# Patient Record
Sex: FEMALE | Race: WHITE | NOT HISPANIC OR LATINO | Employment: UNEMPLOYED | URBAN - METROPOLITAN AREA
[De-identification: names, ages, dates, MRNs, and addresses within clinical notes are randomized per-mention and may not be internally consistent; named-entity substitution may affect disease eponyms.]

---

## 2017-02-02 ENCOUNTER — TRANSCRIBE ORDERS (OUTPATIENT)
Dept: ADMINISTRATIVE | Facility: HOSPITAL | Age: 62
End: 2017-02-02

## 2017-02-02 ENCOUNTER — HOSPITAL ENCOUNTER (OUTPATIENT)
Dept: RADIOLOGY | Facility: HOSPITAL | Age: 62
Discharge: HOME | End: 2017-02-02
Attending: PHYSICAL MEDICINE & REHABILITATION
Payer: COMMERCIAL

## 2017-02-02 DIAGNOSIS — M25.561 RIGHT KNEE PAIN, UNSPECIFIED CHRONICITY: ICD-10-CM

## 2017-02-02 DIAGNOSIS — M25.561 RIGHT KNEE PAIN, UNSPECIFIED CHRONICITY: Primary | ICD-10-CM

## 2017-02-02 PROCEDURE — 73562 X-RAY EXAM OF KNEE 3: CPT

## 2017-02-27 ENCOUNTER — APPOINTMENT (OUTPATIENT)
Dept: PHYSICAL THERAPY | Facility: MEDICAL CENTER | Age: 62
End: 2017-02-27
Payer: COMMERCIAL

## 2017-02-27 PROCEDURE — 97162 PT EVAL MOD COMPLEX 30 MIN: CPT

## 2017-11-28 ENCOUNTER — TRANSCRIBE ORDERS (OUTPATIENT)
Dept: ADMINISTRATIVE | Facility: HOSPITAL | Age: 62
End: 2017-11-28

## 2017-11-28 DIAGNOSIS — M51.37 DEGENERATION OF LUMBAR OR LUMBOSACRAL INTERVERTEBRAL DISC: Primary | ICD-10-CM

## 2018-10-11 ENCOUNTER — HOSPITAL ENCOUNTER (EMERGENCY)
Facility: HOSPITAL | Age: 63
Discharge: LEFT BEFORE DISCHARGE | End: 2018-10-11
Attending: EMERGENCY MEDICINE
Payer: COMMERCIAL

## 2018-10-11 ENCOUNTER — APPOINTMENT (EMERGENCY)
Dept: CT IMAGING | Facility: HOSPITAL | Age: 63
End: 2018-10-11
Payer: COMMERCIAL

## 2018-10-11 ENCOUNTER — APPOINTMENT (EMERGENCY)
Dept: RADIOLOGY | Facility: HOSPITAL | Age: 63
End: 2018-10-11
Payer: COMMERCIAL

## 2018-10-11 VITALS
DIASTOLIC BLOOD PRESSURE: 82 MMHG | TEMPERATURE: 98.1 F | RESPIRATION RATE: 20 BRPM | OXYGEN SATURATION: 94 % | SYSTOLIC BLOOD PRESSURE: 161 MMHG | WEIGHT: 150 LBS | HEART RATE: 61 BPM

## 2018-10-11 DIAGNOSIS — R53.1 WEAKNESS: ICD-10-CM

## 2018-10-11 DIAGNOSIS — E86.0 DEHYDRATION: ICD-10-CM

## 2018-10-11 DIAGNOSIS — R06.02 SHORTNESS OF BREATH: ICD-10-CM

## 2018-10-11 DIAGNOSIS — R10.9 ABDOMINAL PAIN: ICD-10-CM

## 2018-10-11 DIAGNOSIS — Z53.29 LEFT AGAINST MEDICAL ADVICE: ICD-10-CM

## 2018-10-11 DIAGNOSIS — R53.1 ASTHENIA: Primary | ICD-10-CM

## 2018-10-11 LAB
ALBUMIN SERPL BCP-MCNC: 3.7 G/DL (ref 3.5–5)
ALP SERPL-CCNC: 61 U/L (ref 46–116)
ALT SERPL W P-5'-P-CCNC: 36 U/L (ref 12–78)
ANION GAP SERPL CALCULATED.3IONS-SCNC: 8 MMOL/L (ref 4–13)
AST SERPL W P-5'-P-CCNC: 30 U/L (ref 5–45)
ATRIAL RATE: 63 BPM
BACTERIA UR QL AUTO: ABNORMAL /HPF
BASOPHILS # BLD AUTO: 0.07 THOUSANDS/ΜL (ref 0–0.1)
BASOPHILS NFR BLD AUTO: 1 % (ref 0–1)
BILIRUB SERPL-MCNC: 0.24 MG/DL (ref 0.2–1)
BILIRUB UR QL STRIP: NEGATIVE
BUN SERPL-MCNC: 8 MG/DL (ref 5–25)
CALCIUM SERPL-MCNC: 9.2 MG/DL (ref 8.3–10.1)
CHLORIDE SERPL-SCNC: 103 MMOL/L (ref 100–108)
CLARITY UR: CLEAR
CO2 SERPL-SCNC: 30 MMOL/L (ref 21–32)
COLOR UR: YELLOW
CREAT SERPL-MCNC: 0.82 MG/DL (ref 0.6–1.3)
EOSINOPHIL # BLD AUTO: 0.1 THOUSAND/ΜL (ref 0–0.61)
EOSINOPHIL NFR BLD AUTO: 1 % (ref 0–6)
ERYTHROCYTE [DISTWIDTH] IN BLOOD BY AUTOMATED COUNT: 13.8 % (ref 11.6–15.1)
GFR SERPL CREATININE-BSD FRML MDRD: 76 ML/MIN/1.73SQ M
GLUCOSE SERPL-MCNC: 84 MG/DL (ref 65–140)
GLUCOSE UR STRIP-MCNC: NEGATIVE MG/DL
HCT VFR BLD AUTO: 42.6 % (ref 34.8–46.1)
HGB BLD-MCNC: 14.2 G/DL (ref 11.5–15.4)
HGB UR QL STRIP.AUTO: NEGATIVE
IMM GRANULOCYTES # BLD AUTO: 0.03 THOUSAND/UL (ref 0–0.2)
IMM GRANULOCYTES NFR BLD AUTO: 0 % (ref 0–2)
KETONES UR STRIP-MCNC: NEGATIVE MG/DL
LACTATE SERPL-SCNC: 1.2 MMOL/L (ref 0.5–2)
LEUKOCYTE ESTERASE UR QL STRIP: ABNORMAL
LIPASE SERPL-CCNC: 245 U/L (ref 73–393)
LYMPHOCYTES # BLD AUTO: 2.28 THOUSANDS/ΜL (ref 0.6–4.47)
LYMPHOCYTES NFR BLD AUTO: 30 % (ref 14–44)
MCH RBC QN AUTO: 37.9 PG (ref 26.8–34.3)
MCHC RBC AUTO-ENTMCNC: 33.3 G/DL (ref 31.4–37.4)
MCV RBC AUTO: 114 FL (ref 82–98)
MONOCYTES # BLD AUTO: 0.8 THOUSAND/ΜL (ref 0.17–1.22)
MONOCYTES NFR BLD AUTO: 10 % (ref 4–12)
NEUTROPHILS # BLD AUTO: 4.44 THOUSANDS/ΜL (ref 1.85–7.62)
NEUTS SEG NFR BLD AUTO: 58 % (ref 43–75)
NITRITE UR QL STRIP: NEGATIVE
NON-SQ EPI CELLS URNS QL MICRO: ABNORMAL /HPF
NRBC BLD AUTO-RTO: 0 /100 WBCS
NT-PROBNP SERPL-MCNC: 198 PG/ML
P AXIS: 78 DEGREES
PH UR STRIP.AUTO: 7 [PH] (ref 4.5–8)
PLATELET # BLD AUTO: 308 THOUSANDS/UL (ref 149–390)
PMV BLD AUTO: 9.5 FL (ref 8.9–12.7)
POTASSIUM SERPL-SCNC: 3.8 MMOL/L (ref 3.5–5.3)
PR INTERVAL: 146 MS
PROT SERPL-MCNC: 7.7 G/DL (ref 6.4–8.2)
PROT UR STRIP-MCNC: NEGATIVE MG/DL
QRS AXIS: 49 DEGREES
QRSD INTERVAL: 82 MS
QT INTERVAL: 398 MS
QTC INTERVAL: 407 MS
RBC # BLD AUTO: 3.75 MILLION/UL (ref 3.81–5.12)
RBC #/AREA URNS AUTO: ABNORMAL /HPF
SODIUM SERPL-SCNC: 141 MMOL/L (ref 136–145)
SP GR UR STRIP.AUTO: 1.01 (ref 1–1.03)
T WAVE AXIS: 67 DEGREES
TROPONIN I SERPL-MCNC: <0.02 NG/ML
UROBILINOGEN UR QL STRIP.AUTO: 0.2 E.U./DL
VENTRICULAR RATE: 63 BPM
WBC # BLD AUTO: 7.72 THOUSAND/UL (ref 4.31–10.16)
WBC #/AREA URNS AUTO: ABNORMAL /HPF

## 2018-10-11 PROCEDURE — 99285 EMERGENCY DEPT VISIT HI MDM: CPT

## 2018-10-11 PROCEDURE — 84484 ASSAY OF TROPONIN QUANT: CPT | Performed by: EMERGENCY MEDICINE

## 2018-10-11 PROCEDURE — 71046 X-RAY EXAM CHEST 2 VIEWS: CPT

## 2018-10-11 PROCEDURE — 74177 CT ABD & PELVIS W/CONTRAST: CPT

## 2018-10-11 PROCEDURE — 80053 COMPREHEN METABOLIC PANEL: CPT | Performed by: EMERGENCY MEDICINE

## 2018-10-11 PROCEDURE — 36415 COLL VENOUS BLD VENIPUNCTURE: CPT | Performed by: EMERGENCY MEDICINE

## 2018-10-11 PROCEDURE — 83605 ASSAY OF LACTIC ACID: CPT | Performed by: EMERGENCY MEDICINE

## 2018-10-11 PROCEDURE — 93005 ELECTROCARDIOGRAM TRACING: CPT

## 2018-10-11 PROCEDURE — 83690 ASSAY OF LIPASE: CPT | Performed by: EMERGENCY MEDICINE

## 2018-10-11 PROCEDURE — 81001 URINALYSIS AUTO W/SCOPE: CPT

## 2018-10-11 PROCEDURE — 93010 ELECTROCARDIOGRAM REPORT: CPT | Performed by: INTERNAL MEDICINE

## 2018-10-11 PROCEDURE — 85025 COMPLETE CBC W/AUTO DIFF WBC: CPT | Performed by: EMERGENCY MEDICINE

## 2018-10-11 PROCEDURE — 83880 ASSAY OF NATRIURETIC PEPTIDE: CPT | Performed by: EMERGENCY MEDICINE

## 2018-10-11 RX ADMIN — IOHEXOL 100 ML: 350 INJECTION, SOLUTION INTRAVENOUS at 11:59

## 2018-10-11 NOTE — ED NOTES
Pt taken for walk accompanied by ED staff pt states " I feel heavy as soon as I start walking and need to sit  " Pt edwin Cooper  10/11/18 [id number]

## 2018-10-11 NOTE — ED NOTES
During triage pt sates feeling weak and faint yesterday "Like I was going to pass out"  Pt also states having increased tremors when asked what is the source of her tremors she states "they dont know"          Alan Chaudhry RN  10/11/18 [id number]

## 2018-10-11 NOTE — Clinical Note
2001 [facility] discharge to home/self care      Condition at discharge: Good

## 2018-10-11 NOTE — DISCHARGE INSTRUCTIONS
Acute Abdominal Pain   WHAT YOU NEED TO KNOW:   The cause of your abdominal pain may not be found  If a cause is found, treatment will depend on what the cause is  DISCHARGE INSTRUCTIONS:   Seek care immediately if:   · You vomit blood or cannot stop vomiting  · You have blood in your bowel movement or it looks like tar  · You have bleeding from your rectum  · Your abdomen is larger than usual, more painful, and hard  · You have severe pain in your abdomen  · You stop passing gas and having bowel movements  · You feel weak, dizzy, or faint  Contact your healthcare provider if:   · You have a fever  · You have new signs and symptoms  · Your symptoms do not get better with treatment  · You have questions or concerns about your condition or care  Medicines  may be given to decrease pain, treat an infection, and manage your symptoms  Take your medicine as directed  Call your healthcare provider if you think your medicine is not helping or if you have side effects  Tell him if you are allergic to any medicine  Keep a list of the medicines, vitamins, and herbs you take  Include the amounts, and when and why you take them  Bring the list or the pill bottles to follow-up visits  Carry your medicine list with you in case of an emergency  Manage your symptoms:   · Apply heat  on your abdomen for 20 to 30 minutes every 2 hours for as many days as directed  Heat helps decrease pain and muscle spasms  · Manage your stress  Stress may cause abdominal pain  Your healthcare provider may recommend relaxation techniques and deep breathing exercises to help decrease your stress  Your healthcare provider may recommend you talk to someone about your stress or anxiety, such as a counselor or a trusted friend  Get plenty of sleep and exercise regularly  · Limit or do not drink alcohol  Alcohol can make your abdominal pain worse   Ask your healthcare provider if it is safe for you to drink alcohol  Also ask how much is safe for you to drink  · Do not smoke  Nicotine and other chemicals in cigarettes can damage your esophagus and stomach  Ask your healthcare provider for information if you currently smoke and need help to quit  E-cigarettes or smokeless tobacco still contain nicotine  Talk to your healthcare provider before you use these products  Make changes to the food you eat as directed:  Do not eat foods that cause abdominal pain or other symptoms  Eat small meals more often  · Eat more high-fiber foods if you are constipated  High-fiber foods include fruits, vegetables, whole-grain foods, and legumes  · Do not eat foods that cause gas if you have bloating  Examples include broccoli, cabbage, and cauliflower  Do not drink soda or carbonated drinks, because these may also cause gas  · Do not eat foods or drinks that contain sorbitol or fructose if you have diarrhea and bloating  Some examples are fruit juices, candy, jelly, and sugar-free gum  · Do not eat high-fat foods, such as fried foods, cheeseburgers, hot dogs, and desserts  · Limit or do not drink caffeine  Caffeine may make symptoms, such as heart burn or nausea, worse  · Drink plenty of liquids to prevent dehydration from diarrhea or vomiting  Ask your healthcare provider how much liquid to drink each day and which liquids are best for you  Follow up with your healthcare provider as directed:  Write down your questions so you remember to ask them during your visits  © 2017 [address] Information is for End User's use only and may not be sold, redistributed or otherwise used for commercial purposes  All illustrations and images included in CareNotes® are the copyrighted property of A D A [employer]  or [address]  The above information is an  only  It is not intended as medical advice for individual conditions or treatments   Talk to your doctor, nurse or pharmacist before following any medical regimen to see if it is safe and effective for you  Dehydration   WHAT YOU NEED TO KNOW:   Dehydration is a condition that develops when your body does not have enough fluid  You may become dehydrated if you do not drink enough water or lose too much fluid  Fluid loss may also cause loss of electrolytes (minerals), such as sodium  DISCHARGE INSTRUCTIONS:   Seek care immediately if:   · You have a seizure  · You are confused or cannot think clearly  · You are extremely sleepy, or another person cannot wake you  · You become dizzy or faint when you stand  · You are not able to urinate  · You have trouble breathing  · You have a fast or irregular heartbeat  · Your hands or feet are cold, or your face is pale  Contact your healthcare provider if:   · You have trouble drinking liquids because you are vomiting  · Your symptoms get worse  · You have a fever  · You feel very weak or tired  · You have questions or concerns about your condition or care  Follow up with your healthcare provider as directed:  Write down your questions so you remember to ask them during your visits  Prevent or manage dehydration:   · Drink liquids as directed  Liquids that contain water, sugar, and minerals can help your body hold in fluid and help prevent dehydration  Drink liquids throughout the day, not just when you feel thirsty  Men should drink about 3 liters (13 eight-ounce cups) of liquid each day  Women should drink about 2 liters (9 eight-ounce cups) of liquid each day  Drink even more liquid if you will be outdoors, in the sun for a long time, or exercising  · Stay cool  Limit the time you spend outdoors during the hottest part of the day  Dress in lightweight clothes  · Keep track of how often you urinate  If you urinate less than usual or your urine is darker, drink more liquids    © 2017 [employer] Information is for End User's use only and may not be sold, redistributed or otherwise used for commercial purposes  All illustrations and images included in CareNotes® are the copyrighted property of A D A M [employer]  or [employer]  The above information is an  only  It is not intended as medical advice for individual conditions or treatments  Talk to your doctor, nurse or pharmacist before following any medical regimen to see if it is safe and effective for you  Shortness of Breath   AMBULATORY CARE:   Shortness of breath  is a feeling that you cannot get enough air when you breathe in  You may have this feeling only during activity, or all the time  Your symptoms can range from mild to severe  Shortness of breath may be a sign of a serious health condition that needs immediate care  Seek care immediately if:   · Your signs and symptoms are the same or worse within 24 hours of treatment  · The skin over your ribs or on your neck sinks in when you breathe  · You feel confused or dizzy  Contact your healthcare provider if:   · You have new or worsening symptoms  · You have questions or concerns about your condition or care  Treatment:   · Medicines  may be used to treat the cause of your symptoms  You may need medicine to treat a bacterial infection or reduce anxiety  Other medicines may be used to open your airway, reduce swelling, or remove extra fluid  If you have a heart condition, you may need medicine to help your heart beat more strongly or regularly  · Oxygen  may be given to help you breathe more easily  Manage shortness of breath:   · Create an action plan  You and your healthcare provider can work together to create a plan for how to handle shortness of breath  The plan can include daily activities, treatment changes, and what to do if you have severe breathing problems  · Lean forward on your elbows when you sit  This helps your lungs expand and may make it easier to breathe      · Use pursed-lip breathing any time you feel short of breath  Breathe in through your nose and then slowly breathe out through your mouth with your lips slightly puckered  It should take you twice as long to breathe out as it did to breathe in  · Do not smoke  Nicotine and other chemicals in cigarettes and cigars can cause lung damage and make shortness of breath worse  Ask your healthcare provider for information if you currently smoke and need help to quit  E-cigarettes or smokeless tobacco still contain nicotine  Talk to your healthcare provider before you use these products  · Reach or maintain a healthy weight  Your healthcare provider can help you create a safe weight loss plan if you are overweight  · Exercise as directed  Exercise can help your lungs work more easily  Exercise can also help you lose weight if needed  Try to get at least 30 minutes of exercise most days of the week  Follow up with your healthcare provider or specialist as directed:  Write down your questions so you remember to ask them during your visits  © 2017 [address] [facility] Information is for End User's use only and may not be sold, redistributed or otherwise used for commercial purposes  All illustrations and images included in CareNotes® are the copyrighted property of A D A M [employer]  or [employer]  The above information is an  only  It is not intended as medical advice for individual conditions or treatments  Talk to your doctor, nurse or pharmacist before following any medical regimen to see if it is safe and effective for you  Weakness   WHAT YOU NEED TO KNOW:   Weakness is a loss of muscle strength  It may be caused by brain, nerve, or muscle problems  Physical and mental conditions such as heart problems, pregnancy, dehydration, or depression may also cause weakness  Reactions to certain drugs can cause weakness   Parts of your body may become weak if you need to wear a cast or splint or have been on bed rest for a long time  DISCHARGE INSTRUCTIONS:   Call 911 for any of the following:   · You have any of the following signs of a stroke:      ¨ Numbness or drooping on one side of your face     ¨ Weakness in an arm or leg    ¨ Confusion or difficulty speaking    ¨ Dizziness, a severe headache, or vision loss    · You lose feeling in your weakened body area  · You have electric shock-like feelings down your arms and legs when you flex or move your neck  · You have sudden or increased trouble speaking, swallowing, or breathing  Return to the emergency department if:   · You have severe pain in your back, arms, or legs that worsens  · You have sudden or worsened muscle weakness or loss of movement  · You are not able to control when you urinate or have a bowel movement  Contact your healthcare provider if:   · You feel depressed or anxious  · You have questions or concerns about your condition or care  Manage weakness:   · Use assistive devices as directed  These help protect you from injury  Examples include a walker or cane  Have someone install handrails in your home  These will help you get out of a bathtub or stand up from a toilet  Use a shower chair so you can sit while you shower  Sit down on the toilet or another chair to dry off and put on your clothes  Get help going up and down stairs if your legs are weak  · Go to physical or occupational therapy if directed  A physical therapist can teach you exercises to help strengthen weak muscles  An occupational therapist can show you ways to do your daily activities more easily  For example, light forks and spoons can be easier to use if you have hand weakness  You may also learn ways to organize your household items so you are not moving heavy items  · Balance rest with exercise  Exercise can help increase your muscle strength and energy  Do not exercise for long periods at a time   Take breaks often to rest  Too much exercise can cause muscle strain or make you more tired  Ask your healthcare provider how much exercise is right for you  · Eat a variety of healthy foods  Too much or too little food may cause weakness or tiredness  Ask your healthcare provider what a healthy amount of food is for you  Healthy foods include fruits, vegetables, whole-grain breads, low-fat dairy products, lean meats and fish, nuts, and cooked beans  · Do not smoke  Nicotine and other chemicals in cigarettes and cigars can make your symptoms worse, and can cause lung damage  Ask your healthcare provider for information if you currently smoke and need help to quit  E-cigarettes or smokeless tobacco still contain nicotine  Talk to your healthcare provider before you use these products  · Do not use caffeine, alcohol, or illegal drugs  These may cause muscle twitching, which could lead to worsened weakness  Follow up with your healthcare provider as directed:  Write down your questions so you remember to ask them during your visits  © 2017 [address] [facility] Information is for End User's use only and may not be sold, redistributed or otherwise used for commercial purposes  All illustrations and images included in CareNotes® are the copyrighted property of A D A [employer]  or [employer]  The above information is an  only  It is not intended as medical advice for individual conditions or treatments  Talk to your doctor, nurse or pharmacist before following any medical regimen to see if it is safe and effective for you

## 2018-10-11 NOTE — ED PROVIDER NOTES
History  Chief Complaint   Patient presents with    Abdominal Pain     Pt c/o of lower abdminal pain "for a long time but it has worsen the last 2 days"  Pt kiesha diarrhea but states "there is a strange orange color with it"  Pt states feeling nauseous   Shortness of Breath     Pt states increased SOB from baseline today  Pt states "I have had to use my inhaler 4 times today"  44-year-old female presents for evaluation of multiple complaints  Patient states she has chronic abdominal pain which has become acutely worse over the past several days  She rates this pain as severe, constant, nonradiating, worse with touching  Associated with feeling generally weak to the point that she feels unsteady with walking  She states the became so severe yesterday she was felt multiple times and feels similar today  She also states this morning upon awakening she felt mildly short of breath which resolved after using her inhalers  Patient denies headache, focal neuro deficits or weakness, vertiginous symptoms, cough, UR symptoms, chest pain, lower extremity edema or calf pain, diarrhea, constipation, urinary complaints, fevers, chills  History provided by:  Patient      None       Past Medical History:   Diagnosis Date    GERD (gastroesophageal reflux disease)     Hyperlipidemia     Hypertension        Past Surgical History:   Procedure Laterality Date    ULNAR NERVE REPAIR         History reviewed  No pertinent family history  I have reviewed and agree with the history as documented  Social History   Substance Use Topics    Smoking status: Current Every Day Smoker     Packs/day: 1 00     Types: Cigarettes    Smokeless tobacco: Never Used    Alcohol use No        Review of Systems   Constitutional: Negative for activity change, appetite change, fatigue and fever  HENT: Negative for congestion, dental problem, ear pain, rhinorrhea and sore throat  Eyes: Negative for pain and redness  Respiratory: Positive for shortness of breath and wheezing  Negative for chest tightness  Cardiovascular: Negative for chest pain and palpitations  Gastrointestinal: Positive for abdominal pain  Negative for blood in stool, constipation, diarrhea, nausea and vomiting  Endocrine: Negative for cold intolerance and heat intolerance  Genitourinary: Negative for dysuria, frequency and hematuria  Musculoskeletal: Negative for arthralgias and myalgias  Skin: Negative for color change, pallor and rash  Neurological: Positive for weakness  Negative for dizziness, tremors, syncope, facial asymmetry, speech difficulty, light-headedness, numbness and headaches  Hematological: Does not bruise/bleed easily  Psychiatric/Behavioral: Negative for agitation, hallucinations and suicidal ideas  Physical Exam  Physical Exam   Constitutional: She is oriented to person, place, and time  She appears well-developed and well-nourished  HENT:   Mouth/Throat: No oropharyngeal exudate  TMs normal bilaterally no pharyngeal erythema no rhinorrhea nontender palpation of sinuses, normal looking turbinates   Eyes: Conjunctivae and EOM are normal    Neck: Normal range of motion  Neck supple  No meningeal signs   Cardiovascular: Normal rate, regular rhythm, normal heart sounds and intact distal pulses  Pulmonary/Chest: Effort normal and breath sounds normal  No respiratory distress  She has no wheezes  She has no rales  She exhibits no tenderness  Abdominal: Soft  Bowel sounds are normal  She exhibits no distension and no mass  There is tenderness  There is guarding  There is no rebound  No hernia  No cvat   Musculoskeletal: Normal range of motion  She exhibits no edema  Lymphadenopathy:     She has no cervical adenopathy  Neurological: She is alert and oriented to person, place, and time  No cranial nerve deficit  Skin: No rash noted  No erythema     No edema   Psychiatric: She has a normal mood and affect  Her behavior is normal    Nursing note and vitals reviewed        Vital Signs  ED Triage Vitals [10/11/18 1017]   Temperature Pulse Respirations Blood Pressure SpO2   98 1 °F (36 7 °C) 64 20 169/95 95 %      Temp Source Heart Rate Source Patient Position - Orthostatic VS BP Location FiO2 (%)   Oral Monitor Lying Right arm --      Pain Score       7           Vitals:    10/11/18 1017 10/11/18 1222   BP: 169/95 145/72   Pulse: 64 58   Patient Position - Orthostatic VS: Lying Lying       Visual Acuity      ED Medications  Medications   iohexol (OMNIPAQUE) 350 MG/ML injection (MULTI-DOSE) 100 mL (100 mL Intravenous Given 10/11/18 1159)       Diagnostic Studies  Results Reviewed     Procedure Component Value Units Date/Time    Vitamin B12 [[id number]]     Lab Status:  No result Specimen:  Blood     Folate [[id number]]     Lab Status:  No result Specimen:  Blood     Urine Microscopic [[id number]]  (Abnormal) Collected:  10/11/18 1238    Lab Status:  Final result Specimen:  Urine from Urine, Clean Catch Updated:  10/11/18 1305     RBC, UA None Seen /hpf      WBC, UA 1-2 (A) /hpf      Epithelial Cells Occasional /hpf      Bacteria, UA Occasional /hpf     POCT urinalysis dipstick [[id number]]  (Abnormal) Resulted:  10/11/18 1229    Lab Status:  Final result Specimen:  Urine Updated:  10/11/18 1230    ED Urine Macroscopic [[id number]]  (Abnormal) Collected:  10/11/18 1238    Lab Status:  Final result Specimen:  Urine Updated:  10/11/18 1227     Color, UA Yellow     Clarity, UA Clear     pH, UA 7 0     Leukocytes, UA Trace (A)     Nitrite, UA Negative     Protein, UA Negative mg/dl      Glucose, UA Negative mg/dl      Ketones, UA Negative mg/dl      Urobilinogen, UA 0 2 E U /dl      Bilirubin, UA Negative     Blood, UA Negative     Specific Gravity, UA 1 010    Narrative:       CLINITEK RESULT    B-type natriuretic peptide [[id number]]  (Abnormal) Collected:  10/11/18 1039    Lab Status:  Final result Specimen:  Blood from Arm, Right Updated:  10/11/18 1139     NT-proBNP 198 (H) pg/mL     Comprehensive metabolic panel [[id number]] Collected:  10/11/18 1039    Lab Status:  Final result Specimen:  Blood from Arm, Right Updated:  10/11/18 1136     Sodium 141 mmol/L      Potassium 3 8 mmol/L      Chloride 103 mmol/L      CO2 30 mmol/L      ANION GAP 8 mmol/L      BUN 8 mg/dL      Creatinine 0 82 mg/dL      Glucose 84 mg/dL      Calcium 9 2 mg/dL      AST 30 U/L      ALT 36 U/L      Alkaline Phosphatase 61 U/L      Total Protein 7 7 g/dL      Albumin 3 7 g/dL      Total Bilirubin 0 24 mg/dL      eGFR 76 ml/min/1 73sq m     Narrative:         National Kidney Disease Education Program recommendations are as follows:  GFR calculation is accurate only with a steady state creatinine  Chronic Kidney disease less than 60 ml/min/1 73 sq  meters  Kidney failure less than 15 ml/min/1 73 sq  meters  Lipase [[id number]]  (Normal) Collected:  10/11/18 1039    Lab Status:  Final result Specimen:  Blood from Arm, Right Updated:  10/11/18 1136     Lipase 245 u/L     Troponin I [[id number]]  (Normal) Collected:  10/11/18 1039    Lab Status:  Final result Specimen:  Blood from Arm, Right Updated:  10/11/18 1115     Troponin I <0 02 ng/mL     Lactic acid, plasma [[id number]]  (Normal) Collected:  10/11/18 1050    Lab Status:  Final result Specimen:  Blood from Arm, Left Updated:  10/11/18 1114     LACTIC ACID 1 2 mmol/L     Narrative:         Result may be elevated if tourniquet was used during collection      CBC and differential [[id number]]  (Abnormal) Collected:  10/11/18 1039    Lab Status:  Final result Specimen:  Blood from Arm, Right Updated:  10/11/18 1058     WBC 7 72 Thousand/uL      RBC 3 75 (L) Million/uL      Hemoglobin 14 2 g/dL      Hematocrit 42 6 %       (H) fL      MCH 37 9 (H) pg      MCHC 33 3 g/dL      RDW 13 8 %      MPV 9 5 fL      Platelets 472 Thousands/uL      nRBC 0 /100 WBCs      Neutrophils Relative 58 %      Immat GRANS % 0 % Lymphocytes Relative 30 %      Monocytes Relative 10 %      Eosinophils Relative 1 %      Basophils Relative 1 %      Neutrophils Absolute 4 44 Thousands/µL      Immature Grans Absolute 0 03 Thousand/uL      Lymphocytes Absolute 2 28 Thousands/µL      Monocytes Absolute 0 80 Thousand/µL      Eosinophils Absolute 0 10 Thousand/µL      Basophils Absolute 0 07 Thousands/µL                  CT abdomen pelvis with contrast   ED Interpretation by Jigar Valdivia MD (10/11 1304)   Colonic diverticulosis, without evidence of acute diverticulitis  Final Result by Jeffrey Moura MD (10/11 1252)      Colonic diverticulosis, without evidence of acute diverticulitis  Workstation performed: [id number]         XR chest 2 views   ED Interpretation by Jigar Valdivia MD (10/11 1218)   Primary reveiwed: no acute abnormality                 Procedures  ECG 12 Lead Documentation  Date/Time: 10/11/2018 10:35 AM  Performed by: Dayday Mercedes by: Susana JOHNSON     ECG reviewed by me, the ED Provider: yes    Patient location:  ED  Rate:     ECG rate:  63    ECG rate assessment: normal    Rhythm:     Rhythm: sinus rhythm    Ectopy:     Ectopy: none    QRS:     QRS axis:  Normal    QRS intervals:  Normal  Conduction:     Conduction: normal    ST segments:     ST segments:  Normal  T waves:     T waves: non-specific             Phone Contacts  ED Phone Contact    ED Course  ED Course as of Oct 11 1419   Thu Oct 11, 2018   1400 Workup benign  Patient is unable to ambulate  Patient will be admitted  1405 The patient insists on leaving against medical advice, despite my recommendation to remain for ongoing treatment     1: Capacity: I have determined that the patient has capacity to make the decision to leave against medical advice based on the following:    A  Ability to express a choice: The patient is able to express his or her choice and communicate that choice  Lynda Cardenas to understand relevant information:  The patient is able to verbalize their diagnosis, understand information about the purpose of treatment, remember the information, and show that he or she can be part of the decision-making process     C  Ability to appreciate the significance of the information and its consequences: The patient understands the consequences of treatment refusal and the risks and benefits of accepting or refusing treatment     D  Ability to manipulate information: The patient is able to engage in reasoning as it applies to making treatment decisions   2: Psychiatric Consultation: There is not an indication to call psychiatry consultation to determine capacity    3  Alternative Treatment: I have discussed the recommended course of treatment and available alternatives  4  Risks: I have discussed the specific risks of that patient refusing treatment    5  Follow-up Care: I have discussed the follow-up care and advised to see patient's PCP immediately or return here for worsening  6  ED Option: I have emphasized that the patient has the option to return to the ED  Reviewed that we can have continuation of the workup at any time and that we are always open  MDM  Number of Diagnoses or Management Options  Diagnosis management comments: Diffuse abdominal pain with tenderness palpation with guarding-will check abdominal labs including lactic, CT abdomen pelvis read acute intra-abdominal pathology, urine dip, reassess      Generalized weakness-will do ekg/troponin, labs, urine    Sob resolved after inhaler-will do cxr, cardiac labs    CritCare Time    Disposition  Final diagnoses:   Asthenia   Weakness   Abdominal pain   Dehydration   Shortness of breath   Left against medical advice     Time reflects when diagnosis was documented in both MDM as applicable and the Disposition within this note     Time User Action Codes Description Comment    10/11/2018  2:06 PM Sha Fairbanks Add [R53 1] Asthenia     10/11/2018 2:06 PM Skyler Fontan Add [R53 1] Weakness     10/11/2018  2:06 PM Hosak, Loral Blocker Add [R10 9] Abdominal pain     10/11/2018  2:06 PM Skyler Fontan Add [E86 0] Dehydration     10/11/2018  2:06 PM Hosak, Loral Blocker Add [R06 02] Shortness of breath     10/11/2018  2:07 PM Hosak, Loral Blocker Add [Z53 20] Left against medical advice       ED Disposition     ED Disposition Condition Comment    Discharge  [address] [facility] discharge to home/self care  Condition at discharge: Good        Follow-up Information     Follow up With Specialties Details Why Contact Info Additional Information    PCP  Schedule an appointment as soon as possible for a visit in 2 days       [facility] [address] [facility] Schedule an appointment as soon as possible for a visit in 2 days  [facility] [id number]  Yair Mccall [facility] [address], [address] [facility] [address], [address] [facility], [place], [address]          Patient's Medications    No medications on file     No discharge procedures on file      ED Provider  Electronically Signed by           Sonya Nuñez MD  10/11/18 233 Fred Mayorga MD  10/11/18 3294

## 2018-10-11 NOTE — ED NOTES
Pt rang call bell stating that she does not want to be admitted to the hospital and that she would like to be discharged instead  Explained to pt that she was to be admitted to the hospital because she is unable to demonstrate that she can ambulate safely and there are concerns about sending her home  Pt  at bedside stating that she lives with him and he will "take her home and put her in bed and that would be it", states they live in a one story home and would prefer to be discharged home  Explained that pt would likely have to sign out against medical advice because of her inability to safely ambulate, acknowledged understanding  Dr Ericka Gregoryache aware of same        Altaf Jasso RN  10/11/18 5723

## 2018-11-14 ENCOUNTER — ANNUAL EXAM (OUTPATIENT)
Dept: OBGYN CLINIC | Facility: CLINIC | Age: 63
End: 2018-11-14
Payer: COMMERCIAL

## 2018-11-14 VITALS
DIASTOLIC BLOOD PRESSURE: 78 MMHG | SYSTOLIC BLOOD PRESSURE: 122 MMHG | BODY MASS INDEX: 23.79 KG/M2 | HEIGHT: 67 IN | WEIGHT: 151.6 LBS

## 2018-11-14 DIAGNOSIS — N95.2 ATROPHIC VAGINITIS: Primary | ICD-10-CM

## 2018-11-14 DIAGNOSIS — Z12.4 CERVICAL CANCER SCREENING: ICD-10-CM

## 2018-11-14 DIAGNOSIS — Z01.419 WOMEN'S ANNUAL ROUTINE GYNECOLOGICAL EXAMINATION: ICD-10-CM

## 2018-11-14 DIAGNOSIS — Z11.51 SCREENING FOR HUMAN PAPILLOMAVIRUS (HPV): ICD-10-CM

## 2018-11-14 DIAGNOSIS — Z12.39 SCREENING BREAST EXAMINATION: ICD-10-CM

## 2018-11-14 DIAGNOSIS — B37.9 INFECTION DUE TO YEAST: ICD-10-CM

## 2018-11-14 PROCEDURE — G0145 SCR C/V CYTO,THINLAYER,RESCR: HCPCS | Performed by: OBSTETRICS & GYNECOLOGY

## 2018-11-14 PROCEDURE — 99386 PREV VISIT NEW AGE 40-64: CPT | Performed by: OBSTETRICS & GYNECOLOGY

## 2018-11-14 PROCEDURE — 87624 HPV HI-RISK TYP POOLED RSLT: CPT | Performed by: OBSTETRICS & GYNECOLOGY

## 2018-11-14 RX ORDER — LISINOPRIL 2.5 MG/1
2.5 TABLET ORAL
COMMUNITY
Start: 2014-11-30 | End: 2020-06-09 | Stop reason: SDUPTHER

## 2018-11-14 RX ORDER — ALBUTEROL SULFATE 90 UG/1
AEROSOL, METERED RESPIRATORY (INHALATION)
COMMUNITY
Start: 2014-11-30 | End: 2020-08-18 | Stop reason: SDUPTHER

## 2018-11-14 RX ORDER — CLOTRIMAZOLE AND BETAMETHASONE DIPROPIONATE 10; .64 MG/G; MG/G
CREAM TOPICAL 2 TIMES DAILY
Qty: 30 G | Refills: 0 | Status: SHIPPED | OUTPATIENT
Start: 2018-11-14 | End: 2020-06-23 | Stop reason: SDUPTHER

## 2018-11-14 RX ORDER — FLUTICASONE PROPIONATE 50 MCG
SPRAY, SUSPENSION (ML) NASAL
Refills: 1 | COMMUNITY
Start: 2018-10-25 | End: 2020-04-30 | Stop reason: SDUPTHER

## 2018-11-14 RX ORDER — DIAZEPAM 10 MG/1
10 TABLET ORAL 2 TIMES DAILY
Refills: 1 | COMMUNITY
Start: 2018-10-17 | End: 2019-03-25

## 2018-11-14 RX ORDER — ATORVASTATIN CALCIUM 10 MG/1
TABLET, FILM COATED ORAL
Refills: 1 | COMMUNITY
Start: 2018-09-09 | End: 2020-06-09 | Stop reason: SDUPTHER

## 2018-11-14 RX ORDER — ESTRADIOL 0.1 MG/G
1 CREAM VAGINAL DAILY
Qty: 42.5 G | Refills: 0 | Status: SHIPPED | OUTPATIENT
Start: 2018-11-14 | End: 2020-06-23

## 2018-11-14 NOTE — PATIENT INSTRUCTIONS
Pap Smear   GENERAL INFORMATION:   What is a Pap smear? A Pap smear, or Pap test, is a procedure to check your cervix for abnormal cells  The cervix is the narrow opening at the bottom of your uterus  The cervix meets the top part of the vagina  How do I prepare for a Pap smear? The best time to schedule the test is right after your period stops  Do not have a Pap smear during your monthly period  Do not have intercourse or put anything in your vagina for 24 hours before your test    What will happen during a Pap smear? · You will lie on your back and place your feet on footrests called stirrups  Your caregiver will gently insert a device called a speculum into your vagina  The speculum is used to spread the walls of your vagina so he can see your cervix  He will use a thin brush or cotton swab to collect cells from the inside of your cervix  · Your caregiver will also collect cells from the surface of your cervix with a plastic or wooden tool called a spatula  He may also gently scrape the upper part of your vagina for a sample  The samples are placed in a container with liquid or on a glass slide  They are sent to a lab and examined for abnormal cells  How often do I need a Pap smear? Pap smears are usually done every 1 to 3 years  You may need a Pap smear more often if you have any of the following:  · Positive test result for the human papillomavirus (HPV)    · Cervical intraepithelial neoplasm or cervical cancer    · HIV    · A weak immune system    · Exposure to diethylstilbestrol (RIANNA) medicine when your mother was pregnant with you  CARE AGREEMENT:   You have the right to help plan your care  Learn about your health condition and how it may be treated  Discuss treatment options with your caregivers to decide what care you want to receive  You always have the right to refuse treatment  The above information is an  only   It is not intended as medical advice for individual conditions or treatments  Talk to your doctor, nurse or pharmacist before following any medical regimen to see if it is safe and effective for you  © 2014 [address] is for End User's use only and may not be sold, redistributed or otherwise used for commercial purposes  All illustrations and images included in CareNotes® are the copyrighted property of A D A M , Inc  or [employer]

## 2018-11-14 NOTE — PROGRESS NOTES
A/p    1  Annual exam    Last pap many years ago doesn't remember if every abnormal    Done today with HPV      Mammogram same     colonscopy in 2017    2  Atrophic vaginitis    Pt states that not active x years and when tried could not enter do to dryness and  lubercation was not enough    Wants estrogen cream to try    rx sent     3  Superficial yeast in vagina and gluteal fold    Lotrisone cream given       62 y/o   For annual exam   Vaginal dryness - unable to get penile penitration and considering having sex again and wants something   Vaginal itching outside of vagina and gluteal fold   Tender and red in color      Past medical / social / surgical / family history reviewed and updated   Medication and allergies discussed in detail and updated       Review of Systems - History obtained from chart review and the patient  General ROS: negative  Psychological ROS: negative  Ophthalmic ROS: negative  ENT ROS: negative  Allergy and Immunology ROS: negative  Hematological and Lymphatic ROS: negative  Endocrine ROS: negative  Breast ROS: negative for breast lumps  Respiratory ROS: no cough, shortness of breath, or wheezing  Cardiovascular ROS: no chest pain or dyspnea on exertion  Gastrointestinal ROS: no abdominal pain, change in bowel habits, or black or bloody stools  Genito-Urinary ROS: no dysuria, trouble voiding, or hematuria + vagina dryness and itching   Musculoskeletal ROS: negative  Neurological ROS: no TIA or stroke symptoms  Dermatological ROS: superficial yeast in pelvic area  /78 (BP Location: Left arm, Patient Position: Sitting, Cuff Size: Adult)   Ht 5' 7" (1 702 m)   Wt 68 8 kg (151 lb 9 6 oz)   LMP  (LMP Unknown)   Breastfeeding? No   BMI 23 74 kg/m²     Physical Exam   Constitutional: She is oriented to person, place, and time  She appears well-developed  Eyes: Pupils are equal, round, and reactive to light  Neck: Normal range of motion  No thyromegaly present  Cardiovascular: Normal rate  Pulmonary/Chest: Effort normal  No respiratory distress  Right breast exhibits no inverted nipple, no mass and no tenderness  Left breast exhibits no inverted nipple, no mass and no tenderness  Breasts are symmetrical    Abdominal: Soft  She exhibits no distension  There is no tenderness  Genitourinary: Vagina normal and uterus normal  Rectal exam shows no external hemorrhoid  Cervix exhibits no motion tenderness and no discharge  Right adnexum displays no mass, no tenderness and no fullness  Left adnexum displays no mass, no tenderness and no fullness  No vaginal discharge found  Genitourinary Comments: Rash noted around the labia and rectum and in gluteal folds   Lymphadenopathy:     She has no cervical adenopathy  Neurological: She is alert and oriented to person, place, and time  Psychiatric: She has a normal mood and affect  Her behavior is normal  Judgment and thought content normal    Vitals reviewed

## 2018-11-16 LAB
HPV HR 12 DNA CVX QL NAA+PROBE: NEGATIVE
HPV16 DNA CVX QL NAA+PROBE: NEGATIVE
HPV18 DNA CVX QL NAA+PROBE: NEGATIVE

## 2018-11-20 ENCOUNTER — TELEPHONE (OUTPATIENT)
Dept: GASTROENTEROLOGY | Facility: MEDICAL CENTER | Age: 63
End: 2018-11-20

## 2018-11-20 LAB
LAB AP GYN PRIMARY INTERPRETATION: NORMAL
Lab: NORMAL

## 2018-11-23 ENCOUNTER — TELEPHONE (OUTPATIENT)
Dept: OBGYN CLINIC | Facility: CLINIC | Age: 63
End: 2018-11-23

## 2018-11-27 ENCOUNTER — TELEPHONE (OUTPATIENT)
Dept: GASTROENTEROLOGY | Facility: MEDICAL CENTER | Age: 63
End: 2018-11-27

## 2018-11-27 NOTE — TELEPHONE ENCOUNTER
11-27-18 called and left a vm that pt needs to have PCP call in rx for her stomach as our Dr has not seen her yet  Also advised per MA that if her PCP wont call anything in for her she can get medication over the counter such as zantac or prevacid   pm

## 2019-03-25 ENCOUNTER — APPOINTMENT (OUTPATIENT)
Dept: LAB | Facility: HOSPITAL | Age: 64
End: 2019-03-25
Attending: INTERNAL MEDICINE
Payer: COMMERCIAL

## 2019-03-25 ENCOUNTER — OFFICE VISIT (OUTPATIENT)
Dept: GASTROENTEROLOGY | Facility: MEDICAL CENTER | Age: 64
End: 2019-03-25
Payer: COMMERCIAL

## 2019-03-25 ENCOUNTER — HOSPITAL ENCOUNTER (OUTPATIENT)
Dept: RADIOLOGY | Facility: HOSPITAL | Age: 64
Discharge: HOME | End: 2019-03-25
Attending: INTERNAL MEDICINE
Payer: COMMERCIAL

## 2019-03-25 VITALS
BODY MASS INDEX: 22.76 KG/M2 | TEMPERATURE: 96.9 F | HEIGHT: 67 IN | HEART RATE: 67 BPM | SYSTOLIC BLOOD PRESSURE: 122 MMHG | WEIGHT: 145 LBS | DIASTOLIC BLOOD PRESSURE: 72 MMHG

## 2019-03-25 DIAGNOSIS — R13.19 ESOPHAGEAL DYSPHAGIA: Primary | ICD-10-CM

## 2019-03-25 DIAGNOSIS — R19.8 CHANGE IN BOWEL FUNCTION: ICD-10-CM

## 2019-03-25 DIAGNOSIS — R71.8 ELEVATED MCV: ICD-10-CM

## 2019-03-25 DIAGNOSIS — K59.03 DRUG-INDUCED CONSTIPATION: ICD-10-CM

## 2019-03-25 DIAGNOSIS — R10.13 DYSPEPSIA: ICD-10-CM

## 2019-03-25 LAB
FOLATE SERPL-MCNC: >20 NG/ML (ref 3.1–17.5)
VIT B12 SERPL-MCNC: 2771 PG/ML (ref 100–900)

## 2019-03-25 PROCEDURE — 86255 FLUORESCENT ANTIBODY SCREEN: CPT

## 2019-03-25 PROCEDURE — 74022 RADEX COMPL AQT ABD SERIES: CPT

## 2019-03-25 PROCEDURE — 36415 COLL VENOUS BLD VENIPUNCTURE: CPT

## 2019-03-25 PROCEDURE — 82746 ASSAY OF FOLIC ACID SERUM: CPT

## 2019-03-25 PROCEDURE — 82784 ASSAY IGA/IGD/IGG/IGM EACH: CPT

## 2019-03-25 PROCEDURE — 99204 OFFICE O/P NEW MOD 45 MIN: CPT | Performed by: INTERNAL MEDICINE

## 2019-03-25 PROCEDURE — 83516 IMMUNOASSAY NONANTIBODY: CPT

## 2019-03-25 PROCEDURE — 82607 VITAMIN B-12: CPT

## 2019-03-25 RX ORDER — IBUPROFEN 800 MG/1
TABLET ORAL
COMMUNITY
Start: 2019-03-12 | End: 2020-06-23

## 2019-03-25 RX ORDER — OMEPRAZOLE 40 MG/1
40 CAPSULE, DELAYED RELEASE ORAL DAILY
COMMUNITY
End: 2020-06-05 | Stop reason: SDUPTHER

## 2019-03-25 RX ORDER — HYDROCODONE BITARTRATE AND ACETAMINOPHEN 7.5; 325 MG/1; MG/1
1 TABLET ORAL EVERY 8 HOURS PRN
COMMUNITY
Start: 2019-02-25 | End: 2022-03-21 | Stop reason: SDUPTHER

## 2019-03-25 NOTE — PROGRESS NOTES
[facility] - Outpatient Consultation  Kip Miller 61 y o  female MRN: [id number]  Encounter: [id number]          ASSESSMENT AND PLAN:     She is overall poor historian and the history was difficult to obtain     1  Esophageal dysphagia    Due to new onset of solid food dysphagia will plan for EGD evaluation  Will rule out esophageal strictures, eosinophilic esophagitis  Reflux esophagitis, hiatal hernia  We also discuss complications which are associated long-term PPI use such as dementia or chronic kidney disease  I Will encourage her to discontinue PPI use depending on findings on endoscopy  I would like to also perform esophagram for further evaluation     - FL barium swallow; Future  - Case request operating room: ESOPHAGOGASTRODUODENOSCOPY (EGD); Standing    2  Drug-induced constipation with possible overflow diarrhea  She has change in bowel habits between constipation diarrhea she is also taking narcotics for back pain  She also takes ibuprofen as needed  She had a colonoscopy performed a year ago but she does not recall where this was performed and the details regarding it  Will plan for an x-ray to rule out overflow diarrhea  Since she has had diarrhea 3 times this a m  Will plan to rule out infectious etiology  3  Dyspepsia-she has bloating and abdominal discomfort which may be secondary to change in bowel habits  Will hold off on colonoscopy evaluation at this time but this may have be performed in the near future if her symptoms do not improve  Symptoms may be secondary to an overlap constipation irritable bowel syndrome but will confirm this with further workup  4  Change in bowel function  - Clostridium difficile toxin by PCR; Future  - Celiac Disease Antibody Profile; Future  - Stool Enteric Bacterial Panel by PCR; Future  - Vitamin B12; Future  - Folate; Future  - Giardia antigen; Future  - XR abdomen obstruction series;  Future  - H  pylori antigen, stool; Future    5  Elevated MCV-etiology unclear will plan to check B12 and folate  Denies any alcohol use     ______________________________________________________________________    HPI:      She is a 80-year-old female with generalized abdominal pain, change in bowel habits, dyspepsia, esophageal dysphagia to solids, drug-induced constipation  Without any clear triggers, alleviating factors  She denies association intake of food or bowel movements  He says sometimes pain on the right upper quadrant is shooting in nature associated back pain  She has had a colonoscopy done within a year but result of this is not available to us and she cannot recall exactly where this was done  Further evaluation of her labs shows elevation of MCV  She denies any alcohol abuse or use  Lastly her major complaint today is regarding change in bowel habits between constipation diarrhea  This may be related to ibuprofen use and use of narcotics for back pain  She states she has been having diarrhea for last several days which is nonbloody and watery and at times constipation  She denies any weight loss and states her clothes fit her well  Lastly she also has esophageal dysphagia with sensation of food getting stuck in epigastric region  She is currently taking PPI daily which has helped her overall symptoms  She did have previous episode of daily reflux/heartburn which has also improved  Denies any fevers, chills, nausea, vomiting, family history of colorectal cancer  REVIEW OF SYSTEMS:    CONSTITUTIONAL: Denies any fever, chills, rigors, and weight loss  HEENT: No earache or tinnitus  Denies hearing loss or visual disturbances  CARDIOVASCULAR: No chest pain or palpitations  RESPIRATORY: Denies any cough, hemoptysis, shortness of breath or dyspnea on exertion  GASTROINTESTINAL: As noted in the History of Present Illness  GENITOURINARY: No problems with urination   Denies any hematuria or dysuria  NEUROLOGIC: No dizziness or vertigo, denies headaches  MUSCULOSKELETAL: Denies any muscle or joint pain  SKIN: Denies skin rashes or itching  ENDOCRINE: Denies excessive thirst  Denies intolerance to heat or cold  PSYCHOSOCIAL: Denies depression or anxiety  Denies any recent memory loss  Historical Information   Past Medical History:   Diagnosis Date    GERD (gastroesophageal reflux disease)     Hyperlipidemia     Hypertension      Past Surgical History:   Procedure Laterality Date    ULNAR NERVE REPAIR       Social History   Social History     Substance and Sexual Activity   Alcohol Use No     Social History     Substance and Sexual Activity   Drug Use No     Social History     Tobacco Use   Smoking Status Current Every Day Smoker    Packs/day: 1 00    Types: Cigarettes   Smokeless Tobacco Never Used     Family History   Problem Relation Age of Onset    Heart failure Mother     Hyperlipidemia Mother     Hypertension Mother        Meds/Allergies       Current Outpatient Medications:     albuterol (PROVENTIL HFA,VENTOLIN HFA) 90 mcg/act inhaler    atorvastatin (LIPITOR) 10 mg tablet    BREO ELLIPTA 200-25 MCG/INH inhaler    clotrimazole-betamethasone (LOTRISONE) 1-0 05 % cream    estradiol (ESTRACE) 0 1 mg/g vaginal cream    fluticasone (FLONASE) 50 mcg/act nasal spray    lisinopril (ZESTRIL) 2 5 mg tablet    omeprazole (PriLOSEC) 40 MG capsule    HYDROcodone-acetaminophen (NORCO) 7 5-325 mg per tablet    ibuprofen (MOTRIN) 800 mg tablet    Allergies   Allergen Reactions    Levofloxacin Other (See Comments)     Weak legs, blurred vision    Carafate [Sucralfate] Rash    Other Palpitations    Tetracycline Rash           Objective     Blood pressure 122/72, pulse 67, temperature (!) 96 9 °F (36 1 °C), temperature source Tympanic, height 5' 7" (1 702 m), weight 65 8 kg (145 lb), not currently breastfeeding  Body mass index is 22 71 kg/m²          PHYSICAL EXAM:      General Appearance:   Alert, cooperative, no distress   HEENT:   Normocephalic, atraumatic, anicteric      Neck:  Supple, symmetrical, trachea midline   Lungs:   Clear to auscultation bilaterally; no rales, rhonchi or wheezing; respirations unlabored    Heart[de-identified]   Regular rate and rhythm; no murmur, rub, or gallop  Abdomen:   Soft, non-tender, non-distended; normal bowel sounds; no masses, no organomegaly    Genitalia:   Deferred    Rectal:   Deferred    Extremities:  No cyanosis, clubbing or edema    Pulses:  2+ and symmetric    Skin:  No jaundice, rashes, or lesions    Lymph nodes:  No palpable cervical lymphadenopathy        Lab Results:   No visits with results within 1 Day(s) from this visit  Latest known visit with results is:   Annual Exam on 11/14/2018   Component Date Value    Case Report 11/14/2018                      Value:Gynecologic Cytology Report                       Case: [id number]                                  Authorizing Provider:  Janet Wilkinson MD          Collected:           11/14/2018 1127              Ordering Location:     [facility]     Received:            11/14/2018 [place] [address]                                                                    First Screen:          RICHARD Arreguin                                                         Specimen:    LIQUID-BASED PAP, SCREENING, Cervix                                                        Primary Interpretation 11/14/2018 Negative for intraepithelial lesion or malignancy     Specimen Adequacy 11/14/2018 Satisfactory for evaluation  Partially obscuring inflammation   Additional Information 11/14/2018                      Value: This result contains rich text formatting which cannot be displayed here      HPV Other HR 11/14/2018 Negative     HPV16 11/14/2018 Negative     HPV18 11/14/2018 Negative          Radiology Results:   No results found

## 2019-03-25 NOTE — PATIENT INSTRUCTIONS
The patient is scheduled at [place] [address] for an EGD with Dr Christopher Beebe  Prep was gone over with by the MA  She is aware she will be called the day prior with an arrival time  Her other tests were given to her and scheduled

## 2019-03-27 LAB
ENDOMYSIUM IGA SER QL: NEGATIVE
GLIADIN PEPTIDE IGA SER-ACNC: 3 UNITS (ref 0–19)
GLIADIN PEPTIDE IGG SER-ACNC: 4 UNITS (ref 0–19)
IGA SERPL-MCNC: 174 MG/DL (ref 87–352)
TTG IGA SER-ACNC: <2 U/ML (ref 0–3)
TTG IGG SER-ACNC: <2 U/ML (ref 0–5)

## 2019-04-01 ENCOUNTER — HOSPITAL ENCOUNTER (OUTPATIENT)
Dept: RADIOLOGY | Facility: HOSPITAL | Age: 64
Discharge: HOME | End: 2019-04-01
Attending: INTERNAL MEDICINE
Payer: COMMERCIAL

## 2019-04-01 ENCOUNTER — APPOINTMENT (OUTPATIENT)
Dept: LAB | Facility: HOSPITAL | Age: 64
End: 2019-04-01
Attending: INTERNAL MEDICINE
Payer: COMMERCIAL

## 2019-04-01 DIAGNOSIS — R19.8 CHANGE IN BOWEL FUNCTION: ICD-10-CM

## 2019-04-01 DIAGNOSIS — R13.19 ESOPHAGEAL DYSPHAGIA: ICD-10-CM

## 2019-04-01 LAB
C DIFF TOX GENS STL QL NAA+PROBE: NORMAL
CAMPYLOBACTER DNA SPEC NAA+PROBE: NORMAL
SALMONELLA DNA SPEC QL NAA+PROBE: NORMAL
SHIGA TOXIN STX GENE SPEC NAA+PROBE: NORMAL
SHIGELLA DNA SPEC QL NAA+PROBE: NORMAL

## 2019-04-01 PROCEDURE — 74220 X-RAY XM ESOPHAGUS 1CNTRST: CPT

## 2019-04-01 PROCEDURE — 87338 HPYLORI STOOL AG IA: CPT

## 2019-04-01 PROCEDURE — 87329 GIARDIA AG IA: CPT

## 2019-04-01 PROCEDURE — 87505 NFCT AGENT DETECTION GI: CPT

## 2019-04-01 PROCEDURE — 87493 C DIFF AMPLIFIED PROBE: CPT

## 2019-04-02 LAB
G LAMBLIA AG STL QL IA: NEGATIVE
H PYLORI AG STL QL IA: NEGATIVE

## 2019-04-29 NOTE — TELEPHONE ENCOUNTER
----- Message from Lenin Gonzalez MD sent at 11/21/2018  7:49 PM EST -----  Normal pap
Normal pap letter mailed to patient 
CAD (coronary artery disease)

## 2019-05-20 ENCOUNTER — TELEPHONE (OUTPATIENT)
Dept: GASTROENTEROLOGY | Facility: MEDICAL CENTER | Age: 64
End: 2019-05-20

## 2019-06-28 ENCOUNTER — ANESTHESIA EVENT (OUTPATIENT)
Dept: GASTROENTEROLOGY | Facility: MEDICAL CENTER | Age: 64
End: 2019-06-28

## 2019-07-01 ENCOUNTER — HOSPITAL ENCOUNTER (OUTPATIENT)
Dept: GASTROENTEROLOGY | Facility: MEDICAL CENTER | Age: 64
Setting detail: OUTPATIENT SURGERY
Discharge: HOME | End: 2019-07-01
Attending: INTERNAL MEDICINE
Payer: COMMERCIAL

## 2019-07-01 ENCOUNTER — ANESTHESIA (OUTPATIENT)
Dept: GASTROENTEROLOGY | Facility: MEDICAL CENTER | Age: 64
End: 2019-07-01

## 2019-07-01 VITALS
HEART RATE: 52 BPM | BODY MASS INDEX: 22.76 KG/M2 | WEIGHT: 145 LBS | OXYGEN SATURATION: 97 % | SYSTOLIC BLOOD PRESSURE: 120 MMHG | HEIGHT: 67 IN | RESPIRATION RATE: 16 BRPM | DIASTOLIC BLOOD PRESSURE: 68 MMHG

## 2019-07-01 DIAGNOSIS — R13.10 DYSPHAGIA: ICD-10-CM

## 2019-07-01 PROCEDURE — 88305 TISSUE EXAM BY PATHOLOGIST: CPT | Performed by: PATHOLOGY

## 2019-07-01 PROCEDURE — 43239 EGD BIOPSY SINGLE/MULTIPLE: CPT | Performed by: INTERNAL MEDICINE

## 2019-07-01 PROCEDURE — 88112 CYTOPATH CELL ENHANCE TECH: CPT | Performed by: PATHOLOGY

## 2019-07-01 RX ORDER — PROPOFOL 10 MG/ML
INJECTION, EMULSION INTRAVENOUS AS NEEDED
Status: DISCONTINUED | OUTPATIENT
Start: 2019-07-01 | End: 2019-07-01 | Stop reason: SURG

## 2019-07-01 RX ORDER — LIDOCAINE HYDROCHLORIDE 20 MG/ML
INJECTION, SOLUTION EPIDURAL; INFILTRATION; INTRACAUDAL; PERINEURAL AS NEEDED
Status: DISCONTINUED | OUTPATIENT
Start: 2019-07-01 | End: 2019-07-01 | Stop reason: SURG

## 2019-07-01 RX ORDER — SODIUM CHLORIDE 9 MG/ML
125 INJECTION, SOLUTION INTRAVENOUS CONTINUOUS
Status: DISCONTINUED | OUTPATIENT
Start: 2019-07-01 | End: 2019-07-01

## 2019-07-01 RX ADMIN — LIDOCAINE HYDROCHLORIDE 80 MG: 20 INJECTION, SOLUTION EPIDURAL; INFILTRATION; INTRACAUDAL; PERINEURAL at 12:50

## 2019-07-01 RX ADMIN — PROPOFOL 50 MG: 10 INJECTION, EMULSION INTRAVENOUS at 12:55

## 2019-07-01 RX ADMIN — SODIUM CHLORIDE 125 ML/HR: 0.9 INJECTION, SOLUTION INTRAVENOUS at 12:31

## 2019-07-01 RX ADMIN — PROPOFOL 100 MG: 10 INJECTION, EMULSION INTRAVENOUS at 12:50

## 2019-07-01 NOTE — ANESTHESIA PREPROCEDURE EVALUATION
Review of Systems/Medical History  Patient summary reviewed        Cardiovascular  Negative cardio ROS Hyperlipidemia, Hypertension controlled,    Pulmonary  Negative pulmonary ROS Smoker cigarette smoker  , Tobacco cessation counseling given , Asthma , well controlled/ stable Asthma type of rescue: daily inhaler,        GI/Hepatic  Negative GI/hepatic ROS   GERD well controlled,        Negative  ROS        Endo/Other  Negative endo/other ROS      GYN  Negative gynecology ROS          Hematology  Negative hematology ROS      Musculoskeletal  Negative musculoskeletal ROS        Neurology  Negative neurology ROS      Psychology   Negative psychology ROS Anxiety, Depression , being treated for depression,              Physical Exam    Airway    Mallampati score: II  TM Distance: >3 FB  Neck ROM: full     Dental   upper dentures and lower dentures,     Cardiovascular  Comment: Negative ROS, Rhythm: regular, Rate: normal, Cardiovascular exam normal    Pulmonary  Pulmonary exam normal Breath sounds clear to auscultation,     Other Findings        Anesthesia Plan  ASA Score- 2     Anesthesia Type- IV sedation with anesthesia with ASA Monitors  Additional Monitors:   Airway Plan:         Plan Factors- Patient instructed to abstain from smoking on day of procedure  Patient smoked on day of surgery  Induction- intravenous  Postoperative Plan-     Informed Consent- Anesthetic plan and risks discussed with patient

## 2019-07-01 NOTE — DISCHARGE INSTRUCTIONS
Upper Endoscopy   WHAT YOU NEED TO KNOW:   An upper endoscopy is also called an upper gastrointestinal (GI) endoscopy, or an esophagogastroduodenoscopy (EGD)  You may feel bloated, gassy, or have some abdominal discomfort after your procedure  Your throat may be sore for 24 to 36 hours  You may burp or pass gas from air that is still inside your body  DISCHARGE INSTRUCTIONS:   Call 911 if:   · You have sudden chest pain or trouble breathing  Seek care immediately if:   · You feel dizzy or faint  · You have trouble swallowing  · You have severe throat pain  · Your bowel movements are very dark or black  · Your abdomen is hard and firm and you have severe pain  · You vomit blood  Contact your healthcare provider if:   · You feel full or bloated and cannot burp or pass gas  · You have not had a bowel movement for 3 days after your procedure  · You have neck pain  · You have a fever or chills  · You have nausea or are vomiting  · You have a rash or hives  · You have questions or concerns about your endoscopy  Relieve a sore throat:  Suck on throat lozenges or crushed ice  Gargle with a small amount of warm salt water  Mix 1 teaspoon of salt and 1 cup of warm water to make salt water  Relieve gas and discomfort from bloating:  Lie on your right side with a heating pad on your abdomen  Take short walks to help pass gas  Eat small meals until bloating is relieved  Rest after your procedure:  Do not drive or make important decisions until the day after your procedure  Return to your normal activity as directed  You can usually return to work the day after your procedure  Follow up with your healthcare provider as directed:  Write down your questions so you remember to ask them during your visits  © 2017 [employer]  Information is for End User's use only and may not be sold, redistributed or otherwise used for commercial purposes   All illustrations and images included in [employer] are the copyrighted property of [employer] , [employer]  or [employer]  The above information is an  only  It is not intended as medical advice for individual conditions or treatments  Talk to your doctor, nurse or pharmacist before following any medical regimen to see if it is safe and effective for you

## 2019-07-01 NOTE — H&P
History and Physical -  [facility]  [facility]: [id number]                  HPI: Cynthia Paz is a 61y o  year old female who presents for EGD evaluation for history esophageal dysphagia  REVIEW OF SYSTEMS: Per the HPI, and otherwise unremarkable  Historical Information   Past Medical History:   Diagnosis Date    GERD (gastroesophageal reflux disease)     Hyperlipidemia     Hypertension      Past Surgical History:   Procedure Laterality Date    ULNAR NERVE REPAIR       Social History   Social History     Substance and Sexual Activity   Alcohol Use No     Social History     Substance and Sexual Activity   Drug Use No     Social History     Tobacco Use   Smoking Status Current Every Day Smoker    Packs/day: 1 00    Types: Cigarettes   Smokeless Tobacco Never Used     Family History   Problem Relation Age of Onset    Heart failure Mother     Hyperlipidemia Mother     Hypertension Mother        Meds/Allergies       (Not in a hospital admission)    Allergies   Allergen Reactions    Levofloxacin Other (See Comments)     Weak legs, blurred vision    Carafate [Sucralfate] Rash    Other Palpitations     MRI dye    Tetracycline Rash       Objective     not currently breastfeeding  PHYSICAL EXAM    Gen: NAD  CV: RRR  CHEST: Clear  ABD: soft, NT/ND  EXT: no edema      ASSESSMENT/PLAN:  This is a 61y o  year old female here for EGD and she is stable and optimized for her procedure

## 2019-07-01 NOTE — ANESTHESIA POSTPROCEDURE EVALUATION
Post-Op Assessment Note    CV Status:  Stable  Pain Score: 0    Pain management: adequate     Mental Status:  Alert and awake   Hydration Status:  Euvolemic   PONV Controlled:  Controlled   Airway Patency:  Patent   Post Op Vitals Reviewed: Yes      Staff: Anesthesiologist           BP      Temp      Pulse    Resp      SpO2

## 2019-07-10 DIAGNOSIS — B37.81 CANDIDA ESOPHAGITIS (HCC): Primary | ICD-10-CM

## 2019-07-10 RX ORDER — FLUCONAZOLE 200 MG/1
200 TABLET ORAL DAILY
Qty: 14 TABLET | Refills: 0 | Status: SHIPPED | OUTPATIENT
Start: 2019-07-10 | End: 2019-07-11 | Stop reason: SDUPTHER

## 2019-07-11 ENCOUNTER — TELEPHONE (OUTPATIENT)
Dept: GASTROENTEROLOGY | Facility: MEDICAL CENTER | Age: 64
End: 2019-07-11

## 2019-07-11 DIAGNOSIS — B37.81 CANDIDA ESOPHAGITIS (HCC): ICD-10-CM

## 2019-07-11 RX ORDER — FLUCONAZOLE 200 MG/1
200 TABLET ORAL DAILY
Qty: 14 TABLET | Refills: 0 | Status: SHIPPED | OUTPATIENT
Start: 2019-07-11 | End: 2019-07-25

## 2019-07-11 NOTE — TELEPHONE ENCOUNTER
patient is aware of her results but can we resend her rx to pharmacy it was printed in the office   Thank you

## 2019-07-11 NOTE — TELEPHONE ENCOUNTER
----- Message from Karla Gregorio MD sent at 7/10/2019  4:15 PM EDT -----  Brushings were consistent with candidiasis will plan to prescribe fluconazole for this
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
## 2019-12-09 ENCOUNTER — TELEPHONE (OUTPATIENT)
Dept: FAMILY MEDICINE CLINIC | Facility: CLINIC | Age: 64
End: 2019-12-09

## 2019-12-09 NOTE — TELEPHONE ENCOUNTER
Office policy is that if a new patient no shows their first appointment, we will not reschedule their appointment

## 2019-12-09 NOTE — TELEPHONE ENCOUNTER
----- Message from Jorgito Bernal DO sent at 63/7/2517 12:36 PM EST -----  Regarding: NO SHOW  IS SHE  establishing here OR  not? She will need to wait after I am back from recovering from surgery end of January beginning of February

## 2019-12-23 ENCOUNTER — HOSPITAL ENCOUNTER (EMERGENCY)
Facility: HOSPITAL | Age: 64
Discharge: HOME | End: 2019-12-23
Attending: EMERGENCY MEDICINE
Payer: COMMERCIAL

## 2019-12-23 VITALS
RESPIRATION RATE: 18 BRPM | HEART RATE: 84 BPM | DIASTOLIC BLOOD PRESSURE: 87 MMHG | OXYGEN SATURATION: 98 % | SYSTOLIC BLOOD PRESSURE: 151 MMHG | WEIGHT: 148.59 LBS | BODY MASS INDEX: 23.27 KG/M2 | TEMPERATURE: 97.9 F

## 2019-12-23 DIAGNOSIS — B35.9 RINGWORM: ICD-10-CM

## 2019-12-23 DIAGNOSIS — S51.819A SKIN TEAR OF FOREARM WITHOUT COMPLICATION: ICD-10-CM

## 2019-12-23 DIAGNOSIS — T14.8XXA BRUISING: Primary | ICD-10-CM

## 2019-12-23 PROCEDURE — 99284 EMERGENCY DEPT VISIT MOD MDM: CPT | Performed by: EMERGENCY MEDICINE

## 2019-12-23 PROCEDURE — 99283 EMERGENCY DEPT VISIT LOW MDM: CPT

## 2019-12-23 RX ORDER — FLUCONAZOLE 100 MG/1
100 TABLET ORAL ONCE
Qty: 2 TABLET | Refills: 0 | Status: SHIPPED | OUTPATIENT
Start: 2019-12-23 | End: 2019-12-23

## 2019-12-23 RX ORDER — BACITRACIN, NEOMYCIN, POLYMYXIN B 400; 3.5; 5 [USP'U]/G; MG/G; [USP'U]/G
1 OINTMENT TOPICAL ONCE
Status: COMPLETED | OUTPATIENT
Start: 2019-12-23 | End: 2019-12-23

## 2019-12-23 RX ADMIN — BACITRACIN, NEOMYCIN, POLYMYXIN B 1 SMALL APPLICATION: 400; 3.5; 5 OINTMENT TOPICAL at 13:45

## 2019-12-23 NOTE — ED PROVIDER NOTES
History  Chief Complaint   Patient presents with    Bleeding/Bruising     Reports multiple bruises on b/l arms and abdomen  Also reporting a skin tear to right forearm  History provided by:  Patient   used: No    Medical Problem   Quality:  Rash over both forearms and trunk, skin tear over  right forearm  Severity:  Mild  Onset quality:  Gradual  Duration:  1 week  Timing:  Constant  Progression:  Unchanged  Chronicity:  Recurrent  Context:  States she had similar rash in past that resolved after Flucoanzole; did have skin tear day before yesterday, friend cleaned and ressed it yesterday  Relieved by:  Nothing  Worsened by:  Nothing  Ineffective treatments:  None  Associated symptoms: rash    Associated symptoms: no abdominal pain, no chest pain, no cough, no diarrhea, no fever, no headaches, no loss of consciousness, no nausea, no shortness of breath, no sore throat and no vomiting    Rash:     Location:  Arm    Quality: redness      Quality comment:  Circular rash over forearms    Severity:  Mild    Onset quality:  Gradual    Duration:  1 week    Timing:  Intermittent    Progression:  Waxing and waning  Risk factors:  Not on Anticogulation      Prior to Admission Medications   Prescriptions Last Dose Informant Patient Reported? Taking?    BREO ELLIPTA 200-25 MCG/INH inhaler   Yes No   Sig: INHALE 1 PUFF PO QD   HYDROcodone-acetaminophen (NORCO) 7 5-325 mg per tablet   Yes No   albuterol (PROVENTIL HFA,VENTOLIN HFA) 90 mcg/act inhaler   Yes No   Sig: Inhale   atorvastatin (LIPITOR) 10 mg tablet   Yes No   Sig: TK 1 T PO QD   clotrimazole-betamethasone (LOTRISONE) 1-0 05 % cream   No No   Sig: Apply topically 2 (two) times a day To outside of the vagina   estradiol (ESTRACE) 0 1 mg/g vaginal cream   No No   Sig: Insert 1 g into the vagina daily   fluticasone (FLONASE) 50 mcg/act nasal spray   Yes No   Sig: SPRAY TWICE IEN QD   ibuprofen (MOTRIN) 800 mg tablet   Yes No   lisinopril (ZESTRIL) 2 5 mg tablet   Yes No   Sig: Take 2 5 mg by mouth   omeprazole (PriLOSEC) 40 MG capsule   Yes No   Sig: Take 40 mg by mouth daily      Facility-Administered Medications: None       Past Medical History:   Diagnosis Date    Asthma     Fibromyalgia     GERD (gastroesophageal reflux disease)     Hyperlipidemia     Hypertension     Lumbar herniated disc        Past Surgical History:   Procedure Laterality Date    BREAST BIOPSY Left     benign    TUBAL LIGATION      ULNAR NERVE REPAIR         Family History   Problem Relation Age of Onset    Heart failure Mother     Hyperlipidemia Mother     Hypertension Mother      I have reviewed and agree with the history as documented  Social History     Tobacco Use    Smoking status: Current Every Day Smoker     Packs/day: 1 00     Types: Cigarettes    Smokeless tobacco: Never Used   Substance Use Topics    Alcohol use: No    Drug use: No        Review of Systems   Constitutional: Negative for chills and fever  HENT: Negative for facial swelling, sore throat and trouble swallowing  Eyes: Negative for pain and visual disturbance  Respiratory: Negative for cough and shortness of breath  Cardiovascular: Negative for chest pain and leg swelling  Gastrointestinal: Negative for abdominal pain, blood in stool, diarrhea, nausea and vomiting  Genitourinary: Negative for dysuria and flank pain  Musculoskeletal: Negative for back pain, neck pain and neck stiffness  Skin: Positive for rash  Negative for pallor  Allergic/Immunologic: Negative for environmental allergies and immunocompromised state  Neurological: Negative for dizziness, loss of consciousness and headaches  Hematological: Negative for adenopathy  Does not bruise/bleed easily  Psychiatric/Behavioral: Negative for agitation and behavioral problems  All other systems reviewed and are negative        Physical Exam  Physical Exam   Constitutional: She is oriented to person, place, and time  She appears well-developed and well-nourished  No distress  HENT:   Head: Normocephalic and atraumatic  Eyes: EOM are normal    Neck: Normal range of motion  Neck supple  Cardiovascular: Normal rate, regular rhythm, normal heart sounds and intact distal pulses  Pulmonary/Chest: Effort normal and breath sounds normal    Abdominal: Soft  Bowel sounds are normal  There is no tenderness  There is no rebound and no guarding  Musculoskeletal: Normal range of motion  Neurological: She is alert and oriented to person, place, and time  Skin: Skin is warm and dry  Rash noted  Raised rings with central clearing over the forearms, possibly suggestive of ring worm infectioN   Psychiatric: She has a normal mood and affect  Nursing note and vitals reviewed        Vital Signs  ED Triage Vitals [12/23/19 1237]   Temperature Pulse Respirations Blood Pressure SpO2   97 9 °F (36 6 °C) 84 18 151/87 98 %      Temp Source Heart Rate Source Patient Position - Orthostatic VS BP Location FiO2 (%)   Temporal Monitor Sitting Right arm --      Pain Score       --           Vitals:    12/23/19 1237   BP: 151/87   Pulse: 84   Patient Position - Orthostatic VS: Sitting         Visual Acuity      ED Medications  Medications   neomycin-bacitracin-polymyxin b (NEOSPORIN) ointment 1 small application (1 small application Topical Given 12/23/19 1345)       Diagnostic Studies  Results Reviewed     None                 No orders to display              Procedures  Procedures         ED Course                               MDM  Number of Diagnoses or Management Options  Bruising:   Ringworm:   Skin tear of forearm without complication:   Diagnosis management comments: Patient is a 61-year-old female, comes in with recurrent rash to her forearms, states that she has similar rash before which resolved after fluconazole (has pill bottle with her); also reports bruising and skin tear to the right forearm; denies anticoagulation use, takes aspirin intermittently for pain  Denies any other symptoms no chest pain, dyspnea, leg swellings  On exam no acute distress, vital signs stable, circular erythematous rash with central clearing, suggestive of possible ringworm infection noted on forearms  Skin tear to the right forearm which is in place, no active bleeding, no erythema or fluctuance  Impression:  Possible ringworm infection, well-aligned right forearm skin tear, no sign of infection, will give fluconazole as that has resolved the rash in the past, advise follow-up with PCP  Wound instructions given, clean dressing, Neosporin  Disposition  Final diagnoses:   Bruising   Ringworm   Skin tear of forearm without complication     Time reflects when diagnosis was documented in both MDM as applicable and the Disposition within this note     Time User Action Codes Description Comment    12/23/2019  1:48 PM Enrique Riosview  8XXA] Bruising     12/23/2019  1:48 PM Valdez Causey Add [B35 9] Ringworm     12/23/2019  1:48 PM Valdez Causey Add [K33 434N] Skin tear of forearm without complication       ED Disposition     ED Disposition Condition Date/Time Comment    Discharge Stable Mon Dec 23, 2019  1:48 PM Hemanth Miller [facility] discharge to home/self care  Follow-up Information     Follow up With Specialties Details Why Contact Info    Gabrielle Benton DO Family Medicine Schedule an appointment as soon as possible for a visit   [phone]            Patient's Medications   Discharge Prescriptions    FLUCONAZOLE (DIFLUCAN) 100 MG TABLET    Take 1 tablet (100 mg total) by mouth once for 1 dose       Start Date: 12/23/2019End Date: 12/23/2019       Order Dose: 100 mg       Quantity: 2 tablet    Refills: 0     No discharge procedures on file      ED Provider  Electronically Signed by           Maria Guadalupe Srinivasan MD  12/23/19 4450

## 2019-12-23 NOTE — ED NOTES
Skin tear cleanses with would cleanser, abx ointment applied and non-adherent dressing applied        Laurie Yadav RN  12/23/19 0424

## 2020-01-27 ENCOUNTER — TELEPHONE (OUTPATIENT)
Dept: NEUROLOGY | Facility: CLINIC | Age: 65
End: 2020-01-27

## 2020-01-27 NOTE — TELEPHONE ENCOUNTER
Best contact number for patient:  [phone]  Emergency Contact name and number:  Catherine Gutierres - [phone]  Referring provider:  [facility]  Referring provider Telephone:________________    Primary Care Provider Name:   Dr Vika Grubbs  Reason for Appointment/Dx:  termor  Neurology Location patient would like to be seen:  [facility] received? Yes                                                 Records Received? Yes    Have you ever seen another Neurologist? No    Insurance Information    Insurance Name:  Stephania Gutierrez [address]  ID/Policy #:    Secondary Insurance:    ID/Policy#: Workman's Comp/ Accident/ School  Information      Workman's Comp/Accident/School related?  No    If yes name of Insurance company:    Date of Injury:    Open Claim:no    [address] Name and Telephone Number:    Notes:             New pt consult made with dr Pascale Hays - 6/8/20 @ 11A - new pt appt made - placed on waitlist      Appointment date: _____________________________

## 2020-03-24 ENCOUNTER — TELEPHONE (OUTPATIENT)
Dept: FAMILY MEDICINE CLINIC | Facility: CLINIC | Age: 65
End: 2020-03-24

## 2020-04-17 ENCOUNTER — TELEPHONE (OUTPATIENT)
Dept: NEUROLOGY | Facility: CLINIC | Age: 65
End: 2020-04-17

## 2020-04-20 ENCOUNTER — TELEMEDICINE (OUTPATIENT)
Dept: NEUROLOGY | Facility: CLINIC | Age: 65
End: 2020-04-20
Payer: COMMERCIAL

## 2020-04-20 DIAGNOSIS — G25.0 ESSENTIAL TREMOR: Primary | ICD-10-CM

## 2020-04-20 PROCEDURE — 99203 OFFICE O/P NEW LOW 30 MIN: CPT | Performed by: PSYCHIATRY & NEUROLOGY

## 2020-04-20 RX ORDER — PRIMIDONE 50 MG/1
200 TABLET ORAL
Qty: 120 TABLET | Refills: 3 | Status: SHIPPED | OUTPATIENT
Start: 2020-04-20 | End: 2020-08-21 | Stop reason: SDUPTHER

## 2020-04-30 ENCOUNTER — TELEMEDICINE (OUTPATIENT)
Dept: FAMILY MEDICINE CLINIC | Facility: CLINIC | Age: 65
End: 2020-04-30
Payer: COMMERCIAL

## 2020-04-30 DIAGNOSIS — J30.2 SEASONAL ALLERGIC RHINITIS, UNSPECIFIED TRIGGER: ICD-10-CM

## 2020-04-30 DIAGNOSIS — F33.1 MODERATE EPISODE OF RECURRENT MAJOR DEPRESSIVE DISORDER (HCC): Primary | ICD-10-CM

## 2020-04-30 DIAGNOSIS — J45.20 MILD INTERMITTENT ASTHMA, UNSPECIFIED WHETHER COMPLICATED: ICD-10-CM

## 2020-04-30 DIAGNOSIS — I10 ESSENTIAL HYPERTENSION: ICD-10-CM

## 2020-04-30 PROCEDURE — 99215 OFFICE O/P EST HI 40 MIN: CPT | Performed by: FAMILY MEDICINE

## 2020-04-30 RX ORDER — FLUTICASONE PROPIONATE 50 MCG
2 SPRAY, SUSPENSION (ML) NASAL DAILY
Qty: 1 BOTTLE | Refills: 3 | Status: SHIPPED | OUTPATIENT
Start: 2020-04-30 | End: 2021-01-28

## 2020-04-30 RX ORDER — ESCITALOPRAM OXALATE 10 MG/1
10 TABLET ORAL DAILY
Qty: 90 TABLET | Refills: 0 | Status: SHIPPED | OUTPATIENT
Start: 2020-04-30 | End: 2020-06-23

## 2020-05-18 ENCOUNTER — TELEMEDICINE (OUTPATIENT)
Dept: FAMILY MEDICINE CLINIC | Facility: CLINIC | Age: 65
End: 2020-05-18
Payer: COMMERCIAL

## 2020-05-18 DIAGNOSIS — G43.909 MIGRAINE WITHOUT STATUS MIGRAINOSUS, NOT INTRACTABLE, UNSPECIFIED MIGRAINE TYPE: Primary | ICD-10-CM

## 2020-05-18 DIAGNOSIS — L30.9 ECZEMA, UNSPECIFIED TYPE: ICD-10-CM

## 2020-05-18 PROCEDURE — 99214 OFFICE O/P EST MOD 30 MIN: CPT | Performed by: FAMILY MEDICINE

## 2020-05-18 RX ORDER — BETAMETHASONE DIPROPIONATE 0.5 MG/G
CREAM TOPICAL 2 TIMES DAILY
Qty: 30 G | Refills: 0 | Status: SHIPPED | OUTPATIENT
Start: 2020-05-18 | End: 2020-06-23

## 2020-05-18 RX ORDER — SUMATRIPTAN 100 MG/1
100 TABLET, FILM COATED ORAL ONCE AS NEEDED
Qty: 9 TABLET | Refills: 0 | Status: SHIPPED | OUTPATIENT
Start: 2020-05-18 | End: 2020-06-10

## 2020-05-18 RX ORDER — DIAZEPAM 10 MG/1
10 TABLET ORAL 2 TIMES DAILY
COMMUNITY
Start: 2020-05-18 | End: 2020-06-17 | Stop reason: SDUPTHER

## 2020-05-19 ENCOUNTER — NURSE TRIAGE (OUTPATIENT)
Dept: OTHER | Facility: OTHER | Age: 65
End: 2020-05-19

## 2020-05-20 RX ORDER — DIAZEPAM 10 MG/1
10 TABLET ORAL 2 TIMES DAILY
OUTPATIENT
Start: 2020-05-20

## 2020-05-22 ENCOUNTER — TELEPHONE (OUTPATIENT)
Dept: FAMILY MEDICINE CLINIC | Facility: CLINIC | Age: 65
End: 2020-05-22

## 2020-05-29 ENCOUNTER — TELEPHONE (OUTPATIENT)
Dept: FAMILY MEDICINE CLINIC | Facility: CLINIC | Age: 65
End: 2020-05-29

## 2020-06-04 ENCOUNTER — TELEPHONE (OUTPATIENT)
Dept: FAMILY MEDICINE CLINIC | Facility: CLINIC | Age: 65
End: 2020-06-04

## 2020-06-05 ENCOUNTER — TELEMEDICINE (OUTPATIENT)
Dept: FAMILY MEDICINE CLINIC | Facility: CLINIC | Age: 65
End: 2020-06-05
Payer: COMMERCIAL

## 2020-06-05 DIAGNOSIS — K21.9 GASTROESOPHAGEAL REFLUX DISEASE WITHOUT ESOPHAGITIS: ICD-10-CM

## 2020-06-05 DIAGNOSIS — R11.2 NAUSEA AND VOMITING, INTRACTABILITY OF VOMITING NOT SPECIFIED, UNSPECIFIED VOMITING TYPE: Primary | ICD-10-CM

## 2020-06-05 PROBLEM — K59.03 DRUG-INDUCED CONSTIPATION: Status: RESOLVED | Noted: 2019-03-25 | Resolved: 2020-06-05

## 2020-06-05 PROCEDURE — 99214 OFFICE O/P EST MOD 30 MIN: CPT | Performed by: NURSE PRACTITIONER

## 2020-06-05 RX ORDER — ONDANSETRON 4 MG/1
4 TABLET, FILM COATED ORAL EVERY 8 HOURS PRN
Qty: 12 TABLET | Refills: 0 | Status: SHIPPED | OUTPATIENT
Start: 2020-06-05 | End: 2020-06-23 | Stop reason: SDUPTHER

## 2020-06-05 RX ORDER — CARISOPRODOL 250 MG/1
TABLET ORAL
COMMUNITY
Start: 2020-04-22 | End: 2020-06-23

## 2020-06-05 RX ORDER — FLUTICASONE PROPIONATE 44 MCG
2 AEROSOL WITH ADAPTER (GRAM) INHALATION 2 TIMES DAILY
COMMUNITY
Start: 2020-04-27 | End: 2020-06-21

## 2020-06-05 RX ORDER — OMEPRAZOLE 40 MG/1
40 CAPSULE, DELAYED RELEASE ORAL DAILY
Qty: 90 CAPSULE | Refills: 0 | Status: SHIPPED | OUTPATIENT
Start: 2020-06-05 | End: 2020-08-27

## 2020-06-05 RX ORDER — TIZANIDINE 4 MG/1
4 TABLET ORAL
COMMUNITY
Start: 2020-05-27 | End: 2022-05-24 | Stop reason: SDUPTHER

## 2020-06-08 ENCOUNTER — TELEPHONE (OUTPATIENT)
Dept: FAMILY MEDICINE CLINIC | Facility: CLINIC | Age: 65
End: 2020-06-08

## 2020-06-08 ENCOUNTER — TELEMEDICINE (OUTPATIENT)
Dept: FAMILY MEDICINE CLINIC | Facility: CLINIC | Age: 65
End: 2020-06-08
Payer: COMMERCIAL

## 2020-06-08 DIAGNOSIS — F33.1 MODERATE EPISODE OF RECURRENT MAJOR DEPRESSIVE DISORDER (HCC): ICD-10-CM

## 2020-06-08 DIAGNOSIS — R11.2 NAUSEA AND VOMITING, INTRACTABILITY OF VOMITING NOT SPECIFIED, UNSPECIFIED VOMITING TYPE: Primary | ICD-10-CM

## 2020-06-08 DIAGNOSIS — R42 LIGHTHEADEDNESS: ICD-10-CM

## 2020-06-08 PROCEDURE — 99214 OFFICE O/P EST MOD 30 MIN: CPT | Performed by: FAMILY MEDICINE

## 2020-06-09 DIAGNOSIS — I10 ESSENTIAL HYPERTENSION: ICD-10-CM

## 2020-06-09 DIAGNOSIS — E78.5 HYPERLIPIDEMIA, UNSPECIFIED HYPERLIPIDEMIA TYPE: Primary | ICD-10-CM

## 2020-06-09 RX ORDER — ATORVASTATIN CALCIUM 10 MG/1
10 TABLET, FILM COATED ORAL DAILY
Qty: 90 TABLET | Refills: 0 | Status: SHIPPED | OUTPATIENT
Start: 2020-06-09 | End: 2020-08-14 | Stop reason: SDUPTHER

## 2020-06-09 RX ORDER — LISINOPRIL 2.5 MG/1
2.5 TABLET ORAL DAILY
Qty: 90 TABLET | Refills: 0 | Status: SHIPPED | OUTPATIENT
Start: 2020-06-09 | End: 2020-09-01

## 2020-06-10 DIAGNOSIS — G43.909 MIGRAINE WITHOUT STATUS MIGRAINOSUS, NOT INTRACTABLE, UNSPECIFIED MIGRAINE TYPE: ICD-10-CM

## 2020-06-10 RX ORDER — SUMATRIPTAN 100 MG/1
100 TABLET, FILM COATED ORAL ONCE AS NEEDED
Qty: 9 TABLET | Refills: 0 | Status: SHIPPED | OUTPATIENT
Start: 2020-06-10 | End: 2020-06-23

## 2020-06-17 DIAGNOSIS — G25.0 ESSENTIAL TREMOR: Primary | ICD-10-CM

## 2020-06-18 ENCOUNTER — TELEPHONE (OUTPATIENT)
Dept: NEUROLOGY | Facility: CLINIC | Age: 65
End: 2020-06-18

## 2020-06-18 RX ORDER — DIAZEPAM 10 MG/1
10 TABLET ORAL 2 TIMES DAILY
Qty: 10 TABLET | Refills: 0 | Status: SHIPPED | OUTPATIENT
Start: 2020-06-18 | End: 2020-06-24 | Stop reason: SDUPTHER

## 2020-06-19 ENCOUNTER — OFFICE VISIT (OUTPATIENT)
Dept: NEUROLOGY | Facility: CLINIC | Age: 65
End: 2020-06-19
Payer: COMMERCIAL

## 2020-06-19 VITALS
HEART RATE: 107 BPM | WEIGHT: 141.4 LBS | BODY MASS INDEX: 22.15 KG/M2 | TEMPERATURE: 98.4 F | SYSTOLIC BLOOD PRESSURE: 132 MMHG | DIASTOLIC BLOOD PRESSURE: 88 MMHG

## 2020-06-19 DIAGNOSIS — G25.0 ESSENTIAL TREMOR: Primary | ICD-10-CM

## 2020-06-19 DIAGNOSIS — F41.1 GENERALIZED ANXIETY DISORDER: ICD-10-CM

## 2020-06-19 PROCEDURE — 3074F SYST BP LT 130 MM HG: CPT | Performed by: NURSE PRACTITIONER

## 2020-06-19 PROCEDURE — 99215 OFFICE O/P EST HI 40 MIN: CPT | Performed by: NURSE PRACTITIONER

## 2020-06-19 PROCEDURE — 3078F DIAST BP <80 MM HG: CPT | Performed by: NURSE PRACTITIONER

## 2020-06-19 PROCEDURE — 4004F PT TOBACCO SCREEN RCVD TLK: CPT | Performed by: NURSE PRACTITIONER

## 2020-06-20 DIAGNOSIS — J45.20 MILD INTERMITTENT ASTHMA, UNSPECIFIED WHETHER COMPLICATED: Primary | ICD-10-CM

## 2020-06-21 RX ORDER — FLUTICASONE PROPIONATE 44 MCG
AEROSOL WITH ADAPTER (GRAM) INHALATION
Qty: 10.6 INHALER | Refills: 2 | Status: SHIPPED | OUTPATIENT
Start: 2020-06-21 | End: 2020-09-15

## 2020-06-22 ENCOUNTER — APPOINTMENT (OUTPATIENT)
Dept: LAB | Facility: HOSPITAL | Age: 65
End: 2020-06-22
Payer: COMMERCIAL

## 2020-06-22 DIAGNOSIS — G25.0 ESSENTIAL TREMOR: ICD-10-CM

## 2020-06-22 LAB
25(OH)D3 SERPL-MCNC: 75.2 NG/ML (ref 30–100)
ALBUMIN SERPL BCP-MCNC: 3.2 G/DL (ref 3.5–5)
ALP SERPL-CCNC: 37 U/L (ref 46–116)
ALT SERPL W P-5'-P-CCNC: 27 U/L (ref 12–78)
ANION GAP SERPL CALCULATED.3IONS-SCNC: 11 MMOL/L (ref 4–13)
AST SERPL W P-5'-P-CCNC: 21 U/L (ref 5–45)
BASOPHILS # BLD AUTO: 0.07 THOUSANDS/ΜL (ref 0–0.1)
BASOPHILS NFR BLD AUTO: 1 % (ref 0–1)
BILIRUB SERPL-MCNC: 0.44 MG/DL (ref 0.2–1)
BUN SERPL-MCNC: 11 MG/DL (ref 5–25)
CALCIUM SERPL-MCNC: 8.6 MG/DL (ref 8.3–10.1)
CHLORIDE SERPL-SCNC: 102 MMOL/L (ref 100–108)
CHOLEST SERPL-MCNC: 176 MG/DL (ref 50–200)
CO2 SERPL-SCNC: 25 MMOL/L (ref 21–32)
CREAT SERPL-MCNC: 0.8 MG/DL (ref 0.6–1.3)
EOSINOPHIL # BLD AUTO: 0.44 THOUSAND/ΜL (ref 0–0.61)
EOSINOPHIL NFR BLD AUTO: 5 % (ref 0–6)
ERYTHROCYTE [DISTWIDTH] IN BLOOD BY AUTOMATED COUNT: 12.6 % (ref 11.6–15.1)
EST. AVERAGE GLUCOSE BLD GHB EST-MCNC: 111 MG/DL
GFR SERPL CREATININE-BSD FRML MDRD: 78 ML/MIN/1.73SQ M
GLUCOSE P FAST SERPL-MCNC: 87 MG/DL (ref 65–99)
HBA1C MFR BLD: 5.5 %
HCT VFR BLD AUTO: 41 % (ref 34.8–46.1)
HDLC SERPL-MCNC: 77 MG/DL
HGB BLD-MCNC: 13.2 G/DL (ref 11.5–15.4)
IMM GRANULOCYTES # BLD AUTO: 0.01 THOUSAND/UL (ref 0–0.2)
IMM GRANULOCYTES NFR BLD AUTO: 0 % (ref 0–2)
LDLC SERPL CALC-MCNC: 85 MG/DL (ref 0–100)
LYMPHOCYTES # BLD AUTO: 2.91 THOUSANDS/ΜL (ref 0.6–4.47)
LYMPHOCYTES NFR BLD AUTO: 36 % (ref 14–44)
MCH RBC QN AUTO: 34.6 PG (ref 26.8–34.3)
MCHC RBC AUTO-ENTMCNC: 32.2 G/DL (ref 31.4–37.4)
MCV RBC AUTO: 107 FL (ref 82–98)
MONOCYTES # BLD AUTO: 0.65 THOUSAND/ΜL (ref 0.17–1.22)
MONOCYTES NFR BLD AUTO: 8 % (ref 4–12)
NEUTROPHILS # BLD AUTO: 4.07 THOUSANDS/ΜL (ref 1.85–7.62)
NEUTS SEG NFR BLD AUTO: 50 % (ref 43–75)
NONHDLC SERPL-MCNC: 99 MG/DL
NRBC BLD AUTO-RTO: 0 /100 WBCS
PLATELET # BLD AUTO: 293 THOUSANDS/UL (ref 149–390)
PMV BLD AUTO: 9.6 FL (ref 8.9–12.7)
POTASSIUM SERPL-SCNC: 2.8 MMOL/L (ref 3.5–5.3)
PROT SERPL-MCNC: 6.5 G/DL (ref 6.4–8.2)
RBC # BLD AUTO: 3.82 MILLION/UL (ref 3.81–5.12)
SODIUM SERPL-SCNC: 138 MMOL/L (ref 136–145)
TRIGL SERPL-MCNC: 69 MG/DL
TSH SERPL DL<=0.05 MIU/L-ACNC: 0.87 UIU/ML (ref 0.36–3.74)
WBC # BLD AUTO: 8.15 THOUSAND/UL (ref 4.31–10.16)

## 2020-06-22 PROCEDURE — 36415 COLL VENOUS BLD VENIPUNCTURE: CPT

## 2020-06-22 PROCEDURE — 80061 LIPID PANEL: CPT

## 2020-06-22 PROCEDURE — 85025 COMPLETE CBC W/AUTO DIFF WBC: CPT

## 2020-06-22 PROCEDURE — 82306 VITAMIN D 25 HYDROXY: CPT

## 2020-06-22 PROCEDURE — 83036 HEMOGLOBIN GLYCOSYLATED A1C: CPT

## 2020-06-22 PROCEDURE — 84443 ASSAY THYROID STIM HORMONE: CPT

## 2020-06-22 PROCEDURE — 80053 COMPREHEN METABOLIC PANEL: CPT

## 2020-06-23 ENCOUNTER — OFFICE VISIT (OUTPATIENT)
Dept: FAMILY MEDICINE CLINIC | Facility: CLINIC | Age: 65
End: 2020-06-23
Payer: COMMERCIAL

## 2020-06-23 VITALS
SYSTOLIC BLOOD PRESSURE: 120 MMHG | DIASTOLIC BLOOD PRESSURE: 78 MMHG | BODY MASS INDEX: 22.95 KG/M2 | TEMPERATURE: 85.8 F | OXYGEN SATURATION: 97 % | HEIGHT: 66 IN | HEART RATE: 77 BPM | WEIGHT: 142.8 LBS

## 2020-06-23 DIAGNOSIS — B37.9 INFECTION DUE TO YEAST: ICD-10-CM

## 2020-06-23 DIAGNOSIS — I10 ESSENTIAL HYPERTENSION: Primary | ICD-10-CM

## 2020-06-23 DIAGNOSIS — E87.6 HYPOKALEMIA: ICD-10-CM

## 2020-06-23 DIAGNOSIS — R11.2 NAUSEA AND VOMITING, INTRACTABILITY OF VOMITING NOT SPECIFIED, UNSPECIFIED VOMITING TYPE: ICD-10-CM

## 2020-06-23 DIAGNOSIS — Z12.31 SCREENING MAMMOGRAM, ENCOUNTER FOR: ICD-10-CM

## 2020-06-23 PROCEDURE — 99214 OFFICE O/P EST MOD 30 MIN: CPT | Performed by: FAMILY MEDICINE

## 2020-06-23 PROCEDURE — 3074F SYST BP LT 130 MM HG: CPT | Performed by: FAMILY MEDICINE

## 2020-06-23 PROCEDURE — 3008F BODY MASS INDEX DOCD: CPT | Performed by: FAMILY MEDICINE

## 2020-06-23 PROCEDURE — 3078F DIAST BP <80 MM HG: CPT | Performed by: FAMILY MEDICINE

## 2020-06-23 PROCEDURE — 1036F TOBACCO NON-USER: CPT | Performed by: FAMILY MEDICINE

## 2020-06-23 RX ORDER — ONDANSETRON 4 MG/1
4 TABLET, FILM COATED ORAL EVERY 8 HOURS PRN
Qty: 12 TABLET | Refills: 0 | Status: SHIPPED | OUTPATIENT
Start: 2020-06-23 | End: 2020-07-19 | Stop reason: ALTCHOICE

## 2020-06-23 RX ORDER — FLUCONAZOLE 200 MG/1
TABLET ORAL
Qty: 14 TABLET | Refills: 0 | Status: SHIPPED | OUTPATIENT
Start: 2020-06-23 | End: 2020-07-19 | Stop reason: ALTCHOICE

## 2020-06-23 RX ORDER — POTASSIUM CHLORIDE 750 MG/1
10 TABLET, EXTENDED RELEASE ORAL 2 TIMES DAILY
Qty: 30 TABLET | Refills: 0 | Status: SHIPPED | OUTPATIENT
Start: 2020-06-23 | End: 2020-07-19 | Stop reason: ALTCHOICE

## 2020-06-23 RX ORDER — CLOTRIMAZOLE AND BETAMETHASONE DIPROPIONATE 10; .64 MG/G; MG/G
CREAM TOPICAL 2 TIMES DAILY
Qty: 30 G | Refills: 0 | Status: SHIPPED | OUTPATIENT
Start: 2020-06-23 | End: 2021-12-07

## 2020-06-24 DIAGNOSIS — G25.0 ESSENTIAL TREMOR: ICD-10-CM

## 2020-06-24 RX ORDER — DIAZEPAM 10 MG/1
10 TABLET ORAL 2 TIMES DAILY
Qty: 10 TABLET | Refills: 0 | Status: CANCELLED | OUTPATIENT
Start: 2020-06-24

## 2020-06-24 RX ORDER — DIAZEPAM 10 MG/1
10 TABLET ORAL 2 TIMES DAILY
Qty: 60 TABLET | Refills: 0 | Status: SHIPPED | OUTPATIENT
Start: 2020-06-24 | End: 2020-07-24 | Stop reason: SDUPTHER

## 2020-06-28 PROBLEM — F41.1 GENERALIZED ANXIETY DISORDER: Status: ACTIVE | Noted: 2020-06-28

## 2020-07-07 ENCOUNTER — APPOINTMENT (OUTPATIENT)
Dept: LAB | Facility: CLINIC | Age: 65
End: 2020-07-07
Payer: COMMERCIAL

## 2020-07-07 DIAGNOSIS — E87.6 HYPOKALEMIA: ICD-10-CM

## 2020-07-07 LAB
ANION GAP SERPL CALCULATED.3IONS-SCNC: 9 MMOL/L (ref 4–13)
BUN SERPL-MCNC: 19 MG/DL (ref 5–25)
CALCIUM SERPL-MCNC: 9.3 MG/DL (ref 8.3–10.1)
CHLORIDE SERPL-SCNC: 96 MMOL/L (ref 100–108)
CO2 SERPL-SCNC: 22 MMOL/L (ref 21–32)
CREAT SERPL-MCNC: 1.02 MG/DL (ref 0.6–1.3)
GFR SERPL CREATININE-BSD FRML MDRD: 58 ML/MIN/1.73SQ M
GLUCOSE SERPL-MCNC: 90 MG/DL (ref 65–140)
POTASSIUM SERPL-SCNC: 5.1 MMOL/L (ref 3.5–5.3)
SODIUM SERPL-SCNC: 127 MMOL/L (ref 136–145)

## 2020-07-07 PROCEDURE — 36415 COLL VENOUS BLD VENIPUNCTURE: CPT

## 2020-07-07 PROCEDURE — 80048 BASIC METABOLIC PNL TOTAL CA: CPT

## 2020-07-08 ENCOUNTER — TELEPHONE (OUTPATIENT)
Dept: FAMILY MEDICINE CLINIC | Facility: CLINIC | Age: 65
End: 2020-07-08

## 2020-07-08 DIAGNOSIS — E87.1 HYPONATREMIA: Primary | ICD-10-CM

## 2020-07-08 NOTE — TELEPHONE ENCOUNTER
----- Message from Josey Ohara DO sent at 0/9/7359  8:12 AM EDT -----  Patient's potassium level has improved  She should probably already be finished with the potassium tablets  Her sodium now is low but I think that probably is an adjustment from the changes in the electrolytes and we will follow-up with that with a  [facility] lab in 1 month

## 2020-07-08 NOTE — TELEPHONE ENCOUNTER
I would have to review her chart with regards to things that she might have tried for the fibromyalgia  For instance was she ever trialed on gabapentin, Lyrica, Cymbalta, savella?

## 2020-07-08 NOTE — TELEPHONE ENCOUNTER
Spoke w/ pt and gave results  Pt states she also has pain from the fibromyalgia  She states it feels like "electric currents" shooting through her body  Please advise   Thanks

## 2020-07-09 DIAGNOSIS — M79.7 FIBROMYALGIA: ICD-10-CM

## 2020-07-09 DIAGNOSIS — M79.7 FIBROMYALGIA: Primary | ICD-10-CM

## 2020-07-09 RX ORDER — GABAPENTIN 100 MG/1
100 CAPSULE ORAL
Qty: 60 CAPSULE | Refills: 0 | Status: SHIPPED | OUTPATIENT
Start: 2020-07-09 | End: 2020-07-09

## 2020-07-09 RX ORDER — GABAPENTIN 100 MG/1
100 CAPSULE ORAL
Qty: 60 CAPSULE | Refills: 0 | Status: SHIPPED | OUTPATIENT
Start: 2020-07-09 | End: 2020-08-11 | Stop reason: SDUPTHER

## 2020-07-09 NOTE — TELEPHONE ENCOUNTER
I think that gabapentin is a good way to start  We can do low dose and titrate up  I would start with 100 mg at bedtime which is definitely low-dose but we want to make sure that we can monitor for side effects  It is at bedtime because it can be sedating which is a good thing when you have pain  I did place the order into the chart to her current pharmacy listed on the chart

## 2020-07-19 ENCOUNTER — OFFICE VISIT (OUTPATIENT)
Dept: URGENT CARE | Facility: MEDICAL CENTER | Age: 65
End: 2020-07-19
Payer: COMMERCIAL

## 2020-07-19 VITALS
WEIGHT: 140 LBS | BODY MASS INDEX: 22.5 KG/M2 | HEART RATE: 64 BPM | DIASTOLIC BLOOD PRESSURE: 73 MMHG | OXYGEN SATURATION: 95 % | SYSTOLIC BLOOD PRESSURE: 163 MMHG | RESPIRATION RATE: 18 BRPM | TEMPERATURE: 97.4 F | HEIGHT: 66 IN

## 2020-07-19 DIAGNOSIS — H00.014 HORDEOLUM EXTERNUM OF LEFT UPPER EYELID: Primary | ICD-10-CM

## 2020-07-19 PROCEDURE — 99213 OFFICE O/P EST LOW 20 MIN: CPT | Performed by: FAMILY MEDICINE

## 2020-07-19 PROCEDURE — S9083 URGENT CARE CENTER GLOBAL: HCPCS | Performed by: FAMILY MEDICINE

## 2020-07-19 RX ORDER — TOBRAMYCIN 3 MG/ML
1 SOLUTION/ DROPS OPHTHALMIC
Qty: 1.3 ML | Refills: 0 | Status: SHIPPED | COMMUNITY
Start: 2020-07-19 | End: 2020-07-24

## 2020-07-19 NOTE — PROGRESS NOTES
[facility]        NAME: Freddy Shipman is a 59 y o  female  : 1955    MRN: [id number]  DATE: 2020  TIME: 1:32 PM    Assessment and Plan   Hordeolum externum of left upper eyelid [H00 014]  1  Hordeolum externum of left upper eyelid  tobramycin (TOBREX) 0 3 % SOLN         Patient Instructions       Follow up with PCP in 3-5 days  Proceed to  ER if symptoms worsen  Chief Complaint     Chief Complaint   Patient presents with    Eye Swelling     Patient presents with left eye swelling and redness that started last weekend  She reports drainage  History of Present Illness       59-year-old female here today with redness and swelling of left upper eyelid for the past week  Describes some purulent discharge  Also complained blurry vision  Denies eye pain  Review of Systems   Review of Systems   Eyes: Positive for pain, discharge and visual disturbance           Current Medications       Current Outpatient Medications:     albuterol (PROVENTIL HFA,VENTOLIN HFA) 90 mcg/act inhaler, Inhale, Disp: , Rfl:     atorvastatin (LIPITOR) 10 mg tablet, Take 1 tablet (10 mg total) by mouth daily, Disp: 90 tablet, Rfl: 0    BREO ELLIPTA 200-25 MCG/INH inhaler, Inhale 1 puff daily Rinse mouth after use , Disp: 1 Inhaler, Rfl: 11    clotrimazole-betamethasone (LOTRISONE) 1-0 05 % cream, Apply topically 2 (two) times a day To outside of the vagina, Disp: 30 g, Rfl: 0    diazepam (VALIUM) 10 mg tablet, Take 1 tablet (10 mg total) by mouth 2 (two) times a day, Disp: 60 tablet, Rfl: 0    FLOVENT HFA 44 MCG/ACT inhaler, INHALE 2 PUFFS TWICE DAILY, Disp: 10 6 Inhaler, Rfl: 2    fluticasone (FLONASE) 50 mcg/act nasal spray, 2 sprays into each nostril daily, Disp: 1 Bottle, Rfl: 3    gabapentin (NEURONTIN) 100 mg capsule, Take 1 capsule (100 mg total) by mouth daily at bedtime For one week, then increase to TWO capsules thereafter, Disp: 60 capsule, Rfl: 0    HYDROcodone-acetaminophen (NORCO) 7 5-325 mg per tablet, , Disp: , Rfl:     lisinopril (ZESTRIL) 2 5 mg tablet, Take 1 tablet (2 5 mg total) by mouth daily, Disp: 90 tablet, Rfl: 0    omeprazole (PriLOSEC) 40 MG capsule, Take 1 capsule (40 mg total) by mouth daily, Disp: 90 capsule, Rfl: 0    primidone (MYSOLINE) 50 mg tablet, Take 4 tablets (200 mg total) by mouth daily at bedtime Start with 1/2 tab QHS and increase as dir, Disp: 120 tablet, Rfl: 3    tiZANidine (ZANAFLEX) 4 mg tablet, TAKE 1 TABLET BY MOUTH TWICE A DAY AS NEEDED FOR MUSCLE PAIN/SPASMS, Disp: , Rfl:     tobramycin (TOBREX) 0 3 % SOLN, Administer 1 drop into the left eye every 4 (four) hours while awake for 5 days, Disp: 1 3 mL, Rfl: 0    Current Allergies     Allergies as of 07/19/2020 - Reviewed 07/19/2020   Allergen Reaction Noted    Levofloxacin Other (See Comments) 02/26/2017    Carafate [sucralfate] Rash 10/11/2018    Other Palpitations 03/15/2016    Tetracycline Rash 10/11/2018            The following portions of the patient's history were reviewed and updated as appropriate: allergies, current medications, past family history, past medical history, past social history, past surgical history and problem list      Past Medical History:   Diagnosis Date    Asthma     Fibromyalgia     GERD (gastroesophageal reflux disease)     Hyperlipidemia     Hypertension     Lumbar herniated disc        Past Surgical History:   Procedure Laterality Date    BREAST BIOPSY Left     benign    TUBAL LIGATION      ULNAR NERVE REPAIR         Family History   Problem Relation Age of Onset    Heart failure Mother     Hyperlipidemia Mother     Hypertension Mother     Coronary artery disease Brother     COPD Maternal Grandmother          Medications have been verified          Objective   /73   Pulse 64   Temp (!) 97 4 °F (36 3 °C) (Tympanic)   Resp 18   Ht 5' 6" (1 676 m)   Wt 63 5 kg (140 lb)   LMP  (LMP Unknown)   SpO2 95%   BMI 22 60 kg/m² Physical Exam     Physical Exam   Constitutional: She appears well-developed and well-nourished  Eyes: Pupils are equal, round, and reactive to light  EOM are normal    Left upper eyelid reveals erythema and induration lateral aspect with a small papule observed  Tender to touch  Findings consistent with stye  Vitals reviewed

## 2020-07-19 NOTE — PATIENT INSTRUCTIONS
I prescribed tobramycin eyedrops-1 drop into left eye every 4 hours while awake for the next 5 days  Also advised patient apply warm moist compress to left upper eyelid as often as needed  If redness or swelling persists or worsen, she is to consult with eye care specialist     Johnnie   WHAT YOU NEED TO KNOW:   A stye is a lump on the edge or inside of your eyelid caused by an infection  A stye can form on your upper or lower eyelid  It usually goes away in 2 to 4 days  DISCHARGE INSTRUCTIONS:   Medicines:   · Antibiotic medicine: This is given as an ointment to put into your eye  It is used to fight an infection caused by bacteria  Use as directed  · Take your medicine as directed  Contact your healthcare provider if you think your medicine is not helping or if you have side effects  Tell him of her if you are allergic to any medicine  Keep a list of the medicines, vitamins, and herbs you take  Include the amounts, and when and why you take them  Bring the list or the pill bottles to follow-up visits  Carry your medicine list with you in case of an emergency  Follow up with your healthcare provider as directed:  Write down your questions so you remember to ask them during your visits  Self-care:   · Use warm compresses: This will help decrease swelling and pain  Wet a clean washcloth with warm water and place it on your eye for 10 to 15 minutes, 3 to 4 times each day or as directed  · Keep your hands away from your eye: This helps to prevent the spread of the infection to other parts of the eye  Wash your hands often with soap and dry with a clean towel  Do not squeeze the stye  · Do not use eye makeup:  Do not wear eye makeup while you have a stye  Eye makeup may carry bacteria and cause another stye  Throw away eye makeup and brushes used to apply the makeup  Use new eye makeup after the stye has gone away  Do not share eye makeup with others      · Prevent another stye:  Wash your face and clean your eyelashes every day  Remove eye makeup with makeup remover  This helps to completely remove eye makeup without heavy rubbing  Contact your healthcare provider if:   · You have redness and discharge around your eye, and your eye pain is getting worse  · Your vision changes  · The stye has not gone away within 7 days  · The stye comes back within a short period of time after treatment  · You have questions or concerns about your condition or care  © 2017 [facility] Information is for End User's use only and may not be sold, redistributed or otherwise used for commercial purposes  All illustrations and images included in CareNotes® are the copyrighted property of A D A M , Inc  or [employer]  The above information is an  only  It is not intended as medical advice for individual conditions or treatments  Talk to your doctor, nurse or pharmacist before following any medical regimen to see if it is safe and effective for you

## 2020-07-24 DIAGNOSIS — F41.1 GENERALIZED ANXIETY DISORDER: Primary | ICD-10-CM

## 2020-07-24 DIAGNOSIS — F11.90 OPIOID USE: ICD-10-CM

## 2020-07-24 DIAGNOSIS — G25.0 ESSENTIAL TREMOR: ICD-10-CM

## 2020-07-24 RX ORDER — NALOXONE HYDROCHLORIDE 4 MG/.1ML
SPRAY NASAL
Qty: 1 EACH | Refills: 1 | Status: SHIPPED | OUTPATIENT
Start: 2020-07-24 | End: 2020-09-28

## 2020-07-24 RX ORDER — DIAZEPAM 10 MG/1
10 TABLET ORAL 2 TIMES DAILY
Qty: 60 TABLET | Refills: 0 | Status: SHIPPED | OUTPATIENT
Start: 2020-07-24 | End: 2020-08-27 | Stop reason: SDUPTHER

## 2020-07-30 ENCOUNTER — TELEPHONE (OUTPATIENT)
Dept: FAMILY MEDICINE CLINIC | Facility: CLINIC | Age: 65
End: 2020-07-30

## 2020-07-30 DIAGNOSIS — J32.4 PANSINUSITIS, UNSPECIFIED CHRONICITY: Primary | ICD-10-CM

## 2020-07-30 RX ORDER — AZITHROMYCIN 250 MG/1
TABLET, FILM COATED ORAL
Qty: 6 TABLET | Refills: 0 | Status: SHIPPED | OUTPATIENT
Start: 2020-07-30 | End: 2020-07-30

## 2020-07-30 RX ORDER — PREDNISONE 10 MG/1
TABLET ORAL
Qty: 21 EACH | Refills: 0 | Status: SHIPPED | OUTPATIENT
Start: 2020-07-30 | End: 2020-09-28

## 2020-07-30 NOTE — TELEPHONE ENCOUNTER
Yes I can  Will send in prednisone taper and antibiotic  I will check her chart for allergies to antibiotic  She can use refresh  For her watery eyes

## 2020-07-30 NOTE — TELEPHONE ENCOUNTER
Ana Nava called the office in regards to she had a stye in her eye and was prescribed eye drops from [facility] now on 07/19/2020  The Tobramycin helped the stye  Pt now has watery eyes, vision is blurry, pt has no redness or swelling  Pt has pressure and headaches  Pt feels she has a sinus infection  Pt does have sinus drainage  Pt had temperature over 100 pt took aspirin for it to lower  Had body aches  Pt has body aches Pt was asking would you prescribe her an antibiotic(ceftin)  and medrol dose pack to help clear this up  Pt uses the [facility] in [place]        Please call   [phone]

## 2020-08-04 ENCOUNTER — TELEPHONE (OUTPATIENT)
Dept: FAMILY MEDICINE CLINIC | Facility: CLINIC | Age: 65
End: 2020-08-04

## 2020-08-04 NOTE — TELEPHONE ENCOUNTER
Patient called and asked if you are willing to prescribe her Mirtazapine 15 mg 1 1/2 QHS  She states that her anxiety and depression is getting worse

## 2020-08-05 DIAGNOSIS — F33.1 MODERATE EPISODE OF RECURRENT MAJOR DEPRESSIVE DISORDER (HCC): Primary | ICD-10-CM

## 2020-08-05 DIAGNOSIS — F33.1 MODERATE EPISODE OF RECURRENT MAJOR DEPRESSIVE DISORDER (HCC): ICD-10-CM

## 2020-08-05 RX ORDER — MIRTAZAPINE 15 MG/1
TABLET, FILM COATED ORAL
Qty: 45 TABLET | Refills: 5 | Status: SHIPPED | OUTPATIENT
Start: 2020-08-05 | End: 2020-08-05

## 2020-08-05 RX ORDER — MIRTAZAPINE 15 MG/1
TABLET, FILM COATED ORAL
Qty: 45 TABLET | Refills: 5 | Status: SHIPPED | OUTPATIENT
Start: 2020-08-05 | End: 2020-12-19 | Stop reason: SDUPTHER

## 2020-08-05 NOTE — TELEPHONE ENCOUNTER
Just let patient know that at [facility] with the good Rx card the prescription for 45 tablets of the mirtazapine at 15 mg would be  Approximately $8  It only comes in a 15 mg tablet so by taking 1 and half tablet she would be doing approximately 22 5 mg

## 2020-08-05 NOTE — TELEPHONE ENCOUNTER
Patient is aware  The pharmacy told her that it is not covered under her insurance  She is going to pay full price today    She wants to know if she can be increased to 20 mg

## 2020-08-05 NOTE — TELEPHONE ENCOUNTER
Spoke w/ pt and she states she was on Mirtazapine 15 mg about 2 years ago and she states that it really helped her

## 2020-08-11 ENCOUNTER — TELEPHONE (OUTPATIENT)
Dept: FAMILY MEDICINE CLINIC | Facility: CLINIC | Age: 65
End: 2020-08-11

## 2020-08-11 DIAGNOSIS — M79.7 FIBROMYALGIA: ICD-10-CM

## 2020-08-11 RX ORDER — GABAPENTIN 100 MG/1
200 CAPSULE ORAL
Qty: 60 CAPSULE | Refills: 3 | Status: SHIPPED | OUTPATIENT
Start: 2020-08-11 | End: 2020-09-28

## 2020-08-11 NOTE — TELEPHONE ENCOUNTER
Patient called stating she is having side effects she believes from the gabapentin  She has blurry vision, daytime drowsiness, some ankle swelling  She is not sure if she wants to continue this medication, they are all listed as side effects on the print out she gets from the pharmacy  She states she "can probably do without it"

## 2020-08-12 NOTE — TELEPHONE ENCOUNTER
Gabapentin is not for everyone  If she is having side effects she should discontinue  She will have to have a washout and then we can consider other options  Maybe she needs to have follow-up here in the office at some point time in a few weeks verses when is her next scheduled appointment?

## 2020-08-13 ENCOUNTER — APPOINTMENT (OUTPATIENT)
Dept: LAB | Facility: CLINIC | Age: 65
End: 2020-08-13
Payer: COMMERCIAL

## 2020-08-13 DIAGNOSIS — E87.1 HYPONATREMIA: ICD-10-CM

## 2020-08-13 LAB
ANION GAP SERPL CALCULATED.3IONS-SCNC: 5 MMOL/L (ref 4–13)
BUN SERPL-MCNC: 8 MG/DL (ref 5–25)
CALCIUM SERPL-MCNC: 9.1 MG/DL (ref 8.3–10.1)
CHLORIDE SERPL-SCNC: 106 MMOL/L (ref 100–108)
CO2 SERPL-SCNC: 27 MMOL/L (ref 21–32)
CREAT SERPL-MCNC: 0.96 MG/DL (ref 0.6–1.3)
GFR SERPL CREATININE-BSD FRML MDRD: 62 ML/MIN/1.73SQ M
GLUCOSE P FAST SERPL-MCNC: 89 MG/DL (ref 65–99)
POTASSIUM SERPL-SCNC: 4.7 MMOL/L (ref 3.5–5.3)
SODIUM SERPL-SCNC: 138 MMOL/L (ref 136–145)

## 2020-08-13 PROCEDURE — 36415 COLL VENOUS BLD VENIPUNCTURE: CPT

## 2020-08-13 PROCEDURE — 80048 BASIC METABOLIC PNL TOTAL CA: CPT

## 2020-08-13 NOTE — TELEPHONE ENCOUNTER
No let us keep that appointment  She can keep us updated  So I am not going to refill the gabapentin correct?

## 2020-08-14 DIAGNOSIS — E78.5 HYPERLIPIDEMIA, UNSPECIFIED HYPERLIPIDEMIA TYPE: ICD-10-CM

## 2020-08-14 RX ORDER — ATORVASTATIN CALCIUM 10 MG/1
10 TABLET, FILM COATED ORAL DAILY
Qty: 90 TABLET | Refills: 0 | Status: SHIPPED | OUTPATIENT
Start: 2020-08-14 | End: 2020-11-09 | Stop reason: SDUPTHER

## 2020-08-14 RX ORDER — ATORVASTATIN CALCIUM 10 MG/1
10 TABLET, FILM COATED ORAL DAILY
Qty: 90 TABLET | Refills: 0 | Status: CANCELLED | OUTPATIENT
Start: 2020-08-14

## 2020-08-18 DIAGNOSIS — J45.20 MILD INTERMITTENT ASTHMA, UNSPECIFIED WHETHER COMPLICATED: Primary | ICD-10-CM

## 2020-08-18 RX ORDER — ALBUTEROL SULFATE 90 UG/1
2 AEROSOL, METERED RESPIRATORY (INHALATION) EVERY 6 HOURS PRN
Qty: 3 INHALER | Refills: 0 | Status: SHIPPED | OUTPATIENT
Start: 2020-08-18 | End: 2020-10-26

## 2020-08-21 DIAGNOSIS — G25.0 ESSENTIAL TREMOR: ICD-10-CM

## 2020-08-21 RX ORDER — PRIMIDONE 50 MG/1
200 TABLET ORAL
Qty: 120 TABLET | Refills: 3 | Status: SHIPPED | OUTPATIENT
Start: 2020-08-21 | End: 2020-12-07 | Stop reason: SDUPTHER

## 2020-08-27 DIAGNOSIS — K21.9 GASTROESOPHAGEAL REFLUX DISEASE WITHOUT ESOPHAGITIS: ICD-10-CM

## 2020-08-27 DIAGNOSIS — G25.0 ESSENTIAL TREMOR: ICD-10-CM

## 2020-08-27 RX ORDER — DIAZEPAM 10 MG/1
10 TABLET ORAL 2 TIMES DAILY
Qty: 60 TABLET | Refills: 0 | Status: SHIPPED | OUTPATIENT
Start: 2020-08-27 | End: 2020-09-28 | Stop reason: SDUPTHER

## 2020-08-27 RX ORDER — OMEPRAZOLE 40 MG/1
CAPSULE, DELAYED RELEASE ORAL
Qty: 90 CAPSULE | Refills: 0 | Status: SHIPPED | OUTPATIENT
Start: 2020-08-27 | End: 2021-03-30

## 2020-09-01 DIAGNOSIS — I10 ESSENTIAL HYPERTENSION: ICD-10-CM

## 2020-09-01 RX ORDER — LISINOPRIL 2.5 MG/1
TABLET ORAL
Qty: 90 TABLET | Refills: 0 | Status: SHIPPED | OUTPATIENT
Start: 2020-09-01 | End: 2020-12-15 | Stop reason: SDUPTHER

## 2020-09-10 ENCOUNTER — HOSPITAL ENCOUNTER (OUTPATIENT)
Dept: MAMMOGRAPHY | Facility: MEDICAL CENTER | Age: 65
Discharge: HOME | End: 2020-09-10
Payer: COMMERCIAL

## 2020-09-10 VITALS — WEIGHT: 140 LBS | BODY MASS INDEX: 22.5 KG/M2 | HEIGHT: 66 IN

## 2020-09-10 DIAGNOSIS — Z12.31 SCREENING MAMMOGRAM, ENCOUNTER FOR: ICD-10-CM

## 2020-09-10 PROCEDURE — 77063 BREAST TOMOSYNTHESIS BI: CPT

## 2020-09-10 PROCEDURE — 77067 SCR MAMMO BI INCL CAD: CPT

## 2020-09-15 DIAGNOSIS — J45.20 MILD INTERMITTENT ASTHMA, UNSPECIFIED WHETHER COMPLICATED: ICD-10-CM

## 2020-09-15 RX ORDER — FLUTICASONE PROPIONATE 44 MCG
AEROSOL WITH ADAPTER (GRAM) INHALATION
Qty: 10.6 INHALER | Refills: 2 | Status: SHIPPED | OUTPATIENT
Start: 2020-09-15 | End: 2021-01-17 | Stop reason: SDUPTHER

## 2020-09-21 ENCOUNTER — HOSPITAL ENCOUNTER (OUTPATIENT)
Dept: ULTRASOUND IMAGING | Facility: CLINIC | Age: 65
Discharge: HOME | End: 2020-09-21
Payer: COMMERCIAL

## 2020-09-21 ENCOUNTER — HOSPITAL ENCOUNTER (OUTPATIENT)
Dept: MAMMOGRAPHY | Facility: CLINIC | Age: 65
Discharge: HOME | End: 2020-09-21
Payer: COMMERCIAL

## 2020-09-21 VITALS — HEIGHT: 66 IN | TEMPERATURE: 96.4 F | BODY MASS INDEX: 22.5 KG/M2 | WEIGHT: 140 LBS

## 2020-09-21 DIAGNOSIS — R92.8 ABNORMAL MAMMOGRAM: ICD-10-CM

## 2020-09-21 PROCEDURE — 76642 ULTRASOUND BREAST LIMITED: CPT

## 2020-09-21 PROCEDURE — G0279 TOMOSYNTHESIS, MAMMO: HCPCS

## 2020-09-21 PROCEDURE — 77065 DX MAMMO INCL CAD UNI: CPT

## 2020-09-28 ENCOUNTER — OFFICE VISIT (OUTPATIENT)
Dept: FAMILY MEDICINE CLINIC | Facility: CLINIC | Age: 65
End: 2020-09-28
Payer: COMMERCIAL

## 2020-09-28 ENCOUNTER — TELEPHONE (OUTPATIENT)
Dept: FAMILY MEDICINE CLINIC | Facility: CLINIC | Age: 65
End: 2020-09-28

## 2020-09-28 VITALS
WEIGHT: 128.2 LBS | RESPIRATION RATE: 16 BRPM | DIASTOLIC BLOOD PRESSURE: 70 MMHG | OXYGEN SATURATION: 98 % | BODY MASS INDEX: 20.6 KG/M2 | SYSTOLIC BLOOD PRESSURE: 100 MMHG | TEMPERATURE: 97.2 F | HEART RATE: 78 BPM | HEIGHT: 66 IN

## 2020-09-28 DIAGNOSIS — G25.0 ESSENTIAL TREMOR: ICD-10-CM

## 2020-09-28 DIAGNOSIS — I10 ESSENTIAL HYPERTENSION: Primary | ICD-10-CM

## 2020-09-28 DIAGNOSIS — J30.2 SEASONAL ALLERGIC RHINITIS, UNSPECIFIED TRIGGER: ICD-10-CM

## 2020-09-28 DIAGNOSIS — J45.20 MILD INTERMITTENT ASTHMA, UNSPECIFIED WHETHER COMPLICATED: ICD-10-CM

## 2020-09-28 DIAGNOSIS — G43.909 MIGRAINE WITHOUT STATUS MIGRAINOSUS, NOT INTRACTABLE, UNSPECIFIED MIGRAINE TYPE: ICD-10-CM

## 2020-09-28 PROCEDURE — 3074F SYST BP LT 130 MM HG: CPT | Performed by: FAMILY MEDICINE

## 2020-09-28 PROCEDURE — 3288F FALL RISK ASSESSMENT DOCD: CPT | Performed by: FAMILY MEDICINE

## 2020-09-28 PROCEDURE — 3078F DIAST BP <80 MM HG: CPT | Performed by: FAMILY MEDICINE

## 2020-09-28 PROCEDURE — 99214 OFFICE O/P EST MOD 30 MIN: CPT | Performed by: FAMILY MEDICINE

## 2020-09-28 PROCEDURE — 1101F PT FALLS ASSESS-DOCD LE1/YR: CPT | Performed by: FAMILY MEDICINE

## 2020-09-28 RX ORDER — NALOXONE HYDROCHLORIDE 4 MG/.1ML
1 SPRAY NASAL
COMMUNITY
End: 2022-04-20

## 2020-09-28 RX ORDER — SUMATRIPTAN 100 MG/1
100 TABLET, FILM COATED ORAL ONCE AS NEEDED
Qty: 9 TABLET | Refills: 0 | Status: SHIPPED | OUTPATIENT
Start: 2020-09-28 | End: 2020-09-28 | Stop reason: SDUPTHER

## 2020-09-28 RX ORDER — SUMATRIPTAN 100 MG/1
100 TABLET, FILM COATED ORAL ONCE AS NEEDED
Qty: 9 TABLET | Refills: 0 | Status: SHIPPED | OUTPATIENT
Start: 2020-09-28 | End: 2020-10-22

## 2020-09-28 RX ORDER — DIAZEPAM 10 MG/1
10 TABLET ORAL 2 TIMES DAILY
Qty: 60 TABLET | Refills: 0 | Status: SHIPPED | OUTPATIENT
Start: 2020-09-28 | End: 2020-11-02 | Stop reason: SDUPTHER

## 2020-09-28 NOTE — PROGRESS NOTES
Assessment/Plan:       Diagnoses and all orders for this visit:    Essential hypertension    Migraine without status migrainosus, not intractable, unspecified migraine type  -     Discontinue: SUMAtriptan (IMITREX) 100 mg tablet; Take 1 tablet (100 mg total) by mouth once as needed for migraine for up to 1 dose  -     SUMAtriptan (IMITREX) 100 mg tablet; Take 1 tablet (100 mg total) by mouth once as needed for migraine for up to 1 dose    Essential tremor  -     diazepam (VALIUM) 10 mg tablet; Take 1 tablet (10 mg total) by mouth 2 (two) times a day    Mild intermittent asthma, unspecified whether complicated    Seasonal allergic rhinitis, unspecified trigger    Other orders  -     naloxone (NARCAN) 4 mg/0 1 mL nasal spray; 1 spray by Alternating Nares route every 3 (three) minutes as needed for opioid reversal or respiratory depression Administer 1 spray into a nostril  If breathing does not return to normal or if breathing difficulty resumes after 2-3 minutes, give another dose in the other nostril using a new spray  Patient will continue current medical regimen, can feel free to at Claritin or Allegra OTC to her Flonase regimen  Patient does have follow-up with neurology coming up the next few weeks and she is going to talk to him about side effects of the   Primidone and possible alternative  Follow-up here 6 months or p r n  patient declines flu shot or any other immunizations that are recommended for preventative  Subjective:   Chief Complaint   Patient presents with    Follow-up     3 month follow up      Patient ID: Luly Salas is a 72 y o  female  Patient is a 60-year-old female who is here for blood pressure follow-up  She is compliant with medication  She has history of chronic pain is seen by [facility] for lumbar epidural injections  Recently there and had a greater trochanteric  Bursal injection  She is managed with chronic pain  Rx      She needed a refill on her sumatriptan  Patient is noting some bruising spots on her upper extremities  She is not remembering any trauma  She is not on any regular aspirin but did take it for headache a short while ago  She is not on gabapentin  She does get steroid injections  Discussed with patient that bruising in the elderly is call us when the small blood vessels new the skin surface are broken the blood leaks out of the vessels and appears as a black and blue jose elias on the skin  Sometimes the marks actually leave "staining   "   Also complaining that her left ear feels clooged  Allergies are worse in the fall  She wishes to go off of the [facility] as she does not like the texture in taste in her mouth  She is on Flovent p r n  albuterol  Had recent diagnostic mammogram results do show focal asymmetry which is a normal band of dense breast tissue  Recommend routine screening  Patient has a follow-up BMI done which showed normalization of sodium  Full panel done in June  Patient is seen by chronic pain management and recently there on September 15  Patient was given a prednisone taper at that time  Also given a steroid injection in the greater trochanteric bursa left side  The following portions of the patient's history were reviewed and updated as appropriate: allergies, current medications, past family history, past medical history, past social history, past surgical history and problem list       Review of Systems   Constitutional: Positive for fatigue  Negative for fever and unexpected weight change  Eyes: Negative for visual disturbance  Musculoskeletal: Positive for arthralgias, back pain and myalgias  Skin:        Bruising   Neurological: Positive for tremors and headaches  Numbness:   Controlled           Objective:      /70   Pulse 78   Temp (!) 97 2 °F (36 2 °C) (Temporal)   Resp 16   Ht 5' 6" (1 676 m)   Wt 58 2 kg (128 lb 3 2 oz)   LMP  (LMP Unknown)   SpO2 98%   BMI 20 69 kg/m² Physical Exam  Constitutional:       General: She is not in acute distress  Appearance: She is well-developed  HENT:      Head: Normocephalic  Right Ear: Tympanic membrane normal       Left Ear: Tympanic membrane is retracted  Ears:      Comments: Slight drip clear uvula     Nose: Mucosal edema present  Eyes:      Conjunctiva/sclera: Conjunctivae normal       Pupils: Pupils are equal, round, and reactive to light  Neck:      Musculoskeletal: Normal range of motion and neck supple  Vascular: No carotid bruit  Cardiovascular:      Rate and Rhythm: Normal rate and regular rhythm  Heart sounds: No murmur  Pulmonary:      Effort: Pulmonary effort is normal       Breath sounds: Normal breath sounds  Abdominal:      General: Bowel sounds are normal       Palpations: Abdomen is soft  There is no mass  Tenderness: There is no abdominal tenderness  Musculoskeletal: Normal range of motion  Lymphadenopathy:      Cervical: No cervical adenopathy  Skin:     General: Skin is warm and dry  Findings: Bruising (TypicalDiscoloration without breakage of the skin left forearm greater than right) present  Neurological:      Mental Status: She is alert and oriented to person, place, and time  Gait: Gait normal       Deep Tendon Reflexes: Reflexes are normal and symmetric  Comments: Mild tremor noted, slight dysphonia   Psychiatric:         Mood and Affect: Mood normal          Behavior: Behavior normal          Thought Content:  Thought content normal          Judgment: Judgment normal          Medicare wellness next visit

## 2020-10-08 ENCOUNTER — TELEPHONE (OUTPATIENT)
Dept: FAMILY MEDICINE CLINIC | Facility: CLINIC | Age: 65
End: 2020-10-08

## 2020-10-08 DIAGNOSIS — J45.20 MILD INTERMITTENT ASTHMA, UNSPECIFIED WHETHER COMPLICATED: Primary | ICD-10-CM

## 2020-10-08 RX ORDER — FLUTICASONE FUROATE AND VILANTEROL 200; 25 UG/1; UG/1
1 POWDER RESPIRATORY (INHALATION) DAILY
Qty: 1 INHALER | Refills: 2 | Status: SHIPPED | OUTPATIENT
Start: 2020-10-08 | End: 2020-12-18

## 2020-10-13 ENCOUNTER — OFFICE VISIT (OUTPATIENT)
Dept: NEUROLOGY | Facility: CLINIC | Age: 65
End: 2020-10-13
Payer: COMMERCIAL

## 2020-10-13 VITALS
TEMPERATURE: 97.8 F | HEIGHT: 66 IN | DIASTOLIC BLOOD PRESSURE: 70 MMHG | WEIGHT: 129 LBS | HEART RATE: 70 BPM | BODY MASS INDEX: 20.73 KG/M2 | SYSTOLIC BLOOD PRESSURE: 116 MMHG

## 2020-10-13 DIAGNOSIS — G25.0 ESSENTIAL TREMOR: Primary | ICD-10-CM

## 2020-10-13 PROCEDURE — 99214 OFFICE O/P EST MOD 30 MIN: CPT | Performed by: PSYCHIATRY & NEUROLOGY

## 2020-10-13 PROCEDURE — 1036F TOBACCO NON-USER: CPT | Performed by: PSYCHIATRY & NEUROLOGY

## 2020-10-22 DIAGNOSIS — G43.909 MIGRAINE WITHOUT STATUS MIGRAINOSUS, NOT INTRACTABLE, UNSPECIFIED MIGRAINE TYPE: ICD-10-CM

## 2020-10-22 RX ORDER — SUMATRIPTAN 100 MG/1
100 TABLET, FILM COATED ORAL ONCE AS NEEDED
Qty: 9 TABLET | Refills: 0 | Status: SHIPPED | OUTPATIENT
Start: 2020-10-22 | End: 2020-11-02 | Stop reason: SDUPTHER

## 2020-10-26 DIAGNOSIS — J45.20 MILD INTERMITTENT ASTHMA, UNSPECIFIED WHETHER COMPLICATED: ICD-10-CM

## 2020-10-26 RX ORDER — ALBUTEROL SULFATE 90 UG/1
AEROSOL, METERED RESPIRATORY (INHALATION)
Qty: 54 INHALER | Refills: 0 | Status: SHIPPED | OUTPATIENT
Start: 2020-10-26 | End: 2021-01-17

## 2020-11-02 DIAGNOSIS — G43.909 MIGRAINE WITHOUT STATUS MIGRAINOSUS, NOT INTRACTABLE, UNSPECIFIED MIGRAINE TYPE: ICD-10-CM

## 2020-11-02 DIAGNOSIS — G25.0 ESSENTIAL TREMOR: ICD-10-CM

## 2020-11-02 RX ORDER — SUMATRIPTAN 100 MG/1
100 TABLET, FILM COATED ORAL ONCE AS NEEDED
Qty: 9 TABLET | Refills: 0 | Status: SHIPPED | OUTPATIENT
Start: 2020-11-02 | End: 2020-12-02 | Stop reason: SDUPTHER

## 2020-11-02 RX ORDER — DIAZEPAM 10 MG/1
10 TABLET ORAL 2 TIMES DAILY
Qty: 60 TABLET | Refills: 0 | Status: SHIPPED | OUTPATIENT
Start: 2020-11-02 | End: 2020-12-02 | Stop reason: SDUPTHER

## 2020-11-06 ENCOUNTER — TELEMEDICINE (OUTPATIENT)
Dept: FAMILY MEDICINE CLINIC | Facility: CLINIC | Age: 65
End: 2020-11-06
Payer: COMMERCIAL

## 2020-11-06 DIAGNOSIS — R09.81 CONGESTION OF NASAL SINUS: ICD-10-CM

## 2020-11-06 DIAGNOSIS — G44.89 OTHER HEADACHE SYNDROME: ICD-10-CM

## 2020-11-06 DIAGNOSIS — J01.90 ACUTE NON-RECURRENT SINUSITIS, UNSPECIFIED LOCATION: Primary | ICD-10-CM

## 2020-11-06 PROCEDURE — 1036F TOBACCO NON-USER: CPT | Performed by: FAMILY MEDICINE

## 2020-11-06 PROCEDURE — 99214 OFFICE O/P EST MOD 30 MIN: CPT | Performed by: FAMILY MEDICINE

## 2020-11-06 RX ORDER — PREDNISONE 10 MG/1
TABLET ORAL
Qty: 21 TABLET | Refills: 0 | Status: SHIPPED | OUTPATIENT
Start: 2020-11-06 | End: 2020-11-19 | Stop reason: SDUPTHER

## 2020-11-06 RX ORDER — AZITHROMYCIN 250 MG/1
TABLET, FILM COATED ORAL
Qty: 6 TABLET | Refills: 0 | Status: SHIPPED | OUTPATIENT
Start: 2020-11-06 | End: 2020-11-11

## 2020-11-07 DIAGNOSIS — E78.5 HYPERLIPIDEMIA, UNSPECIFIED HYPERLIPIDEMIA TYPE: ICD-10-CM

## 2020-11-07 DIAGNOSIS — G44.89 OTHER HEADACHE SYNDROME: ICD-10-CM

## 2020-11-07 PROCEDURE — U0003 INFECTIOUS AGENT DETECTION BY NUCLEIC ACID (DNA OR RNA); SEVERE ACUTE RESPIRATORY SYNDROME CORONAVIRUS 2 (SARS-COV-2) (CORONAVIRUS DISEASE [COVID-19]), AMPLIFIED PROBE TECHNIQUE, MAKING USE OF HIGH THROUGHPUT TECHNOLOGIES AS DESCRIBED BY CMS-2020-01-R: HCPCS | Performed by: FAMILY MEDICINE

## 2020-11-08 LAB — SARS-COV-2 RNA SPEC QL NAA+PROBE: NOT DETECTED

## 2020-11-09 DIAGNOSIS — E78.5 HYPERLIPIDEMIA, UNSPECIFIED HYPERLIPIDEMIA TYPE: ICD-10-CM

## 2020-11-09 RX ORDER — ATORVASTATIN CALCIUM 10 MG/1
10 TABLET, FILM COATED ORAL DAILY
Qty: 90 TABLET | Refills: 3 | Status: SHIPPED | OUTPATIENT
Start: 2020-11-09 | End: 2021-04-27 | Stop reason: SDUPTHER

## 2020-11-09 RX ORDER — ATORVASTATIN CALCIUM 10 MG/1
TABLET, FILM COATED ORAL
Qty: 90 TABLET | Refills: 0 | OUTPATIENT
Start: 2020-11-09

## 2020-11-16 ENCOUNTER — TELEPHONE (OUTPATIENT)
Dept: FAMILY MEDICINE CLINIC | Facility: CLINIC | Age: 65
End: 2020-11-16

## 2020-11-16 DIAGNOSIS — J01.90 ACUTE NON-RECURRENT SINUSITIS, UNSPECIFIED LOCATION: Primary | ICD-10-CM

## 2020-11-16 RX ORDER — AMOXICILLIN AND CLAVULANATE POTASSIUM 875; 125 MG/1; MG/1
1 TABLET, FILM COATED ORAL EVERY 12 HOURS SCHEDULED
Qty: 14 TABLET | Refills: 0 | Status: SHIPPED | OUTPATIENT
Start: 2020-11-16 | End: 2020-11-23

## 2020-11-19 ENCOUNTER — TELEPHONE (OUTPATIENT)
Dept: FAMILY MEDICINE CLINIC | Facility: CLINIC | Age: 65
End: 2020-11-19

## 2020-11-19 DIAGNOSIS — J01.90 ACUTE NON-RECURRENT SINUSITIS, UNSPECIFIED LOCATION: ICD-10-CM

## 2020-11-19 RX ORDER — PREDNISONE 10 MG/1
TABLET ORAL
Qty: 21 TABLET | Refills: 0 | Status: SHIPPED | OUTPATIENT
Start: 2020-11-19 | End: 2020-11-24 | Stop reason: SDUPTHER

## 2020-11-24 DIAGNOSIS — K21.9 GASTROESOPHAGEAL REFLUX DISEASE WITHOUT ESOPHAGITIS: ICD-10-CM

## 2020-11-24 DIAGNOSIS — G25.0 ESSENTIAL TREMOR: ICD-10-CM

## 2020-11-24 DIAGNOSIS — J01.90 ACUTE NON-RECURRENT SINUSITIS, UNSPECIFIED LOCATION: ICD-10-CM

## 2020-11-24 RX ORDER — PREDNISONE 10 MG/1
TABLET ORAL
Qty: 21 TABLET | Refills: 0 | Status: SHIPPED | OUTPATIENT
Start: 2020-11-24 | End: 2020-12-07 | Stop reason: SDUPTHER

## 2020-11-24 RX ORDER — DIAZEPAM 10 MG/1
10 TABLET ORAL 2 TIMES DAILY
Qty: 60 TABLET | Refills: 0 | OUTPATIENT
Start: 2020-11-24

## 2020-11-24 RX ORDER — OMEPRAZOLE 40 MG/1
40 CAPSULE, DELAYED RELEASE ORAL DAILY
Qty: 90 CAPSULE | Refills: 0 | Status: CANCELLED | OUTPATIENT
Start: 2020-11-24

## 2020-12-02 ENCOUNTER — TELEPHONE (OUTPATIENT)
Dept: FAMILY MEDICINE CLINIC | Facility: CLINIC | Age: 65
End: 2020-12-02

## 2020-12-02 DIAGNOSIS — G43.909 MIGRAINE WITHOUT STATUS MIGRAINOSUS, NOT INTRACTABLE, UNSPECIFIED MIGRAINE TYPE: ICD-10-CM

## 2020-12-02 DIAGNOSIS — U07.1 COVID-19 VIRUS INFECTION: Primary | ICD-10-CM

## 2020-12-02 DIAGNOSIS — G25.0 ESSENTIAL TREMOR: ICD-10-CM

## 2020-12-02 RX ORDER — DIAZEPAM 10 MG/1
10 TABLET ORAL 2 TIMES DAILY
Qty: 60 TABLET | Refills: 0 | Status: SHIPPED | OUTPATIENT
Start: 2020-12-02 | End: 2021-01-02 | Stop reason: SDUPTHER

## 2020-12-02 RX ORDER — SUMATRIPTAN 100 MG/1
100 TABLET, FILM COATED ORAL ONCE AS NEEDED
Qty: 9 TABLET | Refills: 1 | Status: SHIPPED | OUTPATIENT
Start: 2020-12-02 | End: 2020-12-07 | Stop reason: SDUPTHER

## 2020-12-02 NOTE — TELEPHONE ENCOUNTER
In my opinion she needs to be recheck for COVID  I know she tested negative back in November but with these ongoing symptoms I think it would be advisable    We are now recommending sending them to the outpatient mobile van/ tent at [facility]   let me know if she is agreeable and then I will put an order in

## 2020-12-02 NOTE — TELEPHONE ENCOUNTER
I spoke to the patient, she is agreeable and will be going tomorrow for the testing  Please place the order

## 2020-12-02 NOTE — TELEPHONE ENCOUNTER
Patient has completed an antibiotic 2 days ago  Today she has a fever again of 99 9, diarrhea, shortness of breath, cough which she thinks is due to her reflux

## 2020-12-03 DIAGNOSIS — U07.1 COVID-19 VIRUS INFECTION: ICD-10-CM

## 2020-12-03 PROCEDURE — U0003 INFECTIOUS AGENT DETECTION BY NUCLEIC ACID (DNA OR RNA); SEVERE ACUTE RESPIRATORY SYNDROME CORONAVIRUS 2 (SARS-COV-2) (CORONAVIRUS DISEASE [COVID-19]), AMPLIFIED PROBE TECHNIQUE, MAKING USE OF HIGH THROUGHPUT TECHNOLOGIES AS DESCRIBED BY CMS-2020-01-R: HCPCS | Performed by: FAMILY MEDICINE

## 2020-12-04 LAB — SARS-COV-2 RNA SPEC QL NAA+PROBE: NOT DETECTED

## 2020-12-07 ENCOUNTER — TELEPHONE (OUTPATIENT)
Dept: NEUROLOGY | Facility: CLINIC | Age: 65
End: 2020-12-07

## 2020-12-07 DIAGNOSIS — G43.909 MIGRAINE WITHOUT STATUS MIGRAINOSUS, NOT INTRACTABLE, UNSPECIFIED MIGRAINE TYPE: ICD-10-CM

## 2020-12-07 DIAGNOSIS — J01.90 ACUTE NON-RECURRENT SINUSITIS, UNSPECIFIED LOCATION: ICD-10-CM

## 2020-12-07 DIAGNOSIS — G25.0 ESSENTIAL TREMOR: ICD-10-CM

## 2020-12-07 RX ORDER — PRIMIDONE 50 MG/1
200 TABLET ORAL
Qty: 120 TABLET | Refills: 3 | Status: SHIPPED | OUTPATIENT
Start: 2020-12-07 | End: 2021-01-02 | Stop reason: SDUPTHER

## 2020-12-07 RX ORDER — SUMATRIPTAN 100 MG/1
100 TABLET, FILM COATED ORAL ONCE AS NEEDED
Qty: 9 TABLET | Refills: 0 | Status: SHIPPED | OUTPATIENT
Start: 2020-12-07 | End: 2021-01-02 | Stop reason: SDUPTHER

## 2020-12-07 RX ORDER — PREDNISONE 10 MG/1
TABLET ORAL
Qty: 21 TABLET | Refills: 0 | Status: SHIPPED | OUTPATIENT
Start: 2020-12-07 | End: 2020-12-18 | Stop reason: SDUPTHER

## 2020-12-12 DIAGNOSIS — I10 ESSENTIAL HYPERTENSION: ICD-10-CM

## 2020-12-12 RX ORDER — LISINOPRIL 2.5 MG/1
2.5 TABLET ORAL DAILY
Qty: 90 TABLET | Refills: 0 | Status: CANCELLED | OUTPATIENT
Start: 2020-12-12

## 2020-12-14 DIAGNOSIS — I10 ESSENTIAL HYPERTENSION: ICD-10-CM

## 2020-12-14 RX ORDER — LISINOPRIL 2.5 MG/1
2.5 TABLET ORAL DAILY
Qty: 90 TABLET | Refills: 0 | Status: CANCELLED | OUTPATIENT
Start: 2020-12-14

## 2020-12-15 DIAGNOSIS — I10 ESSENTIAL HYPERTENSION: ICD-10-CM

## 2020-12-15 RX ORDER — LISINOPRIL 2.5 MG/1
2.5 TABLET ORAL DAILY
Qty: 90 TABLET | Refills: 3 | Status: SHIPPED | OUTPATIENT
Start: 2020-12-15 | End: 2020-12-16 | Stop reason: SDUPTHER

## 2020-12-16 DIAGNOSIS — I10 ESSENTIAL HYPERTENSION: ICD-10-CM

## 2020-12-16 RX ORDER — LISINOPRIL 2.5 MG/1
2.5 TABLET ORAL DAILY
Qty: 90 TABLET | Refills: 3 | Status: SHIPPED | OUTPATIENT
Start: 2020-12-16 | End: 2021-03-10 | Stop reason: SDUPTHER

## 2020-12-18 DIAGNOSIS — J45.20 MILD INTERMITTENT ASTHMA, UNSPECIFIED WHETHER COMPLICATED: ICD-10-CM

## 2020-12-18 DIAGNOSIS — J01.90 ACUTE NON-RECURRENT SINUSITIS, UNSPECIFIED LOCATION: ICD-10-CM

## 2020-12-18 RX ORDER — PREDNISONE 10 MG/1
TABLET ORAL
Qty: 21 TABLET | Refills: 0 | OUTPATIENT
Start: 2020-12-18

## 2020-12-18 RX ORDER — PREDNISONE 10 MG/1
TABLET ORAL
Qty: 21 TABLET | Refills: 0 | Status: SHIPPED | OUTPATIENT
Start: 2020-12-18 | End: 2020-12-28 | Stop reason: SDUPTHER

## 2020-12-19 DIAGNOSIS — F33.1 MODERATE EPISODE OF RECURRENT MAJOR DEPRESSIVE DISORDER (HCC): ICD-10-CM

## 2020-12-21 RX ORDER — MIRTAZAPINE 15 MG/1
TABLET, FILM COATED ORAL
Qty: 45 TABLET | Refills: 3 | Status: SHIPPED | OUTPATIENT
Start: 2020-12-21 | End: 2021-03-05 | Stop reason: SDUPTHER

## 2020-12-26 DIAGNOSIS — J01.90 ACUTE NON-RECURRENT SINUSITIS, UNSPECIFIED LOCATION: ICD-10-CM

## 2020-12-28 ENCOUNTER — TELEPHONE (OUTPATIENT)
Dept: FAMILY MEDICINE CLINIC | Facility: CLINIC | Age: 65
End: 2020-12-28

## 2020-12-28 DIAGNOSIS — J01.90 ACUTE NON-RECURRENT SINUSITIS, UNSPECIFIED LOCATION: ICD-10-CM

## 2020-12-28 RX ORDER — PREDNISONE 10 MG/1
TABLET ORAL
Qty: 21 TABLET | Refills: 0 | Status: CANCELLED | OUTPATIENT
Start: 2020-12-28

## 2020-12-28 RX ORDER — PREDNISONE 10 MG/1
TABLET ORAL
Qty: 21 TABLET | Refills: 0 | Status: SHIPPED | OUTPATIENT
Start: 2020-12-28 | End: 2021-01-19 | Stop reason: SDUPTHER

## 2020-12-28 RX ORDER — PREDNISONE 20 MG/1
TABLET ORAL
COMMUNITY
Start: 2020-10-08 | End: 2020-12-28

## 2020-12-28 RX ORDER — AMOXICILLIN AND CLAVULANATE POTASSIUM 875; 125 MG/1; MG/1
1 TABLET, FILM COATED ORAL EVERY 12 HOURS SCHEDULED
Qty: 14 TABLET | Refills: 0 | Status: SHIPPED | OUTPATIENT
Start: 2020-12-28 | End: 2021-01-04

## 2020-12-28 RX ORDER — AMOXICILLIN AND CLAVULANATE POTASSIUM 875; 125 MG/1; MG/1
TABLET, FILM COATED ORAL
COMMUNITY
End: 2020-12-28 | Stop reason: SDUPTHER

## 2021-01-02 DIAGNOSIS — G43.909 MIGRAINE WITHOUT STATUS MIGRAINOSUS, NOT INTRACTABLE, UNSPECIFIED MIGRAINE TYPE: ICD-10-CM

## 2021-01-02 DIAGNOSIS — G25.0 ESSENTIAL TREMOR: ICD-10-CM

## 2021-01-04 RX ORDER — SUMATRIPTAN 100 MG/1
100 TABLET, FILM COATED ORAL ONCE AS NEEDED
Qty: 9 TABLET | Refills: 1 | Status: SHIPPED | OUTPATIENT
Start: 2021-01-04 | End: 2021-01-21 | Stop reason: SDUPTHER

## 2021-01-04 RX ORDER — DIAZEPAM 10 MG/1
10 TABLET ORAL 2 TIMES DAILY
Qty: 60 TABLET | Refills: 0 | Status: SHIPPED | OUTPATIENT
Start: 2021-01-04 | End: 2021-02-03 | Stop reason: SDUPTHER

## 2021-01-05 RX ORDER — PRIMIDONE 50 MG/1
TABLET ORAL
Qty: 360 TABLET | Refills: 1 | Status: SHIPPED | OUTPATIENT
Start: 2021-01-05 | End: 2021-07-21

## 2021-01-05 RX ORDER — PRIMIDONE 50 MG/1
200 TABLET ORAL
Qty: 120 TABLET | Refills: 0 | OUTPATIENT
Start: 2021-01-05

## 2021-01-17 DIAGNOSIS — J45.20 MILD INTERMITTENT ASTHMA, UNSPECIFIED WHETHER COMPLICATED: ICD-10-CM

## 2021-01-17 RX ORDER — ALBUTEROL SULFATE 90 UG/1
AEROSOL, METERED RESPIRATORY (INHALATION)
Qty: 54 INHALER | Refills: 0 | Status: SHIPPED | OUTPATIENT
Start: 2021-01-17 | End: 2021-03-19 | Stop reason: SDUPTHER

## 2021-01-18 ENCOUNTER — TELEPHONE (OUTPATIENT)
Dept: FAMILY MEDICINE CLINIC | Facility: CLINIC | Age: 66
End: 2021-01-18

## 2021-01-18 RX ORDER — FLUTICASONE PROPIONATE 44 MCG
2 AEROSOL WITH ADAPTER (GRAM) INHALATION 2 TIMES DAILY
Qty: 10.6 INHALER | Refills: 0 | Status: SHIPPED | OUTPATIENT
Start: 2021-01-18 | End: 2021-03-16 | Stop reason: SDUPTHER

## 2021-01-18 RX ORDER — ALBUTEROL SULFATE 90 UG/1
2 AEROSOL, METERED RESPIRATORY (INHALATION) EVERY 6 HOURS PRN
Qty: 54 INHALER | Refills: 0 | Status: SHIPPED | OUTPATIENT
Start: 2021-01-18 | End: 2021-03-16 | Stop reason: SDUPTHER

## 2021-01-18 NOTE — TELEPHONE ENCOUNTER
Senia Warner called today- she said she was told to call if she does not feel better today  She is still having pain in her ovaries  There is no room in your schedule for a video visit  Please advise  She can be reached at [phone]

## 2021-01-18 NOTE — TELEPHONE ENCOUNTER
I do not recall this "ovary" pain issue? ? Last  discussion last phone call was this         12/28/20 10:22 AM    Vianney Rivera contacted Janice Palacio         12/28/20 10:24 AM  Note     Patient states she is still having a low grade fever, this morning 99 6, bad headache, body aches, having some breathing issues (Prednisone did help her)  This has been going on since early December  She has completed a course of antibiotics  She is asking if she can have more antibiotics or what you would like to do  PLEASE clarify symptoms? Urine?

## 2021-01-18 NOTE — TELEPHONE ENCOUNTER
Did we call patient back and ask her any additional questions about my non recollection of conversation about "ovary  " pain? If I confused anybody by  Cutting and pasting the December 28th  Conversation, it was only for benefit of recollection  My additional questions are can she please clarify the symptoms? Are there urine symptoms? Bowel symptoms?   I am not really sure what ovary pain would refer to especially in a 60-year-old postmenopausal female

## 2021-01-19 ENCOUNTER — TELEMEDICINE (OUTPATIENT)
Dept: FAMILY MEDICINE CLINIC | Facility: CLINIC | Age: 66
End: 2021-01-19
Payer: COMMERCIAL

## 2021-01-19 DIAGNOSIS — J01.90 ACUTE NON-RECURRENT SINUSITIS, UNSPECIFIED LOCATION: ICD-10-CM

## 2021-01-19 DIAGNOSIS — B34.9 VIRAL INFECTION, UNSPECIFIED: Primary | ICD-10-CM

## 2021-01-19 DIAGNOSIS — B34.9 VIRAL INFECTION, UNSPECIFIED: ICD-10-CM

## 2021-01-19 PROCEDURE — 99213 OFFICE O/P EST LOW 20 MIN: CPT | Performed by: NURSE PRACTITIONER

## 2021-01-19 PROCEDURE — U0005 INFEC AGEN DETEC AMPLI PROBE: HCPCS | Performed by: NURSE PRACTITIONER

## 2021-01-19 PROCEDURE — U0003 INFECTIOUS AGENT DETECTION BY NUCLEIC ACID (DNA OR RNA); SEVERE ACUTE RESPIRATORY SYNDROME CORONAVIRUS 2 (SARS-COV-2) (CORONAVIRUS DISEASE [COVID-19]), AMPLIFIED PROBE TECHNIQUE, MAKING USE OF HIGH THROUGHPUT TECHNOLOGIES AS DESCRIBED BY CMS-2020-01-R: HCPCS | Performed by: NURSE PRACTITIONER

## 2021-01-19 RX ORDER — PREDNISONE 10 MG/1
TABLET ORAL
Qty: 21 TABLET | Refills: 0 | Status: SHIPPED | OUTPATIENT
Start: 2021-01-19 | End: 2021-01-29 | Stop reason: SDUPTHER

## 2021-01-19 NOTE — PROGRESS NOTES
Virtual Regular Visit      Assessment/Plan:    Problem List Items Addressed This Visit     None      Visit Diagnoses     Viral infection, unspecified    -  Primary    Relevant Orders    Novel Coronavirus (Covid-19),PCR [facility] - Collected at Mobile Vans or [facility]    Acute non-recurrent sinusitis, unspecified location        Relevant Medications    predniSONE 10 mg tablet        Advised patient to stay in isolation (not leaving home unless to seek urgent medical care) until results discussed with patient with further instruction  Discussed important of wearing mask around household members, avoiding contact with household members and cleaning surfaces frequently  Discussed important of monitoring temperature and symptoms  Call if any changes or worsening in symptoms, especially any changes with respiratory related symptoms  Please call the office if you are experiencing any worsening of symptoms or no symptom improvement  Due to increased inhaler use will complete prednisone course again  Start prednisone, this is the steroid  It will be 6 pills today and decrease by 1 pill each day for the following 6 days  So it will be 6-5-4-3-2-1  Take this with food as it may upset your stomach  It's best to take it earlier in the day otherwise it may keep you up at night  Do not take this with NSAIDs such as advil/ibuprofen/advil/aleve/motrin  Continue with inhaler use, encouraged vitamin D, vitamin, C, and zinc  Stay hydrated/rest  Has been tested for covid before but not since this illness  Advised to call right away for any re-ocurence of abdominal pain / fever/ or worsening respiratory symptoms  Please call the office if you are experiencing any worsening of symptoms or no symptom improvement              Reason for visit is   Chief Complaint   Patient presents with    Virtual Regular Visit        Encounter provider Chon Ramirez, [facility]    Provider located at [address] [facility] [place] [address]      Recent Visits  Date Type Provider Dept   01/18/21 Telephone Urvashi Obrien DO [facility] [address] recent visits within past 7 days and meeting all other requirements     Today's Visits  Date Type Provider Dept   01/19/21 Telemedicine HIRAL Colin [facility] today's visits and meeting all other requirements     Future Appointments  No visits were found meeting these conditions  Showing future appointments within next 150 days and meeting all other requirements        The patient was identified by name and date of birth  Romero Rodriguez was informed that this is a telemedicine visit and that the visit is being conducted through [facility] and patient was informed that this is a secure, HIPAA-compliant platform  She agrees to proceed     My office door was closed  No one else was in the room  She acknowledged consent and understanding of privacy and security of the video platform  The patient has agreed to participate and understands they can discontinue the visit at any time  Patient is aware this is a billable service  Subjective  Romero Rodriguez is a 72 y o  female  She states her stomach was having some pain a few days ago, she felt it was pushing down on her ovaries, she had a fever of 101 7, the pain is subsiding and the fever resolved but she is still having congestion and had to use her inhaler 3 times this morning  The prednisone has helped with her eating and she's able to eat  The stomach pain she has been having is different than her usual stomach pain, but that's resolving  No covid exposure that she knows of  These symptoms started 1/17/21  Has been taking tylenol  Did prednisone 12/28/20 which did help but still doesn't feel well and got sick again  Starting on Sunday is when the fever and abdominal pain started which has resolved and now it's mainly the congestion/ cough / URI symptoms that are bothersome  Past Medical History:   Diagnosis Date    Asthma     Fibromyalgia     GERD (gastroesophageal reflux disease)     Hyperlipidemia     Hypertension     Lumbar herniated disc        Past Surgical History:   Procedure Laterality Date    BREAST BIOPSY Left     benign- at age 27    TUBAL LIGATION      ULNAR NERVE REPAIR         Current Outpatient Medications   Medication Sig Dispense Refill    albuterol (PROVENTIL HFA,VENTOLIN HFA) 90 mcg/act inhaler INHALE 2 PUFFS BY MOUTH EVERY 6 HOURS AS NEEDED FOR WHEEZE 54 Inhaler 0    albuterol (PROVENTIL HFA,VENTOLIN HFA) 90 mcg/act inhaler Inhale 2 puffs every 6 (six) hours as needed for wheezing 54 Inhaler 0    atorvastatin (LIPITOR) 10 mg tablet Take 1 tablet (10 mg total) by mouth daily 90 tablet 3    Breo Ellipta 200-25 MCG/INH inhaler INHALE 1 PUFF DAILY RINSE MOUTH AFTER USE  1 Inhaler 2    clotrimazole-betamethasone (LOTRISONE) 1-0 05 % cream Apply topically 2 (two) times a day To outside of the vagina 30 g 0    diazepam (VALIUM) 10 mg tablet Take 1 tablet (10 mg total) by mouth 2 (two) times a day 60 tablet 0    fluticasone (FLONASE) 50 mcg/act nasal spray 2 sprays into each nostril daily 1 Bottle 3    fluticasone (Flovent HFA) 44 mcg/act inhaler Inhale 2 puffs 2 (two) times a day Rinse mouth after use  10 6 Inhaler 0    HYDROcodone-acetaminophen (NORCO) 7 5-325 mg per tablet 3 (three) times a day       lisinopril (ZESTRIL) 2 5 mg tablet Take 1 tablet (2 5 mg total) by mouth daily 90 tablet 3    mirtazapine (REMERON) 15 mg tablet TAKE 1 1/2 TABLETS AT BEDTIME-Patient will use good Rx card 45 tablet 3    naloxone (NARCAN) 4 mg/0 1 mL nasal spray 1 spray by Alternating Nares route every 3 (three) minutes as needed for opioid reversal or respiratory depression Administer 1 spray into a nostril  If breathing does not return to normal or if breathing difficulty resumes after 2-3 minutes, give another dose in the other nostril using a new spray        omeprazole (PriLOSEC) 40 MG capsule TAKE 1 CAPSULE BY MOUTH EVERY DAY 90 capsule 0    predniSONE 10 mg tablet 6 tabs Day 1, 5 tabs Day 2, 4 tabs Day 3, 3 tabs Day 4, 2 tabs Day 5, 1 tab Day 6  21 tablet 0    primidone (MYSOLINE) 50 mg tablet Take 4 tabs by mouth daily at bedtime 360 tablet 1    SUMAtriptan (IMITREX) 100 mg tablet Take 1 tablet (100 mg total) by mouth once as needed for migraine for up to 1 dose 9 tablet 1    tiZANidine (ZANAFLEX) 4 mg tablet TAKE 1 TABLET BY MOUTH TWICE A DAY AS NEEDED FOR MUSCLE PAIN/SPASMS       No current facility-administered medications for this visit  Allergies   Allergen Reactions    Levofloxacin Other (See Comments)     Weak legs, blurred vision    Carafate [Sucralfate] Rash    Other Palpitations     MRI dye    Tetracycline Rash       Review of Systems   Constitutional: Positive for chills, fatigue and fever  HENT: Positive for congestion and rhinorrhea  Eyes: Negative for discharge  Respiratory: Positive for cough and shortness of breath (prior to inhaler use)  Cardiovascular: Negative for chest pain  Gastrointestinal: Positive for abdominal pain (resolved) and diarrhea  Negative for constipation  Genitourinary: Negative for difficulty urinating  Musculoskeletal: Negative for joint swelling  Skin: Negative for rash  Neurological: Positive for headaches  Hematological: Negative for adenopathy  Psychiatric/Behavioral: The patient is not nervous/anxious  Video Exam    There were no vitals filed for this visit  Physical Exam  Constitutional:       General: She is not in acute distress  Appearance: Normal appearance  She is not ill-appearing, toxic-appearing or diaphoretic  HENT:      Head: Normocephalic and atraumatic  Nose: Nose normal    Pulmonary:      Effort: Pulmonary effort is normal  No respiratory distress  Skin:     Coloration: Skin is not pale     Neurological:      Mental Status: She is alert and oriented to person, place, and time  Psychiatric:         Mood and Affect: Mood normal           I spent 20 minutes with patient today in which greater than 50% of the time was spent in counseling/coordination of care regarding viral symptom      VIRTUAL VISIT DISCLAIMER    Romero Rodriguez acknowledges that she has consented to an online visit or consultation  She understands that the online visit is based solely on information provided by her, and that, in the absence of a face-to-face physical evaluation by the physician, the diagnosis she receives is both limited and provisional in terms of accuracy and completeness  This is not intended to replace a full medical face-to-face evaluation by the physician  Jasso Michael understands and accepts these terms

## 2021-01-20 LAB — SARS-COV-2 RNA RESP QL NAA+PROBE: NEGATIVE

## 2021-01-21 DIAGNOSIS — G43.909 MIGRAINE WITHOUT STATUS MIGRAINOSUS, NOT INTRACTABLE, UNSPECIFIED MIGRAINE TYPE: ICD-10-CM

## 2021-01-22 RX ORDER — SUMATRIPTAN 100 MG/1
100 TABLET, FILM COATED ORAL ONCE AS NEEDED
Qty: 9 TABLET | Refills: 0 | Status: SHIPPED | OUTPATIENT
Start: 2021-01-22 | End: 2021-02-03 | Stop reason: SDUPTHER

## 2021-01-27 DIAGNOSIS — J30.2 SEASONAL ALLERGIC RHINITIS, UNSPECIFIED TRIGGER: ICD-10-CM

## 2021-01-28 RX ORDER — FLUTICASONE PROPIONATE 50 MCG
SPRAY, SUSPENSION (ML) NASAL
Qty: 16 ML | Refills: 3 | Status: SHIPPED | OUTPATIENT
Start: 2021-01-28 | End: 2021-06-21 | Stop reason: SDUPTHER

## 2021-01-29 DIAGNOSIS — J01.90 ACUTE NON-RECURRENT SINUSITIS, UNSPECIFIED LOCATION: ICD-10-CM

## 2021-01-30 DIAGNOSIS — J01.90 ACUTE NON-RECURRENT SINUSITIS, UNSPECIFIED LOCATION: ICD-10-CM

## 2021-01-31 DIAGNOSIS — J45.20 MILD INTERMITTENT ASTHMA, UNSPECIFIED WHETHER COMPLICATED: ICD-10-CM

## 2021-01-31 RX ORDER — PREDNISONE 10 MG/1
TABLET ORAL
Qty: 21 TABLET | Refills: 0 | Status: SHIPPED | OUTPATIENT
Start: 2021-01-31 | End: 2021-02-19 | Stop reason: SDUPTHER

## 2021-02-02 RX ORDER — PREDNISONE 10 MG/1
TABLET ORAL
Qty: 21 TABLET | Refills: 0 | OUTPATIENT
Start: 2021-02-02

## 2021-02-03 DIAGNOSIS — G25.0 ESSENTIAL TREMOR: ICD-10-CM

## 2021-02-03 DIAGNOSIS — G43.909 MIGRAINE WITHOUT STATUS MIGRAINOSUS, NOT INTRACTABLE, UNSPECIFIED MIGRAINE TYPE: ICD-10-CM

## 2021-02-03 RX ORDER — SUMATRIPTAN 100 MG/1
100 TABLET, FILM COATED ORAL ONCE AS NEEDED
Qty: 9 TABLET | Refills: 0 | Status: SHIPPED | OUTPATIENT
Start: 2021-02-03 | End: 2021-02-11 | Stop reason: SDUPTHER

## 2021-02-03 RX ORDER — DIAZEPAM 10 MG/1
10 TABLET ORAL 2 TIMES DAILY
Qty: 60 TABLET | Refills: 0 | Status: SHIPPED | OUTPATIENT
Start: 2021-02-03 | End: 2021-03-05 | Stop reason: SDUPTHER

## 2021-02-03 NOTE — TELEPHONE ENCOUNTER
Left voicemail message for patient to return call, need to question if she truly needs this medication and if yes the reason why as it was filled on 1/31/21

## 2021-02-11 DIAGNOSIS — G43.909 MIGRAINE WITHOUT STATUS MIGRAINOSUS, NOT INTRACTABLE, UNSPECIFIED MIGRAINE TYPE: ICD-10-CM

## 2021-02-11 RX ORDER — SUMATRIPTAN 100 MG/1
100 TABLET, FILM COATED ORAL ONCE AS NEEDED
Qty: 9 TABLET | Refills: 0 | Status: SHIPPED | OUTPATIENT
Start: 2021-02-11 | End: 2021-03-05 | Stop reason: SDUPTHER

## 2021-02-16 ENCOUNTER — TELEPHONE (OUTPATIENT)
Dept: FAMILY MEDICINE CLINIC | Facility: CLINIC | Age: 66
End: 2021-02-16

## 2021-02-16 DIAGNOSIS — R10.13 DYSPEPSIA: Primary | ICD-10-CM

## 2021-02-16 RX ORDER — ONDANSETRON 4 MG/1
4 TABLET, FILM COATED ORAL EVERY 8 HOURS PRN
Qty: 20 TABLET | Refills: 0 | Status: SHIPPED | OUTPATIENT
Start: 2021-02-16 | End: 2021-03-05 | Stop reason: SDUPTHER

## 2021-02-16 NOTE — TELEPHONE ENCOUNTER
Pt called and is requesting a refill on her Ondansetron 4 mg take I tablet every 8 hours  Pt is having stomach discomfort  Can you please send to [facility] [place]?  Thanks

## 2021-02-19 DIAGNOSIS — J01.90 ACUTE NON-RECURRENT SINUSITIS, UNSPECIFIED LOCATION: ICD-10-CM

## 2021-02-22 RX ORDER — PREDNISONE 10 MG/1
TABLET ORAL
Qty: 21 TABLET | Refills: 0 | Status: SHIPPED | OUTPATIENT
Start: 2021-02-22 | End: 2021-03-05 | Stop reason: SDUPTHER

## 2021-03-02 ENCOUNTER — OFFICE VISIT (OUTPATIENT)
Dept: NEUROLOGY | Facility: CLINIC | Age: 66
End: 2021-03-02
Payer: COMMERCIAL

## 2021-03-02 VITALS
HEART RATE: 72 BPM | DIASTOLIC BLOOD PRESSURE: 70 MMHG | SYSTOLIC BLOOD PRESSURE: 132 MMHG | BODY MASS INDEX: 22.76 KG/M2 | WEIGHT: 141 LBS

## 2021-03-02 DIAGNOSIS — R55 SYNCOPE AND COLLAPSE: ICD-10-CM

## 2021-03-02 DIAGNOSIS — G25.0 ESSENTIAL TREMOR: Primary | ICD-10-CM

## 2021-03-02 PROCEDURE — 3078F DIAST BP <80 MM HG: CPT | Performed by: PHYSICIAN ASSISTANT

## 2021-03-02 PROCEDURE — 3075F SYST BP GE 130 - 139MM HG: CPT | Performed by: PHYSICIAN ASSISTANT

## 2021-03-02 PROCEDURE — 99215 OFFICE O/P EST HI 40 MIN: CPT | Performed by: PHYSICIAN ASSISTANT

## 2021-03-02 PROCEDURE — 1160F RVW MEDS BY RX/DR IN RCRD: CPT | Performed by: PHYSICIAN ASSISTANT

## 2021-03-02 PROCEDURE — 1036F TOBACCO NON-USER: CPT | Performed by: PHYSICIAN ASSISTANT

## 2021-03-02 NOTE — ASSESSMENT & PLAN NOTE
Patient continues to have head and hand tremors  Will continue to treat as ET however there may also be some cervical dystonia  She does not feel there is significant benefit with the primidone, however she did try stopping it and felt the tremors were slightly worse  For now she is tolerating the dose well however she was unable to tolerated daytime dose due to fatigue  Will have her try and increase the dose further to 5 tabs before bed  We did discuss other medication options as well  Propanolol may be an option however I am hesitant given her recent syncopal episode as well as her daily use of inhalers  Topiramate would be another option however she had is hesitant given the potential side effects  We could also consider a trial of Neurontin  She does already take Remeron 15 mg before bed and this dose could be increased in the future

## 2021-03-02 NOTE — ASSESSMENT & PLAN NOTE
She also had a recent syncopal episode in January  The exact etiology of this episode is unclear however I do have some concerns given her history of waking with generalized pain, bruising on the arms as well as tongue biting  Because of this I will get an MRI of the brain which would also be useful to determine if there is any structural cause for her tremors  I will also obtain an EEG to look for any potential under underlying seizure activity  She was asked to hold on driving until we get a better indication of an etiology for this episode  She has not had any further episodes  If the workup is normal then this may be more vasovagal related given it did occur in the setting of getting up from using the bathroom
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
## 2021-03-02 NOTE — PATIENT INSTRUCTIONS
Patient continues to have head and hand tremors  She does not feel there is significant benefit with the primidone, however she did try stopping it and felt the tremors were slightly worse  For now she is tolerating the dose well however she was unable to tolerated daytime dose due to fatigue  Will have her try and increase the dose further to 5 tabs before bed  We did discuss other medication options as well  Propanolol may be an option however I am hesitant given her recent syncopal episode as well as her daily use of inhalers  Topiramate would be another option however she had is hesitant given the potential side effects  We could also consider a trial of Neurontin  She does already take Remeron 15 mg before bed and this dose could be increased in the future  She also had a recent syncopal episode in January  The exact etiology of this episode is unclear however I do have some concerns given her history of waking with generalized pain, bruising on the arms as well as tongue biting  Because of this I will get an MRI of the brain which would also be useful to determine if there is any structural cause for her tremors  I will also obtain an EEG to look for any potential under underlying seizure activity  She was asked to hold on driving until we get a better indication of an etiology for this episode  She has not had any further episodes  If the workup is normal then this may be more vasovagal related given it did occur in the setting of getting up from using the bathroom

## 2021-03-04 DIAGNOSIS — J01.90 ACUTE NON-RECURRENT SINUSITIS, UNSPECIFIED LOCATION: ICD-10-CM

## 2021-03-05 DIAGNOSIS — F33.1 MODERATE EPISODE OF RECURRENT MAJOR DEPRESSIVE DISORDER (HCC): ICD-10-CM

## 2021-03-05 DIAGNOSIS — J01.90 ACUTE NON-RECURRENT SINUSITIS, UNSPECIFIED LOCATION: ICD-10-CM

## 2021-03-05 DIAGNOSIS — G25.0 ESSENTIAL TREMOR: ICD-10-CM

## 2021-03-05 DIAGNOSIS — R10.13 DYSPEPSIA: ICD-10-CM

## 2021-03-05 DIAGNOSIS — G43.909 MIGRAINE WITHOUT STATUS MIGRAINOSUS, NOT INTRACTABLE, UNSPECIFIED MIGRAINE TYPE: ICD-10-CM

## 2021-03-05 RX ORDER — SUMATRIPTAN 100 MG/1
100 TABLET, FILM COATED ORAL ONCE AS NEEDED
Qty: 9 TABLET | Refills: 0 | Status: SHIPPED | OUTPATIENT
Start: 2021-03-05 | End: 2021-03-30 | Stop reason: SDUPTHER

## 2021-03-05 RX ORDER — ONDANSETRON 4 MG/1
4 TABLET, FILM COATED ORAL EVERY 8 HOURS PRN
Qty: 20 TABLET | Refills: 0 | Status: SHIPPED | OUTPATIENT
Start: 2021-03-05 | End: 2021-03-30 | Stop reason: SDUPTHER

## 2021-03-05 RX ORDER — DIAZEPAM 10 MG/1
10 TABLET ORAL 2 TIMES DAILY
Qty: 60 TABLET | Refills: 0 | Status: SHIPPED | OUTPATIENT
Start: 2021-03-05 | End: 2021-04-03 | Stop reason: SDUPTHER

## 2021-03-05 RX ORDER — PREDNISONE 10 MG/1
TABLET ORAL
Qty: 21 TABLET | Refills: 0 | Status: SHIPPED | OUTPATIENT
Start: 2021-03-05 | End: 2021-03-16 | Stop reason: SDUPTHER

## 2021-03-05 RX ORDER — PREDNISONE 10 MG/1
TABLET ORAL
Qty: 21 TABLET | Refills: 0 | OUTPATIENT
Start: 2021-03-05

## 2021-03-08 RX ORDER — MIRTAZAPINE 15 MG/1
TABLET, FILM COATED ORAL
Qty: 45 TABLET | Refills: 0 | Status: SHIPPED | OUTPATIENT
Start: 2021-03-08 | End: 2021-04-27 | Stop reason: SDUPTHER

## 2021-03-10 DIAGNOSIS — I10 ESSENTIAL HYPERTENSION: ICD-10-CM

## 2021-03-10 RX ORDER — LISINOPRIL 2.5 MG/1
2.5 TABLET ORAL DAILY
Qty: 90 TABLET | Refills: 0 | Status: SHIPPED | OUTPATIENT
Start: 2021-03-10 | End: 2021-03-26

## 2021-03-16 DIAGNOSIS — K21.9 GASTROESOPHAGEAL REFLUX DISEASE WITHOUT ESOPHAGITIS: ICD-10-CM

## 2021-03-16 DIAGNOSIS — J01.90 ACUTE NON-RECURRENT SINUSITIS, UNSPECIFIED LOCATION: ICD-10-CM

## 2021-03-16 DIAGNOSIS — J45.20 MILD INTERMITTENT ASTHMA, UNSPECIFIED WHETHER COMPLICATED: ICD-10-CM

## 2021-03-16 RX ORDER — OMEPRAZOLE 40 MG/1
40 CAPSULE, DELAYED RELEASE ORAL DAILY
Qty: 90 CAPSULE | Refills: 0 | Status: CANCELLED | OUTPATIENT
Start: 2021-03-16

## 2021-03-16 RX ORDER — FLUTICASONE PROPIONATE 44 MCG
2 AEROSOL WITH ADAPTER (GRAM) INHALATION 2 TIMES DAILY
Qty: 10.6 INHALER | Refills: 0 | Status: CANCELLED | OUTPATIENT
Start: 2021-03-16

## 2021-03-16 RX ORDER — ALBUTEROL SULFATE 90 UG/1
2 AEROSOL, METERED RESPIRATORY (INHALATION) EVERY 6 HOURS PRN
Qty: 54 INHALER | Refills: 0 | Status: SHIPPED | OUTPATIENT
Start: 2021-03-16 | End: 2021-03-30

## 2021-03-17 ENCOUNTER — HOSPITAL ENCOUNTER (OUTPATIENT)
Dept: MRI IMAGING | Facility: HOSPITAL | Age: 66
Discharge: HOME | End: 2021-03-17
Payer: COMMERCIAL

## 2021-03-17 DIAGNOSIS — R55 SYNCOPE AND COLLAPSE: ICD-10-CM

## 2021-03-17 DIAGNOSIS — G25.0 ESSENTIAL TREMOR: ICD-10-CM

## 2021-03-17 PROCEDURE — G1004 CDSM NDSC: HCPCS

## 2021-03-17 PROCEDURE — 70551 MRI BRAIN STEM W/O DYE: CPT

## 2021-03-17 RX ORDER — PREDNISONE 10 MG/1
TABLET ORAL
Qty: 21 TABLET | Refills: 0 | Status: SHIPPED | OUTPATIENT
Start: 2021-03-17 | End: 2021-03-26

## 2021-03-19 DIAGNOSIS — J45.20 MILD INTERMITTENT ASTHMA, UNSPECIFIED WHETHER COMPLICATED: ICD-10-CM

## 2021-03-19 RX ORDER — ALBUTEROL SULFATE 90 UG/1
2 AEROSOL, METERED RESPIRATORY (INHALATION) 4 TIMES DAILY
Qty: 54 INHALER | Refills: 1 | Status: SHIPPED | OUTPATIENT
Start: 2021-03-19 | End: 2021-04-19

## 2021-03-25 DIAGNOSIS — J45.20 MILD INTERMITTENT ASTHMA, UNSPECIFIED WHETHER COMPLICATED: ICD-10-CM

## 2021-03-25 DIAGNOSIS — J01.90 ACUTE NON-RECURRENT SINUSITIS, UNSPECIFIED LOCATION: ICD-10-CM

## 2021-03-25 RX ORDER — PREDNISONE 10 MG/1
TABLET ORAL
Qty: 21 TABLET | Refills: 0 | OUTPATIENT
Start: 2021-03-25

## 2021-03-25 RX ORDER — ALBUTEROL SULFATE 90 UG/1
2 AEROSOL, METERED RESPIRATORY (INHALATION) 4 TIMES DAILY
Qty: 54 INHALER | Refills: 0 | Status: CANCELLED | OUTPATIENT
Start: 2021-03-25

## 2021-03-25 RX ORDER — ALBUTEROL SULFATE 90 UG/1
2 AEROSOL, METERED RESPIRATORY (INHALATION) EVERY 6 HOURS PRN
Qty: 54 INHALER | Refills: 0 | Status: CANCELLED | OUTPATIENT
Start: 2021-03-25

## 2021-03-26 DIAGNOSIS — J45.20 MILD INTERMITTENT ASTHMA, UNSPECIFIED WHETHER COMPLICATED: ICD-10-CM

## 2021-03-26 DIAGNOSIS — J01.90 ACUTE NON-RECURRENT SINUSITIS, UNSPECIFIED LOCATION: ICD-10-CM

## 2021-03-26 DIAGNOSIS — I10 ESSENTIAL HYPERTENSION: ICD-10-CM

## 2021-03-26 RX ORDER — LISINOPRIL 2.5 MG/1
TABLET ORAL
Qty: 90 TABLET | Refills: 0 | Status: SHIPPED | OUTPATIENT
Start: 2021-03-26 | End: 2021-04-27 | Stop reason: SDUPTHER

## 2021-03-26 RX ORDER — PREDNISONE 10 MG/1
TABLET ORAL
Qty: 21 TABLET | Refills: 0 | OUTPATIENT
Start: 2021-03-26

## 2021-03-26 RX ORDER — FLUTICASONE PROPIONATE 44 MCG
AEROSOL WITH ADAPTER (GRAM) INHALATION
Qty: 10.6 INHALER | Refills: 2 | Status: SHIPPED | OUTPATIENT
Start: 2021-03-26 | End: 2021-09-06 | Stop reason: SDUPTHER

## 2021-03-30 ENCOUNTER — OFFICE VISIT (OUTPATIENT)
Dept: FAMILY MEDICINE CLINIC | Facility: CLINIC | Age: 66
End: 2021-03-30
Payer: COMMERCIAL

## 2021-03-30 VITALS
WEIGHT: 143 LBS | BODY MASS INDEX: 23.82 KG/M2 | TEMPERATURE: 97.8 F | OXYGEN SATURATION: 98 % | HEIGHT: 65 IN | RESPIRATION RATE: 16 BRPM | HEART RATE: 85 BPM

## 2021-03-30 DIAGNOSIS — E78.5 HYPERLIPIDEMIA, UNSPECIFIED HYPERLIPIDEMIA TYPE: ICD-10-CM

## 2021-03-30 DIAGNOSIS — J45.20 MILD INTERMITTENT ASTHMA, UNSPECIFIED WHETHER COMPLICATED: ICD-10-CM

## 2021-03-30 DIAGNOSIS — R10.13 DYSPEPSIA: ICD-10-CM

## 2021-03-30 DIAGNOSIS — G43.909 MIGRAINE WITHOUT STATUS MIGRAINOSUS, NOT INTRACTABLE, UNSPECIFIED MIGRAINE TYPE: ICD-10-CM

## 2021-03-30 DIAGNOSIS — I10 ESSENTIAL HYPERTENSION: ICD-10-CM

## 2021-03-30 DIAGNOSIS — Z00.00 MEDICARE ANNUAL WELLNESS VISIT, SUBSEQUENT: Primary | ICD-10-CM

## 2021-03-30 PROCEDURE — G0439 PPPS, SUBSEQ VISIT: HCPCS | Performed by: FAMILY MEDICINE

## 2021-03-30 PROCEDURE — 3288F FALL RISK ASSESSMENT DOCD: CPT | Performed by: FAMILY MEDICINE

## 2021-03-30 PROCEDURE — 96372 THER/PROPH/DIAG INJ SC/IM: CPT | Performed by: FAMILY MEDICINE

## 2021-03-30 PROCEDURE — 3008F BODY MASS INDEX DOCD: CPT | Performed by: PHYSICIAN ASSISTANT

## 2021-03-30 RX ORDER — PREDNISONE 10 MG/1
10 TABLET ORAL DAILY
Qty: 30 TABLET | Refills: 1 | Status: SHIPPED | OUTPATIENT
Start: 2021-03-30 | End: 2021-05-01 | Stop reason: SDUPTHER

## 2021-03-30 RX ORDER — SUMATRIPTAN 100 MG/1
100 TABLET, FILM COATED ORAL ONCE AS NEEDED
Qty: 9 TABLET | Refills: 3 | Status: SHIPPED | OUTPATIENT
Start: 2021-03-30 | End: 2021-04-25 | Stop reason: SDUPTHER

## 2021-03-30 RX ORDER — KETOROLAC TROMETHAMINE 30 MG/ML
60 INJECTION, SOLUTION INTRAMUSCULAR; INTRAVENOUS ONCE
Status: COMPLETED | OUTPATIENT
Start: 2021-03-30 | End: 2021-03-30

## 2021-03-30 RX ORDER — ONDANSETRON 4 MG/1
4 TABLET, FILM COATED ORAL EVERY 8 HOURS PRN
Qty: 20 TABLET | Refills: 0 | Status: SHIPPED | OUTPATIENT
Start: 2021-03-30 | End: 2021-05-16

## 2021-03-30 RX ADMIN — KETOROLAC TROMETHAMINE 60 MG: 30 INJECTION, SOLUTION INTRAMUSCULAR; INTRAVENOUS at 14:06

## 2021-03-30 NOTE — PROGRESS NOTES
Assessment and Plan:   [facility] was seen today for medicare wellness visit, follow-up and migraine  Diagnoses and all orders for this visit:    Medicare annual wellness visit, subsequent    Hyperlipidemia, unspecified hyperlipidemia type  -     Lipid Panel with Direct LDL reflex; Future    Essential hypertension  -     Lipid Panel with Direct LDL reflex; Future  -     Comprehensive metabolic panel; Future  -     T3, free; Future  -     T4, free; Future  -     TSH, 3rd generation; Future    Migraine without status migrainosus, not intractable, unspecified migraine type  -     SUMAtriptan (IMITREX) 100 mg tablet; Take 1 tablet (100 mg total) by mouth once as needed for migraine for up to 1 dose  -     ketorolac (TORADOL) 60 mg/2 mL IM injection 60 mg    Mild intermittent asthma, unspecified whether complicated  -     predniSONE 10 mg tablet; Take 1 tablet (10 mg total) by mouth daily    Dyspepsia  -     ondansetron (ZOFRAN) 4 mg tablet;  Take 1 tablet (4 mg total) by mouth every 8 (eight) hours as needed for nausea or vomiting      Problem List Items Addressed This Visit        Respiratory    Asthma    Relevant Medications    predniSONE 10 mg tablet       Cardiovascular and Mediastinum    Essential hypertension    Relevant Orders    Lipid Panel with Direct LDL reflex    Comprehensive metabolic panel    T3, free    T4, free    TSH, 3rd generation    Migraine without status migrainosus, not intractable    Relevant Medications    SUMAtriptan (IMITREX) 100 mg tablet       Other    Dyspepsia    Relevant Medications    ondansetron (ZOFRAN) 4 mg tablet      Other Visit Diagnoses     Medicare annual wellness visit, subsequent    -  Primary    Hyperlipidemia, unspecified hyperlipidemia type        Relevant Orders    Lipid Panel with Direct LDL reflex        Chief Complaint   Patient presents with    Medicare Wellness Visit    Follow-up     6 month followup     Migraine       Falls Plan of Care: balance, strength, and gait training instructions were provided  Medications that increase falls were reviewed  Assessed feet and footwear  Home safety education provided  Preventive health issues were discussed with patient, and age appropriate screening tests were ordered as noted in patient's After Visit Summary  Personalized health advice and appropriate referrals for health education or preventive services given if needed, as noted in patient's After Visit Summary  History of Present Illness:     Patient presents for Welcome to Medicare visit  Patient Care Team:  Oliverio Soto DO as PCP - General (Family Medicine)     Review of Systems:     Review of Systems   Neurological: Positive for headaches (Migraine  Ran out of her Imitrex  Recent MRI is included in today's note  No significant findings  )  MRI brain:  IMPRESSION:   Small scattered hyperintensities on T2/FLAIR imaging are   noted in the periventricular and subcortical white matter demonstrating an appearance that is statistically most likely to represent mild microangiopathic change      1  No acute infarction, intracranial hemorrhage or mass effect  2   Mild, chronic microangiopathy     Problem List:     Patient Active Problem List   Diagnosis    Esophageal dysphagia    Elevated MCV    Dyspepsia    Change in bowel function    Essential tremor    Moderate episode of recurrent major depressive disorder (HCC)    Essential hypertension    Generalized anxiety disorder    Asthma    Seasonal allergic rhinitis    Migraine without status migrainosus, not intractable    Syncope and collapse      Past Medical and Surgical History:     Past Medical History:   Diagnosis Date    Asthma     Fibromyalgia     GERD (gastroesophageal reflux disease)     Hyperlipidemia     Hypertension     Lumbar herniated disc      Past Surgical History:   Procedure Laterality Date    BREAST BIOPSY Left     benign- at age 27    TUBAL LIGATION      [facility] Family History:     Family History   Problem Relation Age of Onset    Heart failure Mother     Hyperlipidemia Mother     Hypertension Mother     Coronary artery disease Brother     COPD Maternal Grandmother     No Known Problems Sister     No Known Problems Daughter     No Known Problems Sister     Aneurysm Maternal Aunt       Social History:        Social History     Socioeconomic History    Marital status:       Spouse name: None    Number of children: None    Years of education: None    Highest education level: None   Occupational History    None   Social Needs    Financial resource strain: None    Food insecurity     Worry: None     Inability: None    Transportation needs     Medical: None     Non-medical: None   Tobacco Use    Smoking status: Former Smoker     Packs/day: 1 00     Types: Cigarettes     Quit date: 2019     Years since quittin 5    Smokeless tobacco: Never Used   Substance and Sexual Activity    Alcohol use: No    Drug use: No    Sexual activity: Not Currently     Partners: Female, Male     Birth control/protection: None   Lifestyle    Physical activity     Days per week: None     Minutes per session: None    Stress: None   Relationships    Social connections     Talks on phone: None     Gets together: None     Attends Mu-ism service: None     Active member of club or organization: None     Attends meetings of clubs or organizations: None     Relationship status: None    Intimate partner violence     Fear of current or ex partner: None     Emotionally abused: None     Physically abused: None     Forced sexual activity: None   Other Topics Concern    None   Social History Narrative    None      Medications and Allergies:     Current Outpatient Medications   Medication Sig Dispense Refill    albuterol (PROVENTIL HFA,VENTOLIN HFA) 90 mcg/act inhaler Inhale 2 puffs 4 (four) times a day 54 Inhaler 1    atorvastatin (LIPITOR) 10 mg tablet Take 1 tablet (10 mg total) by mouth daily 90 tablet 3    clotrimazole-betamethasone (LOTRISONE) 1-0 05 % cream Apply topically 2 (two) times a day To outside of the vagina 30 g 0    diazepam (VALIUM) 10 mg tablet Take 1 tablet (10 mg total) by mouth 2 (two) times a day 60 tablet 0    Flovent HFA 44 MCG/ACT inhaler TAKE 2 PUFFS BY MOUTH TWICE A DAY 10 6 Inhaler 2    fluticasone (FLONASE) 50 mcg/act nasal spray SPRAY 2 SPRAYS INTO EACH NOSTRIL EVERY DAY 16 mL 3    fluticasone-vilanterol (Breo Ellipta) 200-25 MCG/INH inhaler Inhale 1 puff daily Rinse mouth after use  1 Inhaler 0    HYDROcodone-acetaminophen (NORCO) 7 5-325 mg per tablet 3 (three) times a day       lisinopril (ZESTRIL) 2 5 mg tablet TAKE 1 TABLET BY MOUTH EVERY DAY 90 tablet 0    mirtazapine (REMERON) 15 mg tablet TAKE 1 1/2 TABLETS AT BEDTIME-Patient will use good Rx card 45 tablet 0    ondansetron (ZOFRAN) 4 mg tablet Take 1 tablet (4 mg total) by mouth every 8 (eight) hours as needed for nausea or vomiting 20 tablet 0    primidone (MYSOLINE) 50 mg tablet Take 4 tabs by mouth daily at bedtime 360 tablet 1    SUMAtriptan (IMITREX) 100 mg tablet Take 1 tablet (100 mg total) by mouth once as needed for migraine for up to 1 dose 9 tablet 3    tiZANidine (ZANAFLEX) 4 mg tablet TAKE 1 TABLET BY MOUTH TWICE A DAY AS NEEDED FOR MUSCLE PAIN/SPASMS      naloxone (NARCAN) 4 mg/0 1 mL nasal spray 1 spray by Alternating Nares route every 3 (three) minutes as needed for opioid reversal or respiratory depression Administer 1 spray into a nostril  If breathing does not return to normal or if breathing difficulty resumes after 2-3 minutes, give another dose in the other nostril using a new spray   predniSONE 10 mg tablet Take 1 tablet (10 mg total) by mouth daily 30 tablet 1     No current facility-administered medications for this visit        Allergies   Allergen Reactions    Levofloxacin Other (See Comments)     Weak legs, blurred vision    Carafate [Sucralfate] Rash    Other Palpitations     MRI dye    Tetracycline Rash      Immunizations: There is no immunization history on file for this patient  Health Maintenance:         Topic Date Due    HIV Screening  06[birth year]02[birth year]2025 (Originally 8/10/1970)    Hepatitis C Screening  04/01/2030 (Originally 1955)    MAMMOGRAM  09/21/2021    Colorectal Cancer Screening  05/26/2030         Topic Date Due    COVID-19 Vaccine (1) Never done      Medicare Screening Tests and Risk Assessments:     [facility] is here for her Subsequent Wellness visit  Health Risk Assessment:   Patient rates overall health as poor  Patient feels that their physical health rating is slightly worse  Patient is satisfied with their life  Eyesight was rated as same  Hearing was rated as same  Patient feels that their emotional and mental health rating is same  Patients states they are never, rarely angry  Patient states they are often unusually tired/fatigued  Pain experienced in the last 7 days has been a lot  Patient's pain rating has been 7/10  Patient states that she has experienced weight loss or gain in last 6 months  Depression Screening:   PHQ-2 Score: 0  PHQ-9 Score: 9      Fall Risk Screening: In the past year, patient has experienced: history of falling in past year    Number of falls: 1  Injured during fall?: Yes    Feels unsteady when standing or walking?: Yes    Worried about falling?: No      Urinary Incontinence Screening:   Patient has not leaked urine accidently in the last six months  Home Safety:  Patient does not have trouble with stairs inside or outside of their home  Patient has working smoke alarms and has working carbon monoxide detector  Home safety hazards include: none  Nutrition:   Current diet is Regular  Medications:   Patient is currently taking over-the-counter supplements  OTC medications include: see medication list  Patient is able to manage medications       Activities of Daily Living (ADLs)/Instrumental Activities of Daily Living (IADLs):   Walk and transfer into and out of bed and chair?: Yes  Dress and groom yourself?: Yes    Bathe or shower yourself?: Yes    Feed yourself? Yes  Do your laundry/housekeeping?: Yes  Manage your money, pay your bills and track your expenses?: Yes  Make your own meals?: Yes    Do your own shopping?: Yes    Previous Hospitalizations:   Any hospitalizations or ED visits within the last 12 months?: No      Advance Care Planning:   Living will: No    Durable POA for healthcare: No    Advanced directive: No    Five wishes given: Yes      PREVENTIVE SCREENINGS      Cardiovascular Screening:    General: Screening Not Indicated and History Lipid Disorder      Diabetes Screening:     General: Screening Current      Colorectal Cancer Screening:     General: Screening Current      Breast Cancer Screening:     General: Screening Current      Cervical Cancer Screening:    General: Screening Not Indicated      Lung Cancer Screening:     General: Screening Not Indicated      Hepatitis C Screening:    General: Screening Current    Screening, Brief Intervention, and Referral to Treatment (SBIRT)    Screening  Typical number of drinks in a day: 0  Typical number of drinks in a week: 0  Interpretation: Low risk drinking behavior  Single Item Drug Screening:  How often have you used an illegal drug (including marijuana) or a prescription medication for non-medical reasons in the past year? never    Single Item Drug Screen Score: 0  Interpretation: Negative screen for possible drug use disorder    Review of Current Opioid Use    Opioid Risk Tool (ORT) Interpretation: Complete Opioid Risk Tool (ORT)    No exam data present     Physical Exam:     Pulse 85   Temp 97 8 °F (36 6 °C) (Temporal)   Resp 16   Ht 5' 4 57" (1 64 m)   Wt 64 9 kg (143 lb)   LMP  (LMP Unknown)   SpO2 98%   BMI 24 12 kg/m²     Physical Exam  Vitals signs and nursing note reviewed     Constitutional: General: Distressed: Photophobic, uncomfortable  Appearance: She is well-developed  HENT:      Head: Normocephalic and atraumatic  Right Ear: Tympanic membrane normal       Left Ear: Tympanic membrane normal    Eyes:      Conjunctiva/sclera: Conjunctivae normal       Pupils: Pupils are equal, round, and reactive to light  Neck:      Musculoskeletal: Neck supple  Cardiovascular:      Rate and Rhythm: Normal rate and regular rhythm  Heart sounds: No murmur  Pulmonary:      Effort: Pulmonary effort is normal  No respiratory distress  Breath sounds: Normal breath sounds  Abdominal:      Palpations: Abdomen is soft  Tenderness: There is no abdominal tenderness  Skin:     General: Skin is warm and dry  Neurological:      General: No focal deficit present  Mental Status: She is alert and oriented to person, place, and time  Mental status is at baseline     Psychiatric:         Mood and Affect: Mood normal           Claire Flor DO

## 2021-03-30 NOTE — PATIENT INSTRUCTIONS
Medicare Preventive Visit Patient Instructions  Thank you for completing your Welcome to Medicare Visit or Medicare Annual Wellness Visit today  Your next wellness visit will be due in one year (3/31/2022)  The screening/preventive services that you may require over the next 5-10 years are detailed below  Some tests may not apply to you based off risk factors and/or age  Screening tests ordered at today's visit but not completed yet may show as past due  Also, please note that scanned in results may not display below  Preventive Screenings:  Service Recommendations Previous Testing/Comments   Colorectal Cancer Screening  * Colonoscopy    * Fecal Occult Blood Test (FOBT)/Fecal Immunochemical Test (FIT)  * Fecal DNA/Cologuard Test  * Flexible Sigmoidoscopy Age: 54-65 years old   Colonoscopy: every 10 years (may be performed more frequently if at higher risk)  OR  FOBT/FIT: every 1 year  OR  Cologuard: every 3 years  OR  Sigmoidoscopy: every 5 years  Screening may be recommended earlier than age 48 if at higher risk for colorectal cancer  Also, an individualized decision between you and your healthcare provider will decide whether screening between the ages of 74-80 would be appropriate  Colonoscopy: 05/26/2020  FOBT/FIT: Not on file  Cologuard: Not on file  Sigmoidoscopy: Not on file    Screening Current     Breast Cancer Screening Age: 36 years old  Frequency: every 1-2 years  Not required if history of left and right mastectomy Mammogram: 09/21/2020    Screening Current   Cervical Cancer Screening Between the ages of 21-29, pap smear recommended once every 3 years  Between the ages of 33-67, can perform pap smear with HPV co-testing every 5 years     Recommendations may differ for women with a history of total hysterectomy, cervical cancer, or abnormal pap smears in past  Pap Smear: 11/14/2018    Screening Not Indicated   Hepatitis C Screening Once for adults born between 1945 and 1965  More frequently in patients at high risk for Hepatitis C Hep C Antibody: Not on file        Diabetes Screening 1-2 times per year if you're at risk for diabetes or have pre-diabetes Fasting glucose: 89 mg/dL   A1C: 5 5 %    Screening Current   Cholesterol Screening Once every 5 years if you don't have a lipid disorder  May order more often based on risk factors  Lipid panel: 06/22/2020    Screening Not Indicated  History Lipid Disorder     Other Preventive Screenings Covered by Medicare:  1  Abdominal Aortic Aneurysm (AAA) Screening: covered once if your at risk  You're considered to be at risk if you have a family history of AAA  2  Lung Cancer Screening: covers low dose CT scan once per year if you meet all of the following conditions: (1) Age 50-69; (2) No signs or symptoms of lung cancer; (3) Current smoker or have quit smoking within the last 15 years; (4) You have a tobacco smoking history of at least 30 pack years (packs per day multiplied by number of years you smoked); (5) You get a written order from a healthcare provider  3  Glaucoma Screening: covered annually if you're considered high risk: (1) You have diabetes OR (2) Family history of glaucoma OR (3)  aged 48 and older OR (3)  American aged 72 and older  3  Osteoporosis Screening: covered every 2 years if you meet one of the following conditions: (1) You're estrogen deficient and at risk for osteoporosis based off medical history and other findings; (2) Have a vertebral abnormality; (3) On glucocorticoid therapy for more than 3 months; (4) Have primary hyperparathyroidism; (5) On osteoporosis medications and need to assess response to drug therapy  · Last bone density test (DXA Scan): Not on file  5  HIV Screening: covered annually if you're between the age of 12-76  Also covered annually if you are younger than 13 and older than 72 with risk factors for HIV infection   For pregnant patients, it is covered up to 3 times per pregnancy  Immunizations:  Immunization Recommendations   Influenza Vaccine Annual influenza vaccination during flu season is recommended for all persons aged >= 6 months who do not have contraindications   Pneumococcal Vaccine (Prevnar and Pneumovax)  * Prevnar = PCV13  * Pneumovax = PPSV23   Adults 25-60 years old: 1-3 doses may be recommended based on certain risk factors  Adults 72 years old: Prevnar (PCV13) vaccine recommended followed by Pneumovax (PPSV23) vaccine  If already received PPSV23 since turning 65, then PCV13 recommended at least one year after PPSV23 dose  Hepatitis B Vaccine 3 dose series if at intermediate or high risk (ex: diabetes, end stage renal disease, liver disease)   Tetanus (Td) Vaccine - COST NOT COVERED BY MEDICARE PART B Following completion of primary series, a booster dose should be given every 10 years to maintain immunity against tetanus  Td may also be given as tetanus wound prophylaxis  Tdap Vaccine - COST NOT COVERED BY MEDICARE PART B Recommended at least once for all adults  For pregnant patients, recommended with each pregnancy  Shingles Vaccine (Shingrix) - COST NOT COVERED BY MEDICARE PART B  2 shot series recommended in those aged 48 and above     Health Maintenance Due:      Topic Date Due    Hepatitis C Screening  Never done    HIV Screening  06/02/2025 (Originally 8/10/1970)    MAMMOGRAM  09/21/2021    Colorectal Cancer Screening  05/26/2030     Immunizations Due:      Topic Date Due    COVID-19 Vaccine (1) Never done     Advance Directives   What are advance directives? Advance directives are legal documents that state your wishes and plans for medical care  These plans are made ahead of time in case you lose your ability to make decisions for yourself  Advance directives can apply to any medical decision, such as the treatments you want, and if you want to donate organs  What are the types of advance directives?   There are many types of advance directives, and each state has rules about how to use them  You may choose a combination of any of the following:  · Living will: This is a written record of the treatment you want  You can also choose which treatments you do not want, which to limit, and which to stop at a certain time  This includes surgery, medicine, IV fluid, and tube feedings  · Durable power of  for healthcare [facility]): This is a written record that states who you want to make healthcare choices for you when you are unable to make them for yourself  This person, called a proxy, is usually a family member or a friend  You may choose more than 1 proxy  · Do not resuscitate (DNR) order:  A DNR order is used in case your heart stops beating or you stop breathing  It is a request not to have certain forms of treatment, such as CPR  A DNR order may be included in other types of advance directives  · Medical directive: This covers the care that you want if you are in a coma, near death, or unable to make decisions for yourself  You can list the treatments you want for each condition  Treatment may include pain medicine, surgery, blood transfusions, dialysis, IV or tube feedings, and a ventilator (breathing machine)  · Values history: This document has questions about your views, beliefs, and how you feel and think about life  This information can help others choose the care that you would choose  Why are advance directives important? An advance directive helps you control your care  Although spoken wishes may be used, it is better to have your wishes written down  Spoken wishes can be misunderstood, or not followed  Treatments may be given even if you do not want them  An advance directive may make it easier for your family to make difficult choices about your care  Fall Prevention    Fall prevention  includes ways to make your home and other areas safer  It also includes ways you can move more carefully to prevent a fall   Health conditions that cause changes in your blood pressure, vision, or muscle strength and coordination may increase your risk for falls  Medicines may also increase your risk for falls if they make you dizzy, weak, or sleepy  Fall prevention tips:   · Stand or sit up slowly  · Use assistive devices as directed  · Wear shoes that fit well and have soles that   · Wear a personal alarm  · Stay active  · Manage your medical conditions  Home Safety Tips:  · Add items to prevent falls in the bathroom  · Keep paths clear  · Install bright lights in your home  · Keep items you use often on shelves within reach  · Paint or place reflective tape on the edges of your stairs  Narcotic (Opioid) Safety    Use narcotics safely:  · Take prescribed narcotics exactly as directed  · Do not give narcotics to others or take narcotics that belong to someone else  · Do not mix narcotics without medicines or alcohol  · Do not drive or operate heavy machinery after you take the narcotic  · Monitor for side effects and notify your healthcare provider if you experienced side effects such as nausea, sleepiness, itching, or trouble thinking clearly  Manage constipation:    Constipation is the most common side effect of narcotic medicine  Constipation is when you have hard, dry bowel movements, or you go longer than usual between bowel movements  Tell your healthcare provider about all changes in your bowel movements while you are taking narcotics  He or she may recommend laxative medicine to help you have a bowel movement  He or she may also change the kind of narcotic you are taking, or change when you take it  The following are more ways you can prevent or relieve constipation:    · Drink liquids as directed  You may need to drink extra liquids to help soften and move your bowels  Ask how much liquid to drink each day and which liquids are best for you  · Eat high-fiber foods    This may help decrease constipation by adding bulk to your bowel movements  High-fiber foods include fruits, vegetables, whole-grain breads and cereals, and beans  Your healthcare provider or dietitian can help you create a high-fiber meal plan  Your provider may also recommend a fiber supplement if you cannot get enough fiber from food  · Exercise regularly  Regular physical activity can help stimulate your intestines  Walking is a good exercise to prevent or relieve constipation  Ask which exercises are best for you  · Schedule a time each day to have a bowel movement  This may help train your body to have regular bowel movements  Bend forward while you are on the toilet to help move the bowel movement out  Sit on the toilet for at least 10 minutes, even if you do not have a bowel movement  Store narcotics safely:   · Store narcotics where others cannot easily get them  Keep them in a locked cabinet or secure area  Do not  keep them in a purse or other bag you carry with you  A person may be looking for something else and find the narcotics  · Make sure narcotics are stored out of the reach of children  A child can easily overdose on narcotics  Narcotics may look like candy to a small child  The best way to dispose of narcotics: The laws vary by country and area  In the [place], the best way is to return the narcotics through a take-back program  This program is offered by the Erika DAMON)  The following are options for using the program:  · Take the narcotics to a AZUL collection site  The site is often a law enforcement center  Call your local law enforcement center for scheduled take-back days in your area  You will be given information on where to go if the collection site is in a different location  · Take the narcotics to an approved pharmacy or hospital   A pharmacy or hospital may be set up as a collection site   You will need to ask if it is a AZUL collection site if you were not directed there  A pharmacy or doctor's office may not be able to take back narcotics unless it is a AZUL site  · Use a mail-back system  This means you are given containers to put the narcotics into  You will then mail them in the containers  · Use a take-back drop box  This is a place to leave the narcotics at any time  People and animals will not be able to get into the box  Your local law enforcement agency can tell you where to find a drop box in your area  Other ways to manage pain:   · Ask your healthcare provider about non-narcotic medicines to control pain  Nonprescription medicines include NSAIDs (such as ibuprofen) and acetaminophen  Prescription medicines include muscle relaxers, antidepressants, and steroids  · Pain may be managed without any medicines  Some ways to relieve pain include massage, aromatherapy, or meditation  Physical or occupational therapy may also help  For more information:   · Drug Enforcement Administration  [address] [place] [address] [address]  Phone: 0- 219 - [phone]  Web Address: [facility]/drug_disposal/    · [address]  and Drug Administration  [facility] , [address] [place] [address]  Phone: 5- 432 - [phone]  Web Address: http://[employer]/     © Copyright [employer] 2018 Information is for End User's use only and may not be sold, redistributed or otherwise used for commercial purposes   All illustrations and images included in CareNotes® are the copyrighted property of [employer]  or [address] [place] [address]

## 2021-04-03 DIAGNOSIS — G25.0 ESSENTIAL TREMOR: ICD-10-CM

## 2021-04-05 RX ORDER — DIAZEPAM 10 MG/1
10 TABLET ORAL 2 TIMES DAILY
Qty: 60 TABLET | Refills: 0 | Status: SHIPPED | OUTPATIENT
Start: 2021-04-05 | End: 2021-04-06 | Stop reason: SDUPTHER

## 2021-04-06 ENCOUNTER — HOSPITAL ENCOUNTER (OUTPATIENT)
Dept: NEUROLOGY | Facility: CLINIC | Age: 66
Discharge: HOME | End: 2021-04-06
Payer: COMMERCIAL

## 2021-04-06 DIAGNOSIS — G25.0 ESSENTIAL TREMOR: ICD-10-CM

## 2021-04-06 DIAGNOSIS — G43.909 MIGRAINE WITHOUT STATUS MIGRAINOSUS, NOT INTRACTABLE, UNSPECIFIED MIGRAINE TYPE: ICD-10-CM

## 2021-04-06 DIAGNOSIS — R55 SYNCOPE AND COLLAPSE: ICD-10-CM

## 2021-04-06 PROCEDURE — 95819 EEG AWAKE AND ASLEEP: CPT | Performed by: PSYCHIATRY & NEUROLOGY

## 2021-04-06 PROCEDURE — 95816 EEG AWAKE AND DROWSY: CPT

## 2021-04-06 RX ORDER — DIAZEPAM 10 MG/1
10 TABLET ORAL 2 TIMES DAILY
Qty: 60 TABLET | Refills: 0 | Status: SHIPPED | OUTPATIENT
Start: 2021-04-06 | End: 2021-06-02 | Stop reason: SDUPTHER

## 2021-04-06 RX ORDER — SUMATRIPTAN 100 MG/1
100 TABLET, FILM COATED ORAL ONCE AS NEEDED
Qty: 9 TABLET | Refills: 0 | Status: CANCELLED | OUTPATIENT
Start: 2021-04-06

## 2021-04-19 DIAGNOSIS — J45.20 MILD INTERMITTENT ASTHMA, UNSPECIFIED WHETHER COMPLICATED: ICD-10-CM

## 2021-04-19 RX ORDER — ALBUTEROL SULFATE 90 UG/1
AEROSOL, METERED RESPIRATORY (INHALATION)
Qty: 54 INHALER | Refills: 1 | Status: SHIPPED | OUTPATIENT
Start: 2021-04-19 | End: 2021-08-04 | Stop reason: SDUPTHER

## 2021-04-24 ENCOUNTER — TELEPHONE (OUTPATIENT)
Dept: OTHER | Facility: OTHER | Age: 66
End: 2021-04-24

## 2021-04-25 DIAGNOSIS — G43.909 MIGRAINE WITHOUT STATUS MIGRAINOSUS, NOT INTRACTABLE, UNSPECIFIED MIGRAINE TYPE: ICD-10-CM

## 2021-04-26 ENCOUNTER — OFFICE VISIT (OUTPATIENT)
Dept: GASTROENTEROLOGY | Facility: MEDICAL CENTER | Age: 66
End: 2021-04-26
Payer: COMMERCIAL

## 2021-04-26 VITALS
WEIGHT: 144 LBS | SYSTOLIC BLOOD PRESSURE: 150 MMHG | TEMPERATURE: 97.8 F | DIASTOLIC BLOOD PRESSURE: 94 MMHG | HEIGHT: 65 IN | HEART RATE: 85 BPM | BODY MASS INDEX: 23.99 KG/M2

## 2021-04-26 DIAGNOSIS — K59.1 FUNCTIONAL DIARRHEA: Primary | ICD-10-CM

## 2021-04-26 DIAGNOSIS — R13.19 ESOPHAGEAL DYSPHAGIA: ICD-10-CM

## 2021-04-26 DIAGNOSIS — R10.13 DYSPEPSIA: ICD-10-CM

## 2021-04-26 PROCEDURE — 1036F TOBACCO NON-USER: CPT | Performed by: PHYSICIAN ASSISTANT

## 2021-04-26 PROCEDURE — 3080F DIAST BP >= 90 MM HG: CPT | Performed by: PHYSICIAN ASSISTANT

## 2021-04-26 PROCEDURE — 3008F BODY MASS INDEX DOCD: CPT | Performed by: PHYSICIAN ASSISTANT

## 2021-04-26 PROCEDURE — 99214 OFFICE O/P EST MOD 30 MIN: CPT | Performed by: PHYSICIAN ASSISTANT

## 2021-04-26 PROCEDURE — 1160F RVW MEDS BY RX/DR IN RCRD: CPT | Performed by: PHYSICIAN ASSISTANT

## 2021-04-26 PROCEDURE — 3077F SYST BP >= 140 MM HG: CPT | Performed by: PHYSICIAN ASSISTANT

## 2021-04-26 RX ORDER — SUMATRIPTAN 100 MG/1
100 TABLET, FILM COATED ORAL ONCE AS NEEDED
Qty: 9 TABLET | Refills: 0 | Status: SHIPPED | OUTPATIENT
Start: 2021-04-26 | End: 2021-05-01 | Stop reason: SDUPTHER

## 2021-04-26 NOTE — PATIENT INSTRUCTIONS
The patient is scheduled at [facility] for a colon with Dr Jack Fernandes on 6/8/2021  miralax/dulcolax prep instructions have been gone over in the office, with the patient, by the MA  The patient is aware that they will receive a call with the arrival time the day prior to procedure and that they will need a  the day of the procedure   I have asked the patient to call with any questions that they might have prior to procedure

## 2021-04-26 NOTE — PROGRESS NOTES
Assessment/Plan:     Diagnoses and all orders for this visit:    Functional diarrhea    Patient complains of abdominal pain and diarrhea  It sounds as if she has had alternating bowel habits for some time but she states over the last few weeks the diarrhea has become worse  She admits to incontinence  She has not tried anything for her symptoms  She has had recent antibiotics and I am concerned about potential C diff  Would recommend stool studies as listed below  She is not exactly sure when her last colonoscopy was however the last report that I could find was in 2009  Pathology is unavailable and unable to determine if they did random biopsies for microscopic colitis  Given her symptoms would recommend repeating at this time   -     Giardia antigen; Future  -     Calprotectin,Fecal; Future  -     Ova and parasite examination; Future  -     Stool Enteric Bacterial Panel by PCR; Future  -     Clostridium difficile toxin by PCR with EIA; Future  -     Colonoscopy; Future    Esophageal dysphagia  Dyspepsia    She was previously having complaints of dysphagia and dyspepsia  She has since discontinued her PPI  She states she has intermittent heartburn specifically with fried and fatty foods  She does use Tums on an as-needed basis  She has occasional dysphagia which sounds oral pharyngeal as her dentures are ill-fitting  Will see her back after her procedure to discuss all results  Subjective:      Patient ID: Yvette Arvizu is a 72 y o  female  HPI       Patient is here for diarrhea, bloating and abdominal pain  She was last seen in 2019 with similar symptoms  At that time she was complaining of dysphagia and intermittent nausea  At that time she was previously on PPI however this is since been discontinued  She states overall she is doing well but does have occasional difficulty with swallowing  She states that her dentures are ill-fitting    She does take Tums on an as-needed basis    She states that she has been having considerable diarrhea with abdominal pain incontinence  She states it wakes her up at night  She has to wear pad  It is unclear if this is been ongoing and has worsened recently or if this is new  She has been on antibiotics for recent tooth infection however  Her most recent TSH is within normal limits  Previous celiac testing was negative  Her 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 was in July of 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, otherwise within normal limits  The last colonoscopy report I could find was from 2009 where she had 6 polyps removed  She believes she was told that these were precancerous, pathology is not available      Patient Active Problem List   Diagnosis    Esophageal dysphagia    Elevated MCV    Dyspepsia    Change in bowel function    Essential tremor    Moderate episode of recurrent major depressive disorder (HCC)    Essential hypertension    Generalized anxiety disorder    Asthma    Seasonal allergic rhinitis    Migraine without status migrainosus, not intractable    Syncope and collapse    Functional diarrhea     Allergies   Allergen Reactions    Levofloxacin Other (See Comments)     Weak legs, blurred vision    Carafate [Sucralfate] Rash    Other Palpitations     MRI dye    Tetracycline Rash     Current Outpatient Medications on File Prior to Visit   Medication Sig    albuterol (PROVENTIL HFA,VENTOLIN HFA) 90 mcg/act inhaler TAKE 2 PUFFS BY MOUTH EVERY 6 HOURS AS NEEDED FOR WHEEZE    atorvastatin (LIPITOR) 10 mg tablet Take 1 tablet (10 mg total) by mouth daily    clotrimazole-betamethasone (LOTRISONE) 1-0 05 % cream Apply topically 2 (two) times a day To outside of the vagina    diazepam (VALIUM) 10 mg tablet Take 1 tablet (10 mg total) by mouth 2 (two) times a day    Flovent HFA 44 MCG/ACT inhaler TAKE 2 PUFFS BY MOUTH TWICE A DAY    fluticasone (FLONASE) 50 mcg/act nasal spray SPRAY 2 SPRAYS INTO EACH NOSTRIL EVERY DAY    fluticasone-vilanterol (Breo Ellipta) 200-25 MCG/INH inhaler Inhale 1 puff daily Rinse mouth after use   HYDROcodone-acetaminophen (NORCO) 7 5-325 mg per tablet 3 (three) times a day     lisinopril (ZESTRIL) 2 5 mg tablet TAKE 1 TABLET BY MOUTH EVERY DAY    mirtazapine (REMERON) 15 mg tablet TAKE 1 1/2 TABLETS AT BEDTIME-Patient will use good Rx card    naloxone (NARCAN) 4 mg/0 1 mL nasal spray 1 spray by Alternating Nares route every 3 (three) minutes as needed for opioid reversal or respiratory depression Administer 1 spray into a nostril  If breathing does not return to normal or if breathing difficulty resumes after 2-3 minutes, give another dose in the other nostril using a new spray   ondansetron (ZOFRAN) 4 mg tablet Take 1 tablet (4 mg total) by mouth every 8 (eight) hours as needed for nausea or vomiting    predniSONE 10 mg tablet Take 1 tablet (10 mg total) by mouth daily    primidone (MYSOLINE) 50 mg tablet Take 4 tabs by mouth daily at bedtime    SUMAtriptan (IMITREX) 100 mg tablet Take 1 tablet (100 mg total) by mouth once as needed for migraine for up to 1 dose    tiZANidine (ZANAFLEX) 4 mg tablet TAKE 1 TABLET BY MOUTH TWICE A DAY AS NEEDED FOR MUSCLE PAIN/SPASMS     No current facility-administered medications on file prior to visit  Family History   Problem Relation Age of Onset    Heart failure Mother     Hyperlipidemia Mother     Hypertension Mother     Coronary artery disease Brother     COPD Maternal Grandmother     No Known Problems Sister     No Known Problems Daughter     No Known Problems Sister     Aneurysm Maternal Aunt      Past Medical History:   Diagnosis Date    Asthma     Fibromyalgia     GERD (gastroesophageal reflux disease)     Hyperlipidemia     Hypertension     Lumbar herniated disc      Social History     Socioeconomic History    Marital status:       Spouse name: None    Number of children: None    Years of education: None    Highest education level: None   Occupational History    None   Social Needs    Financial resource strain: None    Food insecurity     Worry: None     Inability: None    Transportation needs     Medical: None     Non-medical: None   Tobacco Use    Smoking status: Former Smoker     Packs/day: 1 00     Types: Cigarettes     Quit date: 2019     Years since quittin 5    Smokeless tobacco: Never Used   Substance and Sexual Activity    Alcohol use: No    Drug use: No    Sexual activity: Not Currently     Partners: Female, Male     Birth control/protection: None   Lifestyle    Physical activity     Days per week: None     Minutes per session: None    Stress: None   Relationships    Social connections     Talks on phone: None     Gets together: None     Attends Mandaen service: None     Active member of club or organization: None     Attends meetings of clubs or organizations: None     Relationship status: None    Intimate partner violence     Fear of current or ex partner: None     Emotionally abused: None     Physically abused: None     Forced sexual activity: None   Other Topics Concern    None   Social History Narrative    None     Past Surgical History:   Procedure Laterality Date    BREAST BIOPSY Left     benign- at age 34   [address]           Review of Systems   Constitutional: Negative for fever  Gastrointestinal: Positive for abdominal pain, constipation, diarrhea and nausea  Negative for vomiting  Genitourinary: Positive for frequency  Negative for dysuria and hematuria  Musculoskeletal: Positive for arthralgias and myalgias  Neurological: Positive for headaches  All other systems reviewed and are negative          Objective:      /94 (BP Location: Right arm, Patient Position: Sitting, Cuff Size: Adult)   Pulse 85   Temp 97 8 °F (36 6 °C) (Tympanic)   Ht 5' 4 5" (1 638 m)   Wt 65 3 kg (144 lb)   LMP  (LMP Unknown)   BMI 24 34 kg/m² Physical Exam  Constitutional:       Appearance: Normal appearance  She is well-developed  HENT:      Head: Normocephalic and atraumatic  Eyes:      Conjunctiva/sclera: Conjunctivae normal    Neck:      Musculoskeletal: Normal range of motion  Cardiovascular:      Rate and Rhythm: Normal rate and regular rhythm  Pulmonary:      Effort: Pulmonary effort is normal       Breath sounds: Normal breath sounds  Abdominal:      General: Bowel sounds are normal  There is no distension  Palpations: Abdomen is soft  Tenderness: There is abdominal tenderness  Musculoskeletal: Normal range of motion  Skin:     General: Skin is warm and dry  Neurological:      Mental Status: She is alert and oriented to person, place, and time     Psychiatric:         Mood and Affect: Mood normal          Behavior: Behavior normal

## 2021-04-27 ENCOUNTER — APPOINTMENT (OUTPATIENT)
Dept: LAB | Facility: MEDICAL CENTER | Age: 66
End: 2021-04-27
Payer: COMMERCIAL

## 2021-04-27 DIAGNOSIS — I10 ESSENTIAL HYPERTENSION: ICD-10-CM

## 2021-04-27 DIAGNOSIS — K59.1 FUNCTIONAL DIARRHEA: ICD-10-CM

## 2021-04-27 DIAGNOSIS — F33.1 MODERATE EPISODE OF RECURRENT MAJOR DEPRESSIVE DISORDER (HCC): ICD-10-CM

## 2021-04-27 DIAGNOSIS — E78.5 HYPERLIPIDEMIA, UNSPECIFIED HYPERLIPIDEMIA TYPE: ICD-10-CM

## 2021-04-27 PROCEDURE — 87177 OVA AND PARASITES SMEARS: CPT

## 2021-04-27 PROCEDURE — 87209 SMEAR COMPLEX STAIN: CPT

## 2021-04-27 PROCEDURE — 83993 ASSAY FOR CALPROTECTIN FECAL: CPT

## 2021-04-27 PROCEDURE — 87505 NFCT AGENT DETECTION GI: CPT

## 2021-04-28 ENCOUNTER — TELEPHONE (OUTPATIENT)
Dept: GASTROENTEROLOGY | Facility: CLINIC | Age: 66
End: 2021-04-28

## 2021-04-28 ENCOUNTER — TELEPHONE (OUTPATIENT)
Dept: FAMILY MEDICINE CLINIC | Facility: CLINIC | Age: 66
End: 2021-04-28

## 2021-04-28 DIAGNOSIS — L30.9 DERMATITIS: Primary | ICD-10-CM

## 2021-04-28 DIAGNOSIS — R10.9 ABDOMINAL PAIN, UNSPECIFIED ABDOMINAL LOCATION: ICD-10-CM

## 2021-04-28 DIAGNOSIS — R19.7 DIARRHEA, UNSPECIFIED TYPE: Primary | ICD-10-CM

## 2021-04-28 LAB
C DIFF TOX B TCDB STL QL NAA+PROBE: NEGATIVE
CAMPYLOBACTER DNA SPEC NAA+PROBE: NORMAL
G LAMBLIA AG STL QL IA: NEGATIVE
O+P STL CONC: NORMAL
SALMONELLA DNA SPEC QL NAA+PROBE: NORMAL
SHIGA TOXIN STX GENE SPEC NAA+PROBE: NORMAL
SHIGELLA DNA SPEC QL NAA+PROBE: NORMAL

## 2021-04-28 RX ORDER — DICYCLOMINE HYDROCHLORIDE 10 MG/1
10 CAPSULE ORAL
Qty: 60 CAPSULE | Refills: 3 | Status: SHIPPED | OUTPATIENT
Start: 2021-04-28 | End: 2021-08-26 | Stop reason: SDUPTHER

## 2021-04-28 RX ORDER — BETAMETHASONE DIPROPIONATE 0.5 MG/G
CREAM TOPICAL 2 TIMES DAILY
Qty: 30 G | Refills: 0 | Status: SHIPPED | OUTPATIENT
Start: 2021-04-28 | End: 2022-05-04 | Stop reason: SDUPTHER

## 2021-04-28 NOTE — TELEPHONE ENCOUNTER
Please reiterate to patient that we cannot give her antibiotics for c diff since it came back negative  She has a colonosocpy coming up in June; they can do biopsies  to check for microscopic colitis  Until then, we must wait on stool studies results  I will send in bentyl for the abdominal pain and this may also help with the diarrhea, too  Will wait until stool studies resulted for further recs

## 2021-04-28 NOTE — TELEPHONE ENCOUNTER
Patient is calling for a medication for rashes, she was prescribed this previously it has been a while though  I don't see it listed on her medication list   She said it was Betamethasone DP 0 05% cream     Patient uses [facility] on [address] in [place]

## 2021-04-28 NOTE — TELEPHONE ENCOUNTER
Patients GI provider:  Dr Ramos [facility]    Number to return call: [phone]    Reason for call: Pt calling stating she is experiencing diarrhea and wants to know if she can get a script for vancomycin or fidaxomicin?     Scheduled procedure/appointment date if applicable: Procedure 4/1/78

## 2021-04-28 NOTE — TELEPHONE ENCOUNTER
Pt seen in office 4/26/21 Miguel Siemens Colonoscopy scheduled  EGD: 7/1/19  Hx: Diarrhea, dyspepsia, dysphagia      Pt called concerned about ongoing diarrhea/bloating/abdominal pain and requesting ABX  Currently taking Pepto OTC and "not really helping"  Advised pt to take imodium and gas-x OTC to see if this improves symptoms  Also advised she can trial fiber to see if this helps symptoms but deferred because it wont act "quick enough"  Encouraged fluid intake which patient she she is "drinking a lot and all the time"     Stools studies recently placed and so far C  Diff has resulted as negative  Informed pt and explained there is no need to start vancomycin at this time  Will wait for other stool studies to result for further intervention   Please advise

## 2021-04-29 LAB — CALPROTECTIN STL-MCNT: 35 UG/G (ref 0–120)

## 2021-04-29 RX ORDER — LISINOPRIL 2.5 MG/1
2.5 TABLET ORAL DAILY
Qty: 90 TABLET | Refills: 2 | Status: SHIPPED | OUTPATIENT
Start: 2021-04-29 | End: 2021-06-06 | Stop reason: SDUPTHER

## 2021-04-29 RX ORDER — ATORVASTATIN CALCIUM 10 MG/1
10 TABLET, FILM COATED ORAL DAILY
Qty: 90 TABLET | Refills: 2 | Status: SHIPPED | OUTPATIENT
Start: 2021-04-29 | End: 2021-07-01 | Stop reason: SDUPTHER

## 2021-04-29 RX ORDER — MIRTAZAPINE 15 MG/1
TABLET, FILM COATED ORAL
Qty: 45 TABLET | Refills: 2 | Status: SHIPPED | OUTPATIENT
Start: 2021-04-29 | End: 2021-05-25

## 2021-05-01 DIAGNOSIS — G43.909 MIGRAINE WITHOUT STATUS MIGRAINOSUS, NOT INTRACTABLE, UNSPECIFIED MIGRAINE TYPE: ICD-10-CM

## 2021-05-01 DIAGNOSIS — J45.20 MILD INTERMITTENT ASTHMA, UNSPECIFIED WHETHER COMPLICATED: ICD-10-CM

## 2021-05-01 DIAGNOSIS — G25.0 ESSENTIAL TREMOR: ICD-10-CM

## 2021-05-03 RX ORDER — DIAZEPAM 10 MG/1
10 TABLET ORAL 2 TIMES DAILY
Qty: 60 TABLET | Refills: 0 | Status: CANCELLED | OUTPATIENT
Start: 2021-05-03

## 2021-05-04 ENCOUNTER — TELEPHONE (OUTPATIENT)
Dept: GASTROENTEROLOGY | Facility: MEDICAL CENTER | Age: 66
End: 2021-05-04

## 2021-05-04 DIAGNOSIS — R10.9 ABDOMINAL PAIN, UNSPECIFIED ABDOMINAL LOCATION: Primary | ICD-10-CM

## 2021-05-04 DIAGNOSIS — R10.9 ABDOMINAL PAIN, UNSPECIFIED ABDOMINAL LOCATION: ICD-10-CM

## 2021-05-04 DIAGNOSIS — R19.7 DIARRHEA, UNSPECIFIED TYPE: Primary | ICD-10-CM

## 2021-05-04 RX ORDER — SUMATRIPTAN 100 MG/1
100 TABLET, FILM COATED ORAL ONCE AS NEEDED
Qty: 9 TABLET | Refills: 0 | Status: ON HOLD | OUTPATIENT
Start: 2021-05-04 | End: 2021-07-07 | Stop reason: SDUPTHER

## 2021-05-04 RX ORDER — PREDNISONE 10 MG/1
10 TABLET ORAL DAILY
Qty: 30 TABLET | Refills: 0 | Status: SHIPPED | OUTPATIENT
Start: 2021-05-04 | End: 2021-05-12 | Stop reason: SDUPTHER

## 2021-05-04 RX ORDER — HYOSCYAMINE SULFATE 0.125 MG
0.12 TABLET ORAL EVERY 6 HOURS PRN
Qty: 30 TABLET | Refills: 0 | OUTPATIENT
Start: 2021-05-04

## 2021-05-04 RX ORDER — HYOSCYAMINE SULFATE 0.125 MG
0.12 TABLET ORAL EVERY 6 HOURS PRN
Qty: 30 TABLET | Refills: 0 | Status: ON HOLD | OUTPATIENT
Start: 2021-05-04 | End: 2021-11-01 | Stop reason: ALTCHOICE

## 2021-05-04 NOTE — TELEPHONE ENCOUNTER
Hx diarrhea, dyspepsia, dysphagia    Patient was prescribed bentyl 4/28/21 encounter for ongoing symptoms of abdominal pain/ diarrhea  Calling today for alternative due headache when taking bentyl       levsin script entered

## 2021-05-04 NOTE — TELEPHONE ENCOUNTER
Please tell patient that the levsin (the med given to her because she got side effects from the bentyl) is not covered  Is she still having abdominal pain/cramping and diarrhea?

## 2021-05-04 NOTE — TELEPHONE ENCOUNTER
----- Message from Anjana Jensen PA-C sent at 5/3/2021  2:07 PM EDT -----  Stool studies were (-)  Can you imodium as needed for diarrhea   Alison Atkins

## 2021-05-04 NOTE — TELEPHONE ENCOUNTER
Left message on voice mail -discontinue bentyl due to reported symptoms  levsin sent to your pharmacy as alternative

## 2021-05-04 NOTE — TELEPHONE ENCOUNTER
Patients GI provider:  Dr Simmons Blank    Number to return call: (  [phone]    Reason for call: Pt calling to ask if she can have another medication instead of bentyl, giving her headaches but she has stomach pain    Scheduled procedure/appointment date if applicable: Apt/procedure 6-8-21

## 2021-05-06 RX ORDER — DIPHENOXYLATE HYDROCHLORIDE AND ATROPINE SULFATE 2.5; .025 MG/1; MG/1
1 TABLET ORAL 4 TIMES DAILY PRN
Qty: 30 TABLET | Refills: 0 | Status: ON HOLD | OUTPATIENT
Start: 2021-05-06 | End: 2021-11-01 | Stop reason: ALTCHOICE

## 2021-05-06 NOTE — TELEPHONE ENCOUNTER
levsin will likely not help with those symptoms  I'll send in lomotil for the diarrhea  Please advise her to use fiber to bulk up her stools to prevent both diarrhea and leakage  She has colonoscopy scheduled for next month so please make sure she keeps this appt so we can see if it helps us find a cause for her diarrhea

## 2021-05-12 DIAGNOSIS — J45.20 MILD INTERMITTENT ASTHMA, UNSPECIFIED WHETHER COMPLICATED: ICD-10-CM

## 2021-05-12 RX ORDER — PREDNISONE 10 MG/1
10 TABLET ORAL DAILY
Qty: 30 TABLET | Refills: 0 | Status: SHIPPED | OUTPATIENT
Start: 2021-05-12 | End: 2021-05-25 | Stop reason: SDUPTHER

## 2021-05-14 ENCOUNTER — ANESTHESIA EVENT (OUTPATIENT)
Dept: GASTROENTEROLOGY | Facility: MEDICAL CENTER | Age: 66
End: 2021-05-14

## 2021-05-15 DIAGNOSIS — R10.13 DYSPEPSIA: ICD-10-CM

## 2021-05-15 RX ORDER — ONDANSETRON 4 MG/1
4 TABLET, FILM COATED ORAL EVERY 8 HOURS PRN
Qty: 20 TABLET | Refills: 0 | Status: CANCELLED | OUTPATIENT
Start: 2021-05-15

## 2021-05-16 RX ORDER — ONDANSETRON 4 MG/1
TABLET, FILM COATED ORAL
Qty: 20 TABLET | Refills: 0 | Status: SHIPPED | OUTPATIENT
Start: 2021-05-16 | End: 2021-06-01 | Stop reason: SDUPTHER

## 2021-05-17 NOTE — TELEPHONE ENCOUNTER
Pt requesting refills  This has already been filled 5/16/2021  Called Pt and LMOM with message details for her to contact Pharmacy for refill has been sent

## 2021-05-25 ENCOUNTER — TELEPHONE (OUTPATIENT)
Dept: FAMILY MEDICINE CLINIC | Facility: CLINIC | Age: 66
End: 2021-05-25

## 2021-05-25 DIAGNOSIS — F33.1 MODERATE EPISODE OF RECURRENT MAJOR DEPRESSIVE DISORDER (HCC): Primary | ICD-10-CM

## 2021-05-25 DIAGNOSIS — J45.20 MILD INTERMITTENT ASTHMA, UNSPECIFIED WHETHER COMPLICATED: ICD-10-CM

## 2021-05-25 DIAGNOSIS — F41.1 GENERALIZED ANXIETY DISORDER: ICD-10-CM

## 2021-05-25 RX ORDER — PREDNISONE 10 MG/1
10 TABLET ORAL 2 TIMES DAILY WITH MEALS
Qty: 60 TABLET | Refills: 0 | Status: SHIPPED | OUTPATIENT
Start: 2021-05-25 | End: 2021-06-16

## 2021-05-25 NOTE — TELEPHONE ENCOUNTER
I spoke to the patient, she is requesting the Prednisone 10 mg twice a day  She is referencing the remeron as the medication she wants to go back to Zoloft

## 2021-05-25 NOTE — TELEPHONE ENCOUNTER
I spoke to the patient, she states she was on zoloft in her 30's and 40's and it "made her happier" than the Lexapro  Also stated Lexapro made her sleepy

## 2021-05-25 NOTE — TELEPHONE ENCOUNTER
I am assuming that patient is referencing prednisone 10 mg? That is what she wants to take twice a day? Also issue referencing   mirtazapine (REMERON) 15 mg tablet as the current medication that she wants to change back to the Zoloft?

## 2021-05-25 NOTE — TELEPHONE ENCOUNTER
Pt called in , she wants to take the Prednisone twice a day due to her allergies being bad   She also states she wants to go back on Zoloft as it helps her better than what she is on now

## 2021-05-25 NOTE — TELEPHONE ENCOUNTER
Okay just to clarification, I was looking for Zoloft on her old records and it is not Zoloft it is escitalopram which is Lexapro that she was on    So that would be my preference to restart

## 2021-05-25 NOTE — TELEPHONE ENCOUNTER
I can readjust the order on the prednisone but she will need to dial down then after she takes it for 2 weeks, also I will discontinue the Remeron and refill the Zoloft

## 2021-06-01 DIAGNOSIS — R10.13 DYSPEPSIA: ICD-10-CM

## 2021-06-01 RX ORDER — ONDANSETRON 4 MG/1
4 TABLET, FILM COATED ORAL EVERY 8 HOURS PRN
Qty: 20 TABLET | Refills: 0 | Status: SHIPPED | OUTPATIENT
Start: 2021-06-01 | End: 2021-06-21 | Stop reason: SDUPTHER

## 2021-06-02 DIAGNOSIS — G25.0 ESSENTIAL TREMOR: ICD-10-CM

## 2021-06-02 RX ORDER — DIAZEPAM 10 MG/1
10 TABLET ORAL 2 TIMES DAILY
Qty: 60 TABLET | Refills: 0 | Status: ON HOLD | OUTPATIENT
Start: 2021-06-02 | End: 2021-07-07 | Stop reason: SDUPTHER

## 2021-06-06 DIAGNOSIS — I10 ESSENTIAL HYPERTENSION: ICD-10-CM

## 2021-06-07 RX ORDER — LISINOPRIL 2.5 MG/1
2.5 TABLET ORAL DAILY
Qty: 90 TABLET | Refills: 0 | Status: SHIPPED | OUTPATIENT
Start: 2021-06-07 | End: 2021-11-08 | Stop reason: SDUPTHER

## 2021-06-08 ENCOUNTER — ANESTHESIA (OUTPATIENT)
Dept: GASTROENTEROLOGY | Facility: MEDICAL CENTER | Age: 66
End: 2021-06-08

## 2021-06-08 ENCOUNTER — HOSPITAL ENCOUNTER (OUTPATIENT)
Dept: GASTROENTEROLOGY | Facility: MEDICAL CENTER | Age: 66
Setting detail: OUTPATIENT SURGERY
Discharge: HOME | End: 2021-06-08
Admitting: INTERNAL MEDICINE
Payer: COMMERCIAL

## 2021-06-08 VITALS
SYSTOLIC BLOOD PRESSURE: 150 MMHG | OXYGEN SATURATION: 98 % | TEMPERATURE: 97.5 F | DIASTOLIC BLOOD PRESSURE: 86 MMHG | HEIGHT: 65 IN | WEIGHT: 145 LBS | BODY MASS INDEX: 24.16 KG/M2 | HEART RATE: 72 BPM | RESPIRATION RATE: 16 BRPM

## 2021-06-08 DIAGNOSIS — K57.92 DIVERTICULITIS: Primary | ICD-10-CM

## 2021-06-08 DIAGNOSIS — K59.1 FUNCTIONAL DIARRHEA: ICD-10-CM

## 2021-06-08 PROCEDURE — 88305 TISSUE EXAM BY PATHOLOGIST: CPT | Performed by: PATHOLOGY

## 2021-06-08 PROCEDURE — 45380 COLONOSCOPY AND BIOPSY: CPT | Performed by: INTERNAL MEDICINE

## 2021-06-08 RX ORDER — LIDOCAINE HYDROCHLORIDE 20 MG/ML
INJECTION, SOLUTION EPIDURAL; INFILTRATION; INTRACAUDAL; PERINEURAL AS NEEDED
Status: DISCONTINUED | OUTPATIENT
Start: 2021-06-08 | End: 2021-06-08

## 2021-06-08 RX ORDER — SODIUM CHLORIDE 9 MG/ML
125 INJECTION, SOLUTION INTRAVENOUS CONTINUOUS
Status: DISCONTINUED | OUTPATIENT
Start: 2021-06-08 | End: 2021-06-12 | Stop reason: HOSPADM

## 2021-06-08 RX ORDER — PROPOFOL 10 MG/ML
INJECTION, EMULSION INTRAVENOUS AS NEEDED
Status: DISCONTINUED | OUTPATIENT
Start: 2021-06-08 | End: 2021-06-08

## 2021-06-08 RX ORDER — SULFAMETHOXAZOLE AND TRIMETHOPRIM 800; 160 MG/1; MG/1
1 TABLET ORAL EVERY 12 HOURS SCHEDULED
Qty: 20 TABLET | Refills: 0 | Status: SHIPPED | OUTPATIENT
Start: 2021-06-08 | End: 2021-06-18

## 2021-06-08 RX ADMIN — PROPOFOL 50 MG: 10 INJECTION, EMULSION INTRAVENOUS at 12:54

## 2021-06-08 RX ADMIN — PROPOFOL 50 MG: 10 INJECTION, EMULSION INTRAVENOUS at 13:04

## 2021-06-08 RX ADMIN — PROPOFOL 50 MG: 10 INJECTION, EMULSION INTRAVENOUS at 13:09

## 2021-06-08 RX ADMIN — SODIUM CHLORIDE 125 ML/HR: 0.9 INJECTION, SOLUTION INTRAVENOUS at 11:39

## 2021-06-08 RX ADMIN — PROPOFOL 50 MG: 10 INJECTION, EMULSION INTRAVENOUS at 13:14

## 2021-06-08 RX ADMIN — PROPOFOL 100 MG: 10 INJECTION, EMULSION INTRAVENOUS at 12:50

## 2021-06-08 RX ADMIN — LIDOCAINE HYDROCHLORIDE 60 MG: 20 INJECTION, SOLUTION EPIDURAL; INFILTRATION; INTRACAUDAL; PERINEURAL at 12:50

## 2021-06-08 RX ADMIN — PROPOFOL 50 MG: 10 INJECTION, EMULSION INTRAVENOUS at 12:59

## 2021-06-08 RX ADMIN — PROPOFOL 50 MG: 10 INJECTION, EMULSION INTRAVENOUS at 13:19

## 2021-06-08 NOTE — H&P
History and Physical -  [facility]  Miguel Saldana [facility] 72 y o  female MRN: [id number]                  HPI: Francesca Glass is a 72y o  year old female who presents for colonoscopy for evaluation of diarrhea  Last colonoscopy reported in   REVIEW OF SYSTEMS: Per the HPI, and otherwise unremarkable  Historical Information   Past Medical History:   Diagnosis Date    Anxiety     Asthma     Depression     Fibromyalgia     GERD (gastroesophageal reflux disease)     Hyperlipidemia     Hypertension     Lumbar herniated disc      Past Surgical History:   Procedure Laterality Date    BREAST BIOPSY Left     benign- at age 27    TUBAL LIGATION      ULNAR NERVE REPAIR Left      Social History   Social History     Substance and Sexual Activity   Alcohol Use No     Social History     Substance and Sexual Activity   Drug Use No     Social History     Tobacco Use   Smoking Status Current Some Day Smoker    Packs/day: 0 25    Types: Cigarettes    Last attempt to quit: 2019    Years since quittin 7   Smokeless Tobacco Never Used   Tobacco Comment    2-3 in am with coffee     Family History   Problem Relation Age of Onset    Heart failure Mother     Hyperlipidemia Mother     Hypertension Mother     Coronary artery disease Brother     COPD Maternal Grandmother     No Known Problems Sister     No Known Problems Daughter     No Known Problems Sister     Aneurysm Maternal Aunt        Meds/Allergies     (Not in a hospital admission)      Allergies   Allergen Reactions    Levofloxacin Other (See Comments)     Weak legs, blurred vision    Carafate [Sucralfate] Rash    Other Palpitations     MRI dye    Tetracycline Rash       Objective     Blood pressure 170/93, pulse 72, temperature 97 5 °F (36 4 °C), temperature source Temporal, resp  rate 20, height 5' 5" (1 651 m), weight 65 8 kg (145 lb), SpO2 98 %, not currently breastfeeding        PHYSICAL EXAM    Gen: NAD  CV: RRR  CHEST: Clear  ABD: soft, NT/ND  EXT: no edema      ASSESSMENT/PLAN:  This is a 72y o  year old female here for colonoscopy for diarrhea evaluation      PLAN:   Procedure:  Colonoscopy

## 2021-06-08 NOTE — DISCHARGE INSTRUCTIONS
Colonoscopy   WHAT YOU NEED TO KNOW:   A colonoscopy is a procedure to examine the inside of your colon (intestine) with a scope  Polyps or tissue growths may have been removed during your colonoscopy  It is normal to feel bloated and to have some abdominal discomfort  You should be passing gas  If you have hemorrhoids or you had polyps removed, you may have a small amount of bleeding  DISCHARGE INSTRUCTIONS:   Seek care immediately if:    You have sudden, severe abdominal pain   You have problems swallowing   You have a large amount of black, sticky bowel movements or blood in your bowel movements   You have sudden trouble breathing   You feel weak, lightheaded, or faint or your heart beats faster than normal for you  Contact your healthcare provider if:    You have a fever and chills   You have nausea or are vomiting   Your abdomen is bloated or feels full and hard   You have abdominal pain   You have black, sticky bowel movements or blood in your bowel movements   You have not had a bowel movement for 3 days after your procedure   You have rash or hives   You have questions or concerns about your procedure  Activity:    Do not lift, strain, or run for 24 hours after your procedure   Rest after your procedure  You have been given medicine to relax you  Do not drive or make important decisions until the day after your procedure  Return to your normal activity as directed   Relieve gas and discomfort from bloating by lying on your right side with a heating pad on your abdomen  You may need to take short walks to help the gas move out  Eat small meals until bloating is relieved  Follow up with your healthcare provider as directed: Write down your questions so you remember to ask them during your visits  If you take a blood thinner, please review the specific instructions from your endoscopist about when you should resume it   These can be found in the Recommendation and Your Medication list sections of this After Visit Summary  Diverticulitis   WHAT YOU NEED TO KNOW:   Diverticulitis is a condition that causes small pockets along your intestine called diverticula to become inflamed or infected  This is caused by hard bowel movements, food, or bacteria that get stuck in the pockets  DISCHARGE INSTRUCTIONS:   Seek care immediately if:   · You have bowel movement or foul-smelling discharge leaking from your vagina or in your urine  · You have severe diarrhea  · You urinate less than usual or not at all  · You are not able to have a bowel movement  · You cannot stop vomiting  · You have severe abdominal pain, a fever, and your abdomen is larger than usual      · You have new or increased blood in your bowel movements  Contact your healthcare provider if:   · You have pain when you urinate  · Your symptoms get worse or do not go away  · You have questions or concerns about your condition or care  Medicines:   · Antibiotics  may be given to help prevent or treat a bacterial infection  · Prescription pain medicine  may be given  Ask your healthcare provider how to take this medicine safely  Some prescription pain medicines contain acetaminophen  Do not take other medicines that contain acetaminophen without talking to your healthcare provider  Too much acetaminophen may cause liver damage  Prescription pain medicine may cause constipation  Ask your healthcare provider how to prevent or treat constipation  · Take your medicine as directed  Contact your healthcare provider if you think your medicine is not helping or if you have side effects  Tell him or her if you are allergic to any medicine  Keep a list of the medicines, vitamins, and herbs you take  Include the amounts, and when and why you take them  Bring the list or the pill bottles to follow-up visits   Carry your medicine list with you in case of an emergency  Clear liquid diet:  A clear liquid diet includes any liquids that you can see through  Examples include water, ginger-jordan, cranberry or apple juice, frozen fruit ice, or broth  Stay on a clear liquid diet until your symptoms are gone, or as directed  Follow up with your healthcare provider as directed: You may need to return for a colonoscopy  When your symptoms are gone, you may need a low-fat, high-fiber diet to help prevent diverticulitis from developing again  Your healthcare provider or dietitian can help you create meal plans  Write down your questions so you remember to ask them during your visits  © Copyright [address] Information is for End User's use only and may not be sold, redistributed or otherwise used for commercial purposes  All illustrations and images included in CareNotes® are the copyrighted property of A D A M , Inc  or [facility] Ena Jones   The above information is an  only  It is not intended as medical advice for individual conditions or treatments  Talk to your doctor, nurse or pharmacist before following any medical regimen to see if it is safe and effective for you

## 2021-06-08 NOTE — ANESTHESIA PREPROCEDURE EVALUATION
Procedure:  COLONOSCOPY    Relevant Problems   ANESTHESIA (within normal limits)      CARDIO   (+) Essential hypertension      ENDO (within normal limits)      GI/HEPATIC   (+) Esophageal dysphagia      /RENAL (within normal limits)      GYN (within normal limits)      HEMATOLOGY (within normal limits)      MUSCULOSKELETAL (within normal limits)      NEURO/PSYCH   (+) Generalized anxiety disorder   (+) Moderate episode of recurrent major depressive disorder (HCC)      PULMONARY   (+) Asthma        Physical Exam    Airway    Mallampati score: I  TM Distance: >3 FB  Neck ROM: full     Dental   No notable dental hx     Cardiovascular  Rhythm: regular, Rate: normal, Cardiovascular exam normal    Pulmonary  Pulmonary exam normal Breath sounds clear to auscultation,     Other Findings        Anesthesia Plan  ASA Score- 2     Anesthesia Type-     Plan Factors-Exercise tolerance (METS): >4 METS  Chart reviewed  Patient summary reviewed  Patient is not a current smoker  Patient instructed to abstain from smoking on day of procedure  Patient did not smoke on day of surgery  Induction- intravenous  Postoperative Plan-     Informed Consent- Anesthetic plan and risks discussed with patient

## 2021-06-16 DIAGNOSIS — J45.20 MILD INTERMITTENT ASTHMA, UNSPECIFIED WHETHER COMPLICATED: ICD-10-CM

## 2021-06-16 RX ORDER — PREDNISONE 10 MG/1
TABLET ORAL
Qty: 60 TABLET | Refills: 0 | Status: SHIPPED | OUTPATIENT
Start: 2021-06-16 | End: 2021-07-01

## 2021-06-21 DIAGNOSIS — J30.2 SEASONAL ALLERGIC RHINITIS, UNSPECIFIED TRIGGER: ICD-10-CM

## 2021-06-21 DIAGNOSIS — R10.13 DYSPEPSIA: ICD-10-CM

## 2021-06-21 RX ORDER — FLUTICASONE PROPIONATE 50 MCG
2 SPRAY, SUSPENSION (ML) NASAL DAILY
Qty: 16 ML | Refills: 3 | Status: SHIPPED | OUTPATIENT
Start: 2021-06-21 | End: 2021-08-04 | Stop reason: SDUPTHER

## 2021-06-21 RX ORDER — FLUTICASONE PROPIONATE 50 MCG
SPRAY, SUSPENSION (ML) NASAL
Qty: 16 ML | Refills: 0 | Status: CANCELLED | OUTPATIENT
Start: 2021-06-21

## 2021-06-21 RX ORDER — ONDANSETRON 4 MG/1
4 TABLET, FILM COATED ORAL EVERY 8 HOURS PRN
Qty: 20 TABLET | Refills: 0 | Status: ON HOLD | OUTPATIENT
Start: 2021-06-21 | End: 2021-07-07 | Stop reason: SDUPTHER

## 2021-06-23 ENCOUNTER — APPOINTMENT (OUTPATIENT)
Dept: LAB | Facility: CLINIC | Age: 66
End: 2021-06-23
Payer: COMMERCIAL

## 2021-06-23 DIAGNOSIS — E78.5 HYPERLIPIDEMIA, UNSPECIFIED HYPERLIPIDEMIA TYPE: ICD-10-CM

## 2021-06-23 DIAGNOSIS — I10 ESSENTIAL HYPERTENSION: ICD-10-CM

## 2021-06-23 LAB
ALBUMIN SERPL BCP-MCNC: 3.7 G/DL (ref 3.5–5)
ALP SERPL-CCNC: 34 U/L (ref 46–116)
ALT SERPL W P-5'-P-CCNC: 28 U/L (ref 12–78)
ANION GAP SERPL CALCULATED.3IONS-SCNC: 5 MMOL/L (ref 4–13)
AST SERPL W P-5'-P-CCNC: 22 U/L (ref 5–45)
BILIRUB SERPL-MCNC: 0.27 MG/DL (ref 0.2–1)
BUN SERPL-MCNC: 11 MG/DL (ref 5–25)
CALCIUM SERPL-MCNC: 9.4 MG/DL (ref 8.3–10.1)
CHLORIDE SERPL-SCNC: 104 MMOL/L (ref 100–108)
CHOLEST SERPL-MCNC: 252 MG/DL (ref 50–200)
CO2 SERPL-SCNC: 28 MMOL/L (ref 21–32)
CREAT SERPL-MCNC: 1 MG/DL (ref 0.6–1.3)
GFR SERPL CREATININE-BSD FRML MDRD: 59 ML/MIN/1.73SQ M
GLUCOSE P FAST SERPL-MCNC: 82 MG/DL (ref 65–99)
HDLC SERPL-MCNC: 125 MG/DL
LDLC SERPL CALC-MCNC: 103 MG/DL (ref 0–100)
POTASSIUM SERPL-SCNC: 4.6 MMOL/L (ref 3.5–5.3)
PROT SERPL-MCNC: 7.1 G/DL (ref 6.4–8.2)
SODIUM SERPL-SCNC: 137 MMOL/L (ref 136–145)
T3FREE SERPL-MCNC: 2.14 PG/ML (ref 2.3–4.2)
T4 FREE SERPL-MCNC: 0.61 NG/DL (ref 0.76–1.46)
TRIGL SERPL-MCNC: 122 MG/DL
TSH SERPL DL<=0.05 MIU/L-ACNC: 0.94 UIU/ML (ref 0.36–3.74)

## 2021-06-23 PROCEDURE — 84439 ASSAY OF FREE THYROXINE: CPT

## 2021-06-23 PROCEDURE — 84481 FREE ASSAY (FT-3): CPT

## 2021-06-23 PROCEDURE — 80061 LIPID PANEL: CPT

## 2021-06-23 PROCEDURE — 84443 ASSAY THYROID STIM HORMONE: CPT

## 2021-06-23 PROCEDURE — 80053 COMPREHEN METABOLIC PANEL: CPT

## 2021-06-23 PROCEDURE — 36415 COLL VENOUS BLD VENIPUNCTURE: CPT

## 2021-06-25 ENCOUNTER — RA CDI HCC (OUTPATIENT)
Dept: OTHER | Facility: HOSPITAL | Age: 66
End: 2021-06-25

## 2021-06-25 DIAGNOSIS — J45.20 MILD INTERMITTENT ASTHMA, UNSPECIFIED WHETHER COMPLICATED: ICD-10-CM

## 2021-06-25 NOTE — PROGRESS NOTES
[facility]  coding opportunities             Chart reviewed, (number of) suggestions sent to provider: 1               Number of suggestions NOT actually used: 1     Patients insurance company: [address]  (Medicare Advantage and Commercial)     Visit status: Patient arrived for their scheduled appointment     Provider never responded to [facility]  coding request     [facility] [place]  coding opportunities             Chart reviewed, (number of) suggestions sent to provider: 1        F33 1 Moderate episode of recurrent major depressive disorder [facility])     If this is correct, please document and assess at your next visit 7/1/21              Patients insurance company: [address]  (Medicare Advantage and Commercial)

## 2021-06-25 NOTE — PROGRESS NOTES
[place] [address]  coding opportunities             Chart reviewed, (number of) suggestions sent to provider: 1     Problem listed updated  Provider Accepted, (number of) suggestions accepted: 1            Number of suggestions NOT actually used: 1     Patients insurance company: [address]  (Medicare Advantage and [employer])     Visit status: Patient arrived for their scheduled appointment        [place] [address]  coding opportunities             Chart reviewed, (number of) suggestions sent to provider: 1     Problem listed updated   Provider Accepted, (number of) suggestions accepted: 1               Patients insurance company: [address]  (Medicare Advantage and [employer])           [place] [address]  coding opportunities        DX F33 1 Major depressive disorder, recurrent, moderate         Chart reviewed, (number of) suggestions sent to provider: 1                  Patients insurance company: [address]  (Medicare Advantage and [employer])

## 2021-07-01 ENCOUNTER — APPOINTMENT (OUTPATIENT)
Dept: LAB | Facility: CLINIC | Age: 66
End: 2021-07-01
Payer: COMMERCIAL

## 2021-07-01 ENCOUNTER — OFFICE VISIT (OUTPATIENT)
Dept: FAMILY MEDICINE CLINIC | Facility: CLINIC | Age: 66
End: 2021-07-01
Payer: COMMERCIAL

## 2021-07-01 DIAGNOSIS — B37.81 THRUSH OF MOUTH AND ESOPHAGUS (HCC): ICD-10-CM

## 2021-07-01 DIAGNOSIS — E78.5 HYPERLIPIDEMIA, UNSPECIFIED HYPERLIPIDEMIA TYPE: ICD-10-CM

## 2021-07-01 DIAGNOSIS — B37.0 THRUSH OF MOUTH AND ESOPHAGUS (HCC): ICD-10-CM

## 2021-07-01 DIAGNOSIS — R10.84 GENERALIZED ABDOMINAL PAIN: Primary | ICD-10-CM

## 2021-07-01 DIAGNOSIS — R10.84 GENERALIZED ABDOMINAL PAIN: ICD-10-CM

## 2021-07-01 DIAGNOSIS — R23.8 ABNORMAL BRUISING: ICD-10-CM

## 2021-07-01 LAB
ANION GAP SERPL CALCULATED.3IONS-SCNC: 6 MMOL/L (ref 4–13)
BUN SERPL-MCNC: 9 MG/DL (ref 5–25)
CALCIUM SERPL-MCNC: 9.5 MG/DL (ref 8.3–10.1)
CHLORIDE SERPL-SCNC: 108 MMOL/L (ref 100–108)
CO2 SERPL-SCNC: 27 MMOL/L (ref 21–32)
CREAT SERPL-MCNC: 0.97 MG/DL (ref 0.6–1.3)
ERYTHROCYTE [DISTWIDTH] IN BLOOD BY AUTOMATED COUNT: 14.5 % (ref 11.6–15.1)
GFR SERPL CREATININE-BSD FRML MDRD: 61 ML/MIN/1.73SQ M
GLUCOSE SERPL-MCNC: 100 MG/DL (ref 65–140)
HCT VFR BLD AUTO: 41 % (ref 34.8–46.1)
HGB BLD-MCNC: 13.4 G/DL (ref 11.5–15.4)
MCH RBC QN AUTO: 36.6 PG (ref 26.8–34.3)
MCHC RBC AUTO-ENTMCNC: 32.7 G/DL (ref 31.4–37.4)
MCV RBC AUTO: 112 FL (ref 82–98)
PLATELET # BLD AUTO: 294 THOUSANDS/UL (ref 149–390)
PMV BLD AUTO: 9.3 FL (ref 8.9–12.7)
POTASSIUM SERPL-SCNC: 4.4 MMOL/L (ref 3.5–5.3)
RBC # BLD AUTO: 3.66 MILLION/UL (ref 3.81–5.12)
SODIUM SERPL-SCNC: 141 MMOL/L (ref 136–145)
WBC # BLD AUTO: 12.23 THOUSAND/UL (ref 4.31–10.16)

## 2021-07-01 PROCEDURE — 1160F RVW MEDS BY RX/DR IN RCRD: CPT | Performed by: FAMILY MEDICINE

## 2021-07-01 PROCEDURE — 85027 COMPLETE CBC AUTOMATED: CPT

## 2021-07-01 PROCEDURE — 85610 PROTHROMBIN TIME: CPT

## 2021-07-01 PROCEDURE — 3008F BODY MASS INDEX DOCD: CPT | Performed by: FAMILY MEDICINE

## 2021-07-01 PROCEDURE — 99215 OFFICE O/P EST HI 40 MIN: CPT | Performed by: FAMILY MEDICINE

## 2021-07-01 PROCEDURE — 3078F DIAST BP <80 MM HG: CPT | Performed by: FAMILY MEDICINE

## 2021-07-01 PROCEDURE — 36415 COLL VENOUS BLD VENIPUNCTURE: CPT

## 2021-07-01 PROCEDURE — 85730 THROMBOPLASTIN TIME PARTIAL: CPT

## 2021-07-01 PROCEDURE — 4004F PT TOBACCO SCREEN RCVD TLK: CPT | Performed by: FAMILY MEDICINE

## 2021-07-01 PROCEDURE — 3074F SYST BP LT 130 MM HG: CPT | Performed by: FAMILY MEDICINE

## 2021-07-01 PROCEDURE — 80048 BASIC METABOLIC PNL TOTAL CA: CPT

## 2021-07-01 RX ORDER — ATORVASTATIN CALCIUM 20 MG/1
20 TABLET, FILM COATED ORAL DAILY
Qty: 90 TABLET | Refills: 1 | Status: SHIPPED | OUTPATIENT
Start: 2021-07-01 | End: 2021-08-03

## 2021-07-01 RX ORDER — CLOTRIMAZOLE 10 MG/1
10 LOZENGE ORAL; TOPICAL
Qty: 25 TROCHE | Refills: 0 | Status: SHIPPED | OUTPATIENT
Start: 2021-07-01 | End: 2021-07-18 | Stop reason: SDUPTHER

## 2021-07-01 NOTE — PROGRESS NOTES
Assessment/Plan:     Diagnoses and all orders for this visit:    Generalized abdominal pain  -     CT abdomen pelvis w wo contrast; Future  -     Basic metabolic panel; Future    Thrush of mouth and esophagus ([address] )  -     clotrimazole (MYCELEX) 10 mg delfina; Take 1 tablet (10 mg total) by mouth 5 (five) times a day    Abnormal bruising  -     CBC and Platelet; Future  -     Protime-INR; Future  -     APTT; Future    Hyperlipidemia, unspecified hyperlipidemia type  -     atorvastatin (LIPITOR) 20 mg tablet; Take 1 tablet (20 mg total) by mouth daily         ASAP CT abdomen and pelvis to assess for diverticular disease  CBC and anti coagulant numbers to rule out reason for bruisability  Follow-up with patient tomorrow pending tests  Subjective:   Chief Complaint   Patient presents with    Follow-up     3 month followup  Pt states she has abdominal pain     Bleeding/Bruising     pt has increased bruising       Patient ID: Sindi Biggs is a 72 y o  female  Patient is 80-year-old female who coincidentally was here for 3 month follow-up however is having some significant issues with some increased bruising as well as abdominal pain  Patient had a colonoscopy performed due to functional diarrhea on June 8, 2021  There was notation of moderate diverticulosis containing pus in the sigmoid colon  Patient was given antibiotics for 10 days which appears to have been Bactrim DS  She states she felt better when she was on the antibiotics but now is symptomatic again  She denies bloody diarrhea  With regards to the bruising it is mostly on her upper extremities the arms  She is not on aspirin or blood thinners    It does not appear that she is on any fish oil or flaxseed oil supplements either however patient does take repeated doses of prednisone taper which could definitely we to chronic bruising    She also is having discomfort in her throat left-sided more than right no fever, she actually can feel the soreness when she reaches in and touch is the tonsillar/pillar area  on a completely different note patient started taking increased dose of atorvastatin because of her cholesterol numbers  She states she "feels that her cholesterol is elevated "  The following portions of the patient's history were reviewed and updated as appropriate: allergies, current medications, past family history, past medical history, past social history, past surgical history and problem list       Review of Systems      Objective:      /68   Pulse 79   Temp (!) 97 3 °F (36 3 °C) (Temporal)   Resp 16   Ht 5' 5" (1 651 m)   Wt 64 kg (141 lb 3 2 oz)   LMP  (LMP Unknown)   SpO2 98%   BMI 23 50 kg/m²          Physical Exam  Constitutional:       General: She is not in acute distress (  Uncomfortable)  Appearance: She is well-developed  HENT:      Right Ear: Tympanic membrane normal    Eyes:      Conjunctiva/sclera: Conjunctivae normal       Pupils: Pupils are equal, round, and reactive to light  Cardiovascular:      Rate and Rhythm: Normal rate and regular rhythm  Heart sounds: No murmur heard  Pulmonary:      Effort: Pulmonary effort is normal       Breath sounds: Normal breath sounds  Abdominal:      General: Bowel sounds are normal       Palpations: Abdomen is soft  There is no mass  Tenderness: There is abdominal tenderness ( periumbilical to mostly left lower quadrant )  There is guarding  There is no left CVA tenderness or rebound  Musculoskeletal:      Cervical back: Normal range of motion and neck supple  Skin:     General: Skin is warm and dry  Findings: Bruising ( mostly on upper extremities dorsal surface of arms) present  Comments: No petechiae   Neurological:      General: No focal deficit present  Mental Status: She is alert and oriented to person, place, and time  Deep Tendon Reflexes: Reflexes are normal and symmetric     Psychiatric:         Mood and Affect: Mood is anxious  Speech: Speech is not slurred ( chronically tremulous)  Behavior: Behavior normal          Thought Content: Thought content normal           addendum Friday July 2nd at 215 p m  Phone call from Radiology Dr Triny López  CT abdomen pelvis with chronic sigmoid diverticulitis  And tiny abscess not likely needing IR intervention  Incidental thrombus left gonadal vein  I have a tiger text into gastroenterology for input    Patient aware

## 2021-07-02 ENCOUNTER — TELEPHONE (OUTPATIENT)
Dept: FAMILY MEDICINE CLINIC | Facility: CLINIC | Age: 66
End: 2021-07-02

## 2021-07-02 ENCOUNTER — HOSPITAL ENCOUNTER (OUTPATIENT)
Dept: CT IMAGING | Facility: HOSPITAL | Age: 66
Discharge: HOME | End: 2021-07-02
Payer: COMMERCIAL

## 2021-07-02 VITALS
HEIGHT: 65 IN | WEIGHT: 141.2 LBS | HEART RATE: 79 BPM | OXYGEN SATURATION: 98 % | SYSTOLIC BLOOD PRESSURE: 120 MMHG | BODY MASS INDEX: 23.53 KG/M2 | DIASTOLIC BLOOD PRESSURE: 68 MMHG | TEMPERATURE: 97.3 F | RESPIRATION RATE: 16 BRPM

## 2021-07-02 DIAGNOSIS — R10.84 GENERALIZED ABDOMINAL PAIN: ICD-10-CM

## 2021-07-02 LAB
APTT PPP: 25 SECONDS (ref 23–37)
INR PPP: 0.86 (ref 0.84–1.19)
PROTHROMBIN TIME: 11.8 SECONDS (ref 11.6–14.5)

## 2021-07-02 PROCEDURE — 74178 CT ABD&PLV WO CNTR FLWD CNTR: CPT

## 2021-07-02 PROCEDURE — G1004 CDSM NDSC: HCPCS

## 2021-07-02 RX ADMIN — IOHEXOL 100 ML: 350 INJECTION, SOLUTION INTRAVENOUS at 11:14

## 2021-07-02 NOTE — TELEPHONE ENCOUNTER
Called and spoke with the Pt and relayed Dr Tierra Wick message she verbalized understanding  Kelechi Jaimes, [address] [place] [address] Clinical; Faith Pinedo  Caller: Unspecified (Today,  4:22 PM)   Please call patient and let her know that the CT scan does show that she has some inflammation and infection in the diverticulosis area and a collection of a tiny abscess which hopefully will heal with repeat antibiotics   I am communicating with the gastroenterologist to decide on the course of treatment and will call her back ASAP when antibiotic decision is made      Let her know that I am still not sure with the bruising is coming from as her blood count and platelets and bleeding time numbers were normal   Hopefully the lozenges will help her throat   Again we will call her back yet today with the decision on what antibiotic she will take  Pedro Harvey will be another 10 day course   IT might actually be TWO antibiotics this time

## 2021-07-02 NOTE — LETTER
[address] [place] [address]  [address] [facility] [address]      July 6, 2021    MRN: [id number]     Phone: [phone]     Dear Ms  Zenia Mortimer recently had a(n) Cat Scan performed on 7/2/2021 at  [address] [place] [address] that was requested by Yohan Knowles DO  The study was reviewed by a radiologist, which is a physician who specializes in medical imaging  The radiologist issued a report describing his or her findings  In that report there was a finding that the radiologist felt warranted further discussion with your health care provider and that discussion would be beneficial to you  The results were sent to Yohan Knowles DO on 89/85/8479  1:47 PM  We recommend that you contact Yohan Knowles DO at [phone] or set up an appointment to discuss the results of the imaging test  If you have already heard from Yohan Knowles DO regarding the results of your study, you can disregard this letter  This letter is not meant to alarm you, but intended to encourage you to follow-up on your results with the provider that sent you for the imaging study  In addition, we have enclosed answers to frequently asked questions by other patients who have also received a letter to review results with their health care provider (see page two)  Thank you for choosing [facility] for your medical imaging needs  FREQUENTLY ASKED QUESTIONS    1  Why am I receiving this letter? [facility] requires us to notify patients who have findings on imaging exams that may require more testing or follow-up with a health professional within the next 3 months          2  How serious is the finding on the imaging test?  This letter is sent to all patients who may need follow-up or more testing within the next 3 months  Receiving this letter does not necessarily mean you have a life-threatening imaging finding or disease  Recommendations in the radiologists imaging report are general in nature and it is up to your healthcare provider to say whether those recommendations make sense for your situation  You are strongly encouraged to talk to your health care provider about the results and ask whether additional steps need to be taken  3  Where can I get a copy of the final report for my recent radiology exam?  To get a full copy of the report you can access your records online at [email or web address] or please contact [address] [place] [address] Records Department at [phone] Monday through Friday between 8 am and 6 pm          4  What do I need to do now? Please contact your health care provider who requested the imaging study to discuss what further actions (if any) are needed  You may have already heard from (your ordering provider) in regard to this test in which case you can disregard this letter  NOTICE IN ACCORDANCE WITH THE [facility] PATIENT TEST RESULT INFORMATION ACT OF 2018    You are receiving this notice as a result of a determination by your diagnostic imaging service that further discussions of your test results are warranted and would be beneficial to you  The complete results of your test or tests have been or will be sent to the health care practitioner that ordered the test or tests  It is recommended that you contact your health care practitioner to discuss your results as soon as possible

## 2021-07-07 ENCOUNTER — HOSPITAL ENCOUNTER (INPATIENT)
Facility: HOSPITAL | Age: 66
LOS: 6 days | Discharge: HOME | DRG: 392 | End: 2021-07-13
Attending: EMERGENCY MEDICINE | Admitting: FAMILY MEDICINE
Payer: COMMERCIAL

## 2021-07-07 ENCOUNTER — APPOINTMENT (EMERGENCY)
Dept: CT IMAGING | Facility: HOSPITAL | Age: 66
DRG: 392 | End: 2021-07-07
Payer: COMMERCIAL

## 2021-07-07 DIAGNOSIS — K59.1 FUNCTIONAL DIARRHEA: ICD-10-CM

## 2021-07-07 DIAGNOSIS — K57.92 DIVERTICULITIS: Primary | ICD-10-CM

## 2021-07-07 DIAGNOSIS — K57.32 SIGMOID DIVERTICULITIS: ICD-10-CM

## 2021-07-07 PROBLEM — Z72.0 TOBACCO USE: Status: ACTIVE | Noted: 2021-07-07

## 2021-07-07 LAB
ALBUMIN SERPL BCP-MCNC: 3.5 G/DL (ref 3.5–5)
ALP SERPL-CCNC: 31 U/L (ref 46–116)
ALT SERPL W P-5'-P-CCNC: 30 U/L (ref 12–78)
ANION GAP SERPL CALCULATED.3IONS-SCNC: 6 MMOL/L (ref 4–13)
APTT PPP: 24 SECONDS (ref 23–37)
AST SERPL W P-5'-P-CCNC: 17 U/L (ref 5–45)
BACTERIA UR QL AUTO: ABNORMAL /HPF
BASOPHILS # BLD AUTO: 0.06 THOUSANDS/ΜL (ref 0–0.1)
BASOPHILS NFR BLD AUTO: 1 % (ref 0–1)
BILIRUB DIRECT SERPL-MCNC: 0.06 MG/DL (ref 0–0.2)
BILIRUB SERPL-MCNC: 0.13 MG/DL (ref 0.2–1)
BILIRUB UR QL STRIP: NEGATIVE
BUN SERPL-MCNC: 10 MG/DL (ref 5–25)
CALCIUM SERPL-MCNC: 8.3 MG/DL (ref 8.3–10.1)
CHLORIDE SERPL-SCNC: 108 MMOL/L (ref 100–108)
CLARITY UR: CLEAR
CO2 SERPL-SCNC: 29 MMOL/L (ref 21–32)
COLOR UR: YELLOW
CREAT SERPL-MCNC: 0.95 MG/DL (ref 0.6–1.3)
EOSINOPHIL # BLD AUTO: 0.17 THOUSAND/ΜL (ref 0–0.61)
EOSINOPHIL NFR BLD AUTO: 2 % (ref 0–6)
ERYTHROCYTE [DISTWIDTH] IN BLOOD BY AUTOMATED COUNT: 14.7 % (ref 11.6–15.1)
GFR SERPL CREATININE-BSD FRML MDRD: 63 ML/MIN/1.73SQ M
GLUCOSE SERPL-MCNC: 86 MG/DL (ref 65–140)
GLUCOSE UR STRIP-MCNC: NEGATIVE MG/DL
HCT VFR BLD AUTO: 37.4 % (ref 34.8–46.1)
HGB BLD-MCNC: 12.6 G/DL (ref 11.5–15.4)
HGB UR QL STRIP.AUTO: ABNORMAL
IMM GRANULOCYTES # BLD AUTO: 0.05 THOUSAND/UL (ref 0–0.2)
IMM GRANULOCYTES NFR BLD AUTO: 1 % (ref 0–2)
INR PPP: 0.96 (ref 0.84–1.19)
KETONES UR STRIP-MCNC: NEGATIVE MG/DL
LACTATE SERPL-SCNC: 0.6 MMOL/L (ref 0.5–2)
LEUKOCYTE ESTERASE UR QL STRIP: NEGATIVE
LIPASE SERPL-CCNC: 108 U/L (ref 73–393)
LYMPHOCYTES # BLD AUTO: 2.24 THOUSANDS/ΜL (ref 0.6–4.47)
LYMPHOCYTES NFR BLD AUTO: 24 % (ref 14–44)
MCH RBC QN AUTO: 37.4 PG (ref 26.8–34.3)
MCHC RBC AUTO-ENTMCNC: 33.7 G/DL (ref 31.4–37.4)
MCV RBC AUTO: 111 FL (ref 82–98)
MONOCYTES # BLD AUTO: 0.76 THOUSAND/ΜL (ref 0.17–1.22)
MONOCYTES NFR BLD AUTO: 8 % (ref 4–12)
NEUTROPHILS # BLD AUTO: 6.2 THOUSANDS/ΜL (ref 1.85–7.62)
NEUTS SEG NFR BLD AUTO: 64 % (ref 43–75)
NITRITE UR QL STRIP: NEGATIVE
NON-SQ EPI CELLS URNS QL MICRO: ABNORMAL /HPF
NRBC BLD AUTO-RTO: 0 /100 WBCS
PH UR STRIP.AUTO: 7 [PH]
PLATELET # BLD AUTO: 237 THOUSANDS/UL (ref 149–390)
PMV BLD AUTO: 9.1 FL (ref 8.9–12.7)
POTASSIUM SERPL-SCNC: 3.9 MMOL/L (ref 3.5–5.3)
PROCALCITONIN SERPL-MCNC: <0.05 NG/ML
PROT SERPL-MCNC: 6.2 G/DL (ref 6.4–8.2)
PROT UR STRIP-MCNC: NEGATIVE MG/DL
PROTHROMBIN TIME: 12.6 SECONDS (ref 11.6–14.5)
RBC # BLD AUTO: 3.37 MILLION/UL (ref 3.81–5.12)
RBC #/AREA URNS AUTO: ABNORMAL /HPF
SODIUM SERPL-SCNC: 143 MMOL/L (ref 136–145)
SP GR UR STRIP.AUTO: <=1.005 (ref 1–1.03)
UROBILINOGEN UR QL STRIP.AUTO: 0.2 E.U./DL
WBC # BLD AUTO: 9.48 THOUSAND/UL (ref 4.31–10.16)
WBC #/AREA URNS AUTO: ABNORMAL /HPF

## 2021-07-07 PROCEDURE — 99285 EMERGENCY DEPT VISIT HI MDM: CPT

## 2021-07-07 PROCEDURE — 83605 ASSAY OF LACTIC ACID: CPT | Performed by: EMERGENCY MEDICINE

## 2021-07-07 PROCEDURE — 85730 THROMBOPLASTIN TIME PARTIAL: CPT | Performed by: EMERGENCY MEDICINE

## 2021-07-07 PROCEDURE — 87040 BLOOD CULTURE FOR BACTERIA: CPT | Performed by: EMERGENCY MEDICINE

## 2021-07-07 PROCEDURE — 85610 PROTHROMBIN TIME: CPT | Performed by: EMERGENCY MEDICINE

## 2021-07-07 PROCEDURE — 36415 COLL VENOUS BLD VENIPUNCTURE: CPT | Performed by: EMERGENCY MEDICINE

## 2021-07-07 PROCEDURE — 74177 CT ABD & PELVIS W/CONTRAST: CPT

## 2021-07-07 PROCEDURE — 96366 THER/PROPH/DIAG IV INF ADDON: CPT

## 2021-07-07 PROCEDURE — 99223 1ST HOSP IP/OBS HIGH 75: CPT | Performed by: FAMILY MEDICINE

## 2021-07-07 PROCEDURE — 80076 HEPATIC FUNCTION PANEL: CPT | Performed by: EMERGENCY MEDICINE

## 2021-07-07 PROCEDURE — 99285 EMERGENCY DEPT VISIT HI MDM: CPT | Performed by: EMERGENCY MEDICINE

## 2021-07-07 PROCEDURE — 96375 TX/PRO/DX INJ NEW DRUG ADDON: CPT

## 2021-07-07 PROCEDURE — 81001 URINALYSIS AUTO W/SCOPE: CPT | Performed by: EMERGENCY MEDICINE

## 2021-07-07 PROCEDURE — 96367 TX/PROPH/DG ADDL SEQ IV INF: CPT

## 2021-07-07 PROCEDURE — 80048 BASIC METABOLIC PNL TOTAL CA: CPT | Performed by: EMERGENCY MEDICINE

## 2021-07-07 PROCEDURE — 84145 PROCALCITONIN (PCT): CPT | Performed by: EMERGENCY MEDICINE

## 2021-07-07 PROCEDURE — 83690 ASSAY OF LIPASE: CPT | Performed by: EMERGENCY MEDICINE

## 2021-07-07 PROCEDURE — 96365 THER/PROPH/DIAG IV INF INIT: CPT

## 2021-07-07 PROCEDURE — 85025 COMPLETE CBC W/AUTO DIFF WBC: CPT | Performed by: EMERGENCY MEDICINE

## 2021-07-07 RX ORDER — ATORVASTATIN CALCIUM 20 MG/1
20 TABLET, FILM COATED ORAL DAILY
Status: DISCONTINUED | OUTPATIENT
Start: 2021-07-08 | End: 2021-07-13 | Stop reason: HOSPADM

## 2021-07-07 RX ORDER — ONDANSETRON 2 MG/ML
4 INJECTION INTRAMUSCULAR; INTRAVENOUS EVERY 6 HOURS PRN
Status: DISCONTINUED | OUTPATIENT
Start: 2021-07-07 | End: 2021-07-13 | Stop reason: HOSPADM

## 2021-07-07 RX ORDER — DIAZEPAM 5 MG/1
10 TABLET ORAL 2 TIMES DAILY
Status: DISCONTINUED | OUTPATIENT
Start: 2021-07-08 | End: 2021-07-13 | Stop reason: HOSPADM

## 2021-07-07 RX ORDER — FLUTICASONE PROPIONATE 50 MCG
2 SPRAY, SUSPENSION (ML) NASAL DAILY
Status: DISCONTINUED | OUTPATIENT
Start: 2021-07-08 | End: 2021-07-13 | Stop reason: HOSPADM

## 2021-07-07 RX ORDER — OXYCODONE HYDROCHLORIDE 5 MG/1
5 TABLET ORAL EVERY 4 HOURS PRN
Status: DISCONTINUED | OUTPATIENT
Start: 2021-07-07 | End: 2021-07-09

## 2021-07-07 RX ORDER — TIZANIDINE 2 MG/1
2 TABLET ORAL EVERY 12 HOURS PRN
Status: DISCONTINUED | OUTPATIENT
Start: 2021-07-07 | End: 2021-07-13 | Stop reason: HOSPADM

## 2021-07-07 RX ORDER — NICOTINE 21 MG/24HR
1 PATCH, TRANSDERMAL 24 HOURS TRANSDERMAL DAILY
Status: DISCONTINUED | OUTPATIENT
Start: 2021-07-08 | End: 2021-07-13

## 2021-07-07 RX ORDER — FLUTICASONE FUROATE AND VILANTEROL 200; 25 UG/1; UG/1
1 POWDER RESPIRATORY (INHALATION) DAILY
Status: DISCONTINUED | OUTPATIENT
Start: 2021-07-08 | End: 2021-07-13 | Stop reason: HOSPADM

## 2021-07-07 RX ORDER — SODIUM CHLORIDE 9 MG/ML
3 INJECTION INTRAVENOUS
Status: DISCONTINUED | OUTPATIENT
Start: 2021-07-07 | End: 2021-07-13 | Stop reason: HOSPADM

## 2021-07-07 RX ORDER — ALBUTEROL SULFATE 90 UG/1
2 AEROSOL, METERED RESPIRATORY (INHALATION) EVERY 6 HOURS PRN
Status: DISCONTINUED | OUTPATIENT
Start: 2021-07-07 | End: 2021-07-13 | Stop reason: HOSPADM

## 2021-07-07 RX ORDER — FLUTICASONE PROPIONATE 44 UG/1
2 AEROSOL, METERED RESPIRATORY (INHALATION) 2 TIMES DAILY
Status: DISCONTINUED | OUTPATIENT
Start: 2021-07-08 | End: 2021-07-13 | Stop reason: HOSPADM

## 2021-07-07 RX ORDER — SUMATRIPTAN 50 MG/1
100 TABLET, FILM COATED ORAL ONCE AS NEEDED
Status: DISCONTINUED | OUTPATIENT
Start: 2021-07-07 | End: 2021-07-13 | Stop reason: HOSPADM

## 2021-07-07 RX ORDER — HEPARIN SODIUM 5000 [USP'U]/ML
5000 INJECTION, SOLUTION INTRAVENOUS; SUBCUTANEOUS EVERY 8 HOURS SCHEDULED
Status: DISCONTINUED | OUTPATIENT
Start: 2021-07-07 | End: 2021-07-07

## 2021-07-07 RX ORDER — DICYCLOMINE HYDROCHLORIDE 10 MG/1
10 CAPSULE ORAL
Status: DISCONTINUED | OUTPATIENT
Start: 2021-07-07 | End: 2021-07-09

## 2021-07-07 RX ORDER — MORPHINE SULFATE 4 MG/ML
4 INJECTION, SOLUTION INTRAMUSCULAR; INTRAVENOUS ONCE
Status: COMPLETED | OUTPATIENT
Start: 2021-07-07 | End: 2021-07-07

## 2021-07-07 RX ORDER — ONDANSETRON 2 MG/ML
4 INJECTION INTRAMUSCULAR; INTRAVENOUS ONCE
Status: COMPLETED | OUTPATIENT
Start: 2021-07-07 | End: 2021-07-07

## 2021-07-07 RX ORDER — LISINOPRIL 2.5 MG/1
2.5 TABLET ORAL DAILY
Status: DISCONTINUED | OUTPATIENT
Start: 2021-07-08 | End: 2021-07-13 | Stop reason: HOSPADM

## 2021-07-07 RX ADMIN — IOHEXOL 100 ML: 350 INJECTION, SOLUTION INTRAVENOUS at 20:05

## 2021-07-07 RX ADMIN — SODIUM CHLORIDE 1000 ML: 0.9 INJECTION, SOLUTION INTRAVENOUS at 19:12

## 2021-07-07 RX ADMIN — CEFTRIAXONE SODIUM 2000 MG: 10 INJECTION, POWDER, FOR SOLUTION INTRAVENOUS at 19:14

## 2021-07-07 RX ADMIN — ONDANSETRON 4 MG: 2 INJECTION INTRAMUSCULAR; INTRAVENOUS at 19:13

## 2021-07-07 RX ADMIN — MORPHINE SULFATE 4 MG: 4 INJECTION INTRAVENOUS at 21:14

## 2021-07-07 RX ADMIN — DICYCLOMINE HYDROCHLORIDE 10 MG: 10 CAPSULE ORAL at 23:50

## 2021-07-07 RX ADMIN — PIPERACILLIN AND TAZOBACTAM 3.38 G: 36; 4.5 INJECTION, POWDER, FOR SOLUTION INTRAVENOUS at 23:51

## 2021-07-07 RX ADMIN — METRONIDAZOLE 500 MG: 500 INJECTION, SOLUTION INTRAVENOUS at 21:15

## 2021-07-07 NOTE — RESULT ENCOUNTER NOTE
Patient should have gone to ER for IV antibiotics can be please make sure patient is doing well    Thanks

## 2021-07-08 ENCOUNTER — TELEPHONE (OUTPATIENT)
Dept: FAMILY MEDICINE CLINIC | Facility: CLINIC | Age: 66
End: 2021-07-08

## 2021-07-08 LAB
ALBUMIN SERPL BCP-MCNC: 3.1 G/DL (ref 3.5–5)
ALP SERPL-CCNC: 23 U/L (ref 46–116)
ALT SERPL W P-5'-P-CCNC: 27 U/L (ref 12–78)
ANION GAP SERPL CALCULATED.3IONS-SCNC: 6 MMOL/L (ref 4–13)
AST SERPL W P-5'-P-CCNC: 14 U/L (ref 5–45)
BASOPHILS # BLD AUTO: 0.07 THOUSANDS/ΜL (ref 0–0.1)
BASOPHILS NFR BLD AUTO: 1 % (ref 0–1)
BILIRUB SERPL-MCNC: 0.16 MG/DL (ref 0.2–1)
BUN SERPL-MCNC: 5 MG/DL (ref 5–25)
CALCIUM ALBUM COR SERPL-MCNC: 8.1 MG/DL (ref 8.3–10.1)
CALCIUM SERPL-MCNC: 7.4 MG/DL (ref 8.3–10.1)
CHLORIDE SERPL-SCNC: 109 MMOL/L (ref 100–108)
CO2 SERPL-SCNC: 28 MMOL/L (ref 21–32)
CREAT SERPL-MCNC: 0.8 MG/DL (ref 0.6–1.3)
EOSINOPHIL # BLD AUTO: 0.26 THOUSAND/ΜL (ref 0–0.61)
EOSINOPHIL NFR BLD AUTO: 3 % (ref 0–6)
ERYTHROCYTE [DISTWIDTH] IN BLOOD BY AUTOMATED COUNT: 14.7 % (ref 11.6–15.1)
GFR SERPL CREATININE-BSD FRML MDRD: 78 ML/MIN/1.73SQ M
GLUCOSE SERPL-MCNC: 81 MG/DL (ref 65–140)
HCT VFR BLD AUTO: 37.7 % (ref 34.8–46.1)
HGB BLD-MCNC: 12.4 G/DL (ref 11.5–15.4)
IMM GRANULOCYTES # BLD AUTO: 0.03 THOUSAND/UL (ref 0–0.2)
IMM GRANULOCYTES NFR BLD AUTO: 0 % (ref 0–2)
LYMPHOCYTES # BLD AUTO: 3.91 THOUSANDS/ΜL (ref 0.6–4.47)
LYMPHOCYTES NFR BLD AUTO: 37 % (ref 14–44)
MAGNESIUM SERPL-MCNC: 2 MG/DL (ref 1.6–2.6)
MCH RBC QN AUTO: 36.3 PG (ref 26.8–34.3)
MCHC RBC AUTO-ENTMCNC: 32.9 G/DL (ref 31.4–37.4)
MCV RBC AUTO: 110 FL (ref 82–98)
MONOCYTES # BLD AUTO: 0.86 THOUSAND/ΜL (ref 0.17–1.22)
MONOCYTES NFR BLD AUTO: 8 % (ref 4–12)
NEUTROPHILS # BLD AUTO: 5.35 THOUSANDS/ΜL (ref 1.85–7.62)
NEUTS SEG NFR BLD AUTO: 51 % (ref 43–75)
NRBC BLD AUTO-RTO: 0 /100 WBCS
PLATELET # BLD AUTO: 217 THOUSANDS/UL (ref 149–390)
PMV BLD AUTO: 9.2 FL (ref 8.9–12.7)
POTASSIUM SERPL-SCNC: 3.7 MMOL/L (ref 3.5–5.3)
PROT SERPL-MCNC: 5.9 G/DL (ref 6.4–8.2)
RBC # BLD AUTO: 3.42 MILLION/UL (ref 3.81–5.12)
SODIUM SERPL-SCNC: 143 MMOL/L (ref 136–145)
WBC # BLD AUTO: 10.48 THOUSAND/UL (ref 4.31–10.16)

## 2021-07-08 PROCEDURE — 99232 SBSQ HOSP IP/OBS MODERATE 35: CPT | Performed by: PHYSICIAN ASSISTANT

## 2021-07-08 PROCEDURE — 83735 ASSAY OF MAGNESIUM: CPT | Performed by: FAMILY MEDICINE

## 2021-07-08 PROCEDURE — 36415 COLL VENOUS BLD VENIPUNCTURE: CPT | Performed by: FAMILY MEDICINE

## 2021-07-08 PROCEDURE — 85025 COMPLETE CBC W/AUTO DIFF WBC: CPT | Performed by: FAMILY MEDICINE

## 2021-07-08 PROCEDURE — 99222 1ST HOSP IP/OBS MODERATE 55: CPT | Performed by: PHYSICIAN ASSISTANT

## 2021-07-08 PROCEDURE — 80053 COMPREHEN METABOLIC PANEL: CPT | Performed by: FAMILY MEDICINE

## 2021-07-08 RX ADMIN — PIPERACILLIN AND TAZOBACTAM 3.38 G: 36; 4.5 INJECTION, POWDER, FOR SOLUTION INTRAVENOUS at 06:13

## 2021-07-08 RX ADMIN — OXYCODONE HYDROCHLORIDE 5 MG: 5 TABLET ORAL at 22:18

## 2021-07-08 RX ADMIN — OXYCODONE HYDROCHLORIDE 5 MG: 5 TABLET ORAL at 07:52

## 2021-07-08 RX ADMIN — ENOXAPARIN SODIUM 40 MG: 40 INJECTION SUBCUTANEOUS at 09:04

## 2021-07-08 RX ADMIN — DICYCLOMINE HYDROCHLORIDE 10 MG: 10 CAPSULE ORAL at 06:16

## 2021-07-08 RX ADMIN — DIAZEPAM 10 MG: 5 TABLET ORAL at 18:26

## 2021-07-08 RX ADMIN — ALBUTEROL SULFATE 2 PUFF: 90 AEROSOL, METERED RESPIRATORY (INHALATION) at 18:26

## 2021-07-08 RX ADMIN — Medication 1 PATCH: at 09:04

## 2021-07-08 RX ADMIN — DICYCLOMINE HYDROCHLORIDE 10 MG: 10 CAPSULE ORAL at 11:25

## 2021-07-08 RX ADMIN — OXYCODONE HYDROCHLORIDE 5 MG: 5 TABLET ORAL at 02:06

## 2021-07-08 RX ADMIN — DIAZEPAM 5 MG: 5 TABLET ORAL at 09:04

## 2021-07-08 RX ADMIN — PIPERACILLIN AND TAZOBACTAM 3.38 G: 36; 4.5 INJECTION, POWDER, FOR SOLUTION INTRAVENOUS at 12:32

## 2021-07-08 RX ADMIN — OXYCODONE HYDROCHLORIDE 5 MG: 5 TABLET ORAL at 12:45

## 2021-07-08 RX ADMIN — FLUTICASONE PROPIONATE 2 SPRAY: 50 SPRAY, METERED NASAL at 09:05

## 2021-07-08 RX ADMIN — FLUTICASONE FUROATE AND VILANTEROL TRIFENATATE 1 PUFF: 200; 25 POWDER RESPIRATORY (INHALATION) at 09:05

## 2021-07-08 RX ADMIN — LISINOPRIL 2.5 MG: 2.5 TABLET ORAL at 09:04

## 2021-07-08 RX ADMIN — OXYCODONE HYDROCHLORIDE 5 MG: 5 TABLET ORAL at 16:21

## 2021-07-08 RX ADMIN — ALBUTEROL SULFATE 2 PUFF: 90 AEROSOL, METERED RESPIRATORY (INHALATION) at 18:29

## 2021-07-08 RX ADMIN — PIPERACILLIN AND TAZOBACTAM 3.38 G: 36; 4.5 INJECTION, POWDER, FOR SOLUTION INTRAVENOUS at 18:26

## 2021-07-08 RX ADMIN — ALBUTEROL SULFATE 2 PUFF: 90 AEROSOL, METERED RESPIRATORY (INHALATION) at 18:30

## 2021-07-08 RX ADMIN — ATORVASTATIN CALCIUM 20 MG: 20 TABLET, FILM COATED ORAL at 09:04

## 2021-07-08 RX ADMIN — DICYCLOMINE HYDROCHLORIDE 10 MG: 10 CAPSULE ORAL at 21:26

## 2021-07-08 RX ADMIN — FLUTICASONE PROPIONATE 2 PUFF: 44 AEROSOL, METERED RESPIRATORY (INHALATION) at 09:05

## 2021-07-08 RX ADMIN — DICYCLOMINE HYDROCHLORIDE 10 MG: 10 CAPSULE ORAL at 16:21

## 2021-07-08 NOTE — TELEPHONE ENCOUNTER
Nadja Parra called the office yesterday 07/07/21 and left a message on  the clerical line  Wanting to let her pcp know that she was heading into the hospital now  She had to get some stuff together for her dentures and hearing aids  Pt is presently at [facility] ER  sending to you as an FYI

## 2021-07-09 LAB
ALBUMIN SERPL BCP-MCNC: 3.1 G/DL (ref 3.5–5)
ALP SERPL-CCNC: 28 U/L (ref 46–116)
ALT SERPL W P-5'-P-CCNC: 28 U/L (ref 12–78)
ANION GAP SERPL CALCULATED.3IONS-SCNC: 9 MMOL/L (ref 4–13)
AST SERPL W P-5'-P-CCNC: 14 U/L (ref 5–45)
BASOPHILS # BLD AUTO: 0.06 THOUSANDS/ΜL (ref 0–0.1)
BASOPHILS NFR BLD AUTO: 1 % (ref 0–1)
BILIRUB SERPL-MCNC: 0.21 MG/DL (ref 0.2–1)
BUN SERPL-MCNC: 11 MG/DL (ref 5–25)
CALCIUM ALBUM COR SERPL-MCNC: 8.9 MG/DL (ref 8.3–10.1)
CALCIUM SERPL-MCNC: 8.2 MG/DL (ref 8.3–10.1)
CHLORIDE SERPL-SCNC: 109 MMOL/L (ref 100–108)
CO2 SERPL-SCNC: 27 MMOL/L (ref 21–32)
CREAT SERPL-MCNC: 0.91 MG/DL (ref 0.6–1.3)
EOSINOPHIL # BLD AUTO: 0.22 THOUSAND/ΜL (ref 0–0.61)
EOSINOPHIL NFR BLD AUTO: 2 % (ref 0–6)
ERYTHROCYTE [DISTWIDTH] IN BLOOD BY AUTOMATED COUNT: 14.9 % (ref 11.6–15.1)
GFR SERPL CREATININE-BSD FRML MDRD: 66 ML/MIN/1.73SQ M
GLUCOSE SERPL-MCNC: 81 MG/DL (ref 65–140)
HCT VFR BLD AUTO: 38.8 % (ref 34.8–46.1)
HGB BLD-MCNC: 13.1 G/DL (ref 11.5–15.4)
IMM GRANULOCYTES # BLD AUTO: 0.07 THOUSAND/UL (ref 0–0.2)
IMM GRANULOCYTES NFR BLD AUTO: 1 % (ref 0–2)
LYMPHOCYTES # BLD AUTO: 2.87 THOUSANDS/ΜL (ref 0.6–4.47)
LYMPHOCYTES NFR BLD AUTO: 23 % (ref 14–44)
MCH RBC QN AUTO: 37.1 PG (ref 26.8–34.3)
MCHC RBC AUTO-ENTMCNC: 33.8 G/DL (ref 31.4–37.4)
MCV RBC AUTO: 110 FL (ref 82–98)
MONOCYTES # BLD AUTO: 1 THOUSAND/ΜL (ref 0.17–1.22)
MONOCYTES NFR BLD AUTO: 8 % (ref 4–12)
NEUTROPHILS # BLD AUTO: 8.18 THOUSANDS/ΜL (ref 1.85–7.62)
NEUTS SEG NFR BLD AUTO: 65 % (ref 43–75)
NRBC BLD AUTO-RTO: 0 /100 WBCS
PLATELET # BLD AUTO: 255 THOUSANDS/UL (ref 149–390)
PMV BLD AUTO: 9.2 FL (ref 8.9–12.7)
POTASSIUM SERPL-SCNC: 3.9 MMOL/L (ref 3.5–5.3)
PROT SERPL-MCNC: 6 G/DL (ref 6.4–8.2)
RBC # BLD AUTO: 3.53 MILLION/UL (ref 3.81–5.12)
SODIUM SERPL-SCNC: 145 MMOL/L (ref 136–145)
WBC # BLD AUTO: 12.4 THOUSAND/UL (ref 4.31–10.16)

## 2021-07-09 PROCEDURE — 87493 C DIFF AMPLIFIED PROBE: CPT | Performed by: FAMILY MEDICINE

## 2021-07-09 PROCEDURE — 99222 1ST HOSP IP/OBS MODERATE 55: CPT | Performed by: SURGERY

## 2021-07-09 PROCEDURE — 85025 COMPLETE CBC W/AUTO DIFF WBC: CPT | Performed by: PHYSICIAN ASSISTANT

## 2021-07-09 PROCEDURE — 97163 PT EVAL HIGH COMPLEX 45 MIN: CPT

## 2021-07-09 PROCEDURE — 80053 COMPREHEN METABOLIC PANEL: CPT | Performed by: PHYSICIAN ASSISTANT

## 2021-07-09 PROCEDURE — 97166 OT EVAL MOD COMPLEX 45 MIN: CPT

## 2021-07-09 PROCEDURE — 99232 SBSQ HOSP IP/OBS MODERATE 35: CPT | Performed by: INTERNAL MEDICINE

## 2021-07-09 PROCEDURE — 99232 SBSQ HOSP IP/OBS MODERATE 35: CPT | Performed by: PHYSICIAN ASSISTANT

## 2021-07-09 PROCEDURE — 87505 NFCT AGENT DETECTION GI: CPT | Performed by: FAMILY MEDICINE

## 2021-07-09 RX ORDER — HYDROMORPHONE HCL IN WATER/PF 6 MG/30 ML
0.2 PATIENT CONTROLLED ANALGESIA SYRINGE INTRAVENOUS
Status: DISCONTINUED | OUTPATIENT
Start: 2021-07-09 | End: 2021-07-12

## 2021-07-09 RX ORDER — HYDROMORPHONE HCL/PF 1 MG/ML
0.5 SYRINGE (ML) INJECTION
Status: DISCONTINUED | OUTPATIENT
Start: 2021-07-09 | End: 2021-07-13

## 2021-07-09 RX ORDER — SODIUM CHLORIDE, SODIUM LACTATE, POTASSIUM CHLORIDE, CALCIUM CHLORIDE 600; 310; 30; 20 MG/100ML; MG/100ML; MG/100ML; MG/100ML
75 INJECTION, SOLUTION INTRAVENOUS CONTINUOUS
Status: DISCONTINUED | OUTPATIENT
Start: 2021-07-09 | End: 2021-07-10

## 2021-07-09 RX ADMIN — FLUTICASONE PROPIONATE 2 PUFF: 44 AEROSOL, METERED RESPIRATORY (INHALATION) at 17:02

## 2021-07-09 RX ADMIN — SERTRALINE HYDROCHLORIDE 50 MG: 50 TABLET ORAL at 09:15

## 2021-07-09 RX ADMIN — FLUTICASONE PROPIONATE 2 PUFF: 44 AEROSOL, METERED RESPIRATORY (INHALATION) at 09:49

## 2021-07-09 RX ADMIN — ATORVASTATIN CALCIUM 20 MG: 20 TABLET, FILM COATED ORAL at 09:15

## 2021-07-09 RX ADMIN — DICYCLOMINE HYDROCHLORIDE 10 MG: 10 CAPSULE ORAL at 17:00

## 2021-07-09 RX ADMIN — DICYCLOMINE HYDROCHLORIDE 10 MG: 10 CAPSULE ORAL at 06:08

## 2021-07-09 RX ADMIN — HYDROMORPHONE HYDROCHLORIDE 0.2 MG: 0.2 INJECTION, SOLUTION INTRAMUSCULAR; INTRAVENOUS; SUBCUTANEOUS at 21:20

## 2021-07-09 RX ADMIN — SODIUM CHLORIDE, SODIUM LACTATE, POTASSIUM CHLORIDE, AND CALCIUM CHLORIDE 75 ML/HR: .6; .31; .03; .02 INJECTION, SOLUTION INTRAVENOUS at 21:25

## 2021-07-09 RX ADMIN — ALBUTEROL SULFATE 2 PUFF: 90 AEROSOL, METERED RESPIRATORY (INHALATION) at 09:14

## 2021-07-09 RX ADMIN — DIAZEPAM 10 MG: 5 TABLET ORAL at 17:02

## 2021-07-09 RX ADMIN — PIPERACILLIN AND TAZOBACTAM 3.38 G: 36; 4.5 INJECTION, POWDER, FOR SOLUTION INTRAVENOUS at 05:48

## 2021-07-09 RX ADMIN — LISINOPRIL 2.5 MG: 2.5 TABLET ORAL at 09:15

## 2021-07-09 RX ADMIN — PIPERACILLIN AND TAZOBACTAM 3.38 G: 36; 4.5 INJECTION, POWDER, FOR SOLUTION INTRAVENOUS at 00:56

## 2021-07-09 RX ADMIN — OXYCODONE HYDROCHLORIDE 5 MG: 5 TABLET ORAL at 09:54

## 2021-07-09 RX ADMIN — DICYCLOMINE HYDROCHLORIDE 10 MG: 10 CAPSULE ORAL at 12:04

## 2021-07-09 RX ADMIN — PIPERACILLIN AND TAZOBACTAM 3.38 G: 36; 4.5 INJECTION, POWDER, FOR SOLUTION INTRAVENOUS at 17:56

## 2021-07-09 RX ADMIN — PIPERACILLIN AND TAZOBACTAM 3.38 G: 36; 4.5 INJECTION, POWDER, FOR SOLUTION INTRAVENOUS at 12:05

## 2021-07-09 RX ADMIN — Medication 1 PATCH: at 09:17

## 2021-07-09 RX ADMIN — OXYCODONE HYDROCHLORIDE 5 MG: 5 TABLET ORAL at 17:05

## 2021-07-09 RX ADMIN — FLUTICASONE FUROATE AND VILANTEROL TRIFENATATE 1 PUFF: 200; 25 POWDER RESPIRATORY (INHALATION) at 09:49

## 2021-07-09 RX ADMIN — OXYCODONE HYDROCHLORIDE 5 MG: 5 TABLET ORAL at 05:51

## 2021-07-09 RX ADMIN — FLUTICASONE PROPIONATE 2 SPRAY: 50 SPRAY, METERED NASAL at 09:49

## 2021-07-09 RX ADMIN — ENOXAPARIN SODIUM 40 MG: 40 INJECTION SUBCUTANEOUS at 09:15

## 2021-07-10 LAB
ANION GAP SERPL CALCULATED.3IONS-SCNC: 8 MMOL/L (ref 4–13)
BUN SERPL-MCNC: 10 MG/DL (ref 5–25)
C DIFF TOX GENS STL QL NAA+PROBE: NEGATIVE
CALCIUM SERPL-MCNC: 8.6 MG/DL (ref 8.3–10.1)
CAMPYLOBACTER DNA SPEC NAA+PROBE: NORMAL
CHLORIDE SERPL-SCNC: 110 MMOL/L (ref 100–108)
CO2 SERPL-SCNC: 28 MMOL/L (ref 21–32)
CREAT SERPL-MCNC: 0.88 MG/DL (ref 0.6–1.3)
ERYTHROCYTE [DISTWIDTH] IN BLOOD BY AUTOMATED COUNT: 14.7 % (ref 11.6–15.1)
GFR SERPL CREATININE-BSD FRML MDRD: 69 ML/MIN/1.73SQ M
GLUCOSE SERPL-MCNC: 87 MG/DL (ref 65–140)
HCT VFR BLD AUTO: 38.6 % (ref 34.8–46.1)
HGB BLD-MCNC: 12.9 G/DL (ref 11.5–15.4)
MCH RBC QN AUTO: 36.4 PG (ref 26.8–34.3)
MCHC RBC AUTO-ENTMCNC: 33.4 G/DL (ref 31.4–37.4)
MCV RBC AUTO: 109 FL (ref 82–98)
PLATELET # BLD AUTO: 241 THOUSANDS/UL (ref 149–390)
PMV BLD AUTO: 9 FL (ref 8.9–12.7)
POTASSIUM SERPL-SCNC: 3.9 MMOL/L (ref 3.5–5.3)
RBC # BLD AUTO: 3.54 MILLION/UL (ref 3.81–5.12)
SALMONELLA DNA SPEC QL NAA+PROBE: NORMAL
SHIGA TOXIN STX GENE SPEC NAA+PROBE: NORMAL
SHIGELLA DNA SPEC QL NAA+PROBE: NORMAL
SODIUM SERPL-SCNC: 146 MMOL/L (ref 136–145)
WBC # BLD AUTO: 8.11 THOUSAND/UL (ref 4.31–10.16)

## 2021-07-10 PROCEDURE — 80048 BASIC METABOLIC PNL TOTAL CA: CPT | Performed by: PHYSICIAN ASSISTANT

## 2021-07-10 PROCEDURE — 99232 SBSQ HOSP IP/OBS MODERATE 35: CPT | Performed by: STUDENT IN AN ORGANIZED HEALTH CARE EDUCATION/TRAINING PROGRAM

## 2021-07-10 PROCEDURE — 99232 SBSQ HOSP IP/OBS MODERATE 35: CPT | Performed by: SURGERY

## 2021-07-10 PROCEDURE — 99232 SBSQ HOSP IP/OBS MODERATE 35: CPT | Performed by: INTERNAL MEDICINE

## 2021-07-10 PROCEDURE — 85027 COMPLETE CBC AUTOMATED: CPT | Performed by: PHYSICIAN ASSISTANT

## 2021-07-10 RX ORDER — DEXTROSE, SODIUM CHLORIDE, AND POTASSIUM CHLORIDE 5; .45; .15 G/100ML; G/100ML; G/100ML
100 INJECTION INTRAVENOUS CONTINUOUS
Status: DISCONTINUED | OUTPATIENT
Start: 2021-07-10 | End: 2021-07-10

## 2021-07-10 RX ORDER — ACETAMINOPHEN 325 MG/1
650 TABLET ORAL EVERY 6 HOURS PRN
Status: DISCONTINUED | OUTPATIENT
Start: 2021-07-10 | End: 2021-07-13 | Stop reason: HOSPADM

## 2021-07-10 RX ORDER — DEXTROSE AND SODIUM CHLORIDE 5; .45 G/100ML; G/100ML
75 INJECTION, SOLUTION INTRAVENOUS CONTINUOUS
Status: DISCONTINUED | OUTPATIENT
Start: 2021-07-10 | End: 2021-07-12

## 2021-07-10 RX ADMIN — PIPERACILLIN AND TAZOBACTAM 3.38 G: 36; 4.5 INJECTION, POWDER, FOR SOLUTION INTRAVENOUS at 00:44

## 2021-07-10 RX ADMIN — LISINOPRIL 2.5 MG: 2.5 TABLET ORAL at 08:59

## 2021-07-10 RX ADMIN — FLUTICASONE PROPIONATE 2 PUFF: 44 AEROSOL, METERED RESPIRATORY (INHALATION) at 08:58

## 2021-07-10 RX ADMIN — DEXTROSE, SODIUM CHLORIDE, AND POTASSIUM CHLORIDE 100 ML/HR: 5; .45; .15 INJECTION INTRAVENOUS at 09:03

## 2021-07-10 RX ADMIN — DIAZEPAM 10 MG: 5 TABLET ORAL at 17:41

## 2021-07-10 RX ADMIN — ACETAMINOPHEN 650 MG: 325 TABLET, FILM COATED ORAL at 22:20

## 2021-07-10 RX ADMIN — FLUTICASONE PROPIONATE 2 SPRAY: 50 SPRAY, METERED NASAL at 08:58

## 2021-07-10 RX ADMIN — FLUTICASONE PROPIONATE 2 PUFF: 44 AEROSOL, METERED RESPIRATORY (INHALATION) at 17:41

## 2021-07-10 RX ADMIN — HYDROMORPHONE HYDROCHLORIDE 0.5 MG: 1 INJECTION, SOLUTION INTRAMUSCULAR; INTRAVENOUS; SUBCUTANEOUS at 17:07

## 2021-07-10 RX ADMIN — DIAZEPAM 5 MG: 5 TABLET ORAL at 09:00

## 2021-07-10 RX ADMIN — HYDROMORPHONE HYDROCHLORIDE 0.2 MG: 0.2 INJECTION, SOLUTION INTRAMUSCULAR; INTRAVENOUS; SUBCUTANEOUS at 07:32

## 2021-07-10 RX ADMIN — HYDROMORPHONE HYDROCHLORIDE 0.5 MG: 1 INJECTION, SOLUTION INTRAMUSCULAR; INTRAVENOUS; SUBCUTANEOUS at 21:16

## 2021-07-10 RX ADMIN — FLUTICASONE FUROATE AND VILANTEROL TRIFENATATE 1 PUFF: 200; 25 POWDER RESPIRATORY (INHALATION) at 08:58

## 2021-07-10 RX ADMIN — ENOXAPARIN SODIUM 40 MG: 40 INJECTION SUBCUTANEOUS at 09:05

## 2021-07-10 RX ADMIN — PIPERACILLIN AND TAZOBACTAM 3.38 G: 36; 4.5 INJECTION, POWDER, FOR SOLUTION INTRAVENOUS at 12:15

## 2021-07-10 RX ADMIN — Medication 1 PATCH: at 09:00

## 2021-07-10 RX ADMIN — HYDROMORPHONE HYDROCHLORIDE 0.2 MG: 0.2 INJECTION, SOLUTION INTRAMUSCULAR; INTRAVENOUS; SUBCUTANEOUS at 16:02

## 2021-07-10 RX ADMIN — PIPERACILLIN AND TAZOBACTAM 3.38 G: 36; 4.5 INJECTION, POWDER, FOR SOLUTION INTRAVENOUS at 17:41

## 2021-07-10 RX ADMIN — HYDROMORPHONE HYDROCHLORIDE 0.2 MG: 0.2 INJECTION, SOLUTION INTRAMUSCULAR; INTRAVENOUS; SUBCUTANEOUS at 12:17

## 2021-07-10 RX ADMIN — ATORVASTATIN CALCIUM 20 MG: 20 TABLET, FILM COATED ORAL at 09:00

## 2021-07-10 RX ADMIN — HYDROMORPHONE HYDROCHLORIDE 0.5 MG: 1 INJECTION, SOLUTION INTRAMUSCULAR; INTRAVENOUS; SUBCUTANEOUS at 01:46

## 2021-07-10 RX ADMIN — PIPERACILLIN AND TAZOBACTAM 3.38 G: 36; 4.5 INJECTION, POWDER, FOR SOLUTION INTRAVENOUS at 05:16

## 2021-07-10 RX ADMIN — DEXTROSE AND SODIUM CHLORIDE 75 ML/HR: 5; .45 INJECTION, SOLUTION INTRAVENOUS at 16:25

## 2021-07-10 RX ADMIN — ONDANSETRON 4 MG: 2 INJECTION INTRAMUSCULAR; INTRAVENOUS at 18:39

## 2021-07-11 LAB
ANION GAP SERPL CALCULATED.3IONS-SCNC: 6 MMOL/L (ref 4–13)
BASOPHILS # BLD AUTO: 0.05 THOUSANDS/ΜL (ref 0–0.1)
BASOPHILS NFR BLD AUTO: 1 % (ref 0–1)
BUN SERPL-MCNC: 7 MG/DL (ref 5–25)
CALCIUM SERPL-MCNC: 8.5 MG/DL (ref 8.3–10.1)
CHLORIDE SERPL-SCNC: 107 MMOL/L (ref 100–108)
CO2 SERPL-SCNC: 30 MMOL/L (ref 21–32)
CREAT SERPL-MCNC: 0.9 MG/DL (ref 0.6–1.3)
EOSINOPHIL # BLD AUTO: 0.12 THOUSAND/ΜL (ref 0–0.61)
EOSINOPHIL NFR BLD AUTO: 1 % (ref 0–6)
ERYTHROCYTE [DISTWIDTH] IN BLOOD BY AUTOMATED COUNT: 14.6 % (ref 11.6–15.1)
GFR SERPL CREATININE-BSD FRML MDRD: 67 ML/MIN/1.73SQ M
GLUCOSE SERPL-MCNC: 115 MG/DL (ref 65–140)
HCT VFR BLD AUTO: 38.6 % (ref 34.8–46.1)
HGB BLD-MCNC: 12.6 G/DL (ref 11.5–15.4)
IMM GRANULOCYTES # BLD AUTO: 0.04 THOUSAND/UL (ref 0–0.2)
IMM GRANULOCYTES NFR BLD AUTO: 0 % (ref 0–2)
LYMPHOCYTES # BLD AUTO: 1.81 THOUSANDS/ΜL (ref 0.6–4.47)
LYMPHOCYTES NFR BLD AUTO: 18 % (ref 14–44)
MAGNESIUM SERPL-MCNC: 2 MG/DL (ref 1.6–2.6)
MCH RBC QN AUTO: 36.7 PG (ref 26.8–34.3)
MCHC RBC AUTO-ENTMCNC: 32.6 G/DL (ref 31.4–37.4)
MCV RBC AUTO: 113 FL (ref 82–98)
MONOCYTES # BLD AUTO: 1.22 THOUSAND/ΜL (ref 0.17–1.22)
MONOCYTES NFR BLD AUTO: 12 % (ref 4–12)
NEUTROPHILS # BLD AUTO: 6.87 THOUSANDS/ΜL (ref 1.85–7.62)
NEUTS SEG NFR BLD AUTO: 68 % (ref 43–75)
NRBC BLD AUTO-RTO: 0 /100 WBCS
PLATELET # BLD AUTO: 218 THOUSANDS/UL (ref 149–390)
PMV BLD AUTO: 9.6 FL (ref 8.9–12.7)
POTASSIUM SERPL-SCNC: 4 MMOL/L (ref 3.5–5.3)
RBC # BLD AUTO: 3.43 MILLION/UL (ref 3.81–5.12)
SODIUM SERPL-SCNC: 143 MMOL/L (ref 136–145)
WBC # BLD AUTO: 10.11 THOUSAND/UL (ref 4.31–10.16)

## 2021-07-11 PROCEDURE — 99232 SBSQ HOSP IP/OBS MODERATE 35: CPT | Performed by: NURSE PRACTITIONER

## 2021-07-11 PROCEDURE — 99232 SBSQ HOSP IP/OBS MODERATE 35: CPT | Performed by: SURGERY

## 2021-07-11 PROCEDURE — 99232 SBSQ HOSP IP/OBS MODERATE 35: CPT | Performed by: STUDENT IN AN ORGANIZED HEALTH CARE EDUCATION/TRAINING PROGRAM

## 2021-07-11 PROCEDURE — 85025 COMPLETE CBC W/AUTO DIFF WBC: CPT | Performed by: STUDENT IN AN ORGANIZED HEALTH CARE EDUCATION/TRAINING PROGRAM

## 2021-07-11 PROCEDURE — 83735 ASSAY OF MAGNESIUM: CPT | Performed by: STUDENT IN AN ORGANIZED HEALTH CARE EDUCATION/TRAINING PROGRAM

## 2021-07-11 PROCEDURE — 99447 NTRPROF PH1/NTRNET/EHR 11-20: CPT | Performed by: RADIOLOGY

## 2021-07-11 PROCEDURE — 80048 BASIC METABOLIC PNL TOTAL CA: CPT | Performed by: STUDENT IN AN ORGANIZED HEALTH CARE EDUCATION/TRAINING PROGRAM

## 2021-07-11 RX ORDER — MAGNESIUM HYDROXIDE/ALUMINUM HYDROXICE/SIMETHICONE 120; 1200; 1200 MG/30ML; MG/30ML; MG/30ML
30 SUSPENSION ORAL EVERY 4 HOURS PRN
Status: DISCONTINUED | OUTPATIENT
Start: 2021-07-11 | End: 2021-07-13 | Stop reason: HOSPADM

## 2021-07-11 RX ADMIN — HYDROMORPHONE HYDROCHLORIDE 0.2 MG: 0.2 INJECTION, SOLUTION INTRAMUSCULAR; INTRAVENOUS; SUBCUTANEOUS at 08:32

## 2021-07-11 RX ADMIN — DEXTROSE AND SODIUM CHLORIDE 75 ML/HR: 5; .45 INJECTION, SOLUTION INTRAVENOUS at 08:32

## 2021-07-11 RX ADMIN — PIPERACILLIN AND TAZOBACTAM 3.38 G: 36; 4.5 INJECTION, POWDER, FOR SOLUTION INTRAVENOUS at 05:00

## 2021-07-11 RX ADMIN — HYDROMORPHONE HYDROCHLORIDE 0.5 MG: 1 INJECTION, SOLUTION INTRAMUSCULAR; INTRAVENOUS; SUBCUTANEOUS at 16:39

## 2021-07-11 RX ADMIN — Medication 1 PATCH: at 08:33

## 2021-07-11 RX ADMIN — ACETAMINOPHEN 650 MG: 325 TABLET, FILM COATED ORAL at 23:44

## 2021-07-11 RX ADMIN — FLUTICASONE PROPIONATE 2 PUFF: 44 AEROSOL, METERED RESPIRATORY (INHALATION) at 08:32

## 2021-07-11 RX ADMIN — HYDROMORPHONE HYDROCHLORIDE 0.5 MG: 1 INJECTION, SOLUTION INTRAMUSCULAR; INTRAVENOUS; SUBCUTANEOUS at 23:44

## 2021-07-11 RX ADMIN — DIAZEPAM 10 MG: 5 TABLET ORAL at 17:35

## 2021-07-11 RX ADMIN — LISINOPRIL 2.5 MG: 2.5 TABLET ORAL at 08:31

## 2021-07-11 RX ADMIN — PIPERACILLIN AND TAZOBACTAM 3.38 G: 36; 4.5 INJECTION, POWDER, FOR SOLUTION INTRAVENOUS at 17:36

## 2021-07-11 RX ADMIN — ENOXAPARIN SODIUM 40 MG: 40 INJECTION SUBCUTANEOUS at 08:32

## 2021-07-11 RX ADMIN — ATORVASTATIN CALCIUM 20 MG: 20 TABLET, FILM COATED ORAL at 08:32

## 2021-07-11 RX ADMIN — FLUTICASONE FUROATE AND VILANTEROL TRIFENATATE 1 PUFF: 200; 25 POWDER RESPIRATORY (INHALATION) at 08:32

## 2021-07-11 RX ADMIN — PIPERACILLIN AND TAZOBACTAM 3.38 G: 36; 4.5 INJECTION, POWDER, FOR SOLUTION INTRAVENOUS at 12:13

## 2021-07-11 RX ADMIN — FLUTICASONE PROPIONATE 2 SPRAY: 50 SPRAY, METERED NASAL at 08:32

## 2021-07-11 RX ADMIN — ALUMINUM HYDROXIDE, MAGNESIUM HYDROXIDE, AND SIMETHICONE 30 ML: 200; 200; 20 SUSPENSION ORAL at 17:35

## 2021-07-11 RX ADMIN — DIAZEPAM 5 MG: 5 TABLET ORAL at 08:32

## 2021-07-11 RX ADMIN — ACETAMINOPHEN 650 MG: 325 TABLET, FILM COATED ORAL at 17:35

## 2021-07-11 RX ADMIN — HYDROMORPHONE HYDROCHLORIDE 0.2 MG: 0.2 INJECTION, SOLUTION INTRAMUSCULAR; INTRAVENOUS; SUBCUTANEOUS at 14:18

## 2021-07-11 RX ADMIN — HYDROMORPHONE HYDROCHLORIDE 0.5 MG: 1 INJECTION, SOLUTION INTRAMUSCULAR; INTRAVENOUS; SUBCUTANEOUS at 05:21

## 2021-07-11 RX ADMIN — PIPERACILLIN AND TAZOBACTAM 3.38 G: 36; 4.5 INJECTION, POWDER, FOR SOLUTION INTRAVENOUS at 00:23

## 2021-07-11 RX ADMIN — PIPERACILLIN AND TAZOBACTAM 3.38 G: 36; 4.5 INJECTION, POWDER, FOR SOLUTION INTRAVENOUS at 23:44

## 2021-07-11 RX ADMIN — FLUTICASONE PROPIONATE 2 PUFF: 44 AEROSOL, METERED RESPIRATORY (INHALATION) at 17:35

## 2021-07-11 RX ADMIN — HYDROMORPHONE HYDROCHLORIDE 0.5 MG: 1 INJECTION, SOLUTION INTRAMUSCULAR; INTRAVENOUS; SUBCUTANEOUS at 01:27

## 2021-07-12 ENCOUNTER — TELEPHONE (OUTPATIENT)
Dept: GASTROENTEROLOGY | Facility: MEDICAL CENTER | Age: 66
End: 2021-07-12

## 2021-07-12 LAB
ALBUMIN SERPL BCP-MCNC: 2.5 G/DL (ref 3.5–5)
ALP SERPL-CCNC: 28 U/L (ref 46–116)
ALT SERPL W P-5'-P-CCNC: 11 U/L (ref 12–78)
ANION GAP SERPL CALCULATED.3IONS-SCNC: 8 MMOL/L (ref 4–13)
AST SERPL W P-5'-P-CCNC: 12 U/L (ref 5–45)
BASOPHILS # BLD AUTO: 0.05 THOUSANDS/ΜL (ref 0–0.1)
BASOPHILS NFR BLD AUTO: 1 % (ref 0–1)
BILIRUB SERPL-MCNC: 0.27 MG/DL (ref 0.2–1)
BUN SERPL-MCNC: 5 MG/DL (ref 5–25)
CALCIUM ALBUM COR SERPL-MCNC: 9.3 MG/DL (ref 8.3–10.1)
CALCIUM SERPL-MCNC: 8.1 MG/DL (ref 8.3–10.1)
CHLORIDE SERPL-SCNC: 111 MMOL/L (ref 100–108)
CO2 SERPL-SCNC: 26 MMOL/L (ref 21–32)
CREAT SERPL-MCNC: 0.68 MG/DL (ref 0.6–1.3)
EOSINOPHIL # BLD AUTO: 0.14 THOUSAND/ΜL (ref 0–0.61)
EOSINOPHIL NFR BLD AUTO: 2 % (ref 0–6)
ERYTHROCYTE [DISTWIDTH] IN BLOOD BY AUTOMATED COUNT: 14.4 % (ref 11.6–15.1)
GFR SERPL CREATININE-BSD FRML MDRD: 92 ML/MIN/1.73SQ M
GLUCOSE SERPL-MCNC: 134 MG/DL (ref 65–140)
HCT VFR BLD AUTO: 34.9 % (ref 34.8–46.1)
HGB BLD-MCNC: 11.7 G/DL (ref 11.5–15.4)
IMM GRANULOCYTES # BLD AUTO: 0.04 THOUSAND/UL (ref 0–0.2)
IMM GRANULOCYTES NFR BLD AUTO: 1 % (ref 0–2)
LYMPHOCYTES # BLD AUTO: 1.53 THOUSANDS/ΜL (ref 0.6–4.47)
LYMPHOCYTES NFR BLD AUTO: 18 % (ref 14–44)
MAGNESIUM SERPL-MCNC: 2.1 MG/DL (ref 1.6–2.6)
MCH RBC QN AUTO: 37.1 PG (ref 26.8–34.3)
MCHC RBC AUTO-ENTMCNC: 33.5 G/DL (ref 31.4–37.4)
MCV RBC AUTO: 111 FL (ref 82–98)
MONOCYTES # BLD AUTO: 1.25 THOUSAND/ΜL (ref 0.17–1.22)
MONOCYTES NFR BLD AUTO: 15 % (ref 4–12)
NEUTROPHILS # BLD AUTO: 5.61 THOUSANDS/ΜL (ref 1.85–7.62)
NEUTS SEG NFR BLD AUTO: 63 % (ref 43–75)
NRBC BLD AUTO-RTO: 0 /100 WBCS
PLATELET # BLD AUTO: 219 THOUSANDS/UL (ref 149–390)
PMV BLD AUTO: 9.4 FL (ref 8.9–12.7)
POTASSIUM SERPL-SCNC: 3 MMOL/L (ref 3.5–5.3)
PROT SERPL-MCNC: 6 G/DL (ref 6.4–8.2)
RBC # BLD AUTO: 3.15 MILLION/UL (ref 3.81–5.12)
SODIUM SERPL-SCNC: 145 MMOL/L (ref 136–145)
WBC # BLD AUTO: 8.62 THOUSAND/UL (ref 4.31–10.16)

## 2021-07-12 PROCEDURE — 85025 COMPLETE CBC W/AUTO DIFF WBC: CPT | Performed by: STUDENT IN AN ORGANIZED HEALTH CARE EDUCATION/TRAINING PROGRAM

## 2021-07-12 PROCEDURE — 83735 ASSAY OF MAGNESIUM: CPT | Performed by: STUDENT IN AN ORGANIZED HEALTH CARE EDUCATION/TRAINING PROGRAM

## 2021-07-12 PROCEDURE — 80053 COMPREHEN METABOLIC PANEL: CPT | Performed by: STUDENT IN AN ORGANIZED HEALTH CARE EDUCATION/TRAINING PROGRAM

## 2021-07-12 PROCEDURE — 99223 1ST HOSP IP/OBS HIGH 75: CPT | Performed by: INTERNAL MEDICINE

## 2021-07-12 PROCEDURE — 99232 SBSQ HOSP IP/OBS MODERATE 35: CPT | Performed by: SURGERY

## 2021-07-12 PROCEDURE — 99231 SBSQ HOSP IP/OBS SF/LOW 25: CPT | Performed by: PHYSICIAN ASSISTANT

## 2021-07-12 PROCEDURE — 99232 SBSQ HOSP IP/OBS MODERATE 35: CPT | Performed by: STUDENT IN AN ORGANIZED HEALTH CARE EDUCATION/TRAINING PROGRAM

## 2021-07-12 RX ORDER — HYDROMORPHONE HCL IN WATER/PF 6 MG/30 ML
0.2 PATIENT CONTROLLED ANALGESIA SYRINGE INTRAVENOUS
Status: DISCONTINUED | OUTPATIENT
Start: 2021-07-12 | End: 2021-07-13

## 2021-07-12 RX ORDER — POTASSIUM CHLORIDE 20 MEQ/1
40 TABLET, EXTENDED RELEASE ORAL 2 TIMES DAILY
Status: COMPLETED | OUTPATIENT
Start: 2021-07-12 | End: 2021-07-12

## 2021-07-12 RX ORDER — POTASSIUM CHLORIDE 20 MEQ/1
20 TABLET, EXTENDED RELEASE ORAL 2 TIMES DAILY
Status: DISCONTINUED | OUTPATIENT
Start: 2021-07-12 | End: 2021-07-12

## 2021-07-12 RX ORDER — OXYCODONE HYDROCHLORIDE AND ACETAMINOPHEN 5; 325 MG/1; MG/1
1 TABLET ORAL EVERY 6 HOURS PRN
Status: DISCONTINUED | OUTPATIENT
Start: 2021-07-12 | End: 2021-07-13 | Stop reason: HOSPADM

## 2021-07-12 RX ADMIN — HYDROMORPHONE HYDROCHLORIDE 0.5 MG: 1 INJECTION, SOLUTION INTRAMUSCULAR; INTRAVENOUS; SUBCUTANEOUS at 04:55

## 2021-07-12 RX ADMIN — Medication 1 PATCH: at 08:28

## 2021-07-12 RX ADMIN — DIAZEPAM 10 MG: 5 TABLET ORAL at 08:20

## 2021-07-12 RX ADMIN — FLUTICASONE FUROATE AND VILANTEROL TRIFENATATE 1 PUFF: 200; 25 POWDER RESPIRATORY (INHALATION) at 08:27

## 2021-07-12 RX ADMIN — HYDROMORPHONE HYDROCHLORIDE 0.2 MG: 0.2 INJECTION, SOLUTION INTRAMUSCULAR; INTRAVENOUS; SUBCUTANEOUS at 23:26

## 2021-07-12 RX ADMIN — FLUTICASONE PROPIONATE 2 PUFF: 44 AEROSOL, METERED RESPIRATORY (INHALATION) at 08:28

## 2021-07-12 RX ADMIN — TIZANIDINE 2 MG: 2 TABLET ORAL at 08:20

## 2021-07-12 RX ADMIN — TIZANIDINE 2 MG: 2 TABLET ORAL at 23:26

## 2021-07-12 RX ADMIN — FLUTICASONE PROPIONATE 2 SPRAY: 50 SPRAY, METERED NASAL at 08:26

## 2021-07-12 RX ADMIN — HYDROMORPHONE HYDROCHLORIDE 0.5 MG: 1 INJECTION, SOLUTION INTRAMUSCULAR; INTRAVENOUS; SUBCUTANEOUS at 20:25

## 2021-07-12 RX ADMIN — ACETAMINOPHEN 650 MG: 325 TABLET, FILM COATED ORAL at 08:19

## 2021-07-12 RX ADMIN — HYDROMORPHONE HYDROCHLORIDE 0.2 MG: 0.2 INJECTION, SOLUTION INTRAMUSCULAR; INTRAVENOUS; SUBCUTANEOUS at 09:50

## 2021-07-12 RX ADMIN — POTASSIUM CHLORIDE 40 MEQ: 1500 TABLET, EXTENDED RELEASE ORAL at 16:32

## 2021-07-12 RX ADMIN — POTASSIUM CHLORIDE 40 MEQ: 1500 TABLET, EXTENDED RELEASE ORAL at 09:50

## 2021-07-12 RX ADMIN — OXYCODONE HYDROCHLORIDE AND ACETAMINOPHEN 1 TABLET: 5; 325 TABLET ORAL at 16:32

## 2021-07-12 RX ADMIN — FLUTICASONE PROPIONATE 2 PUFF: 44 AEROSOL, METERED RESPIRATORY (INHALATION) at 16:32

## 2021-07-12 RX ADMIN — PIPERACILLIN AND TAZOBACTAM 3.38 G: 36; 4.5 INJECTION, POWDER, FOR SOLUTION INTRAVENOUS at 04:56

## 2021-07-12 RX ADMIN — ACETAMINOPHEN 650 MG: 325 TABLET, FILM COATED ORAL at 13:49

## 2021-07-12 RX ADMIN — DIAZEPAM 10 MG: 5 TABLET ORAL at 16:32

## 2021-07-12 RX ADMIN — PIPERACILLIN AND TAZOBACTAM 3.38 G: 36; 4.5 INJECTION, POWDER, FOR SOLUTION INTRAVENOUS at 11:13

## 2021-07-12 RX ADMIN — ATORVASTATIN CALCIUM 20 MG: 20 TABLET, FILM COATED ORAL at 08:20

## 2021-07-12 RX ADMIN — LISINOPRIL 2.5 MG: 2.5 TABLET ORAL at 08:20

## 2021-07-12 RX ADMIN — PIPERACILLIN AND TAZOBACTAM 3.38 G: 36; 4.5 INJECTION, POWDER, FOR SOLUTION INTRAVENOUS at 17:09

## 2021-07-12 RX ADMIN — PIPERACILLIN AND TAZOBACTAM 3.38 G: 36; 4.5 INJECTION, POWDER, FOR SOLUTION INTRAVENOUS at 23:31

## 2021-07-13 ENCOUNTER — TELEPHONE (OUTPATIENT)
Dept: FAMILY MEDICINE CLINIC | Facility: CLINIC | Age: 66
End: 2021-07-13

## 2021-07-13 VITALS
RESPIRATION RATE: 20 BRPM | BODY MASS INDEX: 24.35 KG/M2 | SYSTOLIC BLOOD PRESSURE: 128 MMHG | HEART RATE: 81 BPM | WEIGHT: 146.16 LBS | TEMPERATURE: 98.2 F | OXYGEN SATURATION: 96 % | DIASTOLIC BLOOD PRESSURE: 75 MMHG | HEIGHT: 65 IN

## 2021-07-13 DIAGNOSIS — K65.1 INTRA-ABDOMINAL ABSCESS (HCC): Primary | ICD-10-CM

## 2021-07-13 LAB
ALBUMIN SERPL BCP-MCNC: 2.4 G/DL (ref 3.5–5)
ALP SERPL-CCNC: 33 U/L (ref 46–116)
ALT SERPL W P-5'-P-CCNC: 21 U/L (ref 12–78)
ANION GAP SERPL CALCULATED.3IONS-SCNC: 9 MMOL/L (ref 4–13)
AST SERPL W P-5'-P-CCNC: 8 U/L (ref 5–45)
BACTERIA BLD CULT: NORMAL
BACTERIA BLD CULT: NORMAL
BASOPHILS # BLD AUTO: 0.04 THOUSANDS/ΜL (ref 0–0.1)
BASOPHILS NFR BLD AUTO: 0 % (ref 0–1)
BILIRUB SERPL-MCNC: 0.21 MG/DL (ref 0.2–1)
BUN SERPL-MCNC: 7 MG/DL (ref 5–25)
CALCIUM ALBUM COR SERPL-MCNC: 9.6 MG/DL (ref 8.3–10.1)
CALCIUM SERPL-MCNC: 8.3 MG/DL (ref 8.3–10.1)
CHLORIDE SERPL-SCNC: 111 MMOL/L (ref 100–108)
CO2 SERPL-SCNC: 22 MMOL/L (ref 21–32)
CREAT SERPL-MCNC: 0.69 MG/DL (ref 0.6–1.3)
EOSINOPHIL # BLD AUTO: 0.19 THOUSAND/ΜL (ref 0–0.61)
EOSINOPHIL NFR BLD AUTO: 2 % (ref 0–6)
ERYTHROCYTE [DISTWIDTH] IN BLOOD BY AUTOMATED COUNT: 14.3 % (ref 11.6–15.1)
GFR SERPL CREATININE-BSD FRML MDRD: 92 ML/MIN/1.73SQ M
GLUCOSE SERPL-MCNC: 102 MG/DL (ref 65–140)
HCT VFR BLD AUTO: 33.1 % (ref 34.8–46.1)
HGB BLD-MCNC: 11.1 G/DL (ref 11.5–15.4)
IMM GRANULOCYTES # BLD AUTO: 0.05 THOUSAND/UL (ref 0–0.2)
IMM GRANULOCYTES NFR BLD AUTO: 1 % (ref 0–2)
LYMPHOCYTES # BLD AUTO: 1.78 THOUSANDS/ΜL (ref 0.6–4.47)
LYMPHOCYTES NFR BLD AUTO: 20 % (ref 14–44)
MAGNESIUM SERPL-MCNC: 1.8 MG/DL (ref 1.6–2.6)
MCH RBC QN AUTO: 37.2 PG (ref 26.8–34.3)
MCHC RBC AUTO-ENTMCNC: 33.5 G/DL (ref 31.4–37.4)
MCV RBC AUTO: 111 FL (ref 82–98)
MONOCYTES # BLD AUTO: 1.16 THOUSAND/ΜL (ref 0.17–1.22)
MONOCYTES NFR BLD AUTO: 13 % (ref 4–12)
NEUTROPHILS # BLD AUTO: 5.67 THOUSANDS/ΜL (ref 1.85–7.62)
NEUTS SEG NFR BLD AUTO: 64 % (ref 43–75)
NRBC BLD AUTO-RTO: 0 /100 WBCS
PHOSPHATE SERPL-MCNC: 2.2 MG/DL (ref 2.3–4.1)
PLATELET # BLD AUTO: 240 THOUSANDS/UL (ref 149–390)
PMV BLD AUTO: 9.3 FL (ref 8.9–12.7)
POTASSIUM SERPL-SCNC: 3.9 MMOL/L (ref 3.5–5.3)
PROT SERPL-MCNC: 6.3 G/DL (ref 6.4–8.2)
RBC # BLD AUTO: 2.98 MILLION/UL (ref 3.81–5.12)
SODIUM SERPL-SCNC: 142 MMOL/L (ref 136–145)
WBC # BLD AUTO: 8.89 THOUSAND/UL (ref 4.31–10.16)

## 2021-07-13 PROCEDURE — 80053 COMPREHEN METABOLIC PANEL: CPT | Performed by: STUDENT IN AN ORGANIZED HEALTH CARE EDUCATION/TRAINING PROGRAM

## 2021-07-13 PROCEDURE — 99239 HOSP IP/OBS DSCHRG MGMT >30: CPT | Performed by: STUDENT IN AN ORGANIZED HEALTH CARE EDUCATION/TRAINING PROGRAM

## 2021-07-13 PROCEDURE — 99231 SBSQ HOSP IP/OBS SF/LOW 25: CPT | Performed by: INTERNAL MEDICINE

## 2021-07-13 PROCEDURE — 84100 ASSAY OF PHOSPHORUS: CPT | Performed by: STUDENT IN AN ORGANIZED HEALTH CARE EDUCATION/TRAINING PROGRAM

## 2021-07-13 PROCEDURE — 97116 GAIT TRAINING THERAPY: CPT

## 2021-07-13 PROCEDURE — 83735 ASSAY OF MAGNESIUM: CPT | Performed by: STUDENT IN AN ORGANIZED HEALTH CARE EDUCATION/TRAINING PROGRAM

## 2021-07-13 PROCEDURE — 85025 COMPLETE CBC W/AUTO DIFF WBC: CPT | Performed by: STUDENT IN AN ORGANIZED HEALTH CARE EDUCATION/TRAINING PROGRAM

## 2021-07-13 PROCEDURE — NC001 PR NO CHARGE: Performed by: SURGERY

## 2021-07-13 PROCEDURE — 97530 THERAPEUTIC ACTIVITIES: CPT

## 2021-07-13 PROCEDURE — 99233 SBSQ HOSP IP/OBS HIGH 50: CPT | Performed by: INTERNAL MEDICINE

## 2021-07-13 RX ORDER — NICOTINE 21 MG/24HR
1 PATCH, TRANSDERMAL 24 HOURS TRANSDERMAL DAILY
Status: DISCONTINUED | OUTPATIENT
Start: 2021-07-14 | End: 2021-07-13 | Stop reason: HOSPADM

## 2021-07-13 RX ORDER — AMOXICILLIN AND CLAVULANATE POTASSIUM 875; 125 MG/1; MG/1
1 TABLET, FILM COATED ORAL EVERY 12 HOURS SCHEDULED
Qty: 60 TABLET | Refills: 0 | Status: SHIPPED | OUTPATIENT
Start: 2021-07-13 | End: 2021-08-03

## 2021-07-13 RX ORDER — AMOXICILLIN AND CLAVULANATE POTASSIUM 875; 125 MG/1; MG/1
1 TABLET, FILM COATED ORAL EVERY 12 HOURS SCHEDULED
Status: DISCONTINUED | OUTPATIENT
Start: 2021-07-13 | End: 2021-07-13 | Stop reason: HOSPADM

## 2021-07-13 RX ADMIN — PIPERACILLIN AND TAZOBACTAM 3.38 G: 36; 4.5 INJECTION, POWDER, FOR SOLUTION INTRAVENOUS at 11:07

## 2021-07-13 RX ADMIN — FLUTICASONE PROPIONATE 2 PUFF: 44 AEROSOL, METERED RESPIRATORY (INHALATION) at 08:17

## 2021-07-13 RX ADMIN — ATORVASTATIN CALCIUM 20 MG: 20 TABLET, FILM COATED ORAL at 08:16

## 2021-07-13 RX ADMIN — FLUTICASONE FUROATE AND VILANTEROL TRIFENATATE 1 PUFF: 200; 25 POWDER RESPIRATORY (INHALATION) at 08:17

## 2021-07-13 RX ADMIN — LISINOPRIL 2.5 MG: 2.5 TABLET ORAL at 08:16

## 2021-07-13 RX ADMIN — AMOXICILLIN AND CLAVULANATE POTASSIUM 1 TABLET: 875; 125 TABLET, FILM COATED ORAL at 13:12

## 2021-07-13 RX ADMIN — OXYCODONE HYDROCHLORIDE AND ACETAMINOPHEN 1 TABLET: 5; 325 TABLET ORAL at 14:29

## 2021-07-13 RX ADMIN — TIZANIDINE 2 MG: 2 TABLET ORAL at 13:12

## 2021-07-13 RX ADMIN — Medication 1 PATCH: at 08:14

## 2021-07-13 RX ADMIN — FLUTICASONE PROPIONATE 2 SPRAY: 50 SPRAY, METERED NASAL at 08:17

## 2021-07-13 RX ADMIN — PIPERACILLIN AND TAZOBACTAM 3.38 G: 36; 4.5 INJECTION, POWDER, FOR SOLUTION INTRAVENOUS at 06:19

## 2021-07-13 RX ADMIN — OXYCODONE HYDROCHLORIDE AND ACETAMINOPHEN 1 TABLET: 5; 325 TABLET ORAL at 08:16

## 2021-07-13 RX ADMIN — DIAZEPAM 10 MG: 5 TABLET ORAL at 08:16

## 2021-07-13 NOTE — TELEPHONE ENCOUNTER
I called [facility] and informed her we would like to get her scheduled for an  appointment next week with Dr Leonard  for her post hospital visit and to discuss medications  Pt is agreeable I made pt aware she is scheduled for an appointment 07/20/21 with Dr Leonard  if she were to still be  in pt at [address] she is to please call and make us aware then we would re schedule  Pt expressed verbal undersensing and agreed  Pt requested a virtual appointment  I informed pt we would prefer she come in person to be evaluated at this time  Call complete

## 2021-07-13 NOTE — TELEPHONE ENCOUNTER
Devorah Kearns is presently in pt at [place] [address]  She expressed she believes she is being sent home tomorrow  Pt is on  Generic Zoloft  50 mg one tablet by mouth daily pt takes at night  she doesn not like it it does not work like she thought it would  Pt is requesting If she can go back on the  Mirtazapine 15 mg 1 tablet at bed time  I informed pt Dr Leonard is out of the office I have to ask another physician  I informed pt this would be to the physician's  direction since she is in patient I do not  know how that works with changing medications, but I will lask and call her back  Pt uses the [facility] in [place]    Pt's number is [phone]

## 2021-07-13 NOTE — TELEPHONE ENCOUNTER
I called [facility] and left a detailed message for Micah schaeffer consent ok  I informed pt I heard back from [address] whom is in the office while Dr Leonard is out  He would prefer this wait for Dr Leonard to advise when she is back in the office,due to she is her pcp and would prefer she advise and make this decision  I did inform Lorenzo Galan will not be back in the office until 07/19/21 and not  see this til then  Any questions please call the office

## 2021-07-14 ENCOUNTER — TELEPHONE (OUTPATIENT)
Dept: INFECTIOUS DISEASES | Facility: CLINIC | Age: 66
End: 2021-07-14

## 2021-07-14 ENCOUNTER — TRANSITIONAL CARE MANAGEMENT (OUTPATIENT)
Dept: FAMILY MEDICINE CLINIC | Facility: CLINIC | Age: 66
End: 2021-07-14

## 2021-07-14 NOTE — TELEPHONE ENCOUNTER
Contacted patient and spoke to her  Discussed weekly labs, CT scheduled for 7/26, and follow up with us  Patient was provided enough augmentin through until follow up with us  All information printed out and hand written including CT instructions, date next labs due, and f/u reminder with us  Mailed all of this to patient's home address and provided our contact information should she need us for any questions or concerns

## 2021-07-15 ENCOUNTER — TELEPHONE (OUTPATIENT)
Dept: GASTROENTEROLOGY | Facility: MEDICAL CENTER | Age: 66
End: 2021-07-15

## 2021-07-15 ENCOUNTER — RA CDI HCC (OUTPATIENT)
Dept: OTHER | Facility: HOSPITAL | Age: 66
End: 2021-07-15

## 2021-07-15 DIAGNOSIS — G43.909 MIGRAINE WITHOUT STATUS MIGRAINOSUS, NOT INTRACTABLE, UNSPECIFIED MIGRAINE TYPE: ICD-10-CM

## 2021-07-15 DIAGNOSIS — R10.13 DYSPEPSIA: ICD-10-CM

## 2021-07-15 RX ORDER — SUMATRIPTAN 100 MG/1
100 TABLET, FILM COATED ORAL ONCE AS NEEDED
Qty: 9 TABLET | Refills: 0 | Status: SHIPPED | OUTPATIENT
Start: 2021-07-15 | End: 2021-08-24 | Stop reason: SDUPTHER

## 2021-07-15 RX ORDER — ONDANSETRON 4 MG/1
4 TABLET, FILM COATED ORAL EVERY 8 HOURS PRN
Qty: 20 TABLET | Refills: 0 | Status: SHIPPED | OUTPATIENT
Start: 2021-07-15 | End: 2021-08-10 | Stop reason: SDUPTHER

## 2021-07-15 NOTE — TELEPHONE ENCOUNTER
----- Message from Jerzy Shin PA-C sent at 7/11/2021  2:29 PM EDT -----  Please call patient to schedule hospital follow-up with Sam Campbell in 1-2 months

## 2021-07-18 ENCOUNTER — NURSE TRIAGE (OUTPATIENT)
Dept: OTHER | Facility: OTHER | Age: 66
End: 2021-07-18

## 2021-07-18 DIAGNOSIS — B37.81 THRUSH OF MOUTH AND ESOPHAGUS (HCC): ICD-10-CM

## 2021-07-18 DIAGNOSIS — B37.0 THRUSH OF MOUTH AND ESOPHAGUS (HCC): ICD-10-CM

## 2021-07-18 NOTE — TELEPHONE ENCOUNTER
Regarding: Medication question  ----- Message from [facility] FRANCISCA RADHA sent at 7/18/2021  6:25 PM EDT -----  " I am calling because I am terribly depressed  I am wondering if they can send mirtazapine to the pharmacy?  I tried zoloft and its not working and im just so upset about my  and everything else going on "     (I do not see the med in the patients med list but she was very tearful and upset,informed her unsure if we would be able to help )

## 2021-07-18 NOTE — TELEPHONE ENCOUNTER
Patient would like to stop taking her Zoloft and go back on her Mirtazapine 10 mg PO  Reason for Disposition   [1] Caller has NON-URGENT medicine question about med that PCP prescribed AND [2] triager unable to answer question    Answer Assessment - Initial Assessment Questions  1  NAME of MEDICATION: "What medicine are you calling about?"      Mirtazapine 10 mg PO     2  QUESTION: "What is your question?" (e g , medication refill, side effect)      Would like to go back on this medication    3  PRESCRIBING HCP: "Who prescribed it?" Reason: if prescribed by specialist, call should be referred to that group  Rodo Collazo    4  SYMPTOMS: "Do you have any symptoms?"      Anxiety, patient denies thoughts of self harm    5   SEVERITY: If symptoms are present, ask "Are they mild, moderate or severe?"      Mild to moderate    Protocols used: MEDICATION QUESTION CALL-ADULT-

## 2021-07-19 ENCOUNTER — TELEPHONE (OUTPATIENT)
Dept: FAMILY MEDICINE CLINIC | Facility: CLINIC | Age: 66
End: 2021-07-19

## 2021-07-19 RX ORDER — CLOTRIMAZOLE 10 MG/1
10 LOZENGE ORAL; TOPICAL
Qty: 25 TROCHE | Refills: 0 | Status: SHIPPED | OUTPATIENT
Start: 2021-07-19 | End: 2021-08-24 | Stop reason: SDUPTHER

## 2021-07-19 NOTE — TELEPHONE ENCOUNTER
Patient called asking for a new script to be sent to [facility] in [facility] for Mirtazapine 10 mg  1 at night #30

## 2021-07-19 NOTE — TELEPHONE ENCOUNTER
Called and left Pt a message  Instructed to give the office a call with any questions and or concerns

## 2021-07-20 DIAGNOSIS — G25.0 ESSENTIAL TREMOR: ICD-10-CM

## 2021-07-20 PROBLEM — R19.7 DIARRHEA OF PRESUMED INFECTIOUS ORIGIN: Status: ACTIVE | Noted: 2021-07-20

## 2021-07-21 ENCOUNTER — APPOINTMENT (OUTPATIENT)
Dept: LAB | Facility: CLINIC | Age: 66
End: 2021-07-21
Payer: COMMERCIAL

## 2021-07-21 LAB
ANION GAP SERPL CALCULATED.3IONS-SCNC: 7 MMOL/L (ref 4–13)
BASOPHILS # BLD AUTO: 0.02 THOUSANDS/ΜL (ref 0–0.1)
BASOPHILS NFR BLD AUTO: 0 % (ref 0–1)
BUN SERPL-MCNC: 13 MG/DL (ref 5–25)
CALCIUM SERPL-MCNC: 9.1 MG/DL (ref 8.3–10.1)
CHLORIDE SERPL-SCNC: 102 MMOL/L (ref 100–108)
CO2 SERPL-SCNC: 28 MMOL/L (ref 21–32)
CREAT SERPL-MCNC: 0.84 MG/DL (ref 0.6–1.3)
EOSINOPHIL # BLD AUTO: 0.18 THOUSAND/ΜL (ref 0–0.61)
EOSINOPHIL NFR BLD AUTO: 1 % (ref 0–6)
ERYTHROCYTE [DISTWIDTH] IN BLOOD BY AUTOMATED COUNT: 15.2 % (ref 11.6–15.1)
GFR SERPL CREATININE-BSD FRML MDRD: 73 ML/MIN/1.73SQ M
GLUCOSE P FAST SERPL-MCNC: 75 MG/DL (ref 65–99)
HCT VFR BLD AUTO: 38.5 % (ref 34.8–46.1)
HGB BLD-MCNC: 12.5 G/DL (ref 11.5–15.4)
IMM GRANULOCYTES # BLD AUTO: 0.19 THOUSAND/UL (ref 0–0.2)
IMM GRANULOCYTES NFR BLD AUTO: 1 % (ref 0–2)
LYMPHOCYTES # BLD AUTO: 2.27 THOUSANDS/ΜL (ref 0.6–4.47)
LYMPHOCYTES NFR BLD AUTO: 15 % (ref 14–44)
MCH RBC QN AUTO: 37.4 PG (ref 26.8–34.3)
MCHC RBC AUTO-ENTMCNC: 32.5 G/DL (ref 31.4–37.4)
MCV RBC AUTO: 115 FL (ref 82–98)
MONOCYTES # BLD AUTO: 1.18 THOUSAND/ΜL (ref 0.17–1.22)
MONOCYTES NFR BLD AUTO: 8 % (ref 4–12)
NEUTROPHILS # BLD AUTO: 11.58 THOUSANDS/ΜL (ref 1.85–7.62)
NEUTS SEG NFR BLD AUTO: 75 % (ref 43–75)
NRBC BLD AUTO-RTO: 0 /100 WBCS
PLATELET # BLD AUTO: 420 THOUSANDS/UL (ref 149–390)
PMV BLD AUTO: 9.2 FL (ref 8.9–12.7)
POTASSIUM SERPL-SCNC: 3.6 MMOL/L (ref 3.5–5.3)
RBC # BLD AUTO: 3.34 MILLION/UL (ref 3.81–5.12)
SODIUM SERPL-SCNC: 137 MMOL/L (ref 136–145)
WBC # BLD AUTO: 15.42 THOUSAND/UL (ref 4.31–10.16)

## 2021-07-21 PROCEDURE — 85025 COMPLETE CBC W/AUTO DIFF WBC: CPT

## 2021-07-21 PROCEDURE — 80048 BASIC METABOLIC PNL TOTAL CA: CPT

## 2021-07-21 PROCEDURE — 36415 COLL VENOUS BLD VENIPUNCTURE: CPT

## 2021-07-21 RX ORDER — PRIMIDONE 50 MG/1
TABLET ORAL
Qty: 360 TABLET | Refills: 1 | Status: SHIPPED | OUTPATIENT
Start: 2021-07-21 | End: 2021-09-02 | Stop reason: SDUPTHER

## 2021-07-22 ENCOUNTER — DOCUMENTATION (OUTPATIENT)
Dept: INFECTIOUS DISEASES | Facility: CLINIC | Age: 66
End: 2021-07-22

## 2021-07-23 ENCOUNTER — OFFICE VISIT (OUTPATIENT)
Dept: FAMILY MEDICINE CLINIC | Facility: CLINIC | Age: 66
End: 2021-07-23
Payer: COMMERCIAL

## 2021-07-23 ENCOUNTER — HOSPITAL ENCOUNTER (EMERGENCY)
Facility: HOSPITAL | Age: 66
Discharge: HOME | End: 2021-07-23
Attending: EMERGENCY MEDICINE | Admitting: EMERGENCY MEDICINE
Payer: COMMERCIAL

## 2021-07-23 ENCOUNTER — APPOINTMENT (EMERGENCY)
Dept: CT IMAGING | Facility: HOSPITAL | Age: 66
End: 2021-07-23
Payer: COMMERCIAL

## 2021-07-23 VITALS
HEART RATE: 96 BPM | TEMPERATURE: 96.7 F | OXYGEN SATURATION: 96 % | HEIGHT: 65 IN | BODY MASS INDEX: 22.79 KG/M2 | WEIGHT: 136.8 LBS | DIASTOLIC BLOOD PRESSURE: 78 MMHG | SYSTOLIC BLOOD PRESSURE: 152 MMHG

## 2021-07-23 VITALS
TEMPERATURE: 98.6 F | DIASTOLIC BLOOD PRESSURE: 76 MMHG | SYSTOLIC BLOOD PRESSURE: 169 MMHG | WEIGHT: 135.8 LBS | OXYGEN SATURATION: 96 % | RESPIRATION RATE: 16 BRPM | BODY MASS INDEX: 22.68 KG/M2 | HEART RATE: 61 BPM

## 2021-07-23 DIAGNOSIS — Z87.19 HISTORY OF COLONIC DIVERTICULITIS: ICD-10-CM

## 2021-07-23 DIAGNOSIS — R19.7 DIARRHEA: ICD-10-CM

## 2021-07-23 DIAGNOSIS — D72.829 LEUKOCYTOSIS, UNSPECIFIED TYPE: Primary | ICD-10-CM

## 2021-07-23 DIAGNOSIS — K57.20 DIVERTICULITIS OF LARGE INTESTINE WITH ABSCESS WITHOUT BLEEDING: ICD-10-CM

## 2021-07-23 DIAGNOSIS — K57.92 DIVERTICULITIS: ICD-10-CM

## 2021-07-23 DIAGNOSIS — R10.9 ABDOMINAL PAIN: Primary | ICD-10-CM

## 2021-07-23 LAB
ALBUMIN SERPL BCP-MCNC: 3.2 G/DL (ref 3.5–5)
ALP SERPL-CCNC: 31 U/L (ref 46–116)
ALT SERPL W P-5'-P-CCNC: 23 U/L (ref 12–78)
ANION GAP SERPL CALCULATED.3IONS-SCNC: 9 MMOL/L (ref 4–13)
AST SERPL W P-5'-P-CCNC: 19 U/L (ref 5–45)
BASOPHILS # BLD AUTO: 0.03 THOUSANDS/ΜL (ref 0–0.1)
BASOPHILS NFR BLD AUTO: 0 % (ref 0–1)
BILIRUB DIRECT SERPL-MCNC: 0.07 MG/DL (ref 0–0.2)
BILIRUB SERPL-MCNC: 0.14 MG/DL (ref 0.2–1)
BUN SERPL-MCNC: 7 MG/DL (ref 5–25)
CALCIUM SERPL-MCNC: 8.1 MG/DL (ref 8.3–10.1)
CHLORIDE SERPL-SCNC: 106 MMOL/L (ref 100–108)
CO2 SERPL-SCNC: 29 MMOL/L (ref 21–32)
CREAT SERPL-MCNC: 0.9 MG/DL (ref 0.6–1.3)
EOSINOPHIL # BLD AUTO: 0.21 THOUSAND/ΜL (ref 0–0.61)
EOSINOPHIL NFR BLD AUTO: 2 % (ref 0–6)
ERYTHROCYTE [DISTWIDTH] IN BLOOD BY AUTOMATED COUNT: 14.7 % (ref 11.6–15.1)
GFR SERPL CREATININE-BSD FRML MDRD: 67 ML/MIN/1.73SQ M
GLUCOSE SERPL-MCNC: 73 MG/DL (ref 65–140)
HCT VFR BLD AUTO: 35.4 % (ref 34.8–46.1)
HGB BLD-MCNC: 11.9 G/DL (ref 11.5–15.4)
IMM GRANULOCYTES # BLD AUTO: 0.18 THOUSAND/UL (ref 0–0.2)
IMM GRANULOCYTES NFR BLD AUTO: 2 % (ref 0–2)
LIPASE SERPL-CCNC: 108 U/L (ref 73–393)
LYMPHOCYTES # BLD AUTO: 3.68 THOUSANDS/ΜL (ref 0.6–4.47)
LYMPHOCYTES NFR BLD AUTO: 31 % (ref 14–44)
MCH RBC QN AUTO: 37.3 PG (ref 26.8–34.3)
MCHC RBC AUTO-ENTMCNC: 33.6 G/DL (ref 31.4–37.4)
MCV RBC AUTO: 111 FL (ref 82–98)
MONOCYTES # BLD AUTO: 0.88 THOUSAND/ΜL (ref 0.17–1.22)
MONOCYTES NFR BLD AUTO: 7 % (ref 4–12)
NEUTROPHILS # BLD AUTO: 7.04 THOUSANDS/ΜL (ref 1.85–7.62)
NEUTS SEG NFR BLD AUTO: 58 % (ref 43–75)
NRBC BLD AUTO-RTO: 0 /100 WBCS
PLATELET # BLD AUTO: 336 THOUSANDS/UL (ref 149–390)
PMV BLD AUTO: 9.2 FL (ref 8.9–12.7)
POTASSIUM SERPL-SCNC: 3.7 MMOL/L (ref 3.5–5.3)
PROT SERPL-MCNC: 6.6 G/DL (ref 6.4–8.2)
RBC # BLD AUTO: 3.19 MILLION/UL (ref 3.81–5.12)
SODIUM SERPL-SCNC: 144 MMOL/L (ref 136–145)
WBC # BLD AUTO: 12.02 THOUSAND/UL (ref 4.31–10.16)

## 2021-07-23 PROCEDURE — G1004 CDSM NDSC: HCPCS

## 2021-07-23 PROCEDURE — 99496 TRANSJ CARE MGMT HIGH F2F 7D: CPT | Performed by: NURSE PRACTITIONER

## 2021-07-23 PROCEDURE — 1111F DSCHRG MED/CURRENT MED MERGE: CPT | Performed by: NURSE PRACTITIONER

## 2021-07-23 PROCEDURE — 96374 THER/PROPH/DIAG INJ IV PUSH: CPT

## 2021-07-23 PROCEDURE — 99284 EMERGENCY DEPT VISIT MOD MDM: CPT

## 2021-07-23 PROCEDURE — 83690 ASSAY OF LIPASE: CPT | Performed by: EMERGENCY MEDICINE

## 2021-07-23 PROCEDURE — 99285 EMERGENCY DEPT VISIT HI MDM: CPT | Performed by: EMERGENCY MEDICINE

## 2021-07-23 PROCEDURE — 74177 CT ABD & PELVIS W/CONTRAST: CPT

## 2021-07-23 PROCEDURE — 80076 HEPATIC FUNCTION PANEL: CPT | Performed by: EMERGENCY MEDICINE

## 2021-07-23 PROCEDURE — 85025 COMPLETE CBC W/AUTO DIFF WBC: CPT | Performed by: EMERGENCY MEDICINE

## 2021-07-23 PROCEDURE — 36415 COLL VENOUS BLD VENIPUNCTURE: CPT | Performed by: EMERGENCY MEDICINE

## 2021-07-23 PROCEDURE — 80048 BASIC METABOLIC PNL TOTAL CA: CPT | Performed by: EMERGENCY MEDICINE

## 2021-07-23 RX ORDER — MORPHINE SULFATE 4 MG/ML
4 INJECTION, SOLUTION INTRAMUSCULAR; INTRAVENOUS ONCE
Status: COMPLETED | OUTPATIENT
Start: 2021-07-23 | End: 2021-07-23

## 2021-07-23 RX ADMIN — MORPHINE SULFATE 4 MG: 4 INJECTION INTRAVENOUS at 19:18

## 2021-07-23 RX ADMIN — IOHEXOL 100 ML: 350 INJECTION, SOLUTION INTRAVENOUS at 20:04

## 2021-07-23 NOTE — ED PROVIDER NOTES
Emergency Department Note- Burton Ontiveros [facility]-ER 72 y o  female MRN: [id number]    Unit/Bed#: ED 05 Encounter: [id number]        History of Present Illness     Patient is a 51-year-old female, hospitalized July 7th through July 14, 2021 for diverticulitis  Patient having longstanding abdominal pain and diarrhea secondary to sigmoid diverticulitis, she had a CT scan on July 7th which showed partially improved changes of mid sigmoid diverticulitis, with a known intramural abscess which had diminished in size  The abscess was too small for drainage by Interventional Radiology and recommendations were for medical management  Patient was continued on Augmentin b i d , upon discharge and recommended follow-up with Infectious Disease and outpatient surgery  Said initially after discharge she was doing okay, had some slight discomfort was feeling better overall  After about 5 or 6 days however she had return of her discomfort, occassional diarrhea, got a little bit worseThere is no dysuria or hematuria, no blood in her stool, no nausea or vomiting, no fever or chills  White blood cell count on July 13th with 8000, elevation white blood cell count on July 21st was 15,000  Today she was seen by her primary care physician for follow-up and recommended go to the ED for further workup and evaluation      REVIEW OF SYSTEMS     Constitutional:  No recent weight  gains or losses   Eyes:  No visual changes   ENT:  No tinnitus or hearing changes   Cardiac: No chest pain or palpitations   Respiratory:  No cough or shortness of breath   Abdominal:  As per HPI   Urinary: No dysuria or hematuria   Hematologic: No easy bruising or bleeding   Skin: No rash   Musculoskeletal: No aches or pains   Neurologic: No weakness or sensory changes   Psychiatric: No mood changes      Historical Information   Past Medical History:   Diagnosis Date    Anxiety     Asthma     Depression     Fibromyalgia     GERD (gastroesophageal reflux disease) Hyperlipidemia     Hypertension     Lumbar herniated disc      Past Surgical History:   Procedure Laterality Date    BREAST BIOPSY Left     benign- at age 34    TUBAL LIGATION      ULNAR NERVE REPAIR Left      Social History   Social History     Substance and Sexual Activity   Alcohol Use No     Social History     Substance and Sexual Activity   Drug Use No     Social History     Tobacco Use   Smoking Status Current Some Day Smoker    Packs/day: 0 25    Types: Cigarettes    Last attempt to quit: 2019    Years since quittin 8   Smokeless Tobacco Never Used   Tobacco Comment    2-3 in am with coffee     Family History:   Family History   Problem Relation Age of Onset    Heart failure Mother     Hyperlipidemia Mother     Hypertension Mother     Coronary artery disease Brother     COPD Maternal Grandmother     No Known Problems Sister     No Known Problems Daughter     No Known Problems Sister     Aneurysm Maternal Aunt        MEDICATIONS:  No current facility-administered medications on file prior to encounter  Current Outpatient Medications on File Prior to Encounter   Medication Sig Dispense Refill    albuterol (PROVENTIL HFA,VENTOLIN HFA) 90 mcg/act inhaler TAKE 2 PUFFS BY MOUTH EVERY 6 HOURS AS NEEDED FOR WHEEZE 54 Inhaler 1    amoxicillin-clavulanate (AUGMENTIN) 875-125 mg per tablet Take 1 tablet by mouth every 12 (twelve) hours 60 tablet 0    atorvastatin (LIPITOR) 20 mg tablet Take 1 tablet (20 mg total) by mouth daily 90 tablet 1    betamethasone dipropionate (DIPROSONE) 0 05 % cream Apply topically 2 (two) times a day 30 g 0    Breo Ellipta 200-25 MCG/INH inhaler INHALE 1 PUFF DAILY RINSE MOUTH AFTER USE   60 blister 1    clotrimazole (MYCELEX) 10 mg jeaneth Take 1 tablet (10 mg total) by mouth 5 (five) times a day 25 Jeaneth 0    clotrimazole-betamethasone (LOTRISONE) 1-0 05 % cream Apply topically 2 (two) times a day To outside of the vagina 30 g 0    diazepam (VALIUM) 10 mg tablet Take 1 tablet (10 mg total) by mouth 2 (two) times a day 60 tablet 0    dicyclomine (BENTYL) 10 mg capsule Take 1 capsule (10 mg total) by mouth 4 (four) times a day (before meals and at bedtime) 60 capsule 3    diphenoxylate-atropine (LOMOTIL) 2 5-0 025 mg per tablet Take 1 tablet by mouth 4 (four) times a day as needed for diarrhea 30 tablet 0    Flovent HFA 44 MCG/ACT inhaler TAKE 2 PUFFS BY MOUTH TWICE A DAY 10 6 Inhaler 2    fluticasone (FLONASE) 50 mcg/act nasal spray 2 sprays into each nostril daily 16 mL 3    HYDROcodone-acetaminophen (NORCO) 7 5-325 mg per tablet 3 (three) times a day       hyoscyamine (ANASPAZ,LEVSIN) 0 125 MG tablet Take 1 tablet (0 125 mg total) by mouth every 6 (six) hours as needed for cramping 30 tablet 0    lisinopril (ZESTRIL) 2 5 mg tablet Take 1 tablet (2 5 mg total) by mouth daily 90 tablet 0    mirtazapine (REMERON) 15 mg tablet Take 1 tablet (15 mg total) by mouth daily at bedtime 30 tablet 1    naloxone (NARCAN) 4 mg/0 1 mL nasal spray 1 spray by Alternating Nares route every 3 (three) minutes as needed for opioid reversal or respiratory depression Administer 1 spray into a nostril  If breathing does not return to normal or if breathing difficulty resumes after 2-3 minutes, give another dose in the other nostril using a new spray        ondansetron (ZOFRAN) 4 mg tablet Take 1 tablet (4 mg total) by mouth every 8 (eight) hours as needed for nausea or vomiting 20 tablet 0    primidone (MYSOLINE) 50 mg tablet TAKE 4 TABS (200 MG) BY MOUTH AT BEDTIME START WITH 1/2 TAB AT BEDTIME AND INCREASE AS  tablet 1    SUMAtriptan (IMITREX) 100 mg tablet Take 1 tablet (100 mg total) by mouth once as needed for migraine for up to 1 dose 9 tablet 0    tiZANidine (ZANAFLEX) 4 mg tablet TAKE 1 TABLET BY MOUTH TWICE A DAY AS NEEDED FOR MUSCLE PAIN/SPASMS       ALLERGIES:  Allergies   Allergen Reactions    Levofloxacin Other (See Comments)     Weak legs, blurred vision    Carafate [Sucralfate] Rash Other Palpitations     MRI dye    Tetracycline Rash       Vitals:    07/23/21 1808 07/23/21 2041   BP: (!) 187/95 169/76   TempSrc: Oral    Pulse: 75 61   Resp: 18 16   Patient Position - Orthostatic VS:  Lying   Temp: 98 6 °F (37 °C)        PHYSICAL EXAM    General:  Patient is well-appearing  Head:  Atraumatic  Eyes:  Conjunctiva pink  ENT:  Mucous membranes are moist  Neck:  Supple  Cardiac:  S1-S2, without murmurs  Lungs:  Clear to auscultation bilaterally  Abdomen:  Mild left lower abdominal tenderness, no tympany, no rigidity, no guarding  Extremities:  Normal range of motion  Neurologic:  Awake, fluent speech, normal comprehension, AAOx3  Skin:  Pink warm and dry  Psychiatric:  Alert, pleasant, cooperative        Labs Reviewed   BASIC METABOLIC PANEL - Abnormal       Result Value Ref Range Status    Sodium 144  136 - 145 mmol/L Final    Potassium 3 7  3 5 - 5 3 mmol/L Final    Chloride 106  100 - 108 mmol/L Final    CO2 29  21 - 32 mmol/L Final    ANION GAP 9  4 - 13 mmol/L Final    BUN 7  5 - 25 mg/dL Final    Creatinine 0 90  0 60 - 1 30 mg/dL Final    Comment: Standardized to IDMS reference method    Glucose 73  65 - 140 mg/dL Final    Comment: If the patient is fasting, the ADA then defines impaired fasting glucose as > 100 mg/dL and diabetes as > or equal to 123 mg/dL  Specimen collection should occur prior to Sulfasalazine administration due to the potential for falsely depressed results  Specimen collection should occur prior to Sulfapyridine administration due to the potential for falsely elevated results      Calcium 8 1 (*) 8 3 - 10 1 mg/dL Final    eGFR 67  ml/min/1 73sq m Final    Narrative:     [place] guidelines for Chronic Kidney Disease (CKD):                     Stage 1 with normal or high GFR (GFR > 90 mL/min/1 73 square meters)                    Stage 2 Mild CKD (GFR = 60-89 mL/min/1 73 square meters)                    Stage 3A Moderate CKD (GFR = 45-59 mL/min/1 73 square meters)                    Stage 3B Moderate CKD (GFR = 30-44 mL/min/1 73 square meters)                    Stage 4 Severe CKD (GFR = 15-29 mL/min/1 73 square meters)                    Stage 5 End Stage CKD (GFR <15 mL/min/1 73 square meters)                  Note: GFR calculation is accurate only with a steady state creatinine   CBC AND DIFFERENTIAL - Abnormal    WBC 12 02 (*) 4 31 - 10 16 Thousand/uL Final    RBC 3 19 (*) 3 81 - 5 12 Million/uL Final    Hemoglobin 11 9  11 5 - 15 4 g/dL Final    Hematocrit 35 4  34 8 - 46 1 % Final     (*) 82 - 98 fL Final    MCH 37 3 (*) 26 8 - 34 3 pg Final    MCHC 33 6  31 4 - 37 4 g/dL Final    RDW 14 7  11 6 - 15 1 % Final    MPV 9 2  8 9 - 12 7 fL Final    Platelets 488  208 - 390 Thousands/uL Final    nRBC 0  /100 WBCs Final    Neutrophils Relative 58  43 - 75 % Final    Immat GRANS % 2  0 - 2 % Final    Lymphocytes Relative 31  14 - 44 % Final    Monocytes Relative 7  4 - 12 % Final    Eosinophils Relative 2  0 - 6 % Final    Basophils Relative 0  0 - 1 % Final    Neutrophils Absolute 7 04  1 85 - 7 62 Thousands/µL Final    Immature Grans Absolute 0 18  0 00 - 0 20 Thousand/uL Final    Lymphocytes Absolute 3 68  0 60 - 4 47 Thousands/µL Final    Monocytes Absolute 0 88  0 17 - 1 22 Thousand/µL Final    Eosinophils Absolute 0 21  0 00 - 0 61 Thousand/µL Final    Basophils Absolute 0 03  0 00 - 0 10 Thousands/µL Final   HEPATIC FUNCTION PANEL - Abnormal    Total Bilirubin 0 14 (*) 0 20 - 1 00 mg/dL Final    Comment: Use of this assay is not recommended for patients undergoing treatment with eltrombopag due to the potential for falsely elevated results  Bilirubin, Direct 0 07  0 00 - 0 20 mg/dL Final    Comment: Slightly Hemolyzed;  Results May be Affected    Alkaline Phosphatase 31 (*) 46 - 116 U/L Final    AST 19  5 - 45 U/L Final    Comment: Specimen collection should occur prior to Sulfasalazine administration due to the potential for falsely depressed results  ALT 23  12 - 78 U/L Final    Comment: Specimen collection should occur prior to Sulfasalazine administration due to the potential for falsely depressed results  Total Protein 6 6  6 4 - 8 2 g/dL Final    Albumin 3 2 (*) 3 5 - 5 0 g/dL Final   LIPASE - Normal    Lipase 108  73 - 393 u/L Final       Medications   morphine (PF) 4 mg/mL injection 4 mg (4 mg Intravenous Given 7/23/21 1918)   iohexol (OMNIPAQUE) 350 MG/ML injection (SINGLE-DOSE) 100 mL (100 mL Intravenous Given 7/23/21 2004)       CT abdomen pelvis w contrast   Final Result      Interval resolution of sigmoid diverticulitis  Findings compatible with constipation  Workstation performed: [id number]             ED Course as of Jul 23 2155 Fri Jul 23, 2021 2112 On reassessment the patient said she was feeling better, she had no abdominal tenderness  Rectal examination chaperoned by female tech Shawna Mahamed is nontender, no fecal impaction, no masses          Assessment/Plan     ED Medical Decision Making:    Patient is overall well-appearing, leukocytosis is improving, CT shows no acute pathology requiring additional intervention  Believe it is reasonable the patient be discharged home and follow-up as an outpatient, as well as return if worsening signs or symptoms  Supportive care, importance of follow-up and return precautions were discussed with the patient, who expressed understanding  Time reflects when diagnosis was documented in both MDM as applicable and the Disposition within this note       Time User Action Codes Description Comment    7/23/2021  9:09 PM Lucero Ant Add [R10 9] Abdominal pain     7/23/2021  9:09 PM Lucero Ant Add [R19 7] Diarrhea     7/23/2021  9:09 PM Lucero Ant Add [Z87 19] History of colonic diverticulitis           ED Disposition       ED Disposition Condition Date/Time Comment    Discharge Stable Fri Jul 23, 2021  9:09 PM [address] [place] [address] discharge to home/self care  Follow-up Information       Follow up With Specialties Details Why Contact Info    Hamzah Pascual DO Family Medicine Schedule an appointment as soon as possible for a visit in 3 days  [address]    [address]  [address] [facility]  [phone]      Your surgeon  Schedule an appointment as soon as possible for a visit               Discharge Medication List as of 7/23/2021  9:10 PM               Laci Galvan DO  07/23/21 6259

## 2021-07-23 NOTE — PROGRESS NOTES
Assessment/Plan:    Diverticulitis of large intestine with abscess/ Leukocytosis  Given recent worsening labs, I did tiger text ID Stephanie Jackson) regarding her symptoms and labs  Task sent through ID 7/22 which was not yet addressed, labs were ordered by ID  After discussion through tiger text, it was her recommendation she return to ER for evaluation and imaging sooner than originally planned  ADT order placed  I notified patient of plan and recommendation and she was agreeable  She will be driven by her ex- whom she resides with  She is to follow up with ID and their imaging/labs at their request  Please call the office if you are experiencing any worsening of symptoms or no symptom improvement  Diagnoses and all orders for this visit:    Leukocytosis, unspecified type  -     Transfer to other facility    Diverticulitis    Diverticulitis of large intestine with abscess without bleeding  -     Transfer to other facility          Subjective:        Patient ID: Mayuri Cook is a 72 y o  female  Chief Complaint   Patient presents with    Transition of Care Management       Patient here for follow up after hospital stay at [facility] [place] from 7/7/21 to 7/13/21  She was admitted for diverticulitis with abscess  ID consulted as she did not respond to oral abx outpatient  She received IV abx inpatient  No surgical intervention made at that time  She was sent home on oral abx  She completed f/u CBC per ID recommendation and was to f/u with them  Since being home she states she doesn't feel great but that's normal for her  She does have stomach pain  The pain is 6/10  She is taking aspirin as needed  Denies fever or chills  She states her pain medications/ antispasmodic medication helps a little  No vomiting but has nausea  She had BM this morning and prior to visit  She is having diarrhea  No blood in the stool  She is still taking antibiotic   She had repeat lab work done which showed leukocytosis  There was task out for ID to address this but they did not yet address this/ review with patient  She denies fevers  The following portions of the patient's history were reviewed and updated as appropriate: allergies, current medications, past family history, past social history and problem list     Review of Systems   Constitutional: Negative for chills and fever  Eyes: Negative for discharge  Respiratory: Negative for shortness of breath  Cardiovascular: Negative for chest pain  Gastrointestinal: Positive for abdominal pain and diarrhea  Negative for constipation  Genitourinary: Negative for difficulty urinating  Musculoskeletal: Negative for joint swelling  Skin: Negative for rash  Neurological: Negative for headaches  Hematological: Negative for adenopathy  Psychiatric/Behavioral: The patient is not nervous/anxious  Objective:  TCM Call (since 6/28/2021)     Date and time call was made  7/14/2021  2:23 PM    Hospital care reviewed  Records reviewed    Patient was hospitialized at  [facility]        Date of Admission  07/07/21    Date of discharge  07/13/21    Diagnosis  sigmoid diverticulitis    Disposition  Home    Were the patients medications reviewed and updated  No    Current Symptoms  Fatigue    Fatigue severity  Mild      TCM Call (since 6/28/2021)     Post hospital issues  Reduced activity    Should patient be enrolled in anticoag monitoring? No    Scheduled for follow up?   Yes    Did you obtain your prescribed medications  Yes    Do you need help managing your prescriptions or medications  No    Is transportation to your appointment needed  No (Comment)  son will provide transportation if needed    I have advised the patient to call PCP with any new or worsening symptoms  Alyson Hyman MA        /78 (BP Location: Right arm, Patient Position: Sitting, Cuff Size: Adult)   Pulse 96   Temp (!) 96 7 °F (35 9 °C) (Temporal)   Ht 5' 4 88" (1 648 m)   Wt 62 1 kg (136 lb 12 8 oz)   LMP  (LMP Unknown)   SpO2 96%   BMI 22 85 kg/m²      Physical Exam  Vitals and nursing note reviewed  Constitutional:       General: She is not in acute distress  Appearance: She is well-developed  She is not diaphoretic  HENT:      Head: Normocephalic and atraumatic  Right Ear: External ear normal       Left Ear: External ear normal    Eyes:      General: Lids are normal          Right eye: No discharge  Left eye: No discharge  Conjunctiva/sclera: Conjunctivae normal    Cardiovascular:      Rate and Rhythm: Normal rate and regular rhythm  Heart sounds: No murmur heard  Pulmonary:      Effort: Pulmonary effort is normal  No respiratory distress  Breath sounds: Normal breath sounds  No wheezing  Abdominal:      General: Bowel sounds are normal  There is distension  Tenderness: There is abdominal tenderness  Musculoskeletal:         General: No deformity  Cervical back: Neck supple  Skin:     General: Skin is warm and dry  Neurological:      Mental Status: She is alert and oriented to person, place, and time  Psychiatric:         Speech: Speech normal          Behavior: Behavior normal          Thought Content:  Thought content normal          Judgment: Judgment normal                   Current Outpatient Medications:     amoxicillin-clavulanate (AUGMENTIN) 875-125 mg per tablet, Take 1 tablet by mouth every 12 (twelve) hours, Disp: 60 tablet, Rfl: 0    atorvastatin (LIPITOR) 20 mg tablet, Take 1 tablet (20 mg total) by mouth daily, Disp: 90 tablet, Rfl: 1    betamethasone dipropionate (DIPROSONE) 0 05 % cream, Apply topically 2 (two) times a day, Disp: 30 g, Rfl: 0    Breo Ellipta 200-25 MCG/INH inhaler, INHALE 1 PUFF DAILY RINSE MOUTH AFTER USE , Disp: 60 blister, Rfl: 1    clotrimazole (MYCELEX) 10 mg jeaneth, Take 1 tablet (10 mg total) by mouth 5 (five) times a day, Disp: 25 Jeaneth, Rfl: 0   clotrimazole-betamethasone (LOTRISONE) 1-0 05 % cream, Apply topically 2 (two) times a day To outside of the vagina, Disp: 30 g, Rfl: 0    diazepam (VALIUM) 10 mg tablet, Take 1 tablet (10 mg total) by mouth 2 (two) times a day, Disp: 60 tablet, Rfl: 0    dicyclomine (BENTYL) 10 mg capsule, Take 1 capsule (10 mg total) by mouth 4 (four) times a day (before meals and at bedtime), Disp: 60 capsule, Rfl: 3    Flovent HFA 44 MCG/ACT inhaler, TAKE 2 PUFFS BY MOUTH TWICE A DAY, Disp: 10 6 Inhaler, Rfl: 2    fluticasone (FLONASE) 50 mcg/act nasal spray, 2 sprays into each nostril daily, Disp: 16 mL, Rfl: 3    HYDROcodone-acetaminophen (NORCO) 7 5-325 mg per tablet, 3 (three) times a day , Disp: , Rfl:     hyoscyamine (ANASPAZ,LEVSIN) 0 125 MG tablet, Take 1 tablet (0 125 mg total) by mouth every 6 (six) hours as needed for cramping, Disp: 30 tablet, Rfl: 0    lisinopril (ZESTRIL) 2 5 mg tablet, Take 1 tablet (2 5 mg total) by mouth daily, Disp: 90 tablet, Rfl: 0    mirtazapine (REMERON) 15 mg tablet, Take 1 tablet (15 mg total) by mouth daily at bedtime, Disp: 30 tablet, Rfl: 1    naloxone (NARCAN) 4 mg/0 1 mL nasal spray, 1 spray by Alternating Nares route every 3 (three) minutes as needed for opioid reversal or respiratory depression Administer 1 spray into a nostril   If breathing does not return to normal or if breathing difficulty resumes after 2-3 minutes, give another dose in the other nostril using a new spray , Disp: , Rfl:     ondansetron (ZOFRAN) 4 mg tablet, Take 1 tablet (4 mg total) by mouth every 8 (eight) hours as needed for nausea or vomiting, Disp: 20 tablet, Rfl: 0    primidone (MYSOLINE) 50 mg tablet, TAKE 4 TABS (200 MG) BY MOUTH AT BEDTIME START WITH 1/2 TAB AT BEDTIME AND INCREASE AS DIR, Disp: 360 tablet, Rfl: 1    SUMAtriptan (IMITREX) 100 mg tablet, Take 1 tablet (100 mg total) by mouth once as needed for migraine for up to 1 dose, Disp: 9 tablet, Rfl: 0    tiZANidine (ZANAFLEX) 4 mg tablet, TAKE 1 TABLET BY MOUTH TWICE A DAY AS NEEDED FOR MUSCLE PAIN/SPASMS, Disp: , Rfl:     albuterol (PROVENTIL HFA,VENTOLIN HFA) 90 mcg/act inhaler, TAKE 2 PUFFS BY MOUTH EVERY 6 HOURS AS NEEDED FOR WHEEZE, Disp: 54 Inhaler, Rfl: 1    aspirin 81 mg chewable tablet, Chew 81 mg daily Takes 2- 4 a day, Disp: , Rfl:     diphenoxylate-atropine (LOMOTIL) 2 5-0 025 mg per tablet, Take 1 tablet by mouth 4 (four) times a day as needed for diarrhea, Disp: 30 tablet, Rfl: 0  Allergies   Allergen Reactions    Levofloxacin Other (See Comments)     Weak legs, blurred vision    Carafate [Sucralfate] Rash    Other Palpitations     MRI dye    Tetracycline Rash

## 2021-07-24 NOTE — DISCHARGE INSTRUCTIONS
Acute Abdominal Pain     DISCHARGE INSTRUCTIONS:   Return to the emergency department if:   Your abdominal pain changes, gets worse or still there in  24 hours  You vomit blood or cannot stop vomiting  You have blood in your bowel movement or it looks like tar  You have bleeding from your rectum  Your abdomen is larger than usual, more painful, and hard  You stop passing gas and having bowel movements  You feel weak, dizzy, or faint    You are concerned about anything else

## 2021-07-26 ENCOUNTER — TELEPHONE (OUTPATIENT)
Dept: INFECTIOUS DISEASES | Facility: CLINIC | Age: 66
End: 2021-07-26

## 2021-07-26 NOTE — TELEPHONE ENCOUNTER
Patient aware    ----- Message from [employer] sent at 7/26/2021  6:50 AM EDT -----  Regarding: please cancel CT  Good morning team,  Ms Rivera was in the [facility] ED over the weekend and completed the CT that she needs for her upcoming appointment with Dr Wendy Perera  Please cancel the one she had scheduled for today and could you please reach out to her and tell her that she doesn't need to go today? Thank you    [facility]

## 2021-07-27 NOTE — PROGRESS NOTES
Progress Note - Infectious Disease   Rickie Rivera 72 y o  female MRN: [id number]  Unit/Bed#:  Encounter: [id number]      Impression/Plan:  1  Sigmoid diverticular abscess-being treated medically without ability to drain percutaneously due to small size  Patient has now been treated with more than 3 weeks of antibiotics with radiographic resolution of the abscess  Discontinue Augmentin  Patient needs evaluation by surgery long-term management    2  Diverticulitis-would benefit from eventual sigmoid resection of the diseased bowel  Close surgical follow-up    3  Nicotine dependence-continue stressed the importance of complete tobacco cessation    4  Antibiotic allergies-seems to be tolerating the Augmentin without difficulty  Has had blurred vision with Levaquin and rash with doxycycline in the past     5  Nausea and soft stool-suspect antibiotic related  Discontinue antibiotics as above    6  Chronic abdominal pain-possibly related to the diverticular disease  Refer back to primary physician for further management  Recommend surgical evaluation for long-term management of the diverticular disease    Follow up with infectious diseases p r n  Discussed in detail with the patient and her   Explained that the patient needs to call her primary care physician immediately or go immediately to the emergency department if develops any fever or worsening abdominal pain    Antibiotics:  Augmentin 16  Antibiotics 22    Subjective:  Patient here for follow-up of small sigmoid diverticular abscess  Patient has no fever spike  She was seen back in the emergency department on 7/23/2021 due to worsening leukocytosis  She underwent repeat imaging of the abdomen which revealed resolution of the diverticular abscess  Patient continues to have chronic abdominal pain  She has developed some nausea since being on the antibiotics and soft stool      ROS:  A complete review of systems is negative other than that noted above in the subjective    Followup portions patient history reviewed and updated as: Allergies, current medications, past medical history, past social history, past surgical history, and the problem list    Objective:  Vitals:  Vitals:    07/28/21 0915   BP: 116/64   BP Location: Right arm   Patient Position: Sitting   Cuff Size: Adult   Pulse: 93   Resp: 16   Temp: (!) 97 3 °F (36 3 °C)   TempSrc: Temporal   Weight: 63 6 kg (140 lb 3 2 oz)   Height: 5' 5" (1 651 m)       Physical Exam:   General Appearance:  Alert, interactive, appearing well, nontoxic, no acute distress  Throat: Oropharynx moist without lesions  Lungs:   Clear to auscultation bilaterally; no audible wheezes, rhonchi or rales; respirations unlabored   Heart:  RRR; no murmur, rub or gallop   Abdomen:   Soft, non-tender, non-distended, positive bowel sounds  Extremities: No clubbing, cyanosis or edema   Skin: No new rashes or lesions  No new draining wounds         Labs, Imaging, & Other studies:   All pertinent labs and imaging studies were personally reviewed    Lab Results   Component Value Date    K 3 7 07/23/2021     07/23/2021    CO2 29 07/23/2021    BUN 7 07/23/2021    CREATININE 0 90 07/23/2021    GLUF 75 07/21/2021    CALCIUM 8 1 (L) 07/23/2021    CORRECTEDCA 9 6 07/13/2021    AST 19 07/23/2021    ALT 23 07/23/2021    ALKPHOS 31 (L) 07/23/2021    EGFR 67 07/23/2021     Lab Results   Component Value Date    WBC 12 02 (H) 07/23/2021    HGB 11 9 07/23/2021    HCT 35 4 07/23/2021     (H) 07/23/2021     07/23/2021     CT abdomen pelvis 7/23/2021-resolved diverticular abscess    Images personally reviewed by me in PACS

## 2021-07-28 ENCOUNTER — OFFICE VISIT (OUTPATIENT)
Dept: INFECTIOUS DISEASES | Facility: CLINIC | Age: 66
End: 2021-07-28
Payer: COMMERCIAL

## 2021-07-28 VITALS
RESPIRATION RATE: 16 BRPM | WEIGHT: 140.2 LBS | HEART RATE: 93 BPM | SYSTOLIC BLOOD PRESSURE: 116 MMHG | BODY MASS INDEX: 23.36 KG/M2 | TEMPERATURE: 97.3 F | HEIGHT: 65 IN | DIASTOLIC BLOOD PRESSURE: 64 MMHG

## 2021-07-28 DIAGNOSIS — Z88.1 HX OF ANTIBIOTIC ALLERGY: ICD-10-CM

## 2021-07-28 DIAGNOSIS — K57.32 SIGMOID DIVERTICULITIS: ICD-10-CM

## 2021-07-28 DIAGNOSIS — G89.29 CHRONIC ABDOMINAL PAIN: ICD-10-CM

## 2021-07-28 DIAGNOSIS — R10.9 CHRONIC ABDOMINAL PAIN: ICD-10-CM

## 2021-07-28 DIAGNOSIS — R11.0 NAUSEA: ICD-10-CM

## 2021-07-28 DIAGNOSIS — K57.20 COLONIC DIVERTICULAR ABSCESS: Primary | ICD-10-CM

## 2021-07-28 DIAGNOSIS — Z72.0 TOBACCO USE: ICD-10-CM

## 2021-07-28 PROCEDURE — 1111F DSCHRG MED/CURRENT MED MERGE: CPT | Performed by: INTERNAL MEDICINE

## 2021-07-28 PROCEDURE — 99215 OFFICE O/P EST HI 40 MIN: CPT | Performed by: INTERNAL MEDICINE

## 2021-07-28 RX ORDER — ASPIRIN 81 MG/1
81 TABLET, CHEWABLE ORAL DAILY
Status: ON HOLD | COMMUNITY
End: 2021-12-01 | Stop reason: CLARIF

## 2021-08-03 ENCOUNTER — OFFICE VISIT (OUTPATIENT)
Dept: FAMILY MEDICINE CLINIC | Facility: CLINIC | Age: 66
End: 2021-08-03
Payer: COMMERCIAL

## 2021-08-03 VITALS
WEIGHT: 138.2 LBS | HEIGHT: 65 IN | DIASTOLIC BLOOD PRESSURE: 80 MMHG | OXYGEN SATURATION: 98 % | HEART RATE: 93 BPM | RESPIRATION RATE: 16 BRPM | TEMPERATURE: 96.7 F | SYSTOLIC BLOOD PRESSURE: 118 MMHG | BODY MASS INDEX: 23.03 KG/M2

## 2021-08-03 DIAGNOSIS — K57.92 DIVERTICULITIS: Primary | ICD-10-CM

## 2021-08-03 PROCEDURE — 4004F PT TOBACCO SCREEN RCVD TLK: CPT | Performed by: FAMILY MEDICINE

## 2021-08-03 PROCEDURE — 3074F SYST BP LT 130 MM HG: CPT | Performed by: FAMILY MEDICINE

## 2021-08-03 PROCEDURE — 3008F BODY MASS INDEX DOCD: CPT | Performed by: FAMILY MEDICINE

## 2021-08-03 PROCEDURE — 1160F RVW MEDS BY RX/DR IN RCRD: CPT | Performed by: FAMILY MEDICINE

## 2021-08-03 PROCEDURE — 3079F DIAST BP 80-89 MM HG: CPT | Performed by: FAMILY MEDICINE

## 2021-08-03 PROCEDURE — 99214 OFFICE O/P EST MOD 30 MIN: CPT | Performed by: FAMILY MEDICINE

## 2021-08-03 PROCEDURE — 1111F DSCHRG MED/CURRENT MED MERGE: CPT | Performed by: FAMILY MEDICINE

## 2021-08-03 NOTE — PROGRESS NOTES
Assessment/Plan:       Diagnoses and all orders for this visit:    Status post diverticulitis with small abscess-resolved medically  -     Ambulatory referral to General Surgery; Future         Discussion and decision with General surgery with regards to the possibility of colon resection for fairly significant presentation of diverticulitis with abscess which eventually did resolve medically   Otherwise patient will continue to follow-up here regular basis  Subjective:   Chief Complaint   Patient presents with    Follow-up     Pt is h ere to discuss surgery to have part of colon removed due to diverticulitis     Medication Management     Pt would also like to discuss changing cholesterol medication       Patient ID: Teresa Bingham is a 72 y o  female  Janett Herrera is a 24-year-old pleasant female who is here for follow-up on abdominal issue with diverticulitis and small abscess that was treated as inpatient as well as outpatient over the last month or so  Historically, it is noted that patient had  routine outpatient colonoscopy back on June 8th for complaints of ongoing diarrhea/loose stools  Findings that they are as follows:               IMPRESSION:  · Moderate diverticula containing pus in the sigmoid colon  · Nodular mucosa in the cecum; performed cold forceps biopsy  This fold was located on the other side of ICV please see pictures  Two other folds had similar nodular appearance on the right side of the colon  · After pus was visualized the procedure was stopped to prevent any complications such as perforation  Findings consistent with diverticulitis  · Random bx performed to rule out microscopic colitis      RECOMMENDATION:  Repeat in 6 months due to limited eval and based on biopsies  Antibiotics for 10 days (Augmentin)    The gastroenterology office did contact the patient with regards to follow-up and this corresponds is noted in patient message June 16      Patient then presented to my office on July 1st with generalized abdominal pain  She had slight rebound  She was set up for stat CT scan she had done on the following morning  Patient was afebrile in the office  Our staff to call her with the results of the CT scan does show some inflammation and diverticulosis area with a collection of a tiny abscess which was not amenable to IR type of drainage  I had communicated with the gastroenterologist to decide on the next course of treatment  Patient's lab work on that same day showed slight increase in WBC   Platelets were normal with regards to patient's complaints of abnormal bruising her  etc   We decided to do observation and antibiotic treatment and she was made aware her symptoms would worsen ED review recommended  I did reach out to the patient with a phone call on July 7th asking about how she was doing, and she stated that the antibiotics were helping little bit however,  Patient eventually did call back to  the office on July 7 and left a message that she was going to the ED because of ongoing  Patient was admitted and treated conservatively  CT at that time showed actual improvement  White blood cell count fluctuated throughout hospitalization and even post hospitalization  Blood cultures were negative  Kidney function as well as liver function remained normal   Patient did however have more more trip to the on July 23rd which actually shows interval resolution of the sigmoid diverticulitis  She is here to discuss today the recommendations of Infectious Disease to consider colon resection to decrease risk of recurrence diverticulitis  with abscess and the more serious complication needing a colostomy in this situation  To the best of the patient's knowledge this is the 1st episode diverticulitis but certainly was protracted  An incidental finding of pus in the colon at the time of the colonoscopy in early June certainly signifies subacute presentation    Patient is on various pain control modalities for other diagnoses:  Essential tremor, hypertension presbyesophagus, cervical dystonia, follows regularly with [facility] for chronic back and hip issues  still smoking    The following portions of the patient's history were reviewed and updated as appropriate: allergies, current medications, past family history, past medical history, past social history, past surgical history and problem list       Review of Systems   Constitutional: Positive for fatigue  Eyes: Negative for visual disturbance  Respiratory: Negative for cough  Gastrointestinal:        Still with some mild generalized discomfort  Appetite is fair  Bowels have been still fluctuating from lose so far   Genitourinary: Negative for frequency  Musculoskeletal: Positive for arthralgias and myalgias  Hematological: Bruises/bleeds easily  Psychiatric/Behavioral: The patient is nervous/anxious  Objective:      /80   Pulse 93   Temp (!) 96 7 °F (35 9 °C) (Temporal)   Resp 16   Ht 5' 5" (1 651 m)   Wt 62 7 kg (138 lb 3 2 oz)   LMP  (LMP Unknown)   SpO2 98%   BMI 23 00 kg/m²          Physical Exam  Constitutional:       General: She is not in acute distress  Appearance: Normal appearance  She is well-developed  Comments: Appears somewhat chronically ill   HENT:      Head: Normocephalic  Eyes:      Conjunctiva/sclera: Conjunctivae normal       Pupils: Pupils are equal, round, and reactive to light  Cardiovascular:      Rate and Rhythm: Normal rate and regular rhythm  Heart sounds: No murmur heard  Pulmonary:      Effort: Pulmonary effort is normal       Breath sounds: Normal breath sounds  Abdominal:      General: Bowel sounds are normal  There is no distension  Palpations: Abdomen is soft  There is no mass  Tenderness: There is no abdominal tenderness  There is no rebound        Comments: Slight general as discomfort in the left lower quadrant but no guarding or rebound   Musculoskeletal:         General: Normal range of motion  Cervical back: Normal range of motion and neck supple  Skin:     General: Skin is warm and dry  Neurological:      Mental Status: She is alert and oriented to person, place, and time  Deep Tendon Reflexes: Reflexes are normal and symmetric  Psychiatric:         Behavior: Behavior normal          Thought Content:  Thought content normal

## 2021-08-10 DIAGNOSIS — R10.13 DYSPEPSIA: ICD-10-CM

## 2021-08-10 RX ORDER — ONDANSETRON 4 MG/1
4 TABLET, FILM COATED ORAL EVERY 8 HOURS PRN
Qty: 20 TABLET | Refills: 0 | Status: SHIPPED | OUTPATIENT
Start: 2021-08-10 | End: 2021-08-24 | Stop reason: SDUPTHER

## 2021-08-17 ENCOUNTER — TELEMEDICINE (OUTPATIENT)
Dept: NEUROLOGY | Facility: CLINIC | Age: 66
End: 2021-08-17
Payer: COMMERCIAL

## 2021-08-17 DIAGNOSIS — R55 SYNCOPE AND COLLAPSE: ICD-10-CM

## 2021-08-17 DIAGNOSIS — G25.0 ESSENTIAL TREMOR: Primary | ICD-10-CM

## 2021-08-17 PROCEDURE — 99213 OFFICE O/P EST LOW 20 MIN: CPT | Performed by: PHYSICIAN ASSISTANT

## 2021-08-17 NOTE — ASSESSMENT & PLAN NOTE
She has not had any further episodes of syncope since her episode beginning of the year  Her workup for this was normal including an MRI of the brain and an EEG  It is more than likely at this time that that event was related to vasovagal syncope given she was getting up to use the bathroom at the time of the event

## 2021-08-17 NOTE — PATIENT INSTRUCTIONS
Patient with essential tremors  She continues to have bothersome head and hand tremors  She is unable to tolerate primidone during the day given fatigue and she is currently only taking 3 tabs at night before bed  We did discuss the option of increasing this dose further to see if there is any added benefit on higher dosing  She will start by taking 4 tabs before bed for the next week then 5 tabs before bed  If however she has any side effects or for tremors are no better on this dose she will call the office and we will slowly wean off of this medication and start an alternative medication instead  We did discuss other alternatives including Neurontin and topiramate  I am hesitant to start propanolol for her given she does use inhalers daily and she also had a prior syncopal episode

## 2021-08-17 NOTE — PROGRESS NOTES
Virtual Regular Visit    Verification of patient location:    Patient is located in the following state in which I hold an active license [place]      Assessment/Plan:    Problem List Items Addressed This Visit        Cardiovascular and Mediastinum    Syncope and collapse       She has not had any further episodes of syncope since her episode beginning of the year  Her workup for this was normal including an MRI of the brain and an EEG  It is more than likely at this time that that event was related to vasovagal syncope given she was getting up to use the bathroom at the time of the event  Nervous and Auditory    Essential tremor - Primary     Patient with essential tremors  She continues to have bothersome head and hand tremors  She is unable to tolerate primidone during the day given fatigue and she is currently only taking 3 tabs at night before bed  We did discuss the option of increasing this dose further to see if there is any added benefit on higher dosing  She will start by taking 4 tabs before bed for the next week then 5 tabs before bed  If however she has any side effects or for tremors are no better on this dose she will call the office and we will slowly wean off of this medication and start an alternative medication instead  We did discuss other alternatives including Neurontin and topiramate  I am hesitant to start propanolol for her given she does use inhalers daily and she also had a prior syncopal episode  Reason for visit is follow up for tremors  Chief Complaint   Patient presents with    Virtual Regular Visit        Encounter provider Betty Whatley PA-C    Provider located at [address] [place] [id number]      Recent Visits  No visits were found meeting these conditions    Showing recent visits within past 7 days and meeting all other requirements  Today's Visits  Date Type Provider Dept   08/17/21 Telemedicine Mingo Malone PA-C [facility]   Showing today's visits and meeting all other requirements  Future Appointments  No visits were found meeting these conditions  Showing future appointments within next 150 days and meeting all other requirements       The patient was identified by name and date of birth  Hernesto Collier was informed that this is a telemedicine visit and that the visit is being conducted through [address] [place] [address] Now and patient was informed that this is a secure, HIPAA-compliant platform  She agrees to proceed     My office door was closed  No one else was in the room  She acknowledged consent and understanding of privacy and security of the video platform  The patient has agreed to participate and understands they can discontinue the visit at any time  Patient is aware this is a billable service  Subjective  Hernesto Collier is a 77 y o  female who presents in follow up for tremor  To review, she began with tremors in her mid 45s  She is now experiencing bilateral hand, head and voice tremor  Primidone was started with some improvement of her tremors  At her last visit she continued to have some benefit with the Primidone and it was increased further  She also had a syncopal episode on 1/26/21 (occurred when getting up to use the bathroom, had body pain and possible bite jose elias on the tongue following event) and she was sent for an MRI and EEG for workup  Her EEG was NORMAL  MRI brain with mild, chronic microangiopathy  INTERVAL HISTORY:  She was recently in the hospital for diverticulitis and abscess  She continues to have tremors in the head and hands  The Primidone puts her right to sleep  She tolerate it at night but unable to use it during the day  Her hands tremors are present in both hands  Worse when doing things like carry her coffee  Tremors are present in both hands     She is able to eat and drink however the head tremor can make this worse  She will feel that the Valium helps with the tremors as well (she takes this for anxiety)  She is able to perform her ADLs on her own  She does have a shower seat if needed     No further syncopal episodes  Current medications:   Primidone 3tabs qhs  Remeron 15mg qhs       I personally reviewed and updated the ROS  Past Medical History:   Diagnosis Date    Anxiety     Asthma     Depression     Fibromyalgia     GERD (gastroesophageal reflux disease)     Hyperlipidemia     Hypertension     Lumbar herniated disc        Past Surgical History:   Procedure Laterality Date    BREAST BIOPSY Left     benign- at age 27    TUBAL LIGATION      ULNAR NERVE REPAIR Left        Current Outpatient Medications   Medication Sig Dispense Refill    albuterol (PROVENTIL HFA,VENTOLIN HFA) 90 mcg/act inhaler Inhale 2 puffs every 6 (six) hours as needed for wheezing 54 g 0    aspirin 81 mg chewable tablet Chew 81 mg daily Takes 2- 4 a day      betamethasone dipropionate (DIPROSONE) 0 05 % cream Apply topically 2 (two) times a day 30 g 0    Breo Ellipta 200-25 MCG/INH inhaler INHALE 1 PUFF DAILY RINSE MOUTH AFTER USE   60 blister 1    clotrimazole (MYCELEX) 10 mg jeaneth Take 1 tablet (10 mg total) by mouth 5 (five) times a day 25 Jeaneth 0    clotrimazole-betamethasone (LOTRISONE) 1-0 05 % cream Apply topically 2 (two) times a day To outside of the vagina 30 g 0    diazepam (VALIUM) 10 mg tablet Take 1 tablet (10 mg total) by mouth 2 (two) times a day 60 tablet 0    dicyclomine (BENTYL) 10 mg capsule Take 1 capsule (10 mg total) by mouth 4 (four) times a day (before meals and at bedtime) 60 capsule 3    diphenoxylate-atropine (LOMOTIL) 2 5-0 025 mg per tablet Take 1 tablet by mouth 4 (four) times a day as needed for diarrhea 30 tablet 0    Flovent HFA 44 MCG/ACT inhaler TAKE 2 PUFFS BY MOUTH TWICE A DAY 10 6 Inhaler 2    fluticasone (FLONASE) 50 mcg/act nasal spray 2 sprays into each nostril daily 16 mL 0    HYDROcodone-acetaminophen (NORCO) 7 5-325 mg per tablet 3 (three) times a day       lisinopril (ZESTRIL) 2 5 mg tablet Take 1 tablet (2 5 mg total) by mouth daily 90 tablet 0    mirtazapine (REMERON) 15 mg tablet Take 1 tablet (15 mg total) by mouth daily at bedtime 30 tablet 1    naloxone (NARCAN) 4 mg/0 1 mL nasal spray 1 spray by Alternating Nares route every 3 (three) minutes as needed for opioid reversal or respiratory depression Administer 1 spray into a nostril  If breathing does not return to normal or if breathing difficulty resumes after 2-3 minutes, give another dose in the other nostril using a new spray   ondansetron (ZOFRAN) 4 mg tablet Take 1 tablet (4 mg total) by mouth every 8 (eight) hours as needed for nausea or vomiting 20 tablet 0    primidone (MYSOLINE) 50 mg tablet TAKE 4 TABS (200 MG) BY MOUTH AT BEDTIME START WITH 1/2 TAB AT BEDTIME AND INCREASE AS DIR (Patient taking differently: TAKE 3 TABS (200 MG) BY MOUTH AT BEDTIME START WITH 1/2 TAB AT BEDTIME AND INCREASE AS DIR) 360 tablet 1    SUMAtriptan (IMITREX) 100 mg tablet Take 1 tablet (100 mg total) by mouth once as needed for migraine for up to 1 dose 9 tablet 0    tiZANidine (ZANAFLEX) 4 mg tablet TAKE 1 TABLET BY MOUTH TWICE A DAY AS NEEDED FOR MUSCLE PAIN/SPASMS      hyoscyamine (ANASPAZ,LEVSIN) 0 125 MG tablet Take 1 tablet (0 125 mg total) by mouth every 6 (six) hours as needed for cramping 30 tablet 0     No current facility-administered medications for this visit  Allergies   Allergen Reactions    Levofloxacin Other (See Comments)     Weak legs, blurred vision    Carafate [Sucralfate] Rash    Other Palpitations     MRI dye    Tetracycline Rash       Review of Systems   Constitutional: Positive for appetite change (not eating as much)  Negative for fever  HENT: Positive for trouble swallowing (sometimes)  Negative for hearing loss, tinnitus and voice change      Eyes: Positive for visual disturbance (blurry at times)  Negative for photophobia and pain  At times feels like L eye is on fire     Respiratory: Negative  Negative for shortness of breath  Cardiovascular: Negative  Negative for palpitations  Gastrointestinal: Negative  Negative for nausea and vomiting  Endocrine: Negative  Negative for cold intolerance  Genitourinary: Negative  Negative for dysuria, frequency and urgency  Musculoskeletal: Positive for myalgias (stomach pains and body pains)  Negative for neck pain  Walks slowly     Skin: Negative  Negative for rash  Allergic/Immunologic: Negative  Neurological: Positive for dizziness (once in a while), tremors (Head and sometimes hands) and weakness (feels a little weaker)  Negative for seizures, syncope, facial asymmetry, speech difficulty, light-headedness, numbness and headaches  Hematological: Negative  Does not bruise/bleed easily  Psychiatric/Behavioral: Negative  Negative for confusion, hallucinations and sleep disturbance  All other systems reviewed and are negative  Video Exam    There were no vitals filed for this visit  Physical Exam  Pulmonary:      Effort: Pulmonary effort is normal    Neurological:      Mental Status: She is alert  Comments: EOMI  Tongue midline   Face symmetric   Sensation intact to LT bilaterally     No resting tremor noted   No bradykinesia with finger taps, hand      Mild action tremors with finger to nose testing bilaterally   Intermittent mild no-no head tremor     Ambulating without issues    Psychiatric:         Mood and Affect: Mood normal          Behavior: Behavior normal           I spent 20 minutes directly with the patient during this visit    VIRTUAL VISIT [address] verbally agrees to participate in [employer]   Pt is aware that [employer] could be limited without vital signs or the ability to perform a full hands-on physical exam  Ora JOHNSON Miguel understands she or the provider may request at any time to terminate the video visit and request the patient to seek care or treatment in person

## 2021-08-18 DIAGNOSIS — J45.20 MILD INTERMITTENT ASTHMA, UNSPECIFIED WHETHER COMPLICATED: ICD-10-CM

## 2021-08-24 DIAGNOSIS — B37.81 THRUSH OF MOUTH AND ESOPHAGUS (HCC): ICD-10-CM

## 2021-08-24 DIAGNOSIS — B37.0 THRUSH OF MOUTH AND ESOPHAGUS (HCC): ICD-10-CM

## 2021-08-24 DIAGNOSIS — R10.13 DYSPEPSIA: ICD-10-CM

## 2021-08-24 DIAGNOSIS — J45.20 MILD INTERMITTENT ASTHMA, UNSPECIFIED WHETHER COMPLICATED: ICD-10-CM

## 2021-08-24 DIAGNOSIS — G43.909 MIGRAINE WITHOUT STATUS MIGRAINOSUS, NOT INTRACTABLE, UNSPECIFIED MIGRAINE TYPE: ICD-10-CM

## 2021-08-24 RX ORDER — SUMATRIPTAN 100 MG/1
100 TABLET, FILM COATED ORAL ONCE AS NEEDED
Qty: 9 TABLET | Refills: 0 | Status: SHIPPED | OUTPATIENT
Start: 2021-08-24 | End: 2021-11-08 | Stop reason: SDUPTHER

## 2021-08-24 RX ORDER — ONDANSETRON 4 MG/1
4 TABLET, FILM COATED ORAL EVERY 8 HOURS PRN
Qty: 20 TABLET | Refills: 0 | Status: SHIPPED | OUTPATIENT
Start: 2021-08-24 | End: 2021-09-06 | Stop reason: SDUPTHER

## 2021-08-24 NOTE — TELEPHONE ENCOUNTER
Requested medication(s) are due for refill today: Yes  Patient has already received a courtesy refill: No  Other reason request has been forwarded to provider: not assigned to a protocol

## 2021-08-25 ENCOUNTER — CONSULT (OUTPATIENT)
Dept: SURGERY | Facility: CLINIC | Age: 66
End: 2021-08-25
Payer: COMMERCIAL

## 2021-08-25 VITALS
RESPIRATION RATE: 16 BRPM | BODY MASS INDEX: 22.96 KG/M2 | SYSTOLIC BLOOD PRESSURE: 123 MMHG | WEIGHT: 137.8 LBS | DIASTOLIC BLOOD PRESSURE: 84 MMHG | HEART RATE: 83 BPM | TEMPERATURE: 96.9 F | HEIGHT: 65 IN

## 2021-08-25 DIAGNOSIS — K57.32 SIGMOID DIVERTICULITIS: ICD-10-CM

## 2021-08-25 DIAGNOSIS — R19.8 CHANGE IN BOWEL FUNCTION: ICD-10-CM

## 2021-08-25 DIAGNOSIS — K57.92 DIVERTICULITIS: Primary | ICD-10-CM

## 2021-08-25 PROCEDURE — 1160F RVW MEDS BY RX/DR IN RCRD: CPT | Performed by: SURGERY

## 2021-08-25 PROCEDURE — 99203 OFFICE O/P NEW LOW 30 MIN: CPT | Performed by: SURGERY

## 2021-08-25 PROCEDURE — 4004F PT TOBACCO SCREEN RCVD TLK: CPT | Performed by: SURGERY

## 2021-08-25 PROCEDURE — 3008F BODY MASS INDEX DOCD: CPT | Performed by: SURGERY

## 2021-08-25 PROCEDURE — 3074F SYST BP LT 130 MM HG: CPT | Performed by: SURGERY

## 2021-08-25 PROCEDURE — 3079F DIAST BP 80-89 MM HG: CPT | Performed by: SURGERY

## 2021-08-25 RX ORDER — SODIUM, POTASSIUM,MAG SULFATES 17.5-3.13G
2 SOLUTION, RECONSTITUTED, ORAL ORAL 2 TIMES DAILY
Qty: 177 ML | Refills: 1 | Status: SHIPPED | OUTPATIENT
Start: 2021-08-25 | End: 2021-09-22 | Stop reason: HOSPADM

## 2021-08-25 RX ORDER — ALBUTEROL SULFATE 90 UG/1
2 AEROSOL, METERED RESPIRATORY (INHALATION) EVERY 6 HOURS PRN
Qty: 54 G | Refills: 0 | Status: SHIPPED | OUTPATIENT
Start: 2021-08-25 | End: 2021-09-06 | Stop reason: SDUPTHER

## 2021-08-25 RX ORDER — CLOTRIMAZOLE 10 MG/1
10 LOZENGE ORAL; TOPICAL
Qty: 25 TROCHE | Refills: 0 | Status: SHIPPED | OUTPATIENT
Start: 2021-08-25 | End: 2021-10-02 | Stop reason: SDUPTHER

## 2021-08-25 NOTE — H&P (VIEW-ONLY)
Assessment/Plan:    Sigmoid diverticulitis    I reviewed her hospital notes and her recent CT scans  She did have an episode of complicated diverticulitis with abscess that has subsequently resolved  In general I believe she would benefit from elective sigmoid colectomy  However she does have a history of constipation and some of her pain at this point may be constipated related and not necessarily diverticular related  I do believe she will benefit from surgery in the long run but was clear that some of her symptoms may not resolve  In addition she has not had a complete colonoscopy since 2009 and therefore I will schedule her for colonoscopy in the next 2 weeks  If there is no other abnormalities would then plan to proceed with robotic sigmoid colectomy at [address]   I will discuss with her her findings for colonoscopy and make a plan from there  However if between now and then her pain worsens then she develops fevers I recommend evaluation in the ER for repeat CT scan to evaluate for recurrent diverticulitis  Diagnoses and all orders for this visit:    Status post diverticulitis with small abscess-resolved medically  -     Ambulatory referral to General Surgery  -     Colonoscopy; Future  -     Na Sulfate-K Sulfate-Mg Sulf (Suprep Bowel Prep Kit) 17 5-3 13-1 6 GM/177ML SOLN; Take 2 Bottles by mouth 2 (two) times a day    Sigmoid diverticulitis    Change in bowel function    Other orders  -     Diet NPO; Sips with meds; Standing  -     Void on call to OR; Standing  -     Insert peripheral IV; Standing          Subjective:      Patient ID: Hernesto Collier is a 77 y o  female  Ms Ariana Minaya   Is a 57-year-old female that is here for evaluation of diverticulitis  She does have a history of irritable bowel type syndrome symptoms and constipation diarrhea    She is having increasing pain and was seen by Gastroenterolog in June had a  Colonoscopy that showed some pus in the sigmoid colon consistent with diverticulitis and therefore the colonoscopy was aborted  She has her last colonoscopy previous that was in 2009 for which she had up to 6 polyps removed  After colonoscopy she was seen by her primary care physician and sent for a stat CT scan that showed diverticulitis  She was treated conservatively with oral antibiotics but failed outpatient management and was admitted on July 7th with diverticulitis and intramural abscess  She was treated conservatively and has been discharged  She was seen back in the emergency department on the 23rd and a CT scan that time showed no evidence of diverticulitis but did show constipation  Currently she states she is still having abdominal pain across her abdomen  She took some pain medicine for the pain recently  She is still having constipation issues as well  The following portions of the patient's history were reviewed and updated as appropriate: allergies, current medications, past family history, past medical history, past social history, past surgical history and problem list     Review of Systems   Constitutional: Negative for chills and fever  HENT: Negative for ear pain and sore throat  Eyes: Negative for pain and visual disturbance  Respiratory: Negative for cough and shortness of breath  Cardiovascular: Negative for chest pain and palpitations  Gastrointestinal: Positive for abdominal pain and constipation  Negative for vomiting  Genitourinary: Negative for dysuria and hematuria  Musculoskeletal: Positive for back pain  Skin: Negative for color change and rash  Neurological: Negative for seizures and syncope  Psychiatric/Behavioral: Negative for agitation and behavioral problems  All other systems reviewed and are negative          Objective:      /84   Pulse 83   Temp (!) 96 9 °F (36 1 °C)   Resp 16   Ht 5' 5" (1 651 m)   Wt 62 5 kg (137 lb 12 8 oz)   LMP  (LMP Unknown)   BMI 22 93 kg/m² Physical Exam  Vitals and nursing note reviewed  Constitutional:       General: She is not in acute distress  Appearance: She is well-developed  She is not diaphoretic  HENT:      Head: Normocephalic and atraumatic  Eyes:      Pupils: Pupils are equal, round, and reactive to light  Cardiovascular:      Rate and Rhythm: Normal rate and regular rhythm  Heart sounds: Normal heart sounds  No murmur heard  Pulmonary:      Effort: Pulmonary effort is normal  No respiratory distress  Breath sounds: Normal breath sounds  No wheezing  Abdominal:      General: Bowel sounds are normal       Palpations: Abdomen is soft  Tenderness: There is abdominal tenderness  There is no rebound  Musculoskeletal:         General: Normal range of motion  Cervical back: Normal range of motion and neck supple  Skin:     General: Skin is warm and dry  Neurological:      Mental Status: She is alert and oriented to person, place, and time     Psychiatric:         Behavior: Behavior normal

## 2021-08-25 NOTE — ASSESSMENT & PLAN NOTE
I reviewed her hospital notes and her recent CT scans  She did have an episode of complicated diverticulitis with abscess that has subsequently resolved  In general I believe she would benefit from elective sigmoid colectomy  However she does have a history of constipation and some of her pain at this point may be constipated related and not necessarily diverticular related  I do believe she will benefit from surgery in the long run but was clear that some of her symptoms may not resolve  In addition she has not had a complete colonoscopy since 2009 and therefore I will schedule her for colonoscopy in the next 2 weeks  If there is no other abnormalities would then plan to proceed with robotic sigmoid colectomy at [facility]   I will discuss with her her findings for colonoscopy and make a plan from there  However if between now and then her pain worsens then she develops fevers I recommend evaluation in the ER for repeat CT scan to evaluate for recurrent diverticulitis

## 2021-08-25 NOTE — PROGRESS NOTES
Assessment/Plan:    Sigmoid diverticulitis    I reviewed her hospital notes and her recent CT scans  She did have an episode of complicated diverticulitis with abscess that has subsequently resolved  In general I believe she would benefit from elective sigmoid colectomy  However she does have a history of constipation and some of her pain at this point may be constipated related and not necessarily diverticular related  I do believe she will benefit from surgery in the long run but was clear that some of her symptoms may not resolve  In addition she has not had a complete colonoscopy since 2009 and therefore I will schedule her for colonoscopy in the next 2 weeks  If there is no other abnormalities would then plan to proceed with robotic sigmoid colectomy at [address]   I will discuss with her her findings for colonoscopy and make a plan from there  However if between now and then her pain worsens then she develops fevers I recommend evaluation in the ER for repeat CT scan to evaluate for recurrent diverticulitis  Diagnoses and all orders for this visit:    Status post diverticulitis with small abscess-resolved medically  -     Ambulatory referral to General Surgery  -     Colonoscopy; Future  -     Na Sulfate-K Sulfate-Mg Sulf (Suprep Bowel Prep Kit) 17 5-3 13-1 6 GM/177ML SOLN; Take 2 Bottles by mouth 2 (two) times a day    Sigmoid diverticulitis    Change in bowel function    Other orders  -     Diet NPO; Sips with meds; Standing  -     Void on call to OR; Standing  -     Insert peripheral IV; Standing          Subjective:      Patient ID: Teresa Bingham is a 77 y o  female  Ms Savannah Weaver   Is a 43-year-old female that is here for evaluation of diverticulitis  She does have a history of irritable bowel type syndrome symptoms and constipation diarrhea    She is having increasing pain and was seen by Gastroenterolog in June had a  Colonoscopy that showed some pus in the sigmoid colon consistent with diverticulitis and therefore the colonoscopy was aborted  She has her last colonoscopy previous that was in 2009 for which she had up to 6 polyps removed  After colonoscopy she was seen by her primary care physician and sent for a stat CT scan that showed diverticulitis  She was treated conservatively with oral antibiotics but failed outpatient management and was admitted on July 7th with diverticulitis and intramural abscess  She was treated conservatively and has been discharged  She was seen back in the emergency department on the 23rd and a CT scan that time showed no evidence of diverticulitis but did show constipation  Currently she states she is still having abdominal pain across her abdomen  She took some pain medicine for the pain recently  She is still having constipation issues as well  The following portions of the patient's history were reviewed and updated as appropriate: allergies, current medications, past family history, past medical history, past social history, past surgical history and problem list     Review of Systems   Constitutional: Negative for chills and fever  HENT: Negative for ear pain and sore throat  Eyes: Negative for pain and visual disturbance  Respiratory: Negative for cough and shortness of breath  Cardiovascular: Negative for chest pain and palpitations  Gastrointestinal: Positive for abdominal pain and constipation  Negative for vomiting  Genitourinary: Negative for dysuria and hematuria  Musculoskeletal: Positive for back pain  Skin: Negative for color change and rash  Neurological: Negative for seizures and syncope  Psychiatric/Behavioral: Negative for agitation and behavioral problems  All other systems reviewed and are negative          Objective:      /84   Pulse 83   Temp (!) 96 9 °F (36 1 °C)   Resp 16   Ht 5' 5" (1 651 m)   Wt 62 5 kg (137 lb 12 8 oz)   LMP  (LMP Unknown)   BMI 22 93 kg/m² Physical Exam  Vitals and nursing note reviewed  Constitutional:       General: She is not in acute distress  Appearance: She is well-developed  She is not diaphoretic  HENT:      Head: Normocephalic and atraumatic  Eyes:      Pupils: Pupils are equal, round, and reactive to light  Cardiovascular:      Rate and Rhythm: Normal rate and regular rhythm  Heart sounds: Normal heart sounds  No murmur heard  Pulmonary:      Effort: Pulmonary effort is normal  No respiratory distress  Breath sounds: Normal breath sounds  No wheezing  Abdominal:      General: Bowel sounds are normal       Palpations: Abdomen is soft  Tenderness: There is abdominal tenderness  There is no rebound  Musculoskeletal:         General: Normal range of motion  Cervical back: Normal range of motion and neck supple  Skin:     General: Skin is warm and dry  Neurological:      Mental Status: She is alert and oriented to person, place, and time     Psychiatric:         Behavior: Behavior normal

## 2021-08-26 DIAGNOSIS — R10.9 ABDOMINAL PAIN, UNSPECIFIED ABDOMINAL LOCATION: ICD-10-CM

## 2021-08-26 DIAGNOSIS — R19.7 DIARRHEA, UNSPECIFIED TYPE: ICD-10-CM

## 2021-08-26 RX ORDER — DICYCLOMINE HYDROCHLORIDE 10 MG/1
10 CAPSULE ORAL
Qty: 60 CAPSULE | Refills: 3 | Status: SHIPPED | OUTPATIENT
Start: 2021-08-26 | End: 2021-09-02 | Stop reason: SDUPTHER

## 2021-08-26 NOTE — TELEPHONE ENCOUNTER
Pt called the office and left a message on the clerical line requesting a refill of her bentyl 10 mg capsules

## 2021-08-26 NOTE — TELEPHONE ENCOUNTER
Patient called back and stated she wanted her medication sent to [address] [place] [address] on [address] in [place]

## 2021-08-26 NOTE — TELEPHONE ENCOUNTER
lmom requesting call back from [place] regarding we need a little more information so  that we may assist her  Need to confirm directions, qty, pharmacy Etc

## 2021-09-06 DIAGNOSIS — F41.1 GENERALIZED ANXIETY DISORDER: ICD-10-CM

## 2021-09-06 DIAGNOSIS — F33.1 MODERATE EPISODE OF RECURRENT MAJOR DEPRESSIVE DISORDER (HCC): ICD-10-CM

## 2021-09-06 DIAGNOSIS — R10.13 DYSPEPSIA: ICD-10-CM

## 2021-09-06 DIAGNOSIS — J45.20 MILD INTERMITTENT ASTHMA, UNSPECIFIED WHETHER COMPLICATED: ICD-10-CM

## 2021-09-07 RX ORDER — MIRTAZAPINE 15 MG/1
15 TABLET, FILM COATED ORAL
Qty: 30 TABLET | Refills: 0 | Status: SHIPPED | OUTPATIENT
Start: 2021-09-07 | End: 2021-09-12 | Stop reason: SDUPTHER

## 2021-09-07 RX ORDER — ALBUTEROL SULFATE 90 UG/1
2 AEROSOL, METERED RESPIRATORY (INHALATION) EVERY 6 HOURS PRN
Qty: 54 G | Refills: 0 | Status: SHIPPED | OUTPATIENT
Start: 2021-09-07 | End: 2021-11-25 | Stop reason: SDUPTHER

## 2021-09-07 RX ORDER — ONDANSETRON 4 MG/1
4 TABLET, FILM COATED ORAL EVERY 8 HOURS PRN
Qty: 20 TABLET | Refills: 0 | Status: SHIPPED | OUTPATIENT
Start: 2021-09-07 | End: 2021-09-23 | Stop reason: SDUPTHER

## 2021-09-07 RX ORDER — FLUTICASONE PROPIONATE 44 MCG
2 AEROSOL WITH ADAPTER (GRAM) INHALATION 2 TIMES DAILY
Qty: 10.6 G | Refills: 0 | Status: SHIPPED | OUTPATIENT
Start: 2021-09-07 | End: 2021-09-08

## 2021-09-09 ENCOUNTER — TELEPHONE (OUTPATIENT)
Dept: GASTROENTEROLOGY | Facility: HOSPITAL | Age: 66
End: 2021-09-09

## 2021-09-10 ENCOUNTER — HOSPITAL ENCOUNTER (OUTPATIENT)
Dept: GASTROENTEROLOGY | Facility: HOSPITAL | Age: 66
Setting detail: OUTPATIENT SURGERY
Discharge: HOME | End: 2021-09-10
Attending: SURGERY
Payer: COMMERCIAL

## 2021-09-10 ENCOUNTER — ANESTHESIA (OUTPATIENT)
Dept: GASTROENTEROLOGY | Facility: HOSPITAL | Age: 66
End: 2021-09-10

## 2021-09-10 ENCOUNTER — ANESTHESIA EVENT (OUTPATIENT)
Dept: GASTROENTEROLOGY | Facility: HOSPITAL | Age: 66
End: 2021-09-10

## 2021-09-10 ENCOUNTER — HOSPITAL ENCOUNTER (OUTPATIENT)
Dept: CT IMAGING | Facility: HOSPITAL | Age: 66
Setting detail: OUTPATIENT SURGERY
Discharge: HOME | End: 2021-09-10
Attending: SURGERY
Payer: COMMERCIAL

## 2021-09-10 VITALS
HEART RATE: 75 BPM | DIASTOLIC BLOOD PRESSURE: 60 MMHG | TEMPERATURE: 97.4 F | RESPIRATION RATE: 20 BRPM | SYSTOLIC BLOOD PRESSURE: 125 MMHG | HEIGHT: 65 IN | WEIGHT: 140 LBS | BODY MASS INDEX: 23.32 KG/M2 | OXYGEN SATURATION: 96 %

## 2021-09-10 DIAGNOSIS — K57.92 DIVERTICULITIS: ICD-10-CM

## 2021-09-10 PROCEDURE — 45380 COLONOSCOPY AND BIOPSY: CPT | Performed by: SURGERY

## 2021-09-10 PROCEDURE — G1004 CDSM NDSC: HCPCS

## 2021-09-10 PROCEDURE — 74177 CT ABD & PELVIS W/CONTRAST: CPT

## 2021-09-10 PROCEDURE — 88305 TISSUE EXAM BY PATHOLOGIST: CPT | Performed by: PATHOLOGY

## 2021-09-10 RX ORDER — SODIUM CHLORIDE 9 MG/ML
125 INJECTION, SOLUTION INTRAVENOUS CONTINUOUS
Status: DISCONTINUED | OUTPATIENT
Start: 2021-09-10 | End: 2021-09-14 | Stop reason: HOSPADM

## 2021-09-10 RX ORDER — PROPOFOL 10 MG/ML
INJECTION, EMULSION INTRAVENOUS AS NEEDED
Status: DISCONTINUED | OUTPATIENT
Start: 2021-09-10 | End: 2021-09-10

## 2021-09-10 RX ORDER — LIDOCAINE HYDROCHLORIDE 20 MG/ML
INJECTION, SOLUTION EPIDURAL; INFILTRATION; INTRACAUDAL; PERINEURAL AS NEEDED
Status: DISCONTINUED | OUTPATIENT
Start: 2021-09-10 | End: 2021-09-10

## 2021-09-10 RX ORDER — AMOXICILLIN AND CLAVULANATE POTASSIUM 875; 125 MG/1; MG/1
1 TABLET, FILM COATED ORAL EVERY 12 HOURS SCHEDULED
Qty: 20 TABLET | Refills: 0 | Status: SHIPPED | OUTPATIENT
Start: 2021-09-10 | End: 2021-09-20 | Stop reason: ALTCHOICE

## 2021-09-10 RX ADMIN — PROPOFOL 40 MG: 10 INJECTION, EMULSION INTRAVENOUS at 12:42

## 2021-09-10 RX ADMIN — LIDOCAINE HYDROCHLORIDE 50 MG: 20 INJECTION, SOLUTION EPIDURAL; INFILTRATION; INTRACAUDAL; PERINEURAL at 12:28

## 2021-09-10 RX ADMIN — PROPOFOL 30 MG: 10 INJECTION, EMULSION INTRAVENOUS at 12:31

## 2021-09-10 RX ADMIN — PROPOFOL 120 MG: 10 INJECTION, EMULSION INTRAVENOUS at 12:28

## 2021-09-10 RX ADMIN — IOHEXOL 100 ML: 350 INJECTION, SOLUTION INTRAVENOUS at 14:03

## 2021-09-10 RX ADMIN — PROPOFOL 40 MG: 10 INJECTION, EMULSION INTRAVENOUS at 12:38

## 2021-09-10 RX ADMIN — SODIUM CHLORIDE 125 ML/HR: 0.9 INJECTION, SOLUTION INTRAVENOUS at 11:49

## 2021-09-10 RX ADMIN — PROPOFOL 40 MG: 10 INJECTION, EMULSION INTRAVENOUS at 12:44

## 2021-09-10 RX ADMIN — PROPOFOL 50 MG: 10 INJECTION, EMULSION INTRAVENOUS at 12:35

## 2021-09-10 RX ADMIN — IOHEXOL 50 ML: 240 INJECTION, SOLUTION INTRATHECAL; INTRAVASCULAR; INTRAVENOUS; ORAL at 14:03

## 2021-09-10 NOTE — ANESTHESIA PREPROCEDURE EVALUATION
Procedure:  COLONOSCOPY    Relevant Problems   CARDIO   (+) Essential hypertension      GI/HEPATIC   (+) Esophageal dysphagia      NEURO/PSYCH   (+) Generalized anxiety disorder   (+) Moderate episode of recurrent major depressive disorder (HCC)      PULMONARY   (+) Asthma      Other   (+) Dyspepsia   (+) Sigmoid diverticulitis        Physical Exam    Airway    Mallampati score: II  TM Distance: >3 FB  Neck ROM: full     Dental   upper dentures and lower dentures,     Cardiovascular  Rhythm: regular, Rate: normal, Cardiovascular exam normal    Pulmonary  Pulmonary exam normal Breath sounds clear to auscultation,     Other Findings        Anesthesia Plan  ASA Score- 2     Anesthesia Type- general with ASA Monitors  Additional Monitors:   Airway Plan:           Plan Factors-    Chart reviewed  Patient summary reviewed  Patient is not a current smoker  Patient did not smoke on day of surgery  Induction- intravenous  Postoperative Plan-     Informed Consent- Anesthetic plan and risks discussed with patient

## 2021-09-10 NOTE — INTERVAL H&P NOTE
H&P reviewed  After examining the patient I find no changes in the patients condition since the H&P had been written      Vitals:    09/10/21 1130   BP: 121/61   Pulse: 74   Resp: 20   Temp: (!) 97 4 °F (36 3 °C)   SpO2: 97%

## 2021-09-10 NOTE — DISCHARGE INSTRUCTIONS
Diverticulosis   WHAT YOU NEED TO KNOW:   Diverticulosis is a condition that causes small pockets called diverticula to form in your intestine  These pockets make it difficult for bowel movements to pass through your digestive system  DISCHARGE INSTRUCTIONS:   Seek care immediately if:   · You have severe pain on the left side of your lower abdomen  · Your bowel movements are bright or dark red  Contact your healthcare provider if:   · You have a fever and chills  · You feel dizzy or lightheaded  · You have nausea, or you are vomiting  · You have a change in your bowel movements  · You have questions or concerns about your condition or care  Medicines:   · Medicines  to soften your bowel movements may be given  You may also need medicines to treat symptoms such as bloating and pain  · Take your medicine as directed  Contact your healthcare provider if you think your medicine is not helping or if you have side effects  Tell him or her if you are allergic to any medicine  Keep a list of the medicines, vitamins, and herbs you take  Include the amounts, and when and why you take them  Bring the list or the pill bottles to follow-up visits  Carry your medicine list with you in case of an emergency  Self-care: The goal of treatment is to manage any symptoms you have and prevent other problems such as diverticulitis  Diverticulitis is swelling or infection of the diverticula  Your healthcare provider may recommend any of the following:  · Eat a variety of high-fiber foods  High-fiber foods help you have regular bowel movements  High-fiber foods include cooked beans, fruits, vegetables, and some cereals  Most adults need 25 to 35 grams of fiber each day  Your healthcare provider may recommend that you have more  Ask your healthcare provider how much fiber you need  Increase fiber slowly  You may have abdominal discomfort, bloating, and gas if you add fiber to your diet too quickly   You may need to take a fiber supplement if you are not getting enough fiber from food  · Drink liquids as directed  You may need to drink 2 to 3 liters (8 to 12 cups) of liquids every day  Ask your healthcare provider how much liquid to drink each day and which liquids are best for you  · Apply heat  on your abdomen for 20 to 30 minutes every 2 hours for as many days as directed  Heat helps decrease pain and muscle spasms  Help prevent diverticulitis or other symptoms: The following may help decrease your risk for diverticulitis or symptoms, such as bleeding  Talk to your provider about these or other things you can do to prevent problems that may occur with diverticulosis  · Exercise regularly  Ask your healthcare provider about the best exercise plan for you  Exercise can help you have regular bowel movements  Get 30 minutes of exercise on most days of the week  · Maintain a healthy weight  Ask your healthcare provider how much you should weigh  Ask him or her to help you create a weight loss plan if you are overweight  · Do not smoke  Nicotine and other chemicals in cigarettes increase your risk for diverticulitis  Ask your healthcare provider for information if you currently smoke and need help to quit  E-cigarettes or smokeless tobacco still contain nicotine  Talk to your healthcare provider before you use these products  · Ask your healthcare provider if it is safe to take NSAIDs  NSAIDs may increase your risk of diverticulitis  Follow up with your healthcare provider as directed:  Write down your questions so you remember to ask them during your visits  © Copyright [employer] 2021 Information is for End User's use only and may not be sold, redistributed or otherwise used for commercial purposes  All illustrations and images included in CareNotes® are the copyrighted property of [employer]  or Charito Jones   The above information is an  only   It is not intended as medical advice for individual conditions or treatments  Talk to your doctor, nurse or pharmacist before following any medical regimen to see if it is safe and effective for you

## 2021-09-15 DIAGNOSIS — F33.1 MODERATE EPISODE OF RECURRENT MAJOR DEPRESSIVE DISORDER (HCC): ICD-10-CM

## 2021-09-15 DIAGNOSIS — F41.1 GENERALIZED ANXIETY DISORDER: ICD-10-CM

## 2021-09-15 DIAGNOSIS — G25.0 ESSENTIAL TREMOR: ICD-10-CM

## 2021-09-16 RX ORDER — MIRTAZAPINE 15 MG/1
15 TABLET, FILM COATED ORAL
Qty: 30 TABLET | Refills: 3 | Status: ON HOLD | OUTPATIENT
Start: 2021-09-16 | End: 2021-12-01 | Stop reason: CLARIF

## 2021-09-16 RX ORDER — DIAZEPAM 10 MG/1
10 TABLET ORAL 2 TIMES DAILY
Qty: 60 TABLET | Refills: 0 | Status: SHIPPED | OUTPATIENT
Start: 2021-09-16 | End: 2021-09-17 | Stop reason: SDUPTHER

## 2021-09-20 ENCOUNTER — APPOINTMENT (EMERGENCY)
Dept: CT IMAGING | Facility: HOSPITAL | Age: 66
DRG: 552 | End: 2021-09-20
Payer: COMMERCIAL

## 2021-09-20 ENCOUNTER — HOSPITAL ENCOUNTER (INPATIENT)
Facility: HOSPITAL | Age: 66
LOS: 2 days | Discharge: HOME HEALTH | DRG: 552 | End: 2021-09-22
Attending: EMERGENCY MEDICINE | Admitting: INTERNAL MEDICINE
Payer: COMMERCIAL

## 2021-09-20 DIAGNOSIS — M48.061 LUMBAR STENOSIS: ICD-10-CM

## 2021-09-20 DIAGNOSIS — M54.9 INTRACTABLE BACK PAIN: Primary | ICD-10-CM

## 2021-09-20 DIAGNOSIS — R26.2 AMBULATORY DYSFUNCTION: ICD-10-CM

## 2021-09-20 PROBLEM — M54.59 INTRACTABLE LOW BACK PAIN: Status: ACTIVE | Noted: 2021-09-20

## 2021-09-20 LAB
ALBUMIN SERPL BCP-MCNC: 3.3 G/DL (ref 3.5–5)
ALP SERPL-CCNC: 59 U/L (ref 46–116)
ALT SERPL W P-5'-P-CCNC: 19 U/L (ref 12–78)
ANION GAP SERPL CALCULATED.3IONS-SCNC: 13 MMOL/L (ref 4–13)
AST SERPL W P-5'-P-CCNC: 23 U/L (ref 5–45)
BASOPHILS # BLD AUTO: 0.1 THOUSANDS/ΜL (ref 0–0.1)
BASOPHILS NFR BLD AUTO: 1 % (ref 0–1)
BILIRUB DIRECT SERPL-MCNC: 0.07 MG/DL (ref 0–0.2)
BILIRUB SERPL-MCNC: 0.31 MG/DL (ref 0.2–1)
BILIRUB UR QL STRIP: NEGATIVE
BUN SERPL-MCNC: 8 MG/DL (ref 5–25)
CALCIUM SERPL-MCNC: 8.4 MG/DL (ref 8.3–10.1)
CHLORIDE SERPL-SCNC: 101 MMOL/L (ref 100–108)
CLARITY UR: CLEAR
CO2 SERPL-SCNC: 22 MMOL/L (ref 21–32)
COLOR UR: ABNORMAL
CREAT SERPL-MCNC: 0.75 MG/DL (ref 0.6–1.3)
EOSINOPHIL # BLD AUTO: 1.09 THOUSAND/ΜL (ref 0–0.61)
EOSINOPHIL NFR BLD AUTO: 10 % (ref 0–6)
ERYTHROCYTE [DISTWIDTH] IN BLOOD BY AUTOMATED COUNT: 12.6 % (ref 11.6–15.1)
GFR SERPL CREATININE-BSD FRML MDRD: 83 ML/MIN/1.73SQ M
GLUCOSE SERPL-MCNC: 77 MG/DL (ref 65–140)
GLUCOSE UR STRIP-MCNC: NEGATIVE MG/DL
HCT VFR BLD AUTO: 38.8 % (ref 34.8–46.1)
HGB BLD-MCNC: 13 G/DL (ref 11.5–15.4)
HGB UR QL STRIP.AUTO: NEGATIVE
IMM GRANULOCYTES # BLD AUTO: 0.04 THOUSAND/UL (ref 0–0.2)
IMM GRANULOCYTES NFR BLD AUTO: 0 % (ref 0–2)
KETONES UR STRIP-MCNC: ABNORMAL MG/DL
LACTATE SERPL-SCNC: 1 MMOL/L (ref 0.5–2)
LEUKOCYTE ESTERASE UR QL STRIP: NEGATIVE
LIPASE SERPL-CCNC: 65 U/L (ref 73–393)
LYMPHOCYTES # BLD AUTO: 1.93 THOUSANDS/ΜL (ref 0.6–4.47)
LYMPHOCYTES NFR BLD AUTO: 18 % (ref 14–44)
MCH RBC QN AUTO: 36.3 PG (ref 26.8–34.3)
MCHC RBC AUTO-ENTMCNC: 33.5 G/DL (ref 31.4–37.4)
MCV RBC AUTO: 108 FL (ref 82–98)
MONOCYTES # BLD AUTO: 1.06 THOUSAND/ΜL (ref 0.17–1.22)
MONOCYTES NFR BLD AUTO: 10 % (ref 4–12)
NEUTROPHILS # BLD AUTO: 6.71 THOUSANDS/ΜL (ref 1.85–7.62)
NEUTS SEG NFR BLD AUTO: 61 % (ref 43–75)
NITRITE UR QL STRIP: NEGATIVE
NRBC BLD AUTO-RTO: 0 /100 WBCS
PH UR STRIP.AUTO: 6 [PH]
PLATELET # BLD AUTO: 254 THOUSANDS/UL (ref 149–390)
PMV BLD AUTO: 9.4 FL (ref 8.9–12.7)
POTASSIUM SERPL-SCNC: 4.5 MMOL/L (ref 3.5–5.3)
PROT SERPL-MCNC: 6.6 G/DL (ref 6.4–8.2)
PROT UR STRIP-MCNC: NEGATIVE MG/DL
RBC # BLD AUTO: 3.58 MILLION/UL (ref 3.81–5.12)
SODIUM SERPL-SCNC: 136 MMOL/L (ref 136–145)
SP GR UR STRIP.AUTO: <=1.005 (ref 1–1.03)
UROBILINOGEN UR QL STRIP.AUTO: 0.2 E.U./DL
WBC # BLD AUTO: 10.93 THOUSAND/UL (ref 4.31–10.16)

## 2021-09-20 PROCEDURE — 80048 BASIC METABOLIC PNL TOTAL CA: CPT | Performed by: EMERGENCY MEDICINE

## 2021-09-20 PROCEDURE — 81003 URINALYSIS AUTO W/O SCOPE: CPT | Performed by: EMERGENCY MEDICINE

## 2021-09-20 PROCEDURE — 85025 COMPLETE CBC W/AUTO DIFF WBC: CPT | Performed by: EMERGENCY MEDICINE

## 2021-09-20 PROCEDURE — 96376 TX/PRO/DX INJ SAME DRUG ADON: CPT

## 2021-09-20 PROCEDURE — 96374 THER/PROPH/DIAG INJ IV PUSH: CPT

## 2021-09-20 PROCEDURE — 83690 ASSAY OF LIPASE: CPT | Performed by: EMERGENCY MEDICINE

## 2021-09-20 PROCEDURE — 74177 CT ABD & PELVIS W/CONTRAST: CPT

## 2021-09-20 PROCEDURE — 99285 EMERGENCY DEPT VISIT HI MDM: CPT | Performed by: EMERGENCY MEDICINE

## 2021-09-20 PROCEDURE — 80076 HEPATIC FUNCTION PANEL: CPT | Performed by: EMERGENCY MEDICINE

## 2021-09-20 PROCEDURE — 36415 COLL VENOUS BLD VENIPUNCTURE: CPT | Performed by: EMERGENCY MEDICINE

## 2021-09-20 PROCEDURE — 99223 1ST HOSP IP/OBS HIGH 75: CPT | Performed by: PHYSICIAN ASSISTANT

## 2021-09-20 PROCEDURE — 83605 ASSAY OF LACTIC ACID: CPT | Performed by: EMERGENCY MEDICINE

## 2021-09-20 PROCEDURE — 99285 EMERGENCY DEPT VISIT HI MDM: CPT

## 2021-09-20 RX ORDER — FLUTICASONE PROPIONATE 50 MCG
2 SPRAY, SUSPENSION (ML) NASAL DAILY
Status: DISCONTINUED | OUTPATIENT
Start: 2021-09-21 | End: 2021-09-22 | Stop reason: HOSPADM

## 2021-09-20 RX ORDER — PRIMIDONE 50 MG/1
200 TABLET ORAL
Status: DISCONTINUED | OUTPATIENT
Start: 2021-09-20 | End: 2021-09-22 | Stop reason: HOSPADM

## 2021-09-20 RX ORDER — ACETAMINOPHEN 325 MG/1
975 TABLET ORAL ONCE
Status: COMPLETED | OUTPATIENT
Start: 2021-09-20 | End: 2021-09-20

## 2021-09-20 RX ORDER — METHYLPREDNISOLONE 16 MG/1
16 TABLET ORAL DAILY
Status: DISCONTINUED | OUTPATIENT
Start: 2021-09-23 | End: 2021-09-22 | Stop reason: HOSPADM

## 2021-09-20 RX ORDER — DIPHENOXYLATE HYDROCHLORIDE AND ATROPINE SULFATE 2.5; .025 MG/1; MG/1
1 TABLET ORAL 4 TIMES DAILY PRN
Status: DISCONTINUED | OUTPATIENT
Start: 2021-09-20 | End: 2021-09-22 | Stop reason: HOSPADM

## 2021-09-20 RX ORDER — HEPARIN SODIUM 5000 [USP'U]/ML
5000 INJECTION, SOLUTION INTRAVENOUS; SUBCUTANEOUS EVERY 8 HOURS SCHEDULED
Status: DISCONTINUED | OUTPATIENT
Start: 2021-09-20 | End: 2021-09-22 | Stop reason: HOSPADM

## 2021-09-20 RX ORDER — NICOTINE 21 MG/24HR
1 PATCH, TRANSDERMAL 24 HOURS TRANSDERMAL DAILY
Status: DISCONTINUED | OUTPATIENT
Start: 2021-09-21 | End: 2021-09-22 | Stop reason: HOSPADM

## 2021-09-20 RX ORDER — METHYLPREDNISOLONE 4 MG/1
12 TABLET ORAL DAILY
Status: DISCONTINUED | OUTPATIENT
Start: 2021-09-24 | End: 2021-09-22 | Stop reason: HOSPADM

## 2021-09-20 RX ORDER — METHYLPREDNISOLONE 4 MG/1
4 TABLET ORAL DAILY
Status: DISCONTINUED | OUTPATIENT
Start: 2021-09-26 | End: 2021-09-22 | Stop reason: HOSPADM

## 2021-09-20 RX ORDER — OXYCODONE HYDROCHLORIDE 10 MG/1
10 TABLET ORAL EVERY 4 HOURS PRN
Status: DISCONTINUED | OUTPATIENT
Start: 2021-09-20 | End: 2021-09-22 | Stop reason: HOSPADM

## 2021-09-20 RX ORDER — TIZANIDINE 4 MG/1
4 TABLET ORAL 2 TIMES DAILY PRN
Status: DISCONTINUED | OUTPATIENT
Start: 2021-09-20 | End: 2021-09-22 | Stop reason: HOSPADM

## 2021-09-20 RX ORDER — ASPIRIN 81 MG/1
81 TABLET, CHEWABLE ORAL DAILY
Status: DISCONTINUED | OUTPATIENT
Start: 2021-09-21 | End: 2021-09-22 | Stop reason: HOSPADM

## 2021-09-20 RX ORDER — HYDROMORPHONE HCL/PF 1 MG/ML
0.5 SYRINGE (ML) INJECTION ONCE
Status: COMPLETED | OUTPATIENT
Start: 2021-09-20 | End: 2021-09-20

## 2021-09-20 RX ORDER — METHYLPREDNISOLONE 4 MG/1
8 TABLET ORAL DAILY
Status: DISCONTINUED | OUTPATIENT
Start: 2021-09-25 | End: 2021-09-22 | Stop reason: HOSPADM

## 2021-09-20 RX ORDER — ACETAMINOPHEN 325 MG/1
975 TABLET ORAL EVERY 8 HOURS SCHEDULED
Status: DISCONTINUED | OUTPATIENT
Start: 2021-09-20 | End: 2021-09-22 | Stop reason: HOSPADM

## 2021-09-20 RX ORDER — FLUTICASONE FUROATE AND VILANTEROL 200; 25 UG/1; UG/1
1 POWDER RESPIRATORY (INHALATION) DAILY
Status: DISCONTINUED | OUTPATIENT
Start: 2021-09-21 | End: 2021-09-22 | Stop reason: HOSPADM

## 2021-09-20 RX ORDER — DIAZEPAM 5 MG/1
10 TABLET ORAL 2 TIMES DAILY
Status: DISCONTINUED | OUTPATIENT
Start: 2021-09-20 | End: 2021-09-22 | Stop reason: HOSPADM

## 2021-09-20 RX ORDER — ONDANSETRON 2 MG/ML
4 INJECTION INTRAMUSCULAR; INTRAVENOUS EVERY 6 HOURS PRN
Status: DISCONTINUED | OUTPATIENT
Start: 2021-09-20 | End: 2021-09-22 | Stop reason: HOSPADM

## 2021-09-20 RX ORDER — MIRTAZAPINE 15 MG/1
15 TABLET, FILM COATED ORAL
Status: DISCONTINUED | OUTPATIENT
Start: 2021-09-20 | End: 2021-09-22 | Stop reason: HOSPADM

## 2021-09-20 RX ORDER — OXYCODONE HYDROCHLORIDE 5 MG/1
5 TABLET ORAL EVERY 4 HOURS PRN
Status: DISCONTINUED | OUTPATIENT
Start: 2021-09-20 | End: 2021-09-22 | Stop reason: HOSPADM

## 2021-09-20 RX ORDER — LISINOPRIL 2.5 MG/1
2.5 TABLET ORAL DAILY
Status: DISCONTINUED | OUTPATIENT
Start: 2021-09-21 | End: 2021-09-22 | Stop reason: HOSPADM

## 2021-09-20 RX ORDER — ALBUTEROL SULFATE 90 UG/1
2 AEROSOL, METERED RESPIRATORY (INHALATION) EVERY 6 HOURS PRN
Status: DISCONTINUED | OUTPATIENT
Start: 2021-09-20 | End: 2021-09-22 | Stop reason: HOSPADM

## 2021-09-20 RX ORDER — SENNOSIDES 8.6 MG
2 TABLET ORAL
Status: DISCONTINUED | OUTPATIENT
Start: 2021-09-20 | End: 2021-09-22 | Stop reason: HOSPADM

## 2021-09-20 RX ORDER — CALCIUM CARBONATE 200(500)MG
1000 TABLET,CHEWABLE ORAL DAILY PRN
Status: DISCONTINUED | OUTPATIENT
Start: 2021-09-20 | End: 2021-09-22 | Stop reason: HOSPADM

## 2021-09-20 RX ORDER — CLOTRIMAZOLE 10 MG/1
10 LOZENGE ORAL; TOPICAL
Status: DISCONTINUED | OUTPATIENT
Start: 2021-09-20 | End: 2021-09-22 | Stop reason: HOSPADM

## 2021-09-20 RX ORDER — DICYCLOMINE HYDROCHLORIDE 10 MG/1
10 CAPSULE ORAL
Status: DISCONTINUED | OUTPATIENT
Start: 2021-09-20 | End: 2021-09-22 | Stop reason: HOSPADM

## 2021-09-20 RX ADMIN — HYDROMORPHONE HYDROCHLORIDE 0.5 MG: 1 INJECTION, SOLUTION INTRAMUSCULAR; INTRAVENOUS; SUBCUTANEOUS at 14:28

## 2021-09-20 RX ADMIN — HYDROMORPHONE HYDROCHLORIDE 0.5 MG: 1 INJECTION, SOLUTION INTRAMUSCULAR; INTRAVENOUS; SUBCUTANEOUS at 16:34

## 2021-09-20 RX ADMIN — OXYCODONE HYDROCHLORIDE 10 MG: 10 TABLET ORAL at 22:12

## 2021-09-20 RX ADMIN — ACETAMINOPHEN 975 MG: 325 TABLET, FILM COATED ORAL at 22:12

## 2021-09-20 RX ADMIN — PRIMIDONE 200 MG: 50 TABLET ORAL at 23:06

## 2021-09-20 RX ADMIN — CLOTRIMAZOLE 10 MG: 10 LOZENGE ORAL at 23:06

## 2021-09-20 RX ADMIN — ACETAMINOPHEN 975 MG: 325 TABLET, FILM COATED ORAL at 14:22

## 2021-09-20 RX ADMIN — DICYCLOMINE HYDROCHLORIDE 10 MG: 10 CAPSULE ORAL at 22:12

## 2021-09-20 RX ADMIN — MIRTAZAPINE 15 MG: 15 TABLET, FILM COATED ORAL at 23:05

## 2021-09-20 RX ADMIN — IOHEXOL 100 ML: 350 INJECTION, SOLUTION INTRAVENOUS at 16:14

## 2021-09-20 RX ADMIN — DIAZEPAM 10 MG: 5 TABLET ORAL at 23:04

## 2021-09-20 NOTE — ASSESSMENT & PLAN NOTE
· History a sigmoid diverticulitis  · Notes she completed antibiotic course yesterday  · Denies abdominal pain  · Continue outpatient general surgery follow-up
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
## 2021-09-20 NOTE — ED NOTES
Pt requesting zofran and andi  States didn't much eat in four days  Ordered pasta with sauce and ate most of the meal and now feels sick to her stomach        Suzanne Marks  30/82/46 1951

## 2021-09-20 NOTE — ED NOTES
Pt ambulated with a walker instead of cane from home  Pt was steadier with the walker than with the cane        Shawna Rick  73/62/62 2762

## 2021-09-20 NOTE — ED PROVIDER NOTES
History  Chief Complaint   Patient presents with    Fall     Pt reports right sided back pain that goes all the way down to the right leg and reports not sleeping well - pt reports she cant lift her leg and is having trouble walking and reports falling this morning - denies thinners, loc or hitting head     76 yo F h/o recurrent/complicated diverticulitis, back pain presenting for evaluation of 3 days of R flank/back/abdominal pain  Denies any inciting event injury  Pain is to low back/R flank/low back and radiates down to R buttocks/lateral R thigh  Hip is in flexion as pt reports pain increases with extension  Does report some abdominal pain  Uses cane at baseline, but had 3 falls today secondary to this pain today  Denies any traumatic injuries from the falls  H/o back problems, previously had spinal epidurals, however not in over a year  Difficulty getting to bathroom due to the pain and in preparing foods  Decreased appetite  Denies N/V  Unknown last BM, denies dark or bloody stools  Denies bladder/bowel incontinence, saddle anesthesia, numbness, weakness  Chronic pain and on Norco- ran out about 5 days ago  Denies fevers, chills, HA, CP, SOB  H/o diverticulitis/complicated with abscess previously  Follows with Dr Sindi Feldman  Had Colonoscopy on 9/10- "Significant edema and narrowing of the sigmoid colon  Unable to pass through the edematous area therefore incomplete colonoscopy "  Completed most recent abx course yesterday  Lives at home with ex- who she states cannot tell help her    MDM: 76 yo F with R back pain/radicular component as well- repeat CT to assess for complication of diverticulitis/retropertioneal abnormality and lumbar spine imaging, abdominal labs, UA, post void residual, pain control, likely will need admission for ambulatory dysfunction                Prior to Admission Medications   Prescriptions Last Dose Informant Patient Reported? Taking?    Flovent HFA 44 MCG/ACT inhaler 2021 at Unknown time  No Yes   Sig: INHALE TWO PUFFS BY MOUTH TWICE DAILY   RINSE MOUTH AFTER USE   HYDROcodone-acetaminophen (NORCO) 7 5-325 mg per tablet 2021 at Unknown time Self Yes Yes   Sig: 3 (three) times a day    Na Sulfate-K Sulfate-Mg Sulf (Suprep Bowel Prep Kit) 17 5-3 13-1 6 GM/177ML SOLN Not Taking at Unknown time  No No   Sig: Take 2 Bottles by mouth 2 (two) times a day   Patient not taking: Reported on 2021   SUMAtriptan (IMITREX) 100 mg tablet Past Month at Unknown time  No Yes   Sig: Take 1 tablet (100 mg total) by mouth once as needed for migraine for up to 1 dose   albuterol (PROVENTIL HFA,VENTOLIN HFA) 90 mcg/act inhaler Past Week at Unknown time  No Yes   Sig: Inhale 2 puffs every 6 (six) hours as needed for wheezing   aspirin 81 mg chewable tablet 2021 at Unknown time Self Yes Yes   Sig: Chew 81 mg daily Takes 2- 4 a day   betamethasone dipropionate (DIPROSONE) 0 05 % cream 2021 at Unknown time  No Yes   Sig: Apply topically 2 (two) times a day   clotrimazole (MYCELEX) 10 mg delfina 2021 at Unknown time  No Yes   Sig: Take 1 tablet (10 mg total) by mouth 5 (five) times a day   clotrimazole-betamethasone (LOTRISONE) 1-0 05 % cream 2021 at Unknown time Self No Yes   Sig: Apply topically 2 (two) times a day To outside of the vagina   diazepam (VALIUM) 10 mg tablet 2021 at Unknown time  No Yes   Sig: Take 1 tablet (10 mg total) by mouth 2 (two) times a day   dicyclomine (BENTYL) 10 mg capsule 2021 at Unknown time  No Yes   Sig: Take 1 capsule (10 mg total) by mouth 4 (four) times a day (before meals and at bedtime)   diphenoxylate-atropine (LOMOTIL) 2 5-0 025 mg per tablet More than a month at Unknown time  No No   Sig: Take 1 tablet by mouth 4 (four) times a day as needed for diarrhea   fluticasone (FLONASE) 50 mcg/act nasal spray 2021 at Unknown time  No Yes   Si sprays into each nostril daily   fluticasone-vilanterol (Breo Ellipta) 200-25 MCG/INH inhaler 2021 at Unknown time  No Yes   Sig: Inhale 1 puff daily Rinse mouth after use    hyoscyamine (ANASPAZ,LEVSIN) 0 125 MG tablet Past Week at Unknown time  No Yes   Sig: Take 1 tablet (0 125 mg total) by mouth every 6 (six) hours as needed for cramping   lisinopril (ZESTRIL) 2 5 mg tablet 2021 at Unknown time  No Yes   Sig: Take 1 tablet (2 5 mg total) by mouth daily   mirtazapine (REMERON) 15 mg tablet 2021 at Unknown time  No Yes   Sig: Take 1 tablet (15 mg total) by mouth daily at bedtime   naloxone (NARCAN) 4 mg/0 1 mL nasal spray  Self Yes Yes   Si spray by Alternating Nares route every 3 (three) minutes as needed for opioid reversal or respiratory depression Administer 1 spray into a nostril  If breathing does not return to normal or if breathing difficulty resumes after 2-3 minutes, give another dose in the other nostril using a new spray     ondansetron (ZOFRAN) 4 mg tablet Past Month at Unknown time  No Yes   Sig: Take 1 tablet (4 mg total) by mouth every 8 (eight) hours as needed for nausea or vomiting   primidone (MYSOLINE) 50 mg tablet 2021 at Unknown time  No Yes   Sig: TAKE 4 TABS (200 MG) BY MOUTH AT BEDTIME   tiZANidine (ZANAFLEX) 4 mg tablet 2021 at Unknown time Self Yes Yes   Sig: TAKE 1 TABLET BY MOUTH TWICE A DAY AS NEEDED FOR MUSCLE PAIN/SPASMS      Facility-Administered Medications: None       Past Medical History:   Diagnosis Date    Anxiety     Asthma     Chronic pain disorder     Back    Colon polyp     Depression     Fibromyalgia     Fibromyalgia, primary     GERD (gastroesophageal reflux disease)     Hyperlipidemia     Hypertension     Irritable bowel syndrome     Lumbar herniated disc        Past Surgical History:   Procedure Laterality Date    BREAST BIOPSY Left     benign- at age 34    COLONOSCOPY      TUBAL LIGATION      ULNAR NERVE REPAIR Left        Family History   Problem Relation Age of Onset    Heart failure Mother    Severino Dubois Hyperlipidemia Mother     Hypertension Mother     Coronary artery disease Brother     COPD Maternal Grandmother     No Known Problems Sister     No Known Problems Daughter     No Known Problems Sister     Aneurysm Maternal Aunt      I have reviewed and agree with the history as documented  E-Cigarette/Vaping    E-Cigarette Use Never User      E-Cigarette/Vaping Substances    Nicotine No     THC No     CBD No     Flavoring No     Other No     Unknown No      Social History     Tobacco Use    Smoking status: Current Some Day Smoker     Packs/day: 1 00     Years: 50 00     Pack years: 50 00     Types: Cigarettes     Last attempt to quit: 2019     Years since quittin 9    Smokeless tobacco: Never Used   Vaping Use    Vaping Use: Never used   Substance Use Topics    Alcohol use: No    Drug use: No       Review of Systems   Constitutional: Negative for chills, fever and unexpected weight change  HENT: Negative for ear pain, rhinorrhea and sore throat  Eyes: Negative for pain and visual disturbance  Respiratory: Negative for cough and shortness of breath  Cardiovascular: Negative for chest pain and leg swelling  Gastrointestinal: Negative for abdominal pain, constipation, diarrhea, nausea and vomiting  Endocrine: Negative for polydipsia, polyphagia and polyuria  Genitourinary: Positive for flank pain  Negative for dysuria, frequency, hematuria and urgency  Musculoskeletal: Positive for back pain  Negative for myalgias and neck pain  Skin: Negative for color change and rash  Allergic/Immunologic: Negative for environmental allergies and immunocompromised state  Neurological: Negative for dizziness, weakness, light-headedness, numbness and headaches  Hematological: Negative for adenopathy  Does not bruise/bleed easily  Psychiatric/Behavioral: Negative for agitation and confusion  All other systems reviewed and are negative        Physical Exam  Physical Exam  Vitals and nursing note reviewed  Constitutional:       Appearance: She is well-developed  Comments: Crying in pain, appears uncomfortable   HENT:      Head: Normocephalic and atraumatic  Nose: Nose normal    Eyes:      Conjunctiva/sclera: Conjunctivae normal    Cardiovascular:      Rate and Rhythm: Normal rate and regular rhythm  Heart sounds: Normal heart sounds  Pulmonary:      Effort: Pulmonary effort is normal  No respiratory distress  Breath sounds: Normal breath sounds  No stridor  No wheezing or rales  Chest:      Chest wall: No tenderness  Abdominal:      General: There is no distension  Palpations: Abdomen is soft  Tenderness: There is abdominal tenderness  There is no guarding or rebound  Comments: Mild diffuse abdominal ttp, no rebound/guarding  Back: ttp lumbar spine and R low back/buttocks, R leg resting in hip flexion and increased pain with leg extension, distally NV intact   Musculoskeletal:         General: No deformity  Cervical back: Normal range of motion and neck supple  Right lower leg: No edema  Left lower leg: No edema  Skin:     General: Skin is warm and dry  Findings: No rash  Neurological:      Mental Status: She is alert and oriented to person, place, and time  Motor: No abnormal muscle tone  Coordination: Coordination normal    Psychiatric:         Thought Content:  Thought content normal          Judgment: Judgment normal          Vital Signs  ED Triage Vitals   Temperature Pulse Respirations Blood Pressure SpO2   09/20/21 1155 09/20/21 1158 09/20/21 1158 09/20/21 1157 09/20/21 1158   98 9 °F (37 2 °C) 98 20 135/78 97 %      Temp Source Heart Rate Source Patient Position - Orthostatic VS BP Location FiO2 (%)   09/20/21 1155 09/20/21 1158 09/20/21 1633 09/20/21 1633 --   Oral Monitor Lying Right arm       Pain Score       09/20/21 1157       Worst Possible Pain           Vitals:    09/20/21 1158 09/20/21 1412 09/20/21 1633 09/20/21 1840   BP:  126/84 120/66 128/68   Pulse: 98 85 68 68   Patient Position - Orthostatic VS:   Lying Lying         Visual Acuity      ED Medications  Medications   methylprednisolone (MEDROL) tablet 24 mg (has no administration in time range)     Followed by   methylprednisolone (MEDROL) tablet 20 mg (has no administration in time range)     Followed by   methylPREDNISolone (MEDROL) tablet 16 mg (has no administration in time range)     Followed by   methylprednisolone (MEDROL) tablet 12 mg (has no administration in time range)     Followed by   methylprednisolone (MEDROL) tablet 8 mg (has no administration in time range)     Followed by   methylprednisolone (MEDROL) tablet 4 mg (has no administration in time range)   HYDROmorphone (DILAUDID) injection 0 5 mg (0 5 mg Intravenous Given 9/20/21 1428)   acetaminophen (TYLENOL) tablet 975 mg (975 mg Oral Given 9/20/21 1422)   HYDROmorphone (DILAUDID) injection 0 5 mg (0 5 mg Intravenous Given 9/20/21 1634)   iohexol (OMNIPAQUE) 350 MG/ML injection (SINGLE-DOSE) 100 mL (100 mL Intravenous Given 9/20/21 1614)       Diagnostic Studies  Results Reviewed     Procedure Component Value Units Date/Time    UA w Reflex to Microscopic w Reflex to Culture [[id number]]  (Abnormal) Collected: 09/20/21 1529    Lab Status: Final result Specimen: Urine, Clean Catch Updated: 09/20/21 1627     Color, UA Light Yellow     Clarity, UA Clear     Specific Gravity, UA <=1 005     pH, UA 6 0     Leukocytes, UA Negative     Nitrite, UA Negative     Protein, UA Negative mg/dl      Glucose, UA Negative mg/dl      Ketones, UA Trace mg/dl      Urobilinogen, UA 0 2 E U /dl      Bilirubin, UA Negative     Blood, UA Negative    Lipase [[id number]]  (Abnormal) Collected: 09/20/21 1428    Lab Status: Final result Specimen: Blood from Arm, Right Updated: 09/20/21 1514     Lipase 65 u/L     Hepatic function panel [[id number]]  (Abnormal) Collected: 09/20/21 1428    Lab Status: Final result Specimen: Blood from Arm, Right Updated: 09/20/21 1514     Total Bilirubin 0 31 mg/dL      Bilirubin, Direct 0 07 mg/dL      Alkaline Phosphatase 59 U/L      AST 23 U/L      ALT 19 U/L      Total Protein 6 6 g/dL      Albumin 3 3 g/dL     Basic metabolic panel [[id number]] Collected: 09/20/21 1428    Lab Status: Final result Specimen: Blood from Arm, Right Updated: 09/20/21 1514     Sodium 136 mmol/L      Potassium 4 5 mmol/L      Chloride 101 mmol/L      CO2 22 mmol/L      ANION GAP 13 mmol/L      BUN 8 mg/dL      Creatinine 0 75 mg/dL      Glucose 77 mg/dL      Calcium 8 4 mg/dL      eGFR 83 ml/min/1 73sq m     Narrative:      [place] guidelines for Chronic Kidney Disease (CKD):     Stage 1 with normal or high GFR (GFR > 90 mL/min/1 73 square meters)    Stage 2 Mild CKD (GFR = 60-89 mL/min/1 73 square meters)    Stage 3A Moderate CKD (GFR = 45-59 mL/min/1 73 square meters)    Stage 3B Moderate CKD (GFR = 30-44 mL/min/1 73 square meters)    Stage 4 Severe CKD (GFR = 15-29 mL/min/1 73 square meters)    Stage 5 End Stage CKD (GFR <15 mL/min/1 73 square meters)  Note: GFR calculation is accurate only with a steady state creatinine    CBC and differential [[id number]]  (Abnormal) Collected: 09/20/21 1459    Lab Status: Final result Specimen: Blood from Arm, Right Updated: 09/20/21 1504     WBC 10 93 Thousand/uL      RBC 3 58 Million/uL      Hemoglobin 13 0 g/dL      Hematocrit 38 8 %       fL      MCH 36 3 pg      MCHC 33 5 g/dL      RDW 12 6 %      MPV 9 4 fL      Platelets 715 Thousands/uL      nRBC 0 /100 WBCs      Neutrophils Relative 61 %      Immat GRANS % 0 %      Lymphocytes Relative 18 %      Monocytes Relative 10 %      Eosinophils Relative 10 %      Basophils Relative 1 %      Neutrophils Absolute 6 71 Thousands/µL      Immature Grans Absolute 0 04 Thousand/uL      Lymphocytes Absolute 1 93 Thousands/µL      Monocytes Absolute 1 06 Thousand/µL      Eosinophils Absolute 1 09 Thousand/µL      Basophils Absolute 0 10 Thousands/µL     Lactic acid, plasma [[id number]]  (Normal) Collected: 09/20/21 1428    Lab Status: Final result Specimen: Blood from Arm, Right Updated: 09/20/21 1458     LACTIC ACID 1 0 mmol/L     Narrative:      Result may be elevated if tourniquet was used during collection  CT abdomen pelvis with contrast   ED Interpretation by Genie Spicer DO (09/20 1435)   FINDINGS:     ABDOMEN     LOWER CHEST:  No clinically significant abnormality identified in the visualized lower chest      LIVER/BILIARY TREE:  Unremarkable      GALLBLADDER:  No calcified gallstones  No pericholecystic inflammatory change      SPLEEN:  Unremarkable      PANCREAS:  Unremarkable      ADRENAL GLANDS:  Unremarkable      KIDNEYS/URETERS:  Unremarkable  No hydronephrosis      STOMACH AND BOWEL:  Mild stranding about segment diverticula could relate to mild sigmoid diverticulitis      APPENDIX:  No findings to suggest appendicitis      ABDOMINOPELVIC CAVITY:  No ascites  No pneumoperitoneum  No lymphadenopathy      VESSELS:  Unremarkable for patient's age      PELVIS     REPRODUCTIVE ORGANS:  Unremarkable for patient's age      URINARY BLADDER:  Unremarkable      ABDOMINAL WALL/INGUINAL REGIONS:  Unremarkable      OSSEOUS STRUCTURES:  No acute fracture or destructive osseous lesion      IMPRESSION:     Mild sigmoid diverticulitis  Final Result by Rachid Sow MD (09/20 1241)      Mild sigmoid diverticulitis  Workstation performed: [id number]         CT recon only lumbar spine   ED Interpretation by Genie Spicer DO (09/20 7579)   FINDINGS:     ALIGNMENT: Mild scoliosis, dextro lumbar     VERTEBRAL BODIES: No fracture  No acute osseous abnormality      DEGENERATIVE CHANGES: Limited evaluation of degenerative changes on CT would be better evaluated with MRI   Multilevel degenerative changes causing multilevel neural foraminal and spinal canal stenosis most severe at L4-L5 where there appears to be   moderate spinal canal stenosis caused by disc bulge and ligamentum flavum hypertrophy      PREVERTEBRAL AND PARASPINAL SOFT TISSUES: Possible left gonadal vein thrombus, correlate with pelvic ultrasound      IMPRESSION:     Degenerative changes including spinal canal stenosis at L4-L5 would be better evaluated with nonemergent lumbar spine MRI  Final Result by Lulu Bennett MD (09/20 1701)      Degenerative changes including spinal canal stenosis at L4-L5 would be better evaluated with nonemergent lumbar spine MRI  Workstation performed: [id number]                    Procedures  Procedures         ED Course  ED Course as of Sep 20 1928   Mon Sep 20, 2021   1508 12 1 month ago, trending down   WBC(!): 10 93   1539 PVR 37 ml      1604 Pt is resting more comfortably, is able to extend her R leg at the hip, however still c/o pain  Reviewed unremarkable labs, pending urine and CT scans  1705 Degenerative changes including spinal canal stenosis at L4-L5 would be better evaluated with nonemergent lumbar spine MRI       1724 Pt still with significant pain  Reviewed results, will admit for intractable pain/ambulatory dysfunction                                SBIRT 20yo+      Most Recent Value   SBIRT (24 yo +)   In order to provide better care to our patients, we are screening all of our patients for alcohol and drug use  Would it be okay to ask you these screening questions? No Filed at: 09/20/2021 1556                    [facility]  Number of Diagnoses or Management Options  Ambulatory dysfunction  Intractable back pain  Lumbar stenosis  Diagnosis management comments: 78 yo F presenting with back pain/radicular pain to R proximal leg  Found to have spinal stenosis on CT, no red flags, normal PVR   Pt having recurrent falls 2/2 pain, therefore admitted for pain control/further management  Chronic diverticulitis, seen again on CT today       Amount and/or Complexity of Data Reviewed  Clinical lab tests: ordered and reviewed  Tests in the radiology section of CPT®: ordered and reviewed  Tests in the medicine section of CPT®: ordered and reviewed  Review and summarize past medical records: yes  Independent visualization of images, tracings, or specimens: yes        Disposition  Final diagnoses:   Intractable back pain   Lumbar stenosis   Ambulatory dysfunction     Time reflects when diagnosis was documented in both MDM as applicable and the Disposition within this note     Time User Action Codes Description Comment    9/20/2021  5:25 PM Vonnie KWAN Add [M54 9] Intractable back pain     9/20/2021  5:25 PM Typaudarryl Venessa Add [Y66 533] Lumbar stenosis     9/20/2021  5:25 PM Vonnie KWAN Add [R26 2] Ambulatory dysfunction       ED Disposition     ED Disposition Condition Date/Time Comment    Admit Stable Mon Sep 20, 2021  5:25 PM Case was discussed with AYAN and the patient's admission status was agreed to be Admission Status: inpatient status to the service of Dr Sheri Plata   Follow-up Information    None         Patient's Medications   Discharge Prescriptions    No medications on file     No discharge procedures on file      PDMP Review       Value Time User    PDMP Reviewed  Yes 9/16/2021 91:84 PM Zuleika Tomlinson DO          ED Provider  Electronically Signed by           Shahla Garcia DO  09/20/21 1926

## 2021-09-20 NOTE — ASSESSMENT & PLAN NOTE
· Patient presents with complaint of intractable low back pain with radiation down her right leg  · Denies any bowel or bladder changes, denies saddle anesthesia  · CT lumbar spine showing degenerative changes including spinal canal stenosis at L4-L5  · Plan for MRI lumbar spine  · Notes she follows outpatient with pain management receives epidural steroid injections, has not received 1 approximately 5 years  · PT/OT consult  · P r n   Analgesics

## 2021-09-21 ENCOUNTER — APPOINTMENT (INPATIENT)
Dept: MRI IMAGING | Facility: HOSPITAL | Age: 66
DRG: 552 | End: 2021-09-21
Payer: COMMERCIAL

## 2021-09-21 PROCEDURE — G1004 CDSM NDSC: HCPCS

## 2021-09-21 PROCEDURE — 97163 PT EVAL HIGH COMPLEX 45 MIN: CPT

## 2021-09-21 PROCEDURE — 72148 MRI LUMBAR SPINE W/O DYE: CPT

## 2021-09-21 PROCEDURE — 99232 SBSQ HOSP IP/OBS MODERATE 35: CPT | Performed by: STUDENT IN AN ORGANIZED HEALTH CARE EDUCATION/TRAINING PROGRAM

## 2021-09-21 PROCEDURE — 97167 OT EVAL HIGH COMPLEX 60 MIN: CPT

## 2021-09-21 RX ADMIN — OXYCODONE HYDROCHLORIDE 10 MG: 10 TABLET ORAL at 21:41

## 2021-09-21 RX ADMIN — OXYCODONE HYDROCHLORIDE 10 MG: 10 TABLET ORAL at 07:46

## 2021-09-21 RX ADMIN — ACETAMINOPHEN 975 MG: 325 TABLET, FILM COATED ORAL at 13:12

## 2021-09-21 RX ADMIN — CLOTRIMAZOLE 10 MG: 10 LOZENGE ORAL at 11:27

## 2021-09-21 RX ADMIN — CLOTRIMAZOLE 10 MG: 10 LOZENGE ORAL at 21:42

## 2021-09-21 RX ADMIN — PRIMIDONE 200 MG: 50 TABLET ORAL at 21:43

## 2021-09-21 RX ADMIN — FLUTICASONE FUROATE AND VILANTEROL TRIFENATATE 1 PUFF: 200; 25 POWDER RESPIRATORY (INHALATION) at 09:10

## 2021-09-21 RX ADMIN — ASPIRIN 81 MG CHEWABLE TABLET 81 MG: 81 TABLET CHEWABLE at 09:10

## 2021-09-21 RX ADMIN — ONDANSETRON 4 MG: 2 INJECTION INTRAMUSCULAR; INTRAVENOUS at 16:11

## 2021-09-21 RX ADMIN — CLOTRIMAZOLE 10 MG: 10 LOZENGE ORAL at 17:20

## 2021-09-21 RX ADMIN — ACETAMINOPHEN 975 MG: 325 TABLET, FILM COATED ORAL at 06:38

## 2021-09-21 RX ADMIN — DIAZEPAM 10 MG: 5 TABLET ORAL at 09:10

## 2021-09-21 RX ADMIN — DICYCLOMINE HYDROCHLORIDE 10 MG: 10 CAPSULE ORAL at 06:39

## 2021-09-21 RX ADMIN — CLOTRIMAZOLE 10 MG: 10 LOZENGE ORAL at 06:39

## 2021-09-21 RX ADMIN — TIZANIDINE 4 MG: 4 TABLET ORAL at 07:46

## 2021-09-21 RX ADMIN — MIRTAZAPINE 15 MG: 15 TABLET, FILM COATED ORAL at 21:42

## 2021-09-21 RX ADMIN — DICYCLOMINE HYDROCHLORIDE 10 MG: 10 CAPSULE ORAL at 21:41

## 2021-09-21 RX ADMIN — TIZANIDINE 4 MG: 4 TABLET ORAL at 19:40

## 2021-09-21 RX ADMIN — OXYCODONE HYDROCHLORIDE 10 MG: 10 TABLET ORAL at 13:12

## 2021-09-21 RX ADMIN — DICYCLOMINE HYDROCHLORIDE 10 MG: 10 CAPSULE ORAL at 16:07

## 2021-09-21 RX ADMIN — FLUTICASONE PROPIONATE 2 SPRAY: 50 SPRAY, METERED NASAL at 09:10

## 2021-09-21 RX ADMIN — CLOTRIMAZOLE 10 MG: 10 LOZENGE ORAL at 13:12

## 2021-09-21 RX ADMIN — DICYCLOMINE HYDROCHLORIDE 10 MG: 10 CAPSULE ORAL at 11:27

## 2021-09-21 RX ADMIN — DIAZEPAM 10 MG: 5 TABLET ORAL at 17:20

## 2021-09-21 RX ADMIN — ACETAMINOPHEN 975 MG: 325 TABLET, FILM COATED ORAL at 21:41

## 2021-09-21 RX ADMIN — OXYCODONE HYDROCHLORIDE 10 MG: 10 TABLET ORAL at 17:20

## 2021-09-21 RX ADMIN — LISINOPRIL 2.5 MG: 2.5 TABLET ORAL at 09:10

## 2021-09-21 RX ADMIN — METHYLPREDNISOLONE 24 MG: 16 TABLET ORAL at 09:10

## 2021-09-21 RX ADMIN — Medication 1 PATCH: at 09:10

## 2021-09-21 NOTE — PLAN OF CARE
Problem: Potential for Falls  Goal: Patient will remain free of falls  Description: INTERVENTIONS:  - Educate patient/family on patient safety including physical limitations  - Instruct patient to call for assistance with activity   - Consult OT/PT to assist with strengthening/mobility   - Keep Call bell within reach  - Keep bed low and locked with side rails adjusted as appropriate  - Keep care items and personal belongings within reach  - Initiate and maintain comfort rounds  - Make Fall Risk Sign visible to staff  - Offer Toileting every Hours, in advance of need  - Initiate/Maintain alarm  - Obtain necessary fall risk management equipment:   - Apply yellow socks and bracelet for high fall risk patients  - Consider moving patient to room near nurses station  Outcome: Progressing     Problem: MOBILITY - ADULT  Goal: Maintain or return to baseline ADL function  Description: INTERVENTIONS:  -  Assess patient's ability to carry out ADLs; assess patient's baseline for ADL function and identify physical deficits which impact ability to perform ADLs (bathing, care of mouth/teeth, toileting, grooming, dressing, etc )  - Assess/evaluate cause of self-care deficits   - Assess range of motion  - Assess patient's mobility; develop plan if impaired  - Assess patient's need for assistive devices and provide as appropriate  - Encourage maximum independence but intervene and supervise when necessary  - Involve family in performance of ADLs  - Assess for home care needs following discharge   - Consider OT consult to assist with ADL evaluation and planning for discharge  - Provide patient education as appropriate  Outcome: Progressing  Goal: Maintains/Returns to pre admission functional level  Description: INTERVENTIONS:  - Perform BMAT or MOVE assessment daily    - Set and communicate daily mobility goal to care team and patient/family/caregiver     - Collaborate with rehabilitation services on mobility goals if consulted  - Perform Range of Motion  times a day  - Reposition patient every hours    - Dangle patient  times a day  - Stand patient  times a day  - Ambulate patient times a day  - Out of bed to chair  times a day   - Out of bed for meals  times a day  - Out of bed for toileting  - Record patient progress and toleration of activity level   Outcome: Progressing     Problem: PAIN - ADULT  Goal: Verbalizes/displays adequate comfort level or baseline comfort level  Description: Interventions:  - Encourage patient to monitor pain and request assistance  - Assess pain using appropriate pain scale  - Administer analgesics based on type and severity of pain and evaluate response  - Implement non-pharmacological measures as appropriate and evaluate response  - Consider cultural and social influences on pain and pain management  - Notify physician/advanced practitioner if interventions unsuccessful or patient reports new pain  Outcome: Progressing

## 2021-09-21 NOTE — PLAN OF CARE
Problem: PHYSICAL THERAPY ADULT  Goal: Performs mobility at highest level of function for planned discharge setting  See evaluation for individualized goals  Description: Treatment/Interventions: Functional transfer training, LE strengthening/ROM, Elevations, Therapeutic exercise, Patient/family training, Equipment eval/education, Gait training, Bed mobility, Compensatory technique education, Continued evaluation, Spoke to nursing, OT  Equipment Recommended: Stacy Schwarz       See flowsheet documentation for full assessment, interventions and recommendations  Note: Prognosis: Fair  Problem List: Impaired balance, Decreased mobility, Decreased cognition, Impaired judgement, Decreased safety awareness, Pain  Assessment: Juan Francisco Lu is a 77 y o  female admitted to [facility] on 9/20/2021 for Intractable low back pain  PT was consulted and pt was seen on 9/21/2021 for mobility assessment and d/c planning  Pt presents w high fall risk, shlomo monitoring, exacerbation of chronic LBP  Reports pain now radiating to RLE and making it difficult to ambulate  At baseline ambulates without an AD however recently has been using cane for support  Pt is currently functioning at a supervision assistance x1 level for bed mobility, sup-min A for transf given impulsivity and decreased safety awareness, and mod A for ambulation w RW  Very impulsive requiring frequent cuing for safe use of AD, gait sequencing and transfer technique  Despite symmetrical BLE strength, pt had occasional knee buckling and decreased WB on R d/t pain  Pt inconsistently follows commands latha for safe use of AD to ambulate secondary to impaired cognition including impaired attention and awareness  Pt will benefit from continued skilled IP PT to address the above mentioned impairments  in order to maximize recovery and increase functional independence when completing mobility and ADLs  Currently PT recommendations for DME include RW   At this time PT recommendations for d/c are STR given limited social support, environmental barriers, risk for recurrent falls  Chair alarm engaged end of session  Barriers to Discharge: Decreased caregiver support, Inaccessible home environment  Barriers to Discharge Comments: steps, limited support at home     PT Discharge Recommendation: Post acute rehabilitation services     PT - OK to Discharge: Yes (to rehab)    See flowsheet documentation for full assessment

## 2021-09-21 NOTE — PLAN OF CARE
Problem: OCCUPATIONAL THERAPY ADULT  Goal: Performs self-care activities at highest level of function for planned discharge setting  See evaluation for individualized goals  Description: Treatment Interventions: ADL retraining, Functional transfer training, UE strengthening/ROM, Endurance training, Patient/family training, Equipment evaluation/education, Compensatory technique education, Continued evaluation, Activityengagement          See flowsheet documentation for full assessment, interventions and recommendations  Note: Limitation: Decreased ADL status, Decreased UE strength, Decreased Safe judgement during ADL, Decreased cognition, Decreased endurance, Decreased self-care trans, Decreased high-level ADLs  Prognosis: Fair  Assessment: Pt is a 77 y o  female seen for OT evaluation s/p adm to [address] [facility] on 9/20/2021 w/ Intractable low back pain, radiating down R LE and stopping around her knee  CT lumbar spine showing degenerative changes including spinal canal stenosis at L4-L5  Comorbidities affecting pts functional performance include a significant PMH of Anxiety, Asthma, Chronic pain disorder, Depression, Fibromyalgia, HLD, HTN, IBS, and Lumbar herniated disc  Pt with active OT orders and activity orders for Activity as tolerated  Pt lives with ex- in a two level house with a ramped entrance + 6 steps to 2nd floor vs 14 TRIXIE depending on entrance  Pt reports she lives on 2nd floor and ex- lives on 1st floor  Reports limited assist from ex-  At baseline, pt was I w/ ADLs, IADLs, and functional mobility/transfers w/o use of AD, (+) , and (+) falls PTA   Upon evaluation, pt currently requires Supervision for UB ADLs, Mod-Min A for LB ADLs, Min A for toileting, Supervision for bed mobility, Supervision for sit>stand transfers, Min A for stand>sit transfer, and Mod A for functional mobility 2* the following deficits impacting occupational performance: decreased strength, decreased balance, decreased tolerance, decreased safety awareness and increased pain  These impairments, as well at pts steps to enter environment, limited home support, difficulty performing ADLS, difficulty performing IADLS  and limited insight into deficits limit pts ability to safely engage in all baseline areas of occupation  The patient's raw score on the AM-PAC Daily Activity inpatient short form is 18, standardized score is 38 66, less than 39 4  Patients at this level are likely to benefit from discharge to post-acute rehabilitation services  Please refer to the recommendation of the Occupational Therapist for safe discharge planning  Pt to continue to benefit from continued acute OT services during hospital stay to address defined deficits and to maximize level of functional independence in the following Occupational Performance areas: grooming, bathing/shower, toilet hygiene, dressing, medication management, health maintenance, functional mobility, community mobility, clothing management, cleaning, meal prep and household maintenance  From OT standpoint, recommend STR upon D/C   OT will continue to follow pt 3-5x/wk to address the following goals to  w/in 10-14 days:     OT Discharge Recommendation: Post acute rehabilitation services  OT - OK to Discharge: Yes (when medically cleared to rehab)

## 2021-09-21 NOTE — ASSESSMENT & PLAN NOTE
· Patient presents with complaint of intractable low back pain with radiation down her right leg  · Denies any bowel or bladder changes, denies saddle anesthesia  · CT lumbar spine showing degenerative changes including spinal canal stenosis at L4-L5  · Plan for MRI lumbar spine  · Notes she follows outpatient with pain management receives epidural steroid injections, has not received 1 approximately 5 years  · PT/OT consult, recommend rehab  · P r n   Analgesics  · Pain meds prn, apparently ran out of her pain meds but called her pain mgmt doctor and now she had additional medications  · Plan to discharge 24-48s, pending if patient acceptable to rehab vs home with PT

## 2021-09-21 NOTE — CASE MANAGEMENT
LOS: 1  Bundle: no  Unplanned Readmission Score: 14  30 Day Readmission: no     CM met with patient at bedside  Explained the role of CM  Obtained the following information from patient  Patient hard of hearing  Home: bi level 2 eunice 14 steps to get to her living area  Lives With: ex   ADL's: Speedy Starcher  DME: owns a cane  Ambulation: IPTA w/ assistive device  Transportation: dgt, does not drive  Pharmacy: [facility]  PCP: Dr Gabriela Myers Hx: denies  Rehab Hx: denies  Mental Health Hx:denies   Substance Abuse Hx: denies  Employment: pension and social security  POA/LW/AD: none  Transport at D/C: [facility]    CM reviewed d/c planning process including the following: identifying help at home, patient preferences for d/c planning needs, [facility] to Lizet Jones, availability of treatment team to discuss questions or concerns patient and/or family may have regarding understanding medications and recognizing signs and symptoms once discharged

## 2021-09-21 NOTE — OCCUPATIONAL THERAPY NOTE
Occupational Therapy Evaluation     Patient Name: Damaris Saldaña  DHYCG'L Date: 9/21/2021  Problem List  Principal Problem:    Intractable low back pain  Active Problems:    Essential tremor    Moderate episode of recurrent major depressive disorder (HCC)    Essential hypertension    Sigmoid diverticulitis    Past Medical History  Past Medical History:   Diagnosis Date    Anxiety     Asthma     Chronic pain disorder     Back    Colon polyp     Depression     Fibromyalgia     Fibromyalgia, primary     GERD (gastroesophageal reflux disease)     Hyperlipidemia     Hypertension     Irritable bowel syndrome     Lumbar herniated disc      Past Surgical History  Past Surgical History:   Procedure Laterality Date    BREAST BIOPSY Left     benign- at age 34    COLONOSCOPY      TUBAL LIGATION      ULNAR NERVE REPAIR Left            09/21/21 1035   Note Type   Note type Evaluation   Restrictions/Precautions   Weight Bearing Precautions Per Order No   Other Precautions Chair Alarm; Bed Alarm; Fall Risk;Pain   Pain Assessment   Pain Assessment Tool 0-10   Pain Score 8  (8/10 at rest, 10/10 with activity)   Pain Location/Orientation Orientation: Lower;Orientation: Right;Location: Back   Pain Radiating Towards R LE   Pain Onset/Description Onset: Ongoing   Patient's Stated Pain Goal No pain   Hospital Pain Intervention(s) Repositioned; Ambulation/increased activity; Emotional support; Rest   Multiple Pain Sites No   Home Living   Type of Home House  (2nd floor living)   Home Layout Two level;Ramped entrance;Bed/bath upstairs   Bathroom Shower/Tub Tub/shower unit   Bathroom Toilet Raised   Bathroom Equipment Shower chair   Bathroom Accessibility Accessible   Home Equipment Cane   Additional Comments Pt lives with ex- in a two level house with a ramped entrance + 6 steps to 2nd floor vs 14 TRIXIE depending on entrance  Pt reports she lives on 2nd floor and ex- lives on 1st floor   Reports limited assist from ex-  Prior Function   Level of [place] Independent with ADLs and functional mobility   Lives With Other (Comment)  (Ex-)   Receives Help From Other (Comment)  (Ex-)   ADL Assistance Independent   IADLs Needs assistance  (ex- assists w/ grocery shopping PRN)   Falls in the last 6 months 1 to 4  (3 per pt report)   Vocational Retired   Comments At baseline, pt was I w/ ADLs, IADLs, and functional mobility/transfers w/o use of AD, (+) , and (+) falls PTA  Lifestyle   Autonomy At baseline, pt was I w/ ADLs, IADLs, and functional mobility/transfers w/o use of AD, (+) , and (+) falls PTA  Reciprocal Relationships Ex-   Service to Others Retired- Cleaning field   Intrinsic Gratification Watching TV   Psychosocial   Psychosocial ([address] [facility]) [address] [facility] 5  [address] [facility] 5  [address] [facility] 5  Supervision/Setup   LB [address] 4  [address] 5  [address] 3  Moderate [id number] [facility] 4  Minimal Assistance   Bed Mobility   Supine to Sit 5  Supervision   Additional items HOB elevated; Bedrails; Increased time required;Verbal cues   Sit to Supine Unable to assess   Additional Comments Pt seated OOB in chair at end of session w/ call bell and phone within reach  Chair alarm activated  All needs met and pt reports no further questions for OT at this time   Transfers   Sit to Stand 5  Supervision   Additional items Increased time required;Verbal cues   Stand to Sit 4  Minimal assistance   Additional items Assist x 1; Armrests; Increased time required;Verbal cues  (decreased controlled descent)   Additional Comments Cues for safe technique and hand placement; Decreased controlled descent for stand>sit   Functional Mobility   Functional Mobility 3  Moderate assistance   Additional Comments Assist x1   Additional items Rolling walker   RUE Assessment   RUE Assessment WFL   LUE Assessment   LUE Assessment WFL   Hand Function   Gross Motor Coordination Functional   Fine Motor Coordination Functional   Sensation   Light Touch Partial deficits in the RLE;Partial deficits in the LLE   Proprioception   Proprioception No apparent deficits   Vision - Complex Assessment   Acuity Able to read clock/calendar on wall without difficulty; Able to read employee name badge without difficulty   Perception   Inattention/Neglect Appears intact   Cognition   Overall Cognitive Status Impaired   Arousal/Participation Alert; Cooperative   Attention Attends with cues to redirect   Orientation Level Oriented to person;Oriented to place;Oriented to time   Memory Decreased recall of precautions   Following Commands Follows one step commands with increased time or repetition   Comments Pleasant  Increased processing time, limited insight into deficits   Assessment   Limitation Decreased ADL status; Decreased UE strength;Decreased Safe judgement during ADL;Decreased cognition;Decreased endurance;Decreased self-care trans;Decreased high-level ADLs   Prognosis Fair   Assessment Pt is a 77 y o  female seen for OT evaluation s/p adm to [facility] [address] on 9/20/2021 w/ Intractable low back pain, radiating down R LE and stopping around her knee  CT lumbar spine showing degenerative changes including spinal canal stenosis at L4-L5  Comorbidities affecting pts functional performance include a significant PMH of Anxiety, Asthma, Chronic pain disorder, Depression, Fibromyalgia, HLD, HTN, IBS, and Lumbar herniated disc  Pt with active OT orders and activity orders for Activity as tolerated  Pt lives with ex- in a two level house with a ramped entrance + 6 steps to 2nd floor vs 14 TRIXIE depending on entrance  Pt reports she lives on 2nd floor and ex- lives on 1st floor   Reports limited assist from ex-  At baseline, pt was I w/ ADLs, IADLs, and functional mobility/transfers w/o use of AD, (+) , and (+) falls PTA  Upon evaluation, pt currently requires Supervision for UB ADLs, Mod-Min A for LB ADLs, Min A for toileting, Supervision for bed mobility, Supervision for sit>stand transfers, Min A for stand>sit transfer, and Mod A for functional mobility 2* the following deficits impacting occupational performance: decreased strength, decreased balance, decreased tolerance, decreased safety awareness and increased pain  These impairments, as well at pts steps to enter environment, limited home support, difficulty performing ADLS, difficulty performing IADLS  and limited insight into deficits limit pts ability to safely engage in all baseline areas of occupation  The patient's raw score on the -PAC Daily Activity inpatient short form is 18, standardized score is 38 66, less than 39 4  Patients at this level are likely to benefit from discharge to post-acute rehabilitation services  Please refer to the recommendation of the Occupational Therapist for safe discharge planning  Pt to continue to benefit from continued acute OT services during hospital stay to address defined deficits and to maximize level of functional independence in the following Occupational Performance areas: grooming, bathing/shower, toilet hygiene, dressing, medication management, health maintenance, functional mobility, community mobility, clothing management, cleaning, meal prep and household maintenance  From OT standpoint, recommend STR upon D/C  OT will continue to follow pt 3-5x/wk to address the following goals to  w/in 10-14 days:   Goals   Patient Goals To have less pain   LTG Time Frame 10-14   Long Term Goal Please refer to LTGs listed below   Plan   Treatment Interventions ADL retraining;Functional transfer training;UE strengthening/ROM; Endurance training;Patient/family training;Equipment evaluation/education; Compensatory technique education;Continued evaluation; Activityengagement   Goal Expiration Date 10/05/21   OT Treatment Day 0   OT Frequency 3-5x/wk   Recommendation   OT Discharge Recommendation Post acute rehabilitation services   OT - OK to Discharge Yes  (when medically cleared to rehab)   AM-PAC Daily Activity Inpatient   Lower Body Dressing 2   Bathing 3   Toileting 3   Upper Body Dressing 3   Grooming 3   Eating 4   Daily Activity Raw Score 18   Daily Activity Standardized Score (Calc for Raw Score >=11) 38 66   AM-PAC Applied Cognition Inpatient   Following a Speech/Presentation 4   Understanding Ordinary Conversation 4   Taking Medications 3   Remembering Where Things Are Placed or Put Away 3   Remembering List of 4-5 Errands 3   Taking Care of Complicated Tasks 3   Applied Cognition Raw Score 20   Applied Cognition Standardized Score 41 76          GOALS    1  Pt will improve activity tolerance to G for min 30 min txment sessions for increase engagement in functional tasks    2  Pt will complete bed mobility at a Mod I level w/ G balance/safety demonstrated to decrease caregiver assistance required     3  Pt will complete UB dressing/self care w/ mod I using adaptive device and DME as needed     4  Pt will complete LB dressing/self care w/ mod I using adaptive device and DME as needed    5  Pt will complete toileting w/ mod I w/ G hygiene/thoroughness using DME as needed    6  Pt will improve functional transfers to Mod I on/off all surfaces using DME as needed w/ G balance/safety     7  Pt will improve functional mobility during ADL/IADL/leisure tasks to Mod I using DME as needed w/ G balance/safety     8  Pt will be attentive 100% of the time during ongoing cognitive assessment w/ G participation to assist w/ safe d/c planning/recommendations    9   Pt will demonstrate G carryover of pt/caregiver education and training as appropriate w/o cues w/ good tolerance to increase safety during functional tasks    10  Pt will participate in simulated IADL management task to increase independence to Mod I w/ G safety and endurance    11  Pt will increase BUE strength by 1MM grade via AROM exercises to increase independence in ADLs and transfers    12  Pt will verbalize 3 potential fall hazards and identify appropriate compensatory techniques to decrease fall risk in home environment     13   Pt will increase standing tolerance to 10-15 mins with Fair+ dynamic standing balance to increase safety during participation in ADLs       Anastasiya Berger, OTR/L

## 2021-09-21 NOTE — PLAN OF CARE
Problem: Potential for Falls  Goal: Patient will remain free of falls  Description: INTERVENTIONS:  - Educate patient/family on patient safety including physical limitations  - Instruct patient to call for assistance with activity   - Consult OT/PT to assist with strengthening/mobility   - Keep Call bell within reach  - Keep bed low and locked with side rails adjusted as appropriate  - Keep care items and personal belongings within reach  - Initiate and maintain comfort rounds  - Make Fall Risk Sign visible to staff  - Offer Toileting every 2 Hours, in advance of need  - Initiate/Maintain bed alarm  - Obtain necessary fall risk management equipment:   - Apply yellow socks and bracelet for high fall risk patients  - Consider moving patient to room near nurses station  Outcome: Progressing     Problem: MOBILITY - ADULT  Goal: Maintain or return to baseline ADL function  Description: INTERVENTIONS:  -  Assess patient's ability to carry out ADLs; assess patient's baseline for ADL function and identify physical deficits which impact ability to perform ADLs (bathing, care of mouth/teeth, toileting, grooming, dressing, etc )  - Assess/evaluate cause of self-care deficits   - Assess range of motion  - Assess patient's mobility; develop plan if impaired  - Assess patient's need for assistive devices and provide as appropriate  - Encourage maximum independence but intervene and supervise when necessary  - Involve family in performance of ADLs  - Assess for home care needs following discharge   - Consider OT consult to assist with ADL evaluation and planning for discharge  - Provide patient education as appropriate  Outcome: Progressing  Goal: Maintains/Returns to pre admission functional level  Description: INTERVENTIONS:  - Perform BMAT or MOVE assessment daily    - Set and communicate daily mobility goal to care team and patient/family/caregiver     - Collaborate with rehabilitation services on mobility goals if consulted  - Out of bed to chair 3 times a day   - Out of bed for meals 3 times a day  - Out of bed for toileting  - Record patient progress and toleration of activity level   Outcome: Progressing     Problem: PAIN - ADULT  Goal: Verbalizes/displays adequate comfort level or baseline comfort level  Description: Interventions:  - Encourage patient to monitor pain and request assistance  - Assess pain using appropriate pain scale  - Administer analgesics based on type and severity of pain and evaluate response  - Implement non-pharmacological measures as appropriate and evaluate response  - Consider cultural and social influences on pain and pain management  - Notify physician/advanced practitioner if interventions unsuccessful or patient reports new pain  Outcome: Progressing

## 2021-09-21 NOTE — PHYSICAL THERAPY NOTE
PHYSICAL THERAPY EVALUATION NOTE          Patient Name: James Isaacs  RSFXL'C Date: 9/21/2021   PT EVALUATION    77 y o     [id number]    Back pain [M54 9]  Lumbar stenosis [M48 061]  Intractable back pain [M54 9]  Ambulatory dysfunction [R26 2]    Past Medical History:   Diagnosis Date    Anxiety     Asthma     Chronic pain disorder     Back    Colon polyp     Depression     Fibromyalgia     Fibromyalgia, primary     GERD (gastroesophageal reflux disease)     Hyperlipidemia     Hypertension     Irritable bowel syndrome     Lumbar herniated disc      Past Surgical History:   Procedure Laterality Date    BREAST BIOPSY Left     benign- at age 34    COLONOSCOPY      TUBAL LIGATION      ULNAR NERVE REPAIR Left         09/21/21 1036   PT Last Visit   PT Visit Date 09/21/21   Note Type   Note type Evaluation   Pain Assessment   Pain Assessment Tool 0-10   Pain Score 8   Pain Location/Orientation Orientation: Lower; Location: Back   Pain Radiating Towards [facility] Pain Intervention(s) Repositioned; Ambulation/increased activity; Rest   Home Living   Type of [address] Two level;Performs ADLs on one level;Ramped entrance;Stairs to enter without rails   Bathroom Shower/Tub Tub/shower unit   Bathroom Toilet Raised   Bathroom Equipment Shower chair   Home Equipment Cane   Additional Comments stays on second floor and ex stays in basement  ramp + 6 TRIXIE vs 13 steps via garage  Prior Function   Level of [place] Independent with ADLs and functional mobility   Lives With Other (Comment)  (ex )   ADL Assistance Independent   IADLs Independent   Falls in the last 6 months 1 to 4  (3 right before admission)   Comments pt reports indep pta without an AD  using cane recently d/t back pain  lives w ex who provides very limited assistance but does help w groceries prn  +     General   Additional Pertinent History pt admitted 9/20/21 for intractable LBP  reports hx of chronic back pain, gets steroid injections   Cognition   Overall Cognitive Status Impaired   Arousal/Participation Cooperative   Attention Attends with cues to redirect   Orientation Level Oriented X4   Memory Decreased recall of precautions   Following Commands Follows one step commands with increased time or repetition   Comments increased processing time, requires slower speech to comprehend  decreased insight into deficits   RLE Assessment   RLE Assessment X  (slower to move )   Strength RLE   R Hip Flexion 3-/5  (pain limited)   RLE Overall Strength 4/5   LLE Assessment   LLE Assessment WFL  (4/5)   Coordination   Sensation X  (reports tingling/pain of R hip (lateral))   Bed Mobility   Supine to Sit 5  Supervision   Additional items HOB elevated; Increased time required   Transfers   Sit to Stand 5  Supervision   Additional items Increased time required;Verbal cues; Other  (RW)   Stand to Sit 4  Minimal assistance   Additional items Assist x 1; Increased time required;Verbal cues; Other;Impulsive  (RW)   Ambulation/Elevation   Gait pattern Poor UE support; Improper Weight shift;R Knee Pham; Antalgic;Decreased foot clearance;Decreased R stance; Inconsistent keanu; Short stride; Step to;Excessively slow  (impulsive)   Gait Assistance 3  Moderate assist   Additional items Assist x 1;Verbal cues   Assistive Device Rolling walker   Distance 2'   Balance   Static Standing Fair -   Dynamic Standing Poor +   Ambulatory Poor +   Activity Tolerance   Activity Tolerance Patient limited by pain; Other (Comment)  (cognition)   Medical Staff Made Aware Shaq Funes OT   Nurse Made Aware Maribel RN   Assessment   Prognosis Fair   Problem List Impaired balance;Decreased mobility; Decreased cognition; Impaired judgement;Decreased safety awareness;Pain   Assessment Mervin Ferrer is a 77 y o  female admitted [facility] on 9/20/2021 for Intractable low back pain   PT was consulted and pt was seen on 9/21/2021 for mobility assessment and d/c planning  Pt presents w high fall risk, shlomo monitoring, exacerbation of chronic LBP  Reports pain now radiating to RLE and making it difficult to ambulate  At baseline ambulates without an AD however recently has been using cane for support  Pt is currently functioning at a supervision assistance x1 level for bed mobility, sup-min A for transf given impulsivity and decreased safety awareness, and mod A for ambulation w RW  Very impulsive requiring frequent cuing for safe use of AD, gait sequencing and transfer technique  Despite symmetrical BLE strength, pt had occasional knee buckling and decreased WB on R d/t pain  Pt inconsistently follows commands latha for safe use of AD to ambulate secondary to impaired cognition including impaired attention and awareness  Pt will benefit from continued skilled IP PT to address the above mentioned impairments  in order to maximize recovery and increase functional independence when completing mobility and ADLs  Currently PT recommendations for DME include RW  At this time PT recommendations for d/c are STR given limited social support, environmental barriers, risk for recurrent falls  Chair alarm engaged end of session  Barriers to Discharge Decreased caregiver support; Inaccessible home environment   Barriers to Discharge Comments steps, limited support at home   Goals   Patient Goals decrease pain   STG Expiration Date 10/05/21   Short Term Goal #1 1)  Pt will perform bed mobility with Damion demonstrating appropriate technique 100% of the time in order to improve function  2)  Perform all transfers with Damion demonstrating safe and appropriate technique 100% of the time in order to improve ability to negotiate safely in home environment  3) Amb with least restrictive AD > 50'x1 with mod I in order to demonstrate ability to negotiate in home environment  4)  Improve overall strength and balance 1/2 grade in order to optimize ability to perform functional tasks and reduce fall risk  5) Increase activity tolerance to 45 minutes in order to improve endurance to functional tasks  6)  Negotiate stairs using most appropriate technique and S in order to be able to negotiate safely in home environment  7) PT for ongoing patient and family/caregiver education, DME needs and d/c planning in order to promote highest level of function in least restrictive environment  Plan   Treatment/Interventions Functional transfer training;LE strengthening/ROM; Elevations; Therapeutic exercise;Patient/family training;Equipment eval/education;Gait training;Bed mobility; Compensatory technique education;Continued evaluation;Spoke to nursing;OT   PT Frequency Other (Comment)  (3-5x)   Recommendation   PT Discharge Recommendation Post acute rehabilitation services   Equipment Recommended [facility] Recommended Wheeled walker   Change/add to [employer]? No   PT - OK to Discharge Yes  (to rehab)   Additional Comments The patient's AM-PAC Basic Mobility Inpatient Short Form Raw Score is 18, Standardized Score is 42  ##  A standardized score less than 42 9 suggests the patient may benefit from discharge to post-acute rehabilitation services  Please also refer to the recommendation of the Physical Therapist for safe discharge planning     AM-PAC Basic Mobility Inpatient   Turning in Bed Without Bedrails 4   Lying on Back to Sitting on Edge of Flat Bed 4   Moving Bed to Chair 3   Standing Up From Chair 3   Walk in Room 2   Climb 3-5 Stairs 2   Basic Mobility Inpatient Raw Score 18   Basic Mobility Standardized Score 4#   History: co - morbidities, age, coping styles, social background, fall risk, use of assistive device prn, cognition  Exam: impairments in systems including musculoskeletal (strength), neuromuscular (balance, gait, transfers, motor function and sensation), am-pac, cognition  Clinical: unstable/unpredictable  Complexity:high      Prince Sprague, PT

## 2021-09-21 NOTE — PROGRESS NOTES
[facility]  Progress Note - Nena Duncan 1955, 77 y o  female MRN: [id number]  Unit/Bed#: E5 -01 Encounter: [id number]  Primary Care Provider: Osito Mata DO   Date and time admitted to hospital: 9/20/2021  1:06 PM    * Intractable low back pain  Assessment & Plan  · Patient presents with complaint of intractable low back pain with radiation down her right leg  · Denies any bowel or bladder changes, denies saddle anesthesia  · CT lumbar spine showing degenerative changes including spinal canal stenosis at L4-L5  · Plan for MRI lumbar spine  · Notes she follows outpatient with pain management receives epidural steroid injections, has not received 1 approximately 5 years  · PT/OT consult, recommend rehab  · P r n  Analgesics  · Pain meds prn, apparently ran out of her pain meds but called her pain mgmt doctor and now she had additional medications  · Plan to discharge 24-48s, pending if patient acceptable to rehab vs home with PT    Sigmoid diverticulitis  Assessment & Plan  · History a sigmoid diverticulitis  · Notes she completed antibiotic course yesterday  · Denies abdominal pain  · Continue outpatient general surgery follow-up    Essential hypertension  Assessment & Plan  · BP controlled at time of admission  · Continue lisinopril daily    Moderate episode of recurrent major depressive disorder (HCC)  Assessment & Plan  · Continue Remeron    Essential tremor  Assessment & Plan  · History of essential tremor  · Continue primidone HS and Valium        VTE Pharmacologic Prophylaxis:   Pharmacologic: Heparin  Mechanical VTE Prophylaxis in Place: Yes    Patient Centered Rounds: I have performed bedside rounds with nursing staff today  Discussions with Specialists or Other Care Team Provider: None    Education and Discussions with Family / Patient: Patient    Time Spent for Care: 30 minutes    More than 50% of total time spent on counseling and coordination of care as described above  Current Length of Stay: 1 day(s)    Current Patient Status: Inpatient   Certification Statement: The patient will continue to require additional inpatient hospital stay due to Pending MRI, evaluating back pain    Discharge Plan: Pending, 24-48 hours    Code Status: Level 1 - Full Code      Subjective:   No events overnight  Objective:     Vitals:   Temp (24hrs), Av 6 °F (37 °C), Min:98 4 °F (36 9 °C), Max:98 8 °F (37 1 °C)    Temp:  [98 4 °F (36 9 °C)-98 8 °F (37 1 °C)] 98 8 °F (37 1 °C)  HR:  [68-88] 84  Resp:  [16-20] 20  BP: (112-128)/(66-94) 112/94  SpO2:  [92 %-98 %] 92 %  There is no height or weight on file to calculate BMI  Input and Output Summary (last 24 hours): Intake/Output Summary (Last 24 hours) at 2021 1254  Last data filed at 2021 2157  Gross per 24 hour   Intake --   Output 600 ml   Net -600 ml       Physical Exam:     Physical Exam  Vitals and nursing note reviewed  Constitutional:       General: She is not in acute distress  Appearance: Normal appearance  She is normal weight  HENT:      Head: Normocephalic and atraumatic  Eyes:      General: No scleral icterus  Conjunctiva/sclera: Conjunctivae normal    Cardiovascular:      Rate and Rhythm: Normal rate and regular rhythm  Heart sounds: Normal heart sounds  No murmur heard  Pulmonary:      Effort: Pulmonary effort is normal  No respiratory distress  Breath sounds: Normal breath sounds  No wheezing or rhonchi  Abdominal:      General: Bowel sounds are normal  There is no distension  Palpations: Abdomen is soft  Tenderness: There is no abdominal tenderness  Musculoskeletal:         General: No swelling  Cervical back: Neck supple  Comments: Paraspinal lumbar tenderness   Skin:     General: Skin is warm and dry  Neurological:      General: No focal deficit present  Mental Status: She is alert  Mental status is at baseline     Psychiatric: Mood and Affect: Mood normal          Behavior: Behavior normal          Additional Data:     Labs:    Results from last 7 days   Lab Units 09/20/21  1459   WBC Thousand/uL 10 93*   HEMOGLOBIN g/dL 13 0   HEMATOCRIT % 38 8   PLATELETS Thousands/uL 254   NEUTROS PCT % 61   LYMPHS PCT % 18   MONOS PCT % 10   EOS PCT % 10*     Results from last 7 days   Lab Units 09/20/21  1428   SODIUM mmol/L 136   POTASSIUM mmol/L 4 5   CHLORIDE mmol/L 101   CO2 mmol/L 22   BUN mg/dL 8   CREATININE mg/dL 0 75   ANION GAP mmol/L 13   CALCIUM mg/dL 8 4   ALBUMIN g/dL 3 3*   TOTAL BILIRUBIN mg/dL 0 31   ALK PHOS U/L 59   ALT U/L 19   AST U/L 23   GLUCOSE RANDOM mg/dL 77                 Results from last 7 days   Lab Units 09/20/21  1428   LACTIC ACID mmol/L 1 0           * I Have Reviewed All Lab Data Listed Above  * Additional Pertinent Lab Tests Reviewed: Srinivas King Admission Reviewed    Imaging:    CT recon only lumbar spine    Result Date: 9/20/2021  Impression: Degenerative changes including spinal canal stenosis at L4-L5 would be better evaluated with nonemergent lumbar spine MRI  Workstation performed: [id number]     CT abdomen pelvis with contrast    Result Date: 9/20/2021  Impression: Mild sigmoid diverticulitis   Workstation performed: [id number]       Recent Cultures (last 7 days):           Last 24 Hours Medication List:   Current Facility-Administered Medications   Medication Dose Route Frequency Provider Last Rate    acetaminophen  975 mg Oral Q8H Lashaun Cárdenas, PA-C      albuterol  2 puff Inhalation Q6H PRN Matthew Faster, PA-C      aspirin  81 mg Oral Daily Matthew Faster, PA-C      calcium carbonate  1,000 mg Oral Daily PRN Matthew Faster, PA-C      clotrimazole  10 mg Oral 5x Daily Matthew Faster, PA-C      diazepam  10 mg Oral BID Matthew Faster, PA-C      dicyclomine  10 mg Oral 4x Daily (AC & HS) Matthew Faster, PA-C      diphenoxylate-atropine  1 tablet Oral 4x Daily PRN Cuate Casillas PA-C      fluticasone  2 spray Nasal Daily Cuate Casillas PA-C      fluticasone-vilanterol  1 puff Inhalation Daily Cuate Casillas PA-C      heparin (porcine)  5,000 Units Subcutaneous [facility] Cuate Casillas [place]      lisinopril  2 5 mg Oral Daily Jada Bronson      [START ON 9/22/2021] methylPREDNISolone  20 mg Oral Daily Cuate Casillas PA-C      Followed by   Darrel Ray ON 9/23/2021] methylPREDNISolone  16 mg Oral Daily ANGELO Bronson-C      Followed by   Darrel Ray ON 9/24/2021] methylPREDNISolone  12 mg Oral Daily ANGELO Bronson-C      Followed by   Darrel Ray ON 9/25/2021] methylPREDNISolone  8 mg Oral Daily ANGELO Bronson-RICHARD      Followed by   Darrel Ray ON 9/26/2021] methylPREDNISolone  4 mg Oral Daily MINA BronsonC      mirtazapine  15 mg Oral HS Cuate Casillas PA-C      nicotine  1 patch Transdermal Daily Cutae Casillas PA-C      ondansetron  4 mg Intravenous Q6H PRN Cuate Casillas PA-C      oxyCODONE  10 mg Oral Q4H PRN Cuate Casillas PA-C      oxyCODONE  5 mg Oral Q4H PRN Lananna Casillas PA-C      primidone  200 mg Oral HS Cuate Casillas PA-C      senna  2 tablet Oral HS PRN Lananna Casillas PA-C      tiZANidine  4 mg Oral BID PRN Cuate Casillas PA-C          Today, Patient Was Seen By: Gracie Toledo MD    ** Please Note: Dictation voice to text software may have been used in the creation of this document   **
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
## 2021-09-22 VITALS
HEART RATE: 65 BPM | TEMPERATURE: 97.8 F | DIASTOLIC BLOOD PRESSURE: 49 MMHG | OXYGEN SATURATION: 93 % | RESPIRATION RATE: 20 BRPM | SYSTOLIC BLOOD PRESSURE: 95 MMHG

## 2021-09-22 LAB
DME PARACHUTE DELIVERY DATE REQUESTED: NORMAL
DME PARACHUTE ITEM DESCRIPTION: NORMAL
DME PARACHUTE ORDER STATUS: NORMAL
DME PARACHUTE SUPPLIER NAME: NORMAL
DME PARACHUTE SUPPLIER PHONE: NORMAL

## 2021-09-22 PROCEDURE — 99239 HOSP IP/OBS DSCHRG MGMT >30: CPT | Performed by: STUDENT IN AN ORGANIZED HEALTH CARE EDUCATION/TRAINING PROGRAM

## 2021-09-22 RX ORDER — METHYLPREDNISOLONE 4 MG/1
TABLET ORAL
Qty: 1 EACH | Refills: 0 | Status: ON HOLD | OUTPATIENT
Start: 2021-09-22 | End: 2021-11-01 | Stop reason: ALTCHOICE

## 2021-09-22 RX ADMIN — DIAZEPAM 10 MG: 5 TABLET ORAL at 09:01

## 2021-09-22 RX ADMIN — METHYLPREDNISOLONE 20 MG: 16 TABLET ORAL at 09:02

## 2021-09-22 RX ADMIN — Medication 1 PATCH: at 09:04

## 2021-09-22 RX ADMIN — DICYCLOMINE HYDROCHLORIDE 10 MG: 10 CAPSULE ORAL at 05:50

## 2021-09-22 RX ADMIN — ASPIRIN 81 MG CHEWABLE TABLET 81 MG: 81 TABLET CHEWABLE at 09:01

## 2021-09-22 RX ADMIN — FLUTICASONE PROPIONATE 2 SPRAY: 50 SPRAY, METERED NASAL at 09:01

## 2021-09-22 RX ADMIN — ACETAMINOPHEN 975 MG: 325 TABLET, FILM COATED ORAL at 05:50

## 2021-09-22 RX ADMIN — FLUTICASONE FUROATE AND VILANTEROL TRIFENATATE 1 PUFF: 200; 25 POWDER RESPIRATORY (INHALATION) at 09:01

## 2021-09-22 RX ADMIN — OXYCODONE HYDROCHLORIDE 10 MG: 10 TABLET ORAL at 05:51

## 2021-09-22 RX ADMIN — CLOTRIMAZOLE 10 MG: 10 LOZENGE ORAL at 05:53

## 2021-09-22 NOTE — PLAN OF CARE
Problem: PAIN - ADULT  Goal: Verbalizes/displays adequate comfort level or baseline comfort level  Description: Interventions:  - Encourage patient to monitor pain and request assistance  - Assess pain using appropriate pain scale  - Administer analgesics based on type and severity of pain and evaluate response  - Implement non-pharmacological measures as appropriate and evaluate response  - Consider cultural and social influences on pain and pain management  - Notify physician/advanced practitioner if interventions unsuccessful or patient reports new pain  Outcome: Progressing     Problem: MOBILITY - ADULT  Goal: Maintain or return to baseline ADL function  Description: INTERVENTIONS:  -  Assess patient's ability to carry out ADLs; assess patient's baseline for ADL function and identify physical deficits which impact ability to perform ADLs (bathing, care of mouth/teeth, toileting, grooming, dressing, etc )  - Assess/evaluate cause of self-care deficits   - Assess range of motion  - Assess patient's mobility; develop plan if impaired  - Assess patient's need for assistive devices and provide as appropriate  - Encourage maximum independence but intervene and supervise when necessary  - Involve family in performance of ADLs  - Assess for home care needs following discharge   - Consider OT consult to assist with ADL evaluation and planning for discharge  - Provide patient education as appropriate  Outcome: Progressing  Goal: Maintains/Returns to pre admission functional level  Description: INTERVENTIONS:  - Perform BMAT or MOVE assessment daily    - Set and communicate daily mobility goal to care team and patient/family/caregiver     - Collaborate with rehabilitation services on mobility goals if consulted  - Out of bed for toileting  - Record patient progress and toleration of activity level   Outcome: Progressing

## 2021-09-22 NOTE — ASSESSMENT & PLAN NOTE
· Patient presents with complaint of intractable low back pain with radiation down her right leg  · Denies any bowel or bladder changes, denies saddle anesthesia  · CT lumbar spine showing degenerative changes including spinal canal stenosis at L4-L  · Notes she follows outpatient with pain management receives epidural steroid injections  · PT/OT consult, recommend rehab, but patient declined and would prefer going home with rehab  · Pain meds prn, apparently ran out of her pain meds but called her pain mgmt doctor and now she had additional medications  · Patient was discharged home today

## 2021-09-22 NOTE — DISCHARGE SUMMARY
[facility]  Discharge- Teresa Bingham 1955, 77 y o  female MRN: [id number]  Unit/Bed#: E5 -01 Encounter: [id number]  Primary Care Provider: Mariela Lopez DO   Date and time admitted to hospital: 9/20/2021  1:06 PM    * Intractable low back pain  Assessment & Plan  · Patient presents with complaint of intractable low back pain with radiation down her right leg  · Denies any bowel or bladder changes, denies saddle anesthesia  · CT lumbar spine showing degenerative changes including spinal canal stenosis at L4-L  · Notes she follows outpatient with pain management receives epidural steroid injections  · PT/OT consult, recommend rehab, but patient declined and would prefer going home with rehab  · Pain meds prn, apparently ran out of her pain meds but called her pain mgmt doctor and now she had additional medications  · Patient was discharged home today    Sigmoid diverticulitis  Assessment & Plan  · History a sigmoid diverticulitis  · Recent completed antibiotic course  · Denies abdominal pain  · Continue outpatient general surgery follow-up    Essential hypertension  Assessment & Plan  · BP controlled at time of admission  · Continue lisinopril daily    Moderate episode of recurrent major depressive disorder ([address] )  Assessment & Plan  · Continue Remeron    Essential tremor  Assessment & Plan  · History of essential tremor  · Continue primidone HS and Valium        Discharging Physician / Practitioner: Courtney Menendez MD  PCP: Mariela Lopez DO  Admission Date:   Admission Orders (From admission, onward)     Ordered        09/20/21 1733  Inpatient Admission  Once                   Discharge Date: 09/22/21    Medical Problems     Resolved Problems  Date Reviewed: 9/22/2021    None                Consultations During Hospital Stay:  · None    Procedures Performed:   · None    Significant Findings / Test Results:   MRI lumbar spine wo contrast    Result Date: 9/22/2021  Impression: Straightening of the normal lumbar lordosis with left lateral subluxation of L3 in relation to L2, best seen on coronal reconstructions  Lumbar degenerative disc disease with annular bulging, small disc herniations and facet degenerative changes  Mild canal stenosis most pronounced at the L4-5 level  Foraminal narrowing is most pronounced at L4-5 on the right due to a small right-sided disc herniation  Workstation performed: [id number]     CT recon only lumbar spine    Result Date: 9/20/2021  Impression: Degenerative changes including spinal canal stenosis at L4-L5 would be better evaluated with nonemergent lumbar spine MRI  Workstation performed: [id number]     CT abdomen pelvis with contrast    Result Date: 9/20/2021  · Impression: Mild sigmoid diverticulitis  Workstation performed: [id number]   ·     Incidental Findings:   · None     Test Results Pending at Discharge (will require follow up): · None     Outpatient Tests Requested:  · None    Complications:  None    Reason for Admission: Back Pain    Hospital Course:     April London is a 77 y o  female patient who originally presented to the hospital on 9/20/2021 due to intractable low back pain  Patient presented for worsening lower back pain that was not well controlled  She typically gets epidural injections but was recently diagnosed with acute diverticulitis and had her spine epidurals reschedule  Her pain was not well controlled, and she presented hospital where she was treated with muscle relaxers, opioids and Lidoderm patches  Her pain was well controlled and she also received a CT imaging and MRI of her lumbar spine  CT imaging did show degenerative changes and stenosis at L4-L5 which has been chronic  MRI  as patient requested prior to being discharged to present to her pain management doctor  It did not show any acute changes    Her pain doctor did reportedly called in her pain medications, she was discharged home after declining short-term rehab as recommended by Physical therapy  She was set up with home VNA with plans for home rehab  Patient was discharged home  Please see above list of diagnoses and related plan for additional information  Condition at Discharge: fair     Discharge Day Visit / Exam:     Subjective:  Patient seen examined today at bedside  Reports her back pain is well controlled  She is tolerating diet, has normal bowel movements and declines rehab  She requests to be discharged home with home physical therapy  Vitals: Blood Pressure: (!) 95/49 (09/22/21 0815)  Pulse: 65 (09/22/21 0815)  Temperature: 97 8 °F (36 6 °C) (09/22/21 0815)  Temp Source: Axillary (09/22/21 0015)  Respirations: 20 (09/22/21 0815)  SpO2: 93 % (09/22/21 0815)  Exam:   Physical Exam  Vitals and nursing note reviewed  Constitutional:       General: She is not in acute distress  Appearance: Normal appearance  She is normal weight  HENT:      Head: Normocephalic and atraumatic  Eyes:      General: No scleral icterus  Conjunctiva/sclera: Conjunctivae normal    Cardiovascular:      Rate and Rhythm: Normal rate and regular rhythm  Heart sounds: Normal heart sounds  No murmur heard  Pulmonary:      Effort: Pulmonary effort is normal  No respiratory distress  Breath sounds: Normal breath sounds  No wheezing or rhonchi  Abdominal:      General: Bowel sounds are normal  There is no distension  Palpations: Abdomen is soft  Tenderness: There is no abdominal tenderness  Musculoskeletal:         General: No swelling  Cervical back: Neck supple  Skin:     General: Skin is warm and dry  Neurological:      General: No focal deficit present  Mental Status: She is alert  Mental status is at baseline     Psychiatric:         Mood and Affect: Mood normal          Behavior: Behavior normal          Discussion with Family:  Patient    Discharge instructions/Information to patient and family:   See after visit summary for information provided to patient and family  Provisions for Follow-Up Care:  See after visit summary for information related to follow-up care and any pertinent home health orders  Disposition:     Home with VNA Services (Reminder: Complete face to face encounter)    For Discharges to [facility] SNF:   · Not Applicable to this Patient - Not Applicable to this Patient    Planned Readmission:  None     Discharge Statement:  I spent 35 minutes discharging the patient  This time was spent on the day of discharge  I had direct contact with the patient on the day of discharge  Greater than 50% of the total time was spent examining patient, answering all patient questions, arranging and discussing plan of care with patient as well as directly providing post-discharge instructions  Additional time then spent on discharge activities  Discharge Medications:  See after visit summary for reconciled discharge medications provided to patient and family        ** Please Note: This note has been constructed using a voice recognition system **

## 2021-09-22 NOTE — ASSESSMENT & PLAN NOTE
· History a sigmoid diverticulitis  · Recent completed antibiotic course  · Denies abdominal pain  · Continue outpatient general surgery follow-up

## 2021-09-22 NOTE — CASE MANAGEMENT
Patient is refusing inpatient STR  Patient wants home therapy and is requesting a roller walker  Patient states her ex  will pick her up at discharge  CM sent script for roller walker to [facility]   Referral to RONA DURAN for PT/OT in [address] [place] [address] Drive

## 2021-09-23 ENCOUNTER — DOCUMENTATION (OUTPATIENT)
Dept: SOCIAL WORK | Facility: HOSPITAL | Age: 66
End: 2021-09-23

## 2021-09-23 ENCOUNTER — TRANSITIONAL CARE MANAGEMENT (OUTPATIENT)
Dept: FAMILY MEDICINE CLINIC | Facility: CLINIC | Age: 66
End: 2021-09-23

## 2021-09-23 DIAGNOSIS — R10.13 DYSPEPSIA: ICD-10-CM

## 2021-09-23 RX ORDER — ONDANSETRON 4 MG/1
4 TABLET, FILM COATED ORAL EVERY 8 HOURS PRN
Qty: 20 TABLET | Refills: 0 | Status: SHIPPED | OUTPATIENT
Start: 2021-09-23 | End: 2021-10-04 | Stop reason: SDUPTHER

## 2021-09-23 NOTE — PROGRESS NOTES
Admission Report at [facility]  [facility] has admitted your patient to [facility] service with the following disciplines:      PT  Response needed, please respond via tiger text vs EPIC vs work phone [phone] concerning medication duplicatins and requesting referral orders for MSW and home health aide  Primary focus of home health care MUSCULOSKELETAL  Patient stated goals of care to get rid of the back pain  to take a shower  to walk without pain  to walk better and to be more independent  Anticipated visit pattern 1 wk1 and starting week of 09 26 21 2wk3 and 1wk2    Request for additional disciplines home MSW and home health aide  Request for medication clarification Pt reports no longer taking naloxone  pt needs refill for ondansetron and hyocyamine  duplication of medications include  diazepam and primidone   hyoscyamine  dicyclomine and diphenoxylate atropine   diphenoxylate atropine and hydrocodone acetaminophen   clottrimazole and clotimazole betamethsone  Jere Glass Jere Glass Breo Ellipta and Flovent   betamethasone dipropionate topical cream and clotrimazole betamethasone topical cream     Needs follow up physician appointments with PCP  Potential barriers to [employer] pain R mid to low back and RLE  Tearful during entire PT home visit session  Dec A from ex  at home whom she lives with  Other Pertinent information pt is awaiting delivery of RW which pt does need to ambulate  Per Epic and pts DC summary paperwork order was placed at hospital upon DC and is in process  Thank you for allowing us to participate in the care of your patient        Lon Ortega, PT

## 2021-09-30 ENCOUNTER — APPOINTMENT (OUTPATIENT)
Dept: RADIOLOGY | Facility: MEDICAL CENTER | Age: 66
End: 2021-09-30
Payer: COMMERCIAL

## 2021-09-30 DIAGNOSIS — M25.561 RIGHT KNEE PAIN, UNSPECIFIED CHRONICITY: ICD-10-CM

## 2021-09-30 PROCEDURE — 73564 X-RAY EXAM KNEE 4 OR MORE: CPT

## 2021-10-02 DIAGNOSIS — B37.0 THRUSH OF MOUTH AND ESOPHAGUS (HCC): ICD-10-CM

## 2021-10-02 DIAGNOSIS — B37.81 THRUSH OF MOUTH AND ESOPHAGUS (HCC): ICD-10-CM

## 2021-10-04 DIAGNOSIS — R10.13 DYSPEPSIA: ICD-10-CM

## 2021-10-04 RX ORDER — ONDANSETRON 4 MG/1
4 TABLET, FILM COATED ORAL EVERY 8 HOURS PRN
Qty: 20 TABLET | Refills: 0 | Status: SHIPPED | OUTPATIENT
Start: 2021-10-04 | End: 2021-10-13 | Stop reason: SDUPTHER

## 2021-10-05 RX ORDER — CLOTRIMAZOLE 10 MG/1
10 LOZENGE ORAL; TOPICAL
Qty: 25 TROCHE | Refills: 0 | Status: SHIPPED | OUTPATIENT
Start: 2021-10-05 | End: 2021-12-07

## 2021-10-08 ENCOUNTER — TELEPHONE (OUTPATIENT)
Dept: OTHER | Facility: OTHER | Age: 66
End: 2021-10-08

## 2021-10-08 ENCOUNTER — TELEPHONE (OUTPATIENT)
Dept: SURGERY | Facility: HOSPITAL | Age: 66
End: 2021-10-08

## 2021-10-11 ENCOUNTER — TELEPHONE (OUTPATIENT)
Dept: FAMILY MEDICINE CLINIC | Facility: CLINIC | Age: 66
End: 2021-10-11

## 2021-10-12 ENCOUNTER — CLINICAL SUPPORT (OUTPATIENT)
Dept: URGENT CARE | Facility: MEDICAL CENTER | Age: 66
End: 2021-10-12
Payer: COMMERCIAL

## 2021-10-12 ENCOUNTER — APPOINTMENT (OUTPATIENT)
Dept: LAB | Facility: CLINIC | Age: 66
End: 2021-10-12
Payer: COMMERCIAL

## 2021-10-12 ENCOUNTER — OFFICE VISIT (OUTPATIENT)
Dept: SURGERY | Facility: CLINIC | Age: 66
End: 2021-10-12
Payer: COMMERCIAL

## 2021-10-12 VITALS
TEMPERATURE: 97.4 F | HEIGHT: 65 IN | HEART RATE: 93 BPM | SYSTOLIC BLOOD PRESSURE: 144 MMHG | BODY MASS INDEX: 20.99 KG/M2 | RESPIRATION RATE: 16 BRPM | WEIGHT: 126 LBS | DIASTOLIC BLOOD PRESSURE: 97 MMHG

## 2021-10-12 DIAGNOSIS — E44.0 MODERATE PROTEIN-CALORIE MALNUTRITION (HCC): ICD-10-CM

## 2021-10-12 DIAGNOSIS — K57.32 SIGMOID DIVERTICULITIS: ICD-10-CM

## 2021-10-12 DIAGNOSIS — F41.1 GENERALIZED ANXIETY DISORDER: ICD-10-CM

## 2021-10-12 DIAGNOSIS — K57.32 SIGMOID DIVERTICULITIS: Primary | ICD-10-CM

## 2021-10-12 DIAGNOSIS — M54.59 INTRACTABLE LOW BACK PAIN: ICD-10-CM

## 2021-10-12 LAB
ABO GROUP BLD: NORMAL
APTT PPP: 29 SECONDS (ref 23–37)
ATRIAL RATE: 85 BPM
BLD GP AB SCN SERPL QL: NEGATIVE
EST. AVERAGE GLUCOSE BLD GHB EST-MCNC: 105 MG/DL
HBA1C MFR BLD: 5.3 %
INR PPP: 0.98 (ref 0.84–1.19)
P AXIS: 53 DEGREES
PR INTERVAL: 136 MS
PROTHROMBIN TIME: 12.6 SECONDS (ref 11.6–14.5)
QRS AXIS: -8 DEGREES
QRSD INTERVAL: 82 MS
QT INTERVAL: 380 MS
QTC INTERVAL: 452 MS
RH BLD: POSITIVE
SPECIMEN EXPIRATION DATE: NORMAL
T WAVE AXIS: 35 DEGREES
VENTRICULAR RATE: 85 BPM

## 2021-10-12 PROCEDURE — 93010 ELECTROCARDIOGRAM REPORT: CPT | Performed by: INTERNAL MEDICINE

## 2021-10-12 PROCEDURE — 36415 COLL VENOUS BLD VENIPUNCTURE: CPT

## 2021-10-12 PROCEDURE — 83036 HEMOGLOBIN GLYCOSYLATED A1C: CPT

## 2021-10-12 PROCEDURE — 1160F RVW MEDS BY RX/DR IN RCRD: CPT | Performed by: SURGERY

## 2021-10-12 PROCEDURE — 86901 BLOOD TYPING SEROLOGIC RH(D): CPT

## 2021-10-12 PROCEDURE — 4004F PT TOBACCO SCREEN RCVD TLK: CPT | Performed by: SURGERY

## 2021-10-12 PROCEDURE — 86900 BLOOD TYPING SEROLOGIC ABO: CPT

## 2021-10-12 PROCEDURE — 86850 RBC ANTIBODY SCREEN: CPT

## 2021-10-12 PROCEDURE — 99214 OFFICE O/P EST MOD 30 MIN: CPT | Performed by: SURGERY

## 2021-10-12 PROCEDURE — 85730 THROMBOPLASTIN TIME PARTIAL: CPT

## 2021-10-12 PROCEDURE — 93005 ELECTROCARDIOGRAM TRACING: CPT

## 2021-10-12 PROCEDURE — 85610 PROTHROMBIN TIME: CPT

## 2021-10-12 PROCEDURE — 3008F BODY MASS INDEX DOCD: CPT | Performed by: SURGERY

## 2021-10-12 RX ORDER — NEOMYCIN SULFATE 500 MG/1
1000 TABLET ORAL SEE ADMIN INSTRUCTIONS
Qty: 6 TABLET | Refills: 0 | Status: SHIPPED | OUTPATIENT
Start: 2021-10-12 | End: 2021-10-13

## 2021-10-13 DIAGNOSIS — R10.13 DYSPEPSIA: ICD-10-CM

## 2021-10-13 PROBLEM — E44.0 MODERATE PROTEIN-CALORIE MALNUTRITION (HCC): Status: ACTIVE | Noted: 2021-10-13

## 2021-10-13 RX ORDER — ONDANSETRON 4 MG/1
4 TABLET, FILM COATED ORAL EVERY 8 HOURS PRN
Qty: 20 TABLET | Refills: 0 | Status: SHIPPED | OUTPATIENT
Start: 2021-10-13 | End: 2021-10-26 | Stop reason: SDUPTHER

## 2021-10-13 RX ORDER — CHLORHEXIDINE GLUCONATE 0.12 MG/ML
15 RINSE ORAL ONCE
Status: CANCELLED | OUTPATIENT
Start: 2021-11-01 | End: 2021-11-01

## 2021-10-13 RX ORDER — GABAPENTIN 100 MG/1
300 CAPSULE ORAL ONCE
Status: CANCELLED | OUTPATIENT
Start: 2021-11-01 | End: 2021-11-01

## 2021-10-13 RX ORDER — ACETAMINOPHEN 325 MG/1
975 TABLET ORAL ONCE
Status: CANCELLED | OUTPATIENT
Start: 2021-11-01 | End: 2021-11-01

## 2021-10-13 RX ORDER — SODIUM CHLORIDE, SODIUM LACTATE, POTASSIUM CHLORIDE, CALCIUM CHLORIDE 600; 310; 30; 20 MG/100ML; MG/100ML; MG/100ML; MG/100ML
125 INJECTION, SOLUTION INTRAVENOUS CONTINUOUS
Status: CANCELLED | OUTPATIENT
Start: 2021-11-01

## 2021-10-13 RX ORDER — CEFAZOLIN SODIUM 1 G/50ML
1000 SOLUTION INTRAVENOUS ONCE
Status: CANCELLED | OUTPATIENT
Start: 2021-11-01

## 2021-10-18 ENCOUNTER — HOSPITAL ENCOUNTER (OUTPATIENT)
Dept: NON INVASIVE DIAGNOSTICS | Facility: HOSPITAL | Age: 66
Discharge: HOME | End: 2021-10-18
Payer: COMMERCIAL

## 2021-10-18 DIAGNOSIS — M79.604 RIGHT LEG PAIN: ICD-10-CM

## 2021-10-18 DIAGNOSIS — M79.89 RIGHT LEG SWELLING: ICD-10-CM

## 2021-10-18 PROCEDURE — 93971 EXTREMITY STUDY: CPT

## 2021-10-18 PROCEDURE — 93971 EXTREMITY STUDY: CPT | Performed by: SURGERY

## 2021-10-22 ENCOUNTER — ANESTHESIA EVENT (INPATIENT)
Dept: PERIOP | Facility: HOSPITAL | Age: 66
DRG: 330 | End: 2021-10-22
Payer: COMMERCIAL

## 2021-10-22 RX ORDER — NICOTINE 21 MG/24HR
1 PATCH, TRANSDERMAL 24 HOURS TRANSDERMAL EVERY 24 HOURS
COMMUNITY
End: 2021-11-08

## 2021-10-22 RX ORDER — ERYTHROMYCIN 500 MG/1
TABLET, COATED ORAL 2 TIMES DAILY
COMMUNITY
Start: 2021-10-12 | End: 2021-12-03

## 2021-10-22 RX ORDER — ATORVASTATIN CALCIUM 10 MG/1
10 TABLET, FILM COATED ORAL EVERY OTHER DAY
Status: ON HOLD | COMMUNITY
Start: 2021-08-04 | End: 2021-12-01 | Stop reason: CLARIF

## 2021-10-25 DIAGNOSIS — R10.13 DYSPEPSIA: ICD-10-CM

## 2021-10-25 PROCEDURE — U0003 INFECTIOUS AGENT DETECTION BY NUCLEIC ACID (DNA OR RNA); SEVERE ACUTE RESPIRATORY SYNDROME CORONAVIRUS 2 (SARS-COV-2) (CORONAVIRUS DISEASE [COVID-19]), AMPLIFIED PROBE TECHNIQUE, MAKING USE OF HIGH THROUGHPUT TECHNOLOGIES AS DESCRIBED BY CMS-2020-01-R: HCPCS | Performed by: SURGERY

## 2021-10-25 PROCEDURE — U0005 INFEC AGEN DETEC AMPLI PROBE: HCPCS | Performed by: SURGERY

## 2021-10-26 DIAGNOSIS — R10.13 DYSPEPSIA: ICD-10-CM

## 2021-10-27 RX ORDER — ONDANSETRON 4 MG/1
4 TABLET, FILM COATED ORAL EVERY 8 HOURS PRN
Qty: 20 TABLET | Refills: 0 | Status: SHIPPED | OUTPATIENT
Start: 2021-10-27 | End: 2021-11-17 | Stop reason: SDUPTHER

## 2021-10-27 RX ORDER — ONDANSETRON 4 MG/1
4 TABLET, FILM COATED ORAL EVERY 8 HOURS PRN
Qty: 20 TABLET | Refills: 0 | Status: SHIPPED | OUTPATIENT
Start: 2021-10-27 | End: 2021-11-08

## 2021-11-01 ENCOUNTER — ANESTHESIA (INPATIENT)
Dept: PERIOP | Facility: HOSPITAL | Age: 66
DRG: 330 | End: 2021-11-01
Payer: COMMERCIAL

## 2021-11-01 ENCOUNTER — HOSPITAL ENCOUNTER (INPATIENT)
Facility: HOSPITAL | Age: 66
LOS: 7 days | Discharge: HOME HEALTH | DRG: 330 | End: 2021-11-08
Attending: SURGERY | Admitting: SURGERY
Payer: COMMERCIAL

## 2021-11-01 DIAGNOSIS — K57.32 SIGMOID DIVERTICULITIS: Primary | ICD-10-CM

## 2021-11-01 DIAGNOSIS — F11.20 CHRONIC NARCOTIC DEPENDENCE (HCC): Chronic | ICD-10-CM

## 2021-11-01 LAB
ABO GROUP BLD: NORMAL
BLD GP AB SCN SERPL QL: NEGATIVE
RH BLD: POSITIVE
SPECIMEN EXPIRATION DATE: NORMAL

## 2021-11-01 PROCEDURE — 88307 TISSUE EXAM BY PATHOLOGIST: CPT | Performed by: PATHOLOGY

## 2021-11-01 PROCEDURE — 88304 TISSUE EXAM BY PATHOLOGIST: CPT | Performed by: PATHOLOGY

## 2021-11-01 PROCEDURE — 44204 LAPARO PARTIAL COLECTOMY: CPT | Performed by: SURGERY

## 2021-11-01 PROCEDURE — 86900 BLOOD TYPING SEROLOGIC ABO: CPT | Performed by: SURGERY

## 2021-11-01 PROCEDURE — 86850 RBC ANTIBODY SCREEN: CPT | Performed by: SURGERY

## 2021-11-01 PROCEDURE — 0D1B4Z4 BYPASS ILEUM TO CUTANEOUS, PERCUTANEOUS ENDOSCOPIC APPROACH: ICD-10-PCS | Performed by: SURGERY

## 2021-11-01 PROCEDURE — 0DTN4ZZ RESECTION OF SIGMOID COLON, PERCUTANEOUS ENDOSCOPIC APPROACH: ICD-10-PCS | Performed by: SURGERY

## 2021-11-01 PROCEDURE — 86901 BLOOD TYPING SEROLOGIC RH(D): CPT | Performed by: SURGERY

## 2021-11-01 RX ORDER — GABAPENTIN 100 MG/1
100 CAPSULE ORAL
Status: DISCONTINUED | OUTPATIENT
Start: 2021-11-01 | End: 2021-11-08 | Stop reason: HOSPADM

## 2021-11-01 RX ORDER — SODIUM CHLORIDE 9 MG/ML
125 INJECTION, SOLUTION INTRAVENOUS CONTINUOUS
Status: DISCONTINUED | OUTPATIENT
Start: 2021-11-01 | End: 2021-11-02

## 2021-11-01 RX ORDER — GLYCOPYRROLATE 0.2 MG/ML
INJECTION INTRAMUSCULAR; INTRAVENOUS AS NEEDED
Status: DISCONTINUED | OUTPATIENT
Start: 2021-11-01 | End: 2021-11-01

## 2021-11-01 RX ORDER — NEOSTIGMINE METHYLSULFATE 1 MG/ML
INJECTION INTRAVENOUS AS NEEDED
Status: DISCONTINUED | OUTPATIENT
Start: 2021-11-01 | End: 2021-11-01

## 2021-11-01 RX ORDER — DEXAMETHASONE SODIUM PHOSPHATE 4 MG/ML
INJECTION, SOLUTION INTRA-ARTICULAR; INTRALESIONAL; INTRAMUSCULAR; INTRAVENOUS; SOFT TISSUE AS NEEDED
Status: DISCONTINUED | OUTPATIENT
Start: 2021-11-01 | End: 2021-11-01

## 2021-11-01 RX ORDER — SUCCINYLCHOLINE/SOD CL,ISO/PF 100 MG/5ML
SYRINGE (ML) INTRAVENOUS AS NEEDED
Status: DISCONTINUED | OUTPATIENT
Start: 2021-11-01 | End: 2021-11-01

## 2021-11-01 RX ORDER — MAGNESIUM HYDROXIDE 1200 MG/15ML
LIQUID ORAL AS NEEDED
Status: DISCONTINUED | OUTPATIENT
Start: 2021-11-01 | End: 2021-11-01 | Stop reason: HOSPADM

## 2021-11-01 RX ORDER — ONDANSETRON 2 MG/ML
INJECTION INTRAMUSCULAR; INTRAVENOUS AS NEEDED
Status: DISCONTINUED | OUTPATIENT
Start: 2021-11-01 | End: 2021-11-01

## 2021-11-01 RX ORDER — ONDANSETRON 2 MG/ML
4 INJECTION INTRAMUSCULAR; INTRAVENOUS ONCE
Status: COMPLETED | OUTPATIENT
Start: 2021-11-01 | End: 2021-11-01

## 2021-11-01 RX ORDER — OXYCODONE HYDROCHLORIDE 5 MG/1
5 TABLET ORAL EVERY 4 HOURS PRN
Status: DISCONTINUED | OUTPATIENT
Start: 2021-11-01 | End: 2021-11-05

## 2021-11-01 RX ORDER — OXYCODONE HYDROCHLORIDE 5 MG/1
2.5 TABLET ORAL EVERY 4 HOURS PRN
Status: DISCONTINUED | OUTPATIENT
Start: 2021-11-01 | End: 2021-11-05

## 2021-11-01 RX ORDER — HEPARIN SODIUM 5000 [USP'U]/ML
5000 INJECTION, SOLUTION INTRAVENOUS; SUBCUTANEOUS EVERY 8 HOURS SCHEDULED
Status: DISCONTINUED | OUTPATIENT
Start: 2021-11-01 | End: 2021-11-08 | Stop reason: HOSPADM

## 2021-11-01 RX ORDER — NICOTINE 21 MG/24HR
1 PATCH, TRANSDERMAL 24 HOURS TRANSDERMAL EVERY 24 HOURS
Status: DISCONTINUED | OUTPATIENT
Start: 2021-11-01 | End: 2021-11-08 | Stop reason: HOSPADM

## 2021-11-01 RX ORDER — FENTANYL CITRATE 50 UG/ML
INJECTION, SOLUTION INTRAMUSCULAR; INTRAVENOUS AS NEEDED
Status: DISCONTINUED | OUTPATIENT
Start: 2021-11-01 | End: 2021-11-01

## 2021-11-01 RX ORDER — DIAZEPAM 5 MG/1
10 TABLET ORAL 2 TIMES DAILY
Status: DISCONTINUED | OUTPATIENT
Start: 2021-11-01 | End: 2021-11-08

## 2021-11-01 RX ORDER — ROPIVACAINE HYDROCHLORIDE 5 MG/ML
INJECTION, SOLUTION EPIDURAL; INFILTRATION; PERINEURAL AS NEEDED
Status: DISCONTINUED | OUTPATIENT
Start: 2021-11-01 | End: 2021-11-01

## 2021-11-01 RX ORDER — CHLORHEXIDINE GLUCONATE 0.12 MG/ML
15 RINSE ORAL ONCE
Status: COMPLETED | OUTPATIENT
Start: 2021-11-01 | End: 2021-11-01

## 2021-11-01 RX ORDER — KETAMINE HYDROCHLORIDE 50 MG/ML
INJECTION, SOLUTION, CONCENTRATE INTRAMUSCULAR; INTRAVENOUS AS NEEDED
Status: DISCONTINUED | OUTPATIENT
Start: 2021-11-01 | End: 2021-11-01

## 2021-11-01 RX ORDER — HYDROMORPHONE HCL/PF 1 MG/ML
SYRINGE (ML) INJECTION AS NEEDED
Status: DISCONTINUED | OUTPATIENT
Start: 2021-11-01 | End: 2021-11-01

## 2021-11-01 RX ORDER — ROCURONIUM BROMIDE 10 MG/ML
INJECTION, SOLUTION INTRAVENOUS AS NEEDED
Status: DISCONTINUED | OUTPATIENT
Start: 2021-11-01 | End: 2021-11-01

## 2021-11-01 RX ORDER — ACETAMINOPHEN 325 MG/1
975 TABLET ORAL ONCE
Status: COMPLETED | OUTPATIENT
Start: 2021-11-01 | End: 2021-11-01

## 2021-11-01 RX ORDER — ONDANSETRON 2 MG/ML
4 INJECTION INTRAMUSCULAR; INTRAVENOUS ONCE AS NEEDED
Status: DISCONTINUED | OUTPATIENT
Start: 2021-11-01 | End: 2021-11-01 | Stop reason: HOSPADM

## 2021-11-01 RX ORDER — ONDANSETRON 2 MG/ML
4 INJECTION INTRAMUSCULAR; INTRAVENOUS EVERY 6 HOURS PRN
Status: DISCONTINUED | OUTPATIENT
Start: 2021-11-01 | End: 2021-11-08 | Stop reason: HOSPADM

## 2021-11-01 RX ORDER — HYDROMORPHONE HCL/PF 1 MG/ML
0.5 SYRINGE (ML) INJECTION
Status: DISCONTINUED | OUTPATIENT
Start: 2021-11-01 | End: 2021-11-01 | Stop reason: HOSPADM

## 2021-11-01 RX ORDER — SODIUM CHLORIDE 9 MG/ML
INJECTION, SOLUTION INTRAVENOUS CONTINUOUS PRN
Status: DISCONTINUED | OUTPATIENT
Start: 2021-11-01 | End: 2021-11-01

## 2021-11-01 RX ORDER — BUPIVACAINE HYDROCHLORIDE AND EPINEPHRINE 5; 5 MG/ML; UG/ML
INJECTION, SOLUTION EPIDURAL; INTRACAUDAL; PERINEURAL AS NEEDED
Status: DISCONTINUED | OUTPATIENT
Start: 2021-11-01 | End: 2021-11-01 | Stop reason: HOSPADM

## 2021-11-01 RX ORDER — SODIUM CHLORIDE, SODIUM LACTATE, POTASSIUM CHLORIDE, CALCIUM CHLORIDE 600; 310; 30; 20 MG/100ML; MG/100ML; MG/100ML; MG/100ML
125 INJECTION, SOLUTION INTRAVENOUS CONTINUOUS
Status: DISCONTINUED | OUTPATIENT
Start: 2021-11-01 | End: 2021-11-01 | Stop reason: HOSPADM

## 2021-11-01 RX ORDER — HYDROMORPHONE HCL/PF 1 MG/ML
0.5 SYRINGE (ML) INJECTION
Status: DISCONTINUED | OUTPATIENT
Start: 2021-11-01 | End: 2021-11-02

## 2021-11-01 RX ORDER — GABAPENTIN 100 MG/1
300 CAPSULE ORAL ONCE
Status: COMPLETED | OUTPATIENT
Start: 2021-11-01 | End: 2021-11-01

## 2021-11-01 RX ORDER — PROPOFOL 10 MG/ML
INJECTION, EMULSION INTRAVENOUS AS NEEDED
Status: DISCONTINUED | OUTPATIENT
Start: 2021-11-01 | End: 2021-11-01

## 2021-11-01 RX ORDER — CEFAZOLIN SODIUM 1 G/50ML
1000 SOLUTION INTRAVENOUS ONCE
Status: COMPLETED | OUTPATIENT
Start: 2021-11-01 | End: 2021-11-01

## 2021-11-01 RX ORDER — ACETAMINOPHEN 325 MG/1
975 TABLET ORAL EVERY 8 HOURS SCHEDULED
Status: DISCONTINUED | OUTPATIENT
Start: 2021-11-01 | End: 2021-11-05

## 2021-11-01 RX ORDER — LORAZEPAM 2 MG/ML
1 INJECTION INTRAMUSCULAR ONCE AS NEEDED
Status: COMPLETED | OUTPATIENT
Start: 2021-11-01 | End: 2021-11-01

## 2021-11-01 RX ADMIN — CEFAZOLIN SODIUM 1000 MG: 1 SOLUTION INTRAVENOUS at 11:25

## 2021-11-01 RX ADMIN — METRONIDAZOLE 500 MG: 500 INJECTION, SOLUTION INTRAVENOUS at 11:40

## 2021-11-01 RX ADMIN — ONDANSETRON 4 MG: 2 INJECTION INTRAMUSCULAR; INTRAVENOUS at 22:38

## 2021-11-01 RX ADMIN — GLYCOPYRROLATE 0.4 MG: 0.2 INJECTION, SOLUTION INTRAMUSCULAR; INTRAVENOUS at 14:42

## 2021-11-01 RX ADMIN — ROCURONIUM BROMIDE 20 MG: 50 INJECTION, SOLUTION INTRAVENOUS at 12:26

## 2021-11-01 RX ADMIN — SODIUM CHLORIDE 125 ML/HR: 0.9 INJECTION, SOLUTION INTRAVENOUS at 16:47

## 2021-11-01 RX ADMIN — ROCURONIUM BROMIDE 5 MG: 50 INJECTION, SOLUTION INTRAVENOUS at 11:38

## 2021-11-01 RX ADMIN — ONDANSETRON 4 MG: 2 INJECTION INTRAMUSCULAR; INTRAVENOUS at 14:36

## 2021-11-01 RX ADMIN — SODIUM CHLORIDE, SODIUM LACTATE, POTASSIUM CHLORIDE, AND CALCIUM CHLORIDE: .6; .31; .03; .02 INJECTION, SOLUTION INTRAVENOUS at 11:03

## 2021-11-01 RX ADMIN — LIDOCAINE HYDROCHLORIDE 60 MG: 20 INJECTION INTRAVENOUS at 11:38

## 2021-11-01 RX ADMIN — HYDROMORPHONE HYDROCHLORIDE 0.5 MG: 1 INJECTION, SOLUTION INTRAMUSCULAR; INTRAVENOUS; SUBCUTANEOUS at 19:26

## 2021-11-01 RX ADMIN — HYDROMORPHONE HYDROCHLORIDE 0.5 MG: 1 INJECTION, SOLUTION INTRAMUSCULAR; INTRAVENOUS; SUBCUTANEOUS at 12:42

## 2021-11-01 RX ADMIN — GABAPENTIN 300 MG: 100 CAPSULE ORAL at 09:22

## 2021-11-01 RX ADMIN — ROPIVACAINE HYDROCHLORIDE 20 ML: 5 INJECTION, SOLUTION EPIDURAL; INFILTRATION; PERINEURAL at 11:50

## 2021-11-01 RX ADMIN — ONDANSETRON 4 MG: 2 INJECTION INTRAMUSCULAR; INTRAVENOUS at 10:41

## 2021-11-01 RX ADMIN — ACETAMINOPHEN 975 MG: 325 TABLET, FILM COATED ORAL at 09:22

## 2021-11-01 RX ADMIN — FENTANYL CITRATE 100 MCG: 50 INJECTION INTRAMUSCULAR; INTRAVENOUS at 11:38

## 2021-11-01 RX ADMIN — ROCURONIUM BROMIDE 10 MG: 50 INJECTION, SOLUTION INTRAVENOUS at 14:08

## 2021-11-01 RX ADMIN — SODIUM CHLORIDE, SODIUM LACTATE, POTASSIUM CHLORIDE, AND CALCIUM CHLORIDE: .6; .31; .03; .02 INJECTION, SOLUTION INTRAVENOUS at 11:54

## 2021-11-01 RX ADMIN — KETAMINE HYDROCHLORIDE 10 MG: 50 INJECTION INTRAMUSCULAR; INTRAVENOUS at 12:42

## 2021-11-01 RX ADMIN — ROCURONIUM BROMIDE 10 MG: 50 INJECTION, SOLUTION INTRAVENOUS at 13:30

## 2021-11-01 RX ADMIN — NEOSTIGMINE METHYLSULFATE 3 MG: 1 INJECTION INTRAVENOUS at 14:42

## 2021-11-01 RX ADMIN — SODIUM CHLORIDE, SODIUM LACTATE, POTASSIUM CHLORIDE, AND CALCIUM CHLORIDE 125 ML/HR: .6; .31; .03; .02 INJECTION, SOLUTION INTRAVENOUS at 09:53

## 2021-11-01 RX ADMIN — HYDROMORPHONE HYDROCHLORIDE 0.5 MG: 1 INJECTION, SOLUTION INTRAMUSCULAR; INTRAVENOUS; SUBCUTANEOUS at 15:46

## 2021-11-01 RX ADMIN — HYDROMORPHONE HYDROCHLORIDE 0.5 MG: 1 INJECTION, SOLUTION INTRAMUSCULAR; INTRAVENOUS; SUBCUTANEOUS at 23:29

## 2021-11-01 RX ADMIN — PROPOFOL 200 MG: 10 INJECTION, EMULSION INTRAVENOUS at 11:38

## 2021-11-01 RX ADMIN — ROCURONIUM BROMIDE 45 MG: 50 INJECTION, SOLUTION INTRAVENOUS at 11:55

## 2021-11-01 RX ADMIN — Medication 100 MG: at 11:38

## 2021-11-01 RX ADMIN — OXYCODONE HYDROCHLORIDE 5 MG: 5 TABLET ORAL at 17:17

## 2021-11-01 RX ADMIN — ROCURONIUM BROMIDE 20 MG: 50 INJECTION, SOLUTION INTRAVENOUS at 12:49

## 2021-11-01 RX ADMIN — SODIUM CHLORIDE: 0.9 INJECTION, SOLUTION INTRAVENOUS at 11:41

## 2021-11-01 RX ADMIN — LORAZEPAM 1 MG: 2 INJECTION INTRAMUSCULAR; INTRAVENOUS at 10:04

## 2021-11-01 RX ADMIN — CHLORHEXIDINE GLUCONATE 0.12% ORAL RINSE 15 ML: 1.2 LIQUID ORAL at 09:24

## 2021-11-01 RX ADMIN — ONDANSETRON 4 MG: 2 INJECTION INTRAMUSCULAR; INTRAVENOUS at 18:19

## 2021-11-01 RX ADMIN — DEXAMETHASONE SODIUM PHOSPHATE 4 MG: 4 INJECTION INTRA-ARTICULAR; INTRALESIONAL; INTRAMUSCULAR; INTRAVENOUS; SOFT TISSUE at 11:56

## 2021-11-01 RX ADMIN — KETAMINE HYDROCHLORIDE 25 MG: 50 INJECTION INTRAMUSCULAR; INTRAVENOUS at 11:38

## 2021-11-01 RX ADMIN — ROPIVACAINE HYDROCHLORIDE 20 ML: 5 INJECTION, SOLUTION EPIDURAL; INFILTRATION; PERINEURAL at 11:54

## 2021-11-02 LAB
ANION GAP SERPL CALCULATED.3IONS-SCNC: 13 MMOL/L (ref 4–13)
BUN SERPL-MCNC: 5 MG/DL (ref 5–25)
CALCIUM SERPL-MCNC: 7.7 MG/DL (ref 8.3–10.1)
CHLORIDE SERPL-SCNC: 106 MMOL/L (ref 100–108)
CO2 SERPL-SCNC: 24 MMOL/L (ref 21–32)
CREAT SERPL-MCNC: 0.65 MG/DL (ref 0.6–1.3)
GFR SERPL CREATININE-BSD FRML MDRD: 93 ML/MIN/1.73SQ M
GLUCOSE SERPL-MCNC: 85 MG/DL (ref 65–140)
PLATELET # BLD AUTO: 308 THOUSANDS/UL (ref 149–390)
PMV BLD AUTO: 9.8 FL (ref 8.9–12.7)
POTASSIUM SERPL-SCNC: 2.7 MMOL/L (ref 3.5–5.3)
SODIUM SERPL-SCNC: 143 MMOL/L (ref 136–145)

## 2021-11-02 PROCEDURE — 85049 AUTOMATED PLATELET COUNT: CPT | Performed by: SURGERY

## 2021-11-02 PROCEDURE — 80048 BASIC METABOLIC PNL TOTAL CA: CPT | Performed by: SURGERY

## 2021-11-02 PROCEDURE — 97163 PT EVAL HIGH COMPLEX 45 MIN: CPT

## 2021-11-02 PROCEDURE — 97166 OT EVAL MOD COMPLEX 45 MIN: CPT

## 2021-11-02 PROCEDURE — NC001 PR NO CHARGE: Performed by: SURGERY

## 2021-11-02 RX ORDER — POTASSIUM CHLORIDE 20 MEQ/1
40 TABLET, EXTENDED RELEASE ORAL ONCE
Status: COMPLETED | OUTPATIENT
Start: 2021-11-02 | End: 2021-11-02

## 2021-11-02 RX ORDER — POTASSIUM CHLORIDE 14.9 MG/ML
20 INJECTION INTRAVENOUS ONCE
Status: COMPLETED | OUTPATIENT
Start: 2021-11-02 | End: 2021-11-02

## 2021-11-02 RX ORDER — HYDROMORPHONE HCL/PF 1 MG/ML
0.5 SYRINGE (ML) INJECTION
Status: DISCONTINUED | OUTPATIENT
Start: 2021-11-02 | End: 2021-11-02

## 2021-11-02 RX ORDER — CALCIUM GLUCONATE 20 MG/ML
2 INJECTION, SOLUTION INTRAVENOUS ONCE
Status: COMPLETED | OUTPATIENT
Start: 2021-11-02 | End: 2021-11-02

## 2021-11-02 RX ORDER — DEXTROSE, SODIUM CHLORIDE, AND POTASSIUM CHLORIDE 5; .45; .15 G/100ML; G/100ML; G/100ML
100 INJECTION INTRAVENOUS CONTINUOUS
Status: DISCONTINUED | OUTPATIENT
Start: 2021-11-02 | End: 2021-11-04

## 2021-11-02 RX ADMIN — OXYCODONE HYDROCHLORIDE 5 MG: 5 TABLET ORAL at 23:08

## 2021-11-02 RX ADMIN — HYDROMORPHONE HYDROCHLORIDE 0.5 MG: 1 INJECTION, SOLUTION INTRAMUSCULAR; INTRAVENOUS; SUBCUTANEOUS at 01:56

## 2021-11-02 RX ADMIN — Medication: at 07:48

## 2021-11-02 RX ADMIN — DIAZEPAM 10 MG: 5 TABLET ORAL at 08:44

## 2021-11-02 RX ADMIN — POTASSIUM CHLORIDE 20 MEQ: 14.9 INJECTION, SOLUTION INTRAVENOUS at 17:17

## 2021-11-02 RX ADMIN — HEPARIN SODIUM 5000 UNITS: 5000 INJECTION INTRAVENOUS; SUBCUTANEOUS at 06:00

## 2021-11-02 RX ADMIN — SODIUM CHLORIDE 125 ML/HR: 0.9 INJECTION, SOLUTION INTRAVENOUS at 08:48

## 2021-11-02 RX ADMIN — POTASSIUM CHLORIDE 20 MEQ: 14.9 INJECTION, SOLUTION INTRAVENOUS at 14:20

## 2021-11-02 RX ADMIN — HEPARIN SODIUM 5000 UNITS: 5000 INJECTION INTRAVENOUS; SUBCUTANEOUS at 22:46

## 2021-11-02 RX ADMIN — GABAPENTIN 100 MG: 100 CAPSULE ORAL at 22:46

## 2021-11-02 RX ADMIN — OXYCODONE HYDROCHLORIDE 5 MG: 5 TABLET ORAL at 08:44

## 2021-11-02 RX ADMIN — ONDANSETRON 4 MG: 2 INJECTION INTRAMUSCULAR; INTRAVENOUS at 04:15

## 2021-11-02 RX ADMIN — ACETAMINOPHEN 975 MG: 325 TABLET, FILM COATED ORAL at 22:46

## 2021-11-02 RX ADMIN — ACETAMINOPHEN 975 MG: 325 TABLET, FILM COATED ORAL at 13:25

## 2021-11-02 RX ADMIN — ONDANSETRON 4 MG: 2 INJECTION INTRAMUSCULAR; INTRAVENOUS at 20:27

## 2021-11-02 RX ADMIN — POTASSIUM CHLORIDE 20 MEQ: 14.9 INJECTION, SOLUTION INTRAVENOUS at 12:40

## 2021-11-02 RX ADMIN — DIAZEPAM 10 MG: 5 TABLET ORAL at 17:16

## 2021-11-02 RX ADMIN — DEXTROSE, SODIUM CHLORIDE, AND POTASSIUM CHLORIDE 100 ML/HR: 5; .45; .15 INJECTION INTRAVENOUS at 13:34

## 2021-11-02 RX ADMIN — HYDROMORPHONE HYDROCHLORIDE 0.5 MG: 1 INJECTION, SOLUTION INTRAMUSCULAR; INTRAVENOUS; SUBCUTANEOUS at 04:18

## 2021-11-02 RX ADMIN — POTASSIUM CHLORIDE 40 MEQ: 1500 TABLET, EXTENDED RELEASE ORAL at 10:57

## 2021-11-02 RX ADMIN — Medication 1 PATCH: at 17:16

## 2021-11-02 RX ADMIN — HEPARIN SODIUM 5000 UNITS: 5000 INJECTION INTRAVENOUS; SUBCUTANEOUS at 13:25

## 2021-11-02 RX ADMIN — OXYCODONE HYDROCHLORIDE 5 MG: 5 TABLET ORAL at 17:16

## 2021-11-02 RX ADMIN — CALCIUM GLUCONATE 2 G: 20 INJECTION, SOLUTION INTRAVENOUS at 17:17

## 2021-11-03 LAB
ANION GAP SERPL CALCULATED.3IONS-SCNC: 8 MMOL/L (ref 4–13)
BASOPHILS # BLD AUTO: 0.05 THOUSANDS/ΜL (ref 0–0.1)
BASOPHILS NFR BLD AUTO: 1 % (ref 0–1)
BUN SERPL-MCNC: 3 MG/DL (ref 5–25)
CALCIUM SERPL-MCNC: 8.2 MG/DL (ref 8.3–10.1)
CHLORIDE SERPL-SCNC: 106 MMOL/L (ref 100–108)
CO2 SERPL-SCNC: 25 MMOL/L (ref 21–32)
CREAT SERPL-MCNC: 0.63 MG/DL (ref 0.6–1.3)
EOSINOPHIL # BLD AUTO: 0.31 THOUSAND/ΜL (ref 0–0.61)
EOSINOPHIL NFR BLD AUTO: 3 % (ref 0–6)
ERYTHROCYTE [DISTWIDTH] IN BLOOD BY AUTOMATED COUNT: 13.3 % (ref 11.6–15.1)
GFR SERPL CREATININE-BSD FRML MDRD: 94 ML/MIN/1.73SQ M
GLUCOSE SERPL-MCNC: 118 MG/DL (ref 65–140)
HCT VFR BLD AUTO: 30.3 % (ref 34.8–46.1)
HGB BLD-MCNC: 10 G/DL (ref 11.5–15.4)
IMM GRANULOCYTES # BLD AUTO: 0.04 THOUSAND/UL (ref 0–0.2)
IMM GRANULOCYTES NFR BLD AUTO: 0 % (ref 0–2)
LYMPHOCYTES # BLD AUTO: 1.4 THOUSANDS/ΜL (ref 0.6–4.47)
LYMPHOCYTES NFR BLD AUTO: 14 % (ref 14–44)
MAGNESIUM SERPL-MCNC: 1.4 MG/DL (ref 1.6–2.6)
MCH RBC QN AUTO: 34.2 PG (ref 26.8–34.3)
MCHC RBC AUTO-ENTMCNC: 33 G/DL (ref 31.4–37.4)
MCV RBC AUTO: 104 FL (ref 82–98)
MONOCYTES # BLD AUTO: 0.87 THOUSAND/ΜL (ref 0.17–1.22)
MONOCYTES NFR BLD AUTO: 9 % (ref 4–12)
NEUTROPHILS # BLD AUTO: 7.34 THOUSANDS/ΜL (ref 1.85–7.62)
NEUTS SEG NFR BLD AUTO: 73 % (ref 43–75)
NRBC BLD AUTO-RTO: 0 /100 WBCS
PLATELET # BLD AUTO: 265 THOUSANDS/UL (ref 149–390)
PMV BLD AUTO: 9.8 FL (ref 8.9–12.7)
POTASSIUM SERPL-SCNC: 3.8 MMOL/L (ref 3.5–5.3)
RBC # BLD AUTO: 2.92 MILLION/UL (ref 3.81–5.12)
SODIUM SERPL-SCNC: 139 MMOL/L (ref 136–145)
WBC # BLD AUTO: 10.01 THOUSAND/UL (ref 4.31–10.16)

## 2021-11-03 PROCEDURE — 97110 THERAPEUTIC EXERCISES: CPT

## 2021-11-03 PROCEDURE — 83735 ASSAY OF MAGNESIUM: CPT | Performed by: SURGERY

## 2021-11-03 PROCEDURE — NC001 PR NO CHARGE: Performed by: SURGERY

## 2021-11-03 PROCEDURE — 80048 BASIC METABOLIC PNL TOTAL CA: CPT | Performed by: SURGERY

## 2021-11-03 PROCEDURE — 97530 THERAPEUTIC ACTIVITIES: CPT

## 2021-11-03 PROCEDURE — 97535 SELF CARE MNGMENT TRAINING: CPT

## 2021-11-03 PROCEDURE — 85025 COMPLETE CBC W/AUTO DIFF WBC: CPT | Performed by: SURGERY

## 2021-11-03 RX ORDER — MAGNESIUM SULFATE 1 G/100ML
1 INJECTION INTRAVENOUS ONCE
Status: COMPLETED | OUTPATIENT
Start: 2021-11-03 | End: 2021-11-03

## 2021-11-03 RX ORDER — HYDROMORPHONE HCL/PF 1 MG/ML
0.5 SYRINGE (ML) INJECTION
Status: DISCONTINUED | OUTPATIENT
Start: 2021-11-03 | End: 2021-11-08 | Stop reason: HOSPADM

## 2021-11-03 RX ORDER — POTASSIUM CHLORIDE 20 MEQ/1
40 TABLET, EXTENDED RELEASE ORAL ONCE
Status: COMPLETED | OUTPATIENT
Start: 2021-11-03 | End: 2021-11-03

## 2021-11-03 RX ORDER — CALCIUM CARBONATE 200(500)MG
500 TABLET,CHEWABLE ORAL DAILY PRN
Status: DISCONTINUED | OUTPATIENT
Start: 2021-11-03 | End: 2021-11-08 | Stop reason: HOSPADM

## 2021-11-03 RX ADMIN — ACETAMINOPHEN 975 MG: 325 TABLET, FILM COATED ORAL at 05:45

## 2021-11-03 RX ADMIN — POTASSIUM CHLORIDE 40 MEQ: 1500 TABLET, EXTENDED RELEASE ORAL at 08:52

## 2021-11-03 RX ADMIN — OXYCODONE HYDROCHLORIDE 5 MG: 5 TABLET ORAL at 08:53

## 2021-11-03 RX ADMIN — HYDROMORPHONE HYDROCHLORIDE 0.5 MG: 1 INJECTION, SOLUTION INTRAMUSCULAR; INTRAVENOUS; SUBCUTANEOUS at 19:19

## 2021-11-03 RX ADMIN — DIAZEPAM 10 MG: 5 TABLET ORAL at 17:30

## 2021-11-03 RX ADMIN — ACETAMINOPHEN 975 MG: 325 TABLET, FILM COATED ORAL at 21:10

## 2021-11-03 RX ADMIN — DEXTROSE, SODIUM CHLORIDE, AND POTASSIUM CHLORIDE 100 ML/HR: 5; .45; .15 INJECTION INTRAVENOUS at 01:44

## 2021-11-03 RX ADMIN — Medication 1 PATCH: at 17:30

## 2021-11-03 RX ADMIN — MAGNESIUM SULFATE HEPTAHYDRATE 1 G: 1 INJECTION, SOLUTION INTRAVENOUS at 09:28

## 2021-11-03 RX ADMIN — OXYCODONE HYDROCHLORIDE 5 MG: 5 TABLET ORAL at 13:32

## 2021-11-03 RX ADMIN — OXYCODONE HYDROCHLORIDE 5 MG: 5 TABLET ORAL at 22:04

## 2021-11-03 RX ADMIN — DIAZEPAM 10 MG: 5 TABLET ORAL at 08:52

## 2021-11-03 RX ADMIN — ACETAMINOPHEN 975 MG: 325 TABLET, FILM COATED ORAL at 13:32

## 2021-11-03 RX ADMIN — DEXTROSE, SODIUM CHLORIDE, AND POTASSIUM CHLORIDE 100 ML/HR: 5; .45; .15 INJECTION INTRAVENOUS at 23:37

## 2021-11-03 RX ADMIN — GABAPENTIN 100 MG: 100 CAPSULE ORAL at 21:11

## 2021-11-03 RX ADMIN — HEPARIN SODIUM 5000 UNITS: 5000 INJECTION INTRAVENOUS; SUBCUTANEOUS at 05:45

## 2021-11-03 RX ADMIN — OXYCODONE HYDROCHLORIDE 5 MG: 5 TABLET ORAL at 17:30

## 2021-11-03 RX ADMIN — HYDROMORPHONE HYDROCHLORIDE 0.5 MG: 1 INJECTION, SOLUTION INTRAMUSCULAR; INTRAVENOUS; SUBCUTANEOUS at 14:59

## 2021-11-03 RX ADMIN — HEPARIN SODIUM 5000 UNITS: 5000 INJECTION INTRAVENOUS; SUBCUTANEOUS at 13:32

## 2021-11-03 RX ADMIN — CALCIUM CARBONATE (ANTACID) CHEW TAB 500 MG 500 MG: 500 CHEW TAB at 01:42

## 2021-11-03 RX ADMIN — HEPARIN SODIUM 5000 UNITS: 5000 INJECTION INTRAVENOUS; SUBCUTANEOUS at 21:09

## 2021-11-04 LAB
ANION GAP SERPL CALCULATED.3IONS-SCNC: 8 MMOL/L (ref 4–13)
BASOPHILS # BLD AUTO: 0.04 THOUSANDS/ΜL (ref 0–0.1)
BASOPHILS NFR BLD AUTO: 0 % (ref 0–1)
BUN SERPL-MCNC: 2 MG/DL (ref 5–25)
CALCIUM SERPL-MCNC: 8.1 MG/DL (ref 8.3–10.1)
CHLORIDE SERPL-SCNC: 108 MMOL/L (ref 100–108)
CO2 SERPL-SCNC: 26 MMOL/L (ref 21–32)
CREAT SERPL-MCNC: 0.5 MG/DL (ref 0.6–1.3)
EOSINOPHIL # BLD AUTO: 0.63 THOUSAND/ΜL (ref 0–0.61)
EOSINOPHIL NFR BLD AUTO: 7 % (ref 0–6)
ERYTHROCYTE [DISTWIDTH] IN BLOOD BY AUTOMATED COUNT: 13.1 % (ref 11.6–15.1)
FOLATE SERPL-MCNC: 13.6 NG/ML (ref 3.1–17.5)
GFR SERPL CREATININE-BSD FRML MDRD: 101 ML/MIN/1.73SQ M
GLUCOSE SERPL-MCNC: 107 MG/DL (ref 65–140)
HCT VFR BLD AUTO: 29.4 % (ref 34.8–46.1)
HGB BLD-MCNC: 9.9 G/DL (ref 11.5–15.4)
IMM GRANULOCYTES # BLD AUTO: 0.03 THOUSAND/UL (ref 0–0.2)
IMM GRANULOCYTES NFR BLD AUTO: 0 % (ref 0–2)
LYMPHOCYTES # BLD AUTO: 1.51 THOUSANDS/ΜL (ref 0.6–4.47)
LYMPHOCYTES NFR BLD AUTO: 17 % (ref 14–44)
MAGNESIUM SERPL-MCNC: 1.6 MG/DL (ref 1.6–2.6)
MCH RBC QN AUTO: 34.4 PG (ref 26.8–34.3)
MCHC RBC AUTO-ENTMCNC: 33.7 G/DL (ref 31.4–37.4)
MCV RBC AUTO: 102 FL (ref 82–98)
MONOCYTES # BLD AUTO: 0.66 THOUSAND/ΜL (ref 0.17–1.22)
MONOCYTES NFR BLD AUTO: 7 % (ref 4–12)
NEUTROPHILS # BLD AUTO: 6.24 THOUSANDS/ΜL (ref 1.85–7.62)
NEUTS SEG NFR BLD AUTO: 69 % (ref 43–75)
NRBC BLD AUTO-RTO: 0 /100 WBCS
PHOSPHATE SERPL-MCNC: 1.3 MG/DL (ref 2.3–4.1)
PLATELET # BLD AUTO: 269 THOUSANDS/UL (ref 149–390)
PMV BLD AUTO: 9.7 FL (ref 8.9–12.7)
POTASSIUM SERPL-SCNC: 3.5 MMOL/L (ref 3.5–5.3)
RBC # BLD AUTO: 2.88 MILLION/UL (ref 3.81–5.12)
SODIUM SERPL-SCNC: 142 MMOL/L (ref 136–145)
VIT B12 SERPL-MCNC: 937 PG/ML (ref 100–900)
WBC # BLD AUTO: 9.11 THOUSAND/UL (ref 4.31–10.16)

## 2021-11-04 PROCEDURE — 85025 COMPLETE CBC W/AUTO DIFF WBC: CPT | Performed by: SURGERY

## 2021-11-04 PROCEDURE — 97530 THERAPEUTIC ACTIVITIES: CPT

## 2021-11-04 PROCEDURE — 83735 ASSAY OF MAGNESIUM: CPT | Performed by: SURGERY

## 2021-11-04 PROCEDURE — 99024 POSTOP FOLLOW-UP VISIT: CPT | Performed by: SURGERY

## 2021-11-04 PROCEDURE — 84100 ASSAY OF PHOSPHORUS: CPT | Performed by: SURGERY

## 2021-11-04 PROCEDURE — 80048 BASIC METABOLIC PNL TOTAL CA: CPT | Performed by: SURGERY

## 2021-11-04 PROCEDURE — 97535 SELF CARE MNGMENT TRAINING: CPT

## 2021-11-04 PROCEDURE — 82746 ASSAY OF FOLIC ACID SERUM: CPT | Performed by: SURGERY

## 2021-11-04 PROCEDURE — 82607 VITAMIN B-12: CPT | Performed by: SURGERY

## 2021-11-04 PROCEDURE — 97116 GAIT TRAINING THERAPY: CPT

## 2021-11-04 RX ORDER — POTASSIUM CHLORIDE 20 MEQ/1
40 TABLET, EXTENDED RELEASE ORAL ONCE
Status: COMPLETED | OUTPATIENT
Start: 2021-11-04 | End: 2021-11-04

## 2021-11-04 RX ORDER — ONDANSETRON 2 MG/ML
4 INJECTION INTRAMUSCULAR; INTRAVENOUS ONCE
Status: COMPLETED | OUTPATIENT
Start: 2021-11-04 | End: 2021-11-04

## 2021-11-04 RX ORDER — SCOLOPAMINE TRANSDERMAL SYSTEM 1 MG/1
1 PATCH, EXTENDED RELEASE TRANSDERMAL
Status: DISCONTINUED | OUTPATIENT
Start: 2021-11-04 | End: 2021-11-08 | Stop reason: HOSPADM

## 2021-11-04 RX ADMIN — DIAZEPAM 10 MG: 5 TABLET ORAL at 17:59

## 2021-11-04 RX ADMIN — HYDROMORPHONE HYDROCHLORIDE 0.5 MG: 1 INJECTION, SOLUTION INTRAMUSCULAR; INTRAVENOUS; SUBCUTANEOUS at 14:37

## 2021-11-04 RX ADMIN — ACETAMINOPHEN 975 MG: 325 TABLET, FILM COATED ORAL at 13:05

## 2021-11-04 RX ADMIN — ONDANSETRON 4 MG: 2 INJECTION INTRAMUSCULAR; INTRAVENOUS at 02:20

## 2021-11-04 RX ADMIN — GABAPENTIN 100 MG: 100 CAPSULE ORAL at 21:02

## 2021-11-04 RX ADMIN — ACETAMINOPHEN 975 MG: 325 TABLET, FILM COATED ORAL at 21:02

## 2021-11-04 RX ADMIN — OXYCODONE HYDROCHLORIDE 5 MG: 5 TABLET ORAL at 21:02

## 2021-11-04 RX ADMIN — SCOPALAMINE 1 PATCH: 1 PATCH, EXTENDED RELEASE TRANSDERMAL at 22:41

## 2021-11-04 RX ADMIN — HYDROMORPHONE HYDROCHLORIDE 0.5 MG: 1 INJECTION, SOLUTION INTRAMUSCULAR; INTRAVENOUS; SUBCUTANEOUS at 17:59

## 2021-11-04 RX ADMIN — DIAZEPAM 10 MG: 5 TABLET ORAL at 08:24

## 2021-11-04 RX ADMIN — OXYCODONE HYDROCHLORIDE 5 MG: 5 TABLET ORAL at 11:23

## 2021-11-04 RX ADMIN — OXYCODONE HYDROCHLORIDE 5 MG: 5 TABLET ORAL at 06:01

## 2021-11-04 RX ADMIN — HYDROMORPHONE HYDROCHLORIDE 0.5 MG: 1 INJECTION, SOLUTION INTRAMUSCULAR; INTRAVENOUS; SUBCUTANEOUS at 08:24

## 2021-11-04 RX ADMIN — ONDANSETRON 4 MG: 2 INJECTION INTRAMUSCULAR; INTRAVENOUS at 08:26

## 2021-11-04 RX ADMIN — ONDANSETRON 4 MG: 2 INJECTION INTRAMUSCULAR; INTRAVENOUS at 22:35

## 2021-11-04 RX ADMIN — HYDROMORPHONE HYDROCHLORIDE 0.5 MG: 1 INJECTION, SOLUTION INTRAMUSCULAR; INTRAVENOUS; SUBCUTANEOUS at 02:17

## 2021-11-04 RX ADMIN — OXYCODONE HYDROCHLORIDE 5 MG: 5 TABLET ORAL at 16:27

## 2021-11-04 RX ADMIN — ACETAMINOPHEN 975 MG: 325 TABLET, FILM COATED ORAL at 06:01

## 2021-11-04 RX ADMIN — HEPARIN SODIUM 5000 UNITS: 5000 INJECTION INTRAVENOUS; SUBCUTANEOUS at 22:04

## 2021-11-04 RX ADMIN — OXYCODONE HYDROCHLORIDE 5 MG: 5 TABLET ORAL at 01:45

## 2021-11-04 RX ADMIN — Medication 1 PATCH: at 16:27

## 2021-11-04 RX ADMIN — POTASSIUM CHLORIDE 40 MEQ: 1500 TABLET, EXTENDED RELEASE ORAL at 11:24

## 2021-11-04 RX ADMIN — ONDANSETRON 4 MG: 2 INJECTION INTRAMUSCULAR; INTRAVENOUS at 17:59

## 2021-11-05 LAB
ANION GAP SERPL CALCULATED.3IONS-SCNC: 9 MMOL/L (ref 4–13)
BUN SERPL-MCNC: 6 MG/DL (ref 5–25)
CALCIUM SERPL-MCNC: 8.9 MG/DL (ref 8.3–10.1)
CHLORIDE SERPL-SCNC: 105 MMOL/L (ref 100–108)
CO2 SERPL-SCNC: 26 MMOL/L (ref 21–32)
CREAT SERPL-MCNC: 0.56 MG/DL (ref 0.6–1.3)
GFR SERPL CREATININE-BSD FRML MDRD: 97 ML/MIN/1.73SQ M
GLUCOSE SERPL-MCNC: 83 MG/DL (ref 65–140)
POTASSIUM SERPL-SCNC: 3.6 MMOL/L (ref 3.5–5.3)
SODIUM SERPL-SCNC: 140 MMOL/L (ref 136–145)

## 2021-11-05 PROCEDURE — 80048 BASIC METABOLIC PNL TOTAL CA: CPT | Performed by: SURGERY

## 2021-11-05 PROCEDURE — 99024 POSTOP FOLLOW-UP VISIT: CPT | Performed by: SURGERY

## 2021-11-05 RX ORDER — HYDROCODONE BITARTRATE AND ACETAMINOPHEN 5; 325 MG/1; MG/1
1 TABLET ORAL EVERY 4 HOURS PRN
Status: DISCONTINUED | OUTPATIENT
Start: 2021-11-05 | End: 2021-11-08 | Stop reason: HOSPADM

## 2021-11-05 RX ORDER — HYDROCODONE BITARTRATE AND ACETAMINOPHEN 5; 325 MG/1; MG/1
2 TABLET ORAL EVERY 6 HOURS PRN
Status: DISCONTINUED | OUTPATIENT
Start: 2021-11-05 | End: 2021-11-08 | Stop reason: HOSPADM

## 2021-11-05 RX ORDER — POTASSIUM CHLORIDE 20 MEQ/1
40 TABLET, EXTENDED RELEASE ORAL ONCE
Status: COMPLETED | OUTPATIENT
Start: 2021-11-05 | End: 2021-11-05

## 2021-11-05 RX ADMIN — CALCIUM CARBONATE (ANTACID) CHEW TAB 500 MG 500 MG: 500 CHEW TAB at 16:47

## 2021-11-05 RX ADMIN — HYDROMORPHONE HYDROCHLORIDE 0.5 MG: 1 INJECTION, SOLUTION INTRAMUSCULAR; INTRAVENOUS; SUBCUTANEOUS at 22:16

## 2021-11-05 RX ADMIN — Medication 1 PATCH: at 16:47

## 2021-11-05 RX ADMIN — HEPARIN SODIUM 5000 UNITS: 5000 INJECTION INTRAVENOUS; SUBCUTANEOUS at 14:45

## 2021-11-05 RX ADMIN — HYDROMORPHONE HYDROCHLORIDE 0.5 MG: 1 INJECTION, SOLUTION INTRAMUSCULAR; INTRAVENOUS; SUBCUTANEOUS at 00:35

## 2021-11-05 RX ADMIN — POTASSIUM CHLORIDE 40 MEQ: 1500 TABLET, EXTENDED RELEASE ORAL at 11:50

## 2021-11-05 RX ADMIN — HYDROCODONE BITARTRATE AND ACETAMINOPHEN 2 TABLET: 5; 325 TABLET ORAL at 18:32

## 2021-11-05 RX ADMIN — HYDROMORPHONE HYDROCHLORIDE 0.5 MG: 1 INJECTION, SOLUTION INTRAMUSCULAR; INTRAVENOUS; SUBCUTANEOUS at 08:26

## 2021-11-05 RX ADMIN — ONDANSETRON 4 MG: 2 INJECTION INTRAMUSCULAR; INTRAVENOUS at 16:50

## 2021-11-05 RX ADMIN — ACETAMINOPHEN 975 MG: 325 TABLET, FILM COATED ORAL at 14:46

## 2021-11-05 RX ADMIN — HYDROMORPHONE HYDROCHLORIDE 0.5 MG: 1 INJECTION, SOLUTION INTRAMUSCULAR; INTRAVENOUS; SUBCUTANEOUS at 14:45

## 2021-11-05 RX ADMIN — ACETAMINOPHEN 975 MG: 325 TABLET, FILM COATED ORAL at 05:58

## 2021-11-05 RX ADMIN — DIAZEPAM 10 MG: 5 TABLET ORAL at 18:32

## 2021-11-05 RX ADMIN — HYDROMORPHONE HYDROCHLORIDE 0.5 MG: 1 INJECTION, SOLUTION INTRAMUSCULAR; INTRAVENOUS; SUBCUTANEOUS at 11:50

## 2021-11-05 RX ADMIN — HEPARIN SODIUM 5000 UNITS: 5000 INJECTION INTRAVENOUS; SUBCUTANEOUS at 22:11

## 2021-11-05 RX ADMIN — DIAZEPAM 10 MG: 5 TABLET ORAL at 08:26

## 2021-11-05 RX ADMIN — HEPARIN SODIUM 5000 UNITS: 5000 INJECTION INTRAVENOUS; SUBCUTANEOUS at 05:58

## 2021-11-05 RX ADMIN — GABAPENTIN 100 MG: 100 CAPSULE ORAL at 22:12

## 2021-11-06 LAB
ANION GAP SERPL CALCULATED.3IONS-SCNC: 10 MMOL/L (ref 4–13)
BUN SERPL-MCNC: 10 MG/DL (ref 5–25)
CALCIUM SERPL-MCNC: 8.6 MG/DL (ref 8.3–10.1)
CHLORIDE SERPL-SCNC: 105 MMOL/L (ref 100–108)
CO2 SERPL-SCNC: 27 MMOL/L (ref 21–32)
CREAT SERPL-MCNC: 0.63 MG/DL (ref 0.6–1.3)
GFR SERPL CREATININE-BSD FRML MDRD: 94 ML/MIN/1.73SQ M
GLUCOSE SERPL-MCNC: 91 MG/DL (ref 65–140)
MAGNESIUM SERPL-MCNC: 1.9 MG/DL (ref 1.6–2.6)
PHOSPHATE SERPL-MCNC: 3.4 MG/DL (ref 2.3–4.1)
POTASSIUM SERPL-SCNC: 4.6 MMOL/L (ref 3.5–5.3)
SODIUM SERPL-SCNC: 142 MMOL/L (ref 136–145)

## 2021-11-06 PROCEDURE — 80048 BASIC METABOLIC PNL TOTAL CA: CPT | Performed by: SURGERY

## 2021-11-06 PROCEDURE — NC001 PR NO CHARGE: Performed by: SURGERY

## 2021-11-06 PROCEDURE — 84100 ASSAY OF PHOSPHORUS: CPT | Performed by: SURGERY

## 2021-11-06 PROCEDURE — 83735 ASSAY OF MAGNESIUM: CPT | Performed by: SURGERY

## 2021-11-06 RX ORDER — PRIMIDONE 50 MG/1
50 TABLET ORAL
Status: DISCONTINUED | OUTPATIENT
Start: 2021-11-06 | End: 2021-11-08 | Stop reason: HOSPADM

## 2021-11-06 RX ORDER — TIZANIDINE 4 MG/1
4 TABLET ORAL
Status: DISCONTINUED | OUTPATIENT
Start: 2021-11-06 | End: 2021-11-08 | Stop reason: HOSPADM

## 2021-11-06 RX ADMIN — PRIMIDONE 50 MG: 50 TABLET ORAL at 21:17

## 2021-11-06 RX ADMIN — HYDROMORPHONE HYDROCHLORIDE 0.5 MG: 1 INJECTION, SOLUTION INTRAMUSCULAR; INTRAVENOUS; SUBCUTANEOUS at 19:38

## 2021-11-06 RX ADMIN — ONDANSETRON 4 MG: 2 INJECTION INTRAMUSCULAR; INTRAVENOUS at 05:10

## 2021-11-06 RX ADMIN — Medication 1 PATCH: at 17:19

## 2021-11-06 RX ADMIN — DIAZEPAM 10 MG: 5 TABLET ORAL at 17:19

## 2021-11-06 RX ADMIN — HYDROMORPHONE HYDROCHLORIDE 0.5 MG: 1 INJECTION, SOLUTION INTRAMUSCULAR; INTRAVENOUS; SUBCUTANEOUS at 05:02

## 2021-11-06 RX ADMIN — HYDROCODONE BITARTRATE AND ACETAMINOPHEN 2 TABLET: 5; 325 TABLET ORAL at 17:19

## 2021-11-06 RX ADMIN — HEPARIN SODIUM 5000 UNITS: 5000 INJECTION INTRAVENOUS; SUBCUTANEOUS at 21:17

## 2021-11-06 RX ADMIN — HEPARIN SODIUM 5000 UNITS: 5000 INJECTION INTRAVENOUS; SUBCUTANEOUS at 05:02

## 2021-11-06 RX ADMIN — HEPARIN SODIUM 5000 UNITS: 5000 INJECTION INTRAVENOUS; SUBCUTANEOUS at 14:41

## 2021-11-06 RX ADMIN — HYDROMORPHONE HYDROCHLORIDE 0.5 MG: 1 INJECTION, SOLUTION INTRAMUSCULAR; INTRAVENOUS; SUBCUTANEOUS at 14:41

## 2021-11-06 RX ADMIN — CALCIUM CARBONATE (ANTACID) CHEW TAB 500 MG 500 MG: 500 CHEW TAB at 14:46

## 2021-11-06 RX ADMIN — HYDROCODONE BITARTRATE AND ACETAMINOPHEN 2 TABLET: 5; 325 TABLET ORAL at 03:40

## 2021-11-06 RX ADMIN — TIZANIDINE 4 MG: 4 TABLET ORAL at 21:17

## 2021-11-06 RX ADMIN — DIAZEPAM 10 MG: 5 TABLET ORAL at 08:46

## 2021-11-06 RX ADMIN — ONDANSETRON 4 MG: 2 INJECTION INTRAMUSCULAR; INTRAVENOUS at 11:16

## 2021-11-06 RX ADMIN — HYDROMORPHONE HYDROCHLORIDE 0.5 MG: 1 INJECTION, SOLUTION INTRAMUSCULAR; INTRAVENOUS; SUBCUTANEOUS at 11:16

## 2021-11-06 RX ADMIN — GABAPENTIN 100 MG: 100 CAPSULE ORAL at 21:17

## 2021-11-07 LAB
ANION GAP SERPL CALCULATED.3IONS-SCNC: 11 MMOL/L (ref 4–13)
BUN SERPL-MCNC: 5 MG/DL (ref 5–25)
CALCIUM SERPL-MCNC: 8.7 MG/DL (ref 8.3–10.1)
CHLORIDE SERPL-SCNC: 103 MMOL/L (ref 100–108)
CO2 SERPL-SCNC: 27 MMOL/L (ref 21–32)
CREAT SERPL-MCNC: 0.59 MG/DL (ref 0.6–1.3)
ERYTHROCYTE [DISTWIDTH] IN BLOOD BY AUTOMATED COUNT: 13.5 % (ref 11.6–15.1)
GFR SERPL CREATININE-BSD FRML MDRD: 96 ML/MIN/1.73SQ M
GLUCOSE SERPL-MCNC: 103 MG/DL (ref 65–140)
HCT VFR BLD AUTO: 35.3 % (ref 34.8–46.1)
HGB BLD-MCNC: 11.4 G/DL (ref 11.5–15.4)
MAGNESIUM SERPL-MCNC: 2 MG/DL (ref 1.6–2.6)
MCH RBC QN AUTO: 33.5 PG (ref 26.8–34.3)
MCHC RBC AUTO-ENTMCNC: 32.3 G/DL (ref 31.4–37.4)
MCV RBC AUTO: 104 FL (ref 82–98)
PLATELET # BLD AUTO: 414 THOUSANDS/UL (ref 149–390)
PMV BLD AUTO: 9.6 FL (ref 8.9–12.7)
POTASSIUM SERPL-SCNC: 3.4 MMOL/L (ref 3.5–5.3)
RBC # BLD AUTO: 3.4 MILLION/UL (ref 3.81–5.12)
SODIUM SERPL-SCNC: 141 MMOL/L (ref 136–145)
WBC # BLD AUTO: 7.02 THOUSAND/UL (ref 4.31–10.16)

## 2021-11-07 PROCEDURE — 83735 ASSAY OF MAGNESIUM: CPT | Performed by: SURGERY

## 2021-11-07 PROCEDURE — 80048 BASIC METABOLIC PNL TOTAL CA: CPT | Performed by: SURGERY

## 2021-11-07 PROCEDURE — 97110 THERAPEUTIC EXERCISES: CPT

## 2021-11-07 PROCEDURE — NC001 PR NO CHARGE: Performed by: SURGERY

## 2021-11-07 PROCEDURE — 85027 COMPLETE CBC AUTOMATED: CPT | Performed by: SURGERY

## 2021-11-07 RX ORDER — POTASSIUM CHLORIDE 20 MEQ/1
40 TABLET, EXTENDED RELEASE ORAL ONCE
Status: COMPLETED | OUTPATIENT
Start: 2021-11-07 | End: 2021-11-07

## 2021-11-07 RX ADMIN — HYDROCODONE BITARTRATE AND ACETAMINOPHEN 1 TABLET: 5; 325 TABLET ORAL at 10:36

## 2021-11-07 RX ADMIN — DIAZEPAM 10 MG: 5 TABLET ORAL at 08:33

## 2021-11-07 RX ADMIN — Medication 1 PATCH: at 15:51

## 2021-11-07 RX ADMIN — CALCIUM CARBONATE (ANTACID) CHEW TAB 500 MG 500 MG: 500 CHEW TAB at 06:47

## 2021-11-07 RX ADMIN — HYDROMORPHONE HYDROCHLORIDE 0.5 MG: 1 INJECTION, SOLUTION INTRAMUSCULAR; INTRAVENOUS; SUBCUTANEOUS at 21:05

## 2021-11-07 RX ADMIN — HEPARIN SODIUM 5000 UNITS: 5000 INJECTION INTRAVENOUS; SUBCUTANEOUS at 14:41

## 2021-11-07 RX ADMIN — HEPARIN SODIUM 5000 UNITS: 5000 INJECTION INTRAVENOUS; SUBCUTANEOUS at 05:41

## 2021-11-07 RX ADMIN — HYDROMORPHONE HYDROCHLORIDE 0.5 MG: 1 INJECTION, SOLUTION INTRAMUSCULAR; INTRAVENOUS; SUBCUTANEOUS at 03:44

## 2021-11-07 RX ADMIN — HYDROMORPHONE HYDROCHLORIDE 0.5 MG: 1 INJECTION, SOLUTION INTRAMUSCULAR; INTRAVENOUS; SUBCUTANEOUS at 06:44

## 2021-11-07 RX ADMIN — ONDANSETRON 4 MG: 2 INJECTION INTRAMUSCULAR; INTRAVENOUS at 08:37

## 2021-11-07 RX ADMIN — ONDANSETRON 4 MG: 2 INJECTION INTRAMUSCULAR; INTRAVENOUS at 17:36

## 2021-11-07 RX ADMIN — TIZANIDINE 4 MG: 4 TABLET ORAL at 21:02

## 2021-11-07 RX ADMIN — HYDROCODONE BITARTRATE AND ACETAMINOPHEN 2 TABLET: 5; 325 TABLET ORAL at 15:50

## 2021-11-07 RX ADMIN — HYDROMORPHONE HYDROCHLORIDE 0.5 MG: 1 INJECTION, SOLUTION INTRAMUSCULAR; INTRAVENOUS; SUBCUTANEOUS at 12:17

## 2021-11-07 RX ADMIN — HEPARIN SODIUM 5000 UNITS: 5000 INJECTION INTRAVENOUS; SUBCUTANEOUS at 21:01

## 2021-11-07 RX ADMIN — POTASSIUM CHLORIDE 40 MEQ: 1500 TABLET, EXTENDED RELEASE ORAL at 10:36

## 2021-11-07 RX ADMIN — HYDROCODONE BITARTRATE AND ACETAMINOPHEN 2 TABLET: 5; 325 TABLET ORAL at 02:10

## 2021-11-07 RX ADMIN — DIAZEPAM 10 MG: 5 TABLET ORAL at 18:12

## 2021-11-07 RX ADMIN — PRIMIDONE 50 MG: 50 TABLET ORAL at 21:04

## 2021-11-08 VITALS
SYSTOLIC BLOOD PRESSURE: 122 MMHG | WEIGHT: 130 LBS | BODY MASS INDEX: 21.66 KG/M2 | HEART RATE: 99 BPM | TEMPERATURE: 98.3 F | OXYGEN SATURATION: 95 % | DIASTOLIC BLOOD PRESSURE: 78 MMHG | RESPIRATION RATE: 18 BRPM | HEIGHT: 65 IN

## 2021-11-08 DIAGNOSIS — G43.909 MIGRAINE WITHOUT STATUS MIGRAINOSUS, NOT INTRACTABLE, UNSPECIFIED MIGRAINE TYPE: ICD-10-CM

## 2021-11-08 DIAGNOSIS — G25.0 ESSENTIAL TREMOR: ICD-10-CM

## 2021-11-08 DIAGNOSIS — J45.20 MILD INTERMITTENT ASTHMA, UNSPECIFIED WHETHER COMPLICATED: ICD-10-CM

## 2021-11-08 LAB
ANION GAP SERPL CALCULATED.3IONS-SCNC: 9 MMOL/L (ref 4–13)
BASOPHILS # BLD AUTO: 0.07 THOUSANDS/ΜL (ref 0–0.1)
BASOPHILS NFR BLD AUTO: 1 % (ref 0–1)
BUN SERPL-MCNC: 4 MG/DL (ref 5–25)
CALCIUM SERPL-MCNC: 8.7 MG/DL (ref 8.3–10.1)
CHLORIDE SERPL-SCNC: 106 MMOL/L (ref 100–108)
CO2 SERPL-SCNC: 27 MMOL/L (ref 21–32)
CREAT SERPL-MCNC: 0.56 MG/DL (ref 0.6–1.3)
EOSINOPHIL # BLD AUTO: 0.54 THOUSAND/ΜL (ref 0–0.61)
EOSINOPHIL NFR BLD AUTO: 8 % (ref 0–6)
ERYTHROCYTE [DISTWIDTH] IN BLOOD BY AUTOMATED COUNT: 13.6 % (ref 11.6–15.1)
GFR SERPL CREATININE-BSD FRML MDRD: 97 ML/MIN/1.73SQ M
GLUCOSE SERPL-MCNC: 86 MG/DL (ref 65–140)
HCT VFR BLD AUTO: 32.3 % (ref 34.8–46.1)
HGB BLD-MCNC: 10.8 G/DL (ref 11.5–15.4)
IMM GRANULOCYTES # BLD AUTO: 0.02 THOUSAND/UL (ref 0–0.2)
IMM GRANULOCYTES NFR BLD AUTO: 0 % (ref 0–2)
LYMPHOCYTES # BLD AUTO: 2.12 THOUSANDS/ΜL (ref 0.6–4.47)
LYMPHOCYTES NFR BLD AUTO: 30 % (ref 14–44)
MCH RBC QN AUTO: 35.2 PG (ref 26.8–34.3)
MCHC RBC AUTO-ENTMCNC: 33.4 G/DL (ref 31.4–37.4)
MCV RBC AUTO: 105 FL (ref 82–98)
MONOCYTES # BLD AUTO: 0.94 THOUSAND/ΜL (ref 0.17–1.22)
MONOCYTES NFR BLD AUTO: 13 % (ref 4–12)
NEUTROPHILS # BLD AUTO: 3.42 THOUSANDS/ΜL (ref 1.85–7.62)
NEUTS SEG NFR BLD AUTO: 48 % (ref 43–75)
NRBC BLD AUTO-RTO: 0 /100 WBCS
PLATELET # BLD AUTO: 384 THOUSANDS/UL (ref 149–390)
PMV BLD AUTO: 9 FL (ref 8.9–12.7)
POTASSIUM SERPL-SCNC: 3.6 MMOL/L (ref 3.5–5.3)
RBC # BLD AUTO: 3.07 MILLION/UL (ref 3.81–5.12)
SODIUM SERPL-SCNC: 142 MMOL/L (ref 136–145)
WBC # BLD AUTO: 7.11 THOUSAND/UL (ref 4.31–10.16)

## 2021-11-08 PROCEDURE — 80048 BASIC METABOLIC PNL TOTAL CA: CPT | Performed by: SURGERY

## 2021-11-08 PROCEDURE — 97535 SELF CARE MNGMENT TRAINING: CPT

## 2021-11-08 PROCEDURE — NC001 PR NO CHARGE: Performed by: SURGERY

## 2021-11-08 PROCEDURE — 97530 THERAPEUTIC ACTIVITIES: CPT

## 2021-11-08 PROCEDURE — 85025 COMPLETE CBC W/AUTO DIFF WBC: CPT | Performed by: SURGERY

## 2021-11-08 RX ORDER — DIAZEPAM 5 MG/1
10 TABLET ORAL 2 TIMES DAILY
Status: DISCONTINUED | OUTPATIENT
Start: 2021-11-08 | End: 2021-11-08 | Stop reason: HOSPADM

## 2021-11-08 RX ORDER — POTASSIUM CHLORIDE 20 MEQ/1
40 TABLET, EXTENDED RELEASE ORAL ONCE
Status: COMPLETED | OUTPATIENT
Start: 2021-11-08 | End: 2021-11-08

## 2021-11-08 RX ORDER — SUMATRIPTAN 100 MG/1
100 TABLET, FILM COATED ORAL ONCE AS NEEDED
Qty: 9 TABLET | Refills: 0 | Status: SHIPPED | OUTPATIENT
Start: 2021-11-08 | End: 2021-11-25 | Stop reason: SDUPTHER

## 2021-11-08 RX ADMIN — HEPARIN SODIUM 5000 UNITS: 5000 INJECTION INTRAVENOUS; SUBCUTANEOUS at 07:56

## 2021-11-08 RX ADMIN — ONDANSETRON 4 MG: 2 INJECTION INTRAMUSCULAR; INTRAVENOUS at 10:20

## 2021-11-08 RX ADMIN — POTASSIUM CHLORIDE 40 MEQ: 1500 TABLET, EXTENDED RELEASE ORAL at 08:03

## 2021-11-08 RX ADMIN — HYDROCODONE BITARTRATE AND ACETAMINOPHEN 2 TABLET: 5; 325 TABLET ORAL at 08:04

## 2021-11-08 RX ADMIN — DIAZEPAM 10 MG: 5 TABLET ORAL at 03:44

## 2021-11-08 RX ADMIN — HYDROCODONE BITARTRATE AND ACETAMINOPHEN 2 TABLET: 5; 325 TABLET ORAL at 01:29

## 2021-11-08 RX ADMIN — SCOPALAMINE 1 PATCH: 1 PATCH, EXTENDED RELEASE TRANSDERMAL at 00:10

## 2021-11-09 ENCOUNTER — TRANSITIONAL CARE MANAGEMENT (OUTPATIENT)
Dept: FAMILY MEDICINE CLINIC | Facility: CLINIC | Age: 66
End: 2021-11-09

## 2021-11-09 RX ORDER — PRIMIDONE 50 MG/1
TABLET ORAL
Qty: 360 TABLET | Refills: 0 | Status: SHIPPED | OUTPATIENT
Start: 2021-11-09 | End: 2022-01-12

## 2021-11-11 ENCOUNTER — TELEPHONE (OUTPATIENT)
Dept: SURGERY | Facility: CLINIC | Age: 66
End: 2021-11-11

## 2021-11-15 ENCOUNTER — NURSE TRIAGE (OUTPATIENT)
Dept: OTHER | Facility: OTHER | Age: 66
End: 2021-11-15

## 2021-11-17 ENCOUNTER — OFFICE VISIT (OUTPATIENT)
Dept: SURGERY | Facility: CLINIC | Age: 66
End: 2021-11-17

## 2021-11-17 VITALS — WEIGHT: 111.4 LBS | HEIGHT: 65 IN | BODY MASS INDEX: 18.56 KG/M2 | TEMPERATURE: 95.4 F

## 2021-11-17 DIAGNOSIS — Z93.2 ILEOSTOMY IN PLACE (HCC): ICD-10-CM

## 2021-11-17 DIAGNOSIS — K57.20 DIVERTICULITIS OF LARGE INTESTINE WITH PERFORATION WITHOUT ABSCESS OR BLEEDING: Primary | ICD-10-CM

## 2021-11-17 DIAGNOSIS — Z90.49 STATUS POST LAPAROSCOPIC COLECTOMY: ICD-10-CM

## 2021-11-17 DIAGNOSIS — R10.13 DYSPEPSIA: ICD-10-CM

## 2021-11-17 PROCEDURE — 3008F BODY MASS INDEX DOCD: CPT | Performed by: SURGERY

## 2021-11-17 PROCEDURE — 99024 POSTOP FOLLOW-UP VISIT: CPT | Performed by: SURGERY

## 2021-11-18 RX ORDER — ONDANSETRON 4 MG/1
4 TABLET, FILM COATED ORAL EVERY 8 HOURS PRN
Qty: 20 TABLET | Refills: 0 | Status: SHIPPED | OUTPATIENT
Start: 2021-11-18 | End: 2021-11-25 | Stop reason: SDUPTHER

## 2021-11-22 ENCOUNTER — TELEPHONE (OUTPATIENT)
Dept: SURGERY | Facility: HOSPITAL | Age: 66
End: 2021-11-22

## 2021-11-25 DIAGNOSIS — J45.20 MILD INTERMITTENT ASTHMA, UNSPECIFIED WHETHER COMPLICATED: ICD-10-CM

## 2021-11-26 RX ORDER — ALBUTEROL SULFATE 90 UG/1
2 AEROSOL, METERED RESPIRATORY (INHALATION) EVERY 6 HOURS PRN
Qty: 54 G | Refills: 0 | Status: SHIPPED | OUTPATIENT
Start: 2021-11-26 | End: 2021-12-28 | Stop reason: SDUPTHER

## 2021-11-30 ENCOUNTER — HOSPITAL ENCOUNTER (INPATIENT)
Facility: HOSPITAL | Age: 66
LOS: 1 days | Discharge: HOME HEALTH | DRG: 394 | End: 2021-12-01
Attending: EMERGENCY MEDICINE | Admitting: INTERNAL MEDICINE
Payer: COMMERCIAL

## 2021-11-30 DIAGNOSIS — K94.13 ILEOSTOMY DYSFUNCTION (HCC): Primary | ICD-10-CM

## 2021-11-30 DIAGNOSIS — N17.9 AKI (ACUTE KIDNEY INJURY) (HCC): ICD-10-CM

## 2021-11-30 DIAGNOSIS — Z93.2 ILEOSTOMY IN PLACE (HCC): ICD-10-CM

## 2021-11-30 DIAGNOSIS — L24.9 IRRITANT DERMATITIS: ICD-10-CM

## 2021-11-30 LAB
ANION GAP SERPL CALCULATED.3IONS-SCNC: 13 MMOL/L (ref 4–13)
BACTERIA UR QL AUTO: ABNORMAL /HPF
BASOPHILS # BLD AUTO: 0.08 THOUSANDS/ΜL (ref 0–0.1)
BASOPHILS NFR BLD AUTO: 1 % (ref 0–1)
BILIRUB UR QL STRIP: NEGATIVE
BUN SERPL-MCNC: 61 MG/DL (ref 5–25)
CALCIUM SERPL-MCNC: 10.4 MG/DL (ref 8.3–10.1)
CHLORIDE SERPL-SCNC: 98 MMOL/L (ref 100–108)
CLARITY UR: CLEAR
CO2 SERPL-SCNC: 29 MMOL/L (ref 21–32)
COLOR UR: YELLOW
CREAT SERPL-MCNC: 2.38 MG/DL (ref 0.6–1.3)
EOSINOPHIL # BLD AUTO: 0.49 THOUSAND/ΜL (ref 0–0.61)
EOSINOPHIL NFR BLD AUTO: 5 % (ref 0–6)
ERYTHROCYTE [DISTWIDTH] IN BLOOD BY AUTOMATED COUNT: 14.6 % (ref 11.6–15.1)
GFR SERPL CREATININE-BSD FRML MDRD: 21 ML/MIN/1.73SQ M
GLUCOSE SERPL-MCNC: 86 MG/DL (ref 65–140)
GLUCOSE UR STRIP-MCNC: NEGATIVE MG/DL
HCT VFR BLD AUTO: 38.8 % (ref 34.8–46.1)
HGB BLD-MCNC: 13 G/DL (ref 11.5–15.4)
HGB UR QL STRIP.AUTO: NEGATIVE
HYALINE CASTS #/AREA URNS LPF: ABNORMAL /LPF
IMM GRANULOCYTES # BLD AUTO: 0.04 THOUSAND/UL (ref 0–0.2)
IMM GRANULOCYTES NFR BLD AUTO: 0 % (ref 0–2)
KETONES UR STRIP-MCNC: ABNORMAL MG/DL
LEUKOCYTE ESTERASE UR QL STRIP: NEGATIVE
LYMPHOCYTES # BLD AUTO: 1.67 THOUSANDS/ΜL (ref 0.6–4.47)
LYMPHOCYTES NFR BLD AUTO: 18 % (ref 14–44)
MCH RBC QN AUTO: 35.4 PG (ref 26.8–34.3)
MCHC RBC AUTO-ENTMCNC: 33.5 G/DL (ref 31.4–37.4)
MCV RBC AUTO: 106 FL (ref 82–98)
MONOCYTES # BLD AUTO: 0.73 THOUSAND/ΜL (ref 0.17–1.22)
MONOCYTES NFR BLD AUTO: 8 % (ref 4–12)
NEUTROPHILS # BLD AUTO: 6.05 THOUSANDS/ΜL (ref 1.85–7.62)
NEUTS SEG NFR BLD AUTO: 68 % (ref 43–75)
NITRITE UR QL STRIP: NEGATIVE
NON-SQ EPI CELLS URNS QL MICRO: ABNORMAL /HPF
NRBC BLD AUTO-RTO: 0 /100 WBCS
PH UR STRIP.AUTO: 5.5 [PH]
PLATELET # BLD AUTO: 291 THOUSANDS/UL (ref 149–390)
PMV BLD AUTO: 9.5 FL (ref 8.9–12.7)
POTASSIUM SERPL-SCNC: 4 MMOL/L (ref 3.5–5.3)
PROT UR STRIP-MCNC: NEGATIVE MG/DL
RBC # BLD AUTO: 3.67 MILLION/UL (ref 3.81–5.12)
RBC #/AREA URNS AUTO: ABNORMAL /HPF
SODIUM SERPL-SCNC: 140 MMOL/L (ref 136–145)
SP GR UR STRIP.AUTO: 1.02 (ref 1–1.03)
UROBILINOGEN UR QL STRIP.AUTO: 0.2 E.U./DL
WBC # BLD AUTO: 9.06 THOUSAND/UL (ref 4.31–10.16)
WBC #/AREA URNS AUTO: ABNORMAL /HPF

## 2021-11-30 PROCEDURE — 85025 COMPLETE CBC W/AUTO DIFF WBC: CPT | Performed by: EMERGENCY MEDICINE

## 2021-11-30 PROCEDURE — 99024 POSTOP FOLLOW-UP VISIT: CPT | Performed by: PHYSICIAN ASSISTANT

## 2021-11-30 PROCEDURE — 99284 EMERGENCY DEPT VISIT MOD MDM: CPT

## 2021-11-30 PROCEDURE — 99223 1ST HOSP IP/OBS HIGH 75: CPT | Performed by: INTERNAL MEDICINE

## 2021-11-30 PROCEDURE — 99284 EMERGENCY DEPT VISIT MOD MDM: CPT | Performed by: EMERGENCY MEDICINE

## 2021-11-30 PROCEDURE — 96374 THER/PROPH/DIAG INJ IV PUSH: CPT

## 2021-11-30 PROCEDURE — 81001 URINALYSIS AUTO W/SCOPE: CPT | Performed by: PHYSICIAN ASSISTANT

## 2021-11-30 PROCEDURE — 96361 HYDRATE IV INFUSION ADD-ON: CPT

## 2021-11-30 PROCEDURE — 80048 BASIC METABOLIC PNL TOTAL CA: CPT | Performed by: EMERGENCY MEDICINE

## 2021-11-30 PROCEDURE — 36415 COLL VENOUS BLD VENIPUNCTURE: CPT | Performed by: EMERGENCY MEDICINE

## 2021-11-30 RX ORDER — FLUTICASONE PROPIONATE 44 UG/1
2 AEROSOL, METERED RESPIRATORY (INHALATION) 2 TIMES DAILY
Status: DISCONTINUED | OUTPATIENT
Start: 2021-11-30 | End: 2021-12-01 | Stop reason: HOSPADM

## 2021-11-30 RX ORDER — SODIUM CHLORIDE 9 MG/ML
100 INJECTION, SOLUTION INTRAVENOUS CONTINUOUS
Status: DISCONTINUED | OUTPATIENT
Start: 2021-11-30 | End: 2021-12-01 | Stop reason: HOSPADM

## 2021-11-30 RX ORDER — CLOTRIMAZOLE 10 MG/1
10 LOZENGE ORAL; TOPICAL
Status: DISCONTINUED | OUTPATIENT
Start: 2021-11-30 | End: 2021-12-01 | Stop reason: HOSPADM

## 2021-11-30 RX ORDER — HYDROCODONE BITARTRATE AND ACETAMINOPHEN 5; 325 MG/1; MG/1
1 TABLET ORAL EVERY 4 HOURS PRN
Status: DISCONTINUED | OUTPATIENT
Start: 2021-11-30 | End: 2021-12-01 | Stop reason: HOSPADM

## 2021-11-30 RX ORDER — PRIMIDONE 50 MG/1
200 TABLET ORAL
Status: DISCONTINUED | OUTPATIENT
Start: 2021-11-30 | End: 2021-12-01 | Stop reason: HOSPADM

## 2021-11-30 RX ORDER — ONDANSETRON 2 MG/ML
4 INJECTION INTRAMUSCULAR; INTRAVENOUS EVERY 6 HOURS PRN
Status: DISCONTINUED | OUTPATIENT
Start: 2021-11-30 | End: 2021-12-01 | Stop reason: HOSPADM

## 2021-11-30 RX ORDER — DEXTROSE, SODIUM CHLORIDE, AND POTASSIUM CHLORIDE 5; .45; .15 G/100ML; G/100ML; G/100ML
125 INJECTION INTRAVENOUS CONTINUOUS
Status: DISCONTINUED | OUTPATIENT
Start: 2021-11-30 | End: 2021-11-30

## 2021-11-30 RX ORDER — TIZANIDINE 4 MG/1
2 TABLET ORAL EVERY 8 HOURS PRN
Status: DISCONTINUED | OUTPATIENT
Start: 2021-11-30 | End: 2021-12-01 | Stop reason: HOSPADM

## 2021-11-30 RX ORDER — ONDANSETRON 2 MG/ML
4 INJECTION INTRAMUSCULAR; INTRAVENOUS ONCE
Status: COMPLETED | OUTPATIENT
Start: 2021-11-30 | End: 2021-11-30

## 2021-11-30 RX ORDER — HEPARIN SODIUM 5000 [USP'U]/ML
5000 INJECTION, SOLUTION INTRAVENOUS; SUBCUTANEOUS EVERY 8 HOURS SCHEDULED
Status: DISCONTINUED | OUTPATIENT
Start: 2021-11-30 | End: 2021-12-01 | Stop reason: HOSPADM

## 2021-11-30 RX ORDER — ALBUTEROL SULFATE 90 UG/1
2 AEROSOL, METERED RESPIRATORY (INHALATION) EVERY 6 HOURS PRN
Status: DISCONTINUED | OUTPATIENT
Start: 2021-11-30 | End: 2021-12-01 | Stop reason: HOSPADM

## 2021-11-30 RX ORDER — HYDROMORPHONE HCL/PF 1 MG/ML
0.5 SYRINGE (ML) INJECTION ONCE
Status: COMPLETED | OUTPATIENT
Start: 2021-11-30 | End: 2021-11-30

## 2021-11-30 RX ORDER — DIAZEPAM 5 MG/1
10 TABLET ORAL 2 TIMES DAILY PRN
Status: DISCONTINUED | OUTPATIENT
Start: 2021-11-30 | End: 2021-12-01 | Stop reason: HOSPADM

## 2021-11-30 RX ORDER — ACETAMINOPHEN 325 MG/1
650 TABLET ORAL ONCE
Status: COMPLETED | OUTPATIENT
Start: 2021-11-30 | End: 2021-11-30

## 2021-11-30 RX ADMIN — HYDROCODONE BITARTRATE AND ACETAMINOPHEN 1 TABLET: 5; 325 TABLET ORAL at 16:12

## 2021-11-30 RX ADMIN — SODIUM CHLORIDE 100 ML/HR: 0.9 INJECTION, SOLUTION INTRAVENOUS at 14:24

## 2021-11-30 RX ADMIN — FLUTICASONE PROPIONATE 2 PUFF: 44 AEROSOL, METERED RESPIRATORY (INHALATION) at 20:29

## 2021-11-30 RX ADMIN — TIZANIDINE 2 MG: 4 TABLET ORAL at 16:12

## 2021-11-30 RX ADMIN — DIAZEPAM 10 MG: 5 TABLET ORAL at 18:05

## 2021-11-30 RX ADMIN — ONDANSETRON 4 MG: 2 INJECTION INTRAMUSCULAR; INTRAVENOUS at 07:25

## 2021-11-30 RX ADMIN — SODIUM CHLORIDE 1000 ML: 0.9 INJECTION, SOLUTION INTRAVENOUS at 07:23

## 2021-11-30 RX ADMIN — ACETAMINOPHEN 650 MG: 325 TABLET, FILM COATED ORAL at 09:16

## 2021-11-30 RX ADMIN — CLOTRIMAZOLE 10 MG: 10 LOZENGE ORAL at 22:29

## 2021-11-30 RX ADMIN — HYDROCODONE BITARTRATE AND ACETAMINOPHEN 1 TABLET: 5; 325 TABLET ORAL at 20:26

## 2021-11-30 RX ADMIN — HEPARIN SODIUM 5000 UNITS: 5000 INJECTION INTRAVENOUS; SUBCUTANEOUS at 22:26

## 2021-11-30 RX ADMIN — HYDROMORPHONE HYDROCHLORIDE 0.5 MG: 1 INJECTION, SOLUTION INTRAMUSCULAR; INTRAVENOUS; SUBCUTANEOUS at 11:00

## 2021-11-30 RX ADMIN — NICOTINE 7 MG/24 HR DAILY TRANSDERMAL PATCH 1 PATCH: at 16:13

## 2021-11-30 RX ADMIN — PRIMIDONE 200 MG: 50 TABLET ORAL at 22:27

## 2021-12-01 VITALS
DIASTOLIC BLOOD PRESSURE: 61 MMHG | HEART RATE: 68 BPM | RESPIRATION RATE: 17 BRPM | HEIGHT: 64 IN | WEIGHT: 111.4 LBS | OXYGEN SATURATION: 95 % | SYSTOLIC BLOOD PRESSURE: 109 MMHG | BODY MASS INDEX: 19.02 KG/M2 | TEMPERATURE: 98.4 F

## 2021-12-01 LAB
ANION GAP SERPL CALCULATED.3IONS-SCNC: 12 MMOL/L (ref 4–13)
BASOPHILS # BLD AUTO: 0.05 THOUSANDS/ΜL (ref 0–0.1)
BASOPHILS NFR BLD AUTO: 1 % (ref 0–1)
BUN SERPL-MCNC: 39 MG/DL (ref 5–25)
CALCIUM SERPL-MCNC: 8.3 MG/DL (ref 8.3–10.1)
CHLORIDE SERPL-SCNC: 108 MMOL/L (ref 100–108)
CO2 SERPL-SCNC: 22 MMOL/L (ref 21–32)
CREAT SERPL-MCNC: 0.91 MG/DL (ref 0.6–1.3)
EOSINOPHIL # BLD AUTO: 0.68 THOUSAND/ΜL (ref 0–0.61)
EOSINOPHIL NFR BLD AUTO: 13 % (ref 0–6)
ERYTHROCYTE [DISTWIDTH] IN BLOOD BY AUTOMATED COUNT: 14.1 % (ref 11.6–15.1)
GFR SERPL CREATININE-BSD FRML MDRD: 66 ML/MIN/1.73SQ M
GLUCOSE SERPL-MCNC: 68 MG/DL (ref 65–140)
HCT VFR BLD AUTO: 30.4 % (ref 34.8–46.1)
HGB BLD-MCNC: 10.2 G/DL (ref 11.5–15.4)
IMM GRANULOCYTES # BLD AUTO: 0.01 THOUSAND/UL (ref 0–0.2)
IMM GRANULOCYTES NFR BLD AUTO: 0 % (ref 0–2)
LYMPHOCYTES # BLD AUTO: 1.94 THOUSANDS/ΜL (ref 0.6–4.47)
LYMPHOCYTES NFR BLD AUTO: 36 % (ref 14–44)
MCH RBC QN AUTO: 35.8 PG (ref 26.8–34.3)
MCHC RBC AUTO-ENTMCNC: 33.6 G/DL (ref 31.4–37.4)
MCV RBC AUTO: 107 FL (ref 82–98)
MONOCYTES # BLD AUTO: 0.54 THOUSAND/ΜL (ref 0.17–1.22)
MONOCYTES NFR BLD AUTO: 10 % (ref 4–12)
NEUTROPHILS # BLD AUTO: 2.14 THOUSANDS/ΜL (ref 1.85–7.62)
NEUTS SEG NFR BLD AUTO: 40 % (ref 43–75)
NRBC BLD AUTO-RTO: 0 /100 WBCS
PLATELET # BLD AUTO: 234 THOUSANDS/UL (ref 149–390)
PMV BLD AUTO: 10 FL (ref 8.9–12.7)
POTASSIUM SERPL-SCNC: 3.9 MMOL/L (ref 3.5–5.3)
RBC # BLD AUTO: 2.85 MILLION/UL (ref 3.81–5.12)
SODIUM SERPL-SCNC: 142 MMOL/L (ref 136–145)
WBC # BLD AUTO: 5.36 THOUSAND/UL (ref 4.31–10.16)

## 2021-12-01 PROCEDURE — 85025 COMPLETE CBC W/AUTO DIFF WBC: CPT | Performed by: PHYSICIAN ASSISTANT

## 2021-12-01 PROCEDURE — 80048 BASIC METABOLIC PNL TOTAL CA: CPT | Performed by: PHYSICIAN ASSISTANT

## 2021-12-01 PROCEDURE — 97163 PT EVAL HIGH COMPLEX 45 MIN: CPT

## 2021-12-01 PROCEDURE — 97166 OT EVAL MOD COMPLEX 45 MIN: CPT

## 2021-12-01 PROCEDURE — 99239 HOSP IP/OBS DSCHRG MGMT >30: CPT | Performed by: PHYSICIAN ASSISTANT

## 2021-12-01 RX ADMIN — ONDANSETRON 4 MG: 2 INJECTION INTRAMUSCULAR; INTRAVENOUS at 13:07

## 2021-12-01 RX ADMIN — TIZANIDINE 2 MG: 4 TABLET ORAL at 06:49

## 2021-12-01 RX ADMIN — FLUTICASONE PROPIONATE 2 PUFF: 44 AEROSOL, METERED RESPIRATORY (INHALATION) at 08:31

## 2021-12-01 RX ADMIN — SODIUM CHLORIDE 100 ML/HR: 0.9 INJECTION, SOLUTION INTRAVENOUS at 02:16

## 2021-12-01 RX ADMIN — ONDANSETRON 4 MG: 2 INJECTION INTRAMUSCULAR; INTRAVENOUS at 05:55

## 2021-12-01 RX ADMIN — HYDROCODONE BITARTRATE AND ACETAMINOPHEN 1 TABLET: 5; 325 TABLET ORAL at 15:05

## 2021-12-01 RX ADMIN — DIAZEPAM 10 MG: 5 TABLET ORAL at 06:48

## 2021-12-01 RX ADMIN — HEPARIN SODIUM 5000 UNITS: 5000 INJECTION INTRAVENOUS; SUBCUTANEOUS at 05:15

## 2021-12-01 RX ADMIN — NICOTINE 7 MG/24 HR DAILY TRANSDERMAL PATCH 1 PATCH: at 09:03

## 2021-12-01 RX ADMIN — HYDROCODONE BITARTRATE AND ACETAMINOPHEN 1 TABLET: 5; 325 TABLET ORAL at 05:15

## 2021-12-02 ENCOUNTER — TRANSITIONAL CARE MANAGEMENT (OUTPATIENT)
Dept: FAMILY MEDICINE CLINIC | Facility: CLINIC | Age: 66
End: 2021-12-02

## 2021-12-03 ENCOUNTER — RA CDI HCC (OUTPATIENT)
Dept: OTHER | Facility: HOSPITAL | Age: 66
End: 2021-12-03

## 2021-12-03 DIAGNOSIS — B37.2 YEAST INFECTION OF THE SKIN: Primary | ICD-10-CM

## 2021-12-03 RX ORDER — NYSTATIN 100000 [USP'U]/G
POWDER TOPICAL 2 TIMES DAILY
Qty: 30 G | Refills: 1 | Status: SHIPPED | OUTPATIENT
Start: 2021-12-03 | End: 2022-04-20

## 2021-12-03 RX ORDER — NYSTATIN 100000 U/G
CREAM TOPICAL 2 TIMES DAILY
Qty: 30 G | Refills: 1 | Status: SHIPPED | OUTPATIENT
Start: 2021-12-03 | End: 2021-12-03 | Stop reason: CLARIF

## 2021-12-07 ENCOUNTER — OFFICE VISIT (OUTPATIENT)
Dept: FAMILY MEDICINE CLINIC | Facility: CLINIC | Age: 66
End: 2021-12-07
Payer: COMMERCIAL

## 2021-12-07 VITALS
WEIGHT: 110 LBS | HEIGHT: 64 IN | SYSTOLIC BLOOD PRESSURE: 102 MMHG | RESPIRATION RATE: 16 BRPM | HEART RATE: 88 BPM | DIASTOLIC BLOOD PRESSURE: 60 MMHG | TEMPERATURE: 96.6 F | BODY MASS INDEX: 18.78 KG/M2 | OXYGEN SATURATION: 99 %

## 2021-12-07 DIAGNOSIS — I10 ESSENTIAL HYPERTENSION: ICD-10-CM

## 2021-12-07 DIAGNOSIS — H00.015 HORDEOLUM EXTERNUM OF LEFT LOWER EYELID: ICD-10-CM

## 2021-12-07 DIAGNOSIS — Z12.31 ENCOUNTER FOR SCREENING MAMMOGRAM FOR BREAST CANCER: ICD-10-CM

## 2021-12-07 DIAGNOSIS — Z43.2 ILEOSTOMY CARE (HCC): ICD-10-CM

## 2021-12-07 DIAGNOSIS — K57.20 DIVERTICULITIS OF LARGE INTESTINE WITH PERFORATION WITHOUT ABSCESS OR BLEEDING: Primary | ICD-10-CM

## 2021-12-07 DIAGNOSIS — Z90.49 STATUS POST LAPAROSCOPIC COLECTOMY: ICD-10-CM

## 2021-12-07 PROCEDURE — 99496 TRANSJ CARE MGMT HIGH F2F 7D: CPT | Performed by: FAMILY MEDICINE

## 2021-12-07 PROCEDURE — 1111F DSCHRG MED/CURRENT MED MERGE: CPT | Performed by: FAMILY MEDICINE

## 2021-12-07 RX ORDER — CHOLESTYRAMINE 4 G/9G
1 POWDER, FOR SUSPENSION ORAL
Qty: 30 PACKET | Refills: 1 | Status: SHIPPED | OUTPATIENT
Start: 2021-12-07 | End: 2022-01-12 | Stop reason: SDUPTHER

## 2021-12-07 RX ORDER — ERYTHROMYCIN 5 MG/G
0.5 OINTMENT OPHTHALMIC
Qty: 3.5 G | Refills: 0 | Status: SHIPPED | OUTPATIENT
Start: 2021-12-07 | End: 2022-01-20

## 2021-12-08 ENCOUNTER — OFFICE VISIT (OUTPATIENT)
Dept: SURGERY | Facility: CLINIC | Age: 66
End: 2021-12-08

## 2021-12-08 VITALS — BODY MASS INDEX: 19.02 KG/M2 | TEMPERATURE: 97.4 F | WEIGHT: 111.4 LBS | HEIGHT: 64 IN

## 2021-12-08 DIAGNOSIS — Z90.49 STATUS POST LAPAROSCOPIC COLECTOMY: ICD-10-CM

## 2021-12-08 DIAGNOSIS — Z93.2 ILEOSTOMY IN PLACE (HCC): ICD-10-CM

## 2021-12-08 DIAGNOSIS — G43.909 MIGRAINE WITHOUT STATUS MIGRAINOSUS, NOT INTRACTABLE, UNSPECIFIED MIGRAINE TYPE: ICD-10-CM

## 2021-12-08 DIAGNOSIS — K57.20 DIVERTICULITIS OF LARGE INTESTINE WITH PERFORATION WITHOUT ABSCESS OR BLEEDING: Primary | ICD-10-CM

## 2021-12-08 PROCEDURE — 99024 POSTOP FOLLOW-UP VISIT: CPT | Performed by: SURGERY

## 2021-12-08 PROCEDURE — 3008F BODY MASS INDEX DOCD: CPT | Performed by: PSYCHIATRY & NEUROLOGY

## 2021-12-08 RX ORDER — SUMATRIPTAN 100 MG/1
100 TABLET, FILM COATED ORAL ONCE AS NEEDED
Qty: 9 TABLET | Refills: 0 | Status: SHIPPED | OUTPATIENT
Start: 2021-12-08 | End: 2022-01-19 | Stop reason: SDUPTHER

## 2021-12-13 ENCOUNTER — PATIENT OUTREACH (OUTPATIENT)
Dept: FAMILY MEDICINE CLINIC | Facility: CLINIC | Age: 66
End: 2021-12-13

## 2021-12-13 DIAGNOSIS — Z71.89 COMPLEX CARE COORDINATION: Primary | ICD-10-CM

## 2021-12-15 ENCOUNTER — HOSPITAL ENCOUNTER (OUTPATIENT)
Dept: CT IMAGING | Facility: HOSPITAL | Age: 66
Discharge: HOME | End: 2021-12-15
Attending: SURGERY
Payer: COMMERCIAL

## 2021-12-15 DIAGNOSIS — R10.13 DYSPEPSIA: ICD-10-CM

## 2021-12-15 DIAGNOSIS — K57.20 DIVERTICULITIS OF LARGE INTESTINE WITH PERFORATION WITHOUT ABSCESS OR BLEEDING: ICD-10-CM

## 2021-12-15 PROCEDURE — G1004 CDSM NDSC: HCPCS

## 2021-12-15 PROCEDURE — 74176 CT ABD & PELVIS W/O CONTRAST: CPT

## 2021-12-15 RX ADMIN — IOHEXOL 50 ML: 240 INJECTION, SOLUTION INTRATHECAL; INTRAVASCULAR; INTRAVENOUS; ORAL at 12:53

## 2021-12-16 RX ORDER — ONDANSETRON 4 MG/1
4 TABLET, FILM COATED ORAL EVERY 8 HOURS PRN
Qty: 20 TABLET | Refills: 0 | Status: SHIPPED | OUTPATIENT
Start: 2021-12-16 | End: 2021-12-28 | Stop reason: SDUPTHER

## 2021-12-20 ENCOUNTER — TELEPHONE (OUTPATIENT)
Dept: WOUND CARE | Facility: HOSPITAL | Age: 66
End: 2021-12-20

## 2021-12-21 ENCOUNTER — PATIENT OUTREACH (OUTPATIENT)
Dept: FAMILY MEDICINE CLINIC | Facility: CLINIC | Age: 66
End: 2021-12-21

## 2021-12-27 ENCOUNTER — TELEMEDICINE (OUTPATIENT)
Dept: NEUROLOGY | Facility: CLINIC | Age: 66
End: 2021-12-27
Payer: COMMERCIAL

## 2021-12-27 ENCOUNTER — TELEPHONE (OUTPATIENT)
Dept: FAMILY MEDICINE CLINIC | Facility: CLINIC | Age: 66
End: 2021-12-27

## 2021-12-27 DIAGNOSIS — F41.1 GENERALIZED ANXIETY DISORDER: ICD-10-CM

## 2021-12-27 DIAGNOSIS — G25.0 ESSENTIAL TREMOR: Primary | ICD-10-CM

## 2021-12-27 PROCEDURE — 99214 OFFICE O/P EST MOD 30 MIN: CPT | Performed by: PSYCHIATRY & NEUROLOGY

## 2021-12-27 PROCEDURE — 1160F RVW MEDS BY RX/DR IN RCRD: CPT | Performed by: PSYCHIATRY & NEUROLOGY

## 2021-12-27 PROCEDURE — 4004F PT TOBACCO SCREEN RCVD TLK: CPT | Performed by: PSYCHIATRY & NEUROLOGY

## 2021-12-28 DIAGNOSIS — J45.20 MILD INTERMITTENT ASTHMA, UNSPECIFIED WHETHER COMPLICATED: ICD-10-CM

## 2021-12-28 DIAGNOSIS — R10.13 DYSPEPSIA: ICD-10-CM

## 2021-12-29 RX ORDER — ALBUTEROL SULFATE 90 UG/1
2 AEROSOL, METERED RESPIRATORY (INHALATION) EVERY 6 HOURS PRN
Qty: 54 G | Refills: 0 | Status: SHIPPED | OUTPATIENT
Start: 2021-12-29 | End: 2022-01-12

## 2021-12-29 RX ORDER — ONDANSETRON 4 MG/1
4 TABLET, FILM COATED ORAL EVERY 8 HOURS PRN
Qty: 20 TABLET | Refills: 0 | Status: SHIPPED | OUTPATIENT
Start: 2021-12-29 | End: 2022-01-09 | Stop reason: SDUPTHER

## 2022-01-03 ENCOUNTER — NURSE TRIAGE (OUTPATIENT)
Dept: OTHER | Facility: OTHER | Age: 67
End: 2022-01-03

## 2022-01-03 NOTE — TELEPHONE ENCOUNTER
Regarding: Bag Leaking  ----- Message from Senia Bolden sent at 1/3/2022  8:51 AM EST -----  " My bag is leaking like cottage cheese, and I am not sure what I should do "

## 2022-01-03 NOTE — TELEPHONE ENCOUNTER
Pt called in and was very hard to hear and understand  Pt crying on the phone  Pt stated her ostomy bag has been leaking all night and has white cottage cheese like discharge coming out  Unable to get more information as pt kept crying and stated "I can't take this anymore, I need it reversed " Please advise  Tasked to General Surgery office to follow up with pt on this matter as they are open

## 2022-01-03 NOTE — TELEPHONE ENCOUNTER
Reason for Disposition   Nursing judgment    Protocols used: INFORMATION ONLY CALL - NO TRIAGE-ADULT-[place]

## 2022-01-12 DIAGNOSIS — J45.20 MILD INTERMITTENT ASTHMA, UNSPECIFIED WHETHER COMPLICATED: ICD-10-CM

## 2022-01-12 RX ORDER — ALBUTEROL SULFATE 90 UG/1
AEROSOL, METERED RESPIRATORY (INHALATION)
Qty: 18 G | Refills: 1 | Status: SHIPPED | OUTPATIENT
Start: 2022-01-12 | End: 2022-02-06 | Stop reason: SDUPTHER

## 2022-01-14 ENCOUNTER — TELEPHONE (OUTPATIENT)
Dept: WOUND CARE | Facility: HOSPITAL | Age: 67
End: 2022-01-14

## 2022-01-14 NOTE — TELEPHONE ENCOUNTER
Received voice mail from patient this morning--returned her call  She reported that she was almost out of ostomy pouches because she was having to change them daily due to leakage  She reports her peristomal skin is painful, but not open  She is unhappy with her visiting nurse and is requesting an appointment with the ostomy nurse  Wound center contacted and patient offered appointment at [phone] today at [facility]  Just prior to appointment, patient contacted this nurse to cancel--states her ostomy pouch leaked and she would have to change it instead of coming to the appointment  Encouraged patient to reach out to her visiting nurse team if needed, and informed her this RN will contact her next week to see if an outpatient ostomy appointment is still needed  Patient reports she was able to order more ostomy supplies this afternoon and they will be over-nighted to her

## 2022-01-18 ENCOUNTER — OFFICE VISIT (OUTPATIENT)
Dept: SURGERY | Facility: CLINIC | Age: 67
End: 2022-01-18
Payer: COMMERCIAL

## 2022-01-18 VITALS
SYSTOLIC BLOOD PRESSURE: 103 MMHG | DIASTOLIC BLOOD PRESSURE: 73 MMHG | RESPIRATION RATE: 16 BRPM | HEIGHT: 64 IN | WEIGHT: 106.8 LBS | TEMPERATURE: 95.5 F | BODY MASS INDEX: 18.23 KG/M2 | HEART RATE: 85 BPM

## 2022-01-18 DIAGNOSIS — Z93.2 ILEOSTOMY IN PLACE (HCC): Primary | ICD-10-CM

## 2022-01-18 DIAGNOSIS — E44.0 MODERATE PROTEIN-CALORIE MALNUTRITION (HCC): ICD-10-CM

## 2022-01-18 PROCEDURE — 99213 OFFICE O/P EST LOW 20 MIN: CPT | Performed by: SURGERY

## 2022-01-18 NOTE — PROGRESS NOTES
Assessment/Plan:    Ileostomy in place [facility])    Unfortunately she has lost another 5 lb been seen last which is almost 25 lb since prior to her colon resection  She is very fixated on her ostomy appliance and is continually changing the bag and having difficulty keeping it applied  I explained to her that she needs to continue to work with the visiting nurses and find a way to manage her appliance  She really needs to increase her protein 8 take significantly and stopped losing weight  At this point I believe she is too high risk to plan for ileostomy reversal due to the risk of anastomotic leak  She significantly malnourished and together with smoking I believe she is at too high risk for surgery  I told her she needs to increase her protein intake keep track of her calories and gains some weight back  I will check some nutrition labs to see what her pre-albumin albumin levels are  I will reach out to the wound center again to help with teaching ostomy management  I will see her back in 2-3 weeks       Diagnoses and all orders for this visit:    Ileostomy in place [facility])  -     Comprehensive metabolic panel; Future  -     Prealbumin; Future    Moderate protein-calorie malnutrition (HCC)  -     Comprehensive metabolic panel; Future  -     Prealbumin; Future          Subjective:      Patient ID: Mitchel Rich is a 77 y o  female  Ms Pamela Blancas   Is a 80-year-old female that is here for evaluation of diverticulitis  She does have a history of irritable bowel type syndrome symptoms and constipation diarrhea  She is having increasing pain and was seen by Gastroenterolog in June had a  Colonoscopy that showed some pus in the sigmoid colon consistent with diverticulitis and therefore the colonoscopy was aborted  She has her last colonoscopy previous that was in 2009 for which she had up to 6 polyps removed    After colonoscopy she was seen by her primary care physician and sent for a stat CT scan that showed diverticulitis  She was treated conservatively with oral antibiotics but failed outpatient management and was admitted on July 7th with diverticulitis and intramural abscess  She was treated conservatively and has been discharged  She was seen back in the emergency department on the 23rd and a CT scan that time showed no evidence of diverticulitis but did show constipation  Currently she states she is still having abdominal pain across her abdomen  She took some pain medicine for the pain recently  She is still having constipation issues as well  10/12/2021 she returns now for re-evaluation  She underwent a colonoscopy but it was incomplete and unsuccessful due to inflammation and angulation in the colon  She had a CT scan that same day that just showed mild diverticulitis and she was restarted antibiotics  However she also developed increasing back pain  She has a longstanding history of back pain and bulge with pain management  She was admitted to [address] September 20th with increasing back pain and was treated accordingly  She did have another repeat CT scan that time that showed mild inflammation but no significant findings  She returns now but she states that she is having trouble eating she feels nauseated has pain all the time pain in her abdomen pain in her back  She is having difficulties moving her bowels  11/17/2021  She is now 2 weeks status post robotic sigmoid colectomy with diverting loop ileostomy  She is doing okay at home  Still feeling weak  Having difficulty with her ostomy bag       12/08/2021 she had a readmission for acute renal failure and inability to manage her ileostomy  She has now been following in the [address] [facility] for better  Control of her ostomy output  She was started on cholestyramine for her loose output  She still is not ambulating well  She still not eating appropriate protein intake  She still smoking heavily  3     01/18/2022 she returns now for evaluation  She is still struggling with the ostomy appliance  I am uncertain why she cannot keep the bag in place but she is very fixated on it and continue only changing it  She has been seen by visiting nurses and also the wound center but is still having issues  In addition she states that she is trying to increase her protein but is not adequate doing so as whenever she has an issue with her bag she loses her appetite and stops eating  She is still smoking a pack a day and is relatively sedentary  The following portions of the patient's history were reviewed and updated as appropriate: allergies, current medications, past family history, past medical history, past social history, past surgical history and problem list     Review of Systems   Constitutional: Positive for appetite change and fatigue  Negative for chills and fever  HENT: Negative for ear pain and sore throat  Eyes: Negative for pain and visual disturbance  Respiratory: Negative for cough and shortness of breath  Cardiovascular: Negative for chest pain and palpitations  Gastrointestinal: Negative for abdominal pain and vomiting  Genitourinary: Negative for dysuria and hematuria  Musculoskeletal: Negative for arthralgias and back pain  Skin: Negative for color change and rash  Neurological: Negative for seizures and syncope  Psychiatric/Behavioral: Negative for agitation and behavioral problems  All other systems reviewed and are negative  Objective:      /73   Pulse 85   Temp (!) 95 5 °F (35 3 °C)   Resp 16   Ht 5' 4" (1 626 m)   Wt 48 4 kg (106 lb 12 8 oz)   LMP  (LMP Unknown)   BMI 18 33 kg/m²          Physical Exam  Vitals and nursing note reviewed  Constitutional:       General: She is not in acute distress  Appearance: She is underweight  She is not diaphoretic  HENT:      Head: Normocephalic and atraumatic     Eyes:      Pupils: Pupils are equal, round, and reactive to light  Cardiovascular:      Rate and Rhythm: Normal rate and regular rhythm  Heart sounds: Normal heart sounds  No murmur heard  Pulmonary:      Effort: Pulmonary effort is normal  No respiratory distress  Breath sounds: Normal breath sounds  No wheezing  Abdominal:      General: Bowel sounds are normal       Palpations: Abdomen is soft  Comments:   Ostomy function well    Musculoskeletal:         General: Normal range of motion  Cervical back: Normal range of motion and neck supple  Skin:     General: Skin is warm and dry  Neurological:      Mental Status: She is alert and oriented to person, place, and time     Psychiatric:         Behavior: Behavior normal

## 2022-01-20 ENCOUNTER — LAB (OUTPATIENT)
Dept: LAB | Facility: CLINIC | Age: 67
End: 2022-01-20
Payer: COMMERCIAL

## 2022-01-20 ENCOUNTER — OFFICE VISIT (OUTPATIENT)
Dept: FAMILY MEDICINE CLINIC | Facility: CLINIC | Age: 67
End: 2022-01-20
Payer: COMMERCIAL

## 2022-01-20 ENCOUNTER — PATIENT OUTREACH (OUTPATIENT)
Dept: FAMILY MEDICINE CLINIC | Facility: CLINIC | Age: 67
End: 2022-01-20

## 2022-01-20 DIAGNOSIS — K94.13 ILEOSTOMY DYSFUNCTION (HCC): Primary | ICD-10-CM

## 2022-01-20 DIAGNOSIS — M79.7 FIBROMYALGIA: ICD-10-CM

## 2022-01-20 DIAGNOSIS — Z93.2 ILEOSTOMY IN PLACE (HCC): ICD-10-CM

## 2022-01-20 DIAGNOSIS — K57.20 DIVERTICULITIS OF LARGE INTESTINE WITH PERFORATION WITHOUT ABSCESS OR BLEEDING: ICD-10-CM

## 2022-01-20 DIAGNOSIS — E86.1 HYPOVOLEMIA: ICD-10-CM

## 2022-01-20 DIAGNOSIS — F33.1 MODERATE EPISODE OF RECURRENT MAJOR DEPRESSIVE DISORDER (HCC): ICD-10-CM

## 2022-01-20 DIAGNOSIS — Z71.89 COMPLEX CARE COORDINATION: Primary | ICD-10-CM

## 2022-01-20 DIAGNOSIS — E44.0 MODERATE PROTEIN-CALORIE MALNUTRITION (HCC): Primary | ICD-10-CM

## 2022-01-20 DIAGNOSIS — E44.0 MODERATE PROTEIN-CALORIE MALNUTRITION (HCC): ICD-10-CM

## 2022-01-20 LAB
ALBUMIN SERPL BCP-MCNC: 3.8 G/DL (ref 3.5–5)
ALP SERPL-CCNC: 70 U/L (ref 46–116)
ALT SERPL W P-5'-P-CCNC: 29 U/L (ref 12–78)
ANION GAP SERPL CALCULATED.3IONS-SCNC: 5 MMOL/L (ref 4–13)
AST SERPL W P-5'-P-CCNC: 18 U/L (ref 5–45)
BILIRUB SERPL-MCNC: 0.32 MG/DL (ref 0.2–1)
BUN SERPL-MCNC: 36 MG/DL (ref 5–25)
CALCIUM SERPL-MCNC: 10.7 MG/DL (ref 8.3–10.1)
CHLORIDE SERPL-SCNC: 93 MMOL/L (ref 100–108)
CO2 SERPL-SCNC: 38 MMOL/L (ref 21–32)
CREAT SERPL-MCNC: 1.77 MG/DL (ref 0.6–1.3)
GFR SERPL CREATININE-BSD FRML MDRD: 29 ML/MIN/1.73SQ M
GLUCOSE P FAST SERPL-MCNC: 96 MG/DL (ref 65–99)
POTASSIUM SERPL-SCNC: 3.4 MMOL/L (ref 3.5–5.3)
PREALB SERPL-MCNC: 30.4 MG/DL (ref 18–40)
PROT SERPL-MCNC: 7.7 G/DL (ref 6.4–8.2)
SODIUM SERPL-SCNC: 136 MMOL/L (ref 136–145)

## 2022-01-20 PROCEDURE — 99214 OFFICE O/P EST MOD 30 MIN: CPT | Performed by: FAMILY MEDICINE

## 2022-01-20 PROCEDURE — 80053 COMPREHEN METABOLIC PANEL: CPT

## 2022-01-20 PROCEDURE — 3008F BODY MASS INDEX DOCD: CPT | Performed by: FAMILY MEDICINE

## 2022-01-20 PROCEDURE — 84134 ASSAY OF PREALBUMIN: CPT

## 2022-01-20 PROCEDURE — 36415 COLL VENOUS BLD VENIPUNCTURE: CPT

## 2022-01-20 RX ORDER — ATORVASTATIN CALCIUM 10 MG/1
5 TABLET, FILM COATED ORAL EVERY OTHER DAY
COMMUNITY
Start: 2022-01-01 | End: 2022-01-26

## 2022-01-20 NOTE — PROGRESS NOTES
Outpatient Care Management Note:  RE: Spoke with pt after being referred by Dr Chrissie Hayes and she is at the PCP office awaiting her appt  She did state that she is working with Wound management for ostomy and peristomal skin care  She was provided with sample bags, she states, as her peristomal skin irritation may be related to an adhesive allergy  Pt has [facility] for home care  Her  was also in the background on the call  Pt states that her main challenge now is 'to gain weight'  We discussed briefly her nutrition and inquired if she supplements her diet with anything additional and if shakes are affordable  Will outreach again next week to have more in depth conversation on her needs  Pt has this CM's contact information as well should she have any questions or concerns

## 2022-01-20 NOTE — PROGRESS NOTES
Assessment/Plan:     Diagnoses and all orders for this visit:    Moderate protein-calorie malnutrition ([address] )    Ileostomy in place [facility])    Diverticulitis of large intestine with perforation   Comments:   11/1 pt underwetlap sigmoid resection and diverting ileostomy with Dr Peggy Burgos    Fibromyalgia    Moderate episode of recurrent major depressive disorder ([facility] [address] )    Other orders  -     Discontinue: atorvastatin (LIPITOR) 10 mg tablet; Take 5 mg by mouth every other day        Will contact Patient  to see if we can again facilitate heightened resources for patient to utilize with regards to her current status  Subjective:   Chief Complaint   Patient presents with    Follow-up     6 week followup  Pt also needs refills on medications       Patient ID: Nandini Mahan is a 77 y o  female  Patient is a 14-year-old female status post surgical procedure for diverticulitis with abscess  She currently has ileostomy diverting  She is having difficulty with adapting to the bag  Surgery note was reviewed  Appetite is poor continues to lose weight  Patient out reach is involved  Her partner at home could be a bit more understanding  Will make appt with wound center [facility] to help  Bag is better sticking  Patient saw Dr Peggy Burgos 2 days ago  Postop checkup  Patient continues to lose weight  She is almost 25 lb less since prior to her colon resection  Surgery note says Elsie Brod is very fixated on her ostomy appliance surgeon stress to her she needs to continue to work with visiting nurses  She also was encouraged to increase her protein significantly  At this point there is not indication to do the re-anastomosis until she is able to gain some weight back  Surgeon ordered a CMP and pre-albumin-patient had these labs drawn this morning  Patient is not COVID vaccinated  Generally has not been someone that gets any vaccinations    Comorbid diagnoses of generalized anxiety disorder, depression, fibromyalgia, essential tremor  The following portions of the patient's history were reviewed and updated as appropriate: allergies, current medications, past family history, past medical history, past social history, past surgical history and problem list       Review of Systems   Constitutional: Positive for fatigue  Unexpected weight change: Continues to lose  Respiratory: Negative for cough and shortness of breath  Gastrointestinal: Positive for nausea ( using Zofran)  Decreased appetite, loose stool in bag, typical of ileostomy   Musculoskeletal: Positive for myalgias  Neurological: Positive for weakness  Psychiatric/Behavioral: Positive for dysphoric mood and sleep disturbance  The patient is nervous/anxious  Objective:      BP 98/60   Pulse 83   Temp (!) 96 6 °F (35 9 °C) (Temporal)   Resp 16   Ht 5' 4" (1 626 m)   Wt 47 8 kg (105 lb 6 4 oz)   LMP  (LMP Unknown)   SpO2 99%   BMI 18 09 kg/m²          Physical Exam  Constitutional:       Appearance: She is ill-appearing  Comments: 77year-old thin, mildly cachectic   HENT:      Right Ear: Tympanic membrane normal       Left Ear: Tympanic membrane normal    Cardiovascular:      Rate and Rhythm: Normal rate and regular rhythm  Pulmonary:      Effort: Pulmonary effort is normal    Abdominal:      General: Abdomen is flat  Skin:     Coloration: Pallor: Turgor fair  Psychiatric:         Mood and Affect: Mood is depressed  Affect is flat and tearful

## 2022-01-24 ENCOUNTER — TELEPHONE (OUTPATIENT)
Dept: FAMILY MEDICINE CLINIC | Facility: CLINIC | Age: 67
End: 2022-01-24

## 2022-01-24 DIAGNOSIS — G25.0 ESSENTIAL TREMOR: ICD-10-CM

## 2022-01-24 NOTE — TELEPHONE ENCOUNTER
12/27/2021  1   12/27/2021  Diazepam 10 MG Tablet    90 [address] not able to fill the diazepam until January 27th  It is possible that lorazepam may work better but if I do send to the pharmacy I will make a note that the diazepam is discontinue  We will do a  to week trial of the Ativan  She can take 1 no more than 3 times a day only if needed

## 2022-01-24 NOTE — TELEPHONE ENCOUNTER
Spoke w/ pt and she would like the ativan sent to the  pharmacy   Advised pt we would discontinue the Diazepam  Pt understood

## 2022-01-24 NOTE — TELEPHONE ENCOUNTER
Pamela Shoemaker called the office in regard to her local pharmacy will not fill her diazepam (valium ) 10 mg unless you call and give them a good reason  Pt stated" I can't take this I have burning all over her stomach  It is red/ raw  Pt is hoping  Dr Pope can do something about this  Pt has a [place] on her stomach that is raw  Pt is cleaning and changing bags  Pt is going to call his office  [facility] uses [facility],the patient verbally expressed this medication is the only things that can clam her down or ativan  Pt's brother gave her an ativan one time prior to surgery and that helped     Pt's number is [phone]

## 2022-01-25 ENCOUNTER — PATIENT OUTREACH (OUTPATIENT)
Dept: FAMILY MEDICINE CLINIC | Facility: CLINIC | Age: 67
End: 2022-01-25

## 2022-01-26 ENCOUNTER — PATIENT OUTREACH (OUTPATIENT)
Dept: FAMILY MEDICINE CLINIC | Facility: CLINIC | Age: 67
End: 2022-01-26

## 2022-01-26 DIAGNOSIS — E78.5 HYPERLIPIDEMIA, UNSPECIFIED: ICD-10-CM

## 2022-01-26 RX ORDER — ATORVASTATIN CALCIUM 10 MG/1
TABLET, FILM COATED ORAL
Qty: 90 TABLET | Refills: 2 | Status: SHIPPED | OUTPATIENT
Start: 2022-01-26

## 2022-01-26 NOTE — PROGRESS NOTES
Outpatient Care Management Note:  RE:Spoke with pt and she called to provide update  She has been taking Imodium and has been eating small amounts every hour  She states that it is working to make the output a bit more formed than all liquid  Also she was up 2 times last night for a bag leak  She called to schedule appt for Wound center nurse and can get in tomorrow  Provided pt with number to contact [facility] to discuss their visit which also is to happen tomorrow

## 2022-01-28 ENCOUNTER — APPOINTMENT (EMERGENCY)
Dept: CT IMAGING | Facility: HOSPITAL | Age: 67
End: 2022-01-28
Payer: COMMERCIAL

## 2022-01-28 ENCOUNTER — HOSPITAL ENCOUNTER (EMERGENCY)
Facility: HOSPITAL | Age: 67
Discharge: HOME | End: 2022-01-28
Attending: EMERGENCY MEDICINE | Admitting: EMERGENCY MEDICINE
Payer: COMMERCIAL

## 2022-01-28 ENCOUNTER — TELEPHONE (OUTPATIENT)
Dept: OTHER | Facility: HOSPITAL | Age: 67
End: 2022-01-28

## 2022-01-28 ENCOUNTER — APPOINTMENT (EMERGENCY)
Dept: RADIOLOGY | Facility: HOSPITAL | Age: 67
End: 2022-01-28
Payer: COMMERCIAL

## 2022-01-28 VITALS
WEIGHT: 102.29 LBS | HEART RATE: 68 BPM | BODY MASS INDEX: 17.46 KG/M2 | RESPIRATION RATE: 16 BRPM | TEMPERATURE: 99.5 F | HEIGHT: 64 IN | SYSTOLIC BLOOD PRESSURE: 120 MMHG | OXYGEN SATURATION: 100 % | DIASTOLIC BLOOD PRESSURE: 68 MMHG

## 2022-01-28 DIAGNOSIS — U07.1 COVID-19: Primary | ICD-10-CM

## 2022-01-28 LAB
2HR DELTA HS TROPONIN: -4 NG/L
ALBUMIN SERPL BCP-MCNC: 3.3 G/DL (ref 3.5–5)
ALP SERPL-CCNC: 58 U/L (ref 46–116)
ALT SERPL W P-5'-P-CCNC: 33 U/L (ref 12–78)
ANION GAP SERPL CALCULATED.3IONS-SCNC: 8 MMOL/L (ref 4–13)
AST SERPL W P-5'-P-CCNC: 23 U/L (ref 5–45)
ATRIAL RATE: 82 BPM
BASOPHILS # BLD AUTO: 0.03 THOUSANDS/ΜL (ref 0–0.1)
BASOPHILS NFR BLD AUTO: 1 % (ref 0–1)
BILIRUB SERPL-MCNC: 0.18 MG/DL (ref 0.2–1)
BILIRUB UR QL STRIP: NEGATIVE
BUN SERPL-MCNC: 26 MG/DL (ref 5–25)
CALCIUM ALBUM COR SERPL-MCNC: 9.2 MG/DL (ref 8.3–10.1)
CALCIUM SERPL-MCNC: 8.6 MG/DL (ref 8.3–10.1)
CARDIAC TROPONIN I PNL SERPL HS: 10 NG/L
CARDIAC TROPONIN I PNL SERPL HS: 6 NG/L
CHLORIDE SERPL-SCNC: 95 MMOL/L (ref 100–108)
CLARITY UR: CLEAR
CO2 SERPL-SCNC: 36 MMOL/L (ref 21–32)
COLOR UR: YELLOW
CREAT SERPL-MCNC: 0.9 MG/DL (ref 0.6–1.3)
EOSINOPHIL # BLD AUTO: 0.12 THOUSAND/ΜL (ref 0–0.61)
EOSINOPHIL NFR BLD AUTO: 3 % (ref 0–6)
ERYTHROCYTE [DISTWIDTH] IN BLOOD BY AUTOMATED COUNT: 14 % (ref 11.6–15.1)
FLUAV RNA RESP QL NAA+PROBE: NEGATIVE
FLUBV RNA RESP QL NAA+PROBE: NEGATIVE
GFR SERPL CREATININE-BSD FRML MDRD: 66 ML/MIN/1.73SQ M
GLUCOSE SERPL-MCNC: 90 MG/DL (ref 65–140)
GLUCOSE UR STRIP-MCNC: NEGATIVE MG/DL
HCT VFR BLD AUTO: 34.2 % (ref 34.8–46.1)
HGB BLD-MCNC: 11.8 G/DL (ref 11.5–15.4)
HGB UR QL STRIP.AUTO: NEGATIVE
IMM GRANULOCYTES # BLD AUTO: 0.01 THOUSAND/UL (ref 0–0.2)
IMM GRANULOCYTES NFR BLD AUTO: 0 % (ref 0–2)
KETONES UR STRIP-MCNC: NEGATIVE MG/DL
LEUKOCYTE ESTERASE UR QL STRIP: NEGATIVE
LYMPHOCYTES # BLD AUTO: 1.09 THOUSANDS/ΜL (ref 0.6–4.47)
LYMPHOCYTES NFR BLD AUTO: 27 % (ref 14–44)
MAGNESIUM SERPL-MCNC: 2.2 MG/DL (ref 1.6–2.6)
MCH RBC QN AUTO: 37.5 PG (ref 26.8–34.3)
MCHC RBC AUTO-ENTMCNC: 34.5 G/DL (ref 31.4–37.4)
MCV RBC AUTO: 109 FL (ref 82–98)
MONOCYTES # BLD AUTO: 0.7 THOUSAND/ΜL (ref 0.17–1.22)
MONOCYTES NFR BLD AUTO: 17 % (ref 4–12)
NEUTROPHILS # BLD AUTO: 2.09 THOUSANDS/ΜL (ref 1.85–7.62)
NEUTS SEG NFR BLD AUTO: 52 % (ref 43–75)
NITRITE UR QL STRIP: NEGATIVE
NRBC BLD AUTO-RTO: 0 /100 WBCS
P AXIS: 80 DEGREES
PH UR STRIP.AUTO: 7.5 [PH]
PLATELET # BLD AUTO: 251 THOUSANDS/UL (ref 149–390)
PMV BLD AUTO: 9.5 FL (ref 8.9–12.7)
POTASSIUM SERPL-SCNC: 3 MMOL/L (ref 3.5–5.3)
PR INTERVAL: 148 MS
PROT SERPL-MCNC: 6.5 G/DL (ref 6.4–8.2)
PROT UR STRIP-MCNC: NEGATIVE MG/DL
QRS AXIS: 65 DEGREES
QRSD INTERVAL: 90 MS
QT INTERVAL: 380 MS
QTC INTERVAL: 443 MS
RBC # BLD AUTO: 3.15 MILLION/UL (ref 3.81–5.12)
RSV RNA RESP QL NAA+PROBE: NEGATIVE
SARS-COV-2 RNA RESP QL NAA+PROBE: POSITIVE
SODIUM SERPL-SCNC: 139 MMOL/L (ref 136–145)
SP GR UR STRIP.AUTO: 1.01 (ref 1–1.03)
T WAVE AXIS: 86 DEGREES
UROBILINOGEN UR QL STRIP.AUTO: 0.2 E.U./DL
VENTRICULAR RATE: 82 BPM
WBC # BLD AUTO: 4.04 THOUSAND/UL (ref 4.31–10.16)

## 2022-01-28 PROCEDURE — 84484 ASSAY OF TROPONIN QUANT: CPT

## 2022-01-28 PROCEDURE — 93005 ELECTROCARDIOGRAM TRACING: CPT

## 2022-01-28 PROCEDURE — 81003 URINALYSIS AUTO W/O SCOPE: CPT

## 2022-01-28 PROCEDURE — 93010 ELECTROCARDIOGRAM REPORT: CPT

## 2022-01-28 PROCEDURE — 96365 THER/PROPH/DIAG IV INF INIT: CPT

## 2022-01-28 PROCEDURE — 70450 CT HEAD/BRAIN W/O DYE: CPT

## 2022-01-28 PROCEDURE — 85025 COMPLETE CBC W/AUTO DIFF WBC: CPT

## 2022-01-28 PROCEDURE — 71045 X-RAY EXAM CHEST 1 VIEW: CPT

## 2022-01-28 PROCEDURE — 36415 COLL VENOUS BLD VENIPUNCTURE: CPT

## 2022-01-28 PROCEDURE — 83735 ASSAY OF MAGNESIUM: CPT

## 2022-01-28 PROCEDURE — 0241U HB NFCT DS VIR RESP RNA 4 TRGT: CPT

## 2022-01-28 PROCEDURE — 99285 EMERGENCY DEPT VISIT HI MDM: CPT

## 2022-01-28 PROCEDURE — 80053 COMPREHEN METABOLIC PANEL: CPT

## 2022-01-28 PROCEDURE — 99284 EMERGENCY DEPT VISIT MOD MDM: CPT

## 2022-01-28 PROCEDURE — 96366 THER/PROPH/DIAG IV INF ADDON: CPT

## 2022-01-28 PROCEDURE — 96361 HYDRATE IV INFUSION ADD-ON: CPT

## 2022-01-28 RX ORDER — POTASSIUM CHLORIDE 20 MEQ/1
20 TABLET, EXTENDED RELEASE ORAL ONCE
Status: DISCONTINUED | OUTPATIENT
Start: 2022-01-28 | End: 2022-01-28

## 2022-01-28 RX ORDER — POTASSIUM CHLORIDE 14.9 MG/ML
20 INJECTION INTRAVENOUS ONCE
Status: DISCONTINUED | OUTPATIENT
Start: 2022-01-28 | End: 2022-01-28

## 2022-01-28 RX ORDER — POTASSIUM CHLORIDE 14.9 MG/ML
20 INJECTION INTRAVENOUS ONCE
Status: COMPLETED | OUTPATIENT
Start: 2022-01-28 | End: 2022-01-28

## 2022-01-28 RX ADMIN — SODIUM CHLORIDE 1000 ML: 0.9 INJECTION, SOLUTION INTRAVENOUS at 12:15

## 2022-01-28 RX ADMIN — SODIUM CHLORIDE 1000 ML: 9 INJECTION, SOLUTION INTRAVENOUS at 13:58

## 2022-01-28 RX ADMIN — POTASSIUM CHLORIDE 20 MEQ: 14.9 INJECTION, SOLUTION INTRAVENOUS at 13:58

## 2022-01-28 NOTE — ED NOTES
Pt reports ex , Perez Holly, will come pick her up     Karlene Farnsworth, RN  01/28/22 [phone]

## 2022-01-28 NOTE — ED PROVIDER NOTES
History  Chief Complaint   Patient presents with    Medical Problem     Pt requesting bloodwork per PCP  Pt c/o chronic abd and back pain  Patient presents with weakness over the last week  Patient does have a colostomy bag states that she is unable to walk due to the weakness  Patient is not vaccinated against COVID, states that she wished she got vaccinated  Patient is alert and oriented x3  Patient is in no acute distress at this time  History provided by:  Patient   used: No    Medical Problem  Severity:  Moderate  Duration:  1 week  Timing:  Constant  Progression:  Unchanged  Chronicity:  New  Associated symptoms: no abdominal pain, no chest pain, no congestion, no cough, no diarrhea, no ear pain, no fatigue, no fever, no headaches, no myalgias, no nausea, no rash, no shortness of breath, no sore throat, no vomiting and no wheezing        Prior to Admission Medications   Prescriptions Last Dose Informant Patient Reported? Taking?    HYDROcodone-acetaminophen (NORCO) 7 5-325 mg per tablet  Self Yes No   Si tablet every 8 (eight) hours as needed usually    LORazepam (Ativan) 0 5 mg tablet   No No   Sig: Take 1 tablet (0 5 mg total) by mouth every 8 (eight) hours as needed for anxiety for up to 14 days   SUMAtriptan (IMITREX) 100 mg tablet   No No   Sig: Take 1 tablet (100 mg total) by mouth once as needed for migraine for up to 1 dose   albuterol (PROVENTIL HFA,VENTOLIN HFA) 90 mcg/act inhaler   No No   Sig: INHALE 2 PUFFS EVERY 6 HOURS AS NEEDED FOR WHEEZING   atorvastatin (LIPITOR) 10 mg tablet   No No   Sig: TAKE 1 TABLET BY MOUTH EVERY DAY   betamethasone dipropionate (DIPROSONE) 0 05 % cream   No No   Sig: Apply topically 2 (two) times a day   cholestyramine (QUESTRAN) 4 g packet   No No   Sig: Take 1 packet (4 g total) by mouth 3 (three) times a day with meals   naloxone (NARCAN) 4 mg/0 1 mL nasal spray  Self Yes No   Si spray by Alternating Nares route every 3 (three) minutes as needed for opioid reversal or respiratory depression Administer 1 spray into a nostril  If breathing does not return to normal or if breathing difficulty resumes after 2-3 minutes, give another dose in the other nostril using a new spray  nystatin (MYCOSTATIN) powder   No No   Sig: Apply topically 2 (two) times a day   ondansetron (ZOFRAN) 4 mg tablet   No No   Sig: Take 1 tablet (4 mg total) by mouth every 8 (eight) hours as needed for nausea or vomiting   primidone (MYSOLINE) 50 mg tablet   No No   Sig: TAKE 4 TABS (200 MG) BY MOUTH AT BEDTIME   tiZANidine (ZANAFLEX) 4 mg tablet  Self Yes No   Sig: daily at bedtime       Facility-Administered Medications: None       Past Medical History:   Diagnosis Date    Asthma     Colon polyp     Fibromyalgia     Full dentures     GERD (gastroesophageal reflux disease)     Hearing aid worn     Hearing impaired     "broken eardrum after eardrops used"    Wears glasses        Past Surgical History:   Procedure Laterality Date    BREAST BIOPSY Left     benign- at age 34    COLONOSCOPY      EAR SURGERY      SC LAP,SURG,COLECTOMY, PARTIAL, W/ANAST N/A 11/1/2021    Procedure: RESECTION COLON SIGMOID LAPAROSCOPIC WTIH COLORECTAL ANASTOMOSIS, DIVERTING LOOP ILEOSTOMY;  Surgeon: Phuc Araiza MD;  Location: [facility];  Service: General    TUBAL LIGATION      ULNAR NERVE REPAIR Left        Family History   Problem Relation Age of Onset    Heart failure Mother     Hyperlipidemia Mother     Hypertension Mother     Coronary artery disease Brother     COPD Maternal Grandmother     No Known Problems Sister     No Known Problems Daughter     No Known Problems Sister     Aneurysm Maternal Aunt      I have reviewed and agree with the history as documented      E-Cigarette/Vaping    E-Cigarette Use Never User      E-Cigarette/Vaping Substances    Nicotine No     THC No     CBD No     Flavoring No     Other No     Unknown No      Social History Tobacco Use    Smoking status: Former Smoker     Packs/day: 1 00     Years: 50 00     Pack years: 50 00     Types: Cigarettes     Quit date: 2019     Years since quittin 3    Smokeless tobacco: Never Used   Vaping Use    Vaping Use: Never used   Substance Use Topics    Alcohol use: Not Currently    Drug use: No       Review of Systems   Constitutional: Negative for activity change, appetite change, fatigue and fever  HENT: Negative for congestion, ear pain, sore throat and trouble swallowing  Eyes: Negative  Negative for pain, redness and visual disturbance  Respiratory: Negative for cough, chest tightness, shortness of breath and wheezing  Cardiovascular: Negative for chest pain and palpitations  Gastrointestinal: Negative for abdominal pain, diarrhea, nausea and vomiting  Genitourinary: Negative  Negative for difficulty urinating  Musculoskeletal: Negative  Negative for myalgias  Skin: Negative  Negative for rash  Neurological: Negative for dizziness, light-headedness and headaches  Psychiatric/Behavioral: Negative  Physical Exam  Physical Exam  Vitals reviewed  Constitutional:       Appearance: Normal appearance  She is normal weight  HENT:      Head: Normocephalic and atraumatic  Right Ear: External ear normal       Left Ear: External ear normal       Nose: Nose normal    Eyes:      General:         Right eye: No discharge  Left eye: No discharge  Conjunctiva/sclera: Conjunctivae normal    Cardiovascular:      Rate and Rhythm: Normal rate and regular rhythm  Pulses: Normal pulses  Heart sounds: Normal heart sounds  Pulmonary:      Effort: Pulmonary effort is normal       Breath sounds: Normal breath sounds  Abdominal:      Palpations: Abdomen is soft  Tenderness: There is no abdominal tenderness  There is no guarding  Comments: Does have a colostomy bag   Musculoskeletal:         General: Normal range of motion  Cervical back: Normal range of motion  Skin:     General: Skin is warm and dry  Capillary Refill: Capillary refill takes less than 2 seconds  Neurological:      General: No focal deficit present  Mental Status: She is alert and oriented to person, place, and time     Psychiatric:         Mood and Affect: Mood normal          Behavior: Behavior normal          Vital Signs  ED Triage Vitals [01/28/22 1046]   Temperature Pulse Respirations Blood Pressure SpO2   99 5 °F (37 5 °C) 80 16 143/77 98 %      Temp Source Heart Rate Source Patient Position - Orthostatic VS BP Location FiO2 (%)   Oral Monitor Sitting Right arm --      Pain Score       5           Vitals:    01/28/22 1046 01/28/22 1401 01/28/22 1502   BP: 143/77 130/76 125/71   Pulse: 80 74 70   Patient Position - Orthostatic VS: Sitting Sitting Lying         Visual Acuity      ED Medications  Medications   sodium chloride 0 9 % bolus 1,000 mL (0 mL Intravenous Stopped 1/28/22 1359)   sodium chloride 0 9 % bolus 1,000 mL (0 mL Intravenous Stopped 1/28/22 1636)   potassium chloride 20 mEq IVPB (premix) (0 mEq Intravenous Stopped 1/28/22 1636)       Diagnostic Studies  Results Reviewed     Procedure Component Value Units Date/Time    UA w Reflex to Microscopic w Reflex to Culture [[id number]] Collected: 01/28/22 1500    Lab Status: Final result Specimen: Urine, Clean Catch Updated: 01/28/22 1520     Color, UA Yellow     Clarity, UA Clear     Specific Gravity, UA 1 010     pH, UA 7 5     Leukocytes, UA Negative     Nitrite, UA Negative     Protein, UA Negative mg/dl      Glucose, UA Negative mg/dl      Ketones, UA Negative mg/dl      Urobilinogen, UA 0 2 E U /dl      Bilirubin, UA Negative     Blood, UA Negative    HS Troponin I 2hr [[id number]]  (Normal) Collected: 01/28/22 1400    Lab Status: Final result Specimen: Blood from Arm, Right Updated: 01/28/22 1430     hs TnI 2hr 6 ng/L      Delta 2hr hsTnI -4 ng/L     Magnesium [[id number]]  (Normal) Collected: 01/28/22 1118    Lab Status: Final result Specimen: Blood from Arm, Right Updated: 01/28/22 1223     Magnesium 2 2 mg/dL     COVID/FLU/RSV [[id number]]  (Abnormal) Collected: 01/28/22 1118    Lab Status: Final result Specimen: Nares from Nasopharyngeal Swab Updated: 01/28/22 1206     SARS-CoV-2 Positive     INFLUENZA A PCR Negative     INFLUENZA B PCR Negative     RSV PCR Negative    Narrative:      FOR PEDIATRIC PATIENTS - copy/paste COVID Guidelines URL to browser: [email or web address]    SARS-CoV-2 assay is a Nucleic Acid Amplification assay intended for the  qualitative detection of nucleic acid from SARS-CoV-2 in nasopharyngeal  swabs  Results are for the presumptive identification of SARS-CoV-2 RNA  Positive results are indicative of infection with SARS-CoV-2, the virus  causing COVID-19, but do not rule out bacterial infection or co-infection  with other viruses  Laboratories within the [place] and its  territories are required to report all positive results to the appropriate  public health authorities  Negative results do not preclude SARS-CoV-2  infection and should not be used as the sole basis for treatment or other  patient management decisions  Negative results must be combined with  clinical observations, patient history, and epidemiological information  This test has not been FDA cleared or approved  This test has been authorized by FDA under an Emergency Use Authorization  (EUA)  This test is only authorized for the duration of time the  declaration that circumstances exist justifying the authorization of the  emergency use of an in vitro diagnostic tests for detection of SARS-CoV-2  virus and/or diagnosis of COVID-19 infection under section 564(b)(1) of  the Act, 21 U  S C  918FUX-6(N)(4), unless the authorization is terminated  or revoked sooner   The test has been validated but independent review by FDA  and CLIA is pending  Test performed using [employer] GeneXpert: This RT-PCR assay targets N2,  a region unique to SARS-CoV-2  A conserved region in the E-gene was chosen  for pan-Sarbecovirus detection which includes SARS-CoV-2      HS Troponin 0hr (reflex protocol) [[id number]]  (Normal) Collected: 01/28/22 1118    Lab Status: Final result Specimen: Blood from Arm, Right Updated: 01/28/22 1149     hs TnI 0hr 10 ng/L     Comprehensive metabolic panel [[id number]]  (Abnormal) Collected: 01/28/22 1118    Lab Status: Final result Specimen: Blood from Arm, Right Updated: 01/28/22 1147     Sodium 139 mmol/L      Potassium 3 0 mmol/L      Chloride 95 mmol/L      CO2 36 mmol/L      ANION GAP 8 mmol/L      BUN 26 mg/dL      Creatinine 0 90 mg/dL      Glucose 90 mg/dL      Calcium 8 6 mg/dL      Corrected Calcium 9 2 mg/dL      AST 23 U/L      ALT 33 U/L      Alkaline Phosphatase 58 U/L      Total Protein 6 5 g/dL      Albumin 3 3 g/dL      Total Bilirubin 0 18 mg/dL      eGFR 66 ml/min/1 73sq m     Narrative:      [place] guidelines for Chronic Kidney Disease (CKD):     Stage 1 with normal or high GFR (GFR > 90 mL/min/1 73 square meters)    Stage 2 Mild CKD (GFR = 60-89 mL/min/1 73 square meters)    Stage 3A Moderate CKD (GFR = 45-59 mL/min/1 73 square meters)    Stage 3B Moderate CKD (GFR = 30-44 mL/min/1 73 square meters)    Stage 4 Severe CKD (GFR = 15-29 mL/min/1 73 square meters)    Stage 5 End Stage CKD (GFR <15 mL/min/1 73 square meters)  Note: GFR calculation is accurate only with a steady state creatinine    CBC and differential [[id number]]  (Abnormal) Collected: 01/28/22 1118    Lab Status: Final result Specimen: Blood from Arm, Right Updated: 01/28/22 1129     WBC 4 04 Thousand/uL      RBC 3 15 Million/uL      Hemoglobin 11 8 g/dL      Hematocrit 34 2 %       fL      MCH 37 5 pg      MCHC 34 5 g/dL      RDW 14 0 %      MPV 9 5 fL      Platelets 935 Thousands/uL      nRBC 0 /100 WBCs Neutrophils Relative 52 %      Immat GRANS % 0 %      Lymphocytes Relative 27 %      Monocytes Relative 17 %      Eosinophils Relative 3 %      Basophils Relative 1 %      Neutrophils Absolute 2 09 Thousands/µL      Immature Grans Absolute 0 01 Thousand/uL      Lymphocytes Absolute 1 09 Thousands/µL      Monocytes Absolute 0 70 Thousand/µL      Eosinophils Absolute 0 12 Thousand/µL      Basophils Absolute 0 03 Thousands/µL                  XR chest 1 view portable   Final Result by John Smith MD (01/28 1248)      No acute cardiopulmonary disease  Findings are stable            Workstation performed: [id number]         CT head without contrast   Final Result by No Cárdenas MD (01/28 1202)      No acute intracranial abnormality                    Workstation performed: [id number]                    Procedures  ECG 12 Lead Documentation Only    Date/Time: 1/28/2022 10:39 AM  Performed by: Aury Anguiano PA-C  Authorized by: Aruy Anguiano PA-C     Indications / Diagnosis:  Weakness  ECG reviewed by me, the ED Provider: yes    Patient location:  ED  Interpretation:     Interpretation: abnormal    Rate:     ECG rate:  82    ECG rate assessment: normal    Rhythm:     Rhythm: sinus rhythm    Ectopy:     Ectopy: none    QRS:     QRS axis:  Normal  Conduction:     Conduction: normal    ST segments:     ST segments:  Normal  T waves:     T waves: normal    Comments:      Ventricular rate 82 beats per minute  MD interval 148 milliseconds  QRS duration 90 milliseconds  QT//443 milliseconds  PRT axes 80, 65, 86,    ECG interpretation-"normal sinus rhythm, cannot rule out anterior infarct, age undetermined, abnormal ECG"               ED Course  ED Course as of 01/28/22 1647   Fri Jan 28, 2022   1136 WBC(!): 4 04   1149 Potassium(!): 3 0   1149 Chloride(!): 95   1203 hs TnI 0hr: 10  Will get delta   1208 SARS-COV-2(!): Positive   1249 Xray read;    "No acute cardiopulmonary disease      Findings are stable"   [address] Spoke to Dr Franchesca Lugo, who states patient does not meet admission criteria  Recommends hydration and d/c   1430 Delta 2hr hsTnI: -4  No need forl 4 hour   1431 Spoke to Leyla Reynoso for potential at home monitoring  He will come see her at 1500 for evaluation if she qualifies                               SBIRT 22yo+      Most Recent Value   SBIRT (24 yo +)    In order to provide better care to our patients, we are screening all of our patients for alcohol and drug use  Would it be okay to ask you these screening questions? No Filed at: 01/28/2022 1401                    MDM  Number of Diagnoses or Management Options  COVID-19: new and does not require workup  Diagnosis management comments: Patient presents for weakness  Patient is in no acute distress this time  Patient has been set up with outpatient monitoring  I have talked to jesse, who states that patient is not a candidate for admission as her vitals are stable, and she is in no acute distress  Patient was discharged home with outpatient monitoring    Counseling: I had a detailed discussion with the patient and/or guardian regarding: the historical points, exam findings, and any diagnostic results supporting the discharge diagnosis, lab results, radiology results, discharge instructions reviewed with patient and/or family/caregiver and understanding was verbalized   Instructions given to return to the emergency department if symptoms worsen or persist, or if there are any questions or concerns that arise at home       All labs reviewed and utilized in the medical decision making process     All radiology studies independently viewed by me and interpreted by the radiologist     Portions of the record may have been created with voice recognition software   Occasional wrong word or "sound a like" substitutions may have occurred due to the inherent limitations of voice recognition software   Read the chart carefully and recognize, using context, where substitutions have occurred  Amount and/or Complexity of Data Reviewed  Clinical lab tests: reviewed and ordered  Tests in the radiology section of CPT®: ordered and reviewed  Discuss the patient with other providers: yes    Risk of Complications, Morbidity, and/or Mortality  Presenting problems: low  Diagnostic procedures: minimal  Management options: minimal    Patient Progress  Patient progress: stable      Disposition  Final diagnoses:   COVID-19     Time reflects when diagnosis was documented in both MDM as applicable and the Disposition within this note     Time User Action Codes Description Comment    1/28/2022  4:07 PM Reyes Cones Add [U07 1] COVID-19       ED Disposition     ED Disposition Condition Date/Time Comment    Discharge Stable Fri Jan 28, 2022  4:07 PM [address] [place] [address] discharge to home/self care  Follow-up Information     Follow up With Specialties Details Why Contact Info Additional [id number] [facility],  Family Medicine   [address] [place] [address] Emergency Department Emergency Medicine  As needed, If symptoms worsen [facility] [id number]  [address] [facility] Emergency Department, [address] [place] [address], [id number]          Patient's Medications   Discharge Prescriptions    No medications on file       No discharge procedures on file      PDMP Review       Value Time User    PDMP Reviewed  Yes 1/20/2022 36:72 AM Alva Aguirre DO          ED Provider  Electronically Signed by           Chuck Addison PA-C  01/28/22 2401

## 2022-01-29 ENCOUNTER — TELEMEDICINE (OUTPATIENT)
Dept: FAMILY MEDICINE CLINIC | Facility: CLINIC | Age: 67
End: 2022-01-29
Payer: COMMERCIAL

## 2022-01-29 ENCOUNTER — TELEPHONE (OUTPATIENT)
Dept: FAMILY MEDICINE CLINIC | Facility: CLINIC | Age: 67
End: 2022-01-29

## 2022-01-29 DIAGNOSIS — U07.1 COVID-19: Primary | ICD-10-CM

## 2022-01-29 PROCEDURE — 1036F TOBACCO NON-USER: CPT | Performed by: FAMILY MEDICINE

## 2022-01-29 PROCEDURE — 99212 OFFICE O/P EST SF 10 MIN: CPT | Performed by: FAMILY MEDICINE

## 2022-01-29 PROCEDURE — 1160F RVW MEDS BY RX/DR IN RCRD: CPT | Performed by: FAMILY MEDICINE

## 2022-01-29 RX ORDER — FLUTICASONE PROPIONATE 110 UG/1
4 AEROSOL, METERED RESPIRATORY (INHALATION) 2 TIMES DAILY
Qty: 12 G | Refills: 0 | Status: SHIPPED | OUTPATIENT
Start: 2022-01-29 | End: 2022-03-22 | Stop reason: ALTCHOICE

## 2022-01-29 NOTE — TELEPHONE ENCOUNTER
Received referral form PA for [facility] f/u  Dr Renuka Mortensen is covering for pt's PCP  I sent him a tiger text and spoke with the pt to expect his call  Pt had one episode of desaturation noted in chart  I made Dr Renuka Mortensen aware

## 2022-01-29 NOTE — PROGRESS NOTES
COVID-19 Outpatient Progress Note    Assessment/Plan:    Problem List Items Addressed This Visit     None      Visit Diagnoses     COVID-19    -  Primary    Relevant Medications    molnupiravir 200 mg capsule    fluticasone (FLOVENT HFA) 110 MCG/ACT inhaler         Disposition:     Discussed symptom directed medication options with patient  Discussed vitamin D, vitamin C, and/or zinc supplementation with patient  Discussed risks, benefits, and side effects of inhaled corticosteroids and they agree to treatment  Sent meds to the pharmacy, pt to call if symptoms get worse  Patient meets criteria for Molnupiravir and they have been counseled according to EUA documentation provided by the FDA  After discussion, patient agrees to treatment  Daphane Ape is an investigational medicine used to treat mild-to-moderate COVID-19 in adults with positive results of direct SARS-CoV-2 viral testing, and who are at high risk for progressing to severe COVID-19 including hospitalization or death, and for whom other COVID-19 treatment options authorized by the FDA are not accessible or clinically appropriate  The FDA has authorized the emergency use of molnupiravir for the treatment of mild-tomoderate COVID-19 in adults under an EUA  Daphane Ape is not authorized:  - for use in people less than 25years of age   - for prevention of COVID-19   - for people needing hospitalization for COVID-19   - for use for longer than 5 consecutive days    What is the most important information I should know about molnupiravir? Daphane Ape may cause serious side effects, including:    Daphane Ape may cause harm to your unborn baby  It is not known if molnupiravir will harm your baby if you take molnupiravir during pregnancy  - Daphane Ape is not recommended for use in pregnancy  - Daphane Ape has not been studied in pregnancy  Daphane Ape was studied in pregnant animals only   When molnupiravir was given to pregnant animals, molnupiravir caused harm to their unborn babies   - You and your healthcare provider may decide that you should take molnupiravir duringpregnancy if there are no other COVID-19 treatment options authorized by the FDA that are accessible or clinically appropriate for you  - If you and your healthcare provider decide that you should take molnupiravir during pregnancy, you and your healthcare provider should discuss the known and potential benefits and the potential risks of taking molnupiravir during pregnancy  For individuals who are able to become pregnant:  - You should use a reliable method of birth control (contraception) consistently and correctly during treatment with molnupiravir and for 4 days after the last dose of molnupiravir  Talk to your healthcare provider about reliable birth control methods  - Before starting treatment with molnupiravir your healthcare provider may do a pregnancy test to see if you are pregnant before starting treatment with molnupiravir  - Tell your healthcare provider right away if you become pregnant or think you may be pregnant during treatment with molnupiravir  Pregnancy Surveillance Program:  - There is a pregnancy surveillance program for individuals who take molnupiravir during pregnancy  The purpose of this program is to collect information about the health of you and your baby  Talk to your healthcare provider about how to take part in this program   - If you take molnupiravir during pregnancy and you agree to participate in the pregnancy surveillance program and allow your healthcare provider to share your information with Bernice Antonio, then your healthcare provider will report your use of molnupiravir during pregnancy to [address] [place] [address],[address]  by calling [phone] or pregnancyreporting msd com  For individuals who are sexually active with partners who are able to become pregnant:  - It is not known if molnupiravir can affect sperm   While the risk is regarded as low, animal studies to fully assess the potential for molnupiravir to affect the babies of males treated with molnupiravir have not been completed  A reliable method of birth control (contraception) should be used consistently and correctly during treatment with molnupiravir and for at least 3 months after the last dose  The risk to sperm beyond 3 months is not known  Studies to understand the risk to sperm beyond 3 months are ongoing  Talk to your healthcare provider about reliable birth control methods  Talk to your healthcare provider if you have questions or concerns about how molnupiravir may affect sperm  How do I take molnupiravir? - Take molnupiravir exactly as your healthcare provider tells you to take it  - Take 4 capsules of molnupiravir every 12 hours (for example, at 8 am and at 8 pm)  - Take molnupiravir for 5 days  It is important that you complete the full 5 days of treatment with molnupiravir  Do not stop taking molnupiravir before you complete the full 5 days of treatment, even if you feel better  - Take molnupiravir with or without food  - You should stay in isolation for as long as your healthcare provider tells you to  Talk to your healthcare provider if you are not sure about how to properly isolate while you have COVID-19   - Swallow molnupiravir capsules whole  Do not open, break, or crush the capsules  If you cannot swallow capsules whole, tell your healthcare provider  What to do if you miss a dose:  - If it has been less than 10 hours since the missed dose, take it as soon as you remember  - If it has been more than 10 hours since the missed dose, skip the missed dose and take your dose at the next scheduled time  - Do not double the dose of molnupiravir to make up for a missed dose  What are the important possible side effects of molnupiravir?     Possible side effects of molnupiravir are:  - SeeLizz is the most important information I should know about molnupiravir?   - Diarrhea  - Nausea  - Dizziness    These are not all the possible side effects of molnupiravir  Not many people have taken molnupiravir  Serious and unexpected side effects may happen  This medicine is still being studied, so it is possible that all of the risks are not known at this time  What other treatment choices are there? Like Adelfo Eliceo may allow for the emergency use of other medicines to treat people with COVID-19  Go to [facility] for more information  It is your choice to be treated or not to be treated with molnupiravir  Should you decide not to take it, it will not change your standard medical care  What if I am breastfeeding? Breastfeeding is not recommended during treatment with molnupiravir and for 4 days after the last dose of molnupiravir  If you are breastfeeding or plan to breastfeed, talk to your healthcare provider about your options and specific situation before taking molnupiravir  How do I report side effects with molnupiravir? Contact your healthcare provider if you have any side effects that bother you or do not go away  Report side effects to FDA MedWatch at www fda gov/medwatch or call 0-407-NIW-5527 (1-347.778.4654)  How should I store [address]? - Store molnupiravir capsules at room temperature between 68°F to 77°F (20°C to 25°C)  - Keep molnupiravir and all medicines out of the reach of children and pets  Full fact sheet for patients, parents, and caregivers can be found at: [employer]    I have spent 12 minutes directly with the patient   Greater than 50% of this time was spent in counseling/coordination of care regarding: diagnostic results, prognosis, risks and benefits of treatment options, instructions for management, patient and family education, importance of treatment compliance, risk factor reductions and impressions  Encounter provider More Gill DO    Provider located at [facility] [address]  [address] [place] [address] [phone]    Recent Visits  Date Type Provider Dept   01/24/22 Telephone Lexi Prince, [address] Primary Care   Showing recent visits within past 7 days and meeting all other requirements  Today's Visits  Date Type Provider Dept   01/29/22 [address], [address] Primary [facility]   Showing today's visits and meeting all other requirements  Future Appointments  No visits were found meeting these conditions  Showing future appointments within next 150 days and meeting all other requirements     This virtual check-in was done via telephone and she agrees to proceed  Patient agrees to participate in a virtual check in via telephone or video visit instead of presenting to the office to address urgent/immediate medical needs  Patient is aware this is a billable service  After connecting through Telephone, the patient was identified by name and date of birth  Lucy Stewart was informed that this was a telemedicine visit and that the exam was being conducted confidentially over secure lines  My office door was closed  No one else was in the room  Lucy Stewart acknowledged consent and understanding of privacy and security of the telemedicine visit  I informed the patient that I have reviewed her record in Epic and presented the opportunity for her to ask any questions regarding the visit today  The patient agreed to participate  Verification of patient location:  Patient is located in the following state in which I hold an active license: [place]    Subjective:   Lucy Stewart is a 77 y o  female who has been screened for COVID-19  Symptom change since last report: unchanged   Patient's symptoms include chills, fatigue, malaise, nasal congestion, rhinorrhea, sore throat, cough, shortness of breath, chest tightness, myalgias and headache  Patient denies fever, anosmia, loss of taste, abdominal pain, nausea, vomiting and diarrhea  - Date of positive COVID-19 test: 1/28/2022  Type of test: PCR  COVID-19 vaccination status: Not vaccinated    Rehana Dugan has been staying home and has isolated themselves in her home  She is taking care to not share personal items and is cleaning all surfaces that are touched often, like counters, tabletops, and doorknobs using household cleaning sprays or wipes  She is wearing a mask when she leaves her room  Seen in ED yesterday, tested positive for covid, shlomo device given, pt states she thought she just had a cold    Shlomo team notified call service that pt had brief desat to 87% while sleeping , came up to 975 when awake    Lab Results   Component Value Date    SARSCOV2 Positive (A) 01/28/2022    SARSCOV2 Not Detected 12/03/2020     Past Medical History:   Diagnosis Date    Asthma     Colon polyp     Fibromyalgia     Full dentures     GERD (gastroesophageal reflux disease)     Hearing aid worn     Hearing impaired     "broken eardrum after eardrops used"    Wears glasses      Past Surgical History:   Procedure Laterality Date    BREAST BIOPSY Left     benign- at age 34    COLONOSCOPY      EAR SURGERY      UT LAP,SURG,COLECTOMY, PARTIAL, W/ANAST N/A 11/1/2021    Procedure: RESECTION COLON SIGMOID LAPAROSCOPIC WTIH COLORECTAL ANASTOMOSIS, DIVERTING LOOP ILEOSTOMY;  Surgeon: Cj Dillon MD;  Location: [facility] OR;  Service: General    TUBAL LIGATION      ULNAR NERVE REPAIR Left      Current Outpatient Medications   Medication Sig Dispense Refill    albuterol (PROVENTIL HFA,VENTOLIN HFA) 90 mcg/act inhaler INHALE 2 PUFFS EVERY 6 HOURS AS NEEDED FOR WHEEZING 18 g 1    atorvastatin (LIPITOR) 10 mg tablet TAKE 1 TABLET BY MOUTH EVERY DAY 90 tablet 2    betamethasone dipropionate (DIPROSONE) 0 05 % cream Apply topically 2 (two) times a day 30 g 0    cholestyramine (QUESTRAN) 4 g packet Take 1 packet (4 g total) by mouth 3 (three) times a day with meals 60 packet 1    fluticasone (FLOVENT HFA) 110 MCG/ACT inhaler Inhale 4 puffs 2 (two) times a day for 14 days Rinse mouth after use  12 g 0    HYDROcodone-acetaminophen (NORCO) 7 5-325 mg per tablet 1 tablet every 8 (eight) hours as needed usually       LORazepam (Ativan) 0 5 mg tablet Take 1 tablet (0 5 mg total) by mouth every 8 (eight) hours as needed for anxiety for up to 14 days 42 tablet 0    molnupiravir 200 mg capsule Take 4 capsules (800 mg total) by mouth every 12 (twelve) hours for 5 days 40 capsule 0    naloxone (NARCAN) 4 mg/0 1 mL nasal spray 1 spray by Alternating Nares route every 3 (three) minutes as needed for opioid reversal or respiratory depression Administer 1 spray into a nostril  If breathing does not return to normal or if breathing difficulty resumes after 2-3 minutes, give another dose in the other nostril using a new spray   nystatin (MYCOSTATIN) powder Apply topically 2 (two) times a day 30 g 1    ondansetron (ZOFRAN) 4 mg tablet Take 1 tablet (4 mg total) by mouth every 8 (eight) hours as needed for nausea or vomiting 30 tablet 0    primidone (MYSOLINE) 50 mg tablet TAKE 4 TABS (200 MG) BY MOUTH AT BEDTIME 360 tablet 3    SUMAtriptan (IMITREX) 100 mg tablet Take 1 tablet (100 mg total) by mouth once as needed for migraine for up to 1 dose 9 tablet 0    tiZANidine (ZANAFLEX) 4 mg tablet daily at bedtime        No current facility-administered medications for this visit  Allergies   Allergen Reactions    Levofloxacin Other (See Comments)     Weak legs, blurred vision    Carafate [Sucralfate] Rash    Other Palpitations     MRI dye    Tetracycline Rash       Review of Systems   Constitutional: Positive for activity change, appetite change, chills and fatigue  Negative for fever     HENT: Positive for congestion, rhinorrhea and sore throat  Respiratory: Positive for cough, chest tightness and shortness of breath  Gastrointestinal: Negative for abdominal pain, diarrhea, nausea and vomiting  Musculoskeletal: Positive for myalgias  Neurological: Positive for headaches  Psychiatric/Behavioral: Positive for confusion  The patient is nervous/anxious  Objective: There were no vitals filed for this visit  Physical Exam    VIRTUAL VISIT DISCLAIMER    To Seen RUST verbally agrees to participate in [employer]  Pt is aware that [employer] could be limited without vital signs or the ability to perform a full hands-on physical exam  Ora Rivera understands she or the provider may request at any time to terminate the video visit and request the patient to seek care or treatment in person

## 2022-01-30 ENCOUNTER — TELEPHONE (OUTPATIENT)
Dept: OTHER | Facility: HOSPITAL | Age: 67
End: 2022-01-30

## 2022-01-30 VITALS
OXYGEN SATURATION: 99 % | TEMPERATURE: 96.6 F | SYSTOLIC BLOOD PRESSURE: 98 MMHG | HEIGHT: 64 IN | WEIGHT: 105.4 LBS | RESPIRATION RATE: 16 BRPM | HEART RATE: 83 BPM | DIASTOLIC BLOOD PRESSURE: 60 MMHG | BODY MASS INDEX: 17.99 KG/M2

## 2022-01-30 DIAGNOSIS — R10.13 DYSPEPSIA: ICD-10-CM

## 2022-01-30 NOTE — TELEPHONE ENCOUNTER
Received an alert for patient for pulse ox of 89 1, attempted to call patient x 3 with no response and left messages  I then called and spoke with significant other Luis who stated that patient never answers her phone and stated that patient felt fine and denied and SOB or breathing issues or difficulties  Patient's pulse ox immediately rebounded to 94-96% and has stayed there  Let Luis know that we will continue to monitor patient and vitals and call if any issues

## 2022-01-31 ENCOUNTER — PATIENT OUTREACH (OUTPATIENT)
Dept: FAMILY MEDICINE CLINIC | Facility: CLINIC | Age: 67
End: 2022-01-31

## 2022-01-31 RX ORDER — ONDANSETRON 4 MG/1
4 TABLET, FILM COATED ORAL EVERY 8 HOURS PRN
Qty: 30 TABLET | Refills: 0 | Status: SHIPPED | OUTPATIENT
Start: 2022-01-31 | End: 2022-02-14 | Stop reason: SDUPTHER

## 2022-01-31 NOTE — PROGRESS NOTES
Outpatient Care Management Note:  RE: Spoke with pt to f/u on ED visit  She is Covid positive and commented several times that she is weak  Pt is quarantining and would like to shower but is too weak to do so and has been resting  Intake has been minimal  Encouraged fluids  She denies any cough  Pt has family checking in on her as visiting nurse is on hold for now  She is doing her best with ostomy at this time

## 2022-02-01 ENCOUNTER — RA CDI HCC (OUTPATIENT)
Dept: OTHER | Facility: HOSPITAL | Age: 67
End: 2022-02-01

## 2022-02-01 ENCOUNTER — TELEPHONE (OUTPATIENT)
Dept: FAMILY MEDICINE CLINIC | Facility: CLINIC | Age: 67
End: 2022-02-01

## 2022-02-01 ENCOUNTER — TELEPHONE (OUTPATIENT)
Dept: OTHER | Facility: HOSPITAL | Age: 67
End: 2022-02-01

## 2022-02-01 NOTE — PROGRESS NOTES
[facility]  coding opportunities       Chart reviewed, no opportunity found: CHART REVIEWED, NO OPPORTUNITY FOUND                        Patients insurance company: [facility] [address]  (Medicare Advantage and Commercial)

## 2022-02-01 NOTE — TELEPHONE ENCOUNTER
----- Message from Alva Aguirre DO sent at 9/1/6666  5:42 PM EST -----  Please contact patient and tell her that she can disconnect the Mas[employer] monitoring device and box it up   ----- Message -----  From: Johnice Gowers, RN  Sent: 2/1/2022   9:54 AM EST  To: Alva Aguirre DO    Hi Dr Shana Russell--    74/10/5022--39 hour SpO2 was 96 3%, HR range , RR average 16 69  Since yesterday morning patient did not require any contacts from the [facility]  Pt is having technical issues with the home masimo device and states that it is not working  I attempted to troubleshoot the issue with the patient with no success  Normally since tomorrow is day 6 of monitoring we would check if you would like patient to continue with home monitoring since the home equipment is only good for 8 days  Since patient has remained stable is patient able to come off the monitor as of today? If you could please contact the [facility] at [phone] or [employer] the [facility] nurse role  I will also attempt to send you a tiger connect message as well      Thank You,    Greg Galan RN, [facility]

## 2022-02-02 ENCOUNTER — TELEPHONE (OUTPATIENT)
Dept: FAMILY MEDICINE CLINIC | Facility: CLINIC | Age: 67
End: 2022-02-02

## 2022-02-02 NOTE — TELEPHONE ENCOUNTER
Received e prescribing error for molnupiravir 200mg  I called and spoke with the pharmacy and they stated they did not receive request, they asked we please resend   Thank you

## 2022-02-04 ENCOUNTER — TELEMEDICINE (OUTPATIENT)
Dept: FAMILY MEDICINE CLINIC | Facility: CLINIC | Age: 67
End: 2022-02-04
Payer: COMMERCIAL

## 2022-02-04 DIAGNOSIS — M79.2 NEUROPATHIC PAIN: ICD-10-CM

## 2022-02-04 DIAGNOSIS — Z93.2 ILEOSTOMY IN PLACE (HCC): ICD-10-CM

## 2022-02-04 DIAGNOSIS — U07.1 COVID-19: Primary | ICD-10-CM

## 2022-02-04 PROBLEM — Z72.0 TOBACCO USE: Status: RESOLVED | Noted: 2021-07-07 | Resolved: 2022-02-04

## 2022-02-04 PROCEDURE — 99213 OFFICE O/P EST LOW 20 MIN: CPT | Performed by: FAMILY MEDICINE

## 2022-02-04 RX ORDER — GABAPENTIN 100 MG/1
100 CAPSULE ORAL 3 TIMES DAILY
Qty: 90 CAPSULE | Refills: 0 | Status: SHIPPED | OUTPATIENT
Start: 2022-02-04 | End: 2022-02-14 | Stop reason: SDUPTHER

## 2022-02-04 NOTE — PROGRESS NOTES
COVID-19 Outpatient Progress Note    Assessment/Plan:  Ana Naav was seen today for covid-19  Diagnoses and all orders for this visit:    COVID-19    Neuropathic pain  -     Discontinue: gabapentin (Neurontin) 100 mg capsule; Take 1 capsule (100 mg total) by mouth 3 (three) times a day    Ileostomy in place [facility])      Problem List Items Addressed This Visit        Other    Ileostomy in place [facility])      Other Visit Diagnoses     COVID-19    -  Primary    Neuropathic pain        Relevant Medications    gabapentin (Neurontin) 100 mg capsule         Disposition:     Risks and benefits of COVID-19 vaccination was discussed with patient  I recommended continued isolation until at least 24 hours have passed since recovery defined as resolution of fever without the use of fever-reducing medications AND improvement in COVID symptoms AND 10 days have passed since onset of symptoms (or 10 days have passed since date of first positive viral diagnostic test for asymptomatic patients)  Discussed symptom directed medication options with patient  Discussed vitamin D, vitamin C, and/or zinc supplementation with patient  I have spent 20 minutes directly with the patient  Greater than 50% of this time was spent in counseling/coordination of care regarding: prognosis and impressions  Main issue was the pain which we discussed trial of gabapentin  Daughter is present throughout the entire office visit and will make sure we follow-up on Monday with patient's progress       Encounter provider Juan Falcon DO    Provider located at [address] [place] [address] [address] [place] [id number] [phone]    Recent Visits  Date Type Provider Dept   02/07/22 Telephone [place], [address]  Primary Care   Showing recent visits within past 7 days and meeting all other requirements  Future Appointments  No visits were found meeting these conditions  Showing future appointments within next 150 days and meeting all other requirements     This virtual check-in was done via [facility] and patient was informed that this is a secure, HIPAA-compliant platform  She agrees to proceed  Patient agrees to participate in a virtual check in via telephone or video visit instead of presenting to the office to address urgent/immediate medical needs  Patient is aware this is a billable service  After connecting through [facility], the patient was identified by name and date of birth  Linsey Stewart was informed that this was a telemedicine visit and that the exam was being conducted confidentially over secure lines  My office door was closed  No one else was in the room  Linsey Stewart acknowledged consent and understanding of privacy and security of the telemedicine visit  I informed the patient that I have reviewed her record in Epic and presented the opportunity for her to ask any questions regarding the visit today  The patient agreed to participate  Verification of patient location:  Patient is located in the following state in which I hold an active license: [place]    Subjective:   Linsey Stewart is a 77 y o  female who has been screened for COVID-19  Symptom change since last report: improving  Patient's symptoms include fatigue and malaise  - Date of symptom onset: 1/21/2022  - Date of positive COVID-19 test: 1/28/2022  Type of test: PCR  COVID-19 vaccination status: Not vaccinated    Trevor Him has been staying home and has isolated themselves in her home  She is taking care to not share personal items and is cleaning all surfaces that are touched often, like counters, tabletops, and doorknobs using household cleaning sprays or wipes  She is wearing a mask when she leaves her room  Patient presented with the ED with weakness primarily  She has ongoing significant issues with her ileostomy    That is her main complaint is the discomfort right around the flange  It is disabling  She continues to have decreased appetite and weight loss but this is previous any COVID testing  Lab Results   Component Value Date    SARSCOV2 Positive (A) 01/28/2022    SARSCOV2 Not Detected 12/03/2020     Past Medical History:   Diagnosis Date    Asthma     Colon polyp     Fibromyalgia     Full dentures     GERD (gastroesophageal reflux disease)     Hearing aid worn     Hearing impaired     "broken eardrum after eardrops used"    Wears glasses      Past Surgical History:   Procedure Laterality Date    BREAST BIOPSY Left     benign- at age 34    COLONOSCOPY      EAR SURGERY      [place] LAP,SURG,COLECTOMY, PARTIAL, W/ANAST N/A 11/1/2021    Procedure: RESECTION COLON SIGMOID LAPAROSCOPIC WTIH COLORECTAL ANASTOMOSIS, DIVERTING LOOP ILEOSTOMY;  Surgeon: Pedro Huang MD;  Location: [facility];  Service: General    TUBAL LIGATION      ULNAR NERVE REPAIR Left      Current Outpatient Medications   Medication Sig Dispense Refill    albuterol (PROVENTIL HFA,VENTOLIN HFA) 90 mcg/act inhaler Inhale 2 puffs 4 (four) times a day 18 g 0    atorvastatin (LIPITOR) 10 mg tablet TAKE 1 TABLET BY MOUTH EVERY DAY 90 tablet 2    betamethasone dipropionate (DIPROSONE) 0 05 % cream Apply topically 2 (two) times a day 30 g 0    fluticasone (FLOVENT HFA) 110 MCG/ACT inhaler Inhale 4 puffs 2 (two) times a day for 14 days Rinse mouth after use  12 g 0    fluticasone (FLOVENT HFA) 110 MCG/ACT inhaler Inhale 4 puffs 2 (two) times a day for 14 days Rinse mouth after use   12 g 0    gabapentin (Neurontin) 100 mg capsule Take 1 capsule (100 mg total) by mouth 3 (three) times a day 90 capsule 0    HYDROcodone-acetaminophen (NORCO) 7 5-325 mg per tablet 1 tablet every 8 (eight) hours as needed usually       LORazepam (Ativan) 0 5 mg tablet Take 1 tablet (0 5 mg total) by mouth every 8 (eight) hours as needed for anxiety for up to 14 days 42 tablet 0    naloxone (NARCAN) 4 mg/0 1 mL nasal spray 1 spray by Alternating Nares route every 3 (three) minutes as needed for opioid reversal or respiratory depression Administer 1 spray into a nostril  If breathing does not return to normal or if breathing difficulty resumes after 2-3 minutes, give another dose in the other nostril using a new spray   nystatin (MYCOSTATIN) powder Apply topically 2 (two) times a day 30 g 1    ondansetron (ZOFRAN) 4 mg tablet Take 1 tablet (4 mg total) by mouth every 8 (eight) hours as needed for nausea or vomiting 30 tablet 0    primidone (MYSOLINE) 50 mg tablet TAKE 4 TABS (200 MG) BY MOUTH AT BEDTIME 360 tablet 3    SUMAtriptan (IMITREX) 100 mg tablet Take 1 tablet (100 mg total) by mouth once as needed for migraine for up to 1 dose 9 tablet 0    tiZANidine (ZANAFLEX) 4 mg tablet daily at bedtime        No current facility-administered medications for this visit  Allergies   Allergen Reactions    Levofloxacin Other (See Comments)     Weak legs, blurred vision    Carafate [Sucralfate] Rash    Other Palpitations     MRI dye    Tetracycline Rash       Review of Systems   Constitutional: Positive for fatigue  Objective: There were no vitals filed for this visit  Physical Exam  Constitutional:       Appearance: She is ill-appearing  Pulmonary:      Effort: Pulmonary effort is normal    Skin:     Coloration: Skin is pale  Neurological:      Mental Status: She is alert  Psychiatric:         Mood and Affect: Affect is tearful  VIRTUAL VISIT DISCLAIMER    [address] [facility] verbally agrees to participate in [employer]  Pt is aware that [employer] could be limited without vital signs or the ability to perform a full hands-on physical exam  Ora Rivera understands she or the provider may request at any time to terminate the video visit and request the patient to seek care or treatment in person

## 2022-02-06 DIAGNOSIS — J45.20 MILD INTERMITTENT ASTHMA, UNSPECIFIED WHETHER COMPLICATED: ICD-10-CM

## 2022-02-07 ENCOUNTER — TELEPHONE (OUTPATIENT)
Dept: FAMILY MEDICINE CLINIC | Facility: CLINIC | Age: 67
End: 2022-02-07

## 2022-02-07 RX ORDER — ALBUTEROL SULFATE 90 UG/1
2 AEROSOL, METERED RESPIRATORY (INHALATION) 4 TIMES DAILY
Qty: 18 G | Refills: 0 | Status: SHIPPED | OUTPATIENT
Start: 2022-02-07 | End: 2022-03-22 | Stop reason: SDUPTHER

## 2022-02-07 NOTE — TELEPHONE ENCOUNTER
I absolutely loves the idea of the Flonase around the stoma, it basically is a topical anti-inflammatory  Easier to put on then cream   I think it is okay to use for the short-term as things continue to heal   Yes we can increase the gabapentin  If she would like to start taking 200 mg 3 times a day she can try that  Obviously she will run out of the supply earlier but that is okay because we will call it in as the newer dosing when she needs it refilled

## 2022-02-07 NOTE — TELEPHONE ENCOUNTER
Ora's daughter Frausto Cinnamon called ,due to pt is hard of hearing  Pt was to follow up with you today regarding the gabapentin  Mom did say it helped a little bit per Lisbet Mcneil  Can pt increase the dosage? Pt is presently taking  100 mg 3 times a day  Pamela Shoemaker gave verbal consent that we may speak with Lisbet Mcneil about this  Reason medication was prescribed was  for redness and soreness around stoma  They talked to pt's  he is wearing bag also  He and some other people they know have used generic  flonase around the stoma  Pt has been using it and it has helped  Is there a certain length of time that she can use it for? They only use when it is sore they to know to use it a lot  It has helped the soreness around the stoma/ bag  They just want to make  sure it doesn't hurt her  They believe it has a steroid in it     Please call Lisbet Mcneil at [phone]

## 2022-02-09 ENCOUNTER — TELEMEDICINE (OUTPATIENT)
Dept: SURGERY | Facility: CLINIC | Age: 67
End: 2022-02-09
Payer: COMMERCIAL

## 2022-02-09 DIAGNOSIS — E44.0 MODERATE PROTEIN-CALORIE MALNUTRITION (HCC): Primary | ICD-10-CM

## 2022-02-09 DIAGNOSIS — Z93.2 ILEOSTOMY IN PLACE (HCC): ICD-10-CM

## 2022-02-09 PROCEDURE — 99212 OFFICE O/P EST SF 10 MIN: CPT | Performed by: SURGERY

## 2022-02-09 NOTE — PROGRESS NOTES
Virtual Regular Visit    Verification of patient location:    Patient is located in the following state in which I hold an active license [place]      Assessment/Plan:    Problem List Items Addressed This Visit        Other    Moderate protein-calorie malnutrition ([address] ) - Primary    Ileostomy in place [facility])      She seems to be doing a little better another daughter is available to help  She has gained 5 lb await  I recommend continue with her weight gain and ideally would have at least 10 more lb on before we proceed to surgery  Also after testing positive for COVID we have to wait a minimum of 7 weeks prior to any elective surgery based on recommendations  I will see her back in 3 weeks in the office to consider scheduling for the end of March if she continues to do well                    Reason for visit is No chief complaint on file  Encounter provider Merlin Aldana MD    Provider located at [address] [address] [phone]      Recent Visits  Date Type Provider Dept   02/04/22 [address] [place] [address] Primary Care   Showing recent visits within past 7 days and meeting all other requirements  Today's Visits  Date Type Provider Dept   02/09/22 Telemedicine Merlin Aldana MD [facility] today's visits and meeting all other requirements  Future Appointments  No visits were found meeting these conditions  Showing future appointments within next 150 days and meeting all other requirements       The patient was identified by name and date of birth  Radha Yvette was informed that this is a telemedicine visit and that the visit is being conducted through [employer] and patient was informed that this is a secure, HIPAA-compliant platform  She agrees to proceed     My office door was closed  No one else was in the room    She acknowledged consent and understanding of privacy and security of the video platform  The patient has agreed to participate and understands they can discontinue the visit at any time  Patient is aware this is a billable service  Subjective  Geovanny Casas is a 77 y o  female   That is status post colon resection for perforated diverticulitis with a diverting loop ileostomy  She has been having difficulties managing her ostomy and has been losing significant amount of weight  However her daughter is now home and has been helping her with her meals and she has gained 5 lb on her own  She did test positive for COVID 1 week ago and is at home doing well   Ms Rivera   Is a 70-year-old female that is here for evaluation of diverticulitis  She does have a history of irritable bowel type syndrome symptoms and constipation diarrhea  She is having increasing pain and was seen by Gastroenterolog in June had a  Colonoscopy that showed some pus in the sigmoid colon consistent with diverticulitis and therefore the colonoscopy was aborted  She has her last colonoscopy previous that was in 2009 for which she had up to 6 polyps removed  After colonoscopy she was seen by her primary care physician and sent for a stat CT scan that showed diverticulitis  She was treated conservatively with oral antibiotics but failed outpatient management and was admitted on July 7th with diverticulitis and intramural abscess  She was treated conservatively and has been discharged  She was seen back in the emergency department on the 23rd and a CT scan that time showed no evidence of diverticulitis but did show constipation  Currently she states she is still having abdominal pain across her abdomen  She took some pain medicine for the pain recently  She is still having constipation issues as well  10/12/2021 she returns now for re-evaluation  She underwent a colonoscopy but it was incomplete and unsuccessful due to inflammation and angulation in the colon    She had a CT scan that same day that just showed mild diverticulitis and she was restarted antibiotics  However she also developed increasing back pain  She has a longstanding history of back pain and bulge with pain management  [facility] with increasing back pain and was treated accordingly  She did have another repeat CT scan that time that showed mild inflammation but no significant findings  She returns now but she states that she is having trouble eating she feels nauseated has pain all the time pain in her abdomen pain in her back  She is having difficulties moving her bowels  11/17/2021  She is now 2 weeks status post robotic sigmoid colectomy with diverting loop ileostomy  She is doing okay at home  Still feeling weak  Having difficulty with her ostomy bag       12/08/2021 she had a readmission for acute renal failure and inability to manage her ileostomy  She has now been following in the [facility] for better  Control of her ostomy output  She was started on cholestyramine for her loose output  She still is not ambulating well  She still not eating appropriate protein intake  She still smoking heavily  3     01/18/2022 she returns now for evaluation  She is still struggling with the ostomy appliance  I am uncertain why she cannot keep the bag in place but she is very fixated on it and continue only changing it  She has been seen by visiting nurses and also the wound center but is still having issues  In addition she states that she is trying to increase her protein but is not adequate doing so as whenever she has an issue with her bag she loses her appetite and stops eating  She is still smoking a pack a day and is relatively sedentary  02/09/2022 a virtual visit was held as patient did test positive for COVID and finds it difficult to get transportation to the office    She is doing better at home another daughter is they are helping her   She has gained 5 lb  She is also found better appliance to help with her ostomy and it is not leaking the sponge       Past Medical History:   Diagnosis Date    Asthma     Colon polyp     Fibromyalgia     Full dentures     GERD (gastroesophageal reflux disease)     Hearing aid worn     Hearing impaired     "broken eardrum after eardrops used"    Wears glasses        Past Surgical History:   Procedure Laterality Date    BREAST BIOPSY Left     benign- at age 34    COLONOSCOPY      EAR SURGERY      [place] LAP,SURG,COLECTOMY, PARTIAL, W/ANAST N/A 11/1/2021    Procedure: RESECTION COLON SIGMOID LAPAROSCOPIC WTIH COLORECTAL ANASTOMOSIS, DIVERTING LOOP ILEOSTOMY;  Surgeon: Chon Morley MD;  Location: [facility];  Service: General    TUBAL LIGATION      ULNAR NERVE REPAIR Left        Current Outpatient Medications   Medication Sig Dispense Refill    albuterol (PROVENTIL HFA,VENTOLIN HFA) 90 mcg/act inhaler Inhale 2 puffs 4 (four) times a day 18 g 0    atorvastatin (LIPITOR) 10 mg tablet TAKE 1 TABLET BY MOUTH EVERY DAY 90 tablet 2    betamethasone dipropionate (DIPROSONE) 0 05 % cream Apply topically 2 (two) times a day 30 g 0    fluticasone (FLOVENT HFA) 110 MCG/ACT inhaler Inhale 4 puffs 2 (two) times a day for 14 days Rinse mouth after use  12 g 0    fluticasone (FLOVENT HFA) 110 MCG/ACT inhaler Inhale 4 puffs 2 (two) times a day for 14 days Rinse mouth after use   12 g 0    gabapentin (Neurontin) 100 mg capsule Take 1 capsule (100 mg total) by mouth 3 (three) times a day 90 capsule 0    HYDROcodone-acetaminophen (NORCO) 7 5-325 mg per tablet 1 tablet every 8 (eight) hours as needed usually       LORazepam (Ativan) 0 5 mg tablet Take 1 tablet (0 5 mg total) by mouth every 8 (eight) hours as needed for anxiety for up to 14 days 42 tablet 0    naloxone (NARCAN) 4 mg/0 1 mL nasal spray 1 spray by Alternating Nares route every 3 (three) minutes as needed for opioid reversal or respiratory depression Administer 1 spray into a nostril  If breathing does not return to normal or if breathing difficulty resumes after 2-3 minutes, give another dose in the other nostril using a new spray   nystatin (MYCOSTATIN) powder Apply topically 2 (two) times a day 30 g 1    ondansetron (ZOFRAN) 4 mg tablet Take 1 tablet (4 mg total) by mouth every 8 (eight) hours as needed for nausea or vomiting 30 tablet 0    primidone (MYSOLINE) 50 mg tablet TAKE 4 TABS (200 MG) BY MOUTH AT BEDTIME 360 tablet 3    SUMAtriptan (IMITREX) 100 mg tablet Take 1 tablet (100 mg total) by mouth once as needed for migraine for up to 1 dose 9 tablet 0    tiZANidine (ZANAFLEX) 4 mg tablet daily at bedtime        No current facility-administered medications for this visit  Allergies   Allergen Reactions    Levofloxacin Other (See Comments)     Weak legs, blurred vision    Carafate [Sucralfate] Rash    Other Palpitations     MRI dye    Tetracycline Rash       Review of Systems   Constitutional: Positive for fatigue  Negative for chills and fever  HENT: Negative for ear pain and sore throat  Eyes: Negative for pain and visual disturbance  Respiratory: Negative for cough and shortness of breath  Cardiovascular: Negative for chest pain and palpitations  Gastrointestinal: Negative for abdominal pain and vomiting  Genitourinary: Negative for dysuria and hematuria  Musculoskeletal: Positive for back pain  Negative for arthralgias  Skin: Negative for color change and rash  Neurological: Negative for seizures and syncope  Psychiatric/Behavioral: Negative for agitation and behavioral problems  All other systems reviewed and are negative  Video Exam    There were no vitals filed for this visit      Physical Exam  Abdominal:      Comments:  She showed her ostomy on the video and the ostomy is pink and viable with good output              VIRTUAL VISIT [address] verbally agrees to participate in [employer]  Pt is aware that [employer] could be limited without vital signs or the ability to perform a full hands-on physical exam  Ora Rivera understands she or the provider may request at any time to terminate the video visit and request the patient to seek care or treatment in person

## 2022-02-09 NOTE — ASSESSMENT & PLAN NOTE
She seems to be doing a little better another daughter is available to help  She has gained 5 lb await  I recommend continue with her weight gain and ideally would have at least 10 more lb on before we proceed to surgery  Also after testing positive for COVID we have to wait a minimum of 7 weeks prior to any elective surgery based on recommendations    I will see her back in 3 weeks in the office to consider scheduling for the end of March if she continues to do well

## 2022-02-14 DIAGNOSIS — R10.13 DYSPEPSIA: ICD-10-CM

## 2022-02-14 DIAGNOSIS — M79.2 NEUROPATHIC PAIN: ICD-10-CM

## 2022-02-14 RX ORDER — GABAPENTIN 100 MG/1
100 CAPSULE ORAL 3 TIMES DAILY
Qty: 90 CAPSULE | Refills: 0 | Status: SHIPPED | OUTPATIENT
Start: 2022-02-14 | End: 2022-02-17 | Stop reason: SDUPTHER

## 2022-02-14 RX ORDER — ONDANSETRON 4 MG/1
4 TABLET, FILM COATED ORAL EVERY 8 HOURS PRN
Qty: 30 TABLET | Refills: 0 | Status: SHIPPED | OUTPATIENT
Start: 2022-02-14 | End: 2022-02-21 | Stop reason: SDUPTHER

## 2022-02-17 ENCOUNTER — TELEMEDICINE (OUTPATIENT)
Dept: FAMILY MEDICINE CLINIC | Facility: CLINIC | Age: 67
End: 2022-02-17
Payer: COMMERCIAL

## 2022-02-17 VITALS — WEIGHT: 114 LBS | BODY MASS INDEX: 19.57 KG/M2

## 2022-02-17 DIAGNOSIS — J31.0 OTHER RHINITIS: Primary | ICD-10-CM

## 2022-02-17 DIAGNOSIS — M79.2 NEUROPATHIC PAIN: ICD-10-CM

## 2022-02-17 PROCEDURE — 1160F RVW MEDS BY RX/DR IN RCRD: CPT | Performed by: FAMILY MEDICINE

## 2022-02-17 PROCEDURE — 1036F TOBACCO NON-USER: CPT | Performed by: FAMILY MEDICINE

## 2022-02-17 PROCEDURE — 99213 OFFICE O/P EST LOW 20 MIN: CPT | Performed by: FAMILY MEDICINE

## 2022-02-17 RX ORDER — PREDNISONE 10 MG/1
TABLET ORAL
Qty: 21 EACH | Refills: 0 | Status: SHIPPED | OUTPATIENT
Start: 2022-02-17 | End: 2022-03-04 | Stop reason: SDUPTHER

## 2022-02-17 RX ORDER — GABAPENTIN 300 MG/1
300 CAPSULE ORAL 3 TIMES DAILY
Qty: 90 CAPSULE | Refills: 0 | Status: SHIPPED | OUTPATIENT
Start: 2022-02-17 | End: 2022-03-22 | Stop reason: SDUPTHER

## 2022-02-17 NOTE — PROGRESS NOTES
Virtual Regular Visit    Verification of patient location:    Patient is located in the following state in which I hold an active license [place]      Assessment/Plan:    Problem List Items Addressed This Visit     None      Visit Diagnoses     Other rhinitis    -  Primary    Relevant Medications    predniSONE 10 MG (21) TBPK    Neuropathic pain        Relevant Medications    gabapentin (Neurontin) 300 mg capsule    predniSONE 10 MG (21) TBPK               Reason for visit is   Chief Complaint   Patient presents with    Virtual Regular Visit        Encounter provider Cait Degroot DO    Provider located at [facility] [address] [facility] [address] [phone]      Recent Visits  Date Type Provider Dept   02/17/22 1462 [facility], [address] [facility] [address] Primary Care   Showing recent visits within past 7 days and meeting all other requirements  Future Appointments  No visits were found meeting these conditions  Showing future appointments within next 150 days and meeting all other requirements       The patient was identified by name and date of birth  Greg Sotelo was informed that this is a telemedicine visit and that the visit is being conducted through [address] and patient was informed this is a secure, HIPAA-complaint platform  She agrees to proceed     My office door was closed  No one else was in the room  She acknowledged consent and understanding of privacy and security of the video platform  The patient has agreed to participate and understands they can discontinue the visit at any time  Patient is aware this is a billable service  Subjective  Greg Sotelo is a 77 y o  female sinus   51-year-old female with symptoms of rhinitis with relatively clear mucus  No cough other than typical type  Patient is making some headway with the gabapentin for control of her pain    She is also making headway with the discomfort around the stoma and the appliance  She is using Flonase nasal spray topically for this  This was suggested stomal therapist   Past Medical History:   Diagnosis Date    Asthma     Colon polyp     Fibromyalgia     Full dentures     GERD (gastroesophageal reflux disease)     Hearing aid worn     Hearing impaired     "broken eardrum after eardrops used"    Wears glasses        Past Surgical History:   Procedure Laterality Date    BREAST BIOPSY Left     benign- at age 34    COLONOSCOPY      EAR SURGERY      CA LAP,SURG,COLECTOMY, PARTIAL, W/ANAST N/A 11/1/2021    Procedure: RESECTION COLON SIGMOID LAPAROSCOPIC WTIH COLORECTAL ANASTOMOSIS, DIVERTING LOOP ILEOSTOMY;  Surgeon: Tasia Veloz MD;  Location: [facility];  Service: General    TUBAL LIGATION      ULNAR NERVE REPAIR Left        Current Outpatient Medications   Medication Sig Dispense Refill    albuterol (PROVENTIL HFA,VENTOLIN HFA) 90 mcg/act inhaler Inhale 2 puffs 4 (four) times a day 18 g 0    atorvastatin (LIPITOR) 10 mg tablet TAKE 1 TABLET BY MOUTH EVERY DAY 90 tablet 2    betamethasone dipropionate (DIPROSONE) 0 05 % cream Apply topically 2 (two) times a day 30 g 0    fluticasone (FLOVENT HFA) 110 MCG/ACT inhaler Inhale 4 puffs 2 (two) times a day for 14 days Rinse mouth after use  12 g 0    fluticasone (FLOVENT HFA) 110 MCG/ACT inhaler Inhale 4 puffs 2 (two) times a day for 14 days Rinse mouth after use   12 g 0    gabapentin (Neurontin) 300 mg capsule Take 1 capsule (300 mg total) by mouth 3 (three) times a day 90 capsule 0    HYDROcodone-acetaminophen (NORCO) 7 5-325 mg per tablet 1 tablet every 8 (eight) hours as needed usually       LORazepam (Ativan) 0 5 mg tablet Take 1 tablet (0 5 mg total) by mouth every 8 (eight) hours as needed for anxiety for up to 14 days 42 tablet 0    naloxone (NARCAN) 4 mg/0 1 mL nasal spray 1 spray by Alternating Nares route every 3 (three) minutes as needed for opioid reversal or respiratory depression Administer 1 spray into a nostril  If breathing does not return to normal or if breathing difficulty resumes after 2-3 minutes, give another dose in the other nostril using a new spray   nystatin (MYCOSTATIN) powder Apply topically 2 (two) times a day 30 g 1    ondansetron (ZOFRAN) 4 mg tablet Take 1 tablet (4 mg total) by mouth every 8 (eight) hours as needed for nausea or vomiting 30 tablet 0    predniSONE 10 MG (21) TBPK As directed take 6-5-4-3-2-1 21 each 0    primidone (MYSOLINE) 50 mg tablet TAKE 4 TABS (200 MG) BY MOUTH AT BEDTIME 360 tablet 3    SUMAtriptan (IMITREX) 100 mg tablet Take 1 tablet (100 mg total) by mouth once as needed for migraine for up to 1 dose 9 tablet 0    tiZANidine (ZANAFLEX) 4 mg tablet daily at bedtime        No current facility-administered medications for this visit  Allergies   Allergen Reactions    Levofloxacin Other (See Comments)     Weak legs, blurred vision    Carafate [Sucralfate] Rash    Other Palpitations     MRI dye    Tetracycline Rash       Review of Systems   HENT: Positive for postnasal drip, rhinorrhea (yellow) and sneezing  Respiratory: Positive for cough  Video Exam    Vitals:    02/17/22 1221   Weight: 51 7 kg (114 lb)       Physical Exam  Constitutional:       Comments: 51-year-old female who appears her stated age with improved affect   Pulmonary:      Effort: Pulmonary effort is normal    Neurological:      Mental Status: She is alert and oriented to person, place, and time  Psychiatric:      Comments: Affect positive          I spent 12 minutes with patient today in which greater than 50% of the time was spent in counseling/coordination of care regarding Sinus symptoms  Patient requests prednisone    VIRTUAL VISIT DISCLAIMER      [facility] verbally agrees to participate in [employer]   Pt is aware that Virtual Care Services could be limited without vital signs or the ability to perform a full hands-on physical exam  Ora Templeton Fairly understands she or the provider may request at any time to terminate the video visit and request the patient to seek care or treatment in person

## 2022-02-21 ENCOUNTER — TELEPHONE (OUTPATIENT)
Dept: FAMILY MEDICINE CLINIC | Facility: CLINIC | Age: 67
End: 2022-02-21

## 2022-02-21 DIAGNOSIS — R10.13 DYSPEPSIA: ICD-10-CM

## 2022-02-21 DIAGNOSIS — G43.909 MIGRAINE WITHOUT STATUS MIGRAINOSUS, NOT INTRACTABLE, UNSPECIFIED MIGRAINE TYPE: ICD-10-CM

## 2022-02-21 RX ORDER — SUMATRIPTAN 100 MG/1
100 TABLET, FILM COATED ORAL ONCE AS NEEDED
Qty: 9 TABLET | Refills: 0 | Status: SHIPPED | OUTPATIENT
Start: 2022-02-21 | End: 2022-04-26 | Stop reason: SDUPTHER

## 2022-02-21 RX ORDER — ONDANSETRON 4 MG/1
4 TABLET, FILM COATED ORAL EVERY 8 HOURS PRN
Qty: 30 TABLET | Refills: 0 | Status: SHIPPED | OUTPATIENT
Start: 2022-02-21 | End: 2022-03-04 | Stop reason: SDUPTHER

## 2022-02-21 NOTE — TELEPHONE ENCOUNTER
Rafia Conklin called the office requesting if you can please put her back on the diazepam 10 mg twice a day , due to the lorazepam is not  working for her  Pt uses the [facility] in [place] [address]     Pt's number is [phone]

## 2022-02-25 ENCOUNTER — TELEPHONE (OUTPATIENT)
Dept: FAMILY MEDICINE CLINIC | Facility: CLINIC | Age: 67
End: 2022-02-25

## 2022-02-25 DIAGNOSIS — F41.1 GENERALIZED ANXIETY DISORDER: Primary | ICD-10-CM

## 2022-02-25 RX ORDER — DIAZEPAM 10 MG/1
10 TABLET ORAL EVERY 12 HOURS PRN
Qty: 30 TABLET | Refills: 0 | Status: SHIPPED | OUTPATIENT
Start: 2022-02-25 | End: 2022-03-11 | Stop reason: SDUPTHER

## 2022-02-25 NOTE — TELEPHONE ENCOUNTER
Patient called stating Lorazepam is not working for the patient she would like to go back to the Valium 10 mg  2x day  If it's okay please send script to [facility] on [address] in [place]

## 2022-02-28 ENCOUNTER — TELEPHONE (OUTPATIENT)
Dept: FAMILY MEDICINE CLINIC | Facility: CLINIC | Age: 67
End: 2022-02-28

## 2022-02-28 NOTE — TELEPHONE ENCOUNTER
[facility] called stating patient was originally prescribed Diazepam 3 times a day and the pharmacy is questioning this  She was prescribed this time for 2 times a day  Can we please contact the pharmacy to correct the directions and amount of pills  Please contact the pharmacist at [phone]

## 2022-02-28 NOTE — TELEPHONE ENCOUNTER
It appears that Dr Destiny Cervantes refill the medication in my absence with the previous b i d  instructions  Patient is having a difficult time with her ileostomy and therefore we had instructed her to increase to 3 times a day as needed    So we will have to change that and then when the number 30 quantity is almost complete we will then change to the correct prescription Instructions

## 2022-03-01 NOTE — TELEPHONE ENCOUNTER
I called and spoke with the Pharmacist and clarified what happened, he verbalized understanding  He stated we will just need to send in a new prescription with updated instructions for her next refill

## 2022-03-03 ENCOUNTER — RA CDI HCC (OUTPATIENT)
Dept: OTHER | Facility: HOSPITAL | Age: 67
End: 2022-03-03

## 2022-03-03 NOTE — PROGRESS NOTES
[facility]  coding opportunities       Chart reviewed, no opportunity found: CHART REVIEWED, NO OPPORTUNITY FOUND                        Patients insurance company: [facility] [address]  (Medicare Advantage and Commercial)

## 2022-03-04 DIAGNOSIS — J31.0 OTHER RHINITIS: ICD-10-CM

## 2022-03-04 DIAGNOSIS — M79.2 NEUROPATHIC PAIN: ICD-10-CM

## 2022-03-04 DIAGNOSIS — R10.13 DYSPEPSIA: ICD-10-CM

## 2022-03-04 RX ORDER — ONDANSETRON 4 MG/1
4 TABLET, FILM COATED ORAL EVERY 8 HOURS PRN
Qty: 30 TABLET | Refills: 0 | Status: SHIPPED | OUTPATIENT
Start: 2022-03-04 | End: 2022-03-11 | Stop reason: SDUPTHER

## 2022-03-04 RX ORDER — PREDNISONE 10 MG/1
TABLET ORAL
Qty: 21 EACH | Refills: 0 | Status: SHIPPED | OUTPATIENT
Start: 2022-03-04 | End: 2022-03-16 | Stop reason: SDUPTHER

## 2022-03-08 ENCOUNTER — OFFICE VISIT (OUTPATIENT)
Dept: SURGERY | Facility: CLINIC | Age: 67
End: 2022-03-08
Payer: COMMERCIAL

## 2022-03-08 VITALS
SYSTOLIC BLOOD PRESSURE: 117 MMHG | DIASTOLIC BLOOD PRESSURE: 77 MMHG | HEART RATE: 96 BPM | WEIGHT: 116.2 LBS | BODY MASS INDEX: 19.84 KG/M2 | HEIGHT: 64 IN | TEMPERATURE: 97.1 F

## 2022-03-08 DIAGNOSIS — Z93.2 ILEOSTOMY IN PLACE (HCC): Primary | ICD-10-CM

## 2022-03-08 PROCEDURE — 99213 OFFICE O/P EST LOW 20 MIN: CPT | Performed by: SURGERY

## 2022-03-08 RX ORDER — SODIUM CHLORIDE, SODIUM LACTATE, POTASSIUM CHLORIDE, CALCIUM CHLORIDE 600; 310; 30; 20 MG/100ML; MG/100ML; MG/100ML; MG/100ML
125 INJECTION, SOLUTION INTRAVENOUS CONTINUOUS
Status: CANCELLED | OUTPATIENT
Start: 2022-04-04

## 2022-03-08 RX ORDER — CHLORHEXIDINE GLUCONATE 0.12 MG/ML
15 RINSE ORAL ONCE
Status: CANCELLED | OUTPATIENT
Start: 2022-04-04 | End: 2022-04-04

## 2022-03-08 RX ORDER — CEFAZOLIN SODIUM 2 G/50ML
2000 SOLUTION INTRAVENOUS ONCE
Status: CANCELLED | OUTPATIENT
Start: 2022-04-04

## 2022-03-08 RX ORDER — ACETAMINOPHEN 325 MG/1
975 TABLET ORAL ONCE
Status: CANCELLED | OUTPATIENT
Start: 2022-04-04 | End: 2022-04-04

## 2022-03-08 RX ORDER — GABAPENTIN 300 MG/1
300 CAPSULE ORAL ONCE
Status: CANCELLED | OUTPATIENT
Start: 2022-04-04 | End: 2022-04-04

## 2022-03-08 NOTE — H&P (VIEW-ONLY)
Assessment/Plan:    Ileostomy in place [facility])   She is doing significantly better and has gained 10 lb  I will plan for a ileostomy reversal at [facility]   The risks benefits and alternatives were explained to her she is agreeable to proceed  I will recheck her nutrition labs to ensure that she is and continues to improve  If her protein levels are still low a may need to delay her surgery otherwise will plan for reversal next month  She has had some mild excoriation of the skin and I will reconnect her with the wound care center with the ostomy nurse for the next month  Diagnoses and all orders for this visit:    Ileostomy in place [facility])  -     Case request operating room: ILEOSTOMY LOOP REVERSAL; Standing  -     CBC and differential; Future  -     Comprehensive metabolic panel; Future  -     HEMOGLOBIN A1C W/ EAG ESTIMATION; Future  -     Type and screen; Future  -     Case request operating room: ILEOSTOMY LOOP REVERSAL  -     Prealbumin; Future  -     Albumin; Future          Subjective:      Patient ID: Priscila Lima is a 77 y o  female  Ms Nury Greenwood   Is a 69-year-old female that is here for evaluation of diverticulitis  She does have a history of irritable bowel type syndrome symptoms and constipation diarrhea  She is having increasing pain and was seen by Gastroenterolog in June had a  Colonoscopy that showed some pus in the sigmoid colon consistent with diverticulitis and therefore the colonoscopy was aborted  She has her last colonoscopy previous that was in 2009 for which she had up to 6 polyps removed  After colonoscopy she was seen by her primary care physician and sent for a stat CT scan that showed diverticulitis  She was treated conservatively with oral antibiotics but failed outpatient management and was admitted on July 7th with diverticulitis and intramural abscess  She was treated conservatively and has been discharged    She was seen back in the emergency department on the 23rd and a CT scan that time showed no evidence of diverticulitis but did show constipation  Currently she states she is still having abdominal pain across her abdomen  She took some pain medicine for the pain recently  She is still having constipation issues as well  10/12/2021 she returns now for re-evaluation  She underwent a colonoscopy but it was incomplete and unsuccessful due to inflammation and angulation in the colon  She had a CT scan that same day that just showed mild diverticulitis and she was restarted antibiotics  However she also developed increasing back pain  She has a longstanding history of back pain and bulge with pain management  She was admitted to [facility] [address] September 20th with increasing back pain and was treated accordingly  She did have another repeat CT scan that time that showed mild inflammation but no significant findings  She returns now but she states that she is having trouble eating she feels nauseated has pain all the time pain in her abdomen pain in her back  She is having difficulties moving her bowels  11/17/2021  She is now 2 weeks status post robotic sigmoid colectomy with diverting loop ileostomy  She is doing okay at home  Still feeling weak  Having difficulty with her ostomy bag       12/08/2021 she had a readmission for acute renal failure and inability to manage her ileostomy  She has now been following in the [address] [facility] for better  Control of her ostomy output  She was started on cholestyramine for her loose output  She still is not ambulating well  She still not eating appropriate protein intake  She still smoking heavily  3     01/18/2022 she returns now for evaluation  She is still struggling with the ostomy appliance  I am uncertain why she cannot keep the bag in place but she is very fixated on it and continue only changing it    She has been seen by visiting nurses and also the wound center but is still having issues  In addition she states that she is trying to increase her protein but is not adequate doing so as whenever she has an issue with her bag she loses her appetite and stops eating  She is still smoking a pack a day and is relatively sedentary  03/08/2022 over last month she has been doing very well  Her daughter has been around able to help her with her diet and daily activities  She has gained 10 lb since seen last   She denies nausea vomiting fevers or chills      The following portions of the patient's history were reviewed and updated as appropriate: allergies, current medications, past family history, past medical history, past social history, past surgical history and problem list     Review of Systems   Constitutional: Negative for appetite change, chills, fatigue and fever  HENT: Negative for ear pain and sore throat  Eyes: Negative for pain and visual disturbance  Respiratory: Negative for cough and shortness of breath  Cardiovascular: Negative for chest pain and palpitations  Gastrointestinal: Negative for abdominal pain and vomiting  Genitourinary: Negative for dysuria and hematuria  Musculoskeletal: Positive for back pain  Negative for arthralgias  Skin: Negative for color change and rash  Neurological: Negative for seizures and syncope  Psychiatric/Behavioral: Negative for agitation and behavioral problems  All other systems reviewed and are negative  Objective:      /77   Pulse 96   Temp (!) 97 1 °F (36 2 °C)   Ht 5' 4" (1 626 m)   Wt 52 7 kg (116 lb 3 2 oz)   LMP  (LMP Unknown)   BMI 19 95 kg/m²          Physical Exam  Vitals and nursing note reviewed  Constitutional:       General: She is not in acute distress  Appearance: She is underweight  She is not diaphoretic  HENT:      Head: Normocephalic and atraumatic  Eyes:      Pupils: Pupils are equal, round, and reactive to light     Cardiovascular:      Rate and Rhythm: Normal rate and regular rhythm  Heart sounds: Normal heart sounds  No murmur heard  Pulmonary:      Effort: Pulmonary effort is normal  No respiratory distress  Breath sounds: Normal breath sounds  No wheezing  Abdominal:      General: Bowel sounds are normal       Palpations: Abdomen is soft  Comments:   Ostomy function well    Musculoskeletal:         General: Normal range of motion  Cervical back: Normal range of motion and neck supple  Skin:     General: Skin is warm and dry  Neurological:      Mental Status: She is alert and oriented to person, place, and time     Psychiatric:         Behavior: Behavior normal

## 2022-03-08 NOTE — PROGRESS NOTES
Assessment/Plan:    Ileostomy in place [facility])   She is doing significantly better and has gained 10 lb  I will plan for a ileostomy reversal at [facility]   The risks benefits and alternatives were explained to her she is agreeable to proceed  I will recheck her nutrition labs to ensure that she is and continues to improve  If her protein levels are still low a may need to delay her surgery otherwise will plan for reversal next month  She has had some mild excoriation of the skin and I will reconnect her with the wound care center with the ostomy nurse for the next month  Diagnoses and all orders for this visit:    Ileostomy in place [facility])  -     Case request operating room: ILEOSTOMY LOOP REVERSAL; Standing  -     CBC and differential; Future  -     Comprehensive metabolic panel; Future  -     HEMOGLOBIN A1C W/ EAG ESTIMATION; Future  -     Type and screen; Future  -     Case request operating room: ILEOSTOMY LOOP REVERSAL  -     Prealbumin; Future  -     Albumin; Future          Subjective:      Patient ID: Kip Miller is a 77 y o  female  Ms Gianluca Mccarthy   Is a 71-year-old female that is here for evaluation of diverticulitis  She does have a history of irritable bowel type syndrome symptoms and constipation diarrhea  She is having increasing pain and was seen by Gastroenterolog in June had a  Colonoscopy that showed some pus in the sigmoid colon consistent with diverticulitis and therefore the colonoscopy was aborted  She has her last colonoscopy previous that was in 2009 for which she had up to 6 polyps removed  After colonoscopy she was seen by her primary care physician and sent for a stat CT scan that showed diverticulitis  She was treated conservatively with oral antibiotics but failed outpatient management and was admitted on July 7th with diverticulitis and intramural abscess  She was treated conservatively and has been discharged    She was seen back in the emergency department on the 23rd and a CT scan that time showed no evidence of diverticulitis but did show constipation  Currently she states she is still having abdominal pain across her abdomen  She took some pain medicine for the pain recently  She is still having constipation issues as well  10/12/2021 she returns now for re-evaluation  She underwent a colonoscopy but it was incomplete and unsuccessful due to inflammation and angulation in the colon  She had a CT scan that same day that just showed mild diverticulitis and she was restarted antibiotics  However she also developed increasing back pain  She has a longstanding history of back pain and bulge with pain management  She was admitted to [facility] [address] September 20th with increasing back pain and was treated accordingly  She did have another repeat CT scan that time that showed mild inflammation but no significant findings  She returns now but she states that she is having trouble eating she feels nauseated has pain all the time pain in her abdomen pain in her back  She is having difficulties moving her bowels  11/17/2021  She is now 2 weeks status post robotic sigmoid colectomy with diverting loop ileostomy  She is doing okay at home  Still feeling weak  Having difficulty with her ostomy bag       12/08/2021 she had a readmission for acute renal failure and inability to manage her ileostomy  She has now been following in the [address] [facility] for better  Control of her ostomy output  She was started on cholestyramine for her loose output  She still is not ambulating well  She still not eating appropriate protein intake  She still smoking heavily  3     01/18/2022 she returns now for evaluation  She is still struggling with the ostomy appliance  I am uncertain why she cannot keep the bag in place but she is very fixated on it and continue only changing it    She has been seen by visiting nurses and also the wound center but is still having issues  In addition she states that she is trying to increase her protein but is not adequate doing so as whenever she has an issue with her bag she loses her appetite and stops eating  She is still smoking a pack a day and is relatively sedentary  03/08/2022 over last month she has been doing very well  Her daughter has been around able to help her with her diet and daily activities  She has gained 10 lb since seen last   She denies nausea vomiting fevers or chills      The following portions of the patient's history were reviewed and updated as appropriate: allergies, current medications, past family history, past medical history, past social history, past surgical history and problem list     Review of Systems   Constitutional: Negative for appetite change, chills, fatigue and fever  HENT: Negative for ear pain and sore throat  Eyes: Negative for pain and visual disturbance  Respiratory: Negative for cough and shortness of breath  Cardiovascular: Negative for chest pain and palpitations  Gastrointestinal: Negative for abdominal pain and vomiting  Genitourinary: Negative for dysuria and hematuria  Musculoskeletal: Positive for back pain  Negative for arthralgias  Skin: Negative for color change and rash  Neurological: Negative for seizures and syncope  Psychiatric/Behavioral: Negative for agitation and behavioral problems  All other systems reviewed and are negative  Objective:      /77   Pulse 96   Temp (!) 97 1 °F (36 2 °C)   Ht 5' 4" (1 626 m)   Wt 52 7 kg (116 lb 3 2 oz)   LMP  (LMP Unknown)   BMI 19 95 kg/m²          Physical Exam  Vitals and nursing note reviewed  Constitutional:       General: She is not in acute distress  Appearance: She is underweight  She is not diaphoretic  HENT:      Head: Normocephalic and atraumatic  Eyes:      Pupils: Pupils are equal, round, and reactive to light     Cardiovascular:      Rate and Rhythm: Normal rate and regular rhythm  Heart sounds: Normal heart sounds  No murmur heard  Pulmonary:      Effort: Pulmonary effort is normal  No respiratory distress  Breath sounds: Normal breath sounds  No wheezing  Abdominal:      General: Bowel sounds are normal       Palpations: Abdomen is soft  Comments:   Ostomy function well    Musculoskeletal:         General: Normal range of motion  Cervical back: Normal range of motion and neck supple  Skin:     General: Skin is warm and dry  Neurological:      Mental Status: She is alert and oriented to person, place, and time     Psychiatric:         Behavior: Behavior normal

## 2022-03-08 NOTE — ASSESSMENT & PLAN NOTE
She is doing significantly better and has gained 10 lb  I will plan for a ileostomy reversal at [facility]   The risks benefits and alternatives were explained to her she is agreeable to proceed  I will recheck her nutrition labs to ensure that she is and continues to improve  If her protein levels are still low a may need to delay her surgery otherwise will plan for reversal next month  She has had some mild excoriation of the skin and I will reconnect her with the wound care center with the ostomy nurse for the next month

## 2022-03-09 ENCOUNTER — TELEPHONE (OUTPATIENT)
Dept: FAMILY MEDICINE CLINIC | Facility: CLINIC | Age: 67
End: 2022-03-09

## 2022-03-09 NOTE — TELEPHONE ENCOUNTER
I called Dr Paulino Wilks office, they said you can do one if you like  She has an appointment scheduled 3/21 so I turned that into a pre op clearance appointment

## 2022-03-09 NOTE — TELEPHONE ENCOUNTER
Yes I am aware  We would need to call the surgeon's office and let them know does she need any preop clearance?

## 2022-03-11 DIAGNOSIS — R10.13 DYSPEPSIA: ICD-10-CM

## 2022-03-11 RX ORDER — ONDANSETRON 4 MG/1
4 TABLET, FILM COATED ORAL EVERY 8 HOURS PRN
Qty: 30 TABLET | Refills: 0 | Status: SHIPPED | OUTPATIENT
Start: 2022-03-11 | End: 2022-03-22 | Stop reason: SDUPTHER

## 2022-03-16 ENCOUNTER — APPOINTMENT (OUTPATIENT)
Dept: LAB | Facility: CLINIC | Age: 67
DRG: 330 | End: 2022-03-16
Payer: COMMERCIAL

## 2022-03-16 DIAGNOSIS — K94.13 ILEOSTOMY DYSFUNCTION (HCC): ICD-10-CM

## 2022-03-16 DIAGNOSIS — J31.0 OTHER RHINITIS: ICD-10-CM

## 2022-03-16 DIAGNOSIS — Z93.2 ILEOSTOMY IN PLACE (HCC): ICD-10-CM

## 2022-03-16 DIAGNOSIS — E86.1 HYPOVOLEMIA: ICD-10-CM

## 2022-03-16 DIAGNOSIS — M79.2 NEUROPATHIC PAIN: ICD-10-CM

## 2022-03-16 LAB
ABO GROUP BLD: NORMAL
ALBUMIN SERPL BCP-MCNC: 3.6 G/DL (ref 3.5–5)
ALP SERPL-CCNC: 52 U/L (ref 46–116)
ALT SERPL W P-5'-P-CCNC: 33 U/L (ref 12–78)
ANION GAP SERPL CALCULATED.3IONS-SCNC: 4 MMOL/L (ref 4–13)
AST SERPL W P-5'-P-CCNC: 13 U/L (ref 5–45)
BASOPHILS # BLD AUTO: 0.06 THOUSANDS/ΜL (ref 0–0.1)
BASOPHILS NFR BLD AUTO: 1 % (ref 0–1)
BILIRUB SERPL-MCNC: 0.19 MG/DL (ref 0.2–1)
BLD GP AB SCN SERPL QL: NEGATIVE
BUN SERPL-MCNC: 39 MG/DL (ref 5–25)
CALCIUM SERPL-MCNC: 9.2 MG/DL (ref 8.3–10.1)
CHLORIDE SERPL-SCNC: 105 MMOL/L (ref 100–108)
CO2 SERPL-SCNC: 30 MMOL/L (ref 21–32)
CREAT SERPL-MCNC: 0.92 MG/DL (ref 0.6–1.3)
EOSINOPHIL # BLD AUTO: 0.76 THOUSAND/ΜL (ref 0–0.61)
EOSINOPHIL NFR BLD AUTO: 8 % (ref 0–6)
ERYTHROCYTE [DISTWIDTH] IN BLOOD BY AUTOMATED COUNT: 14.4 % (ref 11.6–15.1)
EST. AVERAGE GLUCOSE BLD GHB EST-MCNC: 94 MG/DL
GFR SERPL CREATININE-BSD FRML MDRD: 65 ML/MIN/1.73SQ M
GLUCOSE P FAST SERPL-MCNC: 86 MG/DL (ref 65–99)
HBA1C MFR BLD: 4.9 %
HCT VFR BLD AUTO: 37.7 % (ref 34.8–46.1)
HGB BLD-MCNC: 12.2 G/DL (ref 11.5–15.4)
IMM GRANULOCYTES # BLD AUTO: 0.04 THOUSAND/UL (ref 0–0.2)
IMM GRANULOCYTES NFR BLD AUTO: 0 % (ref 0–2)
LYMPHOCYTES # BLD AUTO: 3.85 THOUSANDS/ΜL (ref 0.6–4.47)
LYMPHOCYTES NFR BLD AUTO: 39 % (ref 14–44)
MCH RBC QN AUTO: 37.3 PG (ref 26.8–34.3)
MCHC RBC AUTO-ENTMCNC: 32.4 G/DL (ref 31.4–37.4)
MCV RBC AUTO: 115 FL (ref 82–98)
MONOCYTES # BLD AUTO: 0.82 THOUSAND/ΜL (ref 0.17–1.22)
MONOCYTES NFR BLD AUTO: 8 % (ref 4–12)
NEUTROPHILS # BLD AUTO: 4.37 THOUSANDS/ΜL (ref 1.85–7.62)
NEUTS SEG NFR BLD AUTO: 44 % (ref 43–75)
NRBC BLD AUTO-RTO: 0 /100 WBCS
PLATELET # BLD AUTO: 276 THOUSANDS/UL (ref 149–390)
PMV BLD AUTO: 10.5 FL (ref 8.9–12.7)
POTASSIUM SERPL-SCNC: 4.9 MMOL/L (ref 3.5–5.3)
PREALB SERPL-MCNC: 35 MG/DL (ref 18–40)
PROT SERPL-MCNC: 6.7 G/DL (ref 6.4–8.2)
RBC # BLD AUTO: 3.27 MILLION/UL (ref 3.81–5.12)
RH BLD: POSITIVE
SODIUM SERPL-SCNC: 139 MMOL/L (ref 136–145)
SPECIMEN EXPIRATION DATE: NORMAL
WBC # BLD AUTO: 9.9 THOUSAND/UL (ref 4.31–10.16)

## 2022-03-16 PROCEDURE — 36415 COLL VENOUS BLD VENIPUNCTURE: CPT

## 2022-03-16 PROCEDURE — 80053 COMPREHEN METABOLIC PANEL: CPT

## 2022-03-16 PROCEDURE — 83036 HEMOGLOBIN GLYCOSYLATED A1C: CPT

## 2022-03-16 PROCEDURE — 85025 COMPLETE CBC W/AUTO DIFF WBC: CPT

## 2022-03-16 PROCEDURE — 86901 BLOOD TYPING SEROLOGIC RH(D): CPT

## 2022-03-16 PROCEDURE — 86850 RBC ANTIBODY SCREEN: CPT

## 2022-03-16 PROCEDURE — 86900 BLOOD TYPING SEROLOGIC ABO: CPT

## 2022-03-16 PROCEDURE — 84134 ASSAY OF PREALBUMIN: CPT

## 2022-03-18 ENCOUNTER — PATIENT OUTREACH (OUTPATIENT)
Dept: FAMILY MEDICINE CLINIC | Facility: CLINIC | Age: 67
End: 2022-03-18

## 2022-03-18 RX ORDER — PREDNISONE 10 MG/1
TABLET ORAL
Qty: 21 EACH | Refills: 0 | Status: SHIPPED | OUTPATIENT
Start: 2022-03-18 | End: 2022-03-21

## 2022-03-18 NOTE — PROGRESS NOTES
Pt is anxiously awaiting surgery as her peristomal skin is sensitive and she is having issues keeping the pouch on and intact  She reports that she has changed the pouch a few times today alone  Pt talked about having an appetite and she has been increasing protein and calories  Her weight has been stable at about 118 lbs  She is scheduled for ileostomy reversal on 4/4  Will follow

## 2022-03-21 ENCOUNTER — OFFICE VISIT (OUTPATIENT)
Dept: FAMILY MEDICINE CLINIC | Facility: CLINIC | Age: 67
End: 2022-03-21
Payer: COMMERCIAL

## 2022-03-21 ENCOUNTER — TELEPHONE (OUTPATIENT)
Dept: FAMILY MEDICINE CLINIC | Facility: CLINIC | Age: 67
End: 2022-03-21

## 2022-03-21 DIAGNOSIS — G25.0 ESSENTIAL TREMOR: ICD-10-CM

## 2022-03-21 DIAGNOSIS — Z01.818 PREOPERATIVE CLEARANCE: Primary | ICD-10-CM

## 2022-03-21 DIAGNOSIS — M51.17 INTERVERTEBRAL DISC DISORDER WITH RADICULOPATHY OF LUMBOSACRAL REGION: ICD-10-CM

## 2022-03-21 DIAGNOSIS — Z90.49 STATUS POST LAPAROSCOPIC COLECTOMY: ICD-10-CM

## 2022-03-21 PROCEDURE — 3008F BODY MASS INDEX DOCD: CPT | Performed by: FAMILY MEDICINE

## 2022-03-21 PROCEDURE — 99214 OFFICE O/P EST MOD 30 MIN: CPT | Performed by: FAMILY MEDICINE

## 2022-03-21 PROCEDURE — 4004F PT TOBACCO SCREEN RCVD TLK: CPT | Performed by: FAMILY MEDICINE

## 2022-03-21 PROCEDURE — 1160F RVW MEDS BY RX/DR IN RCRD: CPT | Performed by: FAMILY MEDICINE

## 2022-03-21 RX ORDER — HYDROCODONE BITARTRATE AND ACETAMINOPHEN 7.5; 325 MG/1; MG/1
1 TABLET ORAL EVERY 8 HOURS PRN
Qty: 90 TABLET | Refills: 0 | Status: SHIPPED | OUTPATIENT
Start: 2022-03-21 | End: 2022-04-15 | Stop reason: SDUPTHER

## 2022-03-21 RX ORDER — LISINOPRIL 2.5 MG/1
2.5 TABLET ORAL DAILY
COMMUNITY
Start: 2022-03-01 | End: 2022-04-07 | Stop reason: SDUPTHER

## 2022-03-21 NOTE — PROGRESS NOTES
Assessment/Plan:       There are no diagnoses linked to this encounter  Subjective:   Chief Complaint   Patient presents with    Pre-op Exam     wt check and pre op clearance for ileostomy reversal 4/4    Back Pain     patient states she has back pain 10/10 pain scale       Patient ID: Freddy Shipman is a 77 y o  female      HPI    The following portions of the patient's history were reviewed and updated as appropriate: allergies, current medications, past family history, past medical history, past social history, past surgical history and problem list       Review of Systems      Objective:      Pulse 91   Temp 97 6 °F (36 4 °C) (Temporal)   Resp 16   Ht 5' 4" (1 626 m)   Wt 52 7 kg (116 lb 3 2 oz)   LMP  (LMP Unknown)   SpO2 98%   BMI 19 95 kg/m²          Physical Exam

## 2022-03-21 NOTE — PROGRESS NOTES
Chief Complaint   Patient presents with    Pre-op Exam     wt check and pre op clearance for ileostomy reversal 4/4    Back Pain     patient states she has back pain 10/10 pain scale       Subjective:      Melchor Santos is a 77 y o  female who presents to the office today for a preoperative consultation  Case: [id number] Date/Time: 04/04/22 0930   Procedure: ILEOSTOMY LOOP REVERSAL (N/A Abdomen)   Anesthesia type: General   Diagnosis: Ileostomy in place [facility]) [Z93 2]   Pre-op diagnosis: Ileostomy in place [facility]) [Z93 2]   Location: [facility]OM 03 / [facility]   Surgeons: Wilfrido Guzman MD   The patient has the following known anesthesia issues: No endotracheal complication issues  Patient has a bleeding risk of: no recent abnormal bleeding  Review of Systems  Review of Systems   Constitutional: Positive for unexpected weight change (Weight stable since last visit )  Respiratory: Negative for cough and shortness of breath  Cardiovascular: Negative for chest pain  Gastrointestinal:        Still with some colostomy bag issues that increase her anxiety  She has always so nervous the bag will spill   Genitourinary: Negative for dysuria  Psychiatric/Behavioral: Positive for sleep disturbance  The patient is nervous/anxious  The ileostomy and has been a long drawn out journey for her very difficult she is excited that the reversal is taking place  Patient is followed by [facility] for her chronic low back pain  She is on chronic pain medications through their office  Due to her recent surgical procedures and difficulty with transportation, she was only able to do telemedicine follow-up and unfortunately their policy is that if she cannot be seen face-to-face they would not be able to prescribe her pain medications    I explained to patient that she certainly has been seen by me most frequently and that I am able to take over the prescribing of her chronic pain med in the interim until she is able to get back to her physiatrist   We did confirm with that office that we would be doing this and they are aware  She has updated her pain contract here  Objective:      Physical Exam    BP 98/60   Pulse 91   Temp 97 6 °F (36 4 °C) (Temporal)   Resp 16   Ht 5' 4" (1 626 m)   Wt 52 7 kg (116 lb 3 2 oz)   LMP  (LMP Unknown)   SpO2 98%   BMI 19 95 kg/m²     Physical Exam  Constitutional:       General: She is not in acute distress  Appearance: She is well-developed  Ill appearance: Chronically  Comments: 63-year-old thin female who is in better spirits today   HENT:      Head: Normocephalic  Right Ear: Tympanic membrane normal       Left Ear: Tympanic membrane normal       Nose: Nose normal       Mouth/Throat:      Mouth: Mucous membranes are moist    Eyes:      Conjunctiva/sclera: Conjunctivae normal       Pupils: Pupils are equal, round, and reactive to light  Cardiovascular:      Rate and Rhythm: Normal rate and regular rhythm  Pulses: Normal pulses  Heart sounds: No murmur heard  Pulmonary:      Effort: Pulmonary effort is normal       Breath sounds: Normal breath sounds  Abdominal:      General: Bowel sounds are normal       Palpations: Abdomen is soft  There is no mass  Tenderness: There is no abdominal tenderness  Comments: Colostomy bag in place intact   Musculoskeletal:         General: Normal range of motion  Cervical back: Normal range of motion and neck supple  Skin:     General: Skin is warm and dry  Neurological:      Mental Status: She is alert and oriented to person, place, and time  Deep Tendon Reflexes: Reflexes are normal and symmetric  Psychiatric:         Mood and Affect: Mood is anxious  Thought Content: Thought content normal          Falls Plan of Care: balance, strength, and gait training instructions were provided  Predictors of intubation difficulty:  Morbid obesity? no  Anatomically abnormal facies? no  Short, thick neck? no  Neck range of motion: normal       Component      Latest Ref Rng & Units 3/16/2022   WBC      4 31 - 10 16 Thousand/uL 9 90   Red Blood Cell Count      3 81 - 5 12 Million/uL 3 27 (L)   Hemoglobin      11 5 - 15 4 g/dL 12 2   HCT      34 8 - 46 1 % 37 7   MCV      82 - 98 fL 115 (H)   MCH      26 8 - 34 3 pg 37 3 (H)   MCHC      31 4 - 37 4 g/dL 32 4   RDW      11 6 - 15 1 % 14 4   MPV      8 9 - 12 7 fL 10 5   Platelet Count      311 - 390 Thousands/uL 276   nRBC      /100 WBCs 0   Neutrophils %      43 - 75 % 44   Immat GRANS %      0 - 2 % 0   Lymphocytes Relative      14 - 44 % 39   Monocytes Relative      4 - 12 % 8   Eosinophils      0 - 6 % 8 (H)   Basophils Relative      0 - 1 % 1   Absolute Neutrophils      1 85 - 7 62 Thousands/µL 4 37   Immature Grans Absolute      0 00 - 0 20 Thousand/uL 0 04   Lymphocytes Absolute      0 60 - 4 47 Thousands/µL 3 85   Absolute Monocytes      0 17 - 1 22 Thousand/µL 0 82   Absolute Eosinophils      0 00 - 0 61 Thousand/µL 0 76 (H)   Basophils Absolute      0 00 - 0 10 Thousands/µL 0 06   Sodium      136 - 145 mmol/L 139   Potassium      3 5 - 5 3 mmol/L 4 9   Chloride      100 - 108 mmol/L 105   CO2      21 - 32 mmol/L 30   Anion Gap      4 - 13 mmol/L 4   BUN      5 - 25 mg/dL 39 (H)   Creatinine      0 60 - 1 30 mg/dL 0 92   GLUCOSE FASTING      65 - 99 mg/dL 86   Calcium      8 3 - 10 1 mg/dL 9 2   AST      5 - 45 U/L 13   ALT      12 - 78 U/L 33   Alkaline Phosphatase      46 - 116 U/L 52   Total Protein      6 4 - 8 2 g/dL 6 7   Albumin      3 5 - 5 0 g/dL 3 6   TOTAL BILIRUBIN      0 20 - 1 00 mg/dL 0 19 (L)   eGFR      ml/min/1 73sq m 65   ABO Grouping       A   Rh Factor       Positive   Antibody Screen       Negative   Specimen Expiration Date       [id number]   Hemoglobin A1C      Normal 3 8-5 6%; PreDiabetic 5 7-6 4%;  Diabetic >=6 5%; Glycemic control for adults with diabetes <7 0% % 4 9   eAG, EST AVG Glucose      mg/dl 94   PREALBUMIN      18 0 - 40 0 mg/dL 35 0   EKG is up-to-date on the chart from January 28, 2022 normal sinus rhythm    Chest x-ray also showing no  acute cardiopulmonary disease from January 28, 2022   Assessment:      77 y o  female with planned surgery as above  Known risk factors for perioperative complications: Undernutrition    Difficulty with intubation is not anticipated  Can walk 2 blocks without difficulty:yes fatigues  Can walk up 2 flights of stairs without difficulty:  Fatigues     1  Preoperative clearance     2  Status post laparoscopic colectomy with iliostomy     3  Intervertebral disc disorder with radiculopathy of lumbosacral region  HYDROcodone-acetaminophen (NORCO) 7 5-325 mg per tablet   4  Essential tremor            Plan:       Patient is medically cleared for surgery  May need nicotine patch p r n        Levofloxacin, Carafate [sucralfate], Other, and Tetracycline  Past Medical History:   Diagnosis Date    Anxiety     Asthma     allergy induced    Chronic pain disorder     back    Colon polyp     Fibromyalgia     GERD (gastroesophageal reflux disease)     Hearing aid worn     Hearing impaired     "broken eardrum after eardrops used"    Hyperlipidemia     controlled    Hypertension     controlled    [facility])     Lumbar herniated disc     Shortness of breath     at times    Wears dentures     Wears glasses     Weight loss      Past Surgical History:   Procedure Laterality Date    BREAST BIOPSY Left     benign- at age 34    COLONOSCOPY      EAR SURGERY      KS LAP,SURG,COLECTOMY, PARTIAL, W/ANAST N/A 11/1/2021    Procedure: RESECTION COLON SIGMOID LAPAROSCOPIC WTIH COLORECTAL ANASTOMOSIS, DIVERTING LOOP ILEOSTOMY;  Surgeon: Enrique Blood MD;  [facility];  Service: General    TUBAL LIGATION      ULNAR NERVE REPAIR Left      Patient Active Problem List   Diagnosis    Esophageal dysphagia    Dyspepsia    Essential tremor    Moderate episode of recurrent major depressive disorder (HCC)    Essential hypertension    Generalized anxiety disorder    Asthma    Seasonal allergic rhinitis    Migraine without status migrainosus, not intractable    Functional diarrhea    History of Diverticulitis large intestine    Moderate protein-calorie malnutrition (HCC)    GERD (gastroesophageal reflux disease)    Status post laparoscopic colectomy    Ile[facility] in place ([facility] )    Ileostomy dysfunction ([facility] )    Fibromyalgia     Social History     Socioeconomic History    Marital status:       Spouse name: None    Number of children: None    Years of education: None    Highest education level: None   Occupational History    None   Tobacco Use    Smoking status: Current Every Day Smoker     Packs/day: 0 50     Years: 50 00     Pack years: 25 00     Types: Cigarettes     Last attempt to quit: 2019     Years since quittin 4    Smokeless tobacco: Never Used    Tobacco comment: wears nicotine patch on for 1/2 day   Vaping Use    Vaping Use: Never used   Substance and Sexual Activity    Alcohol use: Not Currently    Drug use: No    Sexual activity: None   Other Topics Concern    None   Social History Narrative    None     Social Determinants of Health     Financial Resource Strain: Not on file   Food Insecurity: Not on file   Transportation Needs: Not on file   Physical Activity: Not on file   Stress: Not on file   Social Connections: Not on file   Intimate Partner Violence: Not on file   Housing Stability: Not on file       Current Outpatient Medications:     albuterol (PROVENTIL HFA,VENTOLIN HFA) 90 mcg/act inhaler, Inhale 2 puffs 4 (four) times a day, Disp: 18 g, Rfl: 0    atorvastatin (LIPITOR) 10 mg tablet, TAKE 1 TABLET BY MOUTH EVERY DAY, Disp: 90 tablet, Rfl: 2    betamethasone dipropionate (DIPROSONE) 0 05 % cream, Apply topically 2 (two) times a day, Disp: 30 g, Rfl: 0    diazepam (VALIUM) 10 mg tablet, Take 1 tablet (10 mg total) by mouth every 12 (twelve) hours as needed for anxiety, Disp: 60 tablet, Rfl: 0    gabapentin (Neurontin) 300 mg capsule, Take 1 capsule (300 mg total) by mouth 3 (three) times a day (Patient taking differently: Take 300 mg by mouth as needed TID ), Disp: 90 capsule, Rfl: 0    HYDROcodone-acetaminophen (NORCO) 7 5-325 mg per tablet, Take 1 tablet by mouth every 8 (eight) hours as needed for pain usually Max Daily Amount: 3 tablets, Disp: 90 tablet, Rfl: 0    lisinopril (ZESTRIL) 2 5 mg tablet, Take 2 5 mg by mouth daily Pt takes she has not been taking , Disp: , Rfl:     naloxone (NARCAN) 4 mg/0 1 mL nasal spray, 1 spray by Alternating Nares route every 3 (three) minutes as needed for opioid reversal or respiratory depression Administer 1 spray into a nostril   If breathing does not return to normal or if breathing difficulty resumes after 2-3 minutes, give another dose in the other nostril using a new spray , Disp: , Rfl:     nystatin (MYCOSTATIN) powder, Apply topically 2 (two) times a day, Disp: 30 g, Rfl: 1    ondansetron (ZOFRAN) 4 mg tablet, Take 1 tablet (4 mg total) by mouth every 8 (eight) hours as needed for nausea or vomiting, Disp: 30 tablet, Rfl: 0    primidone (MYSOLINE) 50 mg tablet, TAKE 4 TABS (200 MG) BY MOUTH AT BEDTIME (Patient taking differently: Take 50 mg by mouth daily at bedtime *states usually takes 2-3 tabs TAKE 4 TABS (200 MG) BY MOUTH AT BEDTIME ), Disp: 360 tablet, Rfl: 3    SUMAtriptan (IMITREX) 100 mg tablet, Take 1 tablet (100 mg total) by mouth once as needed for migraine for up to 1 dose, Disp: 9 tablet, Rfl: 0    tiZANidine (ZANAFLEX) 4 mg tablet, Take 4 mg by mouth daily at bedtime Takes 1/2-1 tab , Disp: , Rfl:     aspirin 325 mg tablet, Take 325 mg by mouth daily, Disp: , Rfl:     Cholecalciferol (VITAMIN D3 PO), Take by mouth, Disp: , Rfl:     MAGNESIUM PO, Take by mouth, Disp: , Rfl:     Naproxen Sodium (ALEVE PO), Take by mouth, Disp: , Rfl:     nicotine (NICODERM CQ) 14 mg/24hr TD 24 hr patch, Place 1 patch on the skin every 24 hours, Disp: , Rfl:     Omega-3 Fatty Acids (OMEGA-3 2100 PO), Take by mouth, Disp: , Rfl:     Probiotic Product (PROBIOTIC-10 PO), Take by mouth, Disp: , Rfl:   Lab Results   Component Value Date    WBC 9 90 03/16/2022    HGB 12 2 03/16/2022    HCT 37 7 03/16/2022     (H) 03/16/2022     03/16/2022     Lab Results   Component Value Date    K 4 9 03/16/2022     03/16/2022    CO2 30 03/16/2022    BUN 39 (H) 03/16/2022    CREATININE 0 92 03/16/2022    GLUF 86 03/16/2022    CALCIUM 9 2 03/16/2022    CORRECTEDCA 9 2 01/28/2022    AST 13 03/16/2022    ALT 33 03/16/2022    ALKPHOS 52 03/16/2022    EGFR 65 03/16/2022             Current Outpatient Medications:     albuterol (PROVENTIL HFA,VENTOLIN HFA) 90 mcg/act inhaler, Inhale 2 puffs 4 (four) times a day, Disp: 18 g, Rfl: 0    atorvastatin (LIPITOR) 10 mg tablet, TAKE 1 TABLET BY MOUTH EVERY DAY, Disp: 90 tablet, Rfl: 2    betamethasone dipropionate (DIPROSONE) 0 05 % cream, Apply topically 2 (two) times a day, Disp: 30 g, Rfl: 0    diazepam (VALIUM) 10 mg tablet, Take 1 tablet (10 mg total) by mouth every 12 (twelve) hours as needed for anxiety, Disp: 60 tablet, Rfl: 0    gabapentin (Neurontin) 300 mg capsule, Take 1 capsule (300 mg total) by mouth 3 (three) times a day (Patient taking differently: Take 300 mg by mouth as needed TID ), Disp: 90 capsule, Rfl: 0    HYDROcodone-acetaminophen (NORCO) 7 5-325 mg per tablet, Take 1 tablet by mouth every 8 (eight) hours as needed for pain usually Max Daily Amount: 3 tablets, Disp: 90 tablet, Rfl: 0    lisinopril (ZESTRIL) 2 5 mg tablet, Take 2 5 mg by mouth daily Pt takes she has not been taking , Disp: , Rfl:     naloxone (NARCAN) 4 mg/0 1 mL nasal spray, 1 spray by Alternating Nares route every 3 (three) minutes as needed for opioid reversal or respiratory depression Administer 1 spray into a nostril   If breathing does not return to normal or if breathing difficulty resumes after 2-3 minutes, give another dose in the other nostril using a new spray , Disp: , Rfl:     nystatin (MYCOSTATIN) powder, Apply topically 2 (two) times a day, Disp: 30 g, Rfl: 1    ondansetron (ZOFRAN) 4 mg tablet, Take 1 tablet (4 mg total) by mouth every 8 (eight) hours as needed for nausea or vomiting, Disp: 30 tablet, Rfl: 0    primidone (MYSOLINE) 50 mg tablet, TAKE 4 TABS (200 MG) BY MOUTH AT BEDTIME (Patient taking differently: Take 50 mg by mouth daily at bedtime *states usually takes 2-3 tabs TAKE 4 TABS (200 MG) BY MOUTH AT BEDTIME ), Disp: 360 tablet, Rfl: 3    SUMAtriptan (IMITREX) 100 mg tablet, Take 1 tablet (100 mg total) by mouth once as needed for migraine for up to 1 dose, Disp: 9 tablet, Rfl: 0    tiZANidine (ZANAFLEX) 4 mg tablet, Take 4 mg by mouth daily at bedtime Takes 1/2-1 tab , Disp: , Rfl:     aspirin 325 mg tablet, Take 325 mg by mouth daily, Disp: , Rfl:     Cholecalciferol (VITAMIN D3 PO), Take by mouth, Disp: , Rfl:     MAGNESIUM PO, Take by mouth, Disp: , Rfl:     Naproxen Sodium (ALEVE PO), Take by mouth, Disp: , Rfl:     nicotine (NICODERM CQ) 14 mg/24hr TD 24 hr patch, Place 1 patch on the skin every 24 hours, Disp: , Rfl:     Omega-3 Fatty Acids (OMEGA-3 2100 PO), Take by mouth, Disp: , Rfl:     Probiotic Product (PROBIOTIC-10 PO), Take by mouth, Disp: , Rfl:   Allergies   Allergen Reactions    Levofloxacin Other (See Comments)     Weak legs, blurred vision    Carafate [Sucralfate] Rash    Other Palpitations     MRI dye    Tetracycline Rash

## 2022-03-21 NOTE — TELEPHONE ENCOUNTER
Spoke w/ Romero at [facility] and explained that Dr Kenia Shane will be taking over prescribing pt's Hydrocodone  They were ok w/ this    Pt signed opoid agreement

## 2022-03-22 VITALS
HEIGHT: 64 IN | SYSTOLIC BLOOD PRESSURE: 98 MMHG | BODY MASS INDEX: 19.84 KG/M2 | DIASTOLIC BLOOD PRESSURE: 60 MMHG | WEIGHT: 116.2 LBS | OXYGEN SATURATION: 98 % | TEMPERATURE: 97.6 F | RESPIRATION RATE: 16 BRPM | HEART RATE: 91 BPM

## 2022-03-22 DIAGNOSIS — R10.13 DYSPEPSIA: ICD-10-CM

## 2022-03-22 DIAGNOSIS — M79.2 NEUROPATHIC PAIN: ICD-10-CM

## 2022-03-22 DIAGNOSIS — J45.20 MILD INTERMITTENT ASTHMA, UNSPECIFIED WHETHER COMPLICATED: ICD-10-CM

## 2022-03-22 RX ORDER — NICOTINE 21 MG/24HR
1 PATCH, TRANSDERMAL 24 HOURS TRANSDERMAL EVERY 24 HOURS
COMMUNITY

## 2022-03-22 RX ORDER — ASPIRIN 325 MG
325 TABLET ORAL DAILY
COMMUNITY
End: 2022-04-20

## 2022-03-22 NOTE — PRE-PROCEDURE INSTRUCTIONS
Pre-Surgery Instructions:   Medication Instructions    albuterol (PROVENTIL HFA,VENTOLIN HFA) 90 mcg/act inhaler Ok to use DOP & bring to hospital    aspirin 325 mg tablet pt takes for pain, states not prescribed, last dose 3/27    atorvastatin (LIPITOR) 10 mg tablet takes in the evening    betamethasone dipropionate (DIPROSONE) 0 05 % cream Hold DOP    Cholecalciferol (VITAMIN D3 PO) Last dose 3/27    diazepam (VALIUM) 10 mg tablet ok to take DOP w/ a sip of water    gabapentin (Neurontin) 300 mg capsule ok to take DOP w/ a sip of water    HYDROcodone-acetaminophen (NORCO) 7 5-325 mg per tablet ok to take DOP w/ a sip of water    lisinopril (ZESTRIL) 2 5 mg tablet Hold DOP (pt states she hasn't been taking & her BP is fine)    MAGNESIUM PO Last dose 3/27    naloxone (NARCAN) 4 mg/0 1 mL nasal spray prn    Naproxen Sodium (ALEVE PO) Hold x3 days prior    nicotine (NICODERM CQ) 14 mg/24hr TD 24 hr patch Pt states she uses "sometimes" for 1/2 day    nystatin (MYCOSTATIN) powder Hold DOP    Omega-3 Fatty Acids (OMEGA-3 2100 PO) Last dose 3/27    ondansetron (ZOFRAN) 4 mg tablet Ok to take DOP w/ a sip of water    primidone (MYSOLINE) 50 mg tablet pt takes at bedtime    Probiotic Product (PROBIOTIC-10 PO) last dose 3/27    SUMAtriptan (IMITREX) 100 mg tablet takes prn for migraine & hasn't been taking    tiZANidine (ZANAFLEX) 4 mg tablet takes at bedtime     Pt verbalizes understanding of the following:    ERAS: pt has 3 carb drinks    - Bathing instructions, has chg, neck down, no genitals (keep ileostomy bag intact during bathing)  - No lotions, powders, sprays, deodorant, jewelry, body piercings or make-up    - NPO after MN  - Avoid OTC non-directed Vit/ Suppl/ Herbals 7 days prior to surgery to ensure no drug interactions with perioperative surgical/ anesthetic meds  - Avoid NSAIDs 3 days prior  - Avoid ASA containing products 5 days prior    - Bring list of meds with last dose noted  - Bring insurance cards & photo id

## 2022-03-23 ENCOUNTER — ANESTHESIA EVENT (OUTPATIENT)
Dept: PERIOP | Facility: HOSPITAL | Age: 67
DRG: 330 | End: 2022-03-23
Payer: COMMERCIAL

## 2022-03-23 RX ORDER — ALBUTEROL SULFATE 90 UG/1
2 AEROSOL, METERED RESPIRATORY (INHALATION) 4 TIMES DAILY
Qty: 18 G | Refills: 0 | Status: SHIPPED | OUTPATIENT
Start: 2022-03-23 | End: 2022-06-01 | Stop reason: SDUPTHER

## 2022-03-23 RX ORDER — ONDANSETRON 4 MG/1
4 TABLET, FILM COATED ORAL EVERY 8 HOURS PRN
Qty: 30 TABLET | Refills: 0 | Status: SHIPPED | OUTPATIENT
Start: 2022-03-23 | End: 2022-03-28 | Stop reason: SDUPTHER

## 2022-03-23 RX ORDER — GABAPENTIN 300 MG/1
300 CAPSULE ORAL 3 TIMES DAILY
Qty: 90 CAPSULE | Refills: 0 | Status: SHIPPED | OUTPATIENT
Start: 2022-03-23 | End: 2022-04-26 | Stop reason: SDUPTHER

## 2022-03-31 ENCOUNTER — PATIENT OUTREACH (OUTPATIENT)
Dept: FAMILY MEDICINE CLINIC | Facility: CLINIC | Age: 67
End: 2022-03-31

## 2022-03-31 DIAGNOSIS — Z93.2 ILEOSTOMY IN PLACE (HCC): Primary | ICD-10-CM

## 2022-03-31 PROCEDURE — U0005 INFEC AGEN DETEC AMPLI PROBE: HCPCS

## 2022-03-31 PROCEDURE — U0003 INFECTIOUS AGENT DETECTION BY NUCLEIC ACID (DNA OR RNA); SEVERE ACUTE RESPIRATORY SYNDROME CORONAVIRUS 2 (SARS-COV-2) (CORONAVIRUS DISEASE [COVID-19]), AMPLIFIED PROBE TECHNIQUE, MAKING USE OF HIGH THROUGHPUT TECHNOLOGIES AS DESCRIBED BY CMS-2020-01-R: HCPCS

## 2022-03-31 NOTE — PROGRESS NOTES
Spoke with pt and she reports that she is fearful for next week's surgery because she is worried that 'something might go wrong'  She otherwise is trying to keep up with nutrition and keep weight up  Pt had covid test done today for preop she states  Will follow with her post op next week

## 2022-04-04 ENCOUNTER — ANESTHESIA (OUTPATIENT)
Dept: PERIOP | Facility: HOSPITAL | Age: 67
DRG: 330 | End: 2022-04-04
Payer: COMMERCIAL

## 2022-04-04 ENCOUNTER — HOSPITAL ENCOUNTER (INPATIENT)
Facility: HOSPITAL | Age: 67
LOS: 3 days | Discharge: HOME HEALTH | DRG: 330 | End: 2022-04-07
Attending: SURGERY | Admitting: SURGERY
Payer: COMMERCIAL

## 2022-04-04 DIAGNOSIS — Z93.2 ILEOSTOMY IN PLACE (HCC): Primary | ICD-10-CM

## 2022-04-04 DIAGNOSIS — T65.291A TOXIC EFFECT OF TOBACCO AND NICOTINE, ACCIDENTAL OR UNINTENTIONAL, INITIAL ENCOUNTER: ICD-10-CM

## 2022-04-04 PROCEDURE — 44625 REPAIR BOWEL OPENING: CPT | Performed by: SURGERY

## 2022-04-04 PROCEDURE — 88304 TISSUE EXAM BY PATHOLOGIST: CPT | Performed by: PATHOLOGY

## 2022-04-04 PROCEDURE — 0DQB0ZZ REPAIR ILEUM, OPEN APPROACH: ICD-10-PCS | Performed by: SURGERY

## 2022-04-04 RX ORDER — ACETAMINOPHEN 325 MG/1
975 TABLET ORAL ONCE
Status: COMPLETED | OUTPATIENT
Start: 2022-04-04 | End: 2022-04-04

## 2022-04-04 RX ORDER — CEFAZOLIN SODIUM 2 G/50ML
2000 SOLUTION INTRAVENOUS ONCE
Status: COMPLETED | OUTPATIENT
Start: 2022-04-04 | End: 2022-04-04

## 2022-04-04 RX ORDER — CHLORHEXIDINE GLUCONATE 0.12 MG/ML
15 RINSE ORAL ONCE
Status: COMPLETED | OUTPATIENT
Start: 2022-04-04 | End: 2022-04-04

## 2022-04-04 RX ORDER — LIDOCAINE HYDROCHLORIDE 10 MG/ML
INJECTION, SOLUTION EPIDURAL; INFILTRATION; INTRACAUDAL; PERINEURAL AS NEEDED
Status: DISCONTINUED | OUTPATIENT
Start: 2022-04-04 | End: 2022-04-04

## 2022-04-04 RX ORDER — SODIUM CHLORIDE, SODIUM LACTATE, POTASSIUM CHLORIDE, CALCIUM CHLORIDE 600; 310; 30; 20 MG/100ML; MG/100ML; MG/100ML; MG/100ML
125 INJECTION, SOLUTION INTRAVENOUS CONTINUOUS
Status: DISCONTINUED | OUTPATIENT
Start: 2022-04-04 | End: 2022-04-04

## 2022-04-04 RX ORDER — FENTANYL CITRATE 50 UG/ML
INJECTION, SOLUTION INTRAMUSCULAR; INTRAVENOUS AS NEEDED
Status: DISCONTINUED | OUTPATIENT
Start: 2022-04-04 | End: 2022-04-04

## 2022-04-04 RX ORDER — PROPOFOL 10 MG/ML
INJECTION, EMULSION INTRAVENOUS AS NEEDED
Status: DISCONTINUED | OUTPATIENT
Start: 2022-04-04 | End: 2022-04-04

## 2022-04-04 RX ORDER — SUMATRIPTAN 50 MG/1
100 TABLET, FILM COATED ORAL ONCE AS NEEDED
Status: DISCONTINUED | OUTPATIENT
Start: 2022-04-04 | End: 2022-04-07 | Stop reason: HOSPADM

## 2022-04-04 RX ORDER — HYDROMORPHONE HCL/PF 1 MG/ML
0.5 SYRINGE (ML) INJECTION EVERY 4 HOURS PRN
Status: DISCONTINUED | OUTPATIENT
Start: 2022-04-04 | End: 2022-04-07 | Stop reason: HOSPADM

## 2022-04-04 RX ORDER — OXYCODONE HYDROCHLORIDE 5 MG/1
10 TABLET ORAL EVERY 4 HOURS PRN
Status: DISCONTINUED | OUTPATIENT
Start: 2022-04-04 | End: 2022-04-07 | Stop reason: HOSPADM

## 2022-04-04 RX ORDER — ROCURONIUM BROMIDE 10 MG/ML
INJECTION, SOLUTION INTRAVENOUS AS NEEDED
Status: DISCONTINUED | OUTPATIENT
Start: 2022-04-04 | End: 2022-04-04

## 2022-04-04 RX ORDER — OXYCODONE HYDROCHLORIDE 5 MG/1
5 TABLET ORAL EVERY 4 HOURS PRN
Status: DISCONTINUED | OUTPATIENT
Start: 2022-04-04 | End: 2022-04-07 | Stop reason: HOSPADM

## 2022-04-04 RX ORDER — MAGNESIUM HYDROXIDE 1200 MG/15ML
LIQUID ORAL AS NEEDED
Status: DISCONTINUED | OUTPATIENT
Start: 2022-04-04 | End: 2022-04-04 | Stop reason: HOSPADM

## 2022-04-04 RX ORDER — SODIUM CHLORIDE, SODIUM LACTATE, POTASSIUM CHLORIDE, CALCIUM CHLORIDE 600; 310; 30; 20 MG/100ML; MG/100ML; MG/100ML; MG/100ML
100 INJECTION, SOLUTION INTRAVENOUS CONTINUOUS
Status: DISCONTINUED | OUTPATIENT
Start: 2022-04-04 | End: 2022-04-05

## 2022-04-04 RX ORDER — ONDANSETRON 2 MG/ML
4 INJECTION INTRAMUSCULAR; INTRAVENOUS ONCE AS NEEDED
Status: DISCONTINUED | OUTPATIENT
Start: 2022-04-04 | End: 2022-04-04 | Stop reason: HOSPADM

## 2022-04-04 RX ORDER — LIDOCAINE HYDROCHLORIDE AND EPINEPHRINE 10; 10 MG/ML; UG/ML
INJECTION, SOLUTION INFILTRATION; PERINEURAL AS NEEDED
Status: DISCONTINUED | OUTPATIENT
Start: 2022-04-04 | End: 2022-04-04 | Stop reason: HOSPADM

## 2022-04-04 RX ORDER — KETOROLAC TROMETHAMINE 30 MG/ML
INJECTION, SOLUTION INTRAMUSCULAR; INTRAVENOUS AS NEEDED
Status: DISCONTINUED | OUTPATIENT
Start: 2022-04-04 | End: 2022-04-04

## 2022-04-04 RX ORDER — DEXAMETHASONE SODIUM PHOSPHATE 10 MG/ML
INJECTION, SOLUTION INTRAMUSCULAR; INTRAVENOUS AS NEEDED
Status: DISCONTINUED | OUTPATIENT
Start: 2022-04-04 | End: 2022-04-04

## 2022-04-04 RX ORDER — HYDROMORPHONE HCL/PF 1 MG/ML
0.5 SYRINGE (ML) INJECTION
Status: DISCONTINUED | OUTPATIENT
Start: 2022-04-04 | End: 2022-04-04 | Stop reason: HOSPADM

## 2022-04-04 RX ORDER — NEOSTIGMINE METHYLSULFATE 1 MG/ML
INJECTION INTRAVENOUS AS NEEDED
Status: DISCONTINUED | OUTPATIENT
Start: 2022-04-04 | End: 2022-04-04

## 2022-04-04 RX ORDER — ONDANSETRON 2 MG/ML
4 INJECTION INTRAMUSCULAR; INTRAVENOUS EVERY 6 HOURS PRN
Status: DISCONTINUED | OUTPATIENT
Start: 2022-04-04 | End: 2022-04-07 | Stop reason: HOSPADM

## 2022-04-04 RX ORDER — GABAPENTIN 300 MG/1
300 CAPSULE ORAL 3 TIMES DAILY
Status: DISCONTINUED | OUTPATIENT
Start: 2022-04-04 | End: 2022-04-07 | Stop reason: HOSPADM

## 2022-04-04 RX ORDER — ONDANSETRON 2 MG/ML
INJECTION INTRAMUSCULAR; INTRAVENOUS AS NEEDED
Status: DISCONTINUED | OUTPATIENT
Start: 2022-04-04 | End: 2022-04-04

## 2022-04-04 RX ORDER — MIDAZOLAM HYDROCHLORIDE 2 MG/2ML
INJECTION, SOLUTION INTRAMUSCULAR; INTRAVENOUS AS NEEDED
Status: DISCONTINUED | OUTPATIENT
Start: 2022-04-04 | End: 2022-04-04

## 2022-04-04 RX ORDER — ATORVASTATIN CALCIUM 10 MG/1
10 TABLET, FILM COATED ORAL DAILY
Status: DISCONTINUED | OUTPATIENT
Start: 2022-04-05 | End: 2022-04-07 | Stop reason: HOSPADM

## 2022-04-04 RX ORDER — NICOTINE 21 MG/24HR
1 PATCH, TRANSDERMAL 24 HOURS TRANSDERMAL DAILY
Status: DISCONTINUED | OUTPATIENT
Start: 2022-04-05 | End: 2022-04-05

## 2022-04-04 RX ORDER — ALBUTEROL SULFATE 90 UG/1
2 AEROSOL, METERED RESPIRATORY (INHALATION) 4 TIMES DAILY PRN
Status: DISCONTINUED | OUTPATIENT
Start: 2022-04-04 | End: 2022-04-07 | Stop reason: HOSPADM

## 2022-04-04 RX ORDER — GABAPENTIN 300 MG/1
300 CAPSULE ORAL ONCE
Status: COMPLETED | OUTPATIENT
Start: 2022-04-04 | End: 2022-04-04

## 2022-04-04 RX ORDER — GLYCOPYRROLATE 0.2 MG/ML
INJECTION INTRAMUSCULAR; INTRAVENOUS AS NEEDED
Status: DISCONTINUED | OUTPATIENT
Start: 2022-04-04 | End: 2022-04-04

## 2022-04-04 RX ORDER — METOCLOPRAMIDE HYDROCHLORIDE 5 MG/ML
10 INJECTION INTRAMUSCULAR; INTRAVENOUS ONCE
Status: COMPLETED | OUTPATIENT
Start: 2022-04-04 | End: 2022-04-04

## 2022-04-04 RX ORDER — ACETAMINOPHEN 325 MG/1
975 TABLET ORAL EVERY 8 HOURS SCHEDULED
Status: DISCONTINUED | OUTPATIENT
Start: 2022-04-04 | End: 2022-04-07 | Stop reason: HOSPADM

## 2022-04-04 RX ADMIN — NEOSTIGMINE METHYLSULFATE 3 MG: 1 INJECTION INTRAVENOUS at 16:59

## 2022-04-04 RX ADMIN — METOCLOPRAMIDE 10 MG: 5 INJECTION, SOLUTION INTRAMUSCULAR; INTRAVENOUS at 20:37

## 2022-04-04 RX ADMIN — ONDANSETRON 4 MG: 2 INJECTION INTRAMUSCULAR; INTRAVENOUS at 20:20

## 2022-04-04 RX ADMIN — GABAPENTIN 300 MG: 300 CAPSULE ORAL at 20:15

## 2022-04-04 RX ADMIN — DEXAMETHASONE SODIUM PHOSPHATE 10 MG: 10 INJECTION INTRAMUSCULAR; INTRAVENOUS at 16:09

## 2022-04-04 RX ADMIN — PROPOFOL 200 MG: 10 INJECTION, EMULSION INTRAVENOUS at 15:57

## 2022-04-04 RX ADMIN — HYDROMORPHONE HYDROCHLORIDE 0.5 MG: 1 INJECTION, SOLUTION INTRAMUSCULAR; INTRAVENOUS; SUBCUTANEOUS at 17:55

## 2022-04-04 RX ADMIN — SODIUM CHLORIDE, SODIUM LACTATE, POTASSIUM CHLORIDE, AND CALCIUM CHLORIDE 100 ML/HR: .6; .31; .03; .02 INJECTION, SOLUTION INTRAVENOUS at 17:53

## 2022-04-04 RX ADMIN — SODIUM CHLORIDE, SODIUM LACTATE, POTASSIUM CHLORIDE, AND CALCIUM CHLORIDE 125 ML/HR: .6; .31; .03; .02 INJECTION, SOLUTION INTRAVENOUS at 12:37

## 2022-04-04 RX ADMIN — ACETAMINOPHEN 325MG 975 MG: 325 TABLET ORAL at 20:15

## 2022-04-04 RX ADMIN — ROCURONIUM BROMIDE 20 MG: 50 INJECTION, SOLUTION INTRAVENOUS at 16:19

## 2022-04-04 RX ADMIN — CEFAZOLIN SODIUM 2000 MG: 2 SOLUTION INTRAVENOUS at 15:53

## 2022-04-04 RX ADMIN — ONDANSETRON 4 MG: 2 INJECTION INTRAMUSCULAR; INTRAVENOUS at 16:59

## 2022-04-04 RX ADMIN — MIDAZOLAM 2 MG: 1 INJECTION INTRAMUSCULAR; INTRAVENOUS at 15:50

## 2022-04-04 RX ADMIN — KETOROLAC TROMETHAMINE 15 MG: 30 INJECTION, SOLUTION INTRAMUSCULAR at 16:59

## 2022-04-04 RX ADMIN — ACETAMINOPHEN 325MG 975 MG: 325 TABLET ORAL at 12:20

## 2022-04-04 RX ADMIN — METRONIDAZOLE 500 MG: 500 INJECTION, SOLUTION INTRAVENOUS at 15:53

## 2022-04-04 RX ADMIN — LIDOCAINE HYDROCHLORIDE 60 MG: 10 INJECTION, SOLUTION EPIDURAL; INFILTRATION; INTRACAUDAL; PERINEURAL at 15:57

## 2022-04-04 RX ADMIN — FENTANYL CITRATE 100 MCG: 50 INJECTION INTRAMUSCULAR; INTRAVENOUS at 15:57

## 2022-04-04 RX ADMIN — CHLORHEXIDINE GLUCONATE 0.12% ORAL RINSE 15 ML: 1.2 LIQUID ORAL at 12:21

## 2022-04-04 RX ADMIN — FENTANYL CITRATE 50 MCG: 50 INJECTION INTRAMUSCULAR; INTRAVENOUS at 16:22

## 2022-04-04 RX ADMIN — GLYCOPYRROLATE 0.4 MG: 0.2 INJECTION, SOLUTION INTRAMUSCULAR; INTRAVENOUS at 16:59

## 2022-04-04 RX ADMIN — FENTANYL CITRATE 50 MCG: 50 INJECTION INTRAMUSCULAR; INTRAVENOUS at 16:31

## 2022-04-04 RX ADMIN — PROPOFOL 100 MG: 10 INJECTION, EMULSION INTRAVENOUS at 16:48

## 2022-04-04 RX ADMIN — HYDROMORPHONE HYDROCHLORIDE 0.5 MG: 1 INJECTION, SOLUTION INTRAMUSCULAR; INTRAVENOUS; SUBCUTANEOUS at 18:14

## 2022-04-04 RX ADMIN — ROCURONIUM BROMIDE 30 MG: 50 INJECTION, SOLUTION INTRAVENOUS at 15:57

## 2022-04-04 RX ADMIN — OXYCODONE HYDROCHLORIDE 10 MG: 5 TABLET ORAL at 20:14

## 2022-04-04 RX ADMIN — GABAPENTIN 300 MG: 300 CAPSULE ORAL at 12:20

## 2022-04-04 RX ADMIN — HYDROMORPHONE HYDROCHLORIDE 0.5 MG: 1 INJECTION, SOLUTION INTRAMUSCULAR; INTRAVENOUS; SUBCUTANEOUS at 17:44

## 2022-04-04 RX ADMIN — HYDROMORPHONE HYDROCHLORIDE 0.5 MG: 1 INJECTION, SOLUTION INTRAMUSCULAR; INTRAVENOUS; SUBCUTANEOUS at 23:31

## 2022-04-04 NOTE — OP NOTE
OPERATIVE REPORT  PATIENT NAME: Pavithra Garcia    :  1955  MRN: [id number]  Pt Location: [facility] ROOM 02    SURGERY DATE: 2022    Surgeon(s) and Role:     * Mary Alice Wallace MD - Primary     * Constanza Bey DO - Assisting    Preop Diagnosis:  Ileostomy in place [facility]) [Z93 2]    Post-Op Diagnosis Codes:     * Ileostomy in place ([address] ) [Z93 2]    Procedure(s) (LRB):  ILEOSTOMY LOOP REVERSAL (N/A)    Specimen(s):  ID Type Source Tests Collected by Time Destination   1 : portion of bowel Tissue Small Bowel, NOS TISSUE EXAM Mary Alice Wallace MD 2022 [id number]        Estimated Blood Loss:   20 mL    Drains:  * No LDAs found *    Anesthesia Type:   General    Operative Indications:  Ileostomy in place [facility]) [Z93 2]      Operative Findings:  Loop ileostomy    Complications:   None    Procedure and Technique:  The patient was seen again in the Holding Room  The risks, benefits, complications, treatment options, and expected outcomes were discussed with the patient  The patient and/or family concurred with the proposed plan, giving informed consent  The site of surgery properly noted/marked  The patient was taken to Operating Room, identified as Pavithra Garcia and the procedure verified As reversal of a loop colostomy  A Time Out was held after prepping and draping in sterile fashion  The above information was confirmed  Prior to the induction of general anesthesia, antibiotic prophylaxis was administered  General endotracheal anesthesia was then administered and tolerated well  After the induction, the abdomen was prepped in the usual sterile fashion  The colostomy had been sutured closed prior to prepping the abdominal wall  An elliptical incision was created around the ostomy in order to facilitate closure of the circular wound with a 15 blade scalpel  Subcutaneous tissues were dissected with Bovie cautery  Care was taken to ensure the bowel wall and mesentery were preserved    Once theostomy was completely  from the abdominal wall it was also freed up from intra-abdominal adhesions using the dissection and Bovie cautery  The two ends of the loop colostomy were  And each side was stapled and resected using a CHRISTY-75 with a blue load  The 2 ends were then laid side by side A and sutured together with interrupted 3-0 silk suture  An enterotomy was made on the antimesenteric side of each end of the colon  A side-to-side stapled anastomosis was performed with a CHRISTY-75 a blue load  Common enterotomy was closed with a running 3-0 PDS suture  The suture line was then imbricated with interrupted 3-0 silk Lembert type sutures  The specimen was placed back into the abdominal cavity  The abdomen was irrigated with normal saline solution the irrigation fluid was clear  The posterior fascia was reapproximated with running 0 Vicryl suture  The rectus muscle was loosely reapproximated with interrupted 2-0 Vicryl sutures  The anterior fascia was closed with #1 PDS suture  The subcutaneous tissue was reapproximated with interrupted 3-0 Vicryl suture  The skin was closed loosely with staples with Betadine soaked kal       I was present for the entire procedure    Patient Disposition:  PACU       SIGNATURE: Ibeth Hollis MD  DATE: April 4, 2022  TIME: 5:08 PM

## 2022-04-04 NOTE — INTERVAL H&P NOTE
H&P reviewed  After examining the patient I find no changes in the patients condition since the H&P had been written      Vitals:    04/04/22 1228   BP: 136/93   Pulse: 86   Resp: 16   Temp: (!) 97 4 °F (36 3 °C)   SpO2: 97%

## 2022-04-04 NOTE — ANESTHESIA PREPROCEDURE EVALUATION
Procedure:  ILEOSTOMY LOOP REVERSAL (N/A Abdomen)    Relevant Problems   CARDIO   (+) Essential hypertension   (+) Migraine without status migrainosus, not intractable      GI/HEPATIC   (+) Esophageal dysphagia   (+) GERD (gastroesophageal reflux disease)      MUSCULOSKELETAL   (+) Fibromyalgia      NEURO/PSYCH   (+) Fibromyalgia   (+) Generalized anxiety disorder   (+) Migraine without status migrainosus, not intractable   (+) Moderate episode of recurrent major depressive disorder (HCC)      PULMONARY   (+) Asthma      Other   (+) Functional diarrhea   (+) Hearing impaired   (+) History of Diverticulitis large intestine   (+) Ileostomy in place [facility])        Physical Exam    Airway    Mallampati score: II  TM Distance: >3 FB  Neck ROM: full     Dental   upper dentures and lower dentures,     Cardiovascular  Rhythm: regular, Rate: normal, Cardiovascular exam normal    Pulmonary  Pulmonary exam normal Breath sounds clear to auscultation,     Other Findings        Anesthesia Plan  ASA Score- 3     Anesthesia Type- general with ASA Monitors  Additional Monitors:   Airway Plan: ETT  Plan Factors-    Chart reviewed  EKG reviewed  Existing labs reviewed  Patient summary reviewed  Induction- intravenous  Postoperative Plan- Plan for postoperative opioid use  Informed Consent- Anesthetic plan and risks discussed with patient, spouse and daughter

## 2022-04-04 NOTE — ANESTHESIA POSTPROCEDURE EVALUATION
Post-Op Assessment Note    CV Status:  Stable    Pain management: adequate     Mental Status:  Alert and awake   Hydration Status:  Euvolemic   PONV Controlled:  Controlled   Airway Patency:  Patent      Post Op Vitals Reviewed: Yes      Staff: Anesthesiologist         No complications documented      /63 (04/04/22 1826)    Temp 97 7 °F (36 5 °C) (04/04/22 1826)    Pulse 62 (04/04/22 1834)   Resp 12 (04/04/22 1834)    SpO2 97 % (04/04/22 1834)

## 2022-04-05 LAB
ANION GAP SERPL CALCULATED.3IONS-SCNC: 9 MMOL/L (ref 4–13)
BASOPHILS # BLD AUTO: 0.03 THOUSANDS/ΜL (ref 0–0.1)
BASOPHILS NFR BLD AUTO: 0 % (ref 0–1)
BUN SERPL-MCNC: 20 MG/DL (ref 5–25)
CALCIUM SERPL-MCNC: 8.7 MG/DL (ref 8.3–10.1)
CHLORIDE SERPL-SCNC: 106 MMOL/L (ref 100–108)
CO2 SERPL-SCNC: 28 MMOL/L (ref 21–32)
CREAT SERPL-MCNC: 0.99 MG/DL (ref 0.6–1.3)
EOSINOPHIL # BLD AUTO: 0.03 THOUSAND/ΜL (ref 0–0.61)
EOSINOPHIL NFR BLD AUTO: 0 % (ref 0–6)
ERYTHROCYTE [DISTWIDTH] IN BLOOD BY AUTOMATED COUNT: 13 % (ref 11.6–15.1)
GFR SERPL CREATININE-BSD FRML MDRD: 59 ML/MIN/1.73SQ M
GLUCOSE SERPL-MCNC: 90 MG/DL (ref 65–140)
HCT VFR BLD AUTO: 35 % (ref 34.8–46.1)
HGB BLD-MCNC: 11.5 G/DL (ref 11.5–15.4)
IMM GRANULOCYTES # BLD AUTO: 0.04 THOUSAND/UL (ref 0–0.2)
IMM GRANULOCYTES NFR BLD AUTO: 0 % (ref 0–2)
LYMPHOCYTES # BLD AUTO: 1.71 THOUSANDS/ΜL (ref 0.6–4.47)
LYMPHOCYTES NFR BLD AUTO: 14 % (ref 14–44)
MCH RBC QN AUTO: 36.7 PG (ref 26.8–34.3)
MCHC RBC AUTO-ENTMCNC: 32.9 G/DL (ref 31.4–37.4)
MCV RBC AUTO: 112 FL (ref 82–98)
MONOCYTES # BLD AUTO: 0.97 THOUSAND/ΜL (ref 0.17–1.22)
MONOCYTES NFR BLD AUTO: 8 % (ref 4–12)
NEUTROPHILS # BLD AUTO: 9.5 THOUSANDS/ΜL (ref 1.85–7.62)
NEUTS SEG NFR BLD AUTO: 78 % (ref 43–75)
NRBC BLD AUTO-RTO: 0 /100 WBCS
PLATELET # BLD AUTO: 214 THOUSANDS/UL (ref 149–390)
PMV BLD AUTO: 10.1 FL (ref 8.9–12.7)
POTASSIUM SERPL-SCNC: 4 MMOL/L (ref 3.5–5.3)
RBC # BLD AUTO: 3.13 MILLION/UL (ref 3.81–5.12)
SODIUM SERPL-SCNC: 143 MMOL/L (ref 136–145)
WBC # BLD AUTO: 12.28 THOUSAND/UL (ref 4.31–10.16)

## 2022-04-05 PROCEDURE — 80048 BASIC METABOLIC PNL TOTAL CA: CPT | Performed by: SURGERY

## 2022-04-05 PROCEDURE — 99024 POSTOP FOLLOW-UP VISIT: CPT | Performed by: SURGERY

## 2022-04-05 PROCEDURE — 97162 PT EVAL MOD COMPLEX 30 MIN: CPT | Performed by: PHYSICAL THERAPIST

## 2022-04-05 PROCEDURE — 85025 COMPLETE CBC W/AUTO DIFF WBC: CPT | Performed by: SURGERY

## 2022-04-05 RX ORDER — NICOTINE 21 MG/24HR
1 PATCH, TRANSDERMAL 24 HOURS TRANSDERMAL DAILY
Status: DISCONTINUED | OUTPATIENT
Start: 2022-04-05 | End: 2022-04-07 | Stop reason: HOSPADM

## 2022-04-05 RX ORDER — DEXTROSE, SODIUM CHLORIDE, AND POTASSIUM CHLORIDE 5; .45; .15 G/100ML; G/100ML; G/100ML
75 INJECTION INTRAVENOUS CONTINUOUS
Status: DISCONTINUED | OUTPATIENT
Start: 2022-04-05 | End: 2022-04-06

## 2022-04-05 RX ADMIN — ACETAMINOPHEN 325MG 975 MG: 325 TABLET ORAL at 05:48

## 2022-04-05 RX ADMIN — OXYCODONE HYDROCHLORIDE 10 MG: 5 TABLET ORAL at 18:43

## 2022-04-05 RX ADMIN — HYDROMORPHONE HYDROCHLORIDE 0.5 MG: 1 INJECTION, SOLUTION INTRAMUSCULAR; INTRAVENOUS; SUBCUTANEOUS at 21:25

## 2022-04-05 RX ADMIN — ENOXAPARIN SODIUM 40 MG: 40 INJECTION SUBCUTANEOUS at 08:03

## 2022-04-05 RX ADMIN — DEXTROSE, SODIUM CHLORIDE, AND POTASSIUM CHLORIDE 75 ML/HR: 5; .45; .15 INJECTION INTRAVENOUS at 10:45

## 2022-04-05 RX ADMIN — GABAPENTIN 300 MG: 300 CAPSULE ORAL at 15:10

## 2022-04-05 RX ADMIN — GABAPENTIN 300 MG: 300 CAPSULE ORAL at 21:21

## 2022-04-05 RX ADMIN — GABAPENTIN 300 MG: 300 CAPSULE ORAL at 08:03

## 2022-04-05 RX ADMIN — ONDANSETRON 4 MG: 2 INJECTION INTRAMUSCULAR; INTRAVENOUS at 12:18

## 2022-04-05 RX ADMIN — ACETAMINOPHEN 325MG 975 MG: 325 TABLET ORAL at 21:21

## 2022-04-05 RX ADMIN — HYDROMORPHONE HYDROCHLORIDE 0.5 MG: 1 INJECTION, SOLUTION INTRAMUSCULAR; INTRAVENOUS; SUBCUTANEOUS at 08:10

## 2022-04-05 RX ADMIN — OXYCODONE HYDROCHLORIDE 10 MG: 5 TABLET ORAL at 00:58

## 2022-04-05 RX ADMIN — Medication 1 PATCH: at 05:49

## 2022-04-05 RX ADMIN — OXYCODONE HYDROCHLORIDE 10 MG: 5 TABLET ORAL at 05:48

## 2022-04-05 RX ADMIN — ACETAMINOPHEN 325MG 975 MG: 325 TABLET ORAL at 13:32

## 2022-04-05 RX ADMIN — ATORVASTATIN CALCIUM 10 MG: 10 TABLET, FILM COATED ORAL at 08:03

## 2022-04-05 NOTE — UTILIZATION REVIEW
Initial Clinical Review    Elective    IP    surgical procedure    Age/Sex: 77 y o  female     Surgery Date:    4/4/22    Procedure: ILEOSTOMY LOOP REVERSAL (N/A)    Anesthesia:    general    Operative Findings: loop ileostomy    POD#1 Progress Note:   4/5    Continue   Post op care  Continue pain control/antiemetics as needed  Currently on  O2 2 L  NC   + flatus   -  BM   Monitor labs, WBC   12 3 this am     Encourage ambulation  Admission Orders: Date/Time/Statement:   Admission Orders (From admission, onward)     Ordered        04/04/22 1725  Inpatient Admission  Once                      Orders Placed This Encounter   Procedures    Inpatient Admission     Standing Status:   Standing     Number of Occurrences:   1     Order Specific Question:   Level of Care     Answer:   Med Surg [16]     Order Specific Question:   Estimated length of stay     Answer:   More than 2 Midnights     Order Specific Question:   Certification     Answer:   I certify that inpatient services are medically necessary for this patient for a duration of greater than two midnights  See H&P and MD Progress Notes for additional information about the patient's course of treatment       Vital Signs: /65 (BP Location: Right arm)   Pulse 61   Temp 98 2 °F (36 8 °C) (Oral)   Resp 20   Ht 5' 4" (1 626 m)   Wt 53 2 kg (117 lb 4 6 oz)   LMP  (LMP Unknown)   SpO2 98%   BMI 20 13 kg/m²     Pertinent Labs/Diagnostic Test Results:     Results from last 7 days   Lab Units 03/31/22  1247   SARS-COV-2  Negative     Results from last 7 days   Lab Units 04/05/22  0449   WBC Thousand/uL 12 28*   HEMOGLOBIN g/dL 11 5   HEMATOCRIT % 35 0   PLATELETS Thousands/uL 214   NEUTROS ABS Thousands/µL 9 50*         Results from last 7 days   Lab Units 04/05/22  0449   SODIUM mmol/L 143   POTASSIUM mmol/L 4 0   CHLORIDE mmol/L 106   CO2 mmol/L 28   ANION GAP mmol/L 9   BUN mg/dL 20   CREATININE mg/dL 0 99   EGFR ml/min/1 73sq m 59   CALCIUM mg/dL 8 7 Results from last 7 days   Lab Units 04/05/22  0449   GLUCOSE RANDOM mg/dL 90             Diet:    Full  liq    Mobility:    Ambulate  3 times daily    DVT Prophylaxis:    SCD'S    Medications/Pain Control:   Scheduled Medications:  acetaminophen, 975 mg, Oral, Q8H GUZMAN  atorvastatin, 10 mg, Oral, Daily  enoxaparin, 40 mg, Subcutaneous, Daily  gabapentin, 300 mg, Oral, TID  nicotine, 1 patch, Transdermal, Daily      Continuous IV Infusions:  dextrose 5 % and sodium chloride 0 45 % with KCl 20 mEq/L, 75 mL/hr, Intravenous, Continuous      PRN Meds:  albuterol, 2 puff, Inhalation, 4x Daily PRN  HYDROmorphone, 0 5 mg, Intravenous, Q4H PRN   ( x1  4/4 and  X 1  4/5 thus far)  ondansetron, 4 mg, Intravenous, Q6H PRN  oxyCODONE, 10 mg, Oral, Q4H PRN  oxyCODONE, 5 mg, Oral, Q4H PRN  SUMAtriptan, 100 mg, Oral, Once PRN        Network Utilization Review Department  ATTENTION: Please call with any questions or concerns to [phone] and carefully listen to the prompts so that you are directed to the right person  All voicemails are confidential   Petra Pascual all requests for admission clinical reviews, approved or denied determinations and any other requests to dedicated fax number below belonging to the campus where the patient is receiving treatment   List of dedicated fax numbers for the Facilities:  [address] DENIALS (Administrative/Medical Necessity) [phone]   [address] [place] [address] (Maternity/NICU/Pediatrics) 261 [facility],[address]  [address] [place] [address] [id number] [place] [address] [address] [phone]

## 2022-04-05 NOTE — PLAN OF CARE
Problem: PAIN - ADULT  Goal: Verbalizes/displays adequate comfort level or baseline comfort level  Description: Interventions:  - Encourage patient to monitor pain and request assistance  - Assess pain using appropriate pain scale  - Administer analgesics based on type and severity of pain and evaluate response  - Implement non-pharmacological measures as appropriate and evaluate response  - Consider cultural and social influences on pain and pain management  - Notify physician/advanced practitioner if interventions unsuccessful or patient reports new pain  Outcome: Progressing     Problem: INFECTION - ADULT  Goal: Absence or prevention of progression during hospitalization  Description: INTERVENTIONS:  - Assess and monitor for signs and symptoms of infection  - Monitor lab/diagnostic results  - Monitor all insertion sites, i e  indwelling lines, tubes, and drains  - Monitor endotracheal if appropriate and nasal secretions for changes in amount and color  - [place] appropriate cooling/warming therapies per order  - Administer medications as ordered  - Instruct and encourage patient and family to use good hand hygiene technique  - Identify and instruct in appropriate isolation precautions for identified infection/condition  Outcome: Progressing  Goal: Absence of fever/infection during neutropenic period  Description: INTERVENTIONS:  - Monitor WBC    Outcome: Progressing     Problem: SAFETY ADULT  Goal: Patient will remain free of falls  Description: INTERVENTIONS:  - Educate patient/family on patient safety including physical limitations  - Instruct patient to call for assistance with activity   - Consult OT/PT to assist with strengthening/mobility   - Keep Call bell within reach  - Keep bed low and locked with side rails adjusted as appropriate  - Keep care items and personal belongings within reach  - Initiate and maintain comfort rounds  - Make Fall Risk Sign visible to staff  - Offer Toileting every 3 Hours, in advance of need  - Initiate/Maintain bed alarm  - Apply yellow socks and bracelet for high fall risk patients  - Consider moving patient to room near nurses station  Outcome: Progressing  Goal: Maintain or return to baseline ADL function  Description: INTERVENTIONS:  -  Assess patient's ability to carry out ADLs; assess patient's baseline for ADL function and identify physical deficits which impact ability to perform ADLs (bathing, care of mouth/teeth, toileting, grooming, dressing, etc )  - Assess/evaluate cause of self-care deficits   - Assess range of motion  - Assess patient's mobility; develop plan if impaired  - Assess patient's need for assistive devices and provide as appropriate  - Encourage maximum independence but intervene and supervise when necessary  - Involve family in performance of ADLs  - Assess for home care needs following discharge   - Consider OT consult to assist with ADL evaluation and planning for discharge  - Provide patient education as appropriate  Outcome: Progressing  Goal: Maintains/Returns to pre admission functional level  Description: INTERVENTIONS:  - Perform BMAT or MOVE assessment daily    - Set and communicate daily mobility goal to care team and patient/family/caregiver  - Collaborate with rehabilitation services on mobility goals if consulted  - Perform Range of Motion  times a day  - Reposition patient every  hours    - Dangle patient  times a day  - Stand patient  times a day  - Ambulate patient  times a day  - Out of bed to chair  times a day   - Out of bed for meals  times a day  - Out of bed for toileting  - Record patient progress and toleration of activity level   Outcome: Progressing     Problem: DISCHARGE PLANNING  Goal: Discharge to home or other facility with appropriate resources  Description: INTERVENTIONS:  - Identify barriers to discharge w/patient and caregiver  - Arrange for needed discharge resources and transportation as appropriate  - Identify discharge learning needs (meds, wound care, etc )  - Arrange for interpretive services to assist at discharge as needed  - Refer to Case Management Department for coordinating discharge planning if the patient needs post-hospital services based on physician/advanced practitioner order or complex needs related to functional status, cognitive ability, or social support system  Outcome: Progressing     Problem: Knowledge Deficit  Goal: Patient/family/caregiver demonstrates understanding of disease process, treatment plan, medications, and discharge instructions  Description: Complete learning assessment and assess knowledge base    Interventions:  - Provide teaching at level of understanding  - Provide teaching via preferred learning methods  Outcome: Progressing

## 2022-04-05 NOTE — PROGRESS NOTES
Progress Note    Alan Chen 77 y o  female MRN: [id number]  Unit/Bed#: E5 -01 Encounter: [id number]    Assessment:  71-yr old F: now s/p ileostomy reversal: 4/4  Stable VS on 2L NC  Passing small amounts of flatus; no BM  White count: 12 3  PLAN:  - low residue diet  - ambulate, OOB   - DVT/PE chemoppx  - dc kal 4/6  Subjective:   - c/o: pain around op site in RLQ  Objective:     Vitals: Temp:  [97 4 °F (36 3 °C)-98 9 °F (37 2 °C)] 98 2 °F (36 8 °C)  HR:  [54-86] 61  Resp:  [12-22] 20  BP: (103-143)/(63-93) 103/65  Body mass index is 20 13 kg/m²  I/O       04/03 0701  04/04 0700 04/04 0701  04/05 0700 04/05 0701  04/06 0700    I V  (mL/kg)  1000 (18 8)     Total Intake(mL/kg)  1000 (18 8)     Blood  20     Total Output  20     Net  +980                  Physical Exam:  GEN: supine in bed; not in any painful distress  HEENT: atraumatic  CV: RRR  Lung: Normal effort on 2 L NC  Ab: Soft, ND, appro tender  Extrem: No CCE  Neuro: A+Ox3    Lab, Imaging and other studies:   I have personally reviewed pertinent reports    , CBC with diff:   Lab Results   Component Value Date    WBC 12 28 (H) 04/05/2022    HGB 11 5 04/05/2022    HCT 35 0 04/05/2022     (H) 04/05/2022     04/05/2022    MCH 36 7 (H) 04/05/2022    MCHC 32 9 04/05/2022    RDW 13 0 04/05/2022    MPV 10 1 04/05/2022    NRBC 0 04/05/2022   , BMP/CMP:   Lab Results   Component Value Date    SODIUM 143 04/05/2022    K 4 0 04/05/2022     04/05/2022    CO2 28 04/05/2022    BUN 20 04/05/2022    CREATININE 0 99 04/05/2022    CALCIUM 8 7 04/05/2022    EGFR 59 04/05/2022   , Magnesium: No components found for: MAG  VTE Pharmacologic Prophylaxis: Enoxaparin (Lovenox)  VTE Mechanical Prophylaxis: sequential compression device

## 2022-04-05 NOTE — PLAN OF CARE
Problem: PAIN - ADULT  Goal: Verbalizes/displays adequate comfort level or baseline comfort level  Description: Interventions:  - Encourage patient to monitor pain and request assistance  - Assess pain using appropriate pain scale  - Administer analgesics based on type and severity of pain and evaluate response  - Implement non-pharmacological measures as appropriate and evaluate response  - Consider cultural and social influences on pain and pain management  - Notify physician/advanced practitioner if interventions unsuccessful or patient reports new pain  Outcome: Progressing     Problem: INFECTION - ADULT  Goal: Absence or prevention of progression during hospitalization  Description: INTERVENTIONS:  - Assess and monitor for signs and symptoms of infection  - Monitor lab/diagnostic results  - Monitor all insertion sites, i e  indwelling lines, tubes, and drains  - Monitor endotracheal if appropriate and nasal secretions for changes in amount and color  - [place] appropriate cooling/warming therapies per order  - Administer medications as ordered  - Instruct and encourage patient and family to use good hand hygiene technique  - Identify and instruct in appropriate isolation precautions for identified infection/condition  Outcome: Progressing  Goal: Absence of fever/infection during neutropenic period  Description: INTERVENTIONS:  - Monitor WBC    Outcome: Progressing     Problem: SAFETY ADULT  Goal: Patient will remain free of falls  Description: INTERVENTIONS:  - Educate patient/family on patient safety including physical limitations  - Instruct patient to call for assistance with activity   - Consult OT/PT to assist with strengthening/mobility   - Keep Call bell within reach  - Keep bed low and locked with side rails adjusted as appropriate  - Keep care items and personal belongings within reach  - Initiate and maintain comfort rounds  - Make Fall Risk Sign visible to staff  - Offer Toileting every 3 Hours, in advance of need  - Initiate/Maintain bed alarm  - Apply yellow socks and bracelet for high fall risk patients  - Consider moving patient to room near nurses station  Outcome: Progressing  Goal: Maintain or return to baseline ADL function  Description: INTERVENTIONS:  -  Assess patient's ability to carry out ADLs; assess patient's baseline for ADL function and identify physical deficits which impact ability to perform ADLs (bathing, care of mouth/teeth, toileting, grooming, dressing, etc )  - Assess/evaluate cause of self-care deficits   - Assess range of motion  - Assess patient's mobility; develop plan if impaired  - Assess patient's need for assistive devices and provide as appropriate  - Encourage maximum independence but intervene and supervise when necessary  - Involve family in performance of ADLs  - Assess for home care needs following discharge   - Consider OT consult to assist with ADL evaluation and planning for discharge  - Provide patient education as appropriate  Outcome: Progressing  Goal: Maintains/Returns to pre admission functional level  Description: INTERVENTIONS:  - Perform BMAT or MOVE assessment daily    - Set and communicate daily mobility goal to care team and patient/family/caregiver  - Collaborate with rehabilitation services on mobility goals if consulted  - Perform Range of Motion  times a day  - Reposition patient every  hours    - Dangle patient  times a day  - Stand patient  times a day  - Ambulate patient  times a day  - Out of bed to chair  times a day   - Out of bed for meals  times a day  - Out of bed for toileting  - Record patient progress and toleration of activity level   Outcome: Progressing     Problem: DISCHARGE PLANNING  Goal: Discharge to home or other facility with appropriate resources  Description: INTERVENTIONS:  - Identify barriers to discharge w/patient and caregiver  - Arrange for needed discharge resources and transportation as appropriate  - Identify discharge learning needs (meds, wound care, etc )  - Arrange for interpretive services to assist at discharge as needed  - Refer to Case Management Department for coordinating discharge planning if the patient needs post-hospital services based on physician/advanced practitioner order or complex needs related to functional status, cognitive ability, or social support system  Outcome: Progressing     Problem: Knowledge Deficit  Goal: Patient/family/caregiver demonstrates understanding of disease process, treatment plan, medications, and discharge instructions  Description: Complete learning assessment and assess knowledge base    Interventions:  - Provide teaching at level of understanding  - Provide teaching via preferred learning methods  Outcome: Progressing     Problem: GASTROINTESTINAL - ADULT  Goal: Minimal or absence of nausea and/or vomiting  Description: INTERVENTIONS:  - Administer IV fluids if ordered to ensure adequate hydration  - Maintain NPO status until nausea and vomiting are resolved  - Nasogastric tube if ordered  - Administer ordered antiemetic medications as needed  - Provide nonpharmacologic comfort measures as appropriate  - Advance diet as tolerated, if ordered  - Consider nutrition services referral to assist patient with adequate nutrition and appropriate food choices  Outcome: Progressing  Goal: Maintains or returns to baseline bowel function  Description: INTERVENTIONS:  - Assess bowel function  - Encourage oral fluids to ensure adequate hydration  - Administer IV fluids if ordered to ensure adequate hydration  - Administer ordered medications as needed  - Encourage mobilization and activity  - Consider nutritional services referral to assist patient with adequate nutrition and appropriate food choices  Outcome: Progressing  Goal: Maintains adequate nutritional intake  Description: INTERVENTIONS:  - Monitor percentage of each meal consumed  - Identify factors contributing to decreased intake, treat as appropriate  - Assist with meals as needed  - Monitor I&O, weight, and lab values if indicated  - Obtain nutrition services referral as needed  Outcome: Progressing

## 2022-04-05 NOTE — PHYSICAL THERAPY NOTE
PT EVALUATION    Pt  Name: Arminda Dawson  Pt  Age: 77 y o    MRN: [id number]  LENGTH OF STAY: 1    Patient Active Problem List   Diagnosis    Esophageal dysphagia    Dyspepsia    Essential tremor    Moderate episode of recurrent major depressive disorder (HCC)    Essential hypertension    Generalized anxiety disorder    Asthma    Seasonal allergic rhinitis    Migraine without status migrainosus, not intractable    Functional diarrhea    History of Diverticulitis large intestine    Moderate protein-calorie malnutrition (HCC)    GERD (gastroesophageal reflux disease)    Status post laparoscopic colectomy    Ileostomy in place ([address]    Ileostomy dysfunction ([address] )    Fibromyalgia    Hearing impaired    Wears dentures       Admitting Diagnoses:   Ileostomy in place ([address] ) [Z93 2]    Past Medical History:   Diagnosis Date    Anxiety     Asthma     allergy induced    Chronic pain disorder     back    Colon polyp     Fibromyalgia     GERD (gastroesophageal reflux disease)     Hearing aid worn     Hearing impaired     "broken eardrum after eardrops used"    Hyperlipidemia     controlled    Hypertension     controlled    Ileostomy in place [facility])     Lumbar herniated disc     Shortness of breath     at times    Wears dentures     Wears glasses     Weight loss        Past Surgical History:   Procedure Laterality Date    BREAST BIOPSY Left     benign- at age 34    COLONOSCOPY      EAR SURGERY      MO LAP, SURG ILEO/JEJUNO-STOMY N/A 4/4/2022    Procedure: ILEOSTOMY LOOP REVERSAL;  Surgeon: Zan Mora MD;  [facility];  Service: General    MO LAP,SURG,COLECTOMY, PARTIAL, Casie Jenniffer N/A 11/1/2021    Procedure: RESECTION COLON SIGMOID LAPAROSCOPIC WTIH COLORECTAL ANASTOMOSIS, DIVERTING LOOP ILEOSTOMY;  Surgeon: Zan Mora MD;  [facility];  Service: General    TUBAL LIGATION      ULNAR NERVE REPAIR Left        Imaging Studies:  No orders to display 04/05/22 1449   PT Last Visit   PT Visit Date 04/05/22   Note Type   Note type Evaluation   Pain Assessment   Pain Assessment Tool 0-10   Pain Score 10 - Worst Possible Pain   Pain Location/Orientation Location: Abdomen;Orientation: Right   Hospital Pain Intervention(s) Repositioned; Ambulation/increased activity; Emotional support   Restrictions/Precautions   Weight Bearing Precautions Per Order No   Other Precautions Multiple lines;Pain  (shlomo)   Home Living   Type of Home House  (Bi-level)   Home Layout Two level;Performs ADLs on one level; Able to live on main level with bedroom/bathroom;Stairs to enter with rails  (2+2STE; 7 TRIXIE to main floor w/ HR)   Bathroom Shower/Tub Tub/shower unit  (2 bathrooms; 1 with tub, 1 with walk in shower)   Bathroom Toilet Raised   Bathroom Equipment Grab bars in shower; Shower chair   [facility]  (Three wheeled walker)   Prior Function   Level of [place] Independent with ADLs and functional mobility   Lives With Spouse   Receives Help From Family   ADL Assistance Independent   IADLs Independent   Falls in the last 6 months 0   Vocational Retired   Comments PTA, use of 3 wheeled walker and (-)  since initial surgery in Nov 2021   General   Family/Caregiver Present Yes   Cognition   Arousal/Participation Alert   Orientation Level Oriented X4   Following Commands Follows all commands and directions without difficulty   Comments Cooperative and pleasant   Subjective   Subjective I have been going to the bathroom on my own    RUE Assessment   RUE Assessment WFL  (4-/5 grossly)   LUE Assessment   LUE Assessment WFL  (4-/5 grossly)   RLE Assessment   RLE Assessment WFL  (4-/5 grossly (inc pain with MMT))   LLE Assessment   LLE Assessment WFL  (4-/5 grossly (inc pain with MMT))   Light Touch   RLE Light Touch Grossly intact   LLE Light Touch Grossly intact   Bed Mobility   Supine to Sit 6  Modified independent   Additional items HOB elevated; Bedrails   Sit to Supine 6  Modified independent   Additional items Bedrails   Transfers   Sit to Stand 5  Supervision   Stand to Sit 5  Supervision   Toilet transfer 5  Supervision   Additional Comments cues for safety and therapist assist for use of IV pole   Ambulation/Elevation   Gait pattern Narrow ERI; Decreased foot clearance; Short stride   Gait Assistance 5  Supervision   Additional items Assist x 1;Verbal cues   Assistive Device None   Distance 10'x1; 13'x1  (bed to toilet; toilet to sink to bed)   Ambulation/Elevation Additional Comments cues for safety and therapist assist for use of IV pole   Balance   Static Sitting Normal   Dynamic Sitting Good   Static Standing Fair +   Dynamic Standing Fair   Ambulatory Fair   Endurance Deficit   Endurance Deficit Yes   Endurance Deficit Description pain   Activity Tolerance   Activity Tolerance Patient tolerated treatment well   Nurse Made Aware RN Victoria   Assessment   Prognosis Good   Assessment Pt  77 y  o female presents for ileostomy loop reversal  Pt admitted for Ileostomy in place  S/p ileostomy loop reversal on 4/4/22  Pt referred to PT for functional mobility evaluation & D/C planning w/ orders of ambulate pt  PTA, pt reports being I w/o AD prior to initial surgery in Nov 2021, however since Nov surgery pt has been I w/ use of 3 wheeled walker  Pt demonstrated and reported current function at baseline level  Please see flow sheet above for objective findings  Inc pain present with functional mobility assessment however pt able to complete all tasks  Pt able to ambulate 10' w/o AD from bed to toilet w/ S and assistance with IV pole  Pt reports she has been ambulating from bed to toilet Independently w/ IV pole  Denies reports of dizziness or SOB t/o session   Nsg staff most recent vital signs as follows: /66   Pulse 57   Temp 98 3 °F (36 8 °C)   Resp 20   Ht 5' 4" (1 626 m)   Wt 53 2 kg (117 lb 4 6 oz)   LMP  (LMP Unknown)   SpO2 96% BMI 20 13 kg/m²   At end of session, pt back in bed in stable condition, all lines intact  Pt was educated on fall precautions and reinforced w/ good understanding  Pt is functioning at baseline and does not require further PT  The patient's AM-PAC Basic Mobility Inpatient Short Form Raw Score is 21  A Raw score of greater than 16 suggests the patient may benefit from discharge to home  Please also refer to the recommendation of the Physical Therapist for safe discharge planning  From PT/mobility standpoint recommendation for D/C to home, when medically cleared based on objective measures above  Will D/C PT at this time due to patient functioning at baseline  Nsg notified  Goals   PT Treatment Day 0   Plan   Treatment/Interventions   (D/C PT)   PT Frequency   (D/C PT)   Recommendation   PT Discharge Recommendation No rehabilitation needs   Additional Comments Pt functioning at baseline, no further skilled PT needed   AM-PAC Basic Mobility Inpatient   Turning in Bed Without Bedrails 4   Lying on Back to Sitting on Edge of Flat Bed 3   Moving Bed to Chair 4   Standing Up From Chair 4   Walk in Room 3   Climb 3-5 Stairs 3   Basic Mobility Inpatient Raw Score 21   Basic Mobility Standardized Score 45 55   Highest Level Of Mobility   JH-HLM Goal 6: Walk 10 steps or more   JH-HLM Highest Level of Mobility 6: Walk 10 steps or more   JH-HLM Goal Achieved Yes   End of Consult   Patient Position at End of Consult Supine; All needs within reach   End of Consult Comments Pt in stable condition   RN notified       Hx/personal factors: co-morbidities, mutliple lines and pain  Examination: pain, assessed body system, balance, endurance, amb, D/C disposition & fall risk  Clinical: unpredictable (ongoing medical status and pain mgt)  Complexity: moderate     Rachel hCen, PT

## 2022-04-05 NOTE — CASE MANAGEMENT
Case Management Assessment & Discharge Planning Note    Patient name Rajan Phelps  Location [place] [address] /E5 MS 65-* MRN [id number]  : 1955 Date 2022       Current Admission Date: 2022  Current Admission Diagnosis:Ileostomy in place [facility])   Patient Active Problem List    Diagnosis Date Noted    Hearing impaired     Wears dentures     Fibromyalgia     Ileostomy dysfunction ([facility] [address] ) 2021    Status post laparoscopic colectomy 2021    Ileostomy in place [facility]) 2021    GERD (gastroesophageal reflux disease)     Moderate protein-calorie malnutrition ([facility] [address] ) 10/13/2021    History of Diverticulitis large intestine 2021    Functional diarrhea 2021    Seasonal allergic rhinitis 2020    Migraine without status migrainosus, not intractable 2020    Asthma     Generalized anxiety disorder 2020    Essential hypertension 2020    Essential tremor 2020    Esophageal dysphagia 2019    Dyspepsia 2019    Moderate episode of recurrent major depressive disorder ([facility] [address] ) 2017      LOS (days): 1  Geometric Mean LOS (GMLOS) (days):   Days to GMLOS:     OBJECTIVE:    Risk of Unplanned Readmission Score: 11         Current admission status: Inpatient       Preferred Pharmacy:   [id number], [address] [place] [address]  Phone: [phone] Fax: [phone] [address] Kavita ROBERTS  [facility] [address]  Phone: [phone] Fax: [phone]    Primary Care Provider: Arlen Holt DO    Primary Insurance: [facility]  Secondary Insurance:     ASSESSMENT:  [address], [address] Chbil - Daughter   Primary Phone: [phone] (Mobile)               Advance Directives  Does patient have a [address] [place] [address]?: No  Does patient have Advance Directives?: No         Readmission Root Cause  30 Day Readmission: No    Patient Information  Admitted from[de-identified] Home  Mental Status: Alert  During Assessment patient was accompanied by: Not accompanied during assessment  Assessment information provided by[de-identified] Patient  Primary Caregiver: Self  Support Systems: Self,Family members,Spouse/significant other  Home entry access options   Select all that apply : Stairs  Number of steps to enter home : 4  Type of Current Residence: [address] [place] [address]  Upon entering residence, is there a bedroom on the main floor (no further steps)?: No  Upon entering residence, is there a bathroom on the main floor (no further steps)?: No  Living Arrangements: Lives w/ Spouse/significant other    Activities of Daily Living Prior to Admission  Functional Status: Independent  Completes ADLs independently?: Yes  Ambulates independently?: Yes  Does patient use assisted devices?: No  Does patient currently own DME?: Yes  What DME does the patient currently own?: Rollator  Does patient have a history of Outpatient Therapy (PT/OT)?: No  Does the patient have a history of Short-Term Rehab?: No  Does patient have a history of HHC?: Yes (bony)  Does patient currently have [facility]?: Yes    Current Home Health Care  Type of Current Home Care Services: Nurse visit  [address][de-identified] Other (please enter name in comment) ([facility])  [facility] Provider[de-identified] PCP    Patient Information Continued  Does patient have prescription coverage?: Yes  Does patient have a history of substance abuse?: No  Does patient have a history of Mental Health Diagnosis?: Yes  Has patient received inpatient treatment related to mental health in the last 2 years?: No    Means of Transportation  Means of Transport to [facility][de-identified] Family transport        DISCHARGE DETAILS:    Discharge planning discussed with[de-identified] Jennifer Rook of Choice: Yes  Comments - Freedom of Choice: Patient requesting to discharge home and continue [facility] services w/ Bony  CM contacted family/caregiver?: No- see comments     Did patient/caregiver verbalize understanding of patient care needs?: Yes  Were patient/caregiver advised of the risks associated with not following Treatment Team discharge recommendations?: Yes    Contacts  Patient Contacts: Antonio Pacheco  Relationship to Patient[de-identified] Family  Contact Method: Phone  Phone Number: [phone]  Reason/Outcome: Emergency [address]         Is the patient interested in Chelo Rivera at discharge?: Yes  [address] requested[de-identified] [address] Name[de-identified] Other ([facility])  [address] [place] [address] Provider[de-identified] PCP  [address] Needed[de-identified] Wound/Ostomy Care  Homebound Criteria Met[de-identified] Requires the Assistance of Another Person for Safe Ambulation or to Leave the Home  Supporting Clincal Findings[de-identified] Limited Endurance,Fatigues Easliy in Short Distances    DME Referral Provided  Referral made for DME?: No    Other Referral/Resources/Interventions Provided:  Interventions: [facility]o Rivera    Treatment Team Recommendation: Home with [facility]  Discharge Destination Plan[de-identified] Home with Rebecca at Discharge : Family    Patient accepted to [facility] for [facility] health services  No other CM needs at this time

## 2022-04-06 LAB
ANION GAP SERPL CALCULATED.3IONS-SCNC: 9 MMOL/L (ref 4–13)
BUN SERPL-MCNC: 12 MG/DL (ref 5–25)
CALCIUM SERPL-MCNC: 8.6 MG/DL (ref 8.3–10.1)
CHLORIDE SERPL-SCNC: 111 MMOL/L (ref 100–108)
CO2 SERPL-SCNC: 26 MMOL/L (ref 21–32)
CREAT SERPL-MCNC: 0.76 MG/DL (ref 0.6–1.3)
ERYTHROCYTE [DISTWIDTH] IN BLOOD BY AUTOMATED COUNT: 12.8 % (ref 11.6–15.1)
GFR SERPL CREATININE-BSD FRML MDRD: 82 ML/MIN/1.73SQ M
GLUCOSE SERPL-MCNC: 90 MG/DL (ref 65–140)
HCT VFR BLD AUTO: 32.3 % (ref 34.8–46.1)
HGB BLD-MCNC: 10.9 G/DL (ref 11.5–15.4)
MCH RBC QN AUTO: 37.1 PG (ref 26.8–34.3)
MCHC RBC AUTO-ENTMCNC: 33.7 G/DL (ref 31.4–37.4)
MCV RBC AUTO: 110 FL (ref 82–98)
PLATELET # BLD AUTO: 203 THOUSANDS/UL (ref 149–390)
PMV BLD AUTO: 10 FL (ref 8.9–12.7)
POTASSIUM SERPL-SCNC: 3.5 MMOL/L (ref 3.5–5.3)
RBC # BLD AUTO: 2.94 MILLION/UL (ref 3.81–5.12)
SODIUM SERPL-SCNC: 146 MMOL/L (ref 136–145)
WBC # BLD AUTO: 5.41 THOUSAND/UL (ref 4.31–10.16)

## 2022-04-06 PROCEDURE — 85027 COMPLETE CBC AUTOMATED: CPT | Performed by: SURGERY

## 2022-04-06 PROCEDURE — ND001 PR NO DOCUMENTATION: Performed by: SURGERY

## 2022-04-06 PROCEDURE — 80048 BASIC METABOLIC PNL TOTAL CA: CPT | Performed by: SURGERY

## 2022-04-06 RX ORDER — BISACODYL 10 MG
10 SUPPOSITORY, RECTAL RECTAL DAILY PRN
Status: DISCONTINUED | OUTPATIENT
Start: 2022-04-06 | End: 2022-04-07 | Stop reason: HOSPADM

## 2022-04-06 RX ORDER — SENNOSIDES 8.6 MG
1 TABLET ORAL
Status: DISCONTINUED | OUTPATIENT
Start: 2022-04-06 | End: 2022-04-06

## 2022-04-06 RX ORDER — DOCUSATE SODIUM 100 MG/1
100 CAPSULE, LIQUID FILLED ORAL 2 TIMES DAILY
Status: DISCONTINUED | OUTPATIENT
Start: 2022-04-06 | End: 2022-04-07 | Stop reason: HOSPADM

## 2022-04-06 RX ORDER — METOCLOPRAMIDE HYDROCHLORIDE 5 MG/ML
5 INJECTION INTRAMUSCULAR; INTRAVENOUS ONCE
Status: COMPLETED | OUTPATIENT
Start: 2022-04-06 | End: 2022-04-06

## 2022-04-06 RX ADMIN — GABAPENTIN 300 MG: 300 CAPSULE ORAL at 16:48

## 2022-04-06 RX ADMIN — ACETAMINOPHEN 325MG 975 MG: 325 TABLET ORAL at 05:05

## 2022-04-06 RX ADMIN — OXYCODONE HYDROCHLORIDE 10 MG: 5 TABLET ORAL at 08:20

## 2022-04-06 RX ADMIN — ENOXAPARIN SODIUM 40 MG: 40 INJECTION SUBCUTANEOUS at 08:21

## 2022-04-06 RX ADMIN — HYDROMORPHONE HYDROCHLORIDE 0.5 MG: 1 INJECTION, SOLUTION INTRAMUSCULAR; INTRAVENOUS; SUBCUTANEOUS at 23:05

## 2022-04-06 RX ADMIN — ONDANSETRON 4 MG: 2 INJECTION INTRAMUSCULAR; INTRAVENOUS at 08:20

## 2022-04-06 RX ADMIN — ACETAMINOPHEN 325MG 975 MG: 325 TABLET ORAL at 14:10

## 2022-04-06 RX ADMIN — Medication 1 PATCH: at 03:49

## 2022-04-06 RX ADMIN — ATORVASTATIN CALCIUM 10 MG: 10 TABLET, FILM COATED ORAL at 08:19

## 2022-04-06 RX ADMIN — OXYCODONE HYDROCHLORIDE 10 MG: 5 TABLET ORAL at 12:31

## 2022-04-06 RX ADMIN — METOCLOPRAMIDE 5 MG: 5 INJECTION, SOLUTION INTRAMUSCULAR; INTRAVENOUS at 12:34

## 2022-04-06 RX ADMIN — ACETAMINOPHEN 325MG 975 MG: 325 TABLET ORAL at 23:39

## 2022-04-06 RX ADMIN — GABAPENTIN 300 MG: 300 CAPSULE ORAL at 23:40

## 2022-04-06 RX ADMIN — OXYCODONE HYDROCHLORIDE 10 MG: 5 TABLET ORAL at 16:51

## 2022-04-06 RX ADMIN — DOCUSATE SODIUM 100 MG: 100 CAPSULE, LIQUID FILLED ORAL at 16:48

## 2022-04-06 RX ADMIN — OXYCODONE HYDROCHLORIDE 10 MG: 5 TABLET ORAL at 03:49

## 2022-04-06 RX ADMIN — ONDANSETRON 4 MG: 2 INJECTION INTRAMUSCULAR; INTRAVENOUS at 23:08

## 2022-04-06 RX ADMIN — METOPROLOL TARTRATE 25 MG: 25 TABLET, FILM COATED ORAL at 20:23

## 2022-04-06 RX ADMIN — GABAPENTIN 300 MG: 300 CAPSULE ORAL at 08:19

## 2022-04-06 NOTE — PLAN OF CARE
Problem: PAIN - ADULT  Goal: Verbalizes/displays adequate comfort level or baseline comfort level  Description: Interventions:  - Encourage patient to monitor pain and request assistance  - Assess pain using appropriate pain scale  - Administer analgesics based on type and severity of pain and evaluate response  - Implement non-pharmacological measures as appropriate and evaluate response  - Consider cultural and social influences on pain and pain management  - Notify physician/advanced practitioner if interventions unsuccessful or patient reports new pain  Outcome: Progressing     Problem: INFECTION - ADULT  Goal: Absence or prevention of progression during hospitalization  Description: INTERVENTIONS:  - Assess and monitor for signs and symptoms of infection  - Monitor lab/diagnostic results  - Monitor all insertion sites, i e  indwelling lines, tubes, and drains  - Monitor endotracheal if appropriate and nasal secretions for changes in amount and color  - [place] appropriate cooling/warming therapies per order  - Administer medications as ordered  - Instruct and encourage patient and family to use good hand hygiene technique  - Identify and instruct in appropriate isolation precautions for identified infection/condition  Outcome: Progressing  Goal: Absence of fever/infection during neutropenic period  Description: INTERVENTIONS:  - Monitor WBC    Outcome: Progressing     Problem: SAFETY ADULT  Goal: Patient will remain free of falls  Description: INTERVENTIONS:  - Educate patient/family on patient safety including physical limitations  - Instruct patient to call for assistance with activity   - Consult OT/PT to assist with strengthening/mobility   - Keep Call bell within reach  - Keep bed low and locked with side rails adjusted as appropriate  - Keep care items and personal belongings within reach  - Initiate and maintain comfort rounds  - Make Fall Risk Sign visible to staff  - Offer Toileting every 3 Hours, in advance of need  - Initiate/Maintain bed alarm  - Apply yellow socks and bracelet for high fall risk patients  - Consider moving patient to room near nurses station  Outcome: Progressing  Goal: Maintain or return to baseline ADL function  Description: INTERVENTIONS:  -  Assess patient's ability to carry out ADLs; assess patient's baseline for ADL function and identify physical deficits which impact ability to perform ADLs (bathing, care of mouth/teeth, toileting, grooming, dressing, etc )  - Assess/evaluate cause of self-care deficits   - Assess range of motion  - Assess patient's mobility; develop plan if impaired  - Assess patient's need for assistive devices and provide as appropriate  - Encourage maximum independence but intervene and supervise when necessary  - Involve family in performance of ADLs  - Assess for home care needs following discharge   - Consider OT consult to assist with ADL evaluation and planning for discharge  - Provide patient education as appropriate  Outcome: Progressing  Goal: Maintains/Returns to pre admission functional level  Description: INTERVENTIONS:  - Perform BMAT or MOVE assessment daily    - Set and communicate daily mobility goal to care team and patient/family/caregiver  - Collaborate with rehabilitation services on mobility goals if consulted  - Perform Range of Motion  times a day  - Reposition patient every  hours    - Dangle patient  times a day  - Stand patient  times a day  - Ambulate patient  times a day  - Out of bed to chair  times a day   - Out of bed for meals  times a day  - Out of bed for toileting  - Record patient progress and toleration of activity level   Outcome: Progressing     Problem: DISCHARGE PLANNING  Goal: Discharge to home or other facility with appropriate resources  Description: INTERVENTIONS:  - Identify barriers to discharge w/patient and caregiver  - Arrange for needed discharge resources and transportation as appropriate  - Identify discharge learning needs (meds, wound care, etc )  - Arrange for interpretive services to assist at discharge as needed  - Refer to Case Management Department for coordinating discharge planning if the patient needs post-hospital services based on physician/advanced practitioner order or complex needs related to functional status, cognitive ability, or social support system  Outcome: Progressing     Problem: Knowledge Deficit  Goal: Patient/family/caregiver demonstrates understanding of disease process, treatment plan, medications, and discharge instructions  Description: Complete learning assessment and assess knowledge base    Interventions:  - Provide teaching at level of understanding  - Provide teaching via preferred learning methods  Outcome: Progressing     Problem: GASTROINTESTINAL - ADULT  Goal: Minimal or absence of nausea and/or vomiting  Description: INTERVENTIONS:  - Administer IV fluids if ordered to ensure adequate hydration  - Maintain NPO status until nausea and vomiting are resolved  - Nasogastric tube if ordered  - Administer ordered antiemetic medications as needed  - Provide nonpharmacologic comfort measures as appropriate  - Advance diet as tolerated, if ordered  - Consider nutrition services referral to assist patient with adequate nutrition and appropriate food choices  Outcome: Progressing  Goal: Maintains or returns to baseline bowel function  Description: INTERVENTIONS:  - Assess bowel function  - Encourage oral fluids to ensure adequate hydration  - Administer IV fluids if ordered to ensure adequate hydration  - Administer ordered medications as needed  - Encourage mobilization and activity  - Consider nutritional services referral to assist patient with adequate nutrition and appropriate food choices  Outcome: Progressing  Goal: Maintains adequate nutritional intake  Description: INTERVENTIONS:  - Monitor percentage of each meal consumed  - Identify factors contributing to decreased intake, treat as appropriate  - Assist with meals as needed  - Monitor I&O, weight, and lab values if indicated  - Obtain nutrition services referral as needed  Outcome: Progressing

## 2022-04-06 NOTE — PLAN OF CARE
Problem: PAIN - ADULT  Goal: Verbalizes/displays adequate comfort level or baseline comfort level  Description: Interventions:  - Encourage patient to monitor pain and request assistance  - Assess pain using appropriate pain scale  - Administer analgesics based on type and severity of pain and evaluate response  - Implement non-pharmacological measures as appropriate and evaluate response  - Consider cultural and social influences on pain and pain management  - Notify physician/advanced practitioner if interventions unsuccessful or patient reports new pain  Outcome: Progressing     Problem: INFECTION - ADULT  Goal: Absence or prevention of progression during hospitalization  Description: INTERVENTIONS:  - Assess and monitor for signs and symptoms of infection  - Monitor lab/diagnostic results  - Monitor all insertion sites, i e  indwelling lines, tubes, and drains  - Monitor endotracheal if appropriate and nasal secretions for changes in amount and color  - [place] appropriate cooling/warming therapies per order  - Administer medications as ordered  - Instruct and encourage patient and family to use good hand hygiene technique  - Identify and instruct in appropriate isolation precautions for identified infection/condition  Outcome: Progressing  Goal: Absence of fever/infection during neutropenic period  Description: INTERVENTIONS:  - Monitor WBC    Outcome: Progressing     Problem: SAFETY ADULT  Goal: Patient will remain free of falls  Description: INTERVENTIONS:  - Educate patient/family on patient safety including physical limitations  - Instruct patient to call for assistance with activity   - Consult OT/PT to assist with strengthening/mobility   - Keep Call bell within reach  - Keep bed low and locked with side rails adjusted as appropriate  - Keep care items and personal belongings within reach  - Initiate and maintain comfort rounds  - Make Fall Risk Sign visible to staff  - Offer Toileting every 3 Hours, in advance of need  - Initiate/Maintain bed alarm  - Apply yellow socks and bracelet for high fall risk patients  - Consider moving patient to room near nurses station  Outcome: Progressing  Goal: Maintain or return to baseline ADL function  Description: INTERVENTIONS:  -  Assess patient's ability to carry out ADLs; assess patient's baseline for ADL function and identify physical deficits which impact ability to perform ADLs (bathing, care of mouth/teeth, toileting, grooming, dressing, etc )  - Assess/evaluate cause of self-care deficits   - Assess range of motion  - Assess patient's mobility; develop plan if impaired  - Assess patient's need for assistive devices and provide as appropriate  - Encourage maximum independence but intervene and supervise when necessary  - Involve family in performance of ADLs  - Assess for home care needs following discharge   - Consider OT consult to assist with ADL evaluation and planning for discharge  - Provide patient education as appropriate  Outcome: Progressing  Goal: Maintains/Returns to pre admission functional level  Description: INTERVENTIONS:  - Perform BMAT or MOVE assessment daily    - Set and communicate daily mobility goal to care team and patient/family/caregiver  - Collaborate with rehabilitation services on mobility goals if consulted  - Perform Range of Motion  times a day  - Reposition patient every  hours    - Dangle patient  times a day  - Stand patient  times a day  - Ambulate patient  times a day  - Out of bed to chair  times a day   - Out of bed for meals  times a day  - Out of bed for toileting  - Record patient progress and toleration of activity level   Outcome: Progressing     Problem: DISCHARGE PLANNING  Goal: Discharge to home or other facility with appropriate resources  Description: INTERVENTIONS:  - Identify barriers to discharge w/patient and caregiver  - Arrange for needed discharge resources and transportation as appropriate  - Identify discharge learning needs (meds, wound care, etc )  - Arrange for interpretive services to assist at discharge as needed  - Refer to Case Management Department for coordinating discharge planning if the patient needs post-hospital services based on physician/advanced practitioner order or complex needs related to functional status, cognitive ability, or social support system  Outcome: Progressing     Problem: Knowledge Deficit  Goal: Patient/family/caregiver demonstrates understanding of disease process, treatment plan, medications, and discharge instructions  Description: Complete learning assessment and assess knowledge base    Interventions:  - Provide teaching at level of understanding  - Provide teaching via preferred learning methods  Outcome: Progressing     Problem: GASTROINTESTINAL - ADULT  Goal: Minimal or absence of nausea and/or vomiting  Description: INTERVENTIONS:  - Administer IV fluids if ordered to ensure adequate hydration  - Maintain NPO status until nausea and vomiting are resolved  - Nasogastric tube if ordered  - Administer ordered antiemetic medications as needed  - Provide nonpharmacologic comfort measures as appropriate  - Advance diet as tolerated, if ordered  - Consider nutrition services referral to assist patient with adequate nutrition and appropriate food choices  Outcome: Progressing  Goal: Maintains or returns to baseline bowel function  Description: INTERVENTIONS:  - Assess bowel function  - Encourage oral fluids to ensure adequate hydration  - Administer IV fluids if ordered to ensure adequate hydration  - Administer ordered medications as needed  - Encourage mobilization and activity  - Consider nutritional services referral to assist patient with adequate nutrition and appropriate food choices  Outcome: Progressing  Goal: Maintains adequate nutritional intake  Description: INTERVENTIONS:  - Monitor percentage of each meal consumed  - Identify factors contributing to decreased intake, treat as appropriate  - Assist with meals as needed  - Monitor I&O, weight, and lab values if indicated  - Obtain nutrition services referral as needed  Outcome: Progressing

## 2022-04-06 NOTE — UTILIZATION REVIEW
Elective Surgical Continued Stay Review    Date:  4/6/2022    POD#: 2 (ileostomy reversal: 4/4 )  Current Patient Class: inpt  Current Level of Care: med surg     Assessment/Plan: 77 y o  female, initial surgery date 4/4  Exam ABD soft mildly distended & tender palpation near RL quadrant; incision clean/dry  KAPADIA; awake, on 2 L NC, alert, lungs clear  Pain controlled & denies nausea Tolerating soft diet, adv & DC IVF  Maria Elena removed from wound today; Await bowel movement   Up OOB, PT/OT     Pertinent Labs/Diagnostic Results:   Results from last 7 days   Lab Units 03/31/22  1247   SARS-COV-2  Negative     Results from last 7 days   Lab Units 04/06/22  0504 04/05/22  0449   WBC Thousand/uL 5 41 12 28*   HEMOGLOBIN g/dL 10 9* 11 5   HEMATOCRIT % 32 3* 35 0   PLATELETS Thousands/uL 203 214   NEUTROS ABS Thousands/µL  --  9 50*         Results from last 7 days   Lab Units 04/06/22  0504 04/05/22  0449   SODIUM mmol/L 146* 143   POTASSIUM mmol/L 3 5 4 0   CHLORIDE mmol/L 111* 106   CO2 mmol/L 26 28   ANION GAP mmol/L 9 9   BUN mg/dL 12 20   CREATININE mg/dL 0 76 0 99   EGFR ml/min/1 73sq m 82 59   CALCIUM mg/dL 8 6 8 7             Results from last 7 days   Lab Units 04/06/22  0504 04/05/22  0449   GLUCOSE RANDOM mg/dL 90 90          Vital Signs:   Date/Time Temp Pulse Resp BP MAP (mmHg) SpO2 Calculated FIO2 (%) - Nasal Cannula Nasal Cannula O2 Flow Rate (L/min) O2 Device Cardiac (WDL) Patient Position - Orthostatic VS   04/06/22 08:14:42 99 °F (37 2 °C) 78 -- 142/87 105 93 % -- -- None (Room air) -- --   04/06/22 08:05:01 -- 66 -- 134/83 100 91 % -- -- -- -- --   04/06/22 03:36:37 -- 67 18 132/79 97 94 % -- -- -- -- --   04/05/22 23:54:23 -- 61 20 128/68 88 94 % -- -- -- -- --   04/05/22 2121 -- -- -- -- -- -- 28 2 L/min Nasal cannula -- --   04/05/22 19:04:21 98 1 °F (36 7 °C) 67 18 140/88 105 96 % -- -- -- -- --     Medications:   Scheduled Medications:  acetaminophen, 975 mg, Oral, Q8H GUZMAN  atorvastatin, 10 mg, Oral, Daily  enoxaparin, 40 mg, Subcutaneous, Daily  gabapentin, 300 mg, Oral, TID  nicotine, 1 patch, Transdermal, Daily    Continuous IV Infusions:     PRN Meds:  albuterol, 2 puff, Inhalation, 4x Daily PRN  HYDROmorphone, 0 5 mg, Intravenous, Q4H PRN 4/4 x1, 4/5 x2  ondansetron, 4 mg, Intravenous, Q6H PRN 4/4 x1, 4/5 x1, 4/6 x1  oxyCODONE, 10 mg, Oral, Q4H PRN  4/4 x1; 4/5 x3;  4/6 x2  oxyCODONE, 5 mg, Oral, Q4H PRN  SUMAtriptan, 100 mg, Oral, Once PRN    Discharge Plan: D    Network Utilization Review Department  ATTENTION: Please call with any questions or concerns to [phone] and carefully listen to the prompts so that you are directed to the right person  All voicemails are confidential   Irasema Lomeli all requests for admission clinical reviews, approved or denied determinations and any other requests to dedicated fax number below belonging to the campus where the patient is receiving treatment   List of dedicated fax numbers for the Facilities:  [address] [place] [address] DENIALS (Administrative/Medical Necessity) [phone]   [address] (Maternity/NICU/Pediatrics) [phone]   [address]  [address] [address] [place] [address] [address] [facility] [place] [address] [phone]

## 2022-04-06 NOTE — CASE MANAGEMENT
Case Management Discharge Planning Note    Patient name Adrian Boot  Location [place] [address] /E5 MS 65-* MRN [id number]  : 1955 Date 2022       Current Admission Date: 2022  Current Admission Diagnosis:Ileostomy in place [facility])   Patient Active Problem List    Diagnosis Date Noted    Hearing impaired     Wears dentures     Fibromyalgia     Ileostomy dysfunction ([facility] [address] [facility] ) 2021    Status post laparoscopic colectomy 2021    [facility]leostomy in place [facility]) 2021    GERD (gastroesophageal reflux disease)     Moderate protein-calorie malnutrition ([facility] [address] ) 10/13/2021    History of Diverticulitis large intestine 2021    Functional diarrhea 2021    Seasonal allergic rhinitis 2020    Migraine without status migrainosus, not intractable 2020    Asthma     Generalized anxiety disorder 2020    Essential hypertension 2020    Essential tremor 2020    Esophageal dysphagia 2019    Dyspepsia 2019    Moderate episode of recurrent major depressive disorder ([facility] [address] ) 2017      LOS (days): 2  Geometric Mean LOS (GMLOS) (days): 1 90  Days to GMLOS:0     OBJECTIVE:  Risk of Unplanned Readmission Score: 13         Current admission status: Inpatient   Preferred Pharmacy:   [id number] [place] [address]  Phone: [phone] Fax: [phone] [address] W  [facility] [address]  Phone: [phone] Fax: [phone]    Primary Care Provider: Brandi Hernandez DO    Primary Insurance: Chelsey GENTILE  Secondary Insurance:     DISCHARGE DETAILS:      [address]         Is the patient interested in [facility] at discharge?: Yes  [address] requested[de-identified] [address] Name[de-identified] Other ([address] [place])  [address] [place] [address] Provider[de-identified] PCP  Home Health Services Needed[de-identified] Wound/Ostomy Care  Homebound Criteria Met[de-identified] Requires the Assistance of Another Person for Safe Ambulation or to Leave the Home,Uses an Assist Device (i e  cane, walker, etc)  Supporting Clincal Findings[de-identified] Limited Endurance,Fatigues Easliy in Short Distances    DME Referral Provided  Referral made for DME?: No    Other Referral/Resources/Interventions Provided:  Interventions: [facility]    Discharge Destination Plan[de-identified] Home with [place] at Discharge : Family    Patient will resume services w/ [facility]  No additional CM needs anticipated

## 2022-04-06 NOTE — PROGRESS NOTES
Progress Note    Priscila Lima 77 y o  female MRN: [id number]  Unit/Bed#: E5 -01 Encounter: [id number]    Assessment:  71-yr old F: now s/p ileostomy reversal: 4/4  Vitals stable, afebrile    PLAN:  -soft diet as tolerated  -DC IV fluids  -up out of bed ambulate   -PT OT   -await bowel movement  -kal removed in their entirety from wound today    Subjective:   Patient seen and examined at bedside  No acute events overnight  Pain controlled  Denies nausea, vomiting, fever, chills  Positive flatus, denies bowel movement  Up out of bed, ambulating          Objective:     Vitals: Temp:  [98 1 °F (36 7 °C)-98 6 °F (37 °C)] 98 1 °F (36 7 °C)  HR:  [56-67] 66  Resp:  [18-20] 18  BP: (108-140)/(66-88) 134/83  Body mass index is 20 13 kg/m²  I/O       04/03 0701 04/04 0700 04/04 0701 04/05 0700 04/05 0701  04/06 0700    I V  (mL/kg)  1000 (18 8)     Total Intake(mL/kg)  1000 (18 8)     Blood  20     Total Output  20     Net  +980                  Physical Exam:  General: NAD  Head: normocephalic, atraumatic  CV: Pulse regular  Lungs: no conversational dyspnea  Abdomen:  Soft, mildly distended, tender palpation near right lower quadrant surgeon, incision is clean dry intact  Extremities: KAPADIA, motor sensory intact  Neuro: awake, alert, answers questions appropriately      Lab, Imaging and other studies:   I have personally reviewed pertinent reports    , CBC with diff:   Lab Results   Component Value Date    WBC 5 41 04/06/2022    HGB 10 9 (L) 04/06/2022    HCT 32 3 (L) 04/06/2022     (H) 04/06/2022     04/06/2022    MCH 37 1 (H) 04/06/2022    MCHC 33 7 04/06/2022    RDW 12 8 04/06/2022    MPV 10 0 04/06/2022   , BMP/CMP:   Lab Results   Component Value Date    SODIUM 146 (H) 04/06/2022    K 3 5 04/06/2022     (H) 04/06/2022    CO2 26 04/06/2022    BUN 12 04/06/2022    CREATININE 0 76 04/06/2022    CALCIUM 8 6 04/06/2022    EGFR 82 04/06/2022   , Magnesium: No components found for: MAG  VTE Pharmacologic Prophylaxis: Enoxaparin (Lovenox)  VTE Mechanical Prophylaxis: sequential compression device

## 2022-04-06 NOTE — PLAN OF CARE
Problem: PAIN - ADULT  Goal: Verbalizes/displays adequate comfort level or baseline comfort level  Description: Interventions:  - Encourage patient to monitor pain and request assistance  - Assess pain using appropriate pain scale  - Administer analgesics based on type and severity of pain and evaluate response  - Implement non-pharmacological measures as appropriate and evaluate response  - Consider cultural and social influences on pain and pain management  - Notify physician/advanced practitioner if interventions unsuccessful or patient reports new pain  Outcome: Progressing     Problem: INFECTION - ADULT  Goal: Absence or prevention of progression during hospitalization  Description: INTERVENTIONS:  - Assess and monitor for signs and symptoms of infection  - Monitor lab/diagnostic results  - Monitor all insertion sites, i e  indwelling lines, tubes, and drains  - Monitor endotracheal if appropriate and nasal secretions for changes in amount and color  - Lane appropriate cooling/warming therapies per order  - Administer medications as ordered  - Instruct and encourage patient and family to use good hand hygiene technique  - Identify and instruct in appropriate isolation precautions for identified infection/condition  Outcome: Progressing  Goal: Absence of fever/infection during neutropenic period  Description: INTERVENTIONS:  - Monitor WBC    Outcome: Progressing     Problem: SAFETY ADULT  Goal: Patient will remain free of falls  Description: INTERVENTIONS:  - Educate patient/family on patient safety including physical limitations  - Instruct patient to call for assistance with activity   - Consult OT/PT to assist with strengthening/mobility   - Keep Call bell within reach  - Keep bed low and locked with side rails adjusted as appropriate  - Keep care items and personal belongings within reach  - Initiate and maintain comfort rounds  - Make Fall Risk Sign visible to staff  - Offer Toileting every 3 Hours, in advance of need  - Initiate/Maintain bed alarm  - Apply yellow socks and bracelet for high fall risk patients  - Consider moving patient to room near nurses station  Outcome: Progressing  Goal: Maintain or return to baseline ADL function  Description: INTERVENTIONS:  -  Assess patient's ability to carry out ADLs; assess patient's baseline for ADL function and identify physical deficits which impact ability to perform ADLs (bathing, care of mouth/teeth, toileting, grooming, dressing, etc )  - Assess/evaluate cause of self-care deficits   - Assess range of motion  - Assess patient's mobility; develop plan if impaired  - Assess patient's need for assistive devices and provide as appropriate  - Encourage maximum independence but intervene and supervise when necessary  - Involve family in performance of ADLs  - Assess for home care needs following discharge   - Consider OT consult to assist with ADL evaluation and planning for discharge  - Provide patient education as appropriate  Outcome: Progressing  Goal: Maintains/Returns to pre admission functional level  Description: INTERVENTIONS:  - Perform BMAT or MOVE assessment daily    - Set and communicate daily mobility goal to care team and patient/family/caregiver  - Collaborate with rehabilitation services on mobility goals if consulted  - Perform Range of Motion  times a day  - Reposition patient every  hours    - Dangle patient  times a day  - Stand patient  times a day  - Ambulate patient  times a day  - Out of bed to chair  times a day   - Out of bed for meals  times a day  - Out of bed for toileting  - Record patient progress and toleration of activity level   Outcome: Progressing     Problem: DISCHARGE PLANNING  Goal: Discharge to home or other facility with appropriate resources  Description: INTERVENTIONS:  - Identify barriers to discharge w/patient and caregiver  - Arrange for needed discharge resources and transportation as appropriate  - Identify discharge learning needs (meds, wound care, etc )  - Arrange for interpretive services to assist at discharge as needed  - Refer to Case Management Department for coordinating discharge planning if the patient needs post-hospital services based on physician/advanced practitioner order or complex needs related to functional status, cognitive ability, or social support system  Outcome: Progressing     Problem: Knowledge Deficit  Goal: Patient/family/caregiver demonstrates understanding of disease process, treatment plan, medications, and discharge instructions  Description: Complete learning assessment and assess knowledge base    Interventions:  - Provide teaching at level of understanding  - Provide teaching via preferred learning methods  Outcome: Progressing     Problem: GASTROINTESTINAL - ADULT  Goal: Minimal or absence of nausea and/or vomiting  Description: INTERVENTIONS:  - Administer IV fluids if ordered to ensure adequate hydration  - Maintain NPO status until nausea and vomiting are resolved  - Nasogastric tube if ordered  - Administer ordered antiemetic medications as needed  - Provide nonpharmacologic comfort measures as appropriate  - Advance diet as tolerated, if ordered  - Consider nutrition services referral to assist patient with adequate nutrition and appropriate food choices  Outcome: Progressing  Goal: Maintains or returns to baseline bowel function  Description: INTERVENTIONS:  - Assess bowel function  - Encourage oral fluids to ensure adequate hydration  - Administer IV fluids if ordered to ensure adequate hydration  - Administer ordered medications as needed  - Encourage mobilization and activity  - Consider nutritional services referral to assist patient with adequate nutrition and appropriate food choices  Outcome: Progressing  Goal: Maintains adequate nutritional intake  Description: INTERVENTIONS:  - Monitor percentage of each meal consumed  - Identify factors contributing to decreased intake, treat as appropriate  - Assist with meals as needed  - Monitor I&O, weight, and lab values if indicated  - Obtain nutrition services referral as needed  Outcome: Progressing

## 2022-04-07 VITALS
HEIGHT: 64 IN | SYSTOLIC BLOOD PRESSURE: 130 MMHG | OXYGEN SATURATION: 95 % | DIASTOLIC BLOOD PRESSURE: 80 MMHG | BODY MASS INDEX: 20.02 KG/M2 | TEMPERATURE: 98.2 F | HEART RATE: 72 BPM | WEIGHT: 117.28 LBS | RESPIRATION RATE: 20 BRPM

## 2022-04-07 DIAGNOSIS — I10 ESSENTIAL HYPERTENSION: Primary | ICD-10-CM

## 2022-04-07 PROBLEM — Z93.2 ILEOSTOMY IN PLACE (HCC): Status: RESOLVED | Noted: 2021-11-17 | Resolved: 2022-04-07

## 2022-04-07 PROCEDURE — 99024 POSTOP FOLLOW-UP VISIT: CPT | Performed by: PHYSICIAN ASSISTANT

## 2022-04-07 PROCEDURE — NC001 PR NO CHARGE: Performed by: SURGERY

## 2022-04-07 RX ORDER — OXYCODONE HYDROCHLORIDE 5 MG/1
5 TABLET ORAL EVERY 4 HOURS PRN
Qty: 10 TABLET | Refills: 0 | Status: SHIPPED | OUTPATIENT
Start: 2022-04-07 | End: 2022-04-12 | Stop reason: SDUPTHER

## 2022-04-07 RX ORDER — LISINOPRIL 2.5 MG/1
2.5 TABLET ORAL DAILY
Qty: 90 TABLET | Refills: 3 | Status: SHIPPED | OUTPATIENT
Start: 2022-04-07 | End: 2022-06-20 | Stop reason: SDUPTHER

## 2022-04-07 RX ORDER — DOCUSATE SODIUM 100 MG/1
100 CAPSULE, LIQUID FILLED ORAL 2 TIMES DAILY
Qty: 28 CAPSULE | Refills: 0 | Status: SHIPPED | OUTPATIENT
Start: 2022-04-07 | End: 2022-04-20

## 2022-04-07 RX ADMIN — SUMATRIPTAN SUCCINATE 100 MG: 50 TABLET ORAL at 03:56

## 2022-04-07 RX ADMIN — Medication 1 PATCH: at 05:54

## 2022-04-07 RX ADMIN — ONDANSETRON 4 MG: 2 INJECTION INTRAMUSCULAR; INTRAVENOUS at 06:02

## 2022-04-07 RX ADMIN — GABAPENTIN 300 MG: 300 CAPSULE ORAL at 08:20

## 2022-04-07 RX ADMIN — ACETAMINOPHEN 325MG 975 MG: 325 TABLET ORAL at 05:54

## 2022-04-07 RX ADMIN — DOCUSATE SODIUM 100 MG: 100 CAPSULE, LIQUID FILLED ORAL at 08:20

## 2022-04-07 RX ADMIN — ATORVASTATIN CALCIUM 10 MG: 10 TABLET, FILM COATED ORAL at 08:20

## 2022-04-07 RX ADMIN — OXYCODONE HYDROCHLORIDE 10 MG: 5 TABLET ORAL at 01:10

## 2022-04-07 RX ADMIN — OXYCODONE HYDROCHLORIDE 10 MG: 5 TABLET ORAL at 05:54

## 2022-04-07 RX ADMIN — ENOXAPARIN SODIUM 40 MG: 40 INJECTION SUBCUTANEOUS at 08:20

## 2022-04-07 NOTE — PLAN OF CARE
Problem: PAIN - ADULT  Goal: Verbalizes/displays adequate comfort level or baseline comfort level  Description: Interventions:  - Encourage patient to monitor pain and request assistance  - Assess pain using appropriate pain scale  - Administer analgesics based on type and severity of pain and evaluate response  - Implement non-pharmacological measures as appropriate and evaluate response  - Consider cultural and social influences on pain and pain management  - Notify physician/advanced practitioner if interventions unsuccessful or patient reports new pain  Outcome: Progressing     Problem: INFECTION - ADULT  Goal: Absence or prevention of progression during hospitalization  Description: INTERVENTIONS:  - Assess and monitor for signs and symptoms of infection  - Monitor lab/diagnostic results  - Monitor all insertion sites, i e  indwelling lines, tubes, and drains  - Monitor endotracheal if appropriate and nasal secretions for changes in amount and color  - [place] appropriate cooling/warming therapies per order  - Administer medications as ordered  - Instruct and encourage patient and family to use good hand hygiene technique  - Identify and instruct in appropriate isolation precautions for identified infection/condition  Outcome: Progressing  Goal: Absence of fever/infection during neutropenic period  Description: INTERVENTIONS:  - Monitor WBC    Outcome: Progressing     Problem: DISCHARGE PLANNING  Goal: Discharge to home or other facility with appropriate resources  Description: INTERVENTIONS:  - Identify barriers to discharge w/patient and caregiver  - Arrange for needed discharge resources and transportation as appropriate  - Identify discharge learning needs (meds, wound care, etc )  - Arrange for interpretive services to assist at discharge as needed  - Refer to Case Management Department for coordinating discharge planning if the patient needs post-hospital services based on physician/advanced practitioner order or complex needs related to functional status, cognitive ability, or social support system  Outcome: Progressing     Problem: Knowledge Deficit  Goal: Patient/family/caregiver demonstrates understanding of disease process, treatment plan, medications, and discharge instructions  Description: Complete learning assessment and assess knowledge base    Interventions:  - Provide teaching at level of understanding  - Provide teaching via preferred learning methods  Outcome: Progressing     Problem: GASTROINTESTINAL - ADULT  Goal: Minimal or absence of nausea and/or vomiting  Description: INTERVENTIONS:  - Administer IV fluids if ordered to ensure adequate hydration  - Maintain NPO status until nausea and vomiting are resolved  - Nasogastric tube if ordered  - Administer ordered antiemetic medications as needed  - Provide nonpharmacologic comfort measures as appropriate  - Advance diet as tolerated, if ordered  - Consider nutrition services referral to assist patient with adequate nutrition and appropriate food choices  Outcome: Progressing  Goal: Maintains or returns to baseline bowel function  Description: INTERVENTIONS:  - Assess bowel function  - Encourage oral fluids to ensure adequate hydration  - Administer IV fluids if ordered to ensure adequate hydration  - Administer ordered medications as needed  - Encourage mobilization and activity  - Consider nutritional services referral to assist patient with adequate nutrition and appropriate food choices  Outcome: Progressing  Goal: Maintains adequate nutritional intake  Description: INTERVENTIONS:  - Monitor percentage of each meal consumed  - Identify factors contributing to decreased intake, treat as appropriate  - Assist with meals as needed  - Monitor I&O, weight, and lab values if indicated  - Obtain nutrition services referral as needed  Outcome: Progressing

## 2022-04-07 NOTE — PROGRESS NOTES
Progress Note    Jet Everett 77 y o  female MRN: [id number]  Unit/Bed#: E5 -01 Encounter: [id number]    Assessment:  71-yr old F: now s/p ileostomy reversal: 4/4  Stable VS in room air  Afebrile  PLAN:  - diet as tolerated  - ambulate  - bowel regimen   - discharge home later today if doing ok  Subjective:   - no new complaints this morning  Objective:     Vitals: Temp:  [98 2 °F (36 8 °C)-98 6 °F (37 °C)] 98 2 °F (36 8 °C)  HR:  [60-84] 72  Resp:  [20-22] 22  BP: (128-162)/(79-99) 130/80  Body mass index is 20 13 kg/m²  I/O       04/05 0701  04/06 0700 04/06 0701  04/07 0700 04/07 0701 04/08 0700    I V  (mL/kg)       Total Intake(mL/kg)       Blood       Total Output       Net                    Physical Exam:  GEN: not in any painful distress  HEENT: atraumatic  CV: RRR  Lung: Normal effort on room air  Ab: Soft, ND, appro tender; dressing dry  Extrem: No CCE  Neuro:  A+Ox3    Lab, Imaging and other studies:    VTE Pharmacologic Prophylaxis: Enoxaparin (Lovenox)  VTE Mechanical Prophylaxis: sequential compression device

## 2022-04-07 NOTE — DISCHARGE INSTRUCTIONS
[facility]  Post-Operative Care Instructions  Dr Ambika Blevins MD, [facility]  [phone]    1  General: You will feel pulling sensations around the wound or funny aches and pains around the incisions  This is normal  Even minor surgery is a change in your body and this is your bodys way of reaction to it  If you have had abdominal surgery, it may help to support the incision with a small pillow or blanket for comfort when moving or coughing  2  Wound care:  Okay to shower  The glue will fall off over the next week or 2     3  Water: You may shower over the wound, unless there are drain tubes left in place  Do not bathe or use a pool or hot tub until cleared by the physician  4  Activity: You may go up and down stairs, walk as much as you are comfortable, but walk at least 3 times each day  If you have had abdominal surgery, do not lift anything heavier than 15 pounds for at least 2-4 weeks, unless cleared by the doctor  5  Diet: You may resume a regular diet  If you had a same-day surgery or overnight stay surgery, he may wish to eat lightly for a few days: soups, crackers, and sandwiches  You may resume a regular diet when ready  6  Medications: Resume all of your previous medications, unless told otherwise by the doctor  Avoid aspirin or ibuprofen (Advil, Motrin, etc ) products for 2-3 days after the date of surgery  You may, at that time, began to take them again  Tylenol is always fine, unless you are taking any narcotic pain medication containing Tylenol (such as Percocet, Darvocet, Vicodin, or anything containing acetaminophen)  Do not take Tylenol if you're taking these medications  You do not need to take the narcotic pain medications unless you are having significant pain and discomfort  7  Driving: You will need someone to drive you home on the day of surgery   Do not drive or make any important decisions while on narcotic pain medication or 24 hours and after anesthesia or sedation for surgery  Generally, you may drive when your off all narcotic pain medications  8  Upset Stomach: You may take Maalox, Tums, or similar items for an upset stomach  If your narcotic pain medication causes an upset stomach, do not take it on an empty stomach  Try taking it with at least some crackers or toast      9  Constipation: Patients often experienced constipation after surgery  You may take over-the-counter medication for this, such as Metamucil, Senokot, Dulcolax, milk of magnesia, etc  You may take a suppository unless you have had anorectal surgery such as a procedure on your hemorrhoids  If you experience significant nausea or vomiting after abdominal surgery, call the office before trying any of these medications  10  Call the office: If you are experiencing any of the following, fevers above 101 5°, significant nausea or vomiting, if the wound develops drainage and/or is excessive redness around the wound, or if you have significant diarrhea or other worsening symptoms  11  Pain: You may be given a prescription for pain  This will be given to the hospital, the day of surgery  12  Sexual Activity: You may resume sexual activity when you feel ready and comfortable and your incision is sealed and healed without apparent infection risk  13  Urination: If you haven't urinated in 6 hours, go directly to the ER for evaluation for urinary retention  14  Follow-up in 2 weeks

## 2022-04-07 NOTE — DISCHARGE SUMMARY
Discharge Summary - General Surgery   Jeanenne Osler [facility] 77 y o  female MRN: [id number]  Unit/Bed#: E5 -01 Encounter: [id number]    Admission Date: 4/4/2022     Discharge Date: 4/7/2022    Admitting Diagnosis: Ileostomy in place [facility]) [Z93 2]    Discharge Diagnosis:  Same    Attending and Service: Blanca Barry MD    Consulting Physician(s):  None    Procedures Performed:  Loop ileostomy reversal    Imaging:  No results found  Hospital Course:   Priscila Lima is a 77 y o  female who presented 4/4/2022 with loop ileostomy in place  The patient was taken to the operating room for outpatient schedule reversal on 04/04/2022  Intraoperative findings included:  Functional, healthy loop ileostomy  The patient hospital course was uncomplicated  Her diet was advanced as tolerated to a regular diet without pain, nausea, vomiting  Patient was discharged on POD3  On the day of discharge, the patient was voiding spontaneously, ambulating at baseline, tolerating a regular diet, and pain was well controlled  The patient was sent home with medication for pain, stool softener  She understood all instructions for discharge  She was also given the names and numbers of the providers as well as instructions for follow up appointments  Condition at Discharge: good     Discharge instructions/Information to patient and family:   See after visit summary for information provided to patient and family  Provisions for Follow-Up Care:  See after visit summary for information related to follow-up care and any pertinent home health orders  Disposition: Home    Planned Readmission: No    Discharge Statement   I spent 30 minutes discharging the patient  This time was spent on the day of discharge  I had direct contact with the patient on the day of discharge  Additional documentation is required if more than 30 minutes were spent on discharge       Discharge Medications:  See after visit summary for reconciled discharge medications provided to patient and family      Melissa Meléndez PA-C  4/7/2022

## 2022-04-08 ENCOUNTER — TELEPHONE (OUTPATIENT)
Dept: SURGERY | Facility: CLINIC | Age: 67
End: 2022-04-08

## 2022-04-08 ENCOUNTER — PATIENT OUTREACH (OUTPATIENT)
Dept: FAMILY MEDICINE CLINIC | Facility: CLINIC | Age: 67
End: 2022-04-08

## 2022-04-08 ENCOUNTER — TRANSITIONAL CARE MANAGEMENT (OUTPATIENT)
Dept: FAMILY MEDICINE CLINIC | Facility: CLINIC | Age: 67
End: 2022-04-08

## 2022-04-08 NOTE — TELEPHONE ENCOUNTER
Post op call  Spoke to patient  Patient is doing well  Patient ambulating well  No questions or concerns  Reminded patient of post op appointment and to call if there are any questions or concerns prior to appointment  Patient requested more pain meds through nurse

## 2022-04-08 NOTE — PROGRESS NOTES
Outpatient Care Management Note: Spoke with pt to f/u on her hospital discharge  She will have Jose Graham visit this weekend  She states that the incision line is covered with a gauze currently and she can shower  Advised to allow water to run over incision at the end of shower  She has been using Oxycodone for pain and verbalized that she is worried about when it runs out since she was given 10 tablets  Advised pt to call dr if she still has pain at that time  She admits to walking often and had a bowel movement last night  Pt has been tolerating oral intake and supplementing between meals with BOOST  She commented that it is difficult to get up out of bed and discussed side lying approach for getting up   Will follow with pt

## 2022-04-09 ENCOUNTER — TELEPHONE (OUTPATIENT)
Dept: OTHER | Facility: OTHER | Age: 67
End: 2022-04-09

## 2022-04-09 ENCOUNTER — NURSE TRIAGE (OUTPATIENT)
Dept: OTHER | Facility: OTHER | Age: 67
End: 2022-04-09

## 2022-04-09 DIAGNOSIS — K94.13 ILEOSTOMY DYSFUNCTION (HCC): Primary | ICD-10-CM

## 2022-04-09 RX ORDER — CYCLOBENZAPRINE HCL 10 MG
10 TABLET ORAL 3 TIMES DAILY PRN
Qty: 21 TABLET | Refills: 0 | Status: SHIPPED | OUTPATIENT
Start: 2022-04-09

## 2022-04-09 NOTE — TELEPHONE ENCOUNTER
Pt  had an ileostomy reversal on 04/04/2022 and states this morning her pain went to 9 out of 10 reduce to an 8 with pain medication  TC on call for general surgery     As per on call    *Ibuprofen 800mG three (3) times a day  *Muscle relaxer will be called in to [facility] pharmacy for incisional pain  *Continue stool softener while on oxycodone  Advised patient, patient expressed understanding and is aware to call back if develops any other symptoms or if symptoms worsens  Verified pharmacy with patient

## 2022-04-09 NOTE — TELEPHONE ENCOUNTER
Reason for Disposition   [1] SEVERE post-op pain (e g , excruciating, pain scale 8-10) AND [2] not controlled with pain medications    Answer Assessment - Initial Assessment Questions  1  SYMPTOM: "What's the main symptom you're concerned about?" (e g , pain, fever, vomiting)      " pain it has been since the surgery "    2  ONSET: "When did  start?"      "  Its been since surgery but today its pretty bad"    3  SURGERY: "What surgery was performed?"      "ileostomy reversal"    4  DATE of SURGERY: "When was surgery performed?"       " 04/04/2022    5  ANESTHESIA: " What type of anesthesia did you have?" (e g , general, spinal, epidural, local)      "general"    6  PAIN: "Is there any pain?" If Yes, ask: "How bad is it?"  (Scale 1-10; or mild, moderate, severe)      " it was 9 out of 10 this morning it went down 8 of 10 with tylenol and 1 oxy "    7  FEVER: "Do you have a fever?" If Yes, ask: "What is your temperature, how was it measured, and when did it start?"      Temp while on phone 99 2F    8  VOMITING: "Is there any vomiting?" If yes, ask: "How many times?"      Denies     9  BLEEDING: "Is there any bleeding?" If Yes, ask: "How much?" and "Where?"      Denies     10   OTHER SYMPTOMS: "Do you have any other symptoms?" (e g , drainage from wound, painful urination, constipation)        " I had a bowel movement "    Protocols used: POST-OP SYMPTOMS AND QUESTIONS-[facility]

## 2022-04-09 NOTE — TELEPHONE ENCOUNTER
Regarding: Post-op Problem: pain  ----- Message from Satnam Gilbert sent at 4/9/2022 11:13 AM EDT -----  "I had surgery on on 4/4  I came home yesterday  I need a refill on my oxycodone because I am in post-op pain   I also want to know if I should continue taking my laxatives if I already had a bowel movement "

## 2022-04-09 NOTE — TELEPHONE ENCOUNTER
Henry Teixeira called from [address] [place] [address] Road regarding Incision site red and Sore and has a Slight Fever      Paged the on call Provider

## 2022-04-12 ENCOUNTER — TELEPHONE (OUTPATIENT)
Dept: FAMILY MEDICINE CLINIC | Facility: CLINIC | Age: 67
End: 2022-04-12

## 2022-04-12 ENCOUNTER — NURSE TRIAGE (OUTPATIENT)
Dept: OTHER | Facility: OTHER | Age: 67
End: 2022-04-12

## 2022-04-12 NOTE — TELEPHONE ENCOUNTER
I called and spoke with the Pt and she does have the hydrocodone and will try that  She was trying to be cautious with her belly because things have been going well and she did not want to mess anything up  She also stated her called Dr Joana Horvath but did not get a reply she is out of tylenol but has aleve and ibuprofen at home and would like to know which she take as well for the pain?

## 2022-04-12 NOTE — TELEPHONE ENCOUNTER
Regarding: Abdominal pain  ----- Message from Gianluca Senior RN sent at 4/12/2022  5:05 PM EDT -----  I recently had a reverse ileostomy and I have some pain

## 2022-04-12 NOTE — TELEPHONE ENCOUNTER
Patient does have the hydrocodone that I gave her recently that she normally got from her pain specialist whom she was not able to see for some time? If she trying to use that for the pain?

## 2022-04-12 NOTE — TELEPHONE ENCOUNTER
I called and spoke with the Pt and made her aware, she verbalized understanding an agreement with plan  Pt is also scheduled for a follow up with you on 4/20 from recent hospital stay

## 2022-04-12 NOTE — PROGRESS NOTES
Looks like she seeing Dr Kay Sorenson on April 19  I am assuming that is going to be in person as it is up postoperative check  We can certainly schedule her for something after that  There is another task in her chart requesting pain medications    Some make sure we answer both of these together/combined

## 2022-04-12 NOTE — TELEPHONE ENCOUNTER
Abena Viera called stating she is in a lot of pain and she has been out of Oxycodone 5 immediate release tablets for a couple days  She stated that the medication did work for the pain  Would you be able to send in another script for patient  Patient uses [facility] in [place]

## 2022-04-12 NOTE — TELEPHONE ENCOUNTER
Would definitely AVOID ibuprofen (GI irritant)-The hydrocodone has tylenol in it also so she wouldn't need extra tylenol    I am OK with calling in a FEW more of the oxycodone IR that she did get from the surgeon to use for another day or so (obviously , not to use both oxycodone and hydrocodone at the same time)

## 2022-04-12 NOTE — TELEPHONE ENCOUNTER
Returned patient's phone call and she states she is not interested  Patient already spoke with someone in office regarding her pain  Reason for Disposition   Caller has already spoken with the PCP and has no further questions      Protocols used: NO CONTACT OR DUPLICATE CONTACT CALL-ADULT-

## 2022-04-19 ENCOUNTER — OFFICE VISIT (OUTPATIENT)
Dept: SURGERY | Facility: CLINIC | Age: 67
End: 2022-04-19

## 2022-04-19 VITALS — TEMPERATURE: 95.5 F | BODY MASS INDEX: 20.42 KG/M2 | HEIGHT: 64 IN | RESPIRATION RATE: 16 BRPM | WEIGHT: 119.6 LBS

## 2022-04-19 DIAGNOSIS — Z90.49 STATUS POST LAPAROSCOPIC COLECTOMY: Primary | ICD-10-CM

## 2022-04-19 PROCEDURE — 99024 POSTOP FOLLOW-UP VISIT: CPT | Performed by: SURGERY

## 2022-04-19 NOTE — PROGRESS NOTES
Assessment/Plan:    No problem-specific Assessment & Plan notes found for this encounter  There are no diagnoses linked to this encounter  Subjective:      Patient ID: Darwin Reza is a 77 y o  female  Ms Vaughn Palacios   Is a 59-year-old female that is here for evaluation of diverticulitis  She does have a history of irritable bowel type syndrome symptoms and constipation diarrhea  She is having increasing pain and was seen by Gastroenterolog in June had a  Colonoscopy that showed some pus in the sigmoid colon consistent with diverticulitis and therefore the colonoscopy was aborted  She has her last colonoscopy previous that was in 2009 for which she had up to 6 polyps removed  After colonoscopy she was seen by her primary care physician and sent for a stat CT scan that showed diverticulitis  She was treated conservatively with oral antibiotics but failed outpatient management and was admitted on July 7th with diverticulitis and intramural abscess  She was treated conservatively and has been discharged  She was seen back in the emergency department on the 23rd and a CT scan that time showed no evidence of diverticulitis but did show constipation  Currently she states she is still having abdominal pain across her abdomen  She took some pain medicine for the pain recently  She is still having constipation issues as well  10/12/2021 she returns now for re-evaluation  She underwent a colonoscopy but it was incomplete and unsuccessful due to inflammation and angulation in the colon  She had a CT scan that same day that just showed mild diverticulitis and she was restarted antibiotics  However she also developed increasing back pain  She has a longstanding history of back pain and bulge with pain management  She was admitted to [address] [place] [address] September 20th with increasing back pain and was treated accordingly    She did have another repeat CT scan that time that showed mild inflammation but no significant findings  She returns now but she states that she is having trouble eating she feels nauseated has pain all the time pain in her abdomen pain in her back  She is having difficulties moving her bowels  11/17/2021  She is now 2 weeks status post robotic sigmoid colectomy with diverting loop ileostomy  She is doing okay at home  Still feeling weak  Having difficulty with her ostomy bag       12/08/2021 she had a readmission for acute renal failure and inability to manage her ileostomy  She has now been following in the [address] [facility] for better  Control of her ostomy output  She was started on cholestyramine for her loose output  She still is not ambulating well  She still not eating appropriate protein intake  She still smoking heavily  3     01/18/2022 she returns now for evaluation  She is still struggling with the ostomy appliance  I am uncertain why she cannot keep the bag in place but she is very fixated on it and continue only changing it  She has been seen by visiting nurses and also the wound center but is still having issues  In addition she states that she is trying to increase her protein but is not adequate doing so as whenever she has an issue with her bag she loses her appetite and stops eating  She is still smoking a pack a day and is relatively sedentary  03/08/2022 over last month she has been doing very well  Her daughter has been around able to help her with her diet and daily activities  She has gained 10 lb since seen last   She denies nausea vomiting fevers or chills  /  4/19/22 Doing well   Slowly improving      The following portions of the patient's history were reviewed and updated as appropriate: allergies, current medications, past family history, past medical history, past social history, past surgical history and problem list     Review of Systems         Objective:      Temp (!) 95 5 °F (35 3 °C)   Resp 16   Ht 5' 4" (1 626 m) Wt 54 3 kg (119 lb 9 6 oz)   LMP  (LMP Unknown)   BMI 20 53 kg/m²          Physical Exam  Abdominal:      Comments: Staples removed

## 2022-04-20 ENCOUNTER — OFFICE VISIT (OUTPATIENT)
Dept: FAMILY MEDICINE CLINIC | Facility: CLINIC | Age: 67
End: 2022-04-20
Payer: COMMERCIAL

## 2022-04-20 VITALS
BODY MASS INDEX: 20.01 KG/M2 | RESPIRATION RATE: 16 BRPM | WEIGHT: 117.2 LBS | DIASTOLIC BLOOD PRESSURE: 62 MMHG | SYSTOLIC BLOOD PRESSURE: 102 MMHG | TEMPERATURE: 97.5 F | HEART RATE: 87 BPM | HEIGHT: 64 IN | OXYGEN SATURATION: 99 %

## 2022-04-20 DIAGNOSIS — K57.20 DIVERTICULITIS OF LARGE INTESTINE WITH PERFORATION AND ABSCESS WITHOUT BLEEDING: ICD-10-CM

## 2022-04-20 DIAGNOSIS — Z13.820 ENCOUNTER FOR OSTEOPOROSIS SCREENING IN ASYMPTOMATIC POSTMENOPAUSAL PATIENT: ICD-10-CM

## 2022-04-20 DIAGNOSIS — Z98.890 STATUS POST REVERSAL OF ILEOSTOMY: Primary | ICD-10-CM

## 2022-04-20 DIAGNOSIS — Z78.0 ENCOUNTER FOR OSTEOPOROSIS SCREENING IN ASYMPTOMATIC POSTMENOPAUSAL PATIENT: ICD-10-CM

## 2022-04-20 DIAGNOSIS — Z12.31 ENCOUNTER FOR SCREENING MAMMOGRAM FOR BREAST CANCER: ICD-10-CM

## 2022-04-20 PROCEDURE — 3008F BODY MASS INDEX DOCD: CPT | Performed by: FAMILY MEDICINE

## 2022-04-20 PROCEDURE — 1160F RVW MEDS BY RX/DR IN RCRD: CPT | Performed by: FAMILY MEDICINE

## 2022-04-20 PROCEDURE — 1036F TOBACCO NON-USER: CPT | Performed by: FAMILY MEDICINE

## 2022-04-20 PROCEDURE — 99214 OFFICE O/P EST MOD 30 MIN: CPT | Performed by: FAMILY MEDICINE

## 2022-04-20 PROCEDURE — 3725F SCREEN DEPRESSION PERFORMED: CPT | Performed by: FAMILY MEDICINE

## 2022-04-20 PROCEDURE — 1111F DSCHRG MED/CURRENT MED MERGE: CPT | Performed by: FAMILY MEDICINE

## 2022-04-20 NOTE — PROGRESS NOTES
Assessment/Plan:     Diagnoses and all orders for this visit:    Status post reversal of ileostomy    Encounter for screening mammogram for breast cancer  -     Mammo screening bilateral w 3d & cad; Future    Encounter for osteoporosis screening in asymptomatic postmenopausal patient  -     DXA bone density spine hip and pelvis; Future    History of Diverticulitis of large intestine with perforation and abscess without bleeding        Patient will continue follow-up with postoperative surgical   Recheck up here for routine medication management 6 months patient declines vaccinations  Subjective:   Chief Complaint   Patient presents with    Follow-up      follow up for reversal of ileostomy  Pt is not Covid Vaccinated       Patient ID: Alan Chen is a 77 y o  female  Patient is a pleasant 58-year-old female who is here status post reversal of her ileostomy  She had had diverticulitis with abscess that required colon resection and ileostomy  She was finally able to have reversal done  Hospital course was unremarkable  She is trying to get her appetite and weight back up  She is having satisfactor healing  Just saw surgeon  Stitches removed  She states that she will need repeat colonoscopy in 3-6 months  Patient was admitted April 4th and disch arged April 7th after reversal of ileostomy  She remains on her preoperative normal medications  She is making efforts at smoking cessation using the nicotine patches  I also counseled her that she should update her mammogram and DEXA scan sometime in the near future  Requisition slips given to patient with directions on Central scheduling phone number  The following portions of the patient's history were reviewed and updated as appropriate: allergies, current medications, past family history, past medical history, past social history, past surgical history and problem list       Review of Systems   Constitutional: Fatigue: Energy level improving  Respiratory: Negative for shortness of breath  Cardiovascular: Negative for leg swelling  Gastrointestinal:        Stools are formed but a little bit loose   Musculoskeletal: Positive for arthralgias ( stable) and myalgias  Psychiatric/Behavioral: Nervous/anxious:  markedly improved  Objective:      /62   Pulse 87   Temp 97 5 °F (36 4 °C) (Temporal)   Resp 16   Ht 5' 4" (1 626 m)   Wt 53 2 kg (117 lb 3 2 oz)   LMP  (LMP Unknown)   SpO2 99%   BMI 20 12 kg/m²          Physical Exam  Constitutional:       Appearance: Normal appearance  Comments: 59-year-old female appears older than stated age with BMI of 20%   HENT:      Right Ear: Tympanic membrane normal       Left Ear: Tympanic membrane normal       Nose: Nose normal       Mouth/Throat:      Mouth: Mucous membranes are moist    Eyes:      Pupils: Pupils are equal, round, and reactive to light  Cardiovascular:      Rate and Rhythm: Normal rate and regular rhythm  Pulses: Normal pulses  Pulmonary:      Effort: Pulmonary effort is normal       Breath sounds: Normal breath sounds  Abdominal:      General: Abdomen is flat  Palpations: Abdomen is soft  Comments: Umbilicus well-healed  Ileostomy reversal surgical site well healed, approximated without any signs of infection or dehiscence   Neurological:      Mental Status: She is alert and oriented to person, place, and time  Psychiatric:         Mood and Affect: Mood normal          Behavior: Behavior normal          Thought Content:  Thought content normal          Judgment: Judgment normal

## 2022-04-21 ENCOUNTER — TELEPHONE (OUTPATIENT)
Dept: ADMINISTRATIVE | Facility: OTHER | Age: 67
End: 2022-04-21

## 2022-04-21 NOTE — TELEPHONE ENCOUNTER
----- Message from aGbriel Tanner DO sent at 9/08/1042 12:38 PM EDT -----  Regarding: Colorectal screening  Patient has undergone major colon surgery in the last year  She had colon resection and had an ostomy and just had ostomy reversal   She is therefore not a candidate right now for colorectal cancer screening update    The wall depend on her final postsurgical released from [address] [place] [address] Carmencita mitchell on when it will be medically recommended for next colonoscopy

## 2022-04-21 NOTE — TELEPHONE ENCOUNTER
Upon review of the In Basket request we have noted that document found 4/20/22, states repeat 3-6mo for colonoscopy HM is reflected and Up to date at 6mo recall  because of this we are requesting that you forward this request/concern to the [email or web address] email  The Quality team members assigned to this email will be more than happy to assist you  Any additional questions or concerns should be emailed to the Practice Liaisons via [email or web address] email, please do not reply via In Basket      Thank you  Alanis Decker

## 2022-04-26 DIAGNOSIS — M79.2 NEUROPATHIC PAIN: ICD-10-CM

## 2022-04-26 DIAGNOSIS — G43.909 MIGRAINE WITHOUT STATUS MIGRAINOSUS, NOT INTRACTABLE, UNSPECIFIED MIGRAINE TYPE: ICD-10-CM

## 2022-04-26 RX ORDER — SUMATRIPTAN 100 MG/1
100 TABLET, FILM COATED ORAL ONCE AS NEEDED
Qty: 9 TABLET | Refills: 0 | Status: SHIPPED | OUTPATIENT
Start: 2022-04-26 | End: 2022-06-01 | Stop reason: SDUPTHER

## 2022-04-26 RX ORDER — GABAPENTIN 300 MG/1
300 CAPSULE ORAL 3 TIMES DAILY
Qty: 90 CAPSULE | Refills: 0 | Status: SHIPPED | OUTPATIENT
Start: 2022-04-26 | End: 2022-06-01 | Stop reason: SDUPTHER

## 2022-05-02 ENCOUNTER — TELEPHONE (OUTPATIENT)
Dept: FAMILY MEDICINE CLINIC | Facility: CLINIC | Age: 67
End: 2022-05-02

## 2022-05-02 NOTE — TELEPHONE ENCOUNTER
Patient is calling today  She uses [facility] in [place]  She is having a lot of sinus infection symptoms  Her head hurts, she has sinus drainage  Wondering if she can be prescribed something  Please advise

## 2022-05-02 NOTE — TELEPHONE ENCOUNTER
I would keep it simple as  she is just past surgery for revision of ileostomy, I will check her allergy and send something mild in

## 2022-05-03 ENCOUNTER — OFFICE VISIT (OUTPATIENT)
Dept: SURGERY | Facility: CLINIC | Age: 67
End: 2022-05-03

## 2022-05-03 VITALS — HEIGHT: 64 IN | BODY MASS INDEX: 20.59 KG/M2 | WEIGHT: 120.6 LBS | TEMPERATURE: 97.1 F

## 2022-05-03 DIAGNOSIS — Z90.49 STATUS POST LAPAROSCOPIC COLECTOMY: Primary | ICD-10-CM

## 2022-05-03 PROCEDURE — 99024 POSTOP FOLLOW-UP VISIT: CPT | Performed by: SURGERY

## 2022-05-03 PROCEDURE — 3008F BODY MASS INDEX DOCD: CPT | Performed by: FAMILY MEDICINE

## 2022-05-03 NOTE — PROGRESS NOTES
Assessment/Plan:    Status post laparoscopic colectomy   Continues to improve  I have her scheduled follow-up in September at that point will schedule her colonoscopy  She  needs to continue with walking and increasing her protein intake  Diagnoses and all orders for this visit:    Status post laparoscopic colectomy          Subjective:      Patient ID: Pavithra Garcia is a 77 y o  female  Ms Yessy Arias   Is a 26-year-old female that is here for evaluation of diverticulitis  She does have a history of irritable bowel type syndrome symptoms and constipation diarrhea  She is having increasing pain and was seen by Gastroenterolog in June had a  Colonoscopy that showed some pus in the sigmoid colon consistent with diverticulitis and therefore the colonoscopy was aborted  She has her last colonoscopy previous that was in 2009 for which she had up to 6 polyps removed  After colonoscopy she was seen by her primary care physician and sent for a stat CT scan that showed diverticulitis  She was treated conservatively with oral antibiotics but failed outpatient management and was admitted on July 7th with diverticulitis and intramural abscess  She was treated conservatively and has been discharged  She was seen back in the emergency department on the 23rd and a CT scan that time showed no evidence of diverticulitis but did show constipation  Currently she states she is still having abdominal pain across her abdomen  She took some pain medicine for the pain recently  She is still having constipation issues as well  10/12/2021 she returns now for re-evaluation  She underwent a colonoscopy but it was incomplete and unsuccessful due to inflammation and angulation in the colon  She had a CT scan that same day that just showed mild diverticulitis and she was restarted antibiotics  However she also developed increasing back pain  She has a longstanding history of back pain and bulge with pain management  She was admitted to [address] [facility] September 20th with increasing back pain and was treated accordingly  She did have another repeat CT scan that time that showed mild inflammation but no significant findings  She returns now but she states that she is having trouble eating she feels nauseated has pain all the time pain in her abdomen pain in her back  She is having difficulties moving her bowels  11/17/2021  She is now 2 weeks status post robotic sigmoid colectomy with diverting loop ileostomy  She is doing okay at home  Still feeling weak  Having difficulty with her ostomy bag       12/08/2021 she had a readmission for acute renal failure and inability to manage her ileostomy  She has now been following in the [address] [facility] for better  Control of her ostomy output  She was started on cholestyramine for her loose output  She still is not ambulating well  She still not eating appropriate protein intake  She still smoking heavily  3     01/18/2022 she returns now for evaluation  She is still struggling with the ostomy appliance  I am uncertain why she cannot keep the bag in place but she is very fixated on it and continue only changing it  She has been seen by visiting nurses and also the wound center but is still having issues  In addition she states that she is trying to increase her protein but is not adequate doing so as whenever she has an issue with her bag she loses her appetite and stops eating  She is still smoking a pack a day and is relatively sedentary  03/08/2022 over last month she has been doing very well  Her daughter has been around able to help her with her diet and daily activities  She has gained 10 lb since seen last   She denies nausea vomiting fevers or chills  /  4/19/22 Doing well  Slowly improving    05/03/2022  Still continues to improve  Continue to gain weight  Her strength is improving  She is walking 20 minutes a day    Still has some right-sided twinges around the incision but overall is doing very well        The following portions of the patient's history were reviewed and updated as appropriate: allergies, current medications, past family history, past medical history, past social history, past surgical history and problem list     Review of Systems      Objective:      Temp (!) 97 1 °F (36 2 °C)   Ht 5' 4" (1 626 m)   Wt 54 7 kg (120 lb 9 6 oz)   LMP  (LMP Unknown)   BMI 20 70 kg/m²          Physical Exam  Abdominal:      Comments:   Incisions healing well

## 2022-05-03 NOTE — ASSESSMENT & PLAN NOTE
Continues to improve  I have her scheduled follow-up in September at that point will schedule her colonoscopy  She  needs to continue with walking and increasing her protein intake

## 2022-05-05 NOTE — TELEPHONE ENCOUNTER
Samuel Garcia called the office she is coughing up a lot of yellow phlegm ,but not drying up  Pt is also a little dizzy form her sinuses yet  Pt has been taking tylenol for her fever   Pt has been really dizzy this am  Pt  was just prescribed an  antibiotic a few days ago ,but pt is requesting would you [facility]?   Pt's number is [phone]

## 2022-05-11 DIAGNOSIS — M51.17 INTERVERTEBRAL DISC DISORDER WITH RADICULOPATHY OF LUMBOSACRAL REGION: ICD-10-CM

## 2022-05-12 RX ORDER — HYDROCODONE BITARTRATE AND ACETAMINOPHEN 7.5; 325 MG/1; MG/1
1 TABLET ORAL EVERY 8 HOURS PRN
Qty: 90 TABLET | Refills: 0 | Status: SHIPPED | OUTPATIENT
Start: 2022-05-12 | End: 2022-06-03 | Stop reason: SDUPTHER

## 2022-05-13 ENCOUNTER — TELEPHONE (OUTPATIENT)
Dept: FAMILY MEDICINE CLINIC | Facility: CLINIC | Age: 67
End: 2022-05-13

## 2022-05-13 NOTE — TELEPHONE ENCOUNTER
I called and left the Pt a detailed message making her aware, I advised her if she had any further questions or concerns to please give the office a call back

## 2022-05-13 NOTE — TELEPHONE ENCOUNTER
Pt called the office and stated pharmacy will not fill her Norco as it is too soon  She would like to know if you can increase how often she can take it to 4 times daily as needed  She stated she is in a lot of pain and has none remaining  Please advise   Thank you

## 2022-05-21 DIAGNOSIS — J01.40 SUBACUTE PANSINUSITIS: ICD-10-CM

## 2022-05-23 RX ORDER — PREDNISONE 10 MG/1
TABLET ORAL
Qty: 21 EACH | Refills: 0 | Status: SHIPPED | OUTPATIENT
Start: 2022-05-23 | End: 2022-06-01 | Stop reason: SDUPTHER

## 2022-05-24 ENCOUNTER — PATIENT OUTREACH (OUTPATIENT)
Dept: FAMILY MEDICINE CLINIC | Facility: CLINIC | Age: 67
End: 2022-05-24

## 2022-05-24 DIAGNOSIS — M79.7 FIBROMYALGIA: Primary | ICD-10-CM

## 2022-05-24 RX ORDER — TIZANIDINE 4 MG/1
4 TABLET ORAL
Qty: 30 TABLET | Refills: 0 | Status: SHIPPED | OUTPATIENT
Start: 2022-05-24 | End: 2022-06-03 | Stop reason: SDUPTHER

## 2022-05-24 NOTE — PROGRESS NOTES
Outpatient Care Management Note: Call placed to pt requesting return call back regarding update on how she has been managing her health post op  Message left  Removing self from care team at this time  Chart review performed

## 2022-05-24 NOTE — TELEPHONE ENCOUNTER
Refer to urology    Requested medication(s) are due for refill today: Yes  Patient has already received a courtesy refill: No  Other reason request has been forwarded to provider:

## 2022-06-03 DIAGNOSIS — R10.13 DYSPEPSIA: ICD-10-CM

## 2022-06-03 RX ORDER — ONDANSETRON 4 MG/1
4 TABLET, FILM COATED ORAL EVERY 8 HOURS PRN
Qty: 30 TABLET | Refills: 0 | Status: SHIPPED | OUTPATIENT
Start: 2022-06-03 | End: 2022-06-20 | Stop reason: SDUPTHER

## 2022-06-08 DIAGNOSIS — M51.17 INTERVERTEBRAL DISC DISORDER WITH RADICULOPATHY OF LUMBOSACRAL REGION: ICD-10-CM

## 2022-06-08 NOTE — TELEPHONE ENCOUNTER
Requested medication(s) are due for refill today: No  Patient has already received a courtesy refill: No  Other reason request has been forwarded to provider: Pt called the office for a refill and I made her aware she is not due as this was just sent in on 6/3/2022  She stated that they will not refill until Saturday unless you change how often she cn have it, I made her aware the directions were changed to every 6 hours  She stated she would call and check with the pharmacy again

## 2022-06-09 RX ORDER — HYDROCODONE BITARTRATE AND ACETAMINOPHEN 7.5; 325 MG/1; MG/1
1 TABLET ORAL EVERY 6 HOURS PRN
Qty: 90 TABLET | Refills: 0 | Status: SHIPPED | OUTPATIENT
Start: 2022-06-09 | End: 2022-07-06 | Stop reason: SDUPTHER

## 2022-06-12 DIAGNOSIS — J01.40 SUBACUTE PANSINUSITIS: ICD-10-CM

## 2022-06-13 RX ORDER — PREDNISONE 10 MG/1
TABLET ORAL
Qty: 21 EACH | Refills: 0 | Status: SHIPPED | OUTPATIENT
Start: 2022-06-13 | End: 2022-06-20 | Stop reason: SDUPTHER

## 2022-06-17 DIAGNOSIS — G43.909 MIGRAINE WITHOUT STATUS MIGRAINOSUS, NOT INTRACTABLE, UNSPECIFIED MIGRAINE TYPE: ICD-10-CM

## 2022-06-17 RX ORDER — SUMATRIPTAN 100 MG/1
100 TABLET, FILM COATED ORAL ONCE AS NEEDED
Qty: 9 TABLET | Refills: 0 | Status: SHIPPED | OUTPATIENT
Start: 2022-06-17 | End: 2022-07-21

## 2022-06-20 DIAGNOSIS — J01.40 SUBACUTE PANSINUSITIS: ICD-10-CM

## 2022-06-20 DIAGNOSIS — R10.13 DYSPEPSIA: ICD-10-CM

## 2022-06-20 DIAGNOSIS — I10 ESSENTIAL HYPERTENSION: ICD-10-CM

## 2022-06-20 DIAGNOSIS — J45.20 MILD INTERMITTENT ASTHMA, UNSPECIFIED WHETHER COMPLICATED: ICD-10-CM

## 2022-06-20 RX ORDER — LISINOPRIL 2.5 MG/1
2.5 TABLET ORAL DAILY
Qty: 90 TABLET | Refills: 0 | Status: SHIPPED | OUTPATIENT
Start: 2022-06-20

## 2022-06-20 RX ORDER — ALBUTEROL SULFATE 90 UG/1
2 AEROSOL, METERED RESPIRATORY (INHALATION) 4 TIMES DAILY
Qty: 18 G | Refills: 0 | Status: SHIPPED | OUTPATIENT
Start: 2022-06-20 | End: 2022-07-12

## 2022-06-20 RX ORDER — PREDNISONE 10 MG/1
TABLET ORAL
Qty: 21 EACH | Refills: 0 | Status: SHIPPED | OUTPATIENT
Start: 2022-06-20

## 2022-06-20 RX ORDER — ONDANSETRON 4 MG/1
4 TABLET, FILM COATED ORAL EVERY 8 HOURS PRN
Qty: 30 TABLET | Refills: 0 | Status: SHIPPED | OUTPATIENT
Start: 2022-06-20 | End: 2022-07-25 | Stop reason: SDUPTHER

## 2022-06-20 RX ORDER — PREDNISONE 10 MG/1
TABLET ORAL
Qty: 21 EACH | Refills: 0 | Status: SHIPPED | OUTPATIENT
Start: 2022-06-20 | End: 2022-06-27 | Stop reason: SDUPTHER

## 2022-06-27 DIAGNOSIS — J01.40 SUBACUTE PANSINUSITIS: ICD-10-CM

## 2022-06-28 RX ORDER — PREDNISONE 10 MG/1
TABLET ORAL
Qty: 21 EACH | Refills: 0 | Status: SHIPPED | OUTPATIENT
Start: 2022-06-28 | End: 2022-07-04 | Stop reason: SDUPTHER

## 2022-07-04 DIAGNOSIS — G25.0 ESSENTIAL TREMOR: ICD-10-CM

## 2022-07-04 RX ORDER — PRIMIDONE 50 MG/1
TABLET ORAL
Qty: 360 TABLET | Refills: 0 | Status: CANCELLED | OUTPATIENT
Start: 2022-07-04

## 2022-07-06 DIAGNOSIS — G25.0 ESSENTIAL TREMOR: ICD-10-CM

## 2022-07-06 DIAGNOSIS — J30.2 SEASONAL ALLERGIC RHINITIS, UNSPECIFIED TRIGGER: Primary | ICD-10-CM

## 2022-07-06 DIAGNOSIS — M51.17 INTERVERTEBRAL DISC DISORDER WITH RADICULOPATHY OF LUMBOSACRAL REGION: ICD-10-CM

## 2022-07-06 RX ORDER — PRIMIDONE 50 MG/1
TABLET ORAL
Qty: 360 TABLET | Refills: 0 | Status: CANCELLED | OUTPATIENT
Start: 2022-07-06

## 2022-07-07 RX ORDER — FLUTICASONE PROPIONATE 50 MCG
1 SPRAY, SUSPENSION (ML) NASAL DAILY
Qty: 16 G | Refills: 1 | Status: SHIPPED | OUTPATIENT
Start: 2022-07-07 | End: 2022-09-21 | Stop reason: SDUPTHER

## 2022-07-07 RX ORDER — HYDROCODONE BITARTRATE AND ACETAMINOPHEN 7.5; 325 MG/1; MG/1
1 TABLET ORAL EVERY 6 HOURS PRN
Qty: 90 TABLET | Refills: 0 | Status: SHIPPED | OUTPATIENT
Start: 2022-07-07 | End: 2022-08-01 | Stop reason: SDUPTHER

## 2022-07-08 NOTE — TELEPHONE ENCOUNTER
Called pt to clarify how she is taking her medications  Left detailed message of same (ok per consent on file under media tab) and advised her to call back  Awaiting cb

## 2022-07-12 DIAGNOSIS — J45.20 MILD INTERMITTENT ASTHMA, UNSPECIFIED WHETHER COMPLICATED: ICD-10-CM

## 2022-07-12 RX ORDER — ALBUTEROL SULFATE 90 UG/1
AEROSOL, METERED RESPIRATORY (INHALATION)
Qty: 18 G | Refills: 1 | Status: SHIPPED | OUTPATIENT
Start: 2022-07-12 | End: 2022-09-06 | Stop reason: SDUPTHER

## 2022-07-18 ENCOUNTER — OFFICE VISIT (OUTPATIENT)
Dept: OBGYN CLINIC | Facility: MEDICAL CENTER | Age: 67
End: 2022-07-18
Payer: COMMERCIAL

## 2022-07-18 VITALS
BODY MASS INDEX: 20.49 KG/M2 | WEIGHT: 120 LBS | HEIGHT: 64 IN | DIASTOLIC BLOOD PRESSURE: 70 MMHG | SYSTOLIC BLOOD PRESSURE: 125 MMHG

## 2022-07-18 DIAGNOSIS — N90.7 INCLUSION CYST OF VULVA: ICD-10-CM

## 2022-07-18 DIAGNOSIS — Z01.419 WOMEN'S ANNUAL ROUTINE GYNECOLOGICAL EXAMINATION: Primary | ICD-10-CM

## 2022-07-18 PROCEDURE — G0101 CA SCREEN;PELVIC/BREAST EXAM: HCPCS | Performed by: OBSTETRICS & GYNECOLOGY

## 2022-07-18 RX ORDER — PREDNISONE 10 MG/1
TABLET ORAL
COMMUNITY
Start: 2022-07-07 | End: 2022-08-29

## 2022-07-18 NOTE — PROGRESS NOTES
A/P    1  Annual exam    Last PAP 11/14/2018- neg neg   Next due any abnormal paps    Scheduling of pap discussed in detail      Mammogram - last 9/21/2020 # 2   Next do ordered stress the need to complete it    Self breast exams discussed     Colonoscopy - 9/10/21-    Sees Dr Pete Farah he recommend to be repeated in September 2  Inclusion cyst on the labia   ID and sebaceous material removed       66 y o ,No LMP recorded (lmp unknown)  Patient is postmenopausal   C/O cyst on the labia       Past medical / social / surgical / family history reviewed and updated   Medication and allergies discussed in detail and updated     Review of Systems - History obtained from chart review and the patient  General ROS: negative  Psychological ROS: negative  Ophthalmic ROS: negative  ENT ROS: negative  Allergy and Immunology ROS: negative  Hematological and Lymphatic ROS: negative  Endocrine ROS: negative  Breast ROS: negative for breast lumps  Respiratory ROS: no cough, shortness of breath, or wheezing  Cardiovascular ROS: no chest pain or dyspnea on exertion  Gastrointestinal ROS: no abdominal pain, change in bowel habits, or black or bloody stools  Genito-Urinary ROS: no dysuria, trouble voiding, or hematuria cyst   Musculoskeletal ROS: negative  Neurological ROS: no TIA or stroke symptoms  Dermatological ROS: negative          Physical Exam  Vitals reviewed  Constitutional:       Appearance: She is well-developed  Neck:      Thyroid: No thyromegaly  Cardiovascular:      Rate and Rhythm: Normal rate  Heart sounds: Normal heart sounds  Pulmonary:      Effort: Pulmonary effort is normal  No accessory muscle usage or respiratory distress  Chest:      Chest wall: No tenderness  Breasts: Breasts are symmetrical       Right: No inverted nipple, mass, tenderness or supraclavicular adenopathy  Left: No inverted nipple, mass, tenderness or supraclavicular adenopathy         Abdominal:      General: There is no distension  Palpations: Abdomen is soft  Tenderness: There is no abdominal tenderness  There is no guarding or rebound  Genitourinary:     Exam position: Supine  Labia:         Right: No rash, tenderness, lesion or injury  Left: Tenderness and lesion present  No rash or injury  Vagina: Normal  No foreign body  No vaginal discharge or bleeding  Cervix: No cervical motion tenderness or discharge  Uterus: Not enlarged and not fixed  Adnexa:         Right: No mass, tenderness or fullness  Left: No mass, tenderness or fullness  Rectum: No external hemorrhoid  Comments: 2 cm inclusion cyst with sebaceous material   Musculoskeletal:      Cervical back: Normal range of motion  Lymphadenopathy:      Cervical: No cervical adenopathy  Upper Body:      Right upper body: No supraclavicular adenopathy  Left upper body: No supraclavicular adenopathy  Neurological:      Mental Status: She is alert and oriented to person, place, and time  Psychiatric:         Speech: Speech normal          Behavior: Behavior normal          Thought Content:  Thought content normal          Judgment: Judgment normal

## 2022-07-19 ENCOUNTER — TELEPHONE (OUTPATIENT)
Dept: OBGYN CLINIC | Facility: MEDICAL CENTER | Age: 67
End: 2022-07-19

## 2022-07-19 DIAGNOSIS — B37.9 YEAST INFECTION: Primary | ICD-10-CM

## 2022-07-19 RX ORDER — CLOTRIMAZOLE AND BETAMETHASONE DIPROPIONATE 10; .64 MG/G; MG/G
CREAM TOPICAL 2 TIMES DAILY
Qty: 30 G | Refills: 0 | Status: SHIPPED | OUTPATIENT
Start: 2022-07-19 | End: 2022-09-04

## 2022-07-19 NOTE — TELEPHONE ENCOUNTER
Patient was seen 07/18/22 and is experiencing a fungus type between her buttocks  Pharmacy on file  Please review when you get a chance   Thank you

## 2022-07-21 DIAGNOSIS — G43.909 MIGRAINE WITHOUT STATUS MIGRAINOSUS, NOT INTRACTABLE, UNSPECIFIED MIGRAINE TYPE: ICD-10-CM

## 2022-07-21 RX ORDER — SUMATRIPTAN 100 MG/1
100 TABLET, FILM COATED ORAL ONCE AS NEEDED
Qty: 9 TABLET | Refills: 0 | Status: SHIPPED | OUTPATIENT
Start: 2022-07-21 | End: 2022-08-23

## 2022-07-25 DIAGNOSIS — R10.13 DYSPEPSIA: ICD-10-CM

## 2022-07-25 DIAGNOSIS — J01.40 SUBACUTE PANSINUSITIS: ICD-10-CM

## 2022-07-27 DIAGNOSIS — M79.7 FIBROMYALGIA: Primary | ICD-10-CM

## 2022-07-27 RX ORDER — PREDNISONE 10 MG/1
TABLET ORAL
Qty: 21 EACH | Refills: 0 | Status: SHIPPED | OUTPATIENT
Start: 2022-07-27 | End: 2022-08-01 | Stop reason: SDUPTHER

## 2022-07-27 RX ORDER — LIDOCAINE 40 MG/G
CREAM TOPICAL AS NEEDED
Qty: 30 G | Refills: 3 | Status: SHIPPED | OUTPATIENT
Start: 2022-07-27 | End: 2022-09-02

## 2022-07-27 RX ORDER — ONDANSETRON 4 MG/1
4 TABLET, FILM COATED ORAL EVERY 8 HOURS PRN
Qty: 30 TABLET | Refills: 0 | Status: SHIPPED | OUTPATIENT
Start: 2022-07-27 | End: 2022-08-13 | Stop reason: SDUPTHER

## 2022-07-27 NOTE — TELEPHONE ENCOUNTER
Spoke with pt  She reports she has enough primidone, does not need refill  Will contact our office once refill is needed

## 2022-08-08 DIAGNOSIS — F41.1 GENERALIZED ANXIETY DISORDER: ICD-10-CM

## 2022-08-08 DIAGNOSIS — M79.2 NEUROPATHIC PAIN: ICD-10-CM

## 2022-08-08 DIAGNOSIS — J01.40 SUBACUTE PANSINUSITIS: ICD-10-CM

## 2022-08-08 RX ORDER — PREDNISONE 10 MG/1
TABLET ORAL
Qty: 21 EACH | Refills: 0 | Status: SHIPPED | OUTPATIENT
Start: 2022-08-08 | End: 2022-08-13 | Stop reason: SDUPTHER

## 2022-08-08 RX ORDER — DIAZEPAM 10 MG/1
10 TABLET ORAL EVERY 12 HOURS PRN
Qty: 60 TABLET | Refills: 0 | Status: SHIPPED | OUTPATIENT
Start: 2022-08-08 | End: 2022-09-06 | Stop reason: SDUPTHER

## 2022-08-08 RX ORDER — GABAPENTIN 300 MG/1
300 CAPSULE ORAL 3 TIMES DAILY
Qty: 90 CAPSULE | Refills: 0 | Status: SHIPPED | OUTPATIENT
Start: 2022-08-08

## 2022-08-13 DIAGNOSIS — R10.13 DYSPEPSIA: ICD-10-CM

## 2022-08-13 DIAGNOSIS — J01.40 SUBACUTE PANSINUSITIS: ICD-10-CM

## 2022-08-15 RX ORDER — ONDANSETRON 4 MG/1
4 TABLET, FILM COATED ORAL EVERY 8 HOURS PRN
Qty: 30 TABLET | Refills: 0 | Status: SHIPPED | OUTPATIENT
Start: 2022-08-15 | End: 2022-08-31 | Stop reason: SDUPTHER

## 2022-08-15 RX ORDER — PREDNISONE 10 MG/1
TABLET ORAL
Qty: 21 EACH | Refills: 0 | Status: SHIPPED | OUTPATIENT
Start: 2022-08-15 | End: 2022-08-21 | Stop reason: SDUPTHER

## 2022-08-20 DIAGNOSIS — G43.909 MIGRAINE WITHOUT STATUS MIGRAINOSUS, NOT INTRACTABLE, UNSPECIFIED MIGRAINE TYPE: ICD-10-CM

## 2022-08-21 DIAGNOSIS — J01.40 SUBACUTE PANSINUSITIS: ICD-10-CM

## 2022-08-23 RX ORDER — PREDNISONE 10 MG/1
TABLET ORAL
Qty: 21 EACH | Refills: 0 | Status: SHIPPED | OUTPATIENT
Start: 2022-08-23 | End: 2022-08-29

## 2022-08-23 RX ORDER — SUMATRIPTAN 100 MG/1
100 TABLET, FILM COATED ORAL ONCE AS NEEDED
Qty: 9 TABLET | Refills: 0 | Status: SHIPPED | OUTPATIENT
Start: 2022-08-23 | End: 2022-09-14 | Stop reason: SDUPTHER

## 2022-08-31 DIAGNOSIS — R10.13 DYSPEPSIA: ICD-10-CM

## 2022-09-01 RX ORDER — ONDANSETRON 4 MG/1
4 TABLET, FILM COATED ORAL EVERY 8 HOURS PRN
Qty: 30 TABLET | Refills: 0 | Status: SHIPPED | OUTPATIENT
Start: 2022-09-01 | End: 2022-09-12 | Stop reason: SDUPTHER

## 2022-09-02 ENCOUNTER — APPOINTMENT (OUTPATIENT)
Dept: LAB | Facility: CLINIC | Age: 67
End: 2022-09-02
Payer: COMMERCIAL

## 2022-09-02 ENCOUNTER — OFFICE VISIT (OUTPATIENT)
Dept: FAMILY MEDICINE CLINIC | Facility: CLINIC | Age: 67
End: 2022-09-02
Payer: COMMERCIAL

## 2022-09-02 VITALS
DIASTOLIC BLOOD PRESSURE: 60 MMHG | OXYGEN SATURATION: 99 % | HEART RATE: 109 BPM | RESPIRATION RATE: 16 BRPM | WEIGHT: 122.8 LBS | BODY MASS INDEX: 20.96 KG/M2 | SYSTOLIC BLOOD PRESSURE: 100 MMHG | TEMPERATURE: 96.2 F | HEIGHT: 64 IN

## 2022-09-02 DIAGNOSIS — M79.7 FIBROMYALGIA: ICD-10-CM

## 2022-09-02 DIAGNOSIS — Z83.49 FAMILY HISTORY OF THYROID DISEASE: ICD-10-CM

## 2022-09-02 DIAGNOSIS — Z01.818 PREOP EXAMINATION: Primary | ICD-10-CM

## 2022-09-02 DIAGNOSIS — H25.9 AGE-RELATED CATARACT OF BOTH EYES, UNSPECIFIED AGE-RELATED CATARACT TYPE: ICD-10-CM

## 2022-09-02 LAB
T3FREE SERPL-MCNC: 3.01 PG/ML (ref 2.3–4.2)
T4 FREE SERPL-MCNC: 0.97 NG/DL (ref 0.76–1.46)
TSH SERPL DL<=0.05 MIU/L-ACNC: 1.48 UIU/ML (ref 0.45–4.5)

## 2022-09-02 PROCEDURE — 84481 FREE ASSAY (FT-3): CPT

## 2022-09-02 PROCEDURE — 84443 ASSAY THYROID STIM HORMONE: CPT

## 2022-09-02 PROCEDURE — 1101F PT FALLS ASSESS-DOCD LE1/YR: CPT | Performed by: FAMILY MEDICINE

## 2022-09-02 PROCEDURE — 84439 ASSAY OF FREE THYROXINE: CPT

## 2022-09-02 PROCEDURE — 36415 COLL VENOUS BLD VENIPUNCTURE: CPT

## 2022-09-02 PROCEDURE — 99213 OFFICE O/P EST LOW 20 MIN: CPT | Performed by: FAMILY MEDICINE

## 2022-09-02 PROCEDURE — 3288F FALL RISK ASSESSMENT DOCD: CPT | Performed by: FAMILY MEDICINE

## 2022-09-02 RX ORDER — LIDOCAINE 50 MG/G
OINTMENT TOPICAL AS NEEDED
Qty: 50 G | Refills: 1 | Status: SHIPPED | OUTPATIENT
Start: 2022-09-02

## 2022-09-02 RX ORDER — BROMFENAC SODIUM 0.7 MG/ML
SOLUTION/ DROPS OPHTHALMIC
COMMUNITY
Start: 2022-08-29

## 2022-09-02 NOTE — PROGRESS NOTES
Chief Complaint   Patient presents with    Pre-op Exam     pre op clearance for cataract surgery L eye  09/14/22 r eye 09/28/22 Dr Grove, no blwk no ekg, general anesthesia per pt      Subjective:      Richard Godinez is a 79 y o  female who presents to the office today for a preoperative consultation at the request of surgeon Dr Robert Rodriguez who plans on performing cataract extraction and IOL Left on September 14  Then Right on 9/28 Planned anesthesia is general  ? ? ? The patient has the following known anesthesia issues: past endotracheal intubation with complications (NONE)  Patient has a bleeding risk of: no recent abnormal bleeding  Review of Systems  Review of Systems   Constitutional: Fatigue:  fair  Eyes: Visual disturbance: Bilateral cataract with impaired vision  Genitourinary: Negative for dysuria  Musculoskeletal: Arthralgias:  stable  Psychiatric/Behavioral: Negative  Nervous/anxious:  stable  Objective:      Physical Exam    /60   Pulse (!) 109   Temp (!) 96 2 °F (35 7 °C) (Temporal)   Resp 16   Ht 5' 4" (1 626 m)   Wt 55 7 kg (122 lb 12 8 oz)   LMP  (LMP Unknown)   SpO2 99%   BMI 21 08 kg/m²     Physical Exam  Constitutional:       Comments:  69-year-old slim female   HENT:      Head: Normocephalic  Right Ear: Tympanic membrane normal       Nose: Nose normal       Mouth/Throat:      Mouth: Mucous membranes are moist       Comments: dentures  Eyes:      Pupils: Pupils are equal, round, and reactive to light  Comments: Bilateral cataract   Cardiovascular:      Rate and Rhythm: Normal rate and regular rhythm  Pulses: Normal pulses  Pulmonary:      Effort: Pulmonary effort is normal       Breath sounds: Normal breath sounds  Abdominal:      General: Bowel sounds are normal       Palpations: Abdomen is soft  Neurological:      Mental Status: She is alert and oriented to person, place, and time     Psychiatric:         Mood and Affect: Mood normal  Behavior: Behavior normal          Thought Content: Thought content normal          Judgment: Judgment normal             Predictors of intubation difficulty:  Morbid obesity? no  Anatomically abnormal facies? no  Short, thick neck? no  Neck range of motion: normal       Assessment:      79 y o  female with planned surgery as above  Known risk factors for perioperative complications: None    Difficulty with intubation is not anticipated  Can walk 2 blocks without difficulty:  Can walk up 2 flights of stairs without difficulty:      1  Preop examination     2  Age-related cataract of both eyes, unspecified age-related cataract type     3  Family history of thyroid disease  TSH, 3rd generation    T4, free    T3, free   4  Fibromyalgia  lidocaine (XYLOCAINE) 5 % ointment          Plan:            Patient is medically cleared for surgery         Levofloxacin, Carafate [sucralfate], Other, and Tetracycline  Past Medical History:   Diagnosis Date    Anxiety     Asthma     allergy induced    Chronic pain disorder     back    Colon polyp     Fibromyalgia     GERD (gastroesophageal reflux disease)     Hearing aid worn     Hearing impaired     "broken eardrum after eardrops used"    Hyperlipidemia     controlled    Hypertension     controlled    Lumbar herniated disc     Shortness of breath     at times    Wears dentures     Wears glasses     Weight loss      Past Surgical History:   Procedure Laterality Date    BREAST BIOPSY Left     benign- at age 34    COLONOSCOPY      EAR SURGERY      LA LAP, SURG ILEO/JEJUNO-STOMY N/A 4/4/2022    Procedure: ILEOSTOMY LOOP REVERSAL;  Surgeon: Odalys Solo MD;  Location: [facility];  Service: General    LA LAP,SURG,COLECTOMY, PARTIAL, [facility] N/A 11/1/2021    Procedure: RESECTION COLON SIGMOID LAPAROSCOPIC WTIH COLORECTAL ANASTOMOSIS, DIVERTING LOOP ILEOSTOMY;  Surgeon: Odalys Solo MD;  Location[facility];  Service: General    TUBAL LIGATION      ULNAR NERVE REPAIR Left      Patient Active Problem List   Diagnosis    Esophageal dysphagia    Dyspepsia    Essential tremor    Moderate episode of recurrent major depressive disorder (HCC)    Essential hypertension    Generalized anxiety disorder    Asthma    Seasonal allergic rhinitis    Migraine without status migrainosus, not intractable    Functional diarrhea    History of Diverticulitis large intestine    Moderate protein-calorie malnutrition (HCC)    GERD (gastroesophageal reflux disease)    Status post laparoscopic colectomy    Fibromyalgia    Hearing impaired    Wears dentures    Status post reversal of ileostomy     Social History     Socioeconomic History    Marital status:       Spouse name: None    Number of children: None    Years of education: None    Highest education level: None   Occupational History    None   Tobacco Use    Smoking status: Former Smoker     Packs/day: 0 50     Years: 50 00     Pack years: 25 00     Types: Cigarettes     Quit date: 4/3/2022     Years since quittin 4    Smokeless tobacco: Never Used    Tobacco comment: wears nicotine patch on for 1/2 day   Vaping Use    Vaping Use: Never used   Substance and Sexual Activity    Alcohol use: Not Currently    Drug use: No    Sexual activity: Not Currently     Partners: Male   Other Topics Concern    None   Social History Narrative    None     Social Determinants of Health     Financial Resource Strain: Not on file   Food Insecurity: Not on file   Transportation Needs: Not on file   Physical Activity: Not on file   Stress: Not on file   Social Connections: Not on file   Intimate Partner Violence: Not on file   Housing Stability: Not on file       Current Outpatient Medications:     albuterol (PROVENTIL HFA,VENTOLIN HFA) 90 mcg/act inhaler, TAKE 2 PUFFS BY MOUTH 4 TIMES A DAY, Disp: 18 g, Rfl: 1    atorvastatin (LIPITOR) 10 mg tablet, TAKE 1 TABLET BY MOUTH EVERY DAY, Disp: 90 tablet, Rfl: 2    Cholecalciferol (VITAMIN D3 PO), Take by mouth, Disp: , Rfl:     cyclobenzaprine (FLEXERIL) 10 mg tablet, Take 1 tablet (10 mg total) by mouth 3 (three) times a day as needed for muscle spasms, Disp: 21 tablet, Rfl: 0    diazepam (VALIUM) 10 mg tablet, Take 1 tablet (10 mg total) by mouth every 12 (twelve) hours as needed for anxiety, Disp: 60 tablet, Rfl: 0    fluticasone (FLONASE) 50 mcg/act nasal spray, 1 spray into each nostril daily, Disp: 16 g, Rfl: 1    gabapentin (Neurontin) 300 mg capsule, Take 1 capsule (300 mg total) by mouth 3 (three) times a day (Patient taking differently: Take 300 mg by mouth 2 (two) times a day), Disp: 90 capsule, Rfl: 0    HYDROcodone-acetaminophen (NORCO) 7 5-325 mg per tablet, Take 1 tablet by mouth every 6 (six) hours as needed for pain usually Max Daily Amount: 4 tablets, Disp: 120 tablet, Rfl: 0    lidocaine (XYLOCAINE) 5 % ointment, Apply topically as needed for mild pain, Disp: 50 g, Rfl: 1    lisinopril (ZESTRIL) 2 5 mg tablet, Take 1 tablet (2 5 mg total) by mouth daily Pt takes she has not been taking, Disp: 90 tablet, Rfl: 0    MAGNESIUM PO, Take by mouth, Disp: , Rfl:     ondansetron (ZOFRAN) 4 mg tablet, Take 1 tablet (4 mg total) by mouth every 8 (eight) hours as needed for nausea or vomiting, Disp: 30 tablet, Rfl: 0    Probiotic Product (PROBIOTIC-10 PO), Take by mouth, Disp: , Rfl:     Prolensa 0 07 % SOLN, PLEASE SEE ATTACHED FOR DETAILED DIRECTIONS, Disp: , Rfl:     SUMAtriptan (IMITREX) 100 mg tablet, TAKE 1 TABLET (100 MG TOTAL) BY MOUTH ONCE AS NEEDED FOR MIGRAINE FOR UP TO 1 DOSE, Disp: 9 tablet, Rfl: 0    tiZANidine (ZANAFLEX) 4 mg tablet, Take 1 tablet (4 mg total) by mouth daily at bedtime Takes 1-2 at hs, Disp: 60 tablet, Rfl: 2  Lab Results   Component Value Date    WBC 5 41 04/06/2022    HGB 10 9 (L) 04/06/2022    HCT 32 3 (L) 04/06/2022     (H) 04/06/2022     04/06/2022     Lab Results   Component Value Date    K 3 5 04/06/2022     (H) 04/06/2022    CO2 26 04/06/2022    BUN 12 04/06/2022    CREATININE 0 76 04/06/2022    GLUF 86 03/16/2022    CALCIUM 8 6 04/06/2022    CORRECTEDCA 9 2 01/28/2022    AST 13 03/16/2022    ALT 33 03/16/2022    ALKPHOS 52 03/16/2022    EGFR 82 04/06/2022     No results found for: NIA        @[email or web address]@    Current Outpatient Medications:     albuterol (PROVENTIL HFA,VENTOLIN HFA) 90 mcg/act inhaler, TAKE 2 PUFFS BY MOUTH 4 TIMES A DAY, Disp: 18 g, Rfl: 1    atorvastatin (LIPITOR) 10 mg tablet, TAKE 1 TABLET BY MOUTH EVERY DAY, Disp: 90 tablet, Rfl: 2    Cholecalciferol (VITAMIN D3 PO), Take by mouth, Disp: , Rfl:     cyclobenzaprine (FLEXERIL) 10 mg tablet, Take 1 tablet (10 mg total) by mouth 3 (three) times a day as needed for muscle spasms, Disp: 21 tablet, Rfl: 0    diazepam (VALIUM) 10 mg tablet, Take 1 tablet (10 mg total) by mouth every 12 (twelve) hours as needed for anxiety, Disp: 60 tablet, Rfl: 0    fluticasone (FLONASE) 50 mcg/act nasal spray, 1 spray into each nostril daily, Disp: 16 g, Rfl: 1    gabapentin (Neurontin) 300 mg capsule, Take 1 capsule (300 mg total) by mouth 3 (three) times a day (Patient taking differently: Take 300 mg by mouth 2 (two) times a day), Disp: 90 capsule, Rfl: 0    HYDROcodone-acetaminophen (NORCO) 7 5-325 mg per tablet, Take 1 tablet by mouth every 6 (six) hours as needed for pain usually Max Daily Amount: 4 tablets, Disp: 120 tablet, Rfl: 0    lidocaine (XYLOCAINE) 5 % ointment, Apply topically as needed for mild pain, Disp: 50 g, Rfl: 1    lisinopril (ZESTRIL) 2 5 mg tablet, Take 1 tablet (2 5 mg total) by mouth daily Pt takes she has not been taking, Disp: 90 tablet, Rfl: 0    MAGNESIUM PO, Take by mouth, Disp: , Rfl:     ondansetron (ZOFRAN) 4 mg tablet, Take 1 tablet (4 mg total) by mouth every 8 (eight) hours as needed for nausea or vomiting, Disp: 30 tablet, Rfl: 0    Probiotic Product (PROBIOTIC-10 PO), Take by mouth, Disp: , Rfl:   Prolensa 0 07 % SOLN, PLEASE SEE ATTACHED FOR DETAILED DIRECTIONS, Disp: , Rfl:     SUMAtriptan (IMITREX) 100 mg tablet, TAKE 1 TABLET (100 MG TOTAL) BY MOUTH ONCE AS NEEDED FOR MIGRAINE FOR UP TO 1 DOSE, Disp: 9 tablet, Rfl: 0    tiZANidine (ZANAFLEX) 4 mg tablet, Take 1 tablet (4 mg total) by mouth daily at bedtime Takes 1-2 at hs, Disp: 60 tablet, Rfl: 2  Allergies   Allergen Reactions    Levofloxacin Other (See Comments)     Weak legs, blurred vision    Carafate [Sucralfate] Rash    Other Palpitations     MRI dye    Tetracycline Rash

## 2022-09-06 ENCOUNTER — OFFICE VISIT (OUTPATIENT)
Dept: SURGERY | Facility: CLINIC | Age: 67
End: 2022-09-06
Payer: COMMERCIAL

## 2022-09-06 ENCOUNTER — TELEPHONE (OUTPATIENT)
Dept: FAMILY MEDICINE CLINIC | Facility: CLINIC | Age: 67
End: 2022-09-06

## 2022-09-06 VITALS
HEIGHT: 64 IN | WEIGHT: 124 LBS | HEART RATE: 75 BPM | SYSTOLIC BLOOD PRESSURE: 116 MMHG | BODY MASS INDEX: 21.17 KG/M2 | DIASTOLIC BLOOD PRESSURE: 78 MMHG

## 2022-09-06 DIAGNOSIS — K57.20 DIVERTICULITIS OF LARGE INTESTINE WITH PERFORATION AND ABSCESS WITHOUT BLEEDING: ICD-10-CM

## 2022-09-06 DIAGNOSIS — Z87.19 HX OF DIVERTICULITIS OF COLON: Primary | ICD-10-CM

## 2022-09-06 DIAGNOSIS — J45.20 MILD INTERMITTENT ASTHMA, UNSPECIFIED WHETHER COMPLICATED: ICD-10-CM

## 2022-09-06 DIAGNOSIS — Z90.49 STATUS POST LAPAROSCOPIC COLECTOMY: ICD-10-CM

## 2022-09-06 DIAGNOSIS — K59.1 FUNCTIONAL DIARRHEA: ICD-10-CM

## 2022-09-06 DIAGNOSIS — Z98.890 STATUS POST REVERSAL OF ILEOSTOMY: ICD-10-CM

## 2022-09-06 DIAGNOSIS — H00.011 HORDEOLUM EXTERNUM OF RIGHT UPPER EYELID: Primary | ICD-10-CM

## 2022-09-06 PROCEDURE — 99213 OFFICE O/P EST LOW 20 MIN: CPT | Performed by: SURGERY

## 2022-09-06 RX ORDER — ALBUTEROL SULFATE 90 UG/1
2 AEROSOL, METERED RESPIRATORY (INHALATION) 4 TIMES DAILY
Qty: 18 G | Refills: 0 | Status: SHIPPED | OUTPATIENT
Start: 2022-09-06 | End: 2022-09-19

## 2022-09-06 RX ORDER — TOBRAMYCIN 3 MG/ML
1 SOLUTION/ DROPS OPHTHALMIC
Qty: 5 ML | Refills: 0 | Status: SHIPPED | OUTPATIENT
Start: 2022-09-06 | End: 2022-10-24

## 2022-09-06 NOTE — TELEPHONE ENCOUNTER
I did attempt to contact Nick Wasserman at the number provided  No answer and no option to leave a message  Will attempt later to contact pt

## 2022-09-06 NOTE — TELEPHONE ENCOUNTER
Pt woke up yesterday and noticed her right eye was red  Yesterday pt's daughter informed her it was a stye  Pt used tea bags three times a day  Pt has upcoming surgery on 09/14/22 for her left eye  Pt is asking what else should she do? Pt aware no availability today and prefers to see what you advise  Pt's number is [phone] pt ask  we only call this number today

## 2022-09-06 NOTE — ASSESSMENT & PLAN NOTE
She is status post colon resection for recurrent diverticulitis and status post ileostomy reversal   Her last colonoscopy was incomplete due to stricturing inflammation and narrowing in the sigmoid colon  We therefore plan for a colonoscopy at this point  The risks benefits alternatives explained to her she is agreeable to proceed  She does have a longstanding history of chronic constipation and GI complaints  If her colonoscopy is otherwise normal he still having difficulty with constipation diarrhea will then refer her to gastroenterology

## 2022-09-06 NOTE — PROGRESS NOTES
Assessment/Plan:    History of Diverticulitis large intestine   She is status post colon resection for recurrent diverticulitis and status post ileostomy reversal   Her last colonoscopy was incomplete due to stricturing inflammation and narrowing in the sigmoid colon  We therefore plan for a colonoscopy at this point  The risks benefits alternatives explained to her she is agreeable to proceed  She does have a longstanding history of chronic constipation and GI complaints  If her colonoscopy is otherwise normal he still having difficulty with constipation diarrhea will then refer her to gastroenterology  Diagnoses and all orders for this visit:    Hx of diverticulitis of colon  -     Colonoscopy; Future    Functional diarrhea    Diverticulitis of large intestine with perforation and abscess without bleeding    Status post laparoscopic colectomy    Status post reversal of ileostomy    Other orders  -     Diet NPO; Sips with meds; Standing  -     Void on call to OR; Standing  -     Insert peripheral IV; Standing          Subjective:      Patient ID: Remi Garcia is a 79 y o  female  Ms Cole Carey   Is a 77-year-old female that is here for evaluation of diverticulitis  She does have a history of irritable bowel type syndrome symptoms and constipation diarrhea  She is having increasing pain and was seen by Gastroenterolog in June had a  Colonoscopy that showed some pus in the sigmoid colon consistent with diverticulitis and therefore the colonoscopy was aborted  She has her last colonoscopy previous that was in 2009 for which she had up to 6 polyps removed  After colonoscopy she was seen by her primary care physician and sent for a stat CT scan that showed diverticulitis  She was treated conservatively with oral antibiotics but failed outpatient management and was admitted on July 7th with diverticulitis and intramural abscess  She was treated conservatively and has been discharged    She was seen back in the emergency department on the 23rd and a CT scan that time showed no evidence of diverticulitis but did show constipation  Currently she states she is still having abdominal pain across her abdomen  She took some pain medicine for the pain recently  She is still having constipation issues as well  10/12/2021 she returns now for re-evaluation  She underwent a colonoscopy but it was incomplete and unsuccessful due to inflammation and angulation in the colon  She had a CT scan that same day that just showed mild diverticulitis and she was restarted antibiotics  However she also developed increasing back pain  She has a longstanding history of back pain and bulge with pain management  She was admitted to [address] September 20th with increasing back pain and was treated accordingly  She did have another repeat CT scan that time that showed mild inflammation but no significant findings  She returns now but she states that she is having trouble eating she feels nauseated has pain all the time pain in her abdomen pain in her back  She is having difficulties moving her bowels  11/17/2021  She is now 2 weeks status post robotic sigmoid colectomy with diverting loop ileostomy  She is doing okay at home  Still feeling weak  Having difficulty with her ostomy bag       12/08/2021 she had a readmission for acute renal failure and inability to manage her ileostomy  She has now been following in the [address] [facility] for better  Control of her ostomy output  She was started on cholestyramine for her loose output  She still is not ambulating well  She still not eating appropriate protein intake  She still smoking heavily  3     01/18/2022 she returns now for evaluation  She is still struggling with the ostomy appliance  I am uncertain why she cannot keep the bag in place but she is very fixated on it and continue only changing it    She has been seen by visiting nurses and also the [place] but is still having issues  In addition she states that she is trying to increase her protein but is not adequate doing so as whenever she has an issue with her bag she loses her appetite and stops eating  She is still smoking a pack a day and is relatively sedentary  03/08/2022 over last month she has been doing very well  Her daughter has been around able to help her with her diet and daily activities  She has gained 10 lb since seen last   She denies nausea vomiting fevers or chills  /  4/19/22 Doing well  Slowly improving    05/03/2022  Still continues to improve  Continue to gain weight  Her strength is improving  She is walking 20 minutes a day  Still has some right-sided twinges around the incision but overall is doing very well  09/06/2022 she returns for re-evaluation for colonoscopy  In addition she states she has been having loose bowel movements over last few weeks it has and tired of she the frequency her bowel movements  She has some discomfort or pain right lower quadrant and feels her abdominal muscles are weak  She denies nausea vomiting fevers or chills  The following portions of the patient's history were reviewed and updated as appropriate: allergies, current medications, past family history, past medical history, past social history, past surgical history and problem list     Review of Systems   Constitutional: Negative for chills and fever  HENT: Negative for ear pain and sore throat  Eyes: Negative for pain and visual disturbance  Respiratory: Negative for cough and shortness of breath  Cardiovascular: Negative for chest pain and palpitations  Gastrointestinal: Positive for abdominal pain and diarrhea  Negative for vomiting  Genitourinary: Negative for dysuria and hematuria  Musculoskeletal: Negative for arthralgias and back pain  Skin: Negative for color change and rash  Neurological: Negative for seizures and syncope  Psychiatric/Behavioral: Negative for agitation  All other systems reviewed and are negative  Objective:      /78   Pulse 75   Ht 5' 4" (1 626 m)   Wt 56 2 kg (124 lb)   LMP  (LMP Unknown)   BMI 21 28 kg/m²          Physical Exam  Vitals and nursing note reviewed  Constitutional:       General: She is not in acute distress  Appearance: She is well-developed  She is not diaphoretic  HENT:      Head: Normocephalic and atraumatic  Eyes:      Pupils: Pupils are equal, round, and reactive to light  Cardiovascular:      Rate and Rhythm: Normal rate and regular rhythm  Heart sounds: Normal heart sounds  No murmur heard  Pulmonary:      Effort: Pulmonary effort is normal  No respiratory distress  Breath sounds: Normal breath sounds  No wheezing  Abdominal:      General: Bowel sounds are normal       Palpations: Abdomen is soft  Comments:   Well-healed scars  Mild tenderness right lower quadrant  No obvious incisional hernia palpated at this time   Musculoskeletal:         General: Normal range of motion  Cervical back: Normal range of motion and neck supple  Skin:     General: Skin is warm and dry  Neurological:      Mental Status: She is alert and oriented to person, place, and time     Psychiatric:         Behavior: Behavior normal

## 2022-09-06 NOTE — TELEPHONE ENCOUNTER
I will send in eye drops  She can continue with the tea bags    The eyedrops are 1 drop 4 times daily

## 2022-09-08 ENCOUNTER — HOSPITAL ENCOUNTER (OUTPATIENT)
Dept: MAMMOGRAPHY | Facility: MEDICAL CENTER | Age: 67
Discharge: HOME | End: 2022-09-08
Payer: COMMERCIAL

## 2022-09-08 ENCOUNTER — HOSPITAL ENCOUNTER (OUTPATIENT)
Dept: BONE DENSITY | Facility: MEDICAL CENTER | Age: 67
Discharge: HOME | End: 2022-09-08
Payer: COMMERCIAL

## 2022-09-08 VITALS — WEIGHT: 123.9 LBS | HEIGHT: 64 IN | BODY MASS INDEX: 21.15 KG/M2

## 2022-09-08 DIAGNOSIS — Z13.820 ENCOUNTER FOR OSTEOPOROSIS SCREENING IN ASYMPTOMATIC POSTMENOPAUSAL PATIENT: ICD-10-CM

## 2022-09-08 DIAGNOSIS — Z78.0 ENCOUNTER FOR OSTEOPOROSIS SCREENING IN ASYMPTOMATIC POSTMENOPAUSAL PATIENT: ICD-10-CM

## 2022-09-08 DIAGNOSIS — Z12.31 ENCOUNTER FOR SCREENING MAMMOGRAM FOR BREAST CANCER: ICD-10-CM

## 2022-09-08 PROCEDURE — 77080 DXA BONE DENSITY AXIAL: CPT

## 2022-09-08 PROCEDURE — 77063 BREAST TOMOSYNTHESIS BI: CPT

## 2022-09-08 PROCEDURE — 77067 SCR MAMMO BI INCL CAD: CPT

## 2022-09-12 DIAGNOSIS — R10.13 DYSPEPSIA: ICD-10-CM

## 2022-09-12 RX ORDER — ONDANSETRON 4 MG/1
4 TABLET, FILM COATED ORAL EVERY 8 HOURS PRN
Qty: 30 TABLET | Refills: 0 | Status: SHIPPED | OUTPATIENT
Start: 2022-09-12 | End: 2022-09-21 | Stop reason: SDUPTHER

## 2022-09-13 ENCOUNTER — TELEPHONE (OUTPATIENT)
Dept: FAMILY MEDICINE CLINIC | Facility: CLINIC | Age: 67
End: 2022-09-13

## 2022-09-13 DIAGNOSIS — J06.9 UPPER RESPIRATORY TRACT INFECTION, UNSPECIFIED TYPE: Primary | ICD-10-CM

## 2022-09-13 RX ORDER — AZITHROMYCIN 250 MG/1
TABLET, FILM COATED ORAL
Qty: 6 TABLET | Refills: 0 | Status: SHIPPED | OUTPATIENT
Start: 2022-09-13 | End: 2022-09-16 | Stop reason: SDUPTHER

## 2022-09-13 NOTE — TELEPHONE ENCOUNTER
Bairon Arm called the office she has a cough and sob  Pt's cough is worse in the morning  Pt is not coughing up mucus  Pt has a headache  Pt feels a little weak  I did try to se if we could schedule an appointment or virtual Pt's surgery is being postponed  Pt is asking since she was jubrodie kim last week would you be willing to send in an antibiotic for her? Her eye is a little bit better on the right  No Covid 19 exposure  Pt had a neg Covid 19 test 3 days ago     Pt's number is [phone]

## 2022-09-13 NOTE — TELEPHONE ENCOUNTER
I called pt she is aware and expressed verbal understanding that the z pack will not interfere with her surgery

## 2022-09-13 NOTE — TELEPHONE ENCOUNTER
I called pt she is aware  Pt's surgeon is going to move forward with her surgery  This medication will interfere with the surgery/ procure? It will help her correct  Pt's first cataract surgery is left eye tomorrow

## 2022-09-14 DIAGNOSIS — G43.909 MIGRAINE WITHOUT STATUS MIGRAINOSUS, NOT INTRACTABLE, UNSPECIFIED MIGRAINE TYPE: ICD-10-CM

## 2022-09-14 RX ORDER — SUMATRIPTAN 100 MG/1
100 TABLET, FILM COATED ORAL ONCE AS NEEDED
Qty: 9 TABLET | Refills: 0 | Status: SHIPPED | OUTPATIENT
Start: 2022-09-14 | End: 2022-10-06 | Stop reason: SDUPTHER

## 2022-09-16 DIAGNOSIS — J06.9 UPPER RESPIRATORY TRACT INFECTION, UNSPECIFIED TYPE: ICD-10-CM

## 2022-09-19 DIAGNOSIS — J45.20 MILD INTERMITTENT ASTHMA, UNSPECIFIED WHETHER COMPLICATED: ICD-10-CM

## 2022-09-19 RX ORDER — ALBUTEROL SULFATE 90 UG/1
AEROSOL, METERED RESPIRATORY (INHALATION)
Qty: 18 G | Refills: 0 | Status: SHIPPED | OUTPATIENT
Start: 2022-09-19 | End: 2022-09-22 | Stop reason: SDUPTHER

## 2022-09-19 RX ORDER — AZITHROMYCIN 250 MG/1
TABLET, FILM COATED ORAL
Qty: 6 TABLET | Refills: 0 | Status: SHIPPED | OUTPATIENT
Start: 2022-09-19 | End: 2022-09-20

## 2022-09-22 ENCOUNTER — TELEPHONE (OUTPATIENT)
Dept: FAMILY MEDICINE CLINIC | Facility: CLINIC | Age: 67
End: 2022-09-22

## 2022-09-22 ENCOUNTER — TELEMEDICINE (OUTPATIENT)
Dept: FAMILY MEDICINE CLINIC | Facility: CLINIC | Age: 67
End: 2022-09-22
Payer: COMMERCIAL

## 2022-09-22 DIAGNOSIS — J45.20 MILD INTERMITTENT ASTHMA, UNSPECIFIED WHETHER COMPLICATED: ICD-10-CM

## 2022-09-22 DIAGNOSIS — J06.9 ACUTE URI: Primary | ICD-10-CM

## 2022-09-22 PROCEDURE — 99442 PR PHYS/QHP TELEPHONE EVALUATION 11-20 MIN: CPT | Performed by: NURSE PRACTITIONER

## 2022-09-22 RX ORDER — PREDNISONE 10 MG/1
TABLET ORAL
Qty: 21 TABLET | Refills: 0 | Status: SHIPPED | OUTPATIENT
Start: 2022-09-22 | End: 2022-09-25 | Stop reason: SDUPTHER

## 2022-09-22 RX ORDER — ALBUTEROL SULFATE 90 UG/1
2 AEROSOL, METERED RESPIRATORY (INHALATION) 3 TIMES DAILY PRN
Qty: 18 G | Refills: 0 | Status: SHIPPED | OUTPATIENT
Start: 2022-09-22 | End: 2022-10-11 | Stop reason: SDUPTHER

## 2022-09-22 NOTE — PROGRESS NOTES
Virtual Brief Visit    Patient is located in the following state in which I hold an active license [place]      Assessment/Plan:    Problem List Items Addressed This Visit        Respiratory    Asthma    Relevant Medications    albuterol (PROVENTIL HFA,VENTOLIN HFA) 90 mcg/act inhaler      Other Visit Diagnoses     Acute URI    -  Primary    Relevant Medications    predniSONE 10 mg tablet          Recent Visits  No visits were found meeting these conditions  Showing recent visits within past 7 days and meeting all other requirements  Today's Visits  Date Type Provider Dept   09/22/22 Telemedicine Nitesh Arrieta, [address] [facility]   Showing today's visits and meeting all other requirements  Future Appointments  No visits were found meeting these conditions  Showing future appointments within next 150 days and meeting all other requirements       Cold/URI symptoms x 3 weeks  Did z pack 2x  She states she was feeling better but now feeling sick again  Has been using Breo twice a day instead of once a day  Has been overusing ventolin  Advised patient ventolin is q8 PRN and breo is only daily  I wanted patient to be evaluated today but she declines and said it wouldn't be possible  She doesn't want to go to ER  She is willing to come in tomorrow to be evaluated  Will set up appointment  In the mean time can start prednisone taper  Will f/u with her tomorrow  Strongly encouraged patient to go to ER right away for any worsening symptoms  She understood and agrees with plan         I spent 15 minutes with patient today in which greater than 50% of the time was spent in counseling/coordination of care regarding acute uri

## 2022-09-22 NOTE — TELEPHONE ENCOUNTER
I called Pamela Shoemaker and nel requesting she please return the office's call  Larry Stovall would like to see pt in person tomorrow for cough/ cold symptoms per Larry Stovall

## 2022-09-23 ENCOUNTER — OFFICE VISIT (OUTPATIENT)
Dept: FAMILY MEDICINE CLINIC | Facility: CLINIC | Age: 67
End: 2022-09-23
Payer: COMMERCIAL

## 2022-09-23 ENCOUNTER — TELEPHONE (OUTPATIENT)
Dept: NEUROLOGY | Facility: CLINIC | Age: 67
End: 2022-09-23

## 2022-09-23 VITALS — HEART RATE: 99 BPM | TEMPERATURE: 96 F | OXYGEN SATURATION: 91 %

## 2022-09-23 DIAGNOSIS — J06.9 ACUTE URI: Primary | ICD-10-CM

## 2022-09-23 DIAGNOSIS — G25.0 ESSENTIAL TREMOR: Primary | ICD-10-CM

## 2022-09-23 DIAGNOSIS — R10.13 DYSPEPSIA: ICD-10-CM

## 2022-09-23 DIAGNOSIS — J45.20 MILD INTERMITTENT ASTHMA, UNSPECIFIED WHETHER COMPLICATED: ICD-10-CM

## 2022-09-23 DIAGNOSIS — R05.9 COUGH: ICD-10-CM

## 2022-09-23 LAB
SARS-COV-2 AG UPPER RESP QL IA: NEGATIVE
VALID CONTROL: NORMAL

## 2022-09-23 PROCEDURE — 99213 OFFICE O/P EST LOW 20 MIN: CPT | Performed by: NURSE PRACTITIONER

## 2022-09-23 PROCEDURE — 87811 SARS-COV-2 COVID19 W/OPTIC: CPT | Performed by: NURSE PRACTITIONER

## 2022-09-23 RX ORDER — PRIMIDONE 50 MG/1
50 TABLET ORAL DAILY
Qty: 450 TABLET | Refills: 1 | Status: SHIPPED | OUTPATIENT
Start: 2022-09-23 | End: 2022-10-26 | Stop reason: SDUPTHER

## 2022-09-23 RX ORDER — ALBUTEROL SULFATE 2.5 MG/3ML
2.5 SOLUTION RESPIRATORY (INHALATION) EVERY 6 HOURS PRN
Qty: 126 ML | Refills: 0 | Status: SHIPPED | OUTPATIENT
Start: 2022-09-23 | End: 2022-10-09 | Stop reason: SDUPTHER

## 2022-09-23 NOTE — TELEPHONE ENCOUNTER
I called Mark Alvarez to follow up in regards to Zabrina Ferrara wanted her to come in today for an appointment  Per verbal conversation with Mark Alvarez she can;brodie Sandoval see her at home Covid test results  Pt is coming curbside for a rapid test per Zabrina Ferrara  Based on results will decide [facility]   Pt expressed verbal understanding, pt was asked to please arrive at 3:35pm

## 2022-09-23 NOTE — PROGRESS NOTES
Assessment/Plan:   Diagnosis ICD-10-CM Associated Orders   1  Acute URI  J06 9 POCT Rapid Covid Ag     albuterol (2 5 mg/3 mL) 0 083 % nebulizer solution   2  Cough  R05 9 POCT Rapid Covid Ag     albuterol (2 5 mg/3 mL) 0 083 % nebulizer solution   3  Mild intermittent asthma, unspecified whether complicated  K76 56    4  Dyspepsia  R10 13        No acute findings on exam  I went out to patient's care for her convenience as she was doing curbside testing  In office COVID testing done and was negative  Advised to continue with Breo, use albuterol nebulizer instead of ventolin inhaler, solution sent to pharmacy  Will complete steroid taper that she has at home  Overall feeling a little better today  She does sound better  Advised on red flag symptoms and when to call or when to go to ER  Breo/norco and jenna ordered by Dr Catrina Blizzard yesterday  Advised to call the office for any worsening of symptoms or no symptom improvement  Patient verbalizes understand and agrees with treatment plan  Diagnoses and all orders for this visit:    Acute URI  -     POCT Rapid Covid Ag  -     albuterol (2 5 mg/3 mL) 0 083 % nebulizer solution; Take 3 mL (2 5 mg total) by nebulization every 6 (six) hours as needed for wheezing or shortness of breath    Cough  -     POCT Rapid Covid Ag  -     albuterol (2 5 mg/3 mL) 0 083 % nebulizer solution; Take 3 mL (2 5 mg total) by nebulization every 6 (six) hours as needed for wheezing or shortness of breath    Mild intermittent asthma, unspecified whether complicated    Dyspepsia              Subjective:        Patient ID: Hoang Hubbard is a 79 y o  female  Chief Complaint   Patient presents with   Shruthi Flores/Nurse Visit     COVID swab       Here for curbside testing/evaluation  Feeling a little better today rather than yesterday  She does have some pain with coughing and deep breathing  She is having productive coughs today and having improvement         The following portions of the patient's history were reviewed and updated as appropriate: allergies, current medications, past family history, past social history and problem list     Review of Systems   Constitutional: Positive for fatigue  Negative for chills and fever  Eyes: Negative for discharge  Respiratory: Positive for cough and chest tightness  Negative for shortness of breath  Cardiovascular: Negative for chest pain  Gastrointestinal: Negative for constipation and diarrhea  Genitourinary: Negative for difficulty urinating  Musculoskeletal: Positive for arthralgias  Negative for joint swelling  Skin: Negative for rash  Neurological: Negative for headaches  Hematological: Negative for adenopathy  Psychiatric/Behavioral: The patient is not nervous/anxious  Objective:  Pulse 99   Temp (!) 96 °F (35 6 °C)   LMP  (LMP Unknown)   SpO2 91%      Physical Exam  Vitals and nursing note reviewed  Constitutional:       General: She is not in acute distress  Appearance: She is well-developed  She is not diaphoretic  HENT:      Head: Normocephalic and atraumatic  Right Ear: External ear normal       Left Ear: External ear normal    Eyes:      General: Lids are normal          Right eye: No discharge  Left eye: No discharge  Conjunctiva/sclera: Conjunctivae normal    Cardiovascular:      Rate and Rhythm: Normal rate and regular rhythm  Heart sounds: No murmur heard  Pulmonary:      Effort: Pulmonary effort is normal  No respiratory distress  Breath sounds: Normal breath sounds  No wheezing  Musculoskeletal:         General: No deformity  Cervical back: Neck supple  Skin:     General: Skin is warm and dry  Neurological:      Mental Status: She is alert and oriented to person, place, and time  Psychiatric:         Speech: Speech normal          Behavior: Behavior normal          Thought Content:  Thought content normal          Judgment: Judgment normal  Current Outpatient Medications:     albuterol (2 5 mg/3 mL) 0 083 % nebulizer solution, Take 3 mL (2 5 mg total) by nebulization every 6 (six) hours as needed for wheezing or shortness of breath, Disp: 126 mL, Rfl: 0    albuterol (PROVENTIL HFA,VENTOLIN HFA) 90 mcg/act inhaler, Inhale 2 puffs 3 (three) times a day as needed for wheezing or shortness of breath, Disp: 18 g, Rfl: 0    atorvastatin (LIPITOR) 10 mg tablet, TAKE 1 TABLET BY MOUTH EVERY DAY, Disp: 90 tablet, Rfl: 2    Cholecalciferol (VITAMIN D3 PO), Take by mouth, Disp: , Rfl:     cyclobenzaprine (FLEXERIL) 10 mg tablet, Take 1 tablet (10 mg total) by mouth 3 (three) times a day as needed for muscle spasms, Disp: 21 tablet, Rfl: 0    diazepam (VALIUM) 10 mg tablet, Take 1 tablet (10 mg total) by mouth every 12 (twelve) hours as needed for anxiety, Disp: 60 tablet, Rfl: 0    fluticasone (FLONASE) 50 mcg/act nasal spray, 1 spray into each nostril daily, Disp: 16 g, Rfl: 0    fluticasone-vilanterol (Breo Ellipta) 200-25 MCG/INH inhaler, Inhale 1 puff daily Rinse mouth after use , Disp: 60 blister, Rfl: 0    gabapentin (Neurontin) 300 mg capsule, Take 1 capsule (300 mg total) by mouth 3 (three) times a day (Patient taking differently: Take 300 mg by mouth 2 (two) times a day), Disp: 90 capsule, Rfl: 0    HYDROcodone-acetaminophen (NORCO) 7 5-325 mg per tablet, Take 1 tablet by mouth every 6 (six) hours as needed for pain usually Max Daily Amount: 4 tablets, Disp: 120 tablet, Rfl: 0    lidocaine (XYLOCAINE) 5 % ointment, Apply topically as needed for mild pain, Disp: 50 g, Rfl: 1    lisinopril (ZESTRIL) 2 5 mg tablet, Take 1 tablet (2 5 mg total) by mouth daily Pt takes she has not been taking, Disp: 90 tablet, Rfl: 0    MAGNESIUM PO, Take by mouth, Disp: , Rfl:     ondansetron (ZOFRAN) 4 mg tablet, Take 1 tablet (4 mg total) by mouth every 8 (eight) hours as needed for nausea or vomiting, Disp: 30 tablet, Rfl: 0    predniSONE 10 mg tablet, Day 1: 6 tabs  Day 2: 5 tabs Day 3: 4 tabs  Day 4: 3 tabs  Day 5: 2 tabs  Day 6: 1 tab, Disp: 21 tablet, Rfl: 0    Probiotic Product (PROBIOTIC-10 PO), Take by mouth, Disp: , Rfl:     Prolensa 0 07 % SOLN, PLEASE SEE ATTACHED FOR DETAILED DIRECTIONS, Disp: , Rfl:     SUMAtriptan (IMITREX) 100 mg tablet, Take 1 tablet (100 mg total) by mouth once as needed for migraine for up to 1 dose, Disp: 9 tablet, Rfl: 0    tiZANidine (ZANAFLEX) 4 mg tablet, Take 1 tablet (4 mg total) by mouth daily at bedtime Takes 1-2 at hs, Disp: 60 tablet, Rfl: 2    tobramycin (TOBREX) 0 3 % SOLN, Administer 1 drop to the right eye every 4 (four) hours while awake, Disp: 5 mL, Rfl: 0  Allergies   Allergen Reactions    Levofloxacin Other (See Comments)     Weak legs, blurred vision    Carafate [Sucralfate] Rash    Other Palpitations     MRI dye    Tetracycline Rash

## 2022-09-23 NOTE — TELEPHONE ENCOUNTER
Patient left VM on refill line requesting Primidone 50mg tabs  Last OV was 12/27/2021  Next appt is scheduled 12/15/22    I don't see Primidone in patient's list of medication anymore  But, per last OV note  12/27/2021    We discussed the option of an increase in primidone to 250 mg nightly (5 tabs)  She wishes to try this  She wishes to keep 50 mg tabs instead of switching to 250 mg tab  Will see if this improves tremor  Patient would like a CB once medication is sent    CB# [phone]

## 2022-09-25 DIAGNOSIS — J06.9 ACUTE URI: ICD-10-CM

## 2022-09-26 NOTE — TELEPHONE ENCOUNTER
Called pt at [phone] and left detailed message making her aware that Primidone was sent to her pharmacy

## 2022-09-27 RX ORDER — PREDNISONE 10 MG/1
TABLET ORAL
Qty: 21 TABLET | Refills: 0 | Status: SHIPPED | OUTPATIENT
Start: 2022-09-27 | End: 2022-10-03 | Stop reason: SDUPTHER

## 2022-09-28 ENCOUNTER — TELEPHONE (OUTPATIENT)
Dept: FAMILY MEDICINE CLINIC | Facility: CLINIC | Age: 67
End: 2022-09-28

## 2022-09-28 NOTE — TELEPHONE ENCOUNTER
Pt called the office and stated she is still not feeling any better, she stated her throat is killing her and it hurts to swallow  She stated she is sooo tired and fatigued she can not stop sleeping  Pt states she feels weak and would like to know what to do now  She said she looked up Mono and is unsure if she has that, she was COVID tested curbside and it was negative

## 2022-09-30 ENCOUNTER — LAB (OUTPATIENT)
Dept: LAB | Facility: CLINIC | Age: 67
End: 2022-09-30
Payer: COMMERCIAL

## 2022-09-30 ENCOUNTER — OFFICE VISIT (OUTPATIENT)
Dept: FAMILY MEDICINE CLINIC | Facility: CLINIC | Age: 67
End: 2022-09-30
Payer: COMMERCIAL

## 2022-09-30 ENCOUNTER — APPOINTMENT (OUTPATIENT)
Dept: RADIOLOGY | Facility: MEDICAL CENTER | Age: 67
End: 2022-09-30
Payer: COMMERCIAL

## 2022-09-30 VITALS
HEIGHT: 64 IN | BODY MASS INDEX: 20.14 KG/M2 | WEIGHT: 118 LBS | OXYGEN SATURATION: 97 % | SYSTOLIC BLOOD PRESSURE: 142 MMHG | HEART RATE: 90 BPM | DIASTOLIC BLOOD PRESSURE: 96 MMHG | TEMPERATURE: 96 F

## 2022-09-30 DIAGNOSIS — R50.9 FEVER, UNSPECIFIED FEVER CAUSE: Primary | ICD-10-CM

## 2022-09-30 DIAGNOSIS — R50.9 FEVER, UNSPECIFIED FEVER CAUSE: ICD-10-CM

## 2022-09-30 DIAGNOSIS — K12.0 APHTHOUS ULCER: ICD-10-CM

## 2022-09-30 DIAGNOSIS — R05.9 COUGH: ICD-10-CM

## 2022-09-30 LAB
ALBUMIN SERPL BCP-MCNC: 3.7 G/DL (ref 3.5–5)
ALP SERPL-CCNC: 38 U/L (ref 46–116)
ALT SERPL W P-5'-P-CCNC: 32 U/L (ref 12–78)
ANION GAP SERPL CALCULATED.3IONS-SCNC: 5 MMOL/L (ref 4–13)
AST SERPL W P-5'-P-CCNC: 15 U/L (ref 5–45)
BASOPHILS # BLD AUTO: 0.04 THOUSANDS/ΜL (ref 0–0.1)
BASOPHILS NFR BLD AUTO: 0 % (ref 0–1)
BILIRUB SERPL-MCNC: 0.28 MG/DL (ref 0.2–1)
BUN SERPL-MCNC: 15 MG/DL (ref 5–25)
CALCIUM SERPL-MCNC: 9.2 MG/DL (ref 8.3–10.1)
CHLORIDE SERPL-SCNC: 106 MMOL/L (ref 96–108)
CO2 SERPL-SCNC: 28 MMOL/L (ref 21–32)
CREAT SERPL-MCNC: 0.99 MG/DL (ref 0.6–1.3)
EOSINOPHIL # BLD AUTO: 0.02 THOUSAND/ΜL (ref 0–0.61)
EOSINOPHIL NFR BLD AUTO: 0 % (ref 0–6)
ERYTHROCYTE [DISTWIDTH] IN BLOOD BY AUTOMATED COUNT: 14.6 % (ref 11.6–15.1)
GFR SERPL CREATININE-BSD FRML MDRD: 59 ML/MIN/1.73SQ M
GLUCOSE P FAST SERPL-MCNC: 110 MG/DL (ref 65–99)
HCT VFR BLD AUTO: 41.1 % (ref 34.8–46.1)
HGB BLD-MCNC: 13.4 G/DL (ref 11.5–15.4)
IMM GRANULOCYTES # BLD AUTO: 0.11 THOUSAND/UL (ref 0–0.2)
IMM GRANULOCYTES NFR BLD AUTO: 1 % (ref 0–2)
LYMPHOCYTES # BLD AUTO: 1.36 THOUSANDS/ΜL (ref 0.6–4.47)
LYMPHOCYTES NFR BLD AUTO: 8 % (ref 14–44)
MCH RBC QN AUTO: 37.1 PG (ref 26.8–34.3)
MCHC RBC AUTO-ENTMCNC: 32.6 G/DL (ref 31.4–37.4)
MCV RBC AUTO: 114 FL (ref 82–98)
MONOCYTES # BLD AUTO: 0.31 THOUSAND/ΜL (ref 0.17–1.22)
MONOCYTES NFR BLD AUTO: 2 % (ref 4–12)
NEUTROPHILS # BLD AUTO: 15.54 THOUSANDS/ΜL (ref 1.85–7.62)
NEUTS SEG NFR BLD AUTO: 89 % (ref 43–75)
NRBC BLD AUTO-RTO: 0 /100 WBCS
PLATELET # BLD AUTO: 352 THOUSANDS/UL (ref 149–390)
PMV BLD AUTO: 9.8 FL (ref 8.9–12.7)
POTASSIUM SERPL-SCNC: 3.8 MMOL/L (ref 3.5–5.3)
PROT SERPL-MCNC: 7.5 G/DL (ref 6.4–8.4)
RBC # BLD AUTO: 3.61 MILLION/UL (ref 3.81–5.12)
SODIUM SERPL-SCNC: 139 MMOL/L (ref 135–147)
WBC # BLD AUTO: 17.38 THOUSAND/UL (ref 4.31–10.16)

## 2022-09-30 PROCEDURE — 86665 EPSTEIN-BARR CAPSID VCA: CPT

## 2022-09-30 PROCEDURE — 99214 OFFICE O/P EST MOD 30 MIN: CPT | Performed by: NURSE PRACTITIONER

## 2022-09-30 PROCEDURE — 86664 EPSTEIN-BARR NUCLEAR ANTIGEN: CPT

## 2022-09-30 PROCEDURE — 80053 COMPREHEN METABOLIC PANEL: CPT

## 2022-09-30 PROCEDURE — 86663 EPSTEIN-BARR ANTIBODY: CPT

## 2022-09-30 PROCEDURE — 71046 X-RAY EXAM CHEST 2 VIEWS: CPT

## 2022-09-30 PROCEDURE — 1160F RVW MEDS BY RX/DR IN RCRD: CPT | Performed by: NURSE PRACTITIONER

## 2022-09-30 PROCEDURE — 85025 COMPLETE CBC W/AUTO DIFF WBC: CPT

## 2022-09-30 PROCEDURE — 36415 COLL VENOUS BLD VENIPUNCTURE: CPT

## 2022-09-30 NOTE — PATIENT INSTRUCTIONS
Complete x ray and labs immediately  We will call with x ray results today  Magic mouthwash sent to pharmacy to use every 6 hours PRN for mouth pain  Please call the office if you are experiencing any worsening of symptoms or no symptom improvement

## 2022-09-30 NOTE — PROGRESS NOTES
Name: Kip Miller      : 1955      MRN: [id number]  Encounter Provider: HIRAL Luevano  Encounter Date: 2022   Encounter department: [facility] PRIMARY CARE    Assessment & Plan      Aphthous ulcers  Sore throat   - Magic mouthwash Q6 hours PRN  - Some improvement with previous prednisone taper  -EBV panel to be done     Fever   - EBV panel, CXR, CBC,and CMP pending  - Continue OTC medication for symptoms management    Upper Respiratory Infection   - Ongoing symptoms x 3 weeks  - Obtain CXR  - EBV panel, CBC, and CMP pending   - Start Augmentin   - Stop BREO and switch to Trelegy     See telephone encounter regarding chest x ray results which led to further plan of care including Trelegy and Augmentin to cover for likely sinusitis diagnosis  Subjective      Patient presents to office today with sore throat and ear pain  She was previously seen at the office and treated for URI  She has had fever 101 1 x 3 days, body aches, and cough which she has been taking Angélica for symptomatic relief but has had minimal improvement  Other conservative measures like green tea with honey has helped with sore throat  She has not been able to eat much due to sore throat and sores in mouth  She reports symptoms started 2 weeks ago  She was previously treated for URI and completed x2 z packs and a course of prednisone which she finished today  The prednisone has helped with swelling in throat  Reports a productive cough and is coughing up clear sputum  Denies any recent sick contacts  L ear pain since last week  Pain is sharp and radiates to her throat  She states some SOB and stopped breo  She uses albuterol inhaler 2-3x per day  Review of Systems   Constitutional: Positive for fatigue and fever  Negative for activity change, appetite change and unexpected weight change  HENT: Positive for ear pain, mouth sores and sore throat  Negative for rhinorrhea      Respiratory: Positive for cough and shortness of breath  Negative for chest tightness  Cardiovascular: Negative for chest pain and palpitations  Gastrointestinal: Positive for nausea  Negative for constipation, diarrhea and vomiting         Current Outpatient Medications on File Prior to Visit   Medication Sig    albuterol (2 5 mg/3 mL) 0 083 % nebulizer solution Take 3 mL (2 5 mg total) by nebulization every 6 (six) hours as needed for wheezing or shortness of breath    albuterol (PROVENTIL HFA,VENTOLIN HFA) 90 mcg/act inhaler Inhale 2 puffs 3 (three) times a day as needed for wheezing or shortness of breath    atorvastatin (LIPITOR) 10 mg tablet TAKE 1 TABLET BY MOUTH EVERY DAY    Cholecalciferol (VITAMIN D3 PO) Take by mouth    cyclobenzaprine (FLEXERIL) 10 mg tablet Take 1 tablet (10 mg total) by mouth 3 (three) times a day as needed for muscle spasms    diazepam (VALIUM) 10 mg tablet Take 1 tablet (10 mg total) by mouth every 12 (twelve) hours as needed for anxiety    fluticasone (FLONASE) 50 mcg/act nasal spray 1 spray into each nostril daily    gabapentin (Neurontin) 300 mg capsule Take 1 capsule (300 mg total) by mouth 3 (three) times a day (Patient taking differently: Take 300 mg by mouth 2 (two) times a day)    HYDROcodone-acetaminophen (NORCO) 7 5-325 mg per tablet Take 1 tablet by mouth every 6 (six) hours as needed for pain usually Max Daily Amount: 4 tablets    lidocaine (XYLOCAINE) 5 % ointment Apply topically as needed for mild pain    lisinopril (ZESTRIL) 2 5 mg tablet Take 1 tablet (2 5 mg total) by mouth daily Pt takes she has not been taking    MAGNESIUM PO Take by mouth    ondansetron (ZOFRAN) 4 mg tablet Take 1 tablet (4 mg total) by mouth every 8 (eight) hours as needed for nausea or vomiting    primidone (MYSOLINE) 50 mg tablet Take 1 tablet (50 mg total) by mouth in the morning    Probiotic Product (PROBIOTIC-10 PO) Take by mouth    Prolensa 0 07 % SOLN PLEASE SEE ATTACHED FOR DETAILED DIRECTIONS  SUMAtriptan (IMITREX) 100 mg tablet Take 1 tablet (100 mg total) by mouth once as needed for migraine for up to 1 dose    tiZANidine (ZANAFLEX) 4 mg tablet Take 1 tablet (4 mg total) by mouth daily at bedtime Takes 1-2 at hs    tobramycin (TOBREX) 0 3 % SOLN Administer 1 drop to the right eye every 4 (four) hours while awake    [DISCONTINUED] diazepam (VALIUM) 10 mg tablet Take 1 tablet (10 mg total) by mouth every 12 (twelve) hours as needed for anxiety    [DISCONTINUED] fluticasone-vilanterol (Breo Ellipta) 200-25 MCG/INH inhaler Inhale 1 puff daily Rinse mouth after use   predniSONE 10 mg tablet Day 1: 6 tabs  Day 2: 5 tabs Day 3: 4 tabs  Day 4: 3 tabs  Day 5: 2 tabs  Day 6: 1 tab (Patient not taking: Reported on 9/30/2022)       Objective     /96 (BP Location: Left arm, Patient Position: Sitting, Cuff Size: Adult)   Pulse 90   Temp (!) 96 °F (35 6 °C) (Temporal)   Ht 5' 4" (1 626 m)   Wt 53 5 kg (118 lb)   LMP  (LMP Unknown)   SpO2 97%   BMI 20 25 kg/m²     Physical Exam  Constitutional:       General: She is not in acute distress  Appearance: She is ill-appearing  HENT:      Head: Normocephalic and atraumatic  Right Ear: Ear canal and external ear normal  No tenderness  A middle ear effusion is present  There is no impacted cerumen  No mastoid tenderness  Left Ear: Ear canal and external ear normal  No tenderness  A middle ear effusion is present  There is no impacted cerumen  No mastoid tenderness  Tympanic membrane is retracted  Mouth/Throat:      Mouth: Mucous membranes are moist       Dentition: Has dentures  Pharynx: Posterior oropharyngeal erythema (ulcerations on soft palate and L buccal mucosa) present  Tonsils: No tonsillar exudate  Cardiovascular:      Rate and Rhythm: Normal rate and regular rhythm  Pulmonary:      Effort: Pulmonary effort is normal  No accessory muscle usage or respiratory distress  Breath sounds: Normal breath sounds  No stridor or decreased air movement  No wheezing or rhonchi  Lymphadenopathy:      Cervical: No cervical adenopathy  Neurological:      Mental Status: She is alert         [address] [facility]

## 2022-10-01 LAB
EBV NA IGG SER IA-ACNC: 553 U/ML (ref 0–17.9)
EBV VCA IGG SER IA-ACNC: 520 U/ML (ref 0–17.9)
EBV VCA IGM SER IA-ACNC: <36 U/ML (ref 0–35.9)
INTERPRETATION: ABNORMAL

## 2022-10-03 DIAGNOSIS — F41.1 GENERALIZED ANXIETY DISORDER: ICD-10-CM

## 2022-10-03 DIAGNOSIS — R10.13 DYSPEPSIA: ICD-10-CM

## 2022-10-03 DIAGNOSIS — J06.9 ACUTE URI: ICD-10-CM

## 2022-10-03 RX ORDER — ONDANSETRON 4 MG/1
4 TABLET, FILM COATED ORAL EVERY 8 HOURS PRN
Qty: 30 TABLET | Refills: 0 | Status: SHIPPED | OUTPATIENT
Start: 2022-10-03 | End: 2022-10-15 | Stop reason: SDUPTHER

## 2022-10-03 RX ORDER — DIAZEPAM 10 MG/1
10 TABLET ORAL EVERY 12 HOURS PRN
Qty: 60 TABLET | Refills: 0 | Status: SHIPPED | OUTPATIENT
Start: 2022-10-03

## 2022-10-03 RX ORDER — PREDNISONE 10 MG/1
TABLET ORAL
Qty: 21 TABLET | Refills: 0 | Status: SHIPPED | OUTPATIENT
Start: 2022-10-03 | End: 2022-10-09 | Stop reason: SDUPTHER

## 2022-10-04 ENCOUNTER — APPOINTMENT (OUTPATIENT)
Dept: LAB | Facility: CLINIC | Age: 67
End: 2022-10-04
Payer: COMMERCIAL

## 2022-10-04 DIAGNOSIS — R05.9 COUGH: ICD-10-CM

## 2022-10-04 DIAGNOSIS — J42 CHRONIC BRONCHITIS, UNSPECIFIED CHRONIC BRONCHITIS TYPE (HCC): ICD-10-CM

## 2022-10-04 DIAGNOSIS — J06.9 ACUTE URI: ICD-10-CM

## 2022-10-04 LAB
BASOPHILS # BLD AUTO: 0.1 THOUSANDS/ΜL (ref 0–0.1)
BASOPHILS NFR BLD AUTO: 1 % (ref 0–1)
EOSINOPHIL # BLD AUTO: 0.37 THOUSAND/ΜL (ref 0–0.61)
EOSINOPHIL NFR BLD AUTO: 3 % (ref 0–6)
ERYTHROCYTE [DISTWIDTH] IN BLOOD BY AUTOMATED COUNT: 14.3 % (ref 11.6–15.1)
HCT VFR BLD AUTO: 42.7 % (ref 34.8–46.1)
HGB BLD-MCNC: 14.1 G/DL (ref 11.5–15.4)
IMM GRANULOCYTES # BLD AUTO: 0.05 THOUSAND/UL (ref 0–0.2)
IMM GRANULOCYTES NFR BLD AUTO: 0 % (ref 0–2)
LYMPHOCYTES # BLD AUTO: 2.85 THOUSANDS/ΜL (ref 0.6–4.47)
LYMPHOCYTES NFR BLD AUTO: 21 % (ref 14–44)
MCH RBC QN AUTO: 36.8 PG (ref 26.8–34.3)
MCHC RBC AUTO-ENTMCNC: 33 G/DL (ref 31.4–37.4)
MCV RBC AUTO: 112 FL (ref 82–98)
MONOCYTES # BLD AUTO: 0.96 THOUSAND/ΜL (ref 0.17–1.22)
MONOCYTES NFR BLD AUTO: 7 % (ref 4–12)
NEUTROPHILS # BLD AUTO: 9.33 THOUSANDS/ΜL (ref 1.85–7.62)
NEUTS SEG NFR BLD AUTO: 68 % (ref 43–75)
NRBC BLD AUTO-RTO: 0 /100 WBCS
PLATELET # BLD AUTO: 309 THOUSANDS/UL (ref 149–390)
PMV BLD AUTO: 9.7 FL (ref 8.9–12.7)
RBC # BLD AUTO: 3.83 MILLION/UL (ref 3.81–5.12)
WBC # BLD AUTO: 13.66 THOUSAND/UL (ref 4.31–10.16)

## 2022-10-04 PROCEDURE — 85025 COMPLETE CBC W/AUTO DIFF WBC: CPT

## 2022-10-04 PROCEDURE — 36415 COLL VENOUS BLD VENIPUNCTURE: CPT

## 2022-10-05 ENCOUNTER — RA CDI HCC (OUTPATIENT)
Dept: OTHER | Facility: HOSPITAL | Age: 67
End: 2022-10-05

## 2022-10-05 DIAGNOSIS — K57.20 DIVERTICULITIS OF LARGE INTESTINE WITH PERFORATION AND ABSCESS WITHOUT BLEEDING: Primary | ICD-10-CM

## 2022-10-05 DIAGNOSIS — K59.1 FUNCTIONAL DIARRHEA: ICD-10-CM

## 2022-10-05 RX ORDER — SODIUM CHLORIDE 9 MG/ML
125 INJECTION, SOLUTION INTRAVENOUS CONTINUOUS
Status: CANCELLED | OUTPATIENT
Start: 2022-10-05

## 2022-10-05 RX ORDER — SODIUM, POTASSIUM,MAG SULFATES 17.5-3.13G
2 SOLUTION, RECONSTITUTED, ORAL ORAL 2 TIMES DAILY
Qty: 177 ML | Refills: 0 | Status: SHIPPED | OUTPATIENT
Start: 2022-10-05 | End: 2022-10-24

## 2022-10-05 NOTE — PROGRESS NOTES
[facility]  coding opportunities       Chart reviewed, no opportunity found:   [address]        Patients Insurance     Medicare Insurance: [employer]vantage

## 2022-10-06 DIAGNOSIS — R50.9 FEVER, UNSPECIFIED FEVER CAUSE: ICD-10-CM

## 2022-10-06 DIAGNOSIS — G43.909 MIGRAINE WITHOUT STATUS MIGRAINOSUS, NOT INTRACTABLE, UNSPECIFIED MIGRAINE TYPE: ICD-10-CM

## 2022-10-06 RX ORDER — AMOXICILLIN AND CLAVULANATE POTASSIUM 875; 125 MG/1; MG/1
1 TABLET, FILM COATED ORAL EVERY 12 HOURS SCHEDULED
Qty: 14 TABLET | Refills: 0 | Status: SHIPPED | OUTPATIENT
Start: 2022-10-06 | End: 2022-10-13

## 2022-10-06 RX ORDER — SUMATRIPTAN 100 MG/1
100 TABLET, FILM COATED ORAL ONCE AS NEEDED
Qty: 9 TABLET | Refills: 0 | Status: SHIPPED | OUTPATIENT
Start: 2022-10-06

## 2022-10-07 ENCOUNTER — HOSPITAL ENCOUNTER (OUTPATIENT)
Dept: GASTROENTEROLOGY | Facility: HOSPITAL | Age: 67
Setting detail: OUTPATIENT SURGERY
End: 2022-10-07
Payer: COMMERCIAL

## 2022-10-07 ENCOUNTER — ANESTHESIA EVENT (OUTPATIENT)
Dept: GASTROENTEROLOGY | Facility: HOSPITAL | Age: 67
End: 2022-10-07

## 2022-10-07 ENCOUNTER — ANESTHESIA (OUTPATIENT)
Dept: GASTROENTEROLOGY | Facility: HOSPITAL | Age: 67
End: 2022-10-07

## 2022-10-07 ENCOUNTER — HOSPITAL ENCOUNTER (OUTPATIENT)
Dept: GASTROENTEROLOGY | Facility: HOSPITAL | Age: 67
Setting detail: OUTPATIENT SURGERY
End: 2022-10-07
Attending: SURGERY
Payer: COMMERCIAL

## 2022-10-07 VITALS
HEART RATE: 75 BPM | DIASTOLIC BLOOD PRESSURE: 53 MMHG | SYSTOLIC BLOOD PRESSURE: 100 MMHG | RESPIRATION RATE: 16 BRPM | OXYGEN SATURATION: 99 %

## 2022-10-07 VITALS
SYSTOLIC BLOOD PRESSURE: 113 MMHG | OXYGEN SATURATION: 99 % | DIASTOLIC BLOOD PRESSURE: 58 MMHG | TEMPERATURE: 98.4 F | HEART RATE: 76 BPM | RESPIRATION RATE: 18 BRPM

## 2022-10-07 DIAGNOSIS — K57.20 DIVERTICULITIS OF LARGE INTESTINE WITH PERFORATION AND ABSCESS WITHOUT BLEEDING: ICD-10-CM

## 2022-10-07 DIAGNOSIS — Z87.19 HX OF DIVERTICULITIS OF COLON: ICD-10-CM

## 2022-10-07 LAB
ATRIAL RATE: 75 BPM
P AXIS: 53 DEGREES
PR INTERVAL: 150 MS
QRS AXIS: -10 DEGREES
QRSD INTERVAL: 88 MS
QT INTERVAL: 410 MS
QTC INTERVAL: 457 MS
T WAVE AXIS: 43 DEGREES
VENTRICULAR RATE: 75 BPM

## 2022-10-07 PROCEDURE — 45378 DIAGNOSTIC COLONOSCOPY: CPT | Performed by: SURGERY

## 2022-10-07 PROCEDURE — 93005 ELECTROCARDIOGRAM TRACING: CPT

## 2022-10-07 PROCEDURE — 93010 ELECTROCARDIOGRAM REPORT: CPT | Performed by: INTERNAL MEDICINE

## 2022-10-07 RX ORDER — LIDOCAINE HYDROCHLORIDE 20 MG/ML
INJECTION, SOLUTION EPIDURAL; INFILTRATION; INTRACAUDAL; PERINEURAL AS NEEDED
Status: DISCONTINUED | OUTPATIENT
Start: 2022-10-07 | End: 2022-10-07

## 2022-10-07 RX ORDER — PROPOFOL 10 MG/ML
INJECTION, EMULSION INTRAVENOUS CONTINUOUS PRN
Status: DISCONTINUED | OUTPATIENT
Start: 2022-10-07 | End: 2022-10-07

## 2022-10-07 RX ORDER — SODIUM CHLORIDE 9 MG/ML
125 INJECTION, SOLUTION INTRAVENOUS CONTINUOUS
Status: DISCONTINUED | OUTPATIENT
Start: 2022-10-07 | End: 2022-10-11 | Stop reason: HOSPADM

## 2022-10-07 RX ORDER — PROPOFOL 10 MG/ML
INJECTION, EMULSION INTRAVENOUS AS NEEDED
Status: DISCONTINUED | OUTPATIENT
Start: 2022-10-07 | End: 2022-10-07

## 2022-10-07 RX ADMIN — SODIUM CHLORIDE: 0.9 INJECTION, SOLUTION INTRAVENOUS at 14:44

## 2022-10-07 RX ADMIN — SODIUM CHLORIDE 125 ML/HR: 0.9 INJECTION, SOLUTION INTRAVENOUS at 12:33

## 2022-10-07 RX ADMIN — PROPOFOL 30 MG: 10 INJECTION, EMULSION INTRAVENOUS at 13:30

## 2022-10-07 RX ADMIN — PROPOFOL 50 MG: 10 INJECTION, EMULSION INTRAVENOUS at 14:47

## 2022-10-07 RX ADMIN — PROPOFOL 20 MG: 10 INJECTION, EMULSION INTRAVENOUS at 14:52

## 2022-10-07 RX ADMIN — LIDOCAINE HYDROCHLORIDE 100 MG: 20 INJECTION, SOLUTION EPIDURAL; INFILTRATION; INTRACAUDAL; PERINEURAL at 13:30

## 2022-10-07 RX ADMIN — PROPOFOL 70 MCG/KG/MIN: 10 INJECTION, EMULSION INTRAVENOUS at 13:30

## 2022-10-07 RX ADMIN — LIDOCAINE HYDROCHLORIDE 60 MG: 20 INJECTION, SOLUTION EPIDURAL; INFILTRATION; INTRACAUDAL; PERINEURAL at 14:46

## 2022-10-07 RX ADMIN — PROPOFOL 50 MCG/KG/MIN: 10 INJECTION, EMULSION INTRAVENOUS at 14:47

## 2022-10-07 NOTE — ANESTHESIA PREPROCEDURE EVALUATION
Procedure:  COLONOSCOPY    Relevant Problems   CARDIO   (+) Essential hypertension   (+) Migraine without status migrainosus, not intractable      GI/HEPATIC   (+) Esophageal dysphagia   (+) GERD (gastroesophageal reflux disease)      MUSCULOSKELETAL   (+) Fibromyalgia      NEURO/PSYCH   (+) Fibromyalgia   (+) Generalized anxiety disorder   (+) Migraine without status migrainosus, not intractable   (+) Moderate episode of recurrent major depressive disorder (HCC)      PULMONARY   (+) Asthma        Physical Exam    Airway    Mallampati score: II  TM Distance: >3 FB  Neck ROM: full     Dental   upper dentures and lower dentures,     Cardiovascular  Rhythm: regular, Rate: normal, Cardiovascular exam normal    Pulmonary  Pulmonary exam normal Breath sounds clear to auscultation,     Other Findings        Anesthesia Plan  ASA Score- 2     Anesthesia Type- IV sedation with anesthesia with ASA Monitors  Additional Monitors:   Airway Plan:     Comment: Started FC sedation  "Fast HB, looked like AFib" per CRNa  Cancelled, Cardiology consulted  12 lead obtained  NSR  Proceed with FC today          Plan Factors-    Chart reviewed  EKG reviewed  Patient summary reviewed  Patient is not a current smoker  Patient instructed to abstain from smoking on day of procedure  Patient did not smoke on day of surgery  Induction- intravenous  Postoperative Plan-     Informed Consent- Anesthetic plan and risks discussed with patient and spouse  I personally reviewed this patient with the CRNA  Discussed and agreed on the Anesthesia Plan with the CRNA  Guido Estrella

## 2022-10-07 NOTE — ANESTHESIA PREPROCEDURE EVALUATION
Procedure:  COLONOSCOPY    Relevant Problems   CARDIO   (+) Essential hypertension   (+) Migraine without status migrainosus, not intractable      GI/HEPATIC   (+) Esophageal dysphagia   (+) GERD (gastroesophageal reflux disease)      MUSCULOSKELETAL   (+) Fibromyalgia      NEURO/PSYCH   (+) Fibromyalgia   (+) Generalized anxiety disorder   (+) Migraine without status migrainosus, not intractable   (+) Moderate episode of recurrent major depressive disorder (HCC)      PULMONARY   (+) Asthma      Nervous and Auditory   (+) Essential tremor        Physical Exam    Airway    Mallampati score: II  TM Distance: >3 FB  Neck ROM: full     Dental   upper dentures and lower dentures,     Cardiovascular  Rhythm: regular, Rate: normal, Cardiovascular exam normal    Pulmonary  Pulmonary exam normal Breath sounds clear to auscultation,     Other Findings        Anesthesia Plan  ASA Score- 2     Anesthesia Type- IV sedation with anesthesia with ASA Monitors  Additional Monitors:   Airway Plan:           Plan Factors-    Chart reviewed  Patient summary reviewed  Patient is not a current smoker  Patient instructed to abstain from smoking on day of procedure  Patient did not smoke on day of surgery  Induction- intravenous  Postoperative Plan-     Informed Consent- Anesthetic plan and risks discussed with patient and spouse  I personally reviewed this patient with the CRNA  Discussed and agreed on the Anesthesia Plan with the CRNA  French Salazar

## 2022-10-07 NOTE — H&P
H&P EXAM - Outpatient Endoscopy   [facility] 79 y o  female MRN: [id number]    [address] [facility] GI LAB INTRA   Encounter: [id number]        Impression:  Colonoscopy    Plan:  Colonoscopy    Chief Complaint:  History of colonoscopy in 2009  Was for repeat colonoscopy in 2021 but had narrowing from diverticulitis and incomplete      Physical Exam: AAOx3    Chest: CTA   Heart: S1S2

## 2022-10-07 NOTE — ANESTHESIA POSTPROCEDURE EVALUATION
Post-Op Assessment Note    CV Status:  Stable    Pain management: adequate     Mental Status:  Alert and awake   Hydration Status:  Euvolemic   PONV Controlled:  Controlled   Airway Patency:  Patent      Post Op Vitals Reviewed: Yes      Staff: Anesthesiologist, CRNA         No complications documented      /53 (10/07/22 1534)    Temp      Pulse 75 (10/07/22 1534)   Resp 16 (10/07/22 1534)    SpO2 99 % (10/07/22 1534)    /53   Pulse 75   Resp 16   LMP  (LMP Unknown)   SpO2 99%

## 2022-10-07 NOTE — PERIOPERATIVE NURSING NOTE
EKG completed at bedside  Dr Alysha Gardiner notified  Patient ID: Sallie is a 55 year old female.    Chief Compliant  Chief Complaint   Patient presents with   • ER F/U     pt went to ER 3 weeks ago pt c/o sharp pain radiating from lower abdomen to lower back with constipation and pain when urinating. pt doesnt ave those symptoms now        HPI    55 yr old female here for ER follow up. Patient was seen in ER on 7/17 at [facility] had lower abdominal pain and constipation. Patient was thought to have constipation and acute cystitis.     Patient reports that since that time she has been feeling fine. Denies any lower abdominal pain. Patient reports that she finished her course of macrobid.     Denies any blood in her urine or stool. Patient states she thinks she is still dealing with constipation. She is not currently taking anything for constipation. Patient states she only drinks about 8 ounces.       Review of Systems:  Review of Systems   Constitutional: Negative for activity change, appetite change, fatigue and fever.   HENT: Negative for congestion.    Respiratory: Negative for cough, shortness of breath and wheezing.    Cardiovascular: Negative for chest pain.   Gastrointestinal: Negative for abdominal distention and abdominal pain.   Genitourinary: Negative for dysuria and hematuria.   Musculoskeletal: Negative for arthralgias and back pain.   Skin: Negative for color change and pallor.   Neurological: Negative for dizziness and headaches.   Psychiatric/Behavioral: Negative.        Physical Exam:  Physical Exam  Constitutional:       Appearance: She is well-developed.   HENT:      Head: Normocephalic.   Eyes:      Conjunctiva/sclera: Conjunctivae normal.      Pupils: Pupils are equal, round, and reactive to light.   Cardiovascular:      Rate and Rhythm: Normal rate and regular rhythm.      Heart sounds: Normal heart sounds.   Pulmonary:      Effort: Pulmonary effort is normal. No respiratory distress.      Breath sounds: Normal breath sounds. No  wheezing.   Abdominal:      General: Bowel sounds are normal. There is no distension.      Palpations: Abdomen is soft.      Tenderness: There is no abdominal tenderness. There is no right CVA tenderness or left CVA tenderness.   Musculoskeletal:         General: Normal range of motion.      Cervical back: Normal range of motion.   Skin:     General: Skin is warm and dry.   Neurological:      Mental Status: She is alert and oriented to person, place, and time.           Vitals  Visit Vitals  /86 (BP Location: LUE - Left upper extremity, Patient Position: Sitting, Cuff Size: Large Adult)   Pulse 76   Temp 98.3 °F (36.8 °C) (Temporal)   Resp 18   Ht 5' 6\" (1.676 m)   Wt (!) 136.4 kg (300 lb 11.3 oz)   SpO2 100%   BMI 48.54 kg/m²       Allergies   ALLERGIES:  No Known Allergies  Family History   Family History   Family history unknown: Yes     Meds  Outpatient Current Medications as of as of 8/2/2021       Disp Refills Start End    losartan-hydrochlorothiazide (HYZAAR) 100-12.5 MG per tablet (Taking) 30 tablet 3 7/26/2021 7/26/2022    Sig - Route: TAKE 1 TABLET BY MOUTH DAILY. - Oral    Class: Eprescribe    montelukast (SINGULAIR) 10 MG tablet (Taking) 30 tablet 3 7/26/2021     Sig - Route: TAKE 1 TABLET BY MOUTH DAILY. AS DIRECTED. - Oral    Class: Eprescribe    ALPRAZolam (XANAX) 0.25 MG tablet (Taking) 30 tablet 0 4/26/2021     Sig - Route: TAKE 1 TABLET BY MOUTH NIGHTLY AS NEEDED FOR ANXIETY - Oral    Class: Eprescribe    estradiol (ESTRACE) 1 MG tablet (Taking) 30 tablet 5 6/2/2020     Sig: TAKE 1 TABLET DAILY.    Class: Eprescribe    azithromycin (ZITHROMAX) 250 MG tablet   4/29/2021     Class: Historical Med    polyethylene glycol (MiraLax) 17 g packet 30 packet 1 8/2/2021 9/1/2021    Sig - Route: Take 17 g by mouth daily. Stir and dissolve powder in any 4 to 8 ounces of beverage, then drink. - Oral    Class: Eprescribe    methylPREDNISolone (MEDROL DOSEPAK) 4 MG tablet 21 tablet 0 4/30/2021     Sig: Do  not start before April 30, 2021. follow package directions    Class: Eprescribe    phentermine 15 MG capsule 30 capsule 0 2/5/2021     Sig: TAKE ONE CAPSULE BY MOUTH IN THE MORNING     Class: Eprescribe    fluticasone (FLONASE) 50 MCG/ACT nasal spray 16 g 1 4/14/2020 5/14/2020    Sig - Route: Spray 1 spray in each nostril daily. - Nasal    Class: Eprescribe    albuterol 108 (90 Base) MCG/ACT inhaler 1 Inhaler 5 4/14/2020     Sig - Route: Inhale 2 puffs into the lungs 3 times daily as needed for Shortness of Breath or Wheezing. - Inhalation    Class: Eprescribe    guaiFENesin-codeine (CHERATUSSIN AC) 100-10 MG/5ML liquid 120 mL 0 12/9/2019     Sig - Route: Take 5 mLs by mouth every 6 hours as needed for Cough. - Oral    Class: Eprescribe    NAPROXEN PO        Class: Historical Med    Route: Oral        Social History  Social History     Socioeconomic History   • Marital status: /Civil Union     Spouse name: Not on file   • Number of children: Not on file   • Years of education: Not on file   • Highest education level: Not on file   Occupational History   • Not on file   Tobacco Use   • Smoking status: Never Smoker   • Smokeless tobacco: Never Used   Vaping Use   • Vaping Use: never used   Substance and Sexual Activity   • Alcohol use: No   • Drug use: Not Currently   • Sexual activity: Not on file   Other Topics Concern   • Not on file   Social History Narrative   • Not on file     Social Determinants of Health     Financial Resource Strain:    • Social Determinants: Financial Resource Strain:    Food Insecurity:    • Social Determinants: Food Insecurity:    Transportation Needs:    • Lack of Transportation (Medical):    • Lack of Transportation (Non-Medical):    Physical Activity:    • Days of Exercise per Week:    • Minutes of Exercise per Session:    Stress:    • Social Determinants: Stress:    Social Connections:    • Social Determinants: Social Connections:    Intimate Partner Violence:    • Social  Determinants: Intimate Partner Violence Past Fear:    • Social Determinants: Intimate Partner Violence Current Fear:        Assessment & Plan  Sallie was seen today for er f/u.    Diagnoses and all orders for this visit:    Constipation, unspecified constipation type  - reports not drinking much water. Advised drinking at least 7-8 glasses of water a day  - advised miralax and increasing fiber in diet  -     polyethylene glycol (MiraLax) 17 g packet; Take 17 g by mouth daily. Stir and dissolve powder in any 4 to 8 ounces of beverage, then drink.    Follow-up exam  -  Finished course of ATB, denies any symptoms of dysuria. Advised to drink more water.     Follow up in 3 months with PCP     Patient agrees and understands with the above plan. All questions were answered to their understanding.    Marbella Massey MD

## 2022-10-08 NOTE — ANESTHESIA POSTPROCEDURE EVALUATION
Post-Op Assessment Note    CV Status:  Stable    Pain management: adequate     Mental Status:  Alert and awake   Hydration Status:  Euvolemic   PONV Controlled:  Controlled   Airway Patency:  Patent      Post Op Vitals Reviewed: Yes      Staff: Anesthesiologist, CRNA         No complications documented      BP      Temp      Pulse     Resp      SpO2      /58   Pulse 76   Temp 98 4 °F (36 9 °C) (Temporal)   Resp 18   LMP  (LMP Unknown)   SpO2 99%

## 2022-10-09 DIAGNOSIS — J06.9 ACUTE URI: ICD-10-CM

## 2022-10-09 DIAGNOSIS — R05.9 COUGH: ICD-10-CM

## 2022-10-10 ENCOUNTER — OFFICE VISIT (OUTPATIENT)
Dept: FAMILY MEDICINE CLINIC | Facility: CLINIC | Age: 67
End: 2022-10-10
Payer: COMMERCIAL

## 2022-10-10 VITALS
DIASTOLIC BLOOD PRESSURE: 80 MMHG | HEIGHT: 64 IN | HEART RATE: 68 BPM | TEMPERATURE: 96 F | SYSTOLIC BLOOD PRESSURE: 138 MMHG | BODY MASS INDEX: 19.97 KG/M2 | WEIGHT: 117 LBS | OXYGEN SATURATION: 99 %

## 2022-10-10 DIAGNOSIS — F41.1 GENERALIZED ANXIETY DISORDER: ICD-10-CM

## 2022-10-10 DIAGNOSIS — K21.9 GASTROESOPHAGEAL REFLUX DISEASE, UNSPECIFIED WHETHER ESOPHAGITIS PRESENT: Chronic | ICD-10-CM

## 2022-10-10 DIAGNOSIS — J30.2 SEASONAL ALLERGIC RHINITIS, UNSPECIFIED TRIGGER: ICD-10-CM

## 2022-10-10 DIAGNOSIS — M51.17 INTERVERTEBRAL DISC DISORDER WITH RADICULOPATHY OF LUMBOSACRAL REGION: ICD-10-CM

## 2022-10-10 DIAGNOSIS — J45.20 MILD INTERMITTENT ASTHMA, UNSPECIFIED WHETHER COMPLICATED: ICD-10-CM

## 2022-10-10 DIAGNOSIS — M79.7 FIBROMYALGIA: ICD-10-CM

## 2022-10-10 DIAGNOSIS — G43.909 MIGRAINE WITHOUT STATUS MIGRAINOSUS, NOT INTRACTABLE, UNSPECIFIED MIGRAINE TYPE: ICD-10-CM

## 2022-10-10 DIAGNOSIS — I10 ESSENTIAL HYPERTENSION: ICD-10-CM

## 2022-10-10 DIAGNOSIS — Z01.818 PRE-OP EXAMINATION: Primary | ICD-10-CM

## 2022-10-10 PROBLEM — E44.0 MODERATE PROTEIN-CALORIE MALNUTRITION (HCC): Status: RESOLVED | Noted: 2021-10-13 | Resolved: 2022-10-10

## 2022-10-10 PROCEDURE — 99214 OFFICE O/P EST MOD 30 MIN: CPT | Performed by: NURSE PRACTITIONER

## 2022-10-10 RX ORDER — ALBUTEROL SULFATE 2.5 MG/3ML
2.5 SOLUTION RESPIRATORY (INHALATION) EVERY 6 HOURS PRN
Qty: 126 ML | Refills: 0 | Status: SHIPPED | OUTPATIENT
Start: 2022-10-10

## 2022-10-10 RX ORDER — FLUTICASONE PROPIONATE 50 MCG
1 SPRAY, SUSPENSION (ML) NASAL DAILY
Qty: 16 G | Refills: 1 | Status: SHIPPED | OUTPATIENT
Start: 2022-10-10

## 2022-10-10 RX ORDER — PREDNISONE 10 MG/1
TABLET ORAL
Qty: 21 TABLET | Refills: 0 | Status: SHIPPED | OUTPATIENT
Start: 2022-10-10 | End: 2022-10-10

## 2022-10-10 NOTE — PATIENT INSTRUCTIONS
Dermatology Practices     [facility]   Dr Flynn Marin MS, PA-C, Garth Harman CRNP-F  [address] [address] [place]  [phone]  [facility]   Dr Savannah Anderson MD and Chanel MUNOZorstefaniekshöadolfo Office: [address] Office: [phone]  [facility]   Multiple locations and providers  [phone]  [facility]   Multiple Providers and Locations  [facility]   [phone]  [facility]   [address] [place], [address]  You can also call the back of your insurance card and ask them for covered providers

## 2022-10-10 NOTE — PROGRESS NOTES
Subjective:      Geovanny Casas is a 79 y o  female who presents to the office today for a preoperative consultation at the request of surgeon Dr Lidia Lucero who plans on performing right eye cataract removal on October 12  Planned anesthesia is local/mac  The patient has the following known anesthesia issues: no history of adverse reactions with anesthesia   Patient has a bleeding risk of: no recent abnormal bleeding  Was recently sick, overall doing much better  She states she feels back to her baseline  No fevers/chills  Review of Systems  Review of Systems   Constitutional: Negative for chills and fever  Eyes: Negative for discharge  Respiratory: Positive for shortness of breath (on exertion at times, back to baseline)  Cardiovascular: Negative for chest pain  Gastrointestinal: Negative for constipation and diarrhea  Genitourinary: Negative for difficulty urinating  Musculoskeletal: Negative for joint swelling  Skin: Negative for rash  Neurological: Negative for headaches  Hematological: Negative for adenopathy  Psychiatric/Behavioral: The patient is not nervous/anxious  Objective:      Physical Exam    /80 (BP Location: Left arm, Patient Position: Sitting, Cuff Size: Standard)   Pulse 68   Temp (!) 96 °F (35 6 °C) (Temporal)   Ht 5' 4" (1 626 m)   Wt 53 1 kg (117 lb)   LMP  (LMP Unknown)   SpO2 99%   BMI 20 08 kg/m²     Physical Exam  Vitals and nursing note reviewed  Constitutional:       General: She is not in acute distress  Appearance: She is well-developed  She is not diaphoretic  HENT:      Head: Normocephalic and atraumatic  Right Ear: External ear normal       Left Ear: External ear normal    Eyes:      General: Lids are normal          Right eye: No discharge  Left eye: No discharge  Conjunctiva/sclera: Conjunctivae normal    Cardiovascular:      Rate and Rhythm: Normal rate and regular rhythm        Heart sounds: No murmur heard   Pulmonary:      Effort: Pulmonary effort is normal  No respiratory distress  Breath sounds: Normal breath sounds  No wheezing  Musculoskeletal:         General: No deformity  Cervical back: Neck supple  Skin:     General: Skin is warm and dry  Neurological:      Mental Status: She is alert and oriented to person, place, and time  Psychiatric:         Speech: Speech normal          Behavior: Behavior normal          Thought Content: Thought content normal          Judgment: Judgment normal             Predictors of intubation difficulty:  Morbid obesity? no  Anatomically abnormal facies? no  Short, thick neck? no  Neck range of motion: normal    Cardiographics  None indicated per surgeon  Imaging  None indicated per surgeon  Lab Review   None indicated per surgeon  Assessment:      79 y o  female with planned surgery as above  Can walk 2 blocks without difficulty: yes        1  Pre-op examination     2  Essential hypertension     3  Mild intermittent asthma, unspecified whether complicated     4  Gastroesophageal reflux disease, unspecified whether esophagitis present     5  Fibromyalgia     6  Generalized anxiety disorder     7  Intervertebral disc disorder with radiculopathy of lumbosacral region     8  Seasonal allergic rhinitis, unspecified trigger  fluticasone (FLONASE) 50 mcg/act nasal spray   9  Migraine without status migrainosus, not intractable, unspecified migraine type            Plan:        Asthma  Symptoms stable On trelegy daily and albuterol PRN     GERD (gastroesophageal reflux disease)  Stable  Not requiring daily medications for symptoms       Seasonal allergic rhinitis  flonase refilled    Essential hypertension  Stable on 2 5 mg lisinopril    Fibromyalgia  Stable with Zanaflex PRN    Generalized anxiety disorder  Stable with valium PRN    Intervertebral disc disorder with radiculopathy of lumbosacral region  On medication for chronic pain management managed by her PCP  Pain agreement on file  Migraine without status migrainosus, not intractable  Stable on imitrex         Patient is medically cleared for surgery  Please be aware she was on oral steroid recently and had recent URI, cbc showed WBC trending down and she is overall feeling well and back to her baseline         Levofloxacin, Carafate [sucralfate], Other, and Tetracycline  Past Medical History:   Diagnosis Date   • Anxiety    • Asthma     allergy induced   • Chronic pain disorder     back   • Colon polyp    • Fibromyalgia    • GERD (gastroesophageal reflux disease)    • Hearing aid worn    • Hearing impaired     "broken eardrum after eardrops used"   • Hyperlipidemia     controlled   • Hypertension     controlled   • Lumbar herniated disc    • Shortness of breath     at times   • Wears dentures    • Wears glasses    • Weight loss      Past Surgical History:   Procedure Laterality Date   • BREAST BIOPSY Left     benign- at age 34   • COLONOSCOPY     • EAR SURGERY     • MT LAP, SURG ILEO/JEJUNO-STOMY N/A 4/4/2022    Procedure: ILEOSTOMY LOOP REVERSAL;  Surgeon: Cj Dillon MD;  Location: [facility];  Service: General   • MT LAP,SURG,COLECTOMY, PARTIAL, W/ANAST N/A 11/1/2021    Procedure: RESECTION COLON SIGMOID LAPAROSCOPIC WTIH COLORECTAL ANASTOMOSIS, DIVERTING LOOP ILEOSTOMY;  Surgeon: Cj Dillon MD;  Location: [facility];  Service: General   • TUBAL LIGATION     • ULNAR NERVE REPAIR Left      Patient Active Problem List   Diagnosis   • Esophageal dysphagia   • Dyspepsia   • Essential tremor   • Moderate episode of recurrent major depressive disorder ([facility] [address] )   • Essential hypertension   • Generalized anxiety disorder   • Asthma   • Seasonal allergic rhinitis   • Migraine without status migrainosus, not intractable   • Functional diarrhea   • History of Diverticulitis large intestine   • GERD (gastroesophageal reflux disease)   • Status post laparoscopic colectomy   • Fibromyalgia   • Hearing impaired   • Wears dentures   • Status post reversal of ileostomy   • Intervertebral disc disorder with radiculopathy of lumbosacral region     Social History     Socioeconomic History   • Marital status:       Spouse name: None   • Number of children: None   • Years of education: None   • Highest education level: None   Occupational History   • None   Tobacco Use   • Smoking status: Former Smoker     Packs/day: 0 50     Years: 50 00     Pack years: 25 00     Types: Cigarettes     Quit date: 4/3/2022     Years since quittin 5   • Smokeless tobacco: Never Used   • Tobacco comment: wears nicotine patch on for 1/2 day   Vaping Use   • Vaping Use: Never used   Substance and Sexual Activity   • Alcohol use: Not Currently   • Drug use: No   • Sexual activity: None   Other Topics Concern   • None   Social History Narrative   • None     Social Determinants of Health     Financial Resource Strain: Not on file   Food Insecurity: Not on file   Transportation Needs: Not on file   Physical Activity: Not on file   Stress: Not on file   Social Connections: Not on file   Intimate Partner Violence: Not on file   Housing Stability: Not on file       Current Outpatient Medications:   •  al mag oxide-diphenhydramine-lidocaine viscous (MAGIC MOUTHWASH) 1:1:1 suspension, Swish and spit 10 mL every 6 (six) hours as needed for mouth pain or discomfort, Disp: 90 mL, Rfl: 0  •  albuterol (2 5 mg/3 mL) 0 083 % nebulizer solution, Take 3 mL (2 5 mg total) by nebulization every 6 (six) hours as needed for wheezing or shortness of breath, Disp: 126 mL, Rfl: 0  •  albuterol (PROVENTIL HFA,VENTOLIN HFA) 90 mcg/act inhaler, Inhale 2 puffs 3 (three) times a day as needed for wheezing or shortness of breath, Disp: 18 g, Rfl: 0  •  amoxicillin-clavulanate (AUGMENTIN) 875-125 mg per tablet, Take 1 tablet by mouth every 12 (twelve) hours for 7 days, Disp: 14 tablet, Rfl: 0  •  atorvastatin (LIPITOR) 10 mg tablet, TAKE 1 TABLET BY MOUTH EVERY DAY, Disp: 90 tablet, Rfl: 2  •  Cholecalciferol (VITAMIN D3 PO), Take by mouth, Disp: , Rfl:   •  cyclobenzaprine (FLEXERIL) 10 mg tablet, Take 1 tablet (10 mg total) by mouth 3 (three) times a day as needed for muscle spasms, Disp: 21 tablet, Rfl: 0  •  diazepam (VALIUM) 10 mg tablet, Take 1 tablet (10 mg total) by mouth every 12 (twelve) hours as needed for anxiety, Disp: 60 tablet, Rfl: 0  •  fluticasone (FLONASE) 50 mcg/act nasal spray, 1 spray into each nostril daily, Disp: 16 g, Rfl: 1  •  fluticasone-umeclidinium-vilanterol (Trelegy Ellipta) 100-62 5-25 MCG/INH inhaler, Inhale 1 puff daily Rinse mouth after use , Disp: 60 blister, Rfl: 0  •  gabapentin (Neurontin) 300 mg capsule, Take 1 capsule (300 mg total) by mouth 3 (three) times a day (Patient taking differently: Take 300 mg by mouth 2 (two) times a day), Disp: 90 capsule, Rfl: 0  •  HYDROcodone-acetaminophen (NORCO) 7 5-325 mg per tablet, Take 1 tablet by mouth every 6 (six) hours as needed for pain usually Max Daily Amount: 4 tablets, Disp: 120 tablet, Rfl: 0  •  lidocaine (XYLOCAINE) 5 % ointment, Apply topically as needed for mild pain, Disp: 50 g, Rfl: 1  •  lisinopril (ZESTRIL) 2 5 mg tablet, Take 1 tablet (2 5 mg total) by mouth daily Pt takes she has not been taking, Disp: 90 tablet, Rfl: 0  •  MAGNESIUM PO, Take by mouth, Disp: , Rfl:   •  Na Sulfate-K Sulfate-Mg Sulf (Suprep Bowel Prep Kit) 17 5-3 13-1 6 GM/177ML SOLN, Take 2 Bottles by mouth 2 (two) times a day (Patient not taking: Reported on 10/10/2022), Disp: 177 mL, Rfl: 0  •  ondansetron (ZOFRAN) 4 mg tablet, Take 1 tablet (4 mg total) by mouth every 8 (eight) hours as needed for nausea or vomiting, Disp: 30 tablet, Rfl: 0  •  primidone (MYSOLINE) 50 mg tablet, Take 1 tablet (50 mg total) by mouth in the morning, Disp: 450 tablet, Rfl: 1  •  Probiotic Product (PROBIOTIC-10 PO), Take by mouth, Disp: , Rfl:   •  Prolensa 0 07 % SOLN, PLEASE SEE ATTACHED FOR DETAILED DIRECTIONS, Disp: , Rfl:   •  SUMAtriptan (IMITREX) 100 mg tablet, Take 1 tablet (100 mg total) by mouth once as needed for migraine for up to 1 dose, Disp: 9 tablet, Rfl: 0  •  tiZANidine (ZANAFLEX) 4 mg tablet, Take 1 tablet (4 mg total) by mouth daily at bedtime Takes 1-2 at hs, Disp: 60 tablet, Rfl: 2  •  tobramycin (TOBREX) 0 3 % SOLN, Administer 1 drop to the right eye every 4 (four) hours while awake, Disp: 5 mL, Rfl: 0  No current facility-administered medications for this visit      Facility-Administered Medications Ordered in Other Visits:   •  sodium chloride 0 9 % infusion, 125 mL/hr, Intravenous, Continuous, David Huddleston DO, Stopped at 10/07/22 1508  Lab Results   Component Value Date    WBC 13 66 (H) 10/04/2022    HGB 14 1 10/04/2022    HCT 42 7 10/04/2022     (H) 10/04/2022     10/04/2022     Lab Results   Component Value Date    K 3 8 09/30/2022     09/30/2022    CO2 28 09/30/2022    BUN 15 09/30/2022    CREATININE 0 99 09/30/2022    GLUF 110 (H) 09/30/2022    CALCIUM 9 2 09/30/2022    CORRECTEDCA 9 2 01/28/2022    AST 15 09/30/2022    ALT 32 09/30/2022    ALKPHOS 38 (L) 09/30/2022    EGFR 59 09/30/2022     No results found for: [facility]        [unfilled]    Current Outpatient Medications:   •  atorvastatin (LIPITOR) 10 mg tablet, TAKE 1 TABLET BY MOUTH EVERY DAY, Disp: 90 tablet, Rfl: 2  •  Cholecalciferol (VITAMIN D3 PO), Take by mouth, Disp: , Rfl:   •  cyclobenzaprine (FLEXERIL) 10 mg tablet, Take 1 tablet (10 mg total) by mouth 3 (three) times a day as needed for muscle spasms, Disp: 21 tablet, Rfl: 0  •  diazepam (VALIUM) 10 mg tablet, Take 1 tablet (10 mg total) by mouth every 12 (twelve) hours as needed for anxiety, Disp: 60 tablet, Rfl: 0  •  fluticasone (FLONASE) 50 mcg/act nasal spray, 1 spray into each nostril daily, Disp: 16 g, Rfl: 1  •  fluticasone-umeclidinium-vilanterol (Trelegy Ellipta) 100-62 5-25 MCG/INH inhaler, Inhale 1 puff daily Rinse mouth after use , Disp: 60 blister, Rfl: 0  •  gabapentin (Neurontin) 300 mg capsule, Take 1 capsule (300 mg total) by mouth 3 (three) times a day (Patient taking differently: Take 300 mg by mouth 2 (two) times a day), Disp: 90 capsule, Rfl: 0  •  lidocaine (XYLOCAINE) 5 % ointment, Apply topically as needed for mild pain, Disp: 50 g, Rfl: 1  •  lisinopril (ZESTRIL) 2 5 mg tablet, Take 1 tablet (2 5 mg total) by mouth daily Pt takes she has not been taking, Disp: 90 tablet, Rfl: 0  •  MAGNESIUM PO, Take by mouth, Disp: , Rfl:   •  Na Sulfate-K Sulfate-Mg Sulf (Suprep Bowel Prep Kit) 17 5-3 13-1 6 GM/177ML SOLN, Take 2 Bottles by mouth 2 (two) times a day (Patient not taking: Reported on 10/10/2022), Disp: 177 mL, Rfl: 0  •  ondansetron (ZOFRAN) 4 mg tablet, Take 1 tablet (4 mg total) by mouth every 8 (eight) hours as needed for nausea or vomiting, Disp: 30 tablet, Rfl: 0  •  primidone (MYSOLINE) 50 mg tablet, Take 1 tablet (50 mg total) by mouth in the morning, Disp: 450 tablet, Rfl: 1  •  Probiotic Product (PROBIOTIC-10 PO), Take by mouth, Disp: , Rfl:   •  Prolensa 0 07 % SOLN, PLEASE SEE ATTACHED FOR DETAILED DIRECTIONS, Disp: , Rfl:   •  SUMAtriptan (IMITREX) 100 mg tablet, Take 1 tablet (100 mg total) by mouth once as needed for migraine for up to 1 dose, Disp: 9 tablet, Rfl: 0  •  tiZANidine (ZANAFLEX) 4 mg tablet, Take 1 tablet (4 mg total) by mouth daily at bedtime Takes 1-2 at hs, Disp: 60 tablet, Rfl: 2  •  al mag oxide-diphenhydramine-lidocaine viscous (MAGIC MOUTHWASH) 1:1:1 suspension, Swish and spit 10 mL every 6 (six) hours as needed for mouth pain or discomfort, Disp: 90 mL, Rfl: 0  •  albuterol (2 5 mg/3 mL) 0 083 % nebulizer solution, Take 3 mL (2 5 mg total) by nebulization every 6 (six) hours as needed for wheezing or shortness of breath, Disp: 126 mL, Rfl: 0  •  albuterol (PROVENTIL HFA,VENTOLIN HFA) 90 mcg/act inhaler, Inhale 2 puffs 3 (three) times a day as needed for wheezing or shortness of breath, Disp: 18 g, Rfl: 0  • amoxicillin-clavulanate (AUGMENTIN) 875-125 mg per tablet, Take 1 tablet by mouth every 12 (twelve) hours for 7 days, Disp: 14 tablet, Rfl: 0  •  diazepam (VALIUM) 10 mg tablet, Take 1 tablet (10 mg total) by mouth every 12 (twelve) hours as needed for anxiety, Disp: 60 tablet, Rfl: 0  •  fluticasone (FLONASE) 50 mcg/act nasal spray, 1 spray into each nostril daily, Disp: 16 g, Rfl: 1  •  fluticasone-umeclidinium-vilanterol (Trelegy Ellipta) 100-62 5-25 MCG/INH inhaler, Inhale 1 puff daily Rinse mouth after use , Disp: 60 blister, Rfl: 0  •  HYDROcodone-acetaminophen (NORCO) 7 5-325 mg per tablet, Take 1 tablet by mouth every 6 (six) hours as needed for pain usually Max Daily Amount: 4 tablets, Disp: 120 tablet, Rfl: 0  •  lidocaine (XYLOCAINE) 5 % ointment, Apply topically as needed for mild pain, Disp: 50 g, Rfl: 1  •  lisinopril (ZESTRIL) 2 5 mg tablet, Take 1 tablet (2 5 mg total) by mouth daily Pt takes she has not been taking, Disp: 90 tablet, Rfl: 0  •  ondansetron (ZOFRAN) 4 mg tablet, Take 1 tablet (4 mg total) by mouth every 8 (eight) hours as needed for nausea or vomiting, Disp: 30 tablet, Rfl: 0  •  primidone (MYSOLINE) 50 mg tablet, Take 1 tablet (50 mg total) by mouth in the morning, Disp: 450 tablet, Rfl: 1  •  Prolensa 0 07 % SOLN, PLEASE SEE ATTACHED FOR DETAILED DIRECTIONS, Disp: , Rfl:   •  SUMAtriptan (IMITREX) 100 mg tablet, Take 1 tablet (100 mg total) by mouth once as needed for migraine for up to 1 dose, Disp: 9 tablet, Rfl: 0  •  tobramycin (TOBREX) 0 3 % SOLN, Administer 1 drop to the right eye every 4 (four) hours while awake, Disp: 5 mL, Rfl: 0  No current facility-administered medications for this visit      Facility-Administered Medications Ordered in Other Visits:   •  sodium chloride 0 9 % infusion, 125 mL/hr, Intravenous, Continuous, Lsiset Maya DO, Stopped at 10/07/22 7168  Allergies   Allergen Reactions   • Levofloxacin Other (See Comments)     Weak legs, blurred vision   • Carafate [Sucralfate] Rash   • Other Palpitations     MRI dye   • Tetracycline Rash

## 2022-10-11 DIAGNOSIS — J45.20 MILD INTERMITTENT ASTHMA, UNSPECIFIED WHETHER COMPLICATED: ICD-10-CM

## 2022-10-11 RX ORDER — ALBUTEROL SULFATE 90 UG/1
2 AEROSOL, METERED RESPIRATORY (INHALATION) 3 TIMES DAILY PRN
Qty: 18 G | Refills: 0 | Status: SHIPPED | OUTPATIENT
Start: 2022-10-11

## 2022-10-15 DIAGNOSIS — R10.13 DYSPEPSIA: ICD-10-CM

## 2022-10-17 RX ORDER — ONDANSETRON 4 MG/1
4 TABLET, FILM COATED ORAL EVERY 8 HOURS PRN
Qty: 30 TABLET | Refills: 0 | Status: SHIPPED | OUTPATIENT
Start: 2022-10-17 | End: 2022-10-24 | Stop reason: SDUPTHER

## 2022-10-22 DIAGNOSIS — M79.7 FIBROMYALGIA: ICD-10-CM

## 2022-10-22 RX ORDER — TIZANIDINE 4 MG/1
TABLET ORAL
Qty: 60 TABLET | Refills: 2 | Status: SHIPPED | OUTPATIENT
Start: 2022-10-22

## 2022-10-24 ENCOUNTER — OFFICE VISIT (OUTPATIENT)
Dept: FAMILY MEDICINE CLINIC | Facility: CLINIC | Age: 67
End: 2022-10-24
Payer: COMMERCIAL

## 2022-10-24 VITALS
SYSTOLIC BLOOD PRESSURE: 132 MMHG | DIASTOLIC BLOOD PRESSURE: 68 MMHG | HEIGHT: 64 IN | HEART RATE: 85 BPM | BODY MASS INDEX: 19.94 KG/M2 | TEMPERATURE: 97.6 F | RESPIRATION RATE: 16 BRPM | WEIGHT: 116.8 LBS | OXYGEN SATURATION: 98 %

## 2022-10-24 DIAGNOSIS — M51.17 INTERVERTEBRAL DISC DISORDER WITH RADICULOPATHY OF LUMBOSACRAL REGION: ICD-10-CM

## 2022-10-24 DIAGNOSIS — E78.5 HYPERLIPIDEMIA, UNSPECIFIED HYPERLIPIDEMIA TYPE: ICD-10-CM

## 2022-10-24 DIAGNOSIS — F33.1 MODERATE EPISODE OF RECURRENT MAJOR DEPRESSIVE DISORDER (HCC): ICD-10-CM

## 2022-10-24 DIAGNOSIS — I10 ESSENTIAL HYPERTENSION: ICD-10-CM

## 2022-10-24 DIAGNOSIS — Z00.00 MEDICARE ANNUAL WELLNESS VISIT, SUBSEQUENT: Primary | ICD-10-CM

## 2022-10-24 PROCEDURE — G0439 PPPS, SUBSEQ VISIT: HCPCS | Performed by: FAMILY MEDICINE

## 2022-10-24 RX ORDER — MIRTAZAPINE 15 MG/1
15 TABLET, FILM COATED ORAL
Qty: 30 TABLET | Refills: 5 | Status: SHIPPED | OUTPATIENT
Start: 2022-10-24

## 2022-10-24 RX ORDER — PREDNISONE 10 MG/1
TABLET ORAL
Qty: 21 EACH | Refills: 0 | Status: SHIPPED | OUTPATIENT
Start: 2022-10-24 | End: 2022-10-27 | Stop reason: SDUPTHER

## 2022-10-24 NOTE — PATIENT INSTRUCTIONS
Medicare Preventive Visit Patient Instructions  Thank you for completing your Welcome to Medicare Visit or Medicare Annual Wellness Visit today  Your next wellness visit will be due in one year (10/25/2023)  The screening/preventive services that you may require over the next 5-10 years are detailed below  Some tests may not apply to you based off risk factors and/or age  Screening tests ordered at today's visit but not completed yet may show as past due  Also, please note that scanned in results may not display below  Preventive Screenings:  Service Recommendations Previous Testing/Comments   Colorectal Cancer Screening  * Colonoscopy    * Fecal Occult Blood Test (FOBT)/Fecal Immunochemical Test (FIT)  * Fecal DNA/Cologuard Test  * Flexible Sigmoidoscopy Age: 39-70 years old   Colonoscopy: every 10 years (may be performed more frequently if at higher risk)  OR  FOBT/FIT: every 1 year  OR  Cologuard: every 3 years  OR  Sigmoidoscopy: every 5 years  Screening may be recommended earlier than age 39 if at higher risk for colorectal cancer  Also, an individualized decision between you and your healthcare provider will decide whether screening between the ages of 74-80 would be appropriate  Colonoscopy: 10/07/2022  FOBT/FIT: Not on file  Cologuard: Not on file  Sigmoidoscopy: Not on file    Screening Current     Breast Cancer Screening Age: 36 years old  Frequency: every 1-2 years  Not required if history of left and right mastectomy Mammogram: 09/08/2022    Screening Current   Cervical Cancer Screening Between the ages of 21-29, pap smear recommended once every 3 years  Between the ages of 33-67, can perform pap smear with HPV co-testing every 5 years     Recommendations may differ for women with a history of total hysterectomy, cervical cancer, or abnormal pap smears in past  Pap Smear: 07/18/2022    Screening Not Indicated   Hepatitis C Screening Once for adults born between 1945 and 1965  More frequently in patients at high risk for Hepatitis C Hep C Antibody: Not on file    Screening Current   Diabetes Screening 1-2 times per year if you're at risk for diabetes or have pre-diabetes Fasting glucose: 110 mg/dL (9/30/2022)  A1C: 4 9 % (3/16/2022)  Screening Current   Cholesterol Screening Once every 5 years if you don't have a lipid disorder  May order more often based on risk factors  Lipid panel: 06/23/2021    Screening Not Indicated  History Lipid Disorder     Other Preventive Screenings Covered by Medicare:  1  Abdominal Aortic Aneurysm (AAA) Screening: covered once if your at risk  You're considered to be at risk if you have a family history of AAA  2  Lung Cancer Screening: covers low dose CT scan once per year if you meet all of the following conditions: (1) Age 50-69; (2) No signs or symptoms of lung cancer; (3) Current smoker or have quit smoking within the last 15 years; (4) You have a tobacco smoking history of at least 20 pack years (packs per day multiplied by number of years you smoked); (5) You get a written order from a healthcare provider  3  Glaucoma Screening: covered annually if you're considered high risk: (1) You have diabetes OR (2) Family history of glaucoma OR (3)  aged 48 and older OR (3)  American aged 72 and older  3  Osteoporosis Screening: covered every 2 years if you meet one of the following conditions: (1) You're estrogen deficient and at risk for osteoporosis based off medical history and other findings; (2) Have a vertebral abnormality; (3) On glucocorticoid therapy for more than 3 months; (4) Have primary hyperparathyroidism; (5) On osteoporosis medications and need to assess response to drug therapy  · Last bone density test (DXA Scan): 09/12/2022   5  HIV Screening: covered annually if you're between the age of 15-65  Also covered annually if you are younger than 13 and older than 72 with risk factors for HIV infection   For pregnant patients, it is covered up to 3 times per pregnancy  Immunizations:  Immunization Recommendations   Influenza Vaccine Annual influenza vaccination during flu season is recommended for all persons aged >= 6 months who do not have contraindications   Pneumococcal Vaccine   * Pneumococcal conjugate vaccine = PCV13 (Prevnar 13), PCV15 (Vaxneuvance), PCV20 (Prevnar 20)  * Pneumococcal polysaccharide vaccine = PPSV23 (Pneumovax) Adults 25-60 years old: 1-3 doses may be recommended based on certain risk factors  Adults 72 years old: 1-2 doses may be recommended based off what pneumonia vaccine you previously received   Hepatitis B Vaccine 3 dose series if at intermediate or high risk (ex: diabetes, end stage renal disease, liver disease)   Tetanus (Td) Vaccine - COST NOT COVERED BY MEDICARE PART B Following completion of primary series, a booster dose should be given every 10 years to maintain immunity against tetanus  Td may also be given as tetanus wound prophylaxis  Tdap Vaccine - COST NOT COVERED BY MEDICARE PART B Recommended at least once for all adults  For pregnant patients, recommended with each pregnancy  Shingles Vaccine (Shingrix) - COST NOT COVERED BY MEDICARE PART B  2 shot series recommended in those aged 48 and above     Health Maintenance Due:      Topic Date Due   • Lung Cancer Screening  Never done   • Hepatitis C Screening  04/01/2030 (Originally 1955)   • Breast Cancer Screening: Mammogram  09/08/2023   • Colorectal Cancer Screening  10/04/2032     Immunizations Due:      Topic Date Due   • COVID-19 Vaccine (1) Never done   • Influenza Vaccine (1) Never done     Advance Directives   What are advance directives? Advance directives are legal documents that state your wishes and plans for medical care  These plans are made ahead of time in case you lose your ability to make decisions for yourself   Advance directives can apply to any medical decision, such as the treatments you want, and if you want to donate organs  What are the types of advance directives? There are many types of advance directives, and each state has rules about how to use them  You may choose a combination of any of the following:  · Living will: This is a written record of the treatment you want  You can also choose which treatments you do not want, which to limit, and which to stop at a certain time  This includes surgery, medicine, IV fluid, and tube feedings  · Durable power of  for healthcare [facility]): This is a written record that states who you want to make healthcare choices for you when you are unable to make them for yourself  This person, called a proxy, is usually a family member or a friend  You may choose more than 1 proxy  · Do not resuscitate (DNR) order:  A DNR order is used in case your heart stops beating or you stop breathing  It is a request not to have certain forms of treatment, such as CPR  A DNR order may be included in other types of advance directives  · Medical directive: This covers the care that you want if you are in a coma, near death, or unable to make decisions for yourself  You can list the treatments you want for each condition  Treatment may include pain medicine, surgery, blood transfusions, dialysis, IV or tube feedings, and a ventilator (breathing machine)  · Values history: This document has questions about your views, beliefs, and how you feel and think about life  This information can help others choose the care that you would choose  Why are advance directives important? An advance directive helps you control your care  Although spoken wishes may be used, it is better to have your wishes written down  Spoken wishes can be misunderstood, or not followed  Treatments may be given even if you do not want them  An advance directive may make it easier for your family to make difficult choices about your care     Urinary Incontinence   Urinary incontinence (UI)  is when you lose control of your bladder  UI develops because your bladder cannot store or empty urine properly  The 3 most common types of UI are stress incontinence, urge incontinence, or both  Medicines:   · May be given to help strengthen your bladder control  Report any side effects of medication to your healthcare provider  Do pelvic muscle exercises often:  Your pelvic muscles help you stop urinating  Squeeze these muscles tight for 5 seconds, then relax for 5 seconds  Gradually work up to squeezing for 10 seconds  Do 3 sets of 15 repetitions a day, or as directed  This will help strengthen your pelvic muscles and improve bladder control  Train your bladder:  Go to the bathroom at set times, such as every 2 hours, even if you do not feel the urge to go  You can also try to hold your urine when you feel the urge to go  For example, hold your urine for 5 minutes when you feel the urge to go  As that becomes easier, hold your urine for 10 minutes  Self-care:   · Keep a UI record  Write down how often you leak urine and how much you leak  Make a note of what you were doing when you leaked urine  · Drink liquids as directed  You may need to limit the amount of liquid you drink to help control your urine leakage  Do not drink any liquid right before you go to bed  Limit or do not have drinks that contain caffeine or alcohol  · Prevent constipation  Eat a variety of high-fiber foods  Good examples are high-fiber cereals, beans, vegetables, and whole-grain breads  Walking is the best way to trigger your intestines to have a bowel movement  · Exercise regularly and maintain a healthy weight  Weight loss and exercise will decrease pressure on your bladder and help you control your leakage  · Use a catheter as directed  to help empty your bladder  A catheter is a tiny, plastic tube that is put into your bladder to drain your urine  · Go to behavior therapy as directed    Behavior therapy may be used to help you learn to control your urge to urinate  Narcotic (Opioid) Safety    Use narcotics safely:  · Take prescribed narcotics exactly as directed  · Do not give narcotics to others or take narcotics that belong to someone else  · Do not mix narcotics without medicines or alcohol  · Do not drive or operate heavy machinery after you take the narcotic  · Monitor for side effects and notify your healthcare provider if you experienced side effects such as nausea, sleepiness, itching, or trouble thinking clearly  Manage constipation:    Constipation is the most common side effect of narcotic medicine  Constipation is when you have hard, dry bowel movements, or you go longer than usual between bowel movements  Tell your healthcare provider about all changes in your bowel movements while you are taking narcotics  He or she may recommend laxative medicine to help you have a bowel movement  He or she may also change the kind of narcotic you are taking, or change when you take it  The following are more ways you can prevent or relieve constipation:    · Drink liquids as directed  You may need to drink extra liquids to help soften and move your bowels  Ask how much liquid to drink each day and which liquids are best for you  · Eat high-fiber foods  This may help decrease constipation by adding bulk to your bowel movements  High-fiber foods include fruits, vegetables, whole-grain breads and cereals, and beans  Your healthcare provider or dietitian can help you create a high-fiber meal plan  Your provider may also recommend a fiber supplement if you cannot get enough fiber from food  · Exercise regularly  Regular physical activity can help stimulate your intestines  Walking is a good exercise to prevent or relieve constipation  Ask which exercises are best for you  · Schedule a time each day to have a bowel movement  This may help train your body to have regular bowel movements   Bend forward while you are on the toilet to help move the bowel movement out  Sit on the toilet for at least 10 minutes, even if you do not have a bowel movement  Store narcotics safely:   · Store narcotics where others cannot easily get them  Keep them in a locked cabinet or secure area  Do not  keep them in a purse or other bag you carry with you  A person may be looking for something else and find the narcotics  · Make sure narcotics are stored out of the reach of children  A child can easily overdose on narcotics  Narcotics may look like candy to a small child  The best way to dispose of narcotics: The laws vary by country and area  In the [facility], the best way is to return the narcotics through a take-back program  This program is offered by the [employer]  The following are options for using the program:  · Take the narcotics to a AZUL collection site  The site is often a law enforcement center  Call your local law enforcement center for scheduled take-back days in your area  You will be given information on where to go if the collection site is in a different location  · Take the narcotics to an approved pharmacy or hospital   A pharmacy or hospital may be set up as a collection site  You will need to ask if it is a AZUL collection site if you were not directed there  A pharmacy or doctor's office may not be able to take back narcotics unless it is a AZUL site  · Use a mail-back system  This means you are given containers to put the narcotics into  You will then mail them in the containers  · Use a take-back drop box  This is a place to leave the narcotics at any time  People and animals will not be able to get into the box  Your local law enforcement agency can tell you where to find a drop box in your area  Other ways to manage pain:   · Ask your healthcare provider about non-narcotic medicines to control pain  Nonprescription medicines include NSAIDs (such as ibuprofen) and acetaminophen   Prescription medicines include muscle relaxers, antidepressants, and steroids  · Pain may be managed without any medicines  Some ways to relieve pain include massage, aromatherapy, or meditation  Physical or occupational therapy may also help  For more information:   · Drug Enforcement Administration  [address] , [address]  Phone: 0- 674 - [phone]  Web Address: [email or web address]_[email or web address]    · [address]  and Drug Administration  [facility] , [address] [place] [address]  Phone: 6- 473 - [phone]  Web Address: http://[employer]/     © Copyright [employer] 2018 Information is for End User's use only and may not be sold, redistributed or otherwise used for commercial purposes   All illustrations and images included in CareNotes® are the copyrighted property of A D A M , Inc  or [address] [place] [address]

## 2022-10-24 NOTE — PROGRESS NOTES
Assessment and Plan:   David Cunningham was seen today for medicare wellness visit and follow-up  Diagnoses and all orders for this visit:    Medicare annual wellness visit, subsequent    Essential hypertension  -     Lipid Panel with Direct LDL reflex; Future  -     CBC and Platelet; Future  -     Comprehensive metabolic panel; Future    Hyperlipidemia, unspecified hyperlipidemia type  -     Lipid Panel with Direct LDL reflex; Future    Intervertebral disc disorder with radiculopathy of lumbosacral region  -     predniSONE 10 MG (21) TBPK; As directed take 6-5-4-3-2-1    Moderate episode of recurrent major depressive disorder (HCC)  -     mirtazapine (REMERON) 15 mg tablet; Take 1 tablet (15 mg total) by mouth daily at bedtime      Problem List Items Addressed This Visit        Cardiovascular and Mediastinum    Essential hypertension    Relevant Orders    Lipid Panel with Direct LDL reflex    CBC and Platelet    Comprehensive metabolic panel       Nervous and Auditory    Intervertebral disc disorder with radiculopathy of lumbosacral region    Relevant Medications    predniSONE 10 MG (21) TBPK       Other    Moderate episode of recurrent major depressive disorder (HCC)    Relevant Medications    mirtazapine (REMERON) 15 mg tablet    Hyperlipidemia    Relevant Orders    Lipid Panel with Direct LDL reflex      Other Visit Diagnoses     Medicare annual wellness visit, subsequent    -  Primary           Preventive health issues were discussed with patient, and age appropriate screening tests were ordered as noted in patient's After Visit Summary  Personalized health advice and appropriate referrals for health education or preventive services given if needed, as noted in patient's After Visit Summary       History of Present Illness:     Patient presents for a Medicare Wellness Visit    HPI   Chief Complaint   Patient presents with   • Medicare Wellness Visit   • Follow-up     6 month follow up for medication and chronic medical conditions        Patient Care Team:  Manfred Kaur DO as PCP - General (Family Medicine)     Review of Systems:     Review of Systems   Constitutional: Appetite change:  improving  HENT: Positive for postnasal drip  Respiratory: Negative for cough  Cardiovascular: Negative for chest pain  Genitourinary: Negative for dysuria  Musculoskeletal: Positive for arthralgias and back pain ( waiting to see her pain management specialties  Overdue for back injection)  Psychiatric/Behavioral: Nervous/anxious:  stable           Problem List:     Patient Active Problem List   Diagnosis   • Esophageal dysphagia   • Dyspepsia   • Essential tremor   • Moderate episode of recurrent major depressive disorder (HCC)   • Essential hypertension   • Generalized anxiety disorder   • Asthma   • Seasonal allergic rhinitis   • Migraine without status migrainosus, not intractable   • Functional diarrhea   • History of Diverticulitis large intestine   • GERD (gastroesophageal reflux disease)   • Status post laparoscopic colectomy   • Fibromyalgia   • Hearing impaired   • Wears dentures   • Status post reversal of ileostomy   • Intervertebral disc disorder with radiculopathy of lumbosacral region   • Hyperlipidemia      Past Medical and Surgical History:     Past Medical History:   Diagnosis Date   • Anxiety    • Asthma     allergy induced   • Chronic pain disorder     back   • Colon polyp    • Fibromyalgia    • GERD (gastroesophageal reflux disease)    • Hearing aid worn    • Hearing impaired     "broken eardrum after eardrops used"   • Hyperlipidemia     controlled   • Hypertension     controlled   • Lumbar herniated disc    • Shortness of breath     at times   • Wears dentures    • Wears glasses    • Weight loss      Past Surgical History:   Procedure Laterality Date   • BREAST BIOPSY Left     benign- at age 34   • COLONOSCOPY     • EAR SURGERY     • [place] LAP, SURG ILEO/JEJUNO-STOMY N/A 4/4/2022    Procedure: ILEOSTOMY LOOP REVERSAL;  Surgeon: Pramod Mariee MD;  Location: [facility];  Service: General   • [place] LAP,SURG,COLECTOMY, PARTIAL, W/ANAST N/A 2021    Procedure: RESECTION COLON SIGMOID LAPAROSCOPIC WTIH COLORECTAL ANASTOMOSIS, DIVERTING LOOP ILEOSTOMY;  Surgeon: Pramod Mariee MD;  Location: [facility];  Service: General   • TUBAL LIGATION     • ULNAR NERVE REPAIR Left       Family History:     Family History   Problem Relation Age of Onset   • Heart failure Mother    • Hyperlipidemia Mother    • Hypertension Mother    • Coronary artery disease Brother    • COPD Maternal Grandmother    • No Known Problems Sister    • No Known Problems Daughter    • No Known Problems Sister    • Aneurysm Maternal Aunt       Social History:     Social History     Socioeconomic History   • Marital status:      Spouse name: None   • Number of children: None   • Years of education: None   • Highest education level: None   Occupational History   • None   Tobacco Use   • Smoking status: Former Smoker     Packs/day: 0 50     Years: 50 00     Pack years: 25 00     Types: Cigarettes     Quit date: 4/3/2022     Years since quittin 5   • Smokeless tobacco: Never Used   • Tobacco comment: wears nicotine patch on for 1/2 day   Vaping Use   • Vaping Use: Never used   Substance and Sexual Activity   • Alcohol use: Not Currently   • Drug use: No   • Sexual activity: None   Other Topics Concern   • None   Social History Narrative   • None     Social Determinants of Health     Financial Resource Strain: Medium Risk   • Difficulty of Paying Living Expenses: Somewhat hard   Food Insecurity: Not on file   Transportation Needs: No Transportation Needs   • Lack of Transportation (Medical): No   • Lack of Transportation (Non-Medical):  No   Physical Activity: Not on file   Stress: Not on file   Social Connections: Not on file   Intimate Partner Violence: Not on file   Housing Stability: Not on file      Medications and Allergies:     Current Outpatient Medications   Medication Sig Dispense Refill   • albuterol (PROVENTIL HFA,VENTOLIN HFA) 90 mcg/act inhaler Inhale 2 puffs 3 (three) times a day as needed for wheezing or shortness of breath 18 g 0   • atorvastatin (LIPITOR) 10 mg tablet TAKE 1 TABLET BY MOUTH EVERY DAY 90 tablet 2   • Cholecalciferol (VITAMIN D3 PO) Take by mouth     • cyclobenzaprine (FLEXERIL) 10 mg tablet Take 1 tablet (10 mg total) by mouth 3 (three) times a day as needed for muscle spasms 21 tablet 0   • diazepam (VALIUM) 10 mg tablet Take 1 tablet (10 mg total) by mouth every 12 (twelve) hours as needed for anxiety 60 tablet 0   • fluticasone (FLONASE) 50 mcg/act nasal spray 1 spray into each nostril daily 16 g 1   • fluticasone-umeclidinium-vilanterol (Trelegy Ellipta) 100-62 5-25 MCG/INH inhaler Inhale 1 puff daily Rinse mouth after use  60 blister 0   • gabapentin (Neurontin) 300 mg capsule Take 1 capsule (300 mg total) by mouth 3 (three) times a day (Patient taking differently: Take 300 mg by mouth 2 (two) times a day) 90 capsule 0   • HYDROcodone-acetaminophen (NORCO) 7 5-325 mg per tablet Take 1 tablet by mouth every 6 (six) hours as needed for pain usually Max Daily Amount: 4 tablets 120 tablet 0   • lidocaine (XYLOCAINE) 5 % ointment Apply topically as needed for mild pain 50 g 1   • lisinopril (ZESTRIL) 2 5 mg tablet Take 1 tablet (2 5 mg total) by mouth daily Pt takes she has not been taking 90 tablet 0   • MAGNESIUM PO Take by mouth     • mirtazapine (REMERON) 15 mg tablet Take 1 tablet (15 mg total) by mouth daily at bedtime 30 tablet 5   • predniSONE 10 MG (21) TBPK As directed take 6-5-4-3-2-1 21 each 0   • primidone (MYSOLINE) 50 mg tablet Take 4 tablets (200 mg total) by mouth daily at bedtime   360 tablet 1   • Probiotic Product (PROBIOTIC-10 PO) Take by mouth     • Prolensa 0 07 % SOLN PLEASE SEE ATTACHED FOR DETAILED DIRECTIONS     • SUMAtriptan (IMITREX) 100 mg tablet Take 1 tablet (100 mg total) by mouth once as needed for migraine for up to 1 dose 9 tablet 0   • tiZANidine (ZANAFLEX) 4 mg tablet TAKE 1 TO 2 TABLETS (4 MG TOTAL) BY MOUTH DAILY AT BEDTIME 60 tablet 2   • al mag oxide-diphenhydramine-lidocaine viscous (MAGIC MOUTHWASH) 1:1:1 suspension Swish and spit 10 mL every 6 (six) hours as needed for mouth pain or discomfort (Patient not taking: Reported on 10/24/2022) 90 mL 0   • albuterol (2 5 mg/3 mL) 0 083 % nebulizer solution Take 3 mL (2 5 mg total) by nebulization every 6 (six) hours as needed for wheezing or shortness of breath (Patient not taking: Reported on 10/24/2022) 126 mL 0   • ondansetron (ZOFRAN) 4 mg tablet Take 1 tablet (4 mg total) by mouth every 8 (eight) hours as needed for nausea or vomiting 30 tablet 0     No current facility-administered medications for this visit  Allergies   Allergen Reactions   • Levofloxacin Other (See Comments)     Weak legs, blurred vision   • Carafate [Sucralfate] Rash   • Other Palpitations     MRI dye   • Tetracycline Rash      Immunizations: There is no immunization history on file for this patient  Health Maintenance:         Topic Date Due   • Lung Cancer Screening  Never done   • Hepatitis C Screening  04/01/2030 (Originally 1955)   • Breast Cancer Screening: Mammogram  09/08/2023   • Colorectal Cancer Screening  10/04/2032     There are no preventive care reminders to display for this patient  Medicare Screening Tests and Risk Assessments:     Vadim Zafar is here for her Subsequent Wellness visit  Health Risk Assessment:   Patient rates overall health as poor  Patient feels that their physical health rating is slightly worse  Patient is satisfied with their life  Eyesight was rated as slightly worse  Hearing was rated as much worse  Patient feels that their emotional and mental health rating is same  Patients states they are never, rarely angry  Patient states they are always unusually tired/fatigued   Pain experienced in the last 7 days has been a lot  Patient's pain rating has been 10/10  Patient states that she has experienced no weight loss or gain in last 6 months  Depression Screening:   PHQ-9 Score: 4      Fall Risk Screening: In the past year, patient has experienced: no history of falling in past year      Urinary Incontinence Screening:   Patient has not leaked urine accidently in the last six months  Home Safety:  Patient does not have trouble with stairs inside or outside of their home  Patient has working smoke alarms and has working carbon monoxide detector  Home safety hazards include: none  Nutrition:   Current diet is Regular  Medications:   Patient is currently taking over-the-counter supplements  OTC medications include: see medication list  Patient is able to manage medications  Activities of Daily Living (ADLs)/Instrumental Activities of Daily Living (IADLs):   Walk and transfer into and out of bed and chair?: Yes  Dress and groom yourself?: Yes    Bathe or shower yourself?: Yes    Feed yourself?  Yes  Do your laundry/housekeeping?: Yes  Manage your money, pay your bills and track your expenses?: Yes  Make your own meals?: Yes    Do your own shopping?: Yes    Previous Hospitalizations:   Any hospitalizations or ED visits within the last 12 months?: No      Advance Care Planning:   Living will: No    Durable POA for healthcare: No    Advanced directive: No    Advanced directive counseling given: Yes    Five wishes given: No    Patient declined ACP directive: Yes    End of Life Decisions reviewed with patient: Yes      Cognitive Screening:   Provider or family/friend/caregiver concerned regarding cognition?: No    PREVENTIVE SCREENINGS      Cardiovascular Screening:    General: History Lipid Disorder and Screening Current      Diabetes Screening:     General: Screening Current      Colorectal Cancer Screening:     General: Screening Current      Breast Cancer Screening:     General: Screening Current      Cervical Cancer Screening:    General: Screening Not Indicated      Osteoporosis Screening:    General: Screening Current      Abdominal Aortic Aneurysm (AAA) Screening:        General: Screening Not Indicated      Hepatitis C Screening:    General: Screening Current    Screening, Brief Intervention, and Referral to Treatment (SBIRT)    Screening  Typical number of drinks in a day: 0  Typical number of drinks in a week: 0  Interpretation: Low risk drinking behavior  Single Item Drug Screening:  How often have you used an illegal drug (including marijuana) or a prescription medication for non-medical reasons in the past year? never    Single Item Drug Screen Score: 0  Interpretation: Negative screen for possible drug use disorder    Review of Current Opioid Use    Opioid Risk Tool (ORT) Interpretation: Complete Opioid Risk Tool (ORT)    No exam data present     Physical Exam:     /68   Pulse 85   Temp 97 6 °F (36 4 °C) (Temporal)   Resp 16   Ht 5' 4 17" (1 63 m)   Wt 53 kg (116 lb 12 8 oz)   LMP  (LMP Unknown)   SpO2 98%   BMI 19 94 kg/m²     Physical Exam  Vitals and nursing note reviewed  Constitutional:       General: She is not in acute distress  Appearance: Normal appearance  She is well-developed  Comments: Thin without cachexia 79year-old   HENT:      Right Ear: Tympanic membrane normal       Left Ear: Tympanic membrane normal       Nose: Nose normal    Eyes:      Conjunctiva/sclera: Conjunctivae normal    Neck:      Vascular: No carotid bruit  Cardiovascular:      Rate and Rhythm: Normal rate and regular rhythm  Heart sounds: No murmur heard  Pulmonary:      Effort: Pulmonary effort is normal  No respiratory distress  Breath sounds: Normal breath sounds  Abdominal:      Palpations: Abdomen is soft  Tenderness: There is no abdominal tenderness  Skin:     General: Skin is warm and dry  Neurological:      Mental Status: She is alert and oriented to person, place, and time  Psychiatric:         Mood and Affect: Affect is flat  Behavior: Behavior is slowed            Tash Lane, DO

## 2022-10-25 DIAGNOSIS — G25.0 ESSENTIAL TREMOR: ICD-10-CM

## 2022-10-25 RX ORDER — PRIMIDONE 50 MG/1
50 TABLET ORAL DAILY
Qty: 450 TABLET | Refills: 0 | Status: CANCELLED | OUTPATIENT
Start: 2022-10-25

## 2022-10-26 NOTE — TELEPHONE ENCOUNTER
Chart reviewed  Script sent in on 9/23 has instructions for pt to take 1 tablet at HS with quantity 450  Spoke with pt and advised her that new script will be sent in with updated instructions  Pt states that she takes 4 tablets (200 mg) at HS  Pt says she has been out of medication for 2 days now  Dr Summer Esparza - Rx entered  Please review and sign if in agreement 
Patient left  for refill of primidone 50 mg; takes 4 tablets q HS    Patient of Dr Curtis Browning    I spoke to patient to clarify dose  She said she takes 3-4 tablets, 50 mg q HS  There is a current prescription dated 9/23 with one refill  She will call pharmacy to fill 
Pt left  re: below  She states pharm said no refills until 11/13 for Primidone  Asking for a CB at # [phone]  Thank you 
Statement Selected

## 2022-10-27 RX ORDER — PRIMIDONE 50 MG/1
TABLET ORAL
Qty: 360 TABLET | Refills: 1 | Status: SHIPPED | OUTPATIENT
Start: 2022-10-27

## 2022-11-04 DIAGNOSIS — J45.20 MILD INTERMITTENT ASTHMA, UNSPECIFIED WHETHER COMPLICATED: ICD-10-CM

## 2022-11-04 DIAGNOSIS — G43.909 MIGRAINE WITHOUT STATUS MIGRAINOSUS, NOT INTRACTABLE, UNSPECIFIED MIGRAINE TYPE: ICD-10-CM

## 2022-11-04 RX ORDER — ALBUTEROL SULFATE 90 UG/1
2 AEROSOL, METERED RESPIRATORY (INHALATION) 3 TIMES DAILY PRN
Qty: 18 G | Refills: 0 | Status: SHIPPED | OUTPATIENT
Start: 2022-11-04

## 2022-11-05 DIAGNOSIS — K12.0 APHTHOUS ULCER: ICD-10-CM

## 2022-11-07 RX ORDER — SUMATRIPTAN 100 MG/1
100 TABLET, FILM COATED ORAL ONCE AS NEEDED
Qty: 9 TABLET | Refills: 0 | Status: SHIPPED | OUTPATIENT
Start: 2022-11-07

## 2022-11-17 NOTE — PROGRESS NOTES
Outpatient Consultation - General Cardiology   [facility] 79 y o  female   MRN: [id number]  Encounter: [id number]      PCP: Hermes Murillo DO      History of Present Illness   Physician Requesting Consult: Consults   Reason for Consult / Principal Problem: abnormal EKG    HPI: I had the pleasure of seeing El Grimm today, who is a 79y o  year old female with a history of hypertension, hyperlipidemia, diverticulitis s/p colon resection in Nov 2021 and subsequent colostomy reversal in April 2022, who was referred to [facility] outpatient Cardiology by Dr Loreto Trujillo for abnormal EKG  She underwent a routine colonoscopy on 10/7/2022, and pre procedure EKG revealed a PAC  She has ongoing anxiety, and has occasional palpitations with anxiety  No chest pain, dyspnea, dizziness, syncope  TSH in September 2022 was WNL  No use of alcohol  She does smoke (2 packs/day, cutting down)  No prior cardiac history  Family history is significant for a pacemaker in her brother, and heart disease in 2 sisters, and father  Review of Systems   Constitutional: Negative  Negative for activity change, chills, fatigue and fever  HENT: Negative  Eyes: Negative  Negative for photophobia and visual disturbance  Respiratory: Negative  Negative for cough, chest tightness, shortness of breath and wheezing  Cardiovascular: Positive for palpitations (rare)  Negative for chest pain and leg swelling  Endocrine: Negative  Musculoskeletal: Negative  Skin: Negative  Neurological: Negative for dizziness, syncope and light-headedness  Psychiatric/Behavioral: The patient is nervous/anxious  All other systems reviewed and are negative  Review of system was conducted and was negative except for as stated in the HPI        Historical Information   Past Medical History:   Diagnosis Date   • Anxiety    • Asthma     allergy induced   • Chronic pain disorder     back   • Colon polyp    • Fibromyalgia    • GERD (gastroesophageal reflux disease)    • Hearing aid worn    • Hearing impaired     "broken eardrum after eardrops used"   • Hyperlipidemia     controlled   • Hypertension     controlled   • Lumbar herniated disc    • Shortness of breath     at times   • Wears dentures    • Wears glasses    • Weight loss      Past Surgical History:   Procedure Laterality Date   • BREAST BIOPSY Left     benign- at age 34   • COLONOSCOPY     • EAR SURGERY     • OH LAP, SURG ILEO/JEJUNO-STOMY N/A 2022    Procedure: ILEOSTOMY LOOP REVERSAL;  Surgeon: Melissa Cabrera MD;  [facility];  Service: General   • OH LAP,SURG,COLECTOMY, PARTIAL, W/ANAST N/A 2021    Procedure: RESECTION COLON SIGMOID LAPAROSCOPIC WTIH COLORECTAL ANASTOMOSIS, DIVERTING LOOP ILEOSTOMY;  Surgeon: Melissa Cabrera MD;  [facility];  Service: General   • TUBAL LIGATION     • ULNAR NERVE REPAIR Left      Social History     Substance and Sexual Activity   Alcohol Use Not Currently     Social History     Substance and Sexual Activity   Drug Use No     Social History     Tobacco Use   Smoking Status Former   • Packs/day: 0 50   • Years: 50 00   • Pack years: 25 00   • Types: Cigarettes   • Quit date: 4/3/2022   • Years since quittin 6   Smokeless Tobacco Never   Tobacco Comments    wears nicotine patch on for 1/2 day     Family History:   Family History   Problem Relation Age of Onset   • Heart failure Mother    • Hyperlipidemia Mother    • Hypertension Mother    • Coronary artery disease Brother    • COPD Maternal Grandmother    • No Known Problems Sister    • No Known Problems Daughter    • No Known Problems Sister    • Aneurysm Maternal Aunt        Meds/Allergies   Home Medications:   Current Outpatient Medications:   •  albuterol (PROVENTIL HFA,VENTOLIN HFA) 90 mcg/act inhaler, INHALE 2 PUFFS 3 (THREE) TIMES A DAY AS NEEDED FOR WHEEZING OR SHORTNESS OF BREATH, Disp: 18 g, Rfl: 0  •  atorvastatin (LIPITOR) 10 mg tablet, Take 1 tablet (10 mg total) by mouth daily, Disp: 90 tablet, Rfl: 0  •  Cholecalciferol (VITAMIN D3 PO), Take by mouth, Disp: , Rfl:   •  cyclobenzaprine (FLEXERIL) 10 mg tablet, Take 1 tablet (10 mg total) by mouth 3 (three) times a day as needed for muscle spasms, Disp: 21 tablet, Rfl: 0  •  diazepam (VALIUM) 10 mg tablet, Take 1 tablet (10 mg total) by mouth every 12 (twelve) hours as needed for anxiety, Disp: 60 tablet, Rfl: 0  •  diazepam (VALIUM) 10 mg tablet, Take 1 tablet (10 mg total) by mouth every 12 (twelve) hours as needed for anxiety, Disp: 60 tablet, Rfl: 0  •  fluticasone (FLONASE) 50 mcg/act nasal spray, 1 spray into each nostril daily, Disp: 16 g, Rfl: 1  •  gabapentin (Neurontin) 300 mg capsule, Take 1 capsule (300 mg total) by mouth 3 (three) times a day (Patient taking differently: Take 300 mg by mouth 2 (two) times a day), Disp: 90 capsule, Rfl: 0  •  HYDROcodone-acetaminophen (NORCO) 7 5-325 mg per tablet, Take 1 tablet by mouth every 6 (six) hours as needed for pain usually Max Daily Amount: 4 tablets, Disp: 120 tablet, Rfl: 0  •  lidocaine (XYLOCAINE) 5 % ointment, Apply topically as needed for mild pain, Disp: 50 g, Rfl: 1  •  lisinopril (ZESTRIL) 2 5 mg tablet, Take 1 tablet (2 5 mg total) by mouth daily Pt takes she has not been taking, Disp: 90 tablet, Rfl: 0  •  MAGNESIUM PO, Take by mouth, Disp: , Rfl:   •  mirtazapine (REMERON) 15 mg tablet, Take 1 tablet (15 mg total) by mouth daily at bedtime, Disp: 30 tablet, Rfl: 5  •  ondansetron (ZOFRAN) 4 mg tablet, Take 1 tablet (4 mg total) by mouth every 8 (eight) hours as needed for nausea or vomiting, Disp: 30 tablet, Rfl: 0  •  predniSONE 10 MG (21) TBPK, Two PO  daily, Disp: 60 each, Rfl: 0  •  predniSONE 10 mg tablet, Take 20 mg by mouth daily, Disp: , Rfl:   •  primidone (MYSOLINE) 50 mg tablet, Take 4 tablets (200 mg total) by mouth daily at bedtime  , Disp: 360 tablet, Rfl: 1  •  Probiotic Product (PROBIOTIC-10 PO), Take by mouth, Disp: , Rfl:   • SUMAtriptan (IMITREX) 100 mg tablet, Take 1 tablet (100 mg total) by mouth once as needed for migraine for up to 1 dose, Disp: 9 tablet, Rfl: 0  •  tiZANidine (ZANAFLEX) 4 mg tablet, TAKE 1 TO 2 TABLETS (4 MG TOTAL) BY MOUTH DAILY AT BEDTIME, Disp: 60 tablet, Rfl: 2  •  al mag oxide-diphenhydramine-lidocaine viscous (MAGIC MOUTHWASH) 1:1:1 suspension, Swish and spit 10 mL every 6 (six) hours as needed for mouth pain or discomfort, Disp: 90 mL, Rfl: 0  •  albuterol (2 5 mg/3 mL) 0 083 % nebulizer solution, Take 3 mL (2 5 mg total) by nebulization every 6 (six) hours as needed for wheezing or shortness of breath (Patient not taking: Reported on 10/24/2022), Disp: 126 mL, Rfl: 0  •  fluticasone-umeclidinium-vilanterol (Trelegy Ellipta) 100-62 5-25 MCG/INH inhaler, Inhale 1 puff daily Rinse mouth after use  (Patient not taking: Reported on 11/22/2022), Disp: 60 blister, Rfl: 0  •  Prolensa 0 07 % SOLN, PLEASE SEE ATTACHED FOR DETAILED DIRECTIONS, Disp: , Rfl:     Allergies   Allergen Reactions   • Levofloxacin Other (See Comments)     Weak legs, blurred vision   • Carafate [Sucralfate] Rash   • Other Palpitations     MRI dye   • Tetracycline Rash         Objective   Vitals: Blood pressure 120/70, pulse 78, height 5' 4 17" (1 63 m), weight 55 1 kg (121 lb 6 4 oz), not currently breastfeeding        Physical Exam    GEN: Shant Nelson appears well, alert and oriented x 3, pleasant and cooperative   HEENT:  Normocephalic, atraumatic, anicteric, moist mucous membranes  NECK: No JVD or carotid bruits   HEART: regular rhythm, normal rate, normal S1 and S2, no murmurs, clicks, gallops or rubs   LUNGS: Clear to auscultation bilaterally; no wheezes, rales, or rhonchi; respiration nonlabored   ABDOMEN:  Normoactive bowel sounds, soft, no tenderness, no distention  EXTREMITIES: peripheral pulses palpable; no edema  NEURO: no gross focal findings; cranial nerves grossly intact   SKIN:  Dry, intact, warm to touch    Lab Results: CBC with diff:     CMP:       Invalid input(s): ALBUMIN  TSH:     Lipid Profile:     Lab Results   Component Value Date    HSTNI0 10 01/28/2022    HSTNI2 6 01/28/2022     Lab Results   Component Value Date    NTBNP 198 (H) 10/11/2018     Lab Results   Component Value Date    TRIG 122 06/23/2021     06/23/2021    LDLCALC 103 (H) 06/23/2021     Lab Results   Component Value Date    K 3 8 09/30/2022    CO2 28 09/30/2022     09/30/2022    BUN 15 09/30/2022    CREATININE 0 99 09/30/2022    ALT 32 09/30/2022    AST 15 09/30/2022     Lab Results   Component Value Date    WBC 13 66 (H) 10/04/2022    HGB 14 1 10/04/2022    HCT 42 7 10/04/2022     (H) 10/04/2022     10/04/2022     Lab Results   Component Value Date    INR 0 98 10/12/2021    INR 0 96 07/07/2021    INR 0 86 07/01/2021         Imaging: I have personally reviewed pertinent reports  EKG:   Date: 12/22/22  Interpretation: normal sinus rhythm, rate 86, no PACs or PVCs      DEVICE INTERROGATION  N/A    Previous STRESS TEST:  No results found for this or any previous visit  No results found for this or any previous visit  No results found for this or any previous visit  Previous Cath/PCI:  No results found for this or any previous visit  No results found for this or any previous visit  No results found for this or any previous visit  ECHO:  No results found for this or any previous visit  No results found for this or any previous visit  DANNY:  No results found for this or any previous visit  No results found for this or any previous visit  CMR:  No results found for this or any previous visit  No results found for this or any previous visit  No results found for this or any previous visit  HOLTER  No results found for this or any previous visit  No results found for this or any previous visit            Assessment/Plan     Assessment:    Abnormal EKG  Noted to have a PAC on pre procedure EKG on 10/7/2022  Very rare palpitations with anxiety  EKG today with NSR, no PAC/PVCs  TSH wnl in September 2022  Significant family history of heart disease    Essential hypertension  BP today is 120/70  On lisinopril 2 5 mg daily    Tobacco use  Heavy smoker with 2 packs/day  Currently in the process of cutting down      Plan:  1  PAC appears to be rare, not symptomatic or interfering with daily work, very rare palpitations are likely  Related to her ongoing anxiety  2  No indication to do further testing, advised patient and her  to inform us if she gets frequent palpitations or develops symptoms of dizziness/syncope, and we can consider cardiac monitoring  Will follow up as needed basis      Case discussed and reviewed with Dr Felisa Kumar who agrees with my assessment and plan  Thank you for involving us in the care of your patient  Rasta Foley MD  Cardiology Fellow   PGY-4      ==========================================================================================    Epic/ Allscripts/Care Everywhere records reviewed: yes    ** Please Note: Fluency DirectDictation voice to text software may have been used in the creation of this document   **

## 2022-11-22 ENCOUNTER — OFFICE VISIT (OUTPATIENT)
Dept: CARDIOLOGY CLINIC | Facility: CLINIC | Age: 67
End: 2022-11-22

## 2022-11-22 VITALS
HEIGHT: 64 IN | BODY MASS INDEX: 20.73 KG/M2 | SYSTOLIC BLOOD PRESSURE: 120 MMHG | WEIGHT: 121.4 LBS | DIASTOLIC BLOOD PRESSURE: 70 MMHG | HEART RATE: 78 BPM

## 2022-11-22 DIAGNOSIS — Z72.0 TOBACCO ABUSE: ICD-10-CM

## 2022-11-22 DIAGNOSIS — R94.31 ABNORMAL ELECTROCARDIOGRAM (ECG) (EKG): Primary | ICD-10-CM

## 2022-11-22 DIAGNOSIS — I10 ESSENTIAL HYPERTENSION: ICD-10-CM

## 2022-11-22 DIAGNOSIS — Z87.19 HX OF DIVERTICULITIS OF COLON: ICD-10-CM

## 2022-11-22 RX ORDER — PREDNISONE 10 MG/1
20 TABLET ORAL DAILY
COMMUNITY
Start: 2022-10-28

## 2022-11-24 DIAGNOSIS — M51.17 INTERVERTEBRAL DISC DISORDER WITH RADICULOPATHY OF LUMBOSACRAL REGION: ICD-10-CM

## 2022-11-25 RX ORDER — PREDNISONE 10 MG/1
TABLET ORAL
Qty: 21 TABLET | Refills: 0 | Status: SHIPPED | OUTPATIENT
Start: 2022-11-25

## 2022-11-29 DIAGNOSIS — M79.2 NEUROPATHIC PAIN: ICD-10-CM

## 2022-11-30 RX ORDER — GABAPENTIN 300 MG/1
300 CAPSULE ORAL 3 TIMES DAILY
Qty: 90 CAPSULE | Refills: 0 | Status: SHIPPED | OUTPATIENT
Start: 2022-11-30

## 2022-12-02 DIAGNOSIS — M51.17 INTERVERTEBRAL DISC DISORDER WITH RADICULOPATHY OF LUMBOSACRAL REGION: ICD-10-CM

## 2022-12-02 DIAGNOSIS — F41.1 GENERALIZED ANXIETY DISORDER: ICD-10-CM

## 2022-12-02 RX ORDER — PREDNISONE 10 MG/1
TABLET ORAL
Qty: 21 TABLET | Refills: 0 | OUTPATIENT
Start: 2022-12-02

## 2022-12-02 RX ORDER — DIAZEPAM 10 MG/1
10 TABLET ORAL EVERY 12 HOURS PRN
Qty: 60 TABLET | Refills: 0 | Status: SHIPPED | OUTPATIENT
Start: 2022-12-02

## 2022-12-06 DIAGNOSIS — G43.909 MIGRAINE WITHOUT STATUS MIGRAINOSUS, NOT INTRACTABLE, UNSPECIFIED MIGRAINE TYPE: ICD-10-CM

## 2022-12-06 DIAGNOSIS — M79.7 FIBROMYALGIA: ICD-10-CM

## 2022-12-06 RX ORDER — SUMATRIPTAN 100 MG/1
100 TABLET, FILM COATED ORAL ONCE AS NEEDED
Qty: 9 TABLET | Refills: 0 | Status: SHIPPED | OUTPATIENT
Start: 2022-12-06

## 2022-12-06 RX ORDER — LIDOCAINE 50 MG/G
OINTMENT TOPICAL AS NEEDED
Qty: 50 G | Refills: 0 | Status: SHIPPED | OUTPATIENT
Start: 2022-12-06

## 2022-12-09 ENCOUNTER — TELEPHONE (OUTPATIENT)
Dept: FAMILY MEDICINE CLINIC | Facility: CLINIC | Age: 67
End: 2022-12-09

## 2022-12-09 DIAGNOSIS — R10.13 DYSPEPSIA: ICD-10-CM

## 2022-12-09 DIAGNOSIS — J06.9 UPPER RESPIRATORY TRACT INFECTION, UNSPECIFIED TYPE: Primary | ICD-10-CM

## 2022-12-09 RX ORDER — ONDANSETRON 4 MG/1
4 TABLET, FILM COATED ORAL EVERY 8 HOURS PRN
Qty: 30 TABLET | Refills: 0 | Status: SHIPPED | OUTPATIENT
Start: 2022-12-09

## 2022-12-09 RX ORDER — AMOXICILLIN 500 MG/1
500 CAPSULE ORAL EVERY 8 HOURS SCHEDULED
Qty: 30 CAPSULE | Refills: 0 | Status: SHIPPED | OUTPATIENT
Start: 2022-12-09 | End: 2022-12-19

## 2022-12-09 NOTE — TELEPHONE ENCOUNTER
Patient called stating for a week now she is running a fever of 99 9 and 100 1, she takes aspirin and then it goes down  She is coughing up phlegm and she has body aches  She can't sleep at night due to the cough  She is requesting medication to be called into [facility] on [address] in [place]  Please advise patient at [phone]

## 2022-12-13 ENCOUNTER — TELEPHONE (OUTPATIENT)
Dept: NEUROLOGY | Facility: CLINIC | Age: 67
End: 2022-12-13

## 2022-12-13 DIAGNOSIS — M79.7 FIBROMYALGIA: Primary | ICD-10-CM

## 2022-12-13 RX ORDER — PREDNISONE 10 MG/1
TABLET ORAL
Qty: 21 TABLET | Refills: 0 | Status: SHIPPED | OUTPATIENT
Start: 2022-12-13 | End: 2022-12-27

## 2022-12-13 NOTE — TELEPHONE ENCOUNTER
Pt has called and confirmed her appt with Dr Pfeiffer, [facility], [facility], 1 pm     Thank you,     Jeannette Arroyo

## 2022-12-15 ENCOUNTER — TELEPHONE (OUTPATIENT)
Dept: NEUROLOGY | Facility: CLINIC | Age: 67
End: 2022-12-15

## 2022-12-15 ENCOUNTER — TELEMEDICINE (OUTPATIENT)
Dept: NEUROLOGY | Facility: CLINIC | Age: 67
End: 2022-12-15

## 2022-12-15 DIAGNOSIS — G25.0 ESSENTIAL TREMOR: Primary | ICD-10-CM

## 2022-12-15 NOTE — TELEPHONE ENCOUNTER
Pt called to cancel appt today with Dr Rajesh Brandt at 1:00  Pt does not feel well and the weather is going to be bad  Pt is ok to switch to virtual so she can keep her appt today  Changed to [place] and added phone number

## 2022-12-15 NOTE — PROGRESS NOTES
Virtual Regular Visit    Verification of patient location:    Patient is located in the following state in which I hold an active license [place]      Assessment/Plan:    Problem List Items Addressed This Visit        Nervous and Auditory    Essential tremor - Primary     Setting history of head, vocal, and hand tremors which have worsened in recent weeks  She has increased tremors during the morning and later in the day  Adding an extra dose in the a m  has helped, but 2 tablets in the a m  is associated with sedation  I am spent discussing progression in essential tremor  We discussed potential treatment options including increasing primidone  We opted to add 1/5 tablet of primidone to her day  She will take 1 tablet in the morning and 4 tablets in the evening  If she is having more tremor during the day she could opt to take a tablet midday instead of in addition to her nighttime dose  Beta-blockers have been avoided given history of asthma  He is on gabapentin and mirtazapine  On a benzodiazepine for anxiety  With worsening tremor we could consider the addition of topiramate, this however can cause some cognitive fog                   Reason for visit is tremor  Chief Complaint   Patient presents with   • Virtual Regular Visit        Encounter provider Horacio Claudio MD    Provider located at [address] [place] [address] [address]  [facility] [address]  [facility] [id number]  [phone]      Recent Visits  Date Type Provider Dept   12/13/22 Telephone Horacio Claudio MD [facility]   Showing recent visits within past 7 days and meeting all other requirements  Today's Visits  Date Type Provider Dept   12/15/22 Telephone Horacio Claudio MD [facility]   12/15/22 Cara Triana MD [facility]   Showing today's visits and meeting all other requirements  Future Appointments  No visits were found meeting these conditions  Showing future appointments within next 150 days and meeting all other requirements       The patient was identified by name and date of birth  Yvette Arvizu was informed that this is a telemedicine visit and that the visit is being conducted through the [employer]  She agrees to proceed     My office door was closed  No one else was in the room  She acknowledged consent and understanding of privacy and security of the video platform  The patient has agreed to participate and understands they can discontinue the visit at any time  Patient is aware this is a billable service  Subjective  Yvette Arvizu is a 79 y o  female    who presents in follow up for essential tremor  To review, tremors present since her mid 45s and have progressed to include bilateral hand, head and voice tremor  Primidone was started with some improvement of her tremors  Also with hx of syncopal episode on 1/26/21 (occurred when getting up to use the bathroom, had body pain and possible bite jose elias on the tongue following event)  EEG was normal  MRI brain with mild, chronic microangiopathy  Current medications:   Primidone 3tabs qhs and 1 qam  Remeron 15mg qhs  Gabapentin prn (other reasons)  Valium taken for anxiety (not prescribed by neuro)    Patient presents with underlying upper respiratory illness over the past week  Tremors have been worse even prior to her current illness  He tried increasing the dose of primidone by adding a daytime dose  This has partially helped  Present is worse first thing in the morning  She feels her full body is with tremors  She denies any side effect  It does sleep well on the medications  Tremor can interfere with eating and drinking at times  She is able to perform ADL's without difficulty but not first thing in the am    There has been no change in gait, other than when trembuling in the am  She can sometimes step oddly  Primidone in am helps     She has a vocal tremor which can fluctuate  She has head tremor         HPI     Past Medical History:   Diagnosis Date   • Anxiety    • Asthma     allergy induced   • Chronic pain disorder     back   • Colon polyp    • Fibromyalgia    • GERD (gastroesophageal reflux disease)    • Hearing aid worn    • Hearing impaired     "broken eardrum after eardrops used"   • Hyperlipidemia     controlled   • Hypertension     controlled   • Lumbar herniated disc    • Shortness of breath     at times   • Wears dentures    • Wears glasses    • Weight loss        Past Surgical History:   Procedure Laterality Date   • BREAST BIOPSY Left     benign- at age 34   • COLONOSCOPY     • EAR SURGERY     • NE LAP, SURG ILEO/JEJUNO-STOMY N/A 4/4/2022    Procedure: ILEOSTOMY LOOP REVERSAL;  Surgeon: Hilario Brewster MD;  [facility];  Service: General   • NE LAP,SURG,COLECTOMY, PARTIAL, W/ANAST N/A 11/1/2021    Procedure: RESECTION COLON SIGMOID LAPAROSCOPIC WTIH COLORECTAL ANASTOMOSIS, DIVERTING LOOP ILEOSTOMY;  Surgeon: Hilario Brewster MD;  [facility];  Service: General   • TUBAL LIGATION     • ULNAR NERVE REPAIR Left        Current Outpatient Medications   Medication Sig Dispense Refill   • al mag oxide-diphenhydramine-lidocaine viscous (MAGIC MOUTHWASH) 1:1:1 suspension Swish and spit 10 mL every 6 (six) hours as needed for mouth pain or discomfort 90 mL 0   • albuterol (PROVENTIL HFA,VENTOLIN HFA) 90 mcg/act inhaler Inhale 2 puffs 3 (three) times a day as needed for wheezing or shortness of breath 18 g 0   • amoxicillin (AMOXIL) 500 mg capsule Take 1 capsule (500 mg total) by mouth every 8 (eight) hours for 10 days 30 capsule 0   • atorvastatin (LIPITOR) 10 mg tablet Take 1 tablet (10 mg total) by mouth daily 90 tablet 0   • Cholecalciferol (VITAMIN D3 PO) Take by mouth     • cyclobenzaprine (FLEXERIL) 10 mg tablet Take 1 tablet (10 mg total) by mouth 3 (three) times a day as needed for muscle spasms 21 tablet 0   • diazepam (VALIUM) 10 mg tablet Take 1 tablet (10 mg total) by mouth every 12 (twelve) hours as needed for anxiety 60 tablet 0   • diazepam (VALIUM) 10 mg tablet Take 1 tablet (10 mg total) by mouth every 12 (twelve) hours as needed for anxiety 60 tablet 0   • fluticasone (FLONASE) 50 mcg/act nasal spray 1 spray into each nostril daily 16 g 0   • fluticasone-umeclidinium-vilanterol (Trelegy Ellipta) 100-62 5-25 mcg/actuation inhaler Inhale 1 puff daily Rinse mouth after use  60 blister 0   • gabapentin (Neurontin) 300 mg capsule Take 1 capsule (300 mg total) by mouth 3 (three) times a day (Patient taking differently: Take 300 mg by mouth if needed) 90 capsule 0   • HYDROcodone-acetaminophen (NORCO) 7 5-325 mg per tablet Take 1 tablet by mouth every 6 (six) hours as needed for pain usually Max Daily Amount: 4 tablets 120 tablet 0   • lidocaine (XYLOCAINE) 5 % ointment Apply topically as needed for mild pain 50 g 0   • lisinopril (ZESTRIL) 2 5 mg tablet Take 1 tablet (2 5 mg total) by mouth daily Pt takes she has not been taking 90 tablet 0   • MAGNESIUM PO Take by mouth     • mirtazapine (REMERON) 15 mg tablet Take 1 tablet (15 mg total) by mouth daily at bedtime 30 tablet 5   • ondansetron (ZOFRAN) 4 mg tablet Take 1 tablet (4 mg total) by mouth every 8 (eight) hours as needed for nausea or vomiting 30 tablet 0   • predniSONE 10 mg tablet 6-5-4-3-2-1 21 tablet 0   • predniSONE 10 mg tablet Take 9-7-4-3-2-1- 21 tablet 0   • primidone (MYSOLINE) 50 mg tablet Take 4 tablets (200 mg total) by mouth daily at bedtime   360 tablet 1   • Probiotic Product (PROBIOTIC-10 PO) Take by mouth     • Prolensa 0 07 % SOLN PLEASE SEE ATTACHED FOR DETAILED DIRECTIONS     • SUMAtriptan (IMITREX) 100 mg tablet TAKE 1 TABLET (100 MG TOTAL) BY MOUTH ONCE AS NEEDED FOR MIGRAINE FOR UP TO 1 DOSE 9 tablet 0   • tiZANidine (ZANAFLEX) 4 mg tablet TAKE 1 TO 2 TABLETS (4 MG TOTAL) BY MOUTH DAILY AT BEDTIME 60 tablet 2   • albuterol (2 5 mg/3 mL) 0 083 % nebulizer solution Take 3 mL (2 5 mg total) by nebulization every 6 (six) hours as needed for wheezing or shortness of breath (Patient not taking: Reported on 10/24/2022) 126 mL 0     No current facility-administered medications for this visit  Allergies   Allergen Reactions   • Levofloxacin Other (See Comments)     Weak legs, blurred vision   • Carafate [Sucralfate] Rash   • Other Palpitations     MRI dye   • Tetracycline Rash       Review of Systems   Constitutional: Negative  Negative for appetite change and fever  HENT: Negative  Negative for hearing loss, tinnitus, trouble swallowing and voice change  Eyes: Negative  Negative for photophobia, pain and visual disturbance  Respiratory: Negative  Negative for shortness of breath  Cardiovascular: Negative  Negative for palpitations  Gastrointestinal: Negative  Negative for nausea and vomiting  Endocrine: Negative  Negative for cold intolerance  Genitourinary: Negative  Negative for dysuria, frequency and urgency  Musculoskeletal: Positive for myalgias  Negative for gait problem and neck pain  Skin: Negative  Negative for rash  Allergic/Immunologic: Negative  Neurological: Positive for tremors (head / neck , arms and in the morning whole body) and speech difficulty (mumbles at times as per )  Negative for dizziness, seizures, syncope, facial asymmetry, weakness, light-headedness, numbness and headaches  Hematological: Negative  Does not bruise/bleed easily  Psychiatric/Behavioral: Positive for sleep disturbance (Trouble staying asleep)  Negative for confusion and hallucinations  All other systems reviewed and are negative  Video Exam    There were no vitals filed for this visit      Physical Exam   Mental status: Patient is awake, alert and oriented x 4,Vocal tremor   Cranial Nerves:   CN VII- symmetric facial movements  CN VIII- hard of hearing, wearing headphones but still having diffiuclty hearing  Needed to switch to telephone call to communicate     Moderate , jerking head tremor  Moderate hand tremors when outstretched       I spent 15 minutes directly with the patient during this visit

## 2022-12-15 NOTE — TELEPHONE ENCOUNTER
Called patient following Virtual visit with Dr Kelley lAlen  Advised patient that Dr Kelley Allen would like patient to follow up in 1 year with either Dr Kelley Allen or Francis Capps  Asked patient to return call to the office to schedule appointment

## 2022-12-15 NOTE — ASSESSMENT & PLAN NOTE
Setting history of head, vocal, and hand tremors which have worsened in recent weeks  She has increased tremors during the morning and later in the day  Adding an extra dose in the a m  has helped, but 2 tablets in the a m  is associated with sedation  I am spent discussing progression in essential tremor  We discussed potential treatment options including increasing primidone  We opted to add 1/5 tablet of primidone to her day  She will take 1 tablet in the morning and 4 tablets in the evening  If she is having more tremor during the day she could opt to take a tablet midday instead of in addition to her nighttime dose  Beta-blockers have been avoided given history of asthma  He is on gabapentin and mirtazapine  On a benzodiazepine for anxiety  With worsening tremor we could consider the addition of topiramate, this however can cause some cognitive fog

## 2022-12-23 DIAGNOSIS — J45.20 MILD INTERMITTENT ASTHMA, UNSPECIFIED WHETHER COMPLICATED: ICD-10-CM

## 2022-12-23 RX ORDER — ALBUTEROL SULFATE 90 UG/1
2 AEROSOL, METERED RESPIRATORY (INHALATION) 3 TIMES DAILY PRN
Qty: 18 G | Refills: 0 | Status: SHIPPED | OUTPATIENT
Start: 2022-12-23

## 2022-12-27 ENCOUNTER — TELEMEDICINE (OUTPATIENT)
Dept: FAMILY MEDICINE CLINIC | Facility: CLINIC | Age: 67
End: 2022-12-27

## 2022-12-27 DIAGNOSIS — R10.13 DYSPEPSIA: ICD-10-CM

## 2022-12-27 DIAGNOSIS — M79.7 FIBROMYALGIA: Primary | ICD-10-CM

## 2022-12-27 RX ORDER — ONDANSETRON 4 MG/1
4 TABLET, FILM COATED ORAL EVERY 8 HOURS PRN
Qty: 30 TABLET | Refills: 0 | Status: SHIPPED | OUTPATIENT
Start: 2022-12-27 | End: 2023-01-05 | Stop reason: SDUPTHER

## 2022-12-27 RX ORDER — PREDNISONE 1 MG/1
5 TABLET ORAL DAILY
Qty: 60 TABLET | Refills: 0 | Status: SHIPPED | OUTPATIENT
Start: 2022-12-27

## 2022-12-27 NOTE — PROGRESS NOTES
Virtual Brief Visit    Patient is located in the following state in which I hold an active license [place]    [id number] female with history of chronic back pain as well as ongoing fibromyalgia  We have done prednisone tapers which are helpful but she is requesting to take a low-dose twice daily of the prednisone for a month which she is done in the past which has been much more helpful  She is aware of the signs and symptoms of long-term prednisone use  She states she is having some postnasal drip which is clear  Her bowels are doing okay  Appetite is fair  Recently did see pain management for injection  Assessment/Plan:    Problem List Items Addressed This Visit        Other    Dyspepsia    Relevant Medications    ondansetron (ZOFRAN) 4 mg tablet    Fibromyalgia - Primary    Relevant Medications    predniSONE 5 mg tablet       Recent Visits  No visits were found meeting these conditions  Showing recent visits within past 7 days and meeting all other requirements  Today's Visits  Date Type Provider Dept   12/27/22 Telemedicine Malena Delgado, [address] Primary Care   Showing today's visits and meeting all other requirements  Future Appointments  No visits were found meeting these conditions  Showing future appointments within next 150 days and meeting all other requirements     Physical Exam  Constitutional:       Comments: Patient's speech was clear  No respiratory distress noted  Neurological:      Mental Status: She is alert and oriented to person, place, and time  It was my intent to perform this visit via video technology but the patient was not able to do a video connection so the visit was completed via audio telephone only    Visit Time    Visit Start Time: 11:45  Visit Stop Time: 11:53  Total Visit Duration: 7 minutes

## 2022-12-30 DIAGNOSIS — G43.909 MIGRAINE WITHOUT STATUS MIGRAINOSUS, NOT INTRACTABLE, UNSPECIFIED MIGRAINE TYPE: ICD-10-CM

## 2022-12-30 DIAGNOSIS — F41.1 GENERALIZED ANXIETY DISORDER: ICD-10-CM

## 2022-12-30 RX ORDER — DIAZEPAM 10 MG/1
10 TABLET ORAL EVERY 12 HOURS PRN
Qty: 60 TABLET | Refills: 0 | Status: SHIPPED | OUTPATIENT
Start: 2022-12-30 | End: 2023-01-02 | Stop reason: SDUPTHER

## 2022-12-30 RX ORDER — SUMATRIPTAN 100 MG/1
100 TABLET, FILM COATED ORAL ONCE AS NEEDED
Qty: 9 TABLET | Refills: 0 | Status: SHIPPED | OUTPATIENT
Start: 2022-12-30

## 2023-01-02 DIAGNOSIS — F41.1 GENERALIZED ANXIETY DISORDER: ICD-10-CM

## 2023-01-03 NOTE — TELEPHONE ENCOUNTER
Requested medication(s) are due for refill today: No  Patient has already received a courtesy refill: No  Other reason request has been forwarded to provider: duplicate

## 2023-01-04 RX ORDER — DIAZEPAM 10 MG/1
10 TABLET ORAL EVERY 12 HOURS PRN
Qty: 60 TABLET | Refills: 0 | Status: SHIPPED | OUTPATIENT
Start: 2023-01-04

## 2023-01-05 DIAGNOSIS — R10.13 DYSPEPSIA: ICD-10-CM

## 2023-01-05 RX ORDER — ONDANSETRON 4 MG/1
4 TABLET, FILM COATED ORAL EVERY 8 HOURS PRN
Qty: 30 TABLET | Refills: 0 | Status: SHIPPED | OUTPATIENT
Start: 2023-01-05

## 2023-01-12 DIAGNOSIS — M51.17 INTERVERTEBRAL DISC DISORDER WITH RADICULOPATHY OF LUMBOSACRAL REGION: ICD-10-CM

## 2023-01-12 DIAGNOSIS — M79.7 FIBROMYALGIA: ICD-10-CM

## 2023-01-12 RX ORDER — TIZANIDINE 4 MG/1
TABLET ORAL
Qty: 60 TABLET | Refills: 2 | Status: SHIPPED | OUTPATIENT
Start: 2023-01-12

## 2023-01-15 DIAGNOSIS — J45.20 MILD INTERMITTENT ASTHMA, UNSPECIFIED WHETHER COMPLICATED: ICD-10-CM

## 2023-01-15 NOTE — TELEPHONE ENCOUNTER
Medication Refill Request     Name Norco   Dose/Frequency 7 5-325 mg q6h prn   Quantity 120  Verified pharmacy   [x]  Verified ordering Provider   [x]  Does patient have enough for the next 3 days?  Yes [] No [x]

## 2023-01-16 RX ORDER — ALBUTEROL SULFATE 90 UG/1
2 AEROSOL, METERED RESPIRATORY (INHALATION) 3 TIMES DAILY PRN
Qty: 18 G | Refills: 0 | Status: SHIPPED | OUTPATIENT
Start: 2023-01-16

## 2023-01-16 RX ORDER — HYDROCODONE BITARTRATE AND ACETAMINOPHEN 7.5; 325 MG/1; MG/1
1 TABLET ORAL EVERY 6 HOURS PRN
Qty: 120 TABLET | Refills: 0 | Status: SHIPPED | OUTPATIENT
Start: 2023-01-16 | End: 2023-02-16

## 2023-01-18 DIAGNOSIS — R10.13 DYSPEPSIA: ICD-10-CM

## 2023-01-18 RX ORDER — ONDANSETRON 4 MG/1
4 TABLET, FILM COATED ORAL EVERY 8 HOURS PRN
Qty: 30 TABLET | Refills: 0 | Status: SHIPPED | OUTPATIENT
Start: 2023-01-18

## 2023-01-20 DIAGNOSIS — G43.909 MIGRAINE WITHOUT STATUS MIGRAINOSUS, NOT INTRACTABLE, UNSPECIFIED MIGRAINE TYPE: ICD-10-CM

## 2023-01-20 RX ORDER — SUMATRIPTAN 100 MG/1
100 TABLET, FILM COATED ORAL ONCE AS NEEDED
Qty: 9 TABLET | Refills: 0 | Status: SHIPPED | OUTPATIENT
Start: 2023-01-20

## 2023-01-28 DIAGNOSIS — R10.13 DYSPEPSIA: ICD-10-CM

## 2023-01-30 RX ORDER — ONDANSETRON 4 MG/1
4 TABLET, FILM COATED ORAL EVERY 8 HOURS PRN
Qty: 30 TABLET | Refills: 0 | Status: SHIPPED | OUTPATIENT
Start: 2023-01-30 | End: 2023-02-06 | Stop reason: SDUPTHER

## 2023-01-31 DIAGNOSIS — F41.1 GENERALIZED ANXIETY DISORDER: ICD-10-CM

## 2023-01-31 RX ORDER — DIAZEPAM 10 MG/1
10 TABLET ORAL EVERY 12 HOURS PRN
Qty: 60 TABLET | Refills: 0 | Status: SHIPPED | OUTPATIENT
Start: 2023-01-31

## 2023-02-02 DIAGNOSIS — J30.2 SEASONAL ALLERGIC RHINITIS, UNSPECIFIED TRIGGER: ICD-10-CM

## 2023-02-02 RX ORDER — FLUTICASONE PROPIONATE 50 MCG
SPRAY, SUSPENSION (ML) NASAL
Qty: 16 ML | Refills: 1 | Status: SHIPPED | OUTPATIENT
Start: 2023-02-02

## 2023-02-11 DIAGNOSIS — E78.5 HYPERLIPIDEMIA, UNSPECIFIED: ICD-10-CM

## 2023-02-11 RX ORDER — ATORVASTATIN CALCIUM 10 MG/1
TABLET, FILM COATED ORAL
Qty: 90 TABLET | Refills: 0 | Status: SHIPPED | OUTPATIENT
Start: 2023-02-11

## 2023-02-13 DIAGNOSIS — G43.909 MIGRAINE WITHOUT STATUS MIGRAINOSUS, NOT INTRACTABLE, UNSPECIFIED MIGRAINE TYPE: ICD-10-CM

## 2023-02-13 DIAGNOSIS — M51.17 INTERVERTEBRAL DISC DISORDER WITH RADICULOPATHY OF LUMBOSACRAL REGION: ICD-10-CM

## 2023-02-13 DIAGNOSIS — R10.13 DYSPEPSIA: ICD-10-CM

## 2023-02-13 RX ORDER — SUMATRIPTAN 100 MG/1
100 TABLET, FILM COATED ORAL ONCE AS NEEDED
Qty: 9 TABLET | Refills: 0 | Status: SHIPPED | OUTPATIENT
Start: 2023-02-13

## 2023-02-13 RX ORDER — ONDANSETRON 4 MG/1
4 TABLET, FILM COATED ORAL EVERY 8 HOURS PRN
Qty: 30 TABLET | Refills: 0 | Status: SHIPPED | OUTPATIENT
Start: 2023-02-13

## 2023-02-14 RX ORDER — HYDROCODONE BITARTRATE AND ACETAMINOPHEN 7.5; 325 MG/1; MG/1
1 TABLET ORAL EVERY 6 HOURS PRN
Qty: 120 TABLET | Refills: 0 | Status: SHIPPED | OUTPATIENT
Start: 2023-02-14 | End: 2023-03-17

## 2023-02-15 DIAGNOSIS — J45.20 MILD INTERMITTENT ASTHMA, UNSPECIFIED WHETHER COMPLICATED: ICD-10-CM

## 2023-02-15 RX ORDER — ALBUTEROL SULFATE 90 UG/1
2 AEROSOL, METERED RESPIRATORY (INHALATION) 3 TIMES DAILY PRN
Qty: 18 G | Refills: 0 | Status: SHIPPED | OUTPATIENT
Start: 2023-02-15

## 2023-02-25 DIAGNOSIS — M79.7 FIBROMYALGIA: ICD-10-CM

## 2023-02-25 DIAGNOSIS — R10.13 DYSPEPSIA: ICD-10-CM

## 2023-02-27 ENCOUNTER — TELEPHONE (OUTPATIENT)
Dept: FAMILY MEDICINE CLINIC | Facility: CLINIC | Age: 68
End: 2023-02-27

## 2023-02-27 RX ORDER — PREDNISONE 1 MG/1
TABLET ORAL
Qty: 90 TABLET | Refills: 0 | Status: SHIPPED | OUTPATIENT
Start: 2023-02-27

## 2023-02-27 RX ORDER — ONDANSETRON 4 MG/1
TABLET, FILM COATED ORAL
Qty: 30 TABLET | Refills: 0 | Status: SHIPPED | OUTPATIENT
Start: 2023-02-27 | End: 2023-03-09 | Stop reason: SDUPTHER

## 2023-02-27 NOTE — TELEPHONE ENCOUNTER
Lisajr Yeimi returned the office's call she needs a refill of Zofran and prednisone, I attempted to chriss it up ,but it is already teed up for you  [facility] [place]

## 2023-02-27 NOTE — TELEPHONE ENCOUNTER
----- Message from David Ricketts DO sent at 7/97/5369  8:28 AM EST -----  Regarding: FW: Rx ondansetron [facility])   Contact: [phone]  Please call patient  What medicine does she need  ----- Message -----  From: Rachel Porter  Sent: 2/27/2023   7:53 AM EST  To: David Ricketts DO  Subject: FW: Rx ondansetron [facility])                        ----- Message -----  From: Popeye Murray  Sent: 2/25/2023   3:56 PM EST  To: [facility]  Subject: Rx ondansetron [facility])                          Dr Desiree Aleman,  Thank you for sending this medicine to [facility], I called today and they said they haven't filled it or received your message  Should I wait one more day?   Thank you in advance,  Woodrow Ott  [birth year]

## 2023-03-01 DIAGNOSIS — F41.1 GENERALIZED ANXIETY DISORDER: ICD-10-CM

## 2023-03-01 DIAGNOSIS — G43.909 MIGRAINE WITHOUT STATUS MIGRAINOSUS, NOT INTRACTABLE, UNSPECIFIED MIGRAINE TYPE: ICD-10-CM

## 2023-03-02 RX ORDER — DIAZEPAM 10 MG/1
10 TABLET ORAL EVERY 12 HOURS PRN
Qty: 60 TABLET | Refills: 0 | Status: SHIPPED | OUTPATIENT
Start: 2023-03-02

## 2023-03-02 RX ORDER — SUMATRIPTAN 100 MG/1
100 TABLET, FILM COATED ORAL ONCE AS NEEDED
Qty: 9 TABLET | Refills: 0 | Status: SHIPPED | OUTPATIENT
Start: 2023-03-02

## 2023-03-04 DIAGNOSIS — G25.0 ESSENTIAL TREMOR: ICD-10-CM

## 2023-03-07 RX ORDER — PRIMIDONE 50 MG/1
TABLET ORAL
Qty: 450 TABLET | Refills: 1 | Status: SHIPPED | OUTPATIENT
Start: 2023-03-07

## 2023-03-09 DIAGNOSIS — M51.17 INTERVERTEBRAL DISC DISORDER WITH RADICULOPATHY OF LUMBOSACRAL REGION: ICD-10-CM

## 2023-03-09 DIAGNOSIS — R10.13 DYSPEPSIA: ICD-10-CM

## 2023-03-09 DIAGNOSIS — J45.20 MILD INTERMITTENT ASTHMA, UNSPECIFIED WHETHER COMPLICATED: ICD-10-CM

## 2023-03-09 RX ORDER — HYDROCODONE BITARTRATE AND ACETAMINOPHEN 7.5; 325 MG/1; MG/1
1 TABLET ORAL EVERY 6 HOURS PRN
Qty: 120 TABLET | Refills: 0 | Status: SHIPPED | OUTPATIENT
Start: 2023-03-09 | End: 2023-03-10 | Stop reason: SDUPTHER

## 2023-03-09 RX ORDER — ONDANSETRON 4 MG/1
4 TABLET, FILM COATED ORAL EVERY 8 HOURS PRN
Qty: 30 TABLET | Refills: 0 | Status: SHIPPED | OUTPATIENT
Start: 2023-03-09

## 2023-03-09 RX ORDER — ALBUTEROL SULFATE 90 UG/1
2 AEROSOL, METERED RESPIRATORY (INHALATION) 3 TIMES DAILY PRN
Qty: 18 G | Refills: 0 | Status: SHIPPED | OUTPATIENT
Start: 2023-03-09

## 2023-03-10 DIAGNOSIS — I10 ESSENTIAL HYPERTENSION: ICD-10-CM

## 2023-03-10 DIAGNOSIS — M51.17 INTERVERTEBRAL DISC DISORDER WITH RADICULOPATHY OF LUMBOSACRAL REGION: ICD-10-CM

## 2023-03-10 RX ORDER — LISINOPRIL 2.5 MG/1
2.5 TABLET ORAL DAILY
Qty: 90 TABLET | Refills: 0 | Status: SHIPPED | OUTPATIENT
Start: 2023-03-10

## 2023-03-13 RX ORDER — HYDROCODONE BITARTRATE AND ACETAMINOPHEN 7.5; 325 MG/1; MG/1
1 TABLET ORAL EVERY 6 HOURS PRN
Qty: 120 TABLET | Refills: 0 | Status: SHIPPED | OUTPATIENT
Start: 2023-03-13 | End: 2023-04-13

## 2023-03-17 DIAGNOSIS — R10.13 DYSPEPSIA: ICD-10-CM

## 2023-03-21 RX ORDER — ONDANSETRON 4 MG/1
4 TABLET, FILM COATED ORAL EVERY 8 HOURS PRN
Qty: 30 TABLET | Refills: 0 | Status: SHIPPED | OUTPATIENT
Start: 2023-03-21 | End: 2023-03-27 | Stop reason: SDUPTHER

## 2023-03-21 NOTE — TELEPHONE ENCOUNTER
I spoke to the patient, she states she is now out of ondansetron and is asking if it can be sent to the pharmacy

## 2023-03-29 DIAGNOSIS — J30.2 SEASONAL ALLERGIC RHINITIS, UNSPECIFIED TRIGGER: ICD-10-CM

## 2023-03-29 RX ORDER — FLUTICASONE PROPIONATE 50 MCG
SPRAY, SUSPENSION (ML) NASAL
Qty: 16 ML | Refills: 1 | Status: SHIPPED | OUTPATIENT
Start: 2023-03-29

## 2023-03-30 ENCOUNTER — APPOINTMENT (OUTPATIENT)
Dept: LAB | Facility: CLINIC | Age: 68
End: 2023-03-30

## 2023-03-30 DIAGNOSIS — E78.5 HYPERLIPIDEMIA, UNSPECIFIED HYPERLIPIDEMIA TYPE: ICD-10-CM

## 2023-03-30 DIAGNOSIS — I10 ESSENTIAL HYPERTENSION: ICD-10-CM

## 2023-03-30 LAB
ALBUMIN SERPL BCP-MCNC: 3.7 G/DL (ref 3.5–5)
ALP SERPL-CCNC: 43 U/L (ref 46–116)
ALT SERPL W P-5'-P-CCNC: 48 U/L (ref 12–78)
ANION GAP SERPL CALCULATED.3IONS-SCNC: 2 MMOL/L (ref 4–13)
AST SERPL W P-5'-P-CCNC: 36 U/L (ref 5–45)
BILIRUB SERPL-MCNC: 0.31 MG/DL (ref 0.2–1)
BUN SERPL-MCNC: 11 MG/DL (ref 5–25)
CALCIUM SERPL-MCNC: 9.4 MG/DL (ref 8.3–10.1)
CHLORIDE SERPL-SCNC: 108 MMOL/L (ref 96–108)
CHOLEST SERPL-MCNC: 272 MG/DL
CO2 SERPL-SCNC: 27 MMOL/L (ref 21–32)
CREAT SERPL-MCNC: 1.01 MG/DL (ref 0.6–1.3)
ERYTHROCYTE [DISTWIDTH] IN BLOOD BY AUTOMATED COUNT: 13.9 % (ref 11.6–15.1)
GFR SERPL CREATININE-BSD FRML MDRD: 57 ML/MIN/1.73SQ M
GLUCOSE P FAST SERPL-MCNC: 100 MG/DL (ref 65–99)
HCT VFR BLD AUTO: 45.1 % (ref 34.8–46.1)
HDLC SERPL-MCNC: 130 MG/DL
HGB BLD-MCNC: 14.8 G/DL (ref 11.5–15.4)
LDLC SERPL CALC-MCNC: 126 MG/DL (ref 0–100)
MCH RBC QN AUTO: 36.5 PG (ref 26.8–34.3)
MCHC RBC AUTO-ENTMCNC: 32.8 G/DL (ref 31.4–37.4)
MCV RBC AUTO: 111 FL (ref 82–98)
PLATELET # BLD AUTO: 282 THOUSANDS/UL (ref 149–390)
PMV BLD AUTO: 9.4 FL (ref 8.9–12.7)
POTASSIUM SERPL-SCNC: 4.5 MMOL/L (ref 3.5–5.3)
PROT SERPL-MCNC: 7.7 G/DL (ref 6.4–8.4)
RBC # BLD AUTO: 4.05 MILLION/UL (ref 3.81–5.12)
SODIUM SERPL-SCNC: 137 MMOL/L (ref 135–147)
TRIGL SERPL-MCNC: 80 MG/DL
WBC # BLD AUTO: 13.15 THOUSAND/UL (ref 4.31–10.16)

## 2023-04-01 DIAGNOSIS — J45.20 MILD INTERMITTENT ASTHMA, UNSPECIFIED WHETHER COMPLICATED: ICD-10-CM

## 2023-04-03 RX ORDER — ALBUTEROL SULFATE 90 UG/1
2 AEROSOL, METERED RESPIRATORY (INHALATION) 3 TIMES DAILY PRN
Qty: 18 G | Refills: 0 | Status: SHIPPED | OUTPATIENT
Start: 2023-04-03

## 2023-04-06 DIAGNOSIS — R10.13 DYSPEPSIA: ICD-10-CM

## 2023-04-07 RX ORDER — ONDANSETRON 4 MG/1
4 TABLET, FILM COATED ORAL EVERY 8 HOURS PRN
Qty: 30 TABLET | Refills: 0 | Status: SHIPPED | OUTPATIENT
Start: 2023-04-07

## 2023-04-11 DIAGNOSIS — M79.7 FIBROMYALGIA: Primary | ICD-10-CM

## 2023-04-11 RX ORDER — PREDNISONE 20 MG/1
TABLET ORAL
Qty: 7 TABLET | Refills: 0 | Status: SHIPPED | OUTPATIENT
Start: 2023-04-11 | End: 2023-04-24

## 2023-04-24 ENCOUNTER — OFFICE VISIT (OUTPATIENT)
Dept: FAMILY MEDICINE CLINIC | Facility: CLINIC | Age: 68
End: 2023-04-24

## 2023-04-24 VITALS
TEMPERATURE: 96.3 F | RESPIRATION RATE: 20 BRPM | OXYGEN SATURATION: 99 % | HEIGHT: 65 IN | WEIGHT: 143 LBS | HEART RATE: 115 BPM | SYSTOLIC BLOOD PRESSURE: 132 MMHG | BODY MASS INDEX: 23.82 KG/M2 | DIASTOLIC BLOOD PRESSURE: 68 MMHG

## 2023-04-24 DIAGNOSIS — M79.7 FIBROMYALGIA: ICD-10-CM

## 2023-04-24 DIAGNOSIS — F11.90 CHRONIC, CONTINUOUS USE OF OPIOIDS: ICD-10-CM

## 2023-04-24 DIAGNOSIS — R10.13 DYSPEPSIA: ICD-10-CM

## 2023-04-24 DIAGNOSIS — F51.04 PSYCHOPHYSIOLOGICAL INSOMNIA: ICD-10-CM

## 2023-04-24 DIAGNOSIS — J06.9 UPPER RESPIRATORY TRACT INFECTION, UNSPECIFIED TYPE: Primary | ICD-10-CM

## 2023-04-24 RX ORDER — ZALEPLON 5 MG/1
5 CAPSULE ORAL
Qty: 30 CAPSULE | Refills: 0 | Status: SHIPPED | OUTPATIENT
Start: 2023-04-24

## 2023-04-24 RX ORDER — NEOMYCIN SULFATE, POLYMYXIN B SULFATE AND DEXAMETHASONE 3.5; 10000; 1 MG/ML; [USP'U]/ML; MG/ML
SUSPENSION/ DROPS OPHTHALMIC
COMMUNITY
Start: 2023-04-17

## 2023-04-24 RX ORDER — NALOXONE HYDROCHLORIDE 4 MG/.1ML
SPRAY NASAL
Qty: 1 EACH | Refills: 1 | Status: SHIPPED | OUTPATIENT
Start: 2023-04-24 | End: 2024-04-23

## 2023-04-24 RX ORDER — AZITHROMYCIN 250 MG/1
TABLET, FILM COATED ORAL
Qty: 6 TABLET | Refills: 0 | Status: SHIPPED | OUTPATIENT
Start: 2023-04-24 | End: 2023-04-28

## 2023-04-24 RX ORDER — ONDANSETRON 4 MG/1
4 TABLET, FILM COATED ORAL EVERY 8 HOURS PRN
Qty: 30 TABLET | Refills: 0 | Status: SHIPPED | OUTPATIENT
Start: 2023-04-24 | End: 2023-05-03 | Stop reason: SDUPTHER

## 2023-04-24 RX ORDER — PREDNISONE 5 MG/1
5 TABLET ORAL 2 TIMES DAILY WITH MEALS
Qty: 60 TABLET | Refills: 0 | Status: SHIPPED | OUTPATIENT
Start: 2023-04-24

## 2023-04-24 NOTE — PROGRESS NOTES
Name: Lincoln Yoder      : 1955      MRN: [id number]  Encounter Provider: Dewayne Tolliver DO  Encounter Date: 2023   Encounter department: [facility] PRIMARY CARE    Assessment & Plan     1  Upper respiratory tract infection, unspecified type  -     predniSONE 5 mg tablet; Take 1 tablet (5 mg total) by mouth 2 (two) times a day with meals  -     azithromycin (ZITHROMAX) 250 mg tablet; Take 2 tablets today then 1 tablet daily x 4 days    2  Chronic, continuous use of opioids  -     naloxone (NARCAN) 4 mg/0 1 mL nasal spray; Administer 1 spray into a nostril  If no response after 2-3 minutes, give another dose in the other nostril using a new spray  3  Psychophysiological insomnia  -     zaleplon (SONATA) 5 MG capsule; Take 1 capsule (5 mg total) by mouth daily at bedtime as needed for sleep    4  Dyspepsia  -     ondansetron (ZOFRAN) 4 mg tablet; Take 1 tablet (4 mg total) by mouth every 8 (eight) hours as needed for nausea or vomiting    5  Fibromyalgia  -     predniSONE 5 mg tablet; Take 1 tablet (5 mg total) by mouth 2 (two) times a day with meals           Subjective     40-year-old female here for follow-up on chronic conditions  Not doing well emotionally  Also feels she has of respiratory symptoms  Making her feel nauseous and spitting up phlegm  Chief Complaint   Patient presents with    Follow-up     6 month follow up  Pt has been vomiting and spitting up thick yellow phlegm      With regards to eye condition, Very upset  Right cataract was ok  Left eye with complication---- Dr Angel Luis Card retired since having cataract surgery and she is seeing another ophthalmologist   We do not have any notes and will need to obtain  Regardless, she is going to see another specialist at [facility] eye specialist   Review of Systems   Constitutional: Positive for fatigue  HENT: Positive for postnasal drip and rhinorrhea  Eyes: Positive for visual disturbance     Respiratory: Positive "for cough  Gastrointestinal: Positive for nausea  Musculoskeletal: Positive for arthralgias and myalgias  Psychiatric/Behavioral: The patient is nervous/anxious  Past Medical History:   Diagnosis Date    Anxiety     Asthma     allergy induced    Chronic pain disorder     back    Colon polyp     Fibromyalgia     GERD (gastroesophageal reflux disease)     Hearing aid worn     Hearing impaired     \"broken eardrum after eardrops used\"    Hyperlipidemia     controlled    Hypertension     controlled    Lumbar herniated disc     Shortness of breath     at times    Wears dentures     Wears glasses     Weight loss      Past Surgical History:   Procedure Laterality Date    BREAST BIOPSY Left     benign- at age 34    COLONOSCOPY      EAR SURGERY      MA LAPAROSCOPY COLECTOMY PARTIAL W/ANASTOMOSIS N/A 11/1/2021    Procedure: RESECTION COLON SIGMOID LAPAROSCOPIC WTIH COLORECTAL ANASTOMOSIS, DIVERTING LOOP ILEOSTOMY;  Surgeon: Vaishali Bang MD;  Location: [facility];  Service: General    MA LAPAROSCOPY SURG ILEOSTOMY/JEJUNOSTOMY NON-TUBE N/A 4/4/2022    Procedure: ILEOSTOMY LOOP REVERSAL;  Surgeon: Vaishali Bang MD;  Location: [facility];  Service: General    TUBAL LIGATION      ULNAR NERVE REPAIR Left      Family History   Problem Relation Age of Onset    Heart failure Mother     Hyperlipidemia Mother     Hypertension Mother     Coronary artery disease Brother     COPD Maternal Grandmother     No Known Problems Sister     No Known Problems Daughter     No Known Problems Sister     Aneurysm Maternal Aunt      Social History     Socioeconomic History    Marital status:       Spouse name: None    Number of children: None    Years of education: None    Highest education level: None   Occupational History    None   Tobacco Use    Smoking status: Former     Packs/day: 0 50     Years: 50 00     Pack years: 25 00     Types: Cigarettes     Quit date: 4/3/2022     Years since " quittin 0    Smokeless tobacco: Never    Tobacco comments:     wears nicotine patch on for 1/2 day   Vaping Use    Vaping Use: Never used   Substance and Sexual Activity    Alcohol use: Not Currently    Drug use: No    Sexual activity: None   Other Topics Concern    None   Social History Narrative    None     Social Determinants of Health     Financial Resource Strain: Medium Risk    Difficulty of Paying Living Expenses: Somewhat hard   Food Insecurity: Not on file   Transportation Needs: No Transportation Needs    Lack of Transportation (Medical): No    Lack of Transportation (Non-Medical): No   Physical Activity: Not on file   Stress: Not on file   Social Connections: Not on file   Intimate Partner Violence: Not on file   Housing Stability: Not on file     Current Outpatient Medications on File Prior to Visit   Medication Sig    atorvastatin (LIPITOR) 10 mg tablet TAKE 1 TABLET BY MOUTH EVERY DAY    Cholecalciferol (VITAMIN D3 PO) Take by mouth    diazepam (VALIUM) 10 mg tablet Take 1 tablet (10 mg total) by mouth every 12 (twelve) hours as needed for anxiety    fluticasone (FLONASE) 50 mcg/act nasal spray SPRAY 1 SPRAY INTO EACH NOSTRIL EVERY DAY    HYDROcodone-acetaminophen (NORCO) 7 5-325 mg per tablet Take 1 tablet by mouth every 6 (six) hours as needed for pain usually Max Daily Amount: 4 tablets    lidocaine (XYLOCAINE) 5 % ointment Apply topically as needed for mild pain    MAGNESIUM PO Take by mouth    neomycin-polymyxin-dexamethasone (MAXITROL) ophthalmic suspension 1 DROP INTO EACH EYE 4 TIMES A DAY    primidone (MYSOLINE) 50 mg tablet Take 1 tab by mouth  in the morning and 4 tablets  at bedtime      SUMAtriptan (IMITREX) 100 mg tablet Take 1 tablet (100 mg total) by mouth once as needed for migraine for up to 1 dose    Probiotic Product (PROBIOTIC-10 PO) Take by mouth     Allergies   Allergen Reactions    Levofloxacin Other (See Comments)     Weak legs, blurred vision    "Carafate [Sucralfate] Rash    Other Palpitations     MRI dye    Tetracycline Rash       There is no immunization history on file for this patient  Objective     /68   Pulse (!) 115   Temp (!) 96 3 °F (35 7 °C) (Temporal)   Resp 20   Ht 5' 5\" (1 651 m)   Wt 64 9 kg (143 lb)   LMP  (LMP Unknown)   SpO2 99%   BMI 23 80 kg/m²     Physical Exam  Vitals reviewed  Constitutional:       Comments: 59-year-old female who appears chronically ill   HENT:      Right Ear: Tympanic membrane normal       Left Ear: Tympanic membrane normal       Nose: No rhinorrhea  Mouth/Throat:      Mouth: Mucous membranes are moist       Comments:   Postnasal drip    Cardiovascular:      Rate and Rhythm: Normal rate  Pulmonary:      Breath sounds: Decreased breath sounds present  Abdominal:      General: Abdomen is flat  Palpations: Abdomen is soft  Skin:     General: Skin is warm  Findings: No rash  Neurological:      Mental Status: She is alert and oriented to person, place, and time  Comments: Antalgic   Psychiatric:         Mood and Affect: Mood is anxious  Affect is tearful         Juan Francisco Parents, DO  "

## 2023-04-26 DIAGNOSIS — I10 ESSENTIAL HYPERTENSION: ICD-10-CM

## 2023-04-26 DIAGNOSIS — M79.7 FIBROMYALGIA: ICD-10-CM

## 2023-04-26 DIAGNOSIS — J45.20 MILD INTERMITTENT ASTHMA, UNSPECIFIED WHETHER COMPLICATED: ICD-10-CM

## 2023-04-27 ENCOUNTER — TELEPHONE (OUTPATIENT)
Dept: FAMILY MEDICINE CLINIC | Facility: CLINIC | Age: 68
End: 2023-04-27

## 2023-04-27 RX ORDER — ALBUTEROL SULFATE 90 UG/1
2 AEROSOL, METERED RESPIRATORY (INHALATION) 3 TIMES DAILY PRN
Qty: 18 G | Refills: 1 | Status: SHIPPED | OUTPATIENT
Start: 2023-04-27

## 2023-04-27 RX ORDER — TIZANIDINE 2 MG/1
TABLET ORAL
Qty: 60 TABLET | Refills: 0 | Status: SHIPPED | OUTPATIENT
Start: 2023-04-27

## 2023-04-27 RX ORDER — LISINOPRIL 2.5 MG/1
2.5 TABLET ORAL DAILY
Qty: 90 TABLET | Refills: 0 | Status: SHIPPED | OUTPATIENT
Start: 2023-04-27

## 2023-04-27 NOTE — TELEPHONE ENCOUNTER
Prior auth started for pt's Lidocaine 5% ointment via Cover my meds  Key # [id number]  Awaiting response from insurance company

## 2023-04-28 DIAGNOSIS — F41.1 GENERALIZED ANXIETY DISORDER: ICD-10-CM

## 2023-04-30 RX ORDER — DIAZEPAM 10 MG/1
10 TABLET ORAL EVERY 12 HOURS PRN
Qty: 60 TABLET | Refills: 0 | Status: SHIPPED | OUTPATIENT
Start: 2023-04-30

## 2023-05-01 ENCOUNTER — TELEPHONE (OUTPATIENT)
Dept: FAMILY MEDICINE CLINIC | Facility: CLINIC | Age: 68
End: 2023-05-01

## 2023-05-03 DIAGNOSIS — R10.13 DYSPEPSIA: ICD-10-CM

## 2023-05-03 DIAGNOSIS — M51.17 INTERVERTEBRAL DISC DISORDER WITH RADICULOPATHY OF LUMBOSACRAL REGION: ICD-10-CM

## 2023-05-03 RX ORDER — ONDANSETRON 4 MG/1
4 TABLET, FILM COATED ORAL EVERY 8 HOURS PRN
Qty: 30 TABLET | Refills: 0 | Status: SHIPPED | OUTPATIENT
Start: 2023-05-03

## 2023-05-03 RX ORDER — HYDROCODONE BITARTRATE AND ACETAMINOPHEN 7.5; 325 MG/1; MG/1
1 TABLET ORAL EVERY 6 HOURS PRN
Qty: 120 TABLET | Refills: 0 | Status: SHIPPED | OUTPATIENT
Start: 2023-05-03 | End: 2023-05-05 | Stop reason: SDUPTHER

## 2023-05-05 DIAGNOSIS — M51.17 INTERVERTEBRAL DISC DISORDER WITH RADICULOPATHY OF LUMBOSACRAL REGION: ICD-10-CM

## 2023-05-05 RX ORDER — HYDROCODONE BITARTRATE AND ACETAMINOPHEN 7.5; 325 MG/1; MG/1
1 TABLET ORAL EVERY 6 HOURS PRN
Qty: 120 TABLET | Refills: 0 | Status: CANCELLED | OUTPATIENT
Start: 2023-05-05 | End: 2023-06-05

## 2023-05-05 RX ORDER — HYDROCODONE BITARTRATE AND ACETAMINOPHEN 7.5; 325 MG/1; MG/1
1 TABLET ORAL EVERY 6 HOURS PRN
Qty: 120 TABLET | Refills: 0 | Status: SHIPPED | OUTPATIENT
Start: 2023-05-05 | End: 2023-06-01 | Stop reason: SDUPTHER

## 2023-05-05 NOTE — TELEPHONE ENCOUNTER
Pt called hydrocodone-acetaminophen is on back order at [facility] pharmacy can you please call in to [facility]  Thanks

## 2023-05-11 DIAGNOSIS — E78.5 HYPERLIPIDEMIA, UNSPECIFIED: ICD-10-CM

## 2023-05-11 RX ORDER — ATORVASTATIN CALCIUM 10 MG/1
TABLET, FILM COATED ORAL
Qty: 90 TABLET | Refills: 0 | Status: SHIPPED | OUTPATIENT
Start: 2023-05-11

## 2023-05-15 DIAGNOSIS — R10.13 DYSPEPSIA: ICD-10-CM

## 2023-05-15 RX ORDER — ONDANSETRON 4 MG/1
4 TABLET, FILM COATED ORAL EVERY 8 HOURS PRN
Qty: 30 TABLET | Refills: 0 | Status: SHIPPED | OUTPATIENT
Start: 2023-05-15 | End: 2023-05-26 | Stop reason: SDUPTHER

## 2023-05-17 DIAGNOSIS — J30.2 SEASONAL ALLERGIC RHINITIS, UNSPECIFIED TRIGGER: ICD-10-CM

## 2023-05-17 RX ORDER — FLUTICASONE PROPIONATE 50 MCG
2 SPRAY, SUSPENSION (ML) NASAL DAILY
Qty: 16 ML | Refills: 1 | Status: SHIPPED | OUTPATIENT
Start: 2023-05-17

## 2023-05-21 DIAGNOSIS — G43.909 MIGRAINE WITHOUT STATUS MIGRAINOSUS, NOT INTRACTABLE, UNSPECIFIED MIGRAINE TYPE: ICD-10-CM

## 2023-05-22 RX ORDER — SUMATRIPTAN 100 MG/1
100 TABLET, FILM COATED ORAL ONCE AS NEEDED
Qty: 9 TABLET | Refills: 0 | Status: SHIPPED | OUTPATIENT
Start: 2023-05-22

## 2023-05-26 DIAGNOSIS — J06.9 UPPER RESPIRATORY TRACT INFECTION, UNSPECIFIED TYPE: ICD-10-CM

## 2023-05-26 DIAGNOSIS — R10.13 DYSPEPSIA: ICD-10-CM

## 2023-05-26 DIAGNOSIS — M79.7 FIBROMYALGIA: ICD-10-CM

## 2023-05-26 RX ORDER — ONDANSETRON 4 MG/1
4 TABLET, FILM COATED ORAL EVERY 8 HOURS PRN
Qty: 30 TABLET | Refills: 0 | Status: SHIPPED | OUTPATIENT
Start: 2023-05-26

## 2023-05-26 RX ORDER — PREDNISONE 5 MG/1
5 TABLET ORAL 2 TIMES DAILY WITH MEALS
Qty: 60 TABLET | Refills: 0 | Status: SHIPPED | OUTPATIENT
Start: 2023-05-26 | End: 2023-05-30 | Stop reason: SDUPTHER

## 2023-05-30 DIAGNOSIS — M79.7 FIBROMYALGIA: ICD-10-CM

## 2023-05-30 DIAGNOSIS — J06.9 UPPER RESPIRATORY TRACT INFECTION, UNSPECIFIED TYPE: ICD-10-CM

## 2023-05-31 RX ORDER — PREDNISONE 5 MG/1
5 TABLET ORAL 2 TIMES DAILY WITH MEALS
Qty: 60 TABLET | Refills: 0 | Status: SHIPPED | OUTPATIENT
Start: 2023-05-31 | End: 2023-06-08

## 2023-06-02 DIAGNOSIS — R10.13 DYSPEPSIA: ICD-10-CM

## 2023-06-02 DIAGNOSIS — M51.17 INTERVERTEBRAL DISC DISORDER WITH RADICULOPATHY OF LUMBOSACRAL REGION: ICD-10-CM

## 2023-06-02 RX ORDER — ONDANSETRON 4 MG/1
4 TABLET, FILM COATED ORAL EVERY 8 HOURS PRN
Qty: 30 TABLET | Refills: 0 | Status: CANCELLED | OUTPATIENT
Start: 2023-06-02

## 2023-06-04 DIAGNOSIS — M79.7 FIBROMYALGIA: ICD-10-CM

## 2023-06-05 DIAGNOSIS — M79.7 FIBROMYALGIA: ICD-10-CM

## 2023-06-05 DIAGNOSIS — J06.9 UPPER RESPIRATORY TRACT INFECTION, UNSPECIFIED TYPE: ICD-10-CM

## 2023-06-05 DIAGNOSIS — R10.13 DYSPEPSIA: ICD-10-CM

## 2023-06-05 DIAGNOSIS — G43.909 MIGRAINE WITHOUT STATUS MIGRAINOSUS, NOT INTRACTABLE, UNSPECIFIED MIGRAINE TYPE: ICD-10-CM

## 2023-06-05 RX ORDER — SUMATRIPTAN 100 MG/1
100 TABLET, FILM COATED ORAL ONCE AS NEEDED
Qty: 9 TABLET | Refills: 0 | Status: SHIPPED | OUTPATIENT
Start: 2023-06-05

## 2023-06-05 RX ORDER — PREDNISONE 5 MG/1
TABLET ORAL
Qty: 60 TABLET | Refills: 0 | OUTPATIENT
Start: 2023-06-05

## 2023-06-06 DIAGNOSIS — J06.9 UPPER RESPIRATORY TRACT INFECTION, UNSPECIFIED TYPE: ICD-10-CM

## 2023-06-06 DIAGNOSIS — M79.7 FIBROMYALGIA: ICD-10-CM

## 2023-06-06 DIAGNOSIS — E78.5 HYPERLIPIDEMIA, UNSPECIFIED: ICD-10-CM

## 2023-06-06 DIAGNOSIS — I10 ESSENTIAL HYPERTENSION: ICD-10-CM

## 2023-06-06 DIAGNOSIS — M51.17 INTERVERTEBRAL DISC DISORDER WITH RADICULOPATHY OF LUMBOSACRAL REGION: ICD-10-CM

## 2023-06-06 RX ORDER — HYDROCODONE BITARTRATE AND ACETAMINOPHEN 7.5; 325 MG/1; MG/1
1 TABLET ORAL EVERY 6 HOURS PRN
Qty: 120 TABLET | Refills: 0 | Status: SHIPPED | OUTPATIENT
Start: 2023-06-06 | End: 2023-07-07

## 2023-06-06 RX ORDER — TIZANIDINE 2 MG/1
TABLET ORAL
Qty: 60 TABLET | Refills: 0 | Status: SHIPPED | OUTPATIENT
Start: 2023-06-06

## 2023-06-06 RX ORDER — ONDANSETRON 4 MG/1
4 TABLET, FILM COATED ORAL EVERY 8 HOURS PRN
Qty: 30 TABLET | Refills: 0 | Status: SHIPPED | OUTPATIENT
Start: 2023-06-06

## 2023-06-06 RX ORDER — HYDROCODONE BITARTRATE AND ACETAMINOPHEN 7.5; 325 MG/1; MG/1
1 TABLET ORAL EVERY 6 HOURS PRN
Qty: 120 TABLET | Refills: 0 | Status: SHIPPED | OUTPATIENT
Start: 2023-06-06 | End: 2023-06-06 | Stop reason: SDUPTHER

## 2023-06-06 NOTE — TELEPHONE ENCOUNTER
Pharmacy faxed over paperwork that Hydrocodone- acetaminophen 7 5/325 is on back order  They have hydrocodone 5/325 or hydrocodone / acetaminophen 3/300 in stock with new instructions or send to different pharmacy  Please advise   Thanks

## 2023-06-06 NOTE — TELEPHONE ENCOUNTER
[facility] send to [facility] [place]

## 2023-06-08 RX ORDER — LISINOPRIL 2.5 MG/1
2.5 TABLET ORAL DAILY
Qty: 90 TABLET | Refills: 0 | Status: SHIPPED | OUTPATIENT
Start: 2023-06-08

## 2023-06-08 RX ORDER — ATORVASTATIN CALCIUM 10 MG/1
TABLET, FILM COATED ORAL
Qty: 90 TABLET | Refills: 0 | Status: SHIPPED | OUTPATIENT
Start: 2023-06-08

## 2023-06-08 RX ORDER — PREDNISONE 5 MG/1
TABLET ORAL
Qty: 60 TABLET | Refills: 0 | Status: SHIPPED | OUTPATIENT
Start: 2023-06-08

## 2023-06-09 DIAGNOSIS — J30.2 SEASONAL ALLERGIC RHINITIS, UNSPECIFIED TRIGGER: ICD-10-CM

## 2023-06-09 RX ORDER — FLUTICASONE PROPIONATE 50 MCG
2 SPRAY, SUSPENSION (ML) NASAL DAILY
Qty: 16 ML | Refills: 0 | Status: SHIPPED | OUTPATIENT
Start: 2023-06-09

## 2023-06-19 DIAGNOSIS — R10.13 DYSPEPSIA: ICD-10-CM

## 2023-06-20 DIAGNOSIS — F51.04 PSYCHOPHYSIOLOGICAL INSOMNIA: ICD-10-CM

## 2023-06-20 RX ORDER — ZALEPLON 5 MG/1
5 CAPSULE ORAL
Qty: 30 CAPSULE | Refills: 0 | Status: SHIPPED | OUTPATIENT
Start: 2023-06-20

## 2023-06-20 RX ORDER — ONDANSETRON 4 MG/1
4 TABLET, FILM COATED ORAL EVERY 8 HOURS PRN
Qty: 30 TABLET | Refills: 0 | Status: SHIPPED | OUTPATIENT
Start: 2023-06-20

## 2023-06-26 DIAGNOSIS — J45.20 MILD INTERMITTENT ASTHMA, UNSPECIFIED WHETHER COMPLICATED: ICD-10-CM

## 2023-06-26 RX ORDER — ALBUTEROL SULFATE 90 UG/1
2 AEROSOL, METERED RESPIRATORY (INHALATION) 3 TIMES DAILY PRN
Qty: 18 G | Refills: 0 | Status: SHIPPED | OUTPATIENT
Start: 2023-06-26 | End: 2023-07-09

## 2023-06-30 DIAGNOSIS — F41.1 GENERALIZED ANXIETY DISORDER: ICD-10-CM

## 2023-06-30 DIAGNOSIS — M79.7 FIBROMYALGIA: ICD-10-CM

## 2023-06-30 RX ORDER — TIZANIDINE 2 MG/1
TABLET ORAL
Qty: 60 TABLET | Refills: 0 | Status: SHIPPED | OUTPATIENT
Start: 2023-06-30 | End: 2023-07-03 | Stop reason: SDUPTHER

## 2023-06-30 RX ORDER — DIAZEPAM 10 MG/1
10 TABLET ORAL EVERY 12 HOURS PRN
Qty: 60 TABLET | Refills: 0 | Status: SHIPPED | OUTPATIENT
Start: 2023-06-30

## 2023-07-03 DIAGNOSIS — M51.17 INTERVERTEBRAL DISC DISORDER WITH RADICULOPATHY OF LUMBOSACRAL REGION: ICD-10-CM

## 2023-07-03 DIAGNOSIS — M79.7 FIBROMYALGIA: ICD-10-CM

## 2023-07-05 RX ORDER — HYDROCODONE BITARTRATE AND ACETAMINOPHEN 7.5; 325 MG/1; MG/1
1 TABLET ORAL EVERY 6 HOURS PRN
Qty: 120 TABLET | Refills: 0 | Status: SHIPPED | OUTPATIENT
Start: 2023-07-05 | End: 2023-07-06 | Stop reason: SDUPTHER

## 2023-07-05 RX ORDER — TIZANIDINE 2 MG/1
TABLET ORAL
Qty: 60 TABLET | Refills: 0 | Status: SHIPPED | OUTPATIENT
Start: 2023-07-05

## 2023-07-06 DIAGNOSIS — M51.17 INTERVERTEBRAL DISC DISORDER WITH RADICULOPATHY OF LUMBOSACRAL REGION: ICD-10-CM

## 2023-07-06 RX ORDER — HYDROCODONE BITARTRATE AND ACETAMINOPHEN 7.5; 325 MG/1; MG/1
1 TABLET ORAL EVERY 6 HOURS PRN
Qty: 120 TABLET | Refills: 0 | Status: SHIPPED | OUTPATIENT
Start: 2023-07-06 | End: 2023-08-06

## 2023-07-06 NOTE — TELEPHONE ENCOUNTER
Requested medication(s) are due for refill today: Yes  Patient has already received a courtesy refill: No  Other reason request has been forwarded to provider: Mykel Good

## 2023-07-07 DIAGNOSIS — M79.7 FIBROMYALGIA: ICD-10-CM

## 2023-07-07 DIAGNOSIS — J45.20 MILD INTERMITTENT ASTHMA, UNSPECIFIED WHETHER COMPLICATED: ICD-10-CM

## 2023-07-07 DIAGNOSIS — J06.9 UPPER RESPIRATORY TRACT INFECTION, UNSPECIFIED TYPE: ICD-10-CM

## 2023-07-07 DIAGNOSIS — G25.0 ESSENTIAL TREMOR: ICD-10-CM

## 2023-07-09 RX ORDER — PREDNISONE 2.5 MG/1
TABLET ORAL
Qty: 30 TABLET | Refills: 0 | Status: SHIPPED | OUTPATIENT
Start: 2023-07-09

## 2023-07-09 RX ORDER — ALBUTEROL SULFATE 90 UG/1
2 AEROSOL, METERED RESPIRATORY (INHALATION) 3 TIMES DAILY PRN
Qty: 18 G | Refills: 0 | Status: SHIPPED | OUTPATIENT
Start: 2023-07-09

## 2023-07-10 DIAGNOSIS — R10.13 DYSPEPSIA: ICD-10-CM

## 2023-07-10 DIAGNOSIS — J06.9 UPPER RESPIRATORY TRACT INFECTION, UNSPECIFIED TYPE: ICD-10-CM

## 2023-07-10 DIAGNOSIS — M79.7 FIBROMYALGIA: ICD-10-CM

## 2023-07-10 RX ORDER — PRIMIDONE 50 MG/1
TABLET ORAL
Qty: 450 TABLET | Refills: 1 | Status: SHIPPED | OUTPATIENT
Start: 2023-07-10

## 2023-07-10 RX ORDER — ONDANSETRON 4 MG/1
4 TABLET, FILM COATED ORAL EVERY 8 HOURS PRN
Qty: 30 TABLET | Refills: 0 | Status: SHIPPED | OUTPATIENT
Start: 2023-07-10 | End: 2023-07-12 | Stop reason: SDUPTHER

## 2023-07-10 RX ORDER — PREDNISONE 2.5 MG/1
2.5 TABLET ORAL DAILY
Qty: 30 TABLET | Refills: 0 | OUTPATIENT
Start: 2023-07-10

## 2023-07-12 DIAGNOSIS — R10.13 DYSPEPSIA: ICD-10-CM

## 2023-07-12 RX ORDER — ONDANSETRON 4 MG/1
4 TABLET, FILM COATED ORAL EVERY 8 HOURS PRN
Qty: 30 TABLET | Refills: 0 | Status: SHIPPED | OUTPATIENT
Start: 2023-07-12

## 2023-07-14 ENCOUNTER — TELEPHONE (OUTPATIENT)
Dept: FAMILY MEDICINE CLINIC | Facility: CLINIC | Age: 68
End: 2023-07-14

## 2023-07-14 DIAGNOSIS — J06.9 UPPER RESPIRATORY TRACT INFECTION, UNSPECIFIED TYPE: Primary | ICD-10-CM

## 2023-07-14 RX ORDER — BENZONATATE 100 MG/1
100 CAPSULE ORAL 3 TIMES DAILY PRN
Qty: 20 CAPSULE | Refills: 0 | Status: SHIPPED | OUTPATIENT
Start: 2023-07-14

## 2023-07-14 RX ORDER — AMOXICILLIN 500 MG/1
500 CAPSULE ORAL EVERY 8 HOURS SCHEDULED
Qty: 30 CAPSULE | Refills: 0 | Status: SHIPPED | OUTPATIENT
Start: 2023-07-14 | End: 2023-07-24

## 2023-07-16 DIAGNOSIS — G43.909 MIGRAINE WITHOUT STATUS MIGRAINOSUS, NOT INTRACTABLE, UNSPECIFIED MIGRAINE TYPE: ICD-10-CM

## 2023-07-17 DIAGNOSIS — F51.04 PSYCHOPHYSIOLOGICAL INSOMNIA: ICD-10-CM

## 2023-07-17 RX ORDER — SUMATRIPTAN 100 MG/1
100 TABLET, FILM COATED ORAL ONCE AS NEEDED
Qty: 9 TABLET | Refills: 0 | Status: SHIPPED | OUTPATIENT
Start: 2023-07-17 | End: 2023-07-28 | Stop reason: SDUPTHER

## 2023-07-18 RX ORDER — ZALEPLON 5 MG/1
5 CAPSULE ORAL
Qty: 30 CAPSULE | Refills: 0 | Status: SHIPPED | OUTPATIENT
Start: 2023-07-18 | End: 2023-08-11 | Stop reason: SDUPTHER

## 2023-07-26 ENCOUNTER — TELEPHONE (OUTPATIENT)
Dept: FAMILY MEDICINE CLINIC | Facility: CLINIC | Age: 68
End: 2023-07-26

## 2023-07-26 DIAGNOSIS — J06.9 UPPER RESPIRATORY TRACT INFECTION, UNSPECIFIED TYPE: ICD-10-CM

## 2023-07-26 DIAGNOSIS — M79.7 FIBROMYALGIA: ICD-10-CM

## 2023-07-26 RX ORDER — PREDNISONE 2.5 MG/1
2.5 TABLET ORAL DAILY
Qty: 30 TABLET | Refills: 0 | Status: SHIPPED | OUTPATIENT
Start: 2023-07-26

## 2023-07-26 NOTE — TELEPHONE ENCOUNTER
,Pt.just phoned re [facility] pharmacy,[place] not refilling prednisone 2.5mg because it is "too soon"

## 2023-07-26 NOTE — TELEPHONE ENCOUNTER
patient called and needs a refill on her Prednisone, she has a terrible productive cough with clear mucus and it is keeping her up at night.

## 2023-07-27 ENCOUNTER — OFFICE VISIT (OUTPATIENT)
Dept: FAMILY MEDICINE CLINIC | Facility: CLINIC | Age: 68
End: 2023-07-27
Payer: MEDICARE

## 2023-07-27 VITALS
HEIGHT: 65 IN | TEMPERATURE: 96.7 F | DIASTOLIC BLOOD PRESSURE: 86 MMHG | SYSTOLIC BLOOD PRESSURE: 142 MMHG | BODY MASS INDEX: 20.49 KG/M2 | HEART RATE: 83 BPM | OXYGEN SATURATION: 97 % | WEIGHT: 123 LBS

## 2023-07-27 DIAGNOSIS — J45.20 MILD INTERMITTENT ASTHMA, UNSPECIFIED WHETHER COMPLICATED: ICD-10-CM

## 2023-07-27 DIAGNOSIS — J42 CHRONIC BRONCHITIS, UNSPECIFIED CHRONIC BRONCHITIS TYPE (HCC): Primary | ICD-10-CM

## 2023-07-27 DIAGNOSIS — F33.1 MODERATE EPISODE OF RECURRENT MAJOR DEPRESSIVE DISORDER (HCC): ICD-10-CM

## 2023-07-27 PROCEDURE — 99214 OFFICE O/P EST MOD 30 MIN: CPT | Performed by: NURSE PRACTITIONER

## 2023-07-27 RX ORDER — ALBUTEROL SULFATE 90 UG/1
2 AEROSOL, METERED RESPIRATORY (INHALATION) 3 TIMES DAILY PRN
Qty: 18 G | Refills: 0 | Status: SHIPPED | OUTPATIENT
Start: 2023-07-27

## 2023-07-27 RX ORDER — PREDNISONE 10 MG/1
TABLET ORAL
Qty: 21 TABLET | Refills: 0 | Status: SHIPPED | OUTPATIENT
Start: 2023-07-27 | End: 2023-08-03 | Stop reason: SDUPTHER

## 2023-07-27 RX ORDER — BUDESONIDE, GLYCOPYRROLATE, AND FORMOTEROL FUMARATE 160; 9; 4.8 UG/1; UG/1; UG/1
2 AEROSOL, METERED RESPIRATORY (INHALATION) 2 TIMES DAILY
Qty: 10.7 G | Refills: 0 | Status: SHIPPED | COMMUNITY
Start: 2023-07-27

## 2023-07-27 NOTE — PROGRESS NOTES
Name: Zamzma Womack      : 1955      MRN: [id number]  Encounter Provider: HIRAL Baez  Encounter Date: 2023   Encounter department: [address] [place] [address]     1. Chronic bronchitis, unspecified chronic bronchitis type (HCC)  -     albuterol (PROVENTIL HFA,VENTOLIN HFA) 90 mcg/act inhaler; Inhale 2 puffs 3 (three) times a day as needed for wheezing or shortness of breath  -     predniSONE 10 mg tablet; Day 1: 6 tabs  Day 2: 5 tabs Day 3: 4 tabs  Day 4: 3 tabs  Day 5: 2 tabs  Day 6: 1 tab  -     Budeson-Glycopyrrol-Formoterol (Breztri Aerosphere) 160-9-4.8 MCG/ACT AERO; Inhale 2 puffs 2 (two) times a day Rinse mouth after use. 2. Mild intermittent asthma, unspecified whether complicated    3. Moderate episode of recurrent major depressive disorder (HCC)    Start Breztri inhaler (sample) 2 puffs twice a day. Rinse mouth out after use. Use albuterol inhaler as needed for wheezing/shortness of breath. Hold the 2.5 mg prednisone dose, and start higher dose of prednisone short term. Start prednisone, this is the steroid. It will be 6 pills today and decrease by 1 pill each day for the following 6 days. So it will be 6-5-4-3-2-1. Take this with food as it may upset your stomach. It's best to take it earlier in the day otherwise it may keep you up at night. Do not take this with NSAIDs such as advil/ibuprofen/advil/aleve/motrin. When finished, resume 2.5 mg daily. Call for any worsening symptoms. If no improvement, next step would be chest x ray and possibly more antibiotics if needed. Please call the office if you are experiencing any worsening of symptoms or no symptom improvement. Subjective      Here for evaluation of cough / URI symptoms. Was treated already with amoxicillin and tessalon and didn't have much improvement if any. Feels she is wheezing at night. Is on 2.5 mg prednisone chronically.  Has albuterol inhaler that she is using a lot. She has nebulizer but states that doesn't work well for her and her mask isn't very effective. No fevers. Review of Systems   Constitutional: Negative for chills and fever. Eyes: Negative for discharge. Respiratory: Positive for cough. Negative for shortness of breath. Cardiovascular: Negative for chest pain. Gastrointestinal: Negative for constipation and diarrhea. Genitourinary: Negative for difficulty urinating. Musculoskeletal: Negative for joint swelling. Skin: Negative for rash. Neurological: Negative for headaches. Hematological: Negative for adenopathy. Psychiatric/Behavioral: The patient is not nervous/anxious. Current Outpatient Medications on File Prior to Visit   Medication Sig   • atorvastatin (LIPITOR) 10 mg tablet TAKE 1 TABLET BY MOUTH EVERY DAY   • benzonatate (TESSALON PERLES) 100 mg capsule Take 1 capsule (100 mg total) by mouth 3 (three) times a day as needed for cough   • Cholecalciferol (VITAMIN D3 PO) Take by mouth   • diazepam (VALIUM) 10 mg tablet Take 1 tablet (10 mg total) by mouth every 12 (twelve) hours as needed for anxiety   • fluticasone (FLONASE) 50 mcg/act nasal spray 2 sprays into each nostril daily   • HYDROcodone-acetaminophen (NORCO) 7.5-325 mg per tablet Take 1 tablet by mouth every 6 (six) hours as needed for pain usually Max Daily Amount: 4 tablets   • lidocaine (XYLOCAINE) 5 % ointment Apply topically as needed for mild pain   • lisinopril (ZESTRIL) 2.5 mg tablet TAKE 1 TABLET (2.5 MG TOTAL) BY MOUTH DAILY PT TAKES SHE HAS NOT BEEN TAKING   • MAGNESIUM PO Take by mouth   • naloxone (NARCAN) 4 mg/0.1 mL nasal spray Administer 1 spray into a nostril. If no response after 2-3 minutes, give another dose in the other nostril using a new spray.    • neomycin-polymyxin-dexamethasone (MAXITROL) ophthalmic suspension 1 DROP INTO EACH EYE 4 TIMES A DAY   • ondansetron (ZOFRAN) 4 mg tablet Take 1 tablet (4 mg total) by mouth every 8 (eight) hours as needed for nausea or vomiting   • predniSONE 2.5 mg tablet Take 1 tablet (2.5 mg total) by mouth daily   • primidone (MYSOLINE) 50 mg tablet TAKE 1 TAB BY MOUTH IN THE MORNING AND 4 TABLETS AT BEDTIME. • Probiotic Product (PROBIOTIC-10 PO) Take by mouth   • SUMAtriptan (IMITREX) 100 mg tablet TAKE 1 TABLET (100 MG TOTAL) BY MOUTH ONCE AS NEEDED FOR MIGRAINE FOR UP TO 1 DOSE   • tiZANidine (ZANAFLEX) 2 mg tablet TAKE 1 TO 2 TABLETS BY MOUTH AT BEDTIME   • tiZANidine (ZANAFLEX) 2 mg tablet TAKE 1-2 TABLETS BY MOUTH AT BEDTIME   • zaleplon (SONATA) 5 MG capsule Take 1 capsule (5 mg total) by mouth daily at bedtime as needed for sleep       Objective     /86 (BP Location: Left arm, Patient Position: Lying left side, Cuff Size: Adult)   Pulse 83   Temp (!) 96.7 °F (35.9 °C) (Temporal)   Ht 5' 5" (1.651 m)   Wt 55.8 kg (123 lb)   LMP  (LMP Unknown)   SpO2 97%   BMI 20.47 kg/m²     Physical Exam  Vitals and nursing note reviewed. Constitutional:       General: She is not in acute distress. Appearance: She is well-developed. She is not diaphoretic. HENT:      Head: Normocephalic and atraumatic. Right Ear: External ear normal.      Left Ear: External ear normal.   Eyes:      General: Lids are normal.         Right eye: No discharge. Left eye: No discharge. Conjunctiva/sclera: Conjunctivae normal.   Cardiovascular:      Rate and Rhythm: Normal rate and regular rhythm. Heart sounds: No murmur heard. Pulmonary:      Effort: Pulmonary effort is normal. No respiratory distress. Breath sounds: Rhonchi present. No wheezing. Musculoskeletal:         General: No deformity. Cervical back: Neck supple. Skin:     General: Skin is warm and dry. Neurological:      Mental Status: She is alert and oriented to person, place, and time. Psychiatric:         Speech: Speech normal.         Behavior: Behavior normal.         Thought Content:  Thought content normal.         Judgment: Judgment normal.       Richard Flori, CRNP

## 2023-07-27 NOTE — PATIENT INSTRUCTIONS
Start Breztri inhaler (sample) 2 puffs twice a day. Rinse mouth out after use. Use albuterol inhaler as needed for wheezing/shortness of breath. Hold the 2.5 mg prednisone dose, and start higher dose of prednisone short term. Start prednisone, this is the steroid. It will be 6 pills today and decrease by 1 pill each day for the following 6 days. So it will be 6-5-4-3-2-1. Take this with food as it may upset your stomach. It's best to take it earlier in the day otherwise it may keep you up at night. Do not take this with NSAIDs such as advil/ibuprofen/advil/aleve/motrin. When finished, resume 2.5 mg daily. Call for any worsening symptoms. If no improvement, next step would be chest x ray and possibly more antibiotics if needed. Please call the office if you are experiencing any worsening of symptoms or no symptom improvement.

## 2023-07-28 ENCOUNTER — TELEPHONE (OUTPATIENT)
Dept: FAMILY MEDICINE CLINIC | Facility: CLINIC | Age: 68
End: 2023-07-28

## 2023-07-28 DIAGNOSIS — R10.13 DYSPEPSIA: ICD-10-CM

## 2023-07-28 DIAGNOSIS — G43.909 MIGRAINE WITHOUT STATUS MIGRAINOSUS, NOT INTRACTABLE, UNSPECIFIED MIGRAINE TYPE: ICD-10-CM

## 2023-07-28 RX ORDER — SUMATRIPTAN 100 MG/1
100 TABLET, FILM COATED ORAL ONCE AS NEEDED
Qty: 9 TABLET | Refills: 0 | Status: SHIPPED | OUTPATIENT
Start: 2023-07-28

## 2023-07-28 RX ORDER — ONDANSETRON 4 MG/1
4 TABLET, FILM COATED ORAL EVERY 8 HOURS PRN
Qty: 30 TABLET | Refills: 0 | Status: SHIPPED | OUTPATIENT
Start: 2023-07-28

## 2023-07-29 DIAGNOSIS — G43.909 MIGRAINE WITHOUT STATUS MIGRAINOSUS, NOT INTRACTABLE, UNSPECIFIED MIGRAINE TYPE: ICD-10-CM

## 2023-07-31 RX ORDER — SUMATRIPTAN 100 MG/1
100 TABLET, FILM COATED ORAL ONCE AS NEEDED
Qty: 9 TABLET | Refills: 0 | OUTPATIENT
Start: 2023-07-31

## 2023-08-01 DIAGNOSIS — F41.1 GENERALIZED ANXIETY DISORDER: ICD-10-CM

## 2023-08-01 RX ORDER — DIAZEPAM 10 MG/1
10 TABLET ORAL EVERY 12 HOURS PRN
Qty: 60 TABLET | Refills: 0 | Status: SHIPPED | OUTPATIENT
Start: 2023-08-01

## 2023-08-03 DIAGNOSIS — J30.2 SEASONAL ALLERGIC RHINITIS, UNSPECIFIED TRIGGER: ICD-10-CM

## 2023-08-03 DIAGNOSIS — J42 CHRONIC BRONCHITIS, UNSPECIFIED CHRONIC BRONCHITIS TYPE (HCC): ICD-10-CM

## 2023-08-03 DIAGNOSIS — M79.7 FIBROMYALGIA: ICD-10-CM

## 2023-08-03 DIAGNOSIS — M51.17 INTERVERTEBRAL DISC DISORDER WITH RADICULOPATHY OF LUMBOSACRAL REGION: ICD-10-CM

## 2023-08-03 RX ORDER — FLUTICASONE PROPIONATE 50 MCG
SPRAY, SUSPENSION (ML) NASAL
Qty: 16 ML | Refills: 0 | Status: SHIPPED | OUTPATIENT
Start: 2023-08-03

## 2023-08-04 DIAGNOSIS — J06.9 UPPER RESPIRATORY TRACT INFECTION, UNSPECIFIED TYPE: ICD-10-CM

## 2023-08-04 DIAGNOSIS — M79.7 FIBROMYALGIA: ICD-10-CM

## 2023-08-04 RX ORDER — TIZANIDINE 2 MG/1
TABLET ORAL
Qty: 60 TABLET | Refills: 0 | Status: SHIPPED | OUTPATIENT
Start: 2023-08-04

## 2023-08-04 RX ORDER — PREDNISONE 10 MG/1
TABLET ORAL
Qty: 21 TABLET | Refills: 0 | Status: SHIPPED | OUTPATIENT
Start: 2023-08-04

## 2023-08-04 RX ORDER — HYDROCODONE BITARTRATE AND ACETAMINOPHEN 7.5; 325 MG/1; MG/1
1 TABLET ORAL EVERY 6 HOURS PRN
Qty: 120 TABLET | Refills: 0 | Status: SHIPPED | OUTPATIENT
Start: 2023-08-04 | End: 2023-09-04

## 2023-08-04 RX ORDER — PREDNISONE 2.5 MG/1
2.5 TABLET ORAL DAILY
Qty: 30 TABLET | Refills: 0 | OUTPATIENT
Start: 2023-08-04

## 2023-08-09 DIAGNOSIS — J42 CHRONIC BRONCHITIS, UNSPECIFIED CHRONIC BRONCHITIS TYPE (HCC): ICD-10-CM

## 2023-08-09 DIAGNOSIS — G25.0 ESSENTIAL TREMOR: ICD-10-CM

## 2023-08-09 DIAGNOSIS — R10.13 DYSPEPSIA: ICD-10-CM

## 2023-08-09 RX ORDER — PRIMIDONE 50 MG/1
TABLET ORAL
Qty: 450 TABLET | Refills: 0 | Status: CANCELLED | OUTPATIENT
Start: 2023-08-09

## 2023-08-09 RX ORDER — PREDNISONE 10 MG/1
TABLET ORAL
Qty: 21 TABLET | Refills: 0 | OUTPATIENT
Start: 2023-08-09

## 2023-08-09 RX ORDER — ONDANSETRON 4 MG/1
4 TABLET, FILM COATED ORAL EVERY 8 HOURS PRN
Qty: 30 TABLET | Refills: 0 | OUTPATIENT
Start: 2023-08-09

## 2023-08-11 DIAGNOSIS — F51.04 PSYCHOPHYSIOLOGICAL INSOMNIA: ICD-10-CM

## 2023-08-11 DIAGNOSIS — M79.7 FIBROMYALGIA: ICD-10-CM

## 2023-08-13 RX ORDER — TIZANIDINE 2 MG/1
TABLET ORAL
Qty: 60 TABLET | Refills: 0 | Status: SHIPPED | OUTPATIENT
Start: 2023-08-13 | End: 2023-09-11 | Stop reason: SDUPTHER

## 2023-08-13 RX ORDER — ZALEPLON 5 MG/1
5 CAPSULE ORAL
Qty: 30 CAPSULE | Refills: 0 | Status: SHIPPED | OUTPATIENT
Start: 2023-08-13 | End: 2023-08-30 | Stop reason: ALTCHOICE

## 2023-08-14 DIAGNOSIS — J30.2 SEASONAL ALLERGIC RHINITIS, UNSPECIFIED TRIGGER: ICD-10-CM

## 2023-08-14 DIAGNOSIS — F51.04 PSYCHOPHYSIOLOGICAL INSOMNIA: ICD-10-CM

## 2023-08-14 RX ORDER — ZALEPLON 5 MG/1
5 CAPSULE ORAL
Qty: 30 CAPSULE | Refills: 0 | OUTPATIENT
Start: 2023-08-14

## 2023-08-14 RX ORDER — FLUTICASONE PROPIONATE 50 MCG
SPRAY, SUSPENSION (ML) NASAL
Qty: 16 ML | Refills: 0 | OUTPATIENT
Start: 2023-08-14

## 2023-08-16 NOTE — TELEPHONE ENCOUNTER
Last visit 12/15/2022 Dr. Guerline Davis  Prescription was sent for a 90 day supply with 1 refill on 7/10 to [facility]  My chart message sent to patient

## 2023-08-21 DIAGNOSIS — R10.13 DYSPEPSIA: ICD-10-CM

## 2023-08-21 NOTE — TELEPHONE ENCOUNTER
, Pt.'s daughter,Emily Santos,phoned to inform you that pt.was recently seen in [facility] from 08/14-08/18/2023-zaleplon d/scotty

## 2023-08-22 RX ORDER — ONDANSETRON 4 MG/1
4 TABLET, FILM COATED ORAL EVERY 8 HOURS PRN
Qty: 30 TABLET | Refills: 0 | Status: SHIPPED | OUTPATIENT
Start: 2023-08-22

## 2023-08-25 ENCOUNTER — TELEPHONE (OUTPATIENT)
Dept: NEUROLOGY | Facility: CLINIC | Age: 68
End: 2023-08-25

## 2023-08-25 DIAGNOSIS — J42 CHRONIC BRONCHITIS, UNSPECIFIED CHRONIC BRONCHITIS TYPE (HCC): ICD-10-CM

## 2023-08-25 RX ORDER — ALBUTEROL SULFATE 90 UG/1
2 AEROSOL, METERED RESPIRATORY (INHALATION) 3 TIMES DAILY PRN
Qty: 18 G | Refills: 0 | Status: SHIPPED | OUTPATIENT
Start: 2023-08-25

## 2023-08-25 NOTE — TELEPHONE ENCOUNTER
Patient left voicemail requesting refill of primidone 50mg. Per chart review, refills should be available at [facility].     Call placed to pharmacy, refills available. Will fill for patient. Call returned to patient, [phone]. Notified of refill available.

## 2023-08-30 DIAGNOSIS — F41.1 GENERALIZED ANXIETY DISORDER: ICD-10-CM

## 2023-08-30 RX ORDER — MIRTAZAPINE 7.5 MG/1
TABLET, FILM COATED ORAL
COMMUNITY
Start: 2023-08-18 | End: 2023-09-11 | Stop reason: SDUPTHER

## 2023-08-31 DIAGNOSIS — F41.1 GENERALIZED ANXIETY DISORDER: Primary | ICD-10-CM

## 2023-08-31 RX ORDER — HYDROXYZINE HYDROCHLORIDE 25 MG/1
25 TABLET, FILM COATED ORAL 2 TIMES DAILY
Qty: 60 TABLET | Refills: 0 | Status: SHIPPED | OUTPATIENT
Start: 2023-08-31

## 2023-08-31 RX ORDER — DIAZEPAM 10 MG/1
10 TABLET ORAL EVERY 12 HOURS PRN
Qty: 60 TABLET | Refills: 0 | OUTPATIENT
Start: 2023-08-31

## 2023-08-31 NOTE — TELEPHONE ENCOUNTER
Review of patient's last hospitalization. Lorazepam was discontinued and benzodiazepines not recommended. Patient was prescribed hydroxyzine for anxiety therefore diazepam will not be refilled and hydroxyzine will be written in its place.

## 2023-09-01 DIAGNOSIS — J30.2 SEASONAL ALLERGIC RHINITIS, UNSPECIFIED TRIGGER: ICD-10-CM

## 2023-09-01 RX ORDER — FLUTICASONE PROPIONATE 50 MCG
SPRAY, SUSPENSION (ML) NASAL
Qty: 16 ML | Refills: 0 | Status: SHIPPED | OUTPATIENT
Start: 2023-09-01

## 2023-09-03 DIAGNOSIS — M51.17 INTERVERTEBRAL DISC DISORDER WITH RADICULOPATHY OF LUMBOSACRAL REGION: ICD-10-CM

## 2023-09-05 DIAGNOSIS — G43.909 MIGRAINE WITHOUT STATUS MIGRAINOSUS, NOT INTRACTABLE, UNSPECIFIED MIGRAINE TYPE: ICD-10-CM

## 2023-09-05 NOTE — TELEPHONE ENCOUNTER
Patient called stating she feels sick and has requested several refills and has not received an answer yet. Made patient aware Dr. Joe Rankin is not in today and will return tomorrow. Patient insisted I send another message to Dr. Joe Rankin due to that she needs her refills and feels sick because she does not have medications.

## 2023-09-06 ENCOUNTER — TELEPHONE (OUTPATIENT)
Dept: FAMILY MEDICINE CLINIC | Facility: CLINIC | Age: 68
End: 2023-09-06

## 2023-09-06 DIAGNOSIS — G43.909 MIGRAINE WITHOUT STATUS MIGRAINOSUS, NOT INTRACTABLE, UNSPECIFIED MIGRAINE TYPE: ICD-10-CM

## 2023-09-06 DIAGNOSIS — F41.1 GENERALIZED ANXIETY DISORDER: Primary | ICD-10-CM

## 2023-09-06 PROBLEM — F33.2 MDD (MAJOR DEPRESSIVE DISORDER), RECURRENT SEVERE, WITHOUT PSYCHOSIS (HCC): Status: ACTIVE | Noted: 2023-08-16

## 2023-09-06 PROBLEM — F10.10 ETOH ABUSE: Status: ACTIVE | Noted: 2023-08-16

## 2023-09-06 RX ORDER — HYDROCODONE BITARTRATE AND ACETAMINOPHEN 7.5; 325 MG/1; MG/1
1 TABLET ORAL EVERY 6 HOURS PRN
Qty: 120 TABLET | Refills: 0 | Status: SHIPPED | OUTPATIENT
Start: 2023-09-06 | End: 2023-10-07

## 2023-09-06 RX ORDER — SUMATRIPTAN 100 MG/1
100 TABLET, FILM COATED ORAL ONCE AS NEEDED
Qty: 9 TABLET | Refills: 0 | Status: SHIPPED | OUTPATIENT
Start: 2023-09-06

## 2023-09-06 RX ORDER — DIAZEPAM 5 MG/1
5 TABLET ORAL DAILY
Qty: 30 TABLET | Refills: 0 | Status: SHIPPED | OUTPATIENT
Start: 2023-09-06

## 2023-09-06 RX ORDER — SUMATRIPTAN 100 MG/1
100 TABLET, FILM COATED ORAL ONCE AS NEEDED
Qty: 9 TABLET | Refills: 0 | Status: SHIPPED | OUTPATIENT
Start: 2023-09-06 | End: 2023-09-06

## 2023-09-11 DIAGNOSIS — M79.7 FIBROMYALGIA: ICD-10-CM

## 2023-09-11 DIAGNOSIS — F33.1 MODERATE EPISODE OF RECURRENT MAJOR DEPRESSIVE DISORDER (HCC): Primary | ICD-10-CM

## 2023-09-12 RX ORDER — MIRTAZAPINE 7.5 MG/1
7.5 TABLET, FILM COATED ORAL
Qty: 30 TABLET | Refills: 0 | Status: SHIPPED | OUTPATIENT
Start: 2023-09-12

## 2023-09-12 RX ORDER — TIZANIDINE 2 MG/1
TABLET ORAL
Qty: 60 TABLET | Refills: 0 | Status: SHIPPED | OUTPATIENT
Start: 2023-09-12

## 2023-09-14 DIAGNOSIS — J06.9 UPPER RESPIRATORY TRACT INFECTION, UNSPECIFIED TYPE: ICD-10-CM

## 2023-09-14 DIAGNOSIS — M79.7 FIBROMYALGIA: ICD-10-CM

## 2023-09-15 DIAGNOSIS — J42 CHRONIC BRONCHITIS, UNSPECIFIED CHRONIC BRONCHITIS TYPE (HCC): ICD-10-CM

## 2023-09-15 DIAGNOSIS — R10.13 DYSPEPSIA: ICD-10-CM

## 2023-09-15 RX ORDER — ONDANSETRON 4 MG/1
4 TABLET, FILM COATED ORAL EVERY 8 HOURS PRN
Qty: 30 TABLET | Refills: 0 | Status: SHIPPED | OUTPATIENT
Start: 2023-09-15

## 2023-09-15 RX ORDER — ALBUTEROL SULFATE 90 UG/1
2 AEROSOL, METERED RESPIRATORY (INHALATION) 3 TIMES DAILY PRN
Qty: 18 G | Refills: 0 | Status: SHIPPED | OUTPATIENT
Start: 2023-09-15

## 2023-09-19 DIAGNOSIS — F41.1 GENERALIZED ANXIETY DISORDER: ICD-10-CM

## 2023-09-19 RX ORDER — DIAZEPAM 5 MG/1
5 TABLET ORAL DAILY
Qty: 30 TABLET | Refills: 0 | OUTPATIENT
Start: 2023-09-19

## 2023-09-19 RX ORDER — PREDNISONE 2.5 MG/1
2.5 TABLET ORAL DAILY
Qty: 30 TABLET | Refills: 0 | Status: SHIPPED | OUTPATIENT
Start: 2023-09-19

## 2023-09-19 NOTE — TELEPHONE ENCOUNTER
Patient called for a refill but she had called in already on 9/14/23 for prednisone.      She also would like to up the dose on her Diazepam back to 10 mg

## 2023-09-27 ENCOUNTER — TELEPHONE (OUTPATIENT)
Dept: FAMILY MEDICINE CLINIC | Facility: CLINIC | Age: 68
End: 2023-09-27

## 2023-09-27 NOTE — TELEPHONE ENCOUNTER
Patient called and asked if you were able to reorder her Zaleplon 5mg that she use to take it before and she wants to start taking again. Patient also asked if you would be able to up her Diazepam to 10mg instead of the 5mg that she is currently taking?      Please advise

## 2023-09-28 DIAGNOSIS — F41.1 GENERALIZED ANXIETY DISORDER: ICD-10-CM

## 2023-09-28 DIAGNOSIS — J30.2 SEASONAL ALLERGIC RHINITIS, UNSPECIFIED TRIGGER: ICD-10-CM

## 2023-09-28 RX ORDER — HYDROXYZINE HYDROCHLORIDE 25 MG/1
TABLET, FILM COATED ORAL
Qty: 60 TABLET | Refills: 0 | Status: SHIPPED | OUTPATIENT
Start: 2023-09-28

## 2023-09-28 RX ORDER — FLUTICASONE PROPIONATE 50 MCG
SPRAY, SUSPENSION (ML) NASAL
Qty: 16 ML | Refills: 0 | Status: SHIPPED | OUTPATIENT
Start: 2023-09-28

## 2023-10-03 DIAGNOSIS — G43.909 MIGRAINE WITHOUT STATUS MIGRAINOSUS, NOT INTRACTABLE, UNSPECIFIED MIGRAINE TYPE: ICD-10-CM

## 2023-10-04 RX ORDER — SUMATRIPTAN 100 MG/1
100 TABLET, FILM COATED ORAL ONCE AS NEEDED
Qty: 9 TABLET | Refills: 0 | Status: SHIPPED | OUTPATIENT
Start: 2023-10-04

## 2023-10-05 DIAGNOSIS — F41.1 GENERALIZED ANXIETY DISORDER: ICD-10-CM

## 2023-10-05 NOTE — TELEPHONE ENCOUNTER
Requested medication(s) are due for refill today: Yes  Patient has already received a courtesy refill: No  Other reason request has been forwarded to provider:  Please see note attached

## 2023-10-05 NOTE — TELEPHONE ENCOUNTER
Patient was wondering if this medication can be upped to 10mg. She stated that the 5mg is not working and she would not take the 10mg everyday.

## 2023-10-06 RX ORDER — DIAZEPAM 5 MG/1
TABLET ORAL
Qty: 30 TABLET | Refills: 0 | Status: SHIPPED | OUTPATIENT
Start: 2023-10-06

## 2023-10-06 NOTE — TELEPHONE ENCOUNTER
Spoke w/ pt and advised of medication changes. Can you please send adjusted dose to pharmacy ?  Thanks

## 2023-10-10 DIAGNOSIS — R10.13 DYSPEPSIA: ICD-10-CM

## 2023-10-10 RX ORDER — ONDANSETRON 4 MG/1
4 TABLET, FILM COATED ORAL EVERY 8 HOURS PRN
Qty: 30 TABLET | Refills: 0 | Status: SHIPPED | OUTPATIENT
Start: 2023-10-10

## 2023-10-11 DIAGNOSIS — J42 CHRONIC BRONCHITIS, UNSPECIFIED CHRONIC BRONCHITIS TYPE (HCC): ICD-10-CM

## 2023-10-11 RX ORDER — ALBUTEROL SULFATE 90 UG/1
2 AEROSOL, METERED RESPIRATORY (INHALATION) 3 TIMES DAILY PRN
Qty: 8.5 G | Refills: 1 | Status: SHIPPED | OUTPATIENT
Start: 2023-10-11

## 2023-10-19 DIAGNOSIS — M79.7 FIBROMYALGIA: ICD-10-CM

## 2023-10-19 DIAGNOSIS — J06.9 UPPER RESPIRATORY TRACT INFECTION, UNSPECIFIED TYPE: ICD-10-CM

## 2023-10-19 RX ORDER — PREDNISONE 2.5 MG/1
2.5 TABLET ORAL DAILY
Qty: 30 TABLET | Refills: 0 | Status: SHIPPED | OUTPATIENT
Start: 2023-10-19

## 2023-10-23 ENCOUNTER — OFFICE VISIT (OUTPATIENT)
Dept: FAMILY MEDICINE CLINIC | Facility: CLINIC | Age: 68
End: 2023-10-23
Payer: COMMERCIAL

## 2023-10-23 VITALS
BODY MASS INDEX: 19.59 KG/M2 | RESPIRATION RATE: 16 BRPM | OXYGEN SATURATION: 99 % | HEIGHT: 65 IN | DIASTOLIC BLOOD PRESSURE: 62 MMHG | WEIGHT: 117.6 LBS | HEART RATE: 75 BPM | SYSTOLIC BLOOD PRESSURE: 122 MMHG | TEMPERATURE: 96.9 F

## 2023-10-23 DIAGNOSIS — J42 CHRONIC BRONCHITIS, UNSPECIFIED CHRONIC BRONCHITIS TYPE (HCC): ICD-10-CM

## 2023-10-23 DIAGNOSIS — F41.1 GENERALIZED ANXIETY DISORDER: ICD-10-CM

## 2023-10-23 DIAGNOSIS — J45.31 MILD PERSISTENT ASTHMA WITH ACUTE EXACERBATION: Primary | ICD-10-CM

## 2023-10-23 DIAGNOSIS — G47.01 INSOMNIA DUE TO MEDICAL CONDITION: ICD-10-CM

## 2023-10-23 PROBLEM — F11.20 CONTINUOUS OPIOID DEPENDENCE (HCC): Status: ACTIVE | Noted: 2023-10-23

## 2023-10-23 PROCEDURE — 1160F RVW MEDS BY RX/DR IN RCRD: CPT | Performed by: FAMILY MEDICINE

## 2023-10-23 PROCEDURE — 1159F MED LIST DOCD IN RCRD: CPT | Performed by: FAMILY MEDICINE

## 2023-10-23 PROCEDURE — 99213 OFFICE O/P EST LOW 20 MIN: CPT | Performed by: FAMILY MEDICINE

## 2023-10-23 PROCEDURE — 3725F SCREEN DEPRESSION PERFORMED: CPT | Performed by: FAMILY MEDICINE

## 2023-10-23 RX ORDER — TIZANIDINE 4 MG/1
TABLET ORAL
COMMUNITY
Start: 2023-10-02

## 2023-10-23 RX ORDER — DIAZEPAM 5 MG/1
TABLET ORAL
Qty: 60 TABLET | Refills: 0 | Status: SHIPPED | OUTPATIENT
Start: 2023-10-23

## 2023-10-23 RX ORDER — ZALEPLON 5 MG/1
5 CAPSULE ORAL
Qty: 30 CAPSULE | Refills: 0 | Status: SHIPPED | OUTPATIENT
Start: 2023-10-23

## 2023-10-23 RX ORDER — AZITHROMYCIN 250 MG/1
TABLET, FILM COATED ORAL
Qty: 6 TABLET | Refills: 0 | Status: SHIPPED | OUTPATIENT
Start: 2023-10-23 | End: 2023-10-27

## 2023-10-23 RX ORDER — TIOTROPIUM BROMIDE INHALATION SPRAY 1.56 UG/1
2 SPRAY, METERED RESPIRATORY (INHALATION) DAILY
Qty: 4 G | Refills: 5 | Status: SHIPPED | COMMUNITY
Start: 2023-10-23

## 2023-10-23 RX ORDER — HYDROCODONE BITARTRATE AND ACETAMINOPHEN 10; 325 MG/1; MG/1
TABLET ORAL
COMMUNITY
Start: 2023-10-04

## 2023-10-23 RX ORDER — ALBUTEROL SULFATE 90 UG/1
2 AEROSOL, METERED RESPIRATORY (INHALATION) 3 TIMES DAILY PRN
Qty: 8.5 G | Refills: 1 | Status: SHIPPED | OUTPATIENT
Start: 2023-10-23

## 2023-10-23 NOTE — PROGRESS NOTES
Name: Keron Alba      : 1955      MRN: [id number]  Encounter Provider: Lili Lyons DO  Encounter Date: 10/23/2023   Encounter department: [facility] PRIMARY CARE    Assessment & Plan     1. Mild persistent asthma with acute exacerbation  -     tiotropium (Spiriva Respimat) 1.25 MCG/ACT AERS inhaler; Inhale 2 puffs daily  -     azithromycin (ZITHROMAX) 250 mg tablet; Take 2 tablets today then 1 tablet daily x 4 days    2. Chronic bronchitis, unspecified chronic bronchitis type (HCC)  -     albuterol (PROVENTIL HFA,VENTOLIN HFA) 90 mcg/act inhaler; Inhale 2 puffs 3 (three) times a day as needed for wheezing or shortness of breath    3. Insomnia due to medical condition  -     zaleplon (SONATA) 5 MG capsule; Take 1 capsule (5 mg total) by mouth daily at bedtime as needed for sleep    4. Generalized anxiety disorder  -     diazepam (VALIUM) 5 mg tablet; 1-2 PO HS prn           Subjective      URI   This is a new problem. The current episode started 1 to 4 weeks ago. The problem has been gradually worsening. The maximum temperature recorded prior to her arrival was 101 - 101.9 F. Associated symptoms include congestion, coughing, headaches, rhinorrhea, a sore throat (not current) and wheezing. She has tried inhaler use for the symptoms. Review of Systems   HENT:  Positive for congestion, rhinorrhea and sore throat (not current). Respiratory:  Positive for cough and wheezing. Musculoskeletal:         Back to seeing pain management     Neurological:  Positive for headaches.        Current Outpatient Medications on File Prior to Visit   Medication Sig    atorvastatin (LIPITOR) 10 mg tablet TAKE 1 TABLET BY MOUTH EVERY DAY    Cholecalciferol (VITAMIN D3 PO) Take by mouth    fluticasone (FLONASE) 50 mcg/act nasal spray SPRAY 2 SPRAYS INTO EACH NOSTRIL EVERY DAY    HYDROcodone-acetaminophen (NORCO)  mg per tablet     lisinopril (ZESTRIL) 2.5 mg tablet TAKE 1 TABLET (2.5 MG TOTAL) BY MOUTH DAILY PT TAKES SHE HAS NOT BEEN TAKING    MAGNESIUM PO Take by mouth    mirtazapine (REMERON) 7.5 MG tablet Take 1 tablet (7.5 mg total) by mouth daily at bedtime NO alcohol    naloxone (NARCAN) 4 mg/0.1 mL nasal spray Administer 1 spray into a nostril. If no response after 2-3 minutes, give another dose in the other nostril using a new spray. ondansetron (ZOFRAN) 4 mg tablet Take 1 tablet (4 mg total) by mouth every 8 (eight) hours as needed for nausea or vomiting    predniSONE 2.5 mg tablet TAKE 1 TABLET BY MOUTH EVERY DAY    Probiotic Product (PROBIOTIC-10 PO) Take by mouth    SUMAtriptan (IMITREX) 100 mg tablet TAKE 1 TABLET (100 MG TOTAL) BY MOUTH ONCE AS NEEDED FOR MIGRAINE FOR UP TO 1 DOSE    tiZANidine (ZANAFLEX) 4 mg tablet     lidocaine (XYLOCAINE) 5 % ointment Apply topically as needed for mild pain (Patient not taking: Reported on 10/23/2023)       Objective     /62   Pulse 75   Temp (!) 96.9 °F (36.1 °C) (Temporal)   Resp 16   Ht 5' 5" (1.651 m)   Wt 53.3 kg (117 lb 9.6 oz)   LMP  (LMP Unknown)   SpO2 99%   BMI 19.57 kg/m²     Physical Exam  Constitutional:       Comments: Without cachexia   HENT:      Right Ear: Tympanic membrane is bulging. Tympanic membrane has decreased mobility. Left Ear: Tympanic membrane is not bulging. Mouth/Throat:      Pharynx: Oropharynx is clear. Cardiovascular:      Rate and Rhythm: Normal rate and regular rhythm. Pulmonary:      Breath sounds: No transmitted upper airway sounds. Examination of the right-upper field reveals decreased breath sounds. Examination of the left-upper field reveals decreased breath sounds. Decreased breath sounds present. No wheezing. Neurological:      Mental Status: She is alert and oriented to person, place, and time.        Kenny Teresa DO

## 2023-10-24 ENCOUNTER — TELEPHONE (OUTPATIENT)
Dept: NEUROLOGY | Facility: CLINIC | Age: 68
End: 2023-10-24

## 2023-10-24 DIAGNOSIS — G25.0 ESSENTIAL TREMOR: ICD-10-CM

## 2023-10-24 RX ORDER — PRIMIDONE 50 MG/1
TABLET ORAL
Qty: 720 TABLET | Refills: 1 | Status: SHIPPED | OUTPATIENT
Start: 2023-10-24

## 2023-10-24 NOTE — TELEPHONE ENCOUNTER
TR 10/20/23:    Patient called in for refill of her Primidone, said pharmacy won't fill it until Nov. 11 th. States she needs it as she has been shaking more.  Please call    # [phone]

## 2023-10-24 NOTE — TELEPHONE ENCOUNTER
Last visit Dr. Quinterso Cover 12/15; due for f/u Dec 2023 not scheduled yet    Patient reporting an increase in tremors "shaking" upon awakening 7:30A-8A and prior to bedtime "I start to tremble when I am tired". She said she's had to increase dosing of primidone to 3 tabs (150 mg at 8 AM ) and 4-5 tabs (200-250 mg at 6-6:30 P)      Original prescription: primidone (MYSOLINE) 50 mg tablet [[id number]]    Order Details  Dose, Route, Frequency: As Directed  Dispense Quantity: 450 tablet Refills: 1        Sig: TAKE 1 TAB BY MOUTH IN THE MORNING AND 4 TABLETS AT BEDTIME. Patient has run out of medication due to increased dosing and is not due to be refilled until November. Please provide recommendation, thank you. Clerical Team:  patient due for f/u in December, please call her to schedule, thank you.

## 2023-10-24 NOTE — TELEPHONE ENCOUNTER
Christian Seay MD  Neurology [facility] Clinical Team 4; Neurology [facility] hour ago (10:23 AM)       Script refilled at higher dose. Will further discuss at follow up   ______________  Patient aware and was appreciative.

## 2023-10-28 DIAGNOSIS — E78.5 HYPERLIPIDEMIA, UNSPECIFIED: ICD-10-CM

## 2023-10-29 DIAGNOSIS — J30.2 SEASONAL ALLERGIC RHINITIS, UNSPECIFIED TRIGGER: ICD-10-CM

## 2023-10-30 RX ORDER — FLUTICASONE PROPIONATE 50 MCG
SPRAY, SUSPENSION (ML) NASAL
Qty: 16 ML | Refills: 0 | Status: SHIPPED | OUTPATIENT
Start: 2023-10-30

## 2023-10-30 RX ORDER — ATORVASTATIN CALCIUM 10 MG/1
TABLET, FILM COATED ORAL
Qty: 90 TABLET | Refills: 0 | Status: SHIPPED | OUTPATIENT
Start: 2023-10-30

## 2023-11-07 DIAGNOSIS — R10.13 DYSPEPSIA: ICD-10-CM

## 2023-11-07 RX ORDER — ONDANSETRON 4 MG/1
4 TABLET, FILM COATED ORAL EVERY 8 HOURS PRN
Qty: 30 TABLET | Refills: 0 | Status: SHIPPED | OUTPATIENT
Start: 2023-11-07

## 2023-11-08 ENCOUNTER — TELEPHONE (OUTPATIENT)
Dept: FAMILY MEDICINE CLINIC | Facility: CLINIC | Age: 68
End: 2023-11-08

## 2023-11-08 DIAGNOSIS — F33.1 MODERATE EPISODE OF RECURRENT MAJOR DEPRESSIVE DISORDER (HCC): ICD-10-CM

## 2023-11-08 DIAGNOSIS — J45.31 MILD PERSISTENT ASTHMA WITH ACUTE EXACERBATION: Primary | ICD-10-CM

## 2023-11-08 DIAGNOSIS — M79.7 FIBROMYALGIA: ICD-10-CM

## 2023-11-08 RX ORDER — MIRTAZAPINE 7.5 MG/1
7.5 TABLET, FILM COATED ORAL
Qty: 30 TABLET | Refills: 0 | Status: SHIPPED | OUTPATIENT
Start: 2023-11-08

## 2023-11-08 RX ORDER — AZITHROMYCIN 250 MG/1
TABLET, FILM COATED ORAL
Qty: 6 TABLET | Refills: 0 | Status: SHIPPED | OUTPATIENT
Start: 2023-11-08 | End: 2023-11-12

## 2023-11-08 RX ORDER — PREDNISONE 10 MG/1
TABLET ORAL
Qty: 21 TABLET | Refills: 0 | Status: SHIPPED | OUTPATIENT
Start: 2023-11-08

## 2023-11-08 NOTE — TELEPHONE ENCOUNTER
Patient called asking for an antibiotic and metro dose pack due to coughing  due to bronchitis, she stated it started about a week ago and she gets this at this time of year, she is coughing constantly. She is asking for an antibiotic and a metro dose pack be called into her local pharmacy. Please advise patient at [phone].

## 2023-11-15 DIAGNOSIS — I10 ESSENTIAL HYPERTENSION: ICD-10-CM

## 2023-11-15 RX ORDER — LISINOPRIL 2.5 MG/1
2.5 TABLET ORAL DAILY
Qty: 90 TABLET | Refills: 0 | Status: SHIPPED | OUTPATIENT
Start: 2023-11-15

## 2023-11-16 DIAGNOSIS — M79.7 FIBROMYALGIA: Primary | ICD-10-CM

## 2023-11-16 RX ORDER — TIZANIDINE 4 MG/1
4 TABLET ORAL
Qty: 30 TABLET | Refills: 1 | Status: SHIPPED | OUTPATIENT
Start: 2023-11-16

## 2023-11-25 DIAGNOSIS — J06.9 UPPER RESPIRATORY TRACT INFECTION, UNSPECIFIED TYPE: ICD-10-CM

## 2023-11-25 DIAGNOSIS — M79.7 FIBROMYALGIA: ICD-10-CM

## 2023-11-27 RX ORDER — PREDNISONE 2.5 MG/1
2.5 TABLET ORAL DAILY
Qty: 30 TABLET | Refills: 0 | Status: SHIPPED | OUTPATIENT
Start: 2023-11-27

## 2023-11-29 DIAGNOSIS — J30.2 SEASONAL ALLERGIC RHINITIS, UNSPECIFIED TRIGGER: ICD-10-CM

## 2023-11-29 RX ORDER — FLUTICASONE PROPIONATE 50 MCG
SPRAY, SUSPENSION (ML) NASAL
Qty: 16 ML | Refills: 0 | Status: SHIPPED | OUTPATIENT
Start: 2023-11-29

## 2023-12-06 DIAGNOSIS — M79.7 FIBROMYALGIA: ICD-10-CM

## 2023-12-06 DIAGNOSIS — F33.1 MODERATE EPISODE OF RECURRENT MAJOR DEPRESSIVE DISORDER (HCC): ICD-10-CM

## 2023-12-06 DIAGNOSIS — J45.31 MILD PERSISTENT ASTHMA WITH ACUTE EXACERBATION: ICD-10-CM

## 2023-12-06 RX ORDER — MIRTAZAPINE 7.5 MG/1
7.5 TABLET, FILM COATED ORAL
Qty: 30 TABLET | Refills: 0 | Status: SHIPPED | OUTPATIENT
Start: 2023-12-06

## 2023-12-08 RX ORDER — TIOTROPIUM BROMIDE INHALATION SPRAY 1.56 UG/1
2 SPRAY, METERED RESPIRATORY (INHALATION) DAILY
Qty: 4 G | Refills: 0 | Status: SHIPPED | OUTPATIENT
Start: 2023-12-08

## 2023-12-09 DIAGNOSIS — G47.01 INSOMNIA DUE TO MEDICAL CONDITION: ICD-10-CM

## 2023-12-11 RX ORDER — ZALEPLON 5 MG/1
5 CAPSULE ORAL
Qty: 30 CAPSULE | Refills: 0 | Status: SHIPPED | OUTPATIENT
Start: 2023-12-11

## 2023-12-15 ENCOUNTER — TELEPHONE (OUTPATIENT)
Dept: FAMILY MEDICINE CLINIC | Facility: CLINIC | Age: 68
End: 2023-12-15

## 2023-12-15 NOTE — TELEPHONE ENCOUNTER
Pt called and states she is having a cough w/ yellow phlegm, earache headache and slight fever. Pt states it feels like the bronchitis did not go away. No appts available w/ any provider. Can you please advise?  Thanks

## 2023-12-27 ENCOUNTER — TELEPHONE (OUTPATIENT)
Dept: NEUROLOGY | Facility: CLINIC | Age: 68
End: 2023-12-27

## 2023-12-27 DIAGNOSIS — F41.1 GENERALIZED ANXIETY DISORDER: ICD-10-CM

## 2023-12-27 RX ORDER — DIAZEPAM 5 MG/1
TABLET ORAL
Qty: 60 TABLET | Refills: 0 | Status: SHIPPED | OUTPATIENT
Start: 2023-12-27

## 2023-12-27 NOTE — TELEPHONE ENCOUNTER
Patient aware and will call pharmacy to fill; added to wait list for Vesta Fox for sooner appointment at patient request.

## 2023-12-27 NOTE — TELEPHONE ENCOUNTER
received vm from 12/27 at 12:57pm-This is Ora with a silviano LEUNG. date of birth, [birth year], phone number [phone]. I'm calling regarding a prescription for my primidone 50 milligram. if dr Pfeiffer can call a new prescription and I am out of the dose. Again, it's Ora with a silviano LEUNG. [birth year] at , [phone]  for primidone. Thank you.  --------------------------------------------  Last script was sent to pharm on 10/24 for 90 day supply with 1 refill.  Looks like pharm filled this on 10/25.  It would be too soon to fill  Please contact pt

## 2023-12-30 DIAGNOSIS — G43.909 MIGRAINE WITHOUT STATUS MIGRAINOSUS, NOT INTRACTABLE, UNSPECIFIED MIGRAINE TYPE: ICD-10-CM

## 2024-01-02 RX ORDER — SUMATRIPTAN 100 MG/1
TABLET, FILM COATED ORAL
Qty: 9 TABLET | Refills: 0 | Status: SHIPPED | OUTPATIENT
Start: 2024-01-02

## 2024-01-03 DIAGNOSIS — M79.7 FIBROMYALGIA: ICD-10-CM

## 2024-01-03 DIAGNOSIS — F33.1 MODERATE EPISODE OF RECURRENT MAJOR DEPRESSIVE DISORDER (HCC): ICD-10-CM

## 2024-01-03 RX ORDER — MIRTAZAPINE 7.5 MG/1
7.5 TABLET, FILM COATED ORAL
Qty: 30 TABLET | Refills: 0 | Status: SHIPPED | OUTPATIENT
Start: 2024-01-03

## 2024-01-10 DIAGNOSIS — M79.7 FIBROMYALGIA: ICD-10-CM

## 2024-01-10 DIAGNOSIS — J42 CHRONIC BRONCHITIS, UNSPECIFIED CHRONIC BRONCHITIS TYPE (HCC): ICD-10-CM

## 2024-01-10 RX ORDER — TIZANIDINE 4 MG/1
4 TABLET ORAL
Qty: 30 TABLET | Refills: 0 | Status: SHIPPED | OUTPATIENT
Start: 2024-01-10

## 2024-01-10 RX ORDER — ALBUTEROL SULFATE 90 UG/1
2 AEROSOL, METERED RESPIRATORY (INHALATION) 3 TIMES DAILY PRN
Qty: 18 G | Refills: 1 | Status: SHIPPED | OUTPATIENT
Start: 2024-01-10

## 2024-01-16 DIAGNOSIS — J30.2 SEASONAL ALLERGIC RHINITIS, UNSPECIFIED TRIGGER: ICD-10-CM

## 2024-01-16 DIAGNOSIS — R10.13 DYSPEPSIA: ICD-10-CM

## 2024-01-16 RX ORDER — ONDANSETRON 4 MG/1
4 TABLET, FILM COATED ORAL EVERY 8 HOURS PRN
Qty: 30 TABLET | Refills: 1 | Status: SHIPPED | OUTPATIENT
Start: 2024-01-16

## 2024-01-16 RX ORDER — FLUTICASONE PROPIONATE 50 MCG
SPRAY, SUSPENSION (ML) NASAL
Qty: 16 ML | Refills: 1 | Status: SHIPPED | OUTPATIENT
Start: 2024-01-16

## 2024-01-22 DIAGNOSIS — G47.01 INSOMNIA DUE TO MEDICAL CONDITION: ICD-10-CM

## 2024-01-22 DIAGNOSIS — M79.7 FIBROMYALGIA: ICD-10-CM

## 2024-01-22 DIAGNOSIS — J06.9 UPPER RESPIRATORY TRACT INFECTION, UNSPECIFIED TYPE: ICD-10-CM

## 2024-01-23 RX ORDER — PREDNISONE 2.5 MG/1
2.5 TABLET ORAL DAILY
Qty: 30 TABLET | Refills: 0 | Status: SHIPPED | OUTPATIENT
Start: 2024-01-23

## 2024-01-23 RX ORDER — ZALEPLON 5 MG/1
5 CAPSULE ORAL
Qty: 30 CAPSULE | Refills: 0 | Status: SHIPPED | OUTPATIENT
Start: 2024-01-23

## 2024-01-25 NOTE — PROGRESS NOTES
Outpatient Care Management Note:  RE: Spoke with pt who is expressing that she is tired from not sleeping due to the volume of fluid coming into pouch, but also, the pouch is leaking  She was awakened 2 nights ago when fluid poured out into her bed as pouch broke off  Pt had peristomal skin breakdown and was seen on 1/14 by ET regarding appliances and skin issues  She also reports that she didn't eat anything yesterday because she doesn't want the bag to leak and reduce the amount of fluid coming out  She has been drinking and had Boost  She reports that her urine is light yellow as per conversation that she had with Dr Kent Headings  Pt is contemplating going to the ED because she has become so weak  Encouraged her to eat higher calorie, some protein foods- small amounts and often throughout the day  Expressed importance of nutrition for skin healing and bodily strength  Also to reschedule with [facility] to see ET again  Will TT Dr Aisha Ferrer for direction and to make her aware of pt complaints at this time 
Outpatient Care Management Note:  RE:Pt called and requested info on home draws for lab work since she feels weak  Offered to fax order to [facility] to be drawn this week  Spoke with Jenny Asher at [facility] twice  Once for fax number [phone] and again to confirm that pt does have a visit planned for Thursday and nurse will draw received order 
no

## 2024-01-28 DIAGNOSIS — E78.5 HYPERLIPIDEMIA, UNSPECIFIED: ICD-10-CM

## 2024-01-28 RX ORDER — ATORVASTATIN CALCIUM 10 MG/1
TABLET, FILM COATED ORAL
Qty: 90 TABLET | Refills: 1 | Status: SHIPPED | OUTPATIENT
Start: 2024-01-28

## 2024-01-30 DIAGNOSIS — F41.1 GENERALIZED ANXIETY DISORDER: ICD-10-CM

## 2024-01-31 DIAGNOSIS — F33.1 MODERATE EPISODE OF RECURRENT MAJOR DEPRESSIVE DISORDER (HCC): ICD-10-CM

## 2024-01-31 DIAGNOSIS — M79.7 FIBROMYALGIA: ICD-10-CM

## 2024-01-31 RX ORDER — MIRTAZAPINE 7.5 MG/1
7.5 TABLET, FILM COATED ORAL
Qty: 90 TABLET | Refills: 1 | Status: SHIPPED | OUTPATIENT
Start: 2024-01-31

## 2024-01-31 RX ORDER — DIAZEPAM 5 MG/1
TABLET ORAL
Qty: 60 TABLET | Refills: 0 | Status: SHIPPED | OUTPATIENT
Start: 2024-01-31

## 2024-02-05 DIAGNOSIS — J42 CHRONIC BRONCHITIS, UNSPECIFIED CHRONIC BRONCHITIS TYPE (HCC): ICD-10-CM

## 2024-02-05 RX ORDER — ALBUTEROL SULFATE 90 UG/1
2 AEROSOL, METERED RESPIRATORY (INHALATION) 3 TIMES DAILY PRN
Qty: 18 G | Refills: 0 | Status: SHIPPED | OUTPATIENT
Start: 2024-02-05

## 2024-02-06 DIAGNOSIS — M79.7 FIBROMYALGIA: ICD-10-CM

## 2024-02-06 RX ORDER — TIZANIDINE 4 MG/1
4 TABLET ORAL
Qty: 30 TABLET | Refills: 0 | Status: SHIPPED | OUTPATIENT
Start: 2024-02-06

## 2024-02-07 DIAGNOSIS — J06.9 UPPER RESPIRATORY TRACT INFECTION, UNSPECIFIED TYPE: ICD-10-CM

## 2024-02-07 DIAGNOSIS — M79.7 FIBROMYALGIA: ICD-10-CM

## 2024-02-07 RX ORDER — PREDNISONE 2.5 MG/1
2.5 TABLET ORAL DAILY
Qty: 30 TABLET | Refills: 0 | Status: SHIPPED | OUTPATIENT
Start: 2024-02-07

## 2024-02-07 NOTE — TELEPHONE ENCOUNTER
Medication: prednisone    Dose/Frequency: 5mg/ daily    Quantity: 30    Pharmacy: [facility] [id number] [place]    Office:   [x] PCP/Provider - Dr. Ferrer  [] Speciality/Provider -     Does the patient have enough for 3 days?   [] Yes   [x] No - Send as HP to POD    Pt stated she was previously on 2.5mg and would like it increased to 5mg.        [facility]    Chief Complaint  Chief Complaint   Patient presents with    Follow-up       HPI:    Issues with groin/scrotal pain continue.  Now being evaluated by Urology.  Had a couple of emergency department visits.  Had another CT scan which had the same renal findings.  No significant change for better or worse.    Medications  Current Outpatient Medications   Medication Sig Dispense Refill    aspirin (ECOTRIN) 81 MG EC tablet Take 81 mg by mouth once daily.      ezetimibe (ZETIA) 10 mg tablet Take 10 mg by mouth once daily.      HYDROcodone-acetaminophen (NORCO)  mg per tablet Take 1 tablet by mouth after meals as needed.      meloxicam (MOBIC) 15 MG tablet Take 1 tablet (15 mg total) by mouth once daily. 10 tablet 0    metFORMIN (GLUCOPHAGE-XR) 500 MG ER 24hr tablet Take 500 mg by mouth 2 (two) times daily with meals.      tamsulosin (FLOMAX) 0.4 mg Cap Take 1 capsule by mouth once daily.      valsartan-hydrochlorothiazide (DIOVAN-HCT) 160-12.5 mg per tablet Take 1 tablet by mouth once daily.      aluminum & magnesium hydroxide-simethicone (MYLANTA MAXIMUM STRENGTH) 400-400-40 mg/5 mL suspension Take 15 mLs by mouth every 6 (six) hours as needed for Indigestion. 100 mL 0    amitriptyline (ELAVIL) 25 MG tablet Take 25 mg by mouth once daily.      dicyclomine (BENTYL) 20 mg tablet Take 1 tablet (20 mg total) by mouth 3 (three) times daily as needed (abdominal cramping). 20 tablet 0    ondansetron (ZOFRAN-ODT) 4 MG TbDL Take 1-2 tablets (4-8 mg total) by mouth every 8 (eight) hours as needed (Nausea/Vomiting). 20 tablet 0    oxyCODONE-acetaminophen (PERCOCET)  mg per tablet Take 1 tablet by mouth every 4 (four) hours as needed for Pain. 18 tablet 0     No current facility-administered medications for this visit.        History  Past Medical History:   Diagnosis Date    Diabetes mellitus     Gout     Hepatitis C     Hypercholesteremia     Hypertension      Past Surgical History:    Procedure Laterality Date    ARTHROPLASTY, KNEE, TOTAL, SIGHT ASSISTED Left 7/26/2021    Procedure: ARTHROPLASTY, KNEE, TOTAL, SIGHT ASSISTED;  Surgeon: Jan Rust MD;  Location: [facility];  Service: Orthopedics;  Laterality: Left;    BACK SURGERY      CORONARY ANGIOGRAPHY  08/24/2016    no CAD    FRACTURE SURGERY Left     leg    FUSION OF SPINE WITH INSTRUMENTATION N/A 12/20/2018    Procedure: FUSION, SPINE, WITH INSTRUMENTATION Removal of spinal hardware, Bilateral Jamison-White Osteotomy L5-S1, L5-S1 Interbody Arthrodesis, L4-S1 Segmental Instrumentation with Posterior Lateral Arthrodesis;  Surgeon: Ezequiel Pedroza MD;  Location: [facility];  Service: Neurosurgery;  Laterality: N/A;  Procedure: Removal of spinal hardware, Revision of Posterolateral fusion from L3-S1, L5-S1 In    HIP SURGERY Right     hip fusion    INJECTION OF STEROID Right 3/22/2019    Procedure: INJECTION, STEROID Right SI Joint Block and Steroid Injection;  Surgeon: Ezequiel Pedroza MD;  Location: [facility];  Service: Neurosurgery;  Laterality: Right;  Procedure: Right SI Joint Block and Steroid Injection  Surgery Time: 15 Min  LOS:0  Anesthesia: General  Radiology: C-Arm  Bed: Regular Bed  Position: Prone    KNEE ARTHROSCOPY      LAMINECTOMY      Lumbar    MYELOGRAPHY N/A 11/5/2018    Procedure: Myelogram;  Surgeon: [facility] Diagnostic Provider;  Location: [address];  Service: Radiology;  Laterality: N/A;    right wrist surgery      TRIAL OF SPINAL CORD NERVE STIMULATOR N/A 9/20/2019    Procedure: Trial, Neurostimulator, Spinal Cord Spinal Cord Stimulator Trial using 2 Percutaneous Least at T9-10;  Surgeon: Ezequiel Pedroza MD;  Location: [address];  Service: Neurosurgery;  Laterality: N/A;  Procedure: Spinal Cord Stimulator Trial using 2 Percutaneous Least at T9-10  Surgery Time: 1 Hr  LOS: 0  Anesthesia: General  Radiology: C-arm  Bed: [place] 4 Poster  Position: Prone  Eq     Social History     Socioeconomic History    Marital  "status:    Tobacco Use    Smoking status: Former     Passive exposure: Never    Smokeless tobacco: Never    Tobacco comments:     quit over 30yrs   Substance and Sexual Activity    Alcohol use: No    Drug use: Yes     Types: Marijuana     Comment: daily     Social Determinants of Health     Financial Resource Strain: Low Risk     Difficulty of Paying Living Expenses: Not hard at all   Food Insecurity: No Food Insecurity    Worried About Running Out of Food in the Last Year: Never true    Ran Out of Food in the Last Year: Never true   Transportation Needs: No Transportation Needs    Lack of Transportation (Medical): No    Lack of Transportation (Non-Medical): No   Physical Activity: Inactive    Days of Exercise per Week: 0 days    Minutes of Exercise per Session: 0 min   Stress: No Stress Concern Present    Feeling of Stress : Not at all   Social Connections: Socially Isolated    Frequency of Communication with Friends and Family: More than three times a week    Frequency of Social Gatherings with Friends and Family: Three times a week    Attends [employer] Services: Never    Active Member of Clubs or Organizations: No    Attends Club or Organization Meetings: Never    Marital Status:    Housing Stability: Unknown    Unable to Pay for Housing in the Last Year: No    Unstable Housing in the Last Year: No     Family History   Problem Relation Age of Onset    Diabetes Sister     Hypertension Sister     Diabetes Brother     Hypertension Brother     Melanoma Neg Hx     Psoriasis Neg Hx     Lupus Neg Hx         Allergies  Review of patient's allergies indicates:   Allergen Reactions    Lactose     Gabapentin Other (See Comments)     Patient states, "it makes me bleed from my rectum. Don't give it to me."    Atorvastatin Rash     Dye used    Opioids - morphine analogues Rash    Penicillins Rash     Pt states a "a couple of years ago" he took penicillin and broke out in "red spots" with no itching or " difficulty breathing       Review of Systems   Review of Systems   Constitutional: Negative for malaise/fatigue, weight gain and weight loss.   Eyes:  Negative for visual disturbance.   Cardiovascular:  Negative for chest pain, claudication, cyanosis, dyspnea on exertion, irregular heartbeat, leg swelling, near-syncope, orthopnea, palpitations, paroxysmal nocturnal dyspnea and syncope.   Respiratory:  Negative for cough, hemoptysis, shortness of breath, sleep disturbances due to breathing and wheezing.    Hematologic/Lymphatic: Negative for bleeding problem. Does not bruise/bleed easily.   Skin:  Negative for poor wound healing.   Musculoskeletal:  Negative for muscle cramps and myalgias.   Gastrointestinal:  Negative for abdominal pain, anorexia, diarrhea, heartburn, hematemesis, hematochezia, melena, nausea and vomiting.   Genitourinary:  Negative for hematuria and nocturia.   Neurological:  Negative for excessive daytime sleepiness, dizziness, focal weakness, light-headedness and weakness.     Physical Exam  Vitals:    04/28/23 0911   BP: (!) 140/92   Pulse: 88     Wt Readings from Last 1 Encounters:   04/28/23 95.4 kg (210 lb 6.4 oz)     Physical Exam  Constitutional:       General: He is not in acute distress.     Appearance: He is not toxic-appearing or diaphoretic.   HENT:      Head: Normocephalic and atraumatic.   Eyes:      General: No scleral icterus.     Conjunctiva/sclera: Conjunctivae normal.   Neck:      Thyroid: No thyromegaly.      Vascular: No carotid bruit, hepatojugular reflux or JVD.      Trachea: No tracheal deviation.   Cardiovascular:      Rate and Rhythm: Normal rate and regular rhythm. No extrasystoles are present.     Chest Wall: PMI is not displaced.      Pulses:           Carotid pulses are 2+ on the right side and 2+ on the left side.       Radial pulses are 2+ on the right side and 2+ on the left side.        Dorsalis pedis pulses are 2+ on the right side and 2+ on the left side.         Posterior tibial pulses are 2+ on the right side and 2+ on the left side.      Heart sounds: S1 normal and S2 normal. No murmur heard.    No S3 or S4 sounds.   Pulmonary:      Effort: No accessory muscle usage or respiratory distress.      Breath sounds: No decreased breath sounds, wheezing, rhonchi or rales.   Abdominal:      General: Bowel sounds are normal.      Palpations: Abdomen is soft.      Tenderness: There is no abdominal tenderness.   Musculoskeletal:         General: No tenderness or deformity.      Cervical back: Neck supple.      Right lower leg: No edema.      Left lower leg: No edema.   Skin:     General: Skin is warm and dry.      Coloration: Skin is not pale.      Nails: There is no clubbing.      Comments: No signs of embolic events.   Neurological:      Mental Status: He is alert and oriented to person, place, and time.      Cranial Nerves: No cranial nerve deficit.      Sensory: No sensory deficit.   Psychiatric:         Speech: Speech normal.         Behavior: Behavior normal. Behavior is cooperative.       Labs  Admission on 04/14/2023, Discharged on 04/14/2023   Component Date Value Ref Range Status    WBC 04/14/2023 10.44  3.90 - 12.70 K/uL Final    RBC 04/14/2023 5.73  4.60 - 6.20 M/uL Final    Hemoglobin 04/14/2023 14.8  14.0 - 18.0 g/dL Final    Hematocrit 04/14/2023 45.5  40.0 - 54.0 % Final    MCV 04/14/2023 79 (L)  82 - 98 fL Final    MCH 04/14/2023 25.8 (L)  27.0 - 31.0 pg Final    MCHC 04/14/2023 32.5  32.0 - 36.0 g/dL Final    RDW 04/14/2023 14.6 (H)  11.5 - 14.5 % Final    Platelets 04/14/2023 226  150 - 450 K/uL Final    MPV 04/14/2023 10.4  9.2 - 12.9 fL Final    Immature Granulocytes 04/14/2023 0.2  0.0 - 0.5 % Final    Gran # (ANC) 04/14/2023 7.2  1.8 - 7.7 K/uL Final    Immature Grans (Abs) 04/14/2023 0.02  0.00 - 0.04 K/uL Final    Comment: Mild elevation in immature granulocytes is non specific and   can be seen in a variety of conditions including stress response,   acute  inflammation, trauma and pregnancy. Correlation with other   laboratory and clinical findings is essential.      Lymph # 04/14/2023 1.7  1.0 - 4.8 K/uL Final    Mono # 04/14/2023 1.2 (H)  0.3 - 1.0 K/uL Final    Eos # 04/14/2023 0.2  0.0 - 0.5 K/uL Final    Baso # 04/14/2023 0.09  0.00 - 0.20 K/uL Final    nRBC 04/14/2023 0  0 /100 WBC Final    Gran % 04/14/2023 69.3  38.0 - 73.0 % Final    Lymph % 04/14/2023 16.0 (L)  18.0 - 48.0 % Final    Mono % 04/14/2023 11.6  4.0 - 15.0 % Final    Eosinophil % 04/14/2023 2.0  0.0 - 8.0 % Final    Basophil % 04/14/2023 0.9  0.0 - 1.9 % Final    Differential Method 04/14/2023 Automated   Final    Sodium 04/14/2023 136  136 - 145 mmol/L Final    Potassium 04/14/2023 3.8  3.5 - 5.1 mmol/L Final    Chloride 04/14/2023 101  95 - 110 mmol/L Final    CO2 04/14/2023 18 (L)  23 - 29 mmol/L Final    Glucose 04/14/2023 139 (H)  70 - 110 mg/dL Final    BUN 04/14/2023 14  8 - 23 mg/dL Final    Creatinine 04/14/2023 1.3  0.5 - 1.4 mg/dL Final    Calcium 04/14/2023 9.8  8.7 - 10.5 mg/dL Final    Total Protein 04/14/2023 7.9  6.0 - 8.4 g/dL Final    Albumin 04/14/2023 3.8  3.5 - 5.2 g/dL Final    Total Bilirubin 04/14/2023 0.4  0.1 - 1.0 mg/dL Final    Comment: For infants and newborns, interpretation of results should be based  on gestational age, weight and in agreement with clinical  observations.    Premature Infant recommended reference ranges:  Up to 24 hours.............<8.0 mg/dL  Up to 48 hours............<12.0 mg/dL  3-5 days..................<15.0 mg/dL  6-29 days.................<15.0 mg/dL      Alkaline Phosphatase 04/14/2023 70  55 - 135 U/L Final    AST 04/14/2023 16  10 - 40 U/L Final    ALT 04/14/2023 9 (L)  10 - 44 U/L Final    Anion Gap 04/14/2023 17 (H)  8 - 16 mmol/L Final    eGFR 04/14/2023 >60.0  >60 mL/min/1.73 m^2 Final    Lipase 04/14/2023 17  4 - 60 U/L Final    Specimen UA 04/14/2023 Urine, Clean Catch   Final    Color, UA 04/14/2023 Yellow  Yellow, Straw, Doris  Final    Appearance, UA 04/14/2023 Clear  Clear Final    pH, UA 04/14/2023 7.0  5.0 - 8.0 Final    Specific Gravity, UA 04/14/2023 1.025  1.005 - 1.030 Final    Protein, UA 04/14/2023 1+ (A)  Negative Final    Comment: Recommend a 24 hour urine protein or a urine   protein/creatinine ratio if globulin induced proteinuria is  clinically suspected.      Glucose, UA 04/14/2023 Negative  Negative Final    Ketones, UA 04/14/2023 2+ (A)  Negative Final    Bilirubin (UA) 04/14/2023 Negative  Negative Final    Occult Blood UA 04/14/2023 Negative  Negative Final    Nitrite, UA 04/14/2023 Negative  Negative Final    Urobilinogen, UA 04/14/2023 Negative  Negative EU/dL Final    Leukocytes, UA 04/14/2023 Negative  Negative Final    RBC, UA 04/14/2023 5 (H)  0 - 4 /hpf Final    WBC, UA 04/14/2023 1  0 - 5 /hpf Final    Bacteria 04/14/2023 Rare  None-Occ /hpf Final    Squam Epithel, UA 04/14/2023 0  /hpf Final    Hyaline Casts, UA 04/14/2023 1  0-1/lpf /lpf Final    Amorphous, UA 04/14/2023 Rare  None-Moderate Final    Microscopic Comment 04/14/2023 SEE COMMENT   Final    Comment: Other formed elements not mentioned in the report are not   present in the microscopic examination.      Admission on 03/14/2023, Discharged on 03/14/2023   Component Date Value Ref Range Status    Specimen UA 03/14/2023 Urine, Clean Catch   Final    Color, UA 03/14/2023 Yellow  Yellow, Straw, Doris Final    Appearance, UA 03/14/2023 Clear  Clear Final    pH, UA 03/14/2023 8.0  5.0 - 8.0 Final    Specific Gravity, UA 03/14/2023 1.010  1.005 - 1.030 Final    Protein, UA 03/14/2023 Negative  Negative Final    Comment: Recommend a 24 hour urine protein or a urine   protein/creatinine ratio if globulin induced proteinuria is  clinically suspected.      Glucose, UA 03/14/2023 Negative  Negative Final    Ketones, UA 03/14/2023 1+ (A)  Negative Final    Bilirubin (UA) 03/14/2023 Negative  Negative Final    Occult Blood UA 03/14/2023 Negative  Negative Final     Nitrite, UA 03/14/2023 Negative  Negative Final    Urobilinogen, UA 03/14/2023 Negative  <2.0 EU/dL Final    Leukocytes, UA 03/14/2023 Negative  Negative Final    WBC 03/14/2023 10.57  3.90 - 12.70 K/uL Final    RBC 03/14/2023 6.22 (H)  4.60 - 6.20 M/uL Final    Hemoglobin 03/14/2023 16.4  14.0 - 18.0 g/dL Final    Hematocrit 03/14/2023 49.4  40.0 - 54.0 % Final    MCV 03/14/2023 79 (L)  82 - 98 fL Final    MCH 03/14/2023 26.4 (L)  27.0 - 31.0 pg Final    MCHC 03/14/2023 33.2  32.0 - 36.0 g/dL Final    RDW 03/14/2023 14.8 (H)  11.5 - 14.5 % Final    Platelets 03/14/2023 239  150 - 450 K/uL Final    MPV 03/14/2023 9.7  9.2 - 12.9 fL Final    Immature Granulocytes 03/14/2023 0.3  0.0 - 0.5 % Final    Gran # (ANC) 03/14/2023 8.1 (H)  1.8 - 7.7 K/uL Final    Immature Grans (Abs) 03/14/2023 0.03  0.00 - 0.04 K/uL Final    Comment: Mild elevation in immature granulocytes is non specific and   can be seen in a variety of conditions including stress response,   acute inflammation, trauma and pregnancy. Correlation with other   laboratory and clinical findings is essential.      Lymph # 03/14/2023 1.3  1.0 - 4.8 K/uL Final    Mono # 03/14/2023 0.9  0.3 - 1.0 K/uL Final    Eos # 03/14/2023 0.1  0.0 - 0.5 K/uL Final    Baso # 03/14/2023 0.12  0.00 - 0.20 K/uL Final    nRBC 03/14/2023 0  0 /100 WBC Final    Gran % 03/14/2023 77.0 (H)  38.0 - 73.0 % Final    Lymph % 03/14/2023 12.2 (L)  18.0 - 48.0 % Final    Mono % 03/14/2023 8.7  4.0 - 15.0 % Final    Eosinophil % 03/14/2023 0.7  0.0 - 8.0 % Final    Basophil % 03/14/2023 1.1  0.0 - 1.9 % Final    Differential Method 03/14/2023 Automated   Final    Sodium 03/14/2023 137  136 - 145 mmol/L Final    Potassium 03/14/2023 4.5  3.5 - 5.1 mmol/L Final    Chloride 03/14/2023 100  95 - 110 mmol/L Final    CO2 03/14/2023 27  23 - 29 mmol/L Final    Glucose 03/14/2023 103  70 - 110 mg/dL Final    BUN 03/14/2023 14  8 - 23 mg/dL Final    Creatinine 03/14/2023 1.3  0.5 - 1.4 mg/dL Final     Calcium 03/14/2023 10.2  8.7 - 10.5 mg/dL Final    Total Protein 03/14/2023 8.5 (H)  6.0 - 8.4 g/dL Final    Albumin 03/14/2023 4.1  3.5 - 5.2 g/dL Final    Total Bilirubin 03/14/2023 0.4  0.1 - 1.0 mg/dL Final    Comment: For infants and newborns, interpretation of results should be based  on gestational age, weight and in agreement with clinical  observations.    Premature Infant recommended reference ranges:  Up to 24 hours.............<8.0 mg/dL  Up to 48 hours............<12.0 mg/dL  3-5 days..................<15.0 mg/dL  6-29 days.................<15.0 mg/dL      Alkaline Phosphatase 03/14/2023 81  55 - 135 U/L Final    AST 03/14/2023 15  10 - 40 U/L Final    ALT 03/14/2023 15  10 - 44 U/L Final    Anion Gap 03/14/2023 10  8 - 16 mmol/L Final    eGFR 03/14/2023 >60  >60 mL/min/1.73 m^2 Final    Prothrombin Time 03/14/2023 10.3  9.0 - 12.5 sec Final    INR 03/14/2023 0.9  0.8 - 1.2 Final    Comment: Coumadin Therapy:  2.0 - 3.0 for INR for all indicators except mechanical heart valves  and antiphospholipid syndromes which should use 2.5 - 3.5.  LOT^040^PT Inn^[id number]      Lactate (Lactic Acid) 03/14/2023 3.5 (HH)  0.5 - 2.2 mmol/L Final    Comment: Falsely low lactic acid results can be found in samples   containing >=13.0 mg/dL total bilirubin and/or >=3.5 mg/dL   direct bilirubin.  critical result(s) called and verbal readback obtained from   LA 3.52  SAMI PRATHER RN by [facility] 03/14/2023 08:59      Lactate (Lactic Acid) 03/14/2023 1.0  0.5 - 2.2 mmol/L Final    Comment: Falsely low lactic acid results can be found in samples   containing >=13.0 mg/dL total bilirubin and/or >=3.5 mg/dL   direct bilirubin.     No results displayed because visit has over 200 results.      Admission on 02/07/2023, Discharged on 02/07/2023   Component Date Value Ref Range Status    POCT Glucose 02/07/2023 171 (H)  70 - 110 mg/dL Final    Specimen UA 02/07/2023 Urine, Clean Catch   Final    Color, UA 02/07/2023 Yellow  Yellow,  Straw, Doris Final    Appearance, UA 02/07/2023 Clear  Clear Final    pH, UA 02/07/2023 6.0  5.0 - 8.0 Final    Specific Gravity, UA 02/07/2023 1.030  1.005 - 1.030 Final    Protein, UA 02/07/2023 Trace (A)  Negative Final    Comment: Recommend a 24 hour urine protein or a urine   protein/creatinine ratio if globulin induced proteinuria is  clinically suspected.      Glucose, UA 02/07/2023 Negative  Negative Final    Ketones, UA 02/07/2023 Trace (A)  Negative Final    Bilirubin (UA) 02/07/2023 Negative  Negative Final    Occult Blood UA 02/07/2023 Negative  Negative Final    Nitrite, UA 02/07/2023 Negative  Negative Final    Urobilinogen, UA 02/07/2023 Negative  Negative EU/dL Final    Leukocytes, UA 02/07/2023 Negative  Negative Final       Imaging  Cardiac event monitor    Result Date: 4/26/2023  · Symptoms corresponding with normal sinus rhythm and frequent PVCs. · 20 beat run of nonsustained atrial tachycardia observed on 1 auto-activation        Assessment  1. Hypertensive heart disease with chronic diastolic congestive heart failure  Compensated      Plan and Discussion    No evidence of atrial arrhythmias to lead to embolic disease.  Still not convincing diagnosis.  Nevertheless, continue with aggressive risk factor modification.  He continues to follow with urology to try to elucidate the etiology of his discomfort.    The 10-year ASCVD risk score (Mobile DK, et al., 2019) is: 30.9%    Values used to calculate the score:      Age: 62 years      Sex: Male      Is Non- : Yes      Diabetic: Yes      Tobacco smoker: No      Systolic Blood Pressure: 140 mmHg      Is BP treated: Yes      HDL Cholesterol: 40 mg/dL      Total Cholesterol: 186 mg/dL     Follow Up  Follow up if symptoms worsen or fail to improve.      Elan Smith MD

## 2024-02-09 ENCOUNTER — OFFICE VISIT (OUTPATIENT)
Dept: NEUROLOGY | Facility: CLINIC | Age: 69
End: 2024-02-09
Payer: COMMERCIAL

## 2024-02-09 VITALS
WEIGHT: 112.4 LBS | SYSTOLIC BLOOD PRESSURE: 110 MMHG | HEART RATE: 99 BPM | BODY MASS INDEX: 18.7 KG/M2 | TEMPERATURE: 97.7 F | DIASTOLIC BLOOD PRESSURE: 78 MMHG | OXYGEN SATURATION: 97 %

## 2024-02-09 DIAGNOSIS — G25.0 ESSENTIAL TREMOR: Primary | ICD-10-CM

## 2024-02-09 PROCEDURE — 99214 OFFICE O/P EST MOD 30 MIN: CPT | Performed by: PHYSICIAN ASSISTANT

## 2024-02-09 NOTE — ASSESSMENT & PLAN NOTE
Patient with history of hand and head tremors since mid 40s with worsening of tremors over the past few years. She does have some improvement with Primidone however some days are still worse than others.  On exam she has head tremor along with some mild postural and action tremors in the hands. Generalized slowness but no decrement or rigidity to suggest parkinsonism.     She has not been taking the Remeron consistently which could be contributing to her worsened tremors.  She is also no longer taking Neurontin which she had been on in the past, although she states she has not taken this in some time.  She does take Valium for anxiety which was recently reduced to 5mg from 10mg which she feels caused the tremors to be worse as well.  I do think that her tremors are likely triggered by anxiety which was better controlled on the higher doses of Valium.  She will discuss possibly increasing this dose again with her PCP.     At this time she will start taking the Remeron consistently as this can help with tremors. Will also have her start taking the Primidone consistently 2tabs int he AM, 2tabs in the afternoon and 5tabs before bed.      Will have her follow up in a few months. If tremors are no better than could consider adding Neurontin or Topiramate.

## 2024-02-09 NOTE — PROGRESS NOTES
Patient ID: Ora Rivera is a 68 y.o. female.    Assessment/Plan:    Essential tremor  Patient with history of hand and head tremors since mid 40s with worsening of tremors over the past few years. She does have some improvement with Primidone however some days are still worse than others.  On exam she has head tremor along with some mild postural and action tremors in the hands. Generalized slowness but no decrement or rigidity to suggest parkinsonism.     She has not been taking the Remeron consistently which could be contributing to her worsened tremors.  She is also no longer taking Neurontin which she had been on in the past, although she states she has not taken this in some time.  She does take Valium for anxiety which was recently reduced to 5mg from 10mg which she feels caused the tremors to be worse as well.  I do think that her tremors are likely triggered by anxiety which was better controlled on the higher doses of Valium.  She will discuss possibly increasing this dose again with her PCP.     At this time she will start taking the Remeron consistently as this can help with tremors. Will also have her start taking the Primidone consistently 2tabs int he AM, 2tabs in the afternoon and 5tabs before bed.      Will have her follow up in a few months. If tremors are no better than could consider adding Neurontin or Topiramate.       Subjective:    Ora Rivera is a 68 y.o. female who presents in follow up for essential tremor. To review, tremors present since her mid 40s and have progressed to include bilateral hand, head and voice tremor. Primidone was started with some improvement of her tremors. Also with hx of syncopal episode on 1/26/21 (occurred when getting up to use the bathroom, had body pain and possible bite jose elias on the tongue following event). EEG was normal. MRI brain with mild, chronic microangiopathy. No family history of tremors.     At her last visit she was having some increased  tremors and her Primdone dose was increased.     INTERVAL HISTORY:  She continues to have tremors, worse in the AM   She has increased her Primidone dose further since the last visit due to worsening tremors   Her PCP reduced the Valium dose from 10mg bid to 5mg bid, she feels that with this change the tremors have been worse   She has good and bad days   When she has a bad day she struggles with eating and drinking   She feels the tremors are present in both hands equally   She will take less Primidone on a good day and more on a bad day   She has not been taking the Remeron consistently because she has been taking Melatonin for sleep   She can perform her ADLs     Current medications:   Primidone 2-3tabs qam and 5-6tabs qpm  Remeron 15mg qhs - not taking consisently   Gabapentin prn (other reasons) - no longer taking   Valium taken for anxiety (not prescribed by neuro)  Beta blockers AVOIDED due to asthma     I personally reviewed and updated the ROS.       Objective:    Blood pressure 110/78, pulse 99, temperature 97.7 °F (36.5 °C), temperature source Temporal, weight 51 kg (112 lb 6.4 oz), SpO2 97%, not currently breastfeeding.    Physical Exam  Constitutional:       General: She is awake.      Appearance: Normal appearance.   Eyes:      General: Lids are normal.      Extraocular Movements: Extraocular movements intact.      Pupils: Pupils are equal, round, and reactive to light.   Pulmonary:      Effort: Pulmonary effort is normal.   Neurological:      Mental Status: She is alert.   Psychiatric:         Speech: Speech normal.       Neurological Exam  Mental Status  Awake and alert. Speech is normal.  Patient very hard of hearing and required things to be repeated multiple times.   She was very tearful when talking about her  . .    Cranial Nerves  CN III, IV, VI: Extraocular movements intact bilaterally. Normal lids and orbits bilaterally. Pupils equal round and reactive to light bilaterally.  CN  V:  Right: Facial sensation is normal.  Left: Facial sensation is normal on the left.  CN VIII:  Right: Hearing is decreased.  Left: Hearing is decreased.  CN XI: Shoulder shrug strength is normal.    Motor    Generalized weakness .    Sensory  Light touch is normal in upper and lower extremities.     Coordination  Right: Finger-to-nose abnormality:Left: Finger-to-nose abnormality:  Mild postural tremor in bilateral hands.   Mild action tremors with finger to nose testing bilaterally.   No resting tremors.   Mild constant head tremor with slight head turn to the right. At times the head tremor was more jerky in nature. .    Gait  Casual gait is normal including stance, stride, and arm swing.  Slow however no shuffling or freezing .        ROS:    Review of Systems   Constitutional:  Negative for appetite change, fatigue and fever.   HENT: Negative.  Negative for hearing loss, tinnitus, trouble swallowing and voice change.    Eyes: Negative.  Negative for photophobia, pain and visual disturbance.   Respiratory: Negative.  Negative for shortness of breath.    Cardiovascular: Negative.  Negative for palpitations.   Gastrointestinal: Negative.  Negative for nausea and vomiting.   Endocrine: Negative.  Negative for cold intolerance.   Genitourinary: Negative.  Negative for dysuria, frequency and urgency.   Musculoskeletal:  Negative for back pain, gait problem, myalgias, neck pain and neck stiffness.   Skin: Negative.  Negative for rash.   Allergic/Immunologic: Negative.    Neurological:  Positive for tremors (bad today-are worse in am, get better throughout day, states was better on the 10 mg valium but Dr marie decreased @ last appt) and headaches. Negative for dizziness, seizures, syncope, facial asymmetry, speech difficulty, weakness, light-headedness and numbness.   Hematological: Negative.  Does not bruise/bleed easily.   Psychiatric/Behavioral: Negative.  Negative for confusion, hallucinations and sleep  disturbance.

## 2024-02-09 NOTE — PATIENT INSTRUCTIONS
Patient with history of hand and head tremors since mid 40s with worsening of tremors over the past few years. She does have some improvement with Primidone however some days are still worse than others.  On exam she has head tremor along with some mild postural and action tremors in the hands. Generalized slowness but no decrement or rigidity to suggest parkinsonism.     She has not been taking the Remeron consistently which could be contributing to her worsened tremors.  She is also no longer taking Neurontin which she had been on in the past, although she states she has not taken this in some time.  She does take Valium for anxiety which was recently reduced to 5mg from 10mg which she feels caused the tremors to be worse as well.  I do think that her tremors are likely triggered by anxiety which was better controlled on the higher doses of Valium.  She will discuss possibly increasing this dose again with her PCP.     At this time she will start taking the Remeron consistently as this can help with tremors. Will also have her start taking the Primidone consistently 2tabs int he AM, 2tabs in the afternoon and 5tabs before bed.      Will have her follow up in a few months. If tremors are no better than could consider adding Neurontin or Topiramate.     Will also make a referral for PT to work on some techniques to better cope with the tremors as well as adaptive devices that could be used.   
14-Dec-2021

## 2024-02-12 DIAGNOSIS — R10.13 DYSPEPSIA: ICD-10-CM

## 2024-02-12 DIAGNOSIS — I10 ESSENTIAL HYPERTENSION: ICD-10-CM

## 2024-02-12 RX ORDER — ONDANSETRON 4 MG/1
4 TABLET, FILM COATED ORAL EVERY 8 HOURS PRN
Qty: 30 TABLET | Refills: 0 | Status: SHIPPED | OUTPATIENT
Start: 2024-02-12

## 2024-02-12 RX ORDER — LISINOPRIL 2.5 MG/1
2.5 TABLET ORAL DAILY
Qty: 90 TABLET | Refills: 0 | Status: SHIPPED | OUTPATIENT
Start: 2024-02-12

## 2024-02-19 ENCOUNTER — TELEPHONE (OUTPATIENT)
Dept: NEUROLOGY | Facility: CLINIC | Age: 69
End: 2024-02-19

## 2024-02-19 NOTE — TELEPHONE ENCOUNTER
Patient called to advise that the occupational therapy office she previously went to was [facility]. That location is very close to her house. The phone number to that location is [phone]. Can the referral be submitted for that location.       Please call patient to advise [phone] thank you.

## 2024-02-23 DIAGNOSIS — J42 CHRONIC BRONCHITIS, UNSPECIFIED CHRONIC BRONCHITIS TYPE (HCC): ICD-10-CM

## 2024-02-23 RX ORDER — ALBUTEROL SULFATE 90 UG/1
2 AEROSOL, METERED RESPIRATORY (INHALATION) 3 TIMES DAILY PRN
Qty: 18 G | Refills: 5 | Status: SHIPPED | OUTPATIENT
Start: 2024-02-23

## 2024-03-04 ENCOUNTER — OFFICE VISIT (OUTPATIENT)
Dept: FAMILY MEDICINE CLINIC | Facility: CLINIC | Age: 69
End: 2024-03-04
Payer: COMMERCIAL

## 2024-03-04 DIAGNOSIS — Z12.31 ENCOUNTER FOR SCREENING MAMMOGRAM FOR BREAST CANCER: ICD-10-CM

## 2024-03-04 DIAGNOSIS — R10.13 DYSPEPSIA: ICD-10-CM

## 2024-03-04 DIAGNOSIS — Z00.00 MEDICARE ANNUAL WELLNESS VISIT, SUBSEQUENT: Primary | ICD-10-CM

## 2024-03-04 DIAGNOSIS — F41.1 GENERALIZED ANXIETY DISORDER: ICD-10-CM

## 2024-03-04 DIAGNOSIS — Z78.0 ENCOUNTER FOR OSTEOPOROSIS SCREENING IN ASYMPTOMATIC POSTMENOPAUSAL PATIENT: ICD-10-CM

## 2024-03-04 DIAGNOSIS — I10 ESSENTIAL HYPERTENSION: ICD-10-CM

## 2024-03-04 DIAGNOSIS — Z13.820 ENCOUNTER FOR OSTEOPOROSIS SCREENING IN ASYMPTOMATIC POSTMENOPAUSAL PATIENT: ICD-10-CM

## 2024-03-04 DIAGNOSIS — F11.20 CONTINUOUS OPIOID DEPENDENCE (HCC): ICD-10-CM

## 2024-03-04 DIAGNOSIS — J42 CHRONIC BRONCHITIS, UNSPECIFIED CHRONIC BRONCHITIS TYPE (HCC): ICD-10-CM

## 2024-03-04 DIAGNOSIS — E78.5 HYPERLIPIDEMIA, UNSPECIFIED HYPERLIPIDEMIA TYPE: ICD-10-CM

## 2024-03-04 PROBLEM — F10.10 ETOH ABUSE: Status: RESOLVED | Noted: 2023-08-16 | Resolved: 2024-03-04

## 2024-03-04 PROCEDURE — G0439 PPPS, SUBSEQ VISIT: HCPCS | Performed by: FAMILY MEDICINE

## 2024-03-04 RX ORDER — ONDANSETRON 4 MG/1
4 TABLET, FILM COATED ORAL EVERY 8 HOURS PRN
Qty: 30 TABLET | Refills: 0 | Status: SHIPPED | OUTPATIENT
Start: 2024-03-04 | End: 2024-03-20 | Stop reason: SDUPTHER

## 2024-03-04 RX ORDER — ALBUTEROL SULFATE 90 UG/1
2 AEROSOL, METERED RESPIRATORY (INHALATION) 3 TIMES DAILY PRN
Qty: 18 G | Refills: 5 | Status: SHIPPED | OUTPATIENT
Start: 2024-03-04

## 2024-03-04 RX ORDER — DIAZEPAM 5 MG/1
TABLET ORAL
Qty: 90 TABLET | Refills: 0 | Status: SHIPPED | OUTPATIENT
Start: 2024-03-04

## 2024-03-04 NOTE — PATIENT INSTRUCTIONS
Medicare Preventive Visit Patient Instructions  Thank you for completing your Welcome to Medicare Visit or Medicare Annual Wellness Visit today. Your next wellness visit will be due in one year (3/5/2025).  The screening/preventive services that you may require over the next 5-10 years are detailed below. Some tests may not apply to you based off risk factors and/or age. Screening tests ordered at today's visit but not completed yet may show as past due. Also, please note that scanned in results may not display below.  Preventive Screenings:  Service Recommendations Previous Testing/Comments   Colorectal Cancer Screening  * Colonoscopy    * Fecal Occult Blood Test (FOBT)/Fecal Immunochemical Test (FIT)  * Fecal DNA/Cologuard Test  * Flexible Sigmoidoscopy Age: 45-75 years old   Colonoscopy: every 10 years (may be performed more frequently if at higher risk)  OR  FOBT/FIT: every 1 year  OR  Cologuard: every 3 years  OR  Sigmoidoscopy: every 5 years  Screening may be recommended earlier than age 45 if at higher risk for colorectal cancer. Also, an individualized decision between you and your healthcare provider will decide whether screening between the ages of 76-85 would be appropriate. Colonoscopy: 10/07/2022  FOBT/FIT: Not on file  Cologuard: Not on file  Sigmoidoscopy: Not on file    Screening Current     Breast Cancer Screening Age: 40+ years old  Frequency: every 1-2 years  Not required if history of left and right mastectomy Mammogram: 09/08/2022    Screening Current   Cervical Cancer Screening Between the ages of 21-29, pap smear recommended once every 3 years.   Between the ages of 30-65, can perform pap smear with HPV co-testing every 5 years.   Recommendations may differ for women with a history of total hysterectomy, cervical cancer, or abnormal pap smears in past. Pap Smear: 07/18/2022    Screening Not Indicated   Hepatitis C Screening Once for adults born between 1945 and 1965  More frequently in  patients at high risk for Hepatitis C Hep C Antibody: Not on file    Screening Current   Diabetes Screening 1-2 times per year if you're at risk for diabetes or have pre-diabetes Fasting glucose: 100 mg/dL (3/30/2023)  A1C: 4.9 % (3/16/2022)  Screening Current   Cholesterol Screening Once every 5 years if you don't have a lipid disorder. May order more often based on risk factors. Lipid panel: 03/30/2023    Screening Not Indicated  History Lipid Disorder     Other Preventive Screenings Covered by Medicare:  Abdominal Aortic Aneurysm (AAA) Screening: covered once if your at risk. You're considered to be at risk if you have a family history of AAA.  Lung Cancer Screening: covers low dose CT scan once per year if you meet all of the following conditions: (1) Age 55-77; (2) No signs or symptoms of lung cancer; (3) Current smoker or have quit smoking within the last 15 years; (4) You have a tobacco smoking history of at least 20 pack years (packs per day multiplied by number of years you smoked); (5) You get a written order from a healthcare provider.  Glaucoma Screening: covered annually if you're considered high risk: (1) You have diabetes OR (2) Family history of glaucoma OR (3)  aged 50 and older OR (4)  American aged 65 and older  Osteoporosis Screening: covered every 2 years if you meet one of the following conditions: (1) You're estrogen deficient and at risk for osteoporosis based off medical history and other findings; (2) Have a vertebral abnormality; (3) On glucocorticoid therapy for more than 3 months; (4) Have primary hyperparathyroidism; (5) On osteoporosis medications and need to assess response to drug therapy.   Last bone density test (DXA Scan): 09/12/2022.  HIV Screening: covered annually if you're between the age of 15-65. Also covered annually if you are younger than 15 and older than 65 with risk factors for HIV infection. For pregnant patients, it is covered up to 3 times  per pregnancy.    Immunizations:  Immunization Recommendations   Influenza Vaccine Annual influenza vaccination during flu season is recommended for all persons aged >= 6 months who do not have contraindications   Pneumococcal Vaccine   * Pneumococcal conjugate vaccine = PCV13 (Prevnar 13), PCV15 (Vaxneuvance), PCV20 (Prevnar 20)  * Pneumococcal polysaccharide vaccine = PPSV23 (Pneumovax) Adults 19-65 yo with certain risk factors or if 65+ yo  If never received any pneumonia vaccine: recommend Prevnar 20 (PCV20)  Give PCV20 if previously received 1 dose of PCV13 or PPSV23   Hepatitis B Vaccine 3 dose series if at intermediate or high risk (ex: diabetes, end stage renal disease, liver disease)   Respiratory syncytial virus (RSV) Vaccine - COVERED BY MEDICARE PART D  * RSVPreF3 (Arexvy) CDC recommends that adults 60 years of age and older may receive a single dose of RSV vaccine using shared clinical decision-making (SCDM)   Tetanus (Td) Vaccine - COST NOT COVERED BY MEDICARE PART B Following completion of primary series, a booster dose should be given every 10 years to maintain immunity against tetanus. Td may also be given as tetanus wound prophylaxis.   Tdap Vaccine - COST NOT COVERED BY MEDICARE PART B Recommended at least once for all adults. For pregnant patients, recommended with each pregnancy.   Shingles Vaccine (Shingrix) - COST NOT COVERED BY MEDICARE PART B  2 shot series recommended in those 19 years and older who have or will have weakened immune systems or those 50 years and older     Health Maintenance Due:      Topic Date Due   • Lung Cancer Screening  Never done   • Breast Cancer Screening: Mammogram  09/08/2023   • Hepatitis C Screening  04/01/2030 (Originally 1955)   • Colorectal Cancer Screening  10/04/2032     Immunizations Due:      Topic Date Due   • Hepatitis A Vaccine (1 of 2 - Risk 2-dose series) Never done   • Influenza Vaccine (1) Never done   • COVID-19 Vaccine (1 - 2023-24 season)  Never done     Advance Directives   What are advance directives?  Advance directives are legal documents that state your wishes and plans for medical care. These plans are made ahead of time in case you lose your ability to make decisions for yourself. Advance directives can apply to any medical decision, such as the treatments you want, and if you want to donate organs.   What are the types of advance directives?  There are many types of advance directives, and each state has rules about how to use them. You may choose a combination of any of the following:  Living will:  This is a written record of the treatment you want. You can also choose which treatments you do not want, which to limit, and which to stop at a certain time. This includes surgery, medicine, IV fluid, and tube feedings.   Durable power of  for healthcare (DPAHC):  This is a written record that states who you want to make healthcare choices for you when you are unable to make them for yourself. This person, called a proxy, is usually a family member or a friend. You may choose more than 1 proxy.  Do not resuscitate (DNR) order:  A DNR order is used in case your heart stops beating or you stop breathing. It is a request not to have certain forms of treatment, such as CPR. A DNR order may be included in other types of advance directives.  Medical directive:  This covers the care that you want if you are in a coma, near death, or unable to make decisions for yourself. You can list the treatments you want for each condition. Treatment may include pain medicine, surgery, blood transfusions, dialysis, IV or tube feedings, and a ventilator (breathing machine).  Values history:  This document has questions about your views, beliefs, and how you feel and think about life. This information can help others choose the care that you would choose.  Why are advance directives important?  An advance directive helps you control your care. Although spoken  wishes may be used, it is better to have your wishes written down. Spoken wishes can be misunderstood, or not followed. Treatments may be given even if you do not want them. An advance directive may make it easier for your family to make difficult choices about your care.   Urinary Incontinence   Urinary incontinence (UI)  is when you lose control of your bladder. UI develops because your bladder cannot store or empty urine properly. The 3 most common types of UI are stress incontinence, urge incontinence, or both.  Medicines:   May be given to help strengthen your bladder control. Report any side effects of medication to your healthcare provider.  Do pelvic muscle exercises often:  Your pelvic muscles help you stop urinating. Squeeze these muscles tight for 5 seconds, then relax for 5 seconds. Gradually work up to squeezing for 10 seconds. Do 3 sets of 15 repetitions a day, or as directed. This will help strengthen your pelvic muscles and improve bladder control.  Train your bladder:  Go to the bathroom at set times, such as every 2 hours, even if you do not feel the urge to go. You can also try to hold your urine when you feel the urge to go. For example, hold your urine for 5 minutes when you feel the urge to go. As that becomes easier, hold your urine for 10 minutes.   Self-care:   Keep a UI record.  Write down how often you leak urine and how much you leak. Make a note of what you were doing when you leaked urine.  Drink liquids as directed. You may need to limit the amount of liquid you drink to help control your urine leakage. Do not drink any liquid right before you go to bed. Limit or do not have drinks that contain caffeine or alcohol.   Prevent constipation.  Eat a variety of high-fiber foods. Good examples are high-fiber cereals, beans, vegetables, and whole-grain breads. Walking is the best way to trigger your intestines to have a bowel movement.  Exercise regularly and maintain a healthy weight.   Weight loss and exercise will decrease pressure on your bladder and help you control your leakage.   Use a catheter as directed  to help empty your bladder. A catheter is a tiny, plastic tube that is put into your bladder to drain your urine.   Go to behavior therapy as directed.  Behavior therapy may be used to help you learn to control your urge to urinate.    Narcotic (Opioid) Safety    Use narcotics safely:  Take prescribed narcotics exactly as directed  Do not give narcotics to others or take narcotics that belong to someone else  Do not mix narcotics without medicines or alcohol  Do not drive or operate heavy machinery after you take the narcotic  Monitor for side effects and notify your healthcare provider if you experienced side effects such as nausea, sleepiness, itching, or trouble thinking clearly.    Manage constipation:    Constipation is the most common side effect of narcotic medicine. Constipation is when you have hard, dry bowel movements, or you go longer than usual between bowel movements. Tell your healthcare provider about all changes in your bowel movements while you are taking narcotics. He or she may recommend laxative medicine to help you have a bowel movement. He or she may also change the kind of narcotic you are taking, or change when you take it. The following are more ways you can prevent or relieve constipation:    Drink liquids as directed.  You may need to drink extra liquids to help soften and move your bowels. Ask how much liquid to drink each day and which liquids are best for you.  Eat high-fiber foods.  This may help decrease constipation by adding bulk to your bowel movements. High-fiber foods include fruits, vegetables, whole-grain breads and cereals, and beans. Your healthcare provider or dietitian can help you create a high-fiber meal plan. Your provider may also recommend a fiber supplement if you cannot get enough fiber from food.  Exercise regularly.  Regular physical  activity can help stimulate your intestines. Walking is a good exercise to prevent or relieve constipation. Ask which exercises are best for you.  Schedule a time each day to have a bowel movement.  This may help train your body to have regular bowel movements. Bend forward while you are on the toilet to help move the bowel movement out. Sit on the toilet for at least 10 minutes, even if you do not have a bowel movement.    Store narcotics safely:   Store narcotics where others cannot easily get them.  Keep them in a locked cabinet or secure area. Do not  keep them in a purse or other bag you carry with you. A person may be looking for something else and find the narcotics.  Make sure narcotics are stored out of the reach of children.  A child can easily overdose on narcotics. Narcotics may look like candy to a small child.    The best way to dispose of narcotics:      The laws vary by country and area. In the [place], the best way is to return the narcotics through a take-back program. This program is offered by the [place] Drug Enforcement Agency (AZUL). The following are options for using the program:  Take the narcotics to a AZUL collection site.  The site is often a law enforcement center. Call your local law enforcement center for scheduled take-back days in your area. You will be given information on where to go if the collection site is in a different location.  Take the narcotics to an approved pharmacy or hospital.  A pharmacy or hospital may be set up as a collection site. You will need to ask if it is a AZUL collection site if you were not directed there. A pharmacy or doctor's office may not be able to take back narcotics unless it is a AZUL site.  Use a mail-back system.  This means you are given containers to put the narcotics into. You will then mail them in the containers.  Use a take-back drop box.  This is a place to leave the narcotics at any time. People and animals will not be able to get into the  box. Your local law enforcement agency can tell you where to find a drop box in your area.    Other ways to manage pain:   Ask your healthcare provider about non-narcotic medicines to control pain.  Nonprescription medicines include NSAIDs (such as ibuprofen) and acetaminophen. Prescription medicines include muscle relaxers, antidepressants, and steroids.  Pain may be managed without any medicines.  Some ways to relieve pain include massage, aromatherapy, or meditation. Physical or occupational therapy may also help.    For more information:   Drug Enforcement Administration  [address] [place] [address]  Phone: 6- 024 - 332-2840  Web Address: https://www.deadiversion.[employer].gov/drug_disposal/    [place] Food and Drug Administration  [address] [place] [address]  Phone: 3- 300 - 513-9651  Web Address: http://www.fda.gov     © Copyright [employer] 2018 Information is for End User's use only and may not be sold, redistributed or otherwise used for commercial purposes. All illustrations and images included in CareNotes® are the copyrighted property of [employer].A[employer]., Inc. or [employer]

## 2024-03-04 NOTE — PROGRESS NOTES
Assessment and Plan:   Ora was seen today for medicare wellness visit.    Diagnoses and all orders for this visit:    Medicare annual wellness visit, subsequent    Encounter for screening mammogram for breast cancer  -     Mammo screening bilateral w 3d & cad; Future    Essential hypertension  -     Comprehensive metabolic panel; Future  -     T3, free; Future  -     T4, free; Future  -     TSH, 3rd generation; Future    Continuous opioid dependence (HCC)    Chronic bronchitis, unspecified chronic bronchitis type (HCC)  -     albuterol (PROVENTIL HFA,VENTOLIN HFA) 90 mcg/act inhaler; Inhale 2 puffs 3 (three) times a day as needed for wheezing or shortness of breath    Generalized anxiety disorder  -     diazepam (VALIUM) 5 mg tablet; Take 1 PO in am 1-2 at HS    Hyperlipidemia, unspecified hyperlipidemia type  -     Lipid Panel with Direct LDL reflex; Future  -     T3, free; Future  -     T4, free; Future  -     TSH, 3rd generation; Future    Encounter for osteoporosis screening in asymptomatic postmenopausal patient  -     DXA bone density spine hip and pelvis; Future    Dyspepsia  -     ondansetron (ZOFRAN) 4 mg tablet; Take 1 tablet (4 mg total) by mouth every 8 (eight) hours as needed for nausea or vomiting       Problem List Items Addressed This Visit          Cardiovascular and Mediastinum    Essential hypertension    Relevant Orders    Comprehensive metabolic panel    T3, free    T4, free    TSH, 3rd generation       Other    Dyspepsia    Relevant Medications    ondansetron (ZOFRAN) 4 mg tablet    Generalized anxiety disorder    Relevant Medications    diazepam (VALIUM) 5 mg tablet    Hyperlipidemia    Relevant Orders    Lipid Panel with Direct LDL reflex    T3, free    T4, free    TSH, 3rd generation    Continuous opioid dependence (HCC)     Other Visit Diagnoses       Medicare annual wellness visit, subsequent    -  Primary    Encounter for screening mammogram for breast cancer        Relevant Orders     Mammo screening bilateral w 3d & cad    Chronic bronchitis, unspecified chronic bronchitis type (HCC)        Relevant Medications    albuterol (PROVENTIL HFA,VENTOLIN HFA) 90 mcg/act inhaler    Encounter for osteoporosis screening in asymptomatic postmenopausal patient        Relevant Orders    DXA bone density spine hip and pelvis            Depression Screening and Follow-up Plan: Patient's depression screening was positive with a PHQ-9 score of 5. Patient with underlying depression and was advised to continue current medications as prescribed. Depression likely due to other medical condition. Will treat underlying condition. Patient advised to follow-up with PCP for further management.       Preventive health issues were discussed with patient, and age appropriate screening tests were ordered as noted in patient's After Visit Summary.  Personalized health advice and appropriate referrals for health education or preventive services given if needed, as noted in patient's After Visit Summary.     History of Present Illness:     Patient presents for a Medicare Wellness Visit  Chief Complaint   Patient presents with    Medicare Wellness Visit      60-year-old female is here for wellness.  Reviewed recent neurology note with regards to tremors.       Neurology note re tremor: Vesta Fox PA-C     At this time she will start taking the Remeron consistently as this can help with tremors. Will also have her start taking the Primidone consistently 2tabs int he AM, 2tabs in the afternoon and 5tabs before bed.    Will be starting OT at [facility] location for tremor       Patient Care Team:  Suzan Ferrer DO as PCP - General (Family Medicine)     Review of Systems:     Review of Systems   Constitutional:  Positive for fatigue. Negative for unexpected weight change.   Neurological:  Positive for tremors.   Psychiatric/Behavioral:  Positive for sleep disturbance.         Problem List:     Patient Active Problem List  "  Diagnosis    Esophageal dysphagia    Dyspepsia    Essential tremor    Moderate episode of recurrent major depressive disorder (HCC)    Essential hypertension    Generalized anxiety disorder    Asthma    Seasonal allergic rhinitis    Migraine without status migrainosus, not intractable    Functional diarrhea    History of Diverticulitis large intestine    GERD (gastroesophageal reflux disease)    Status post laparoscopic colectomy    Fibromyalgia    Hearing impaired    Wears dentures    Status post reversal of ileostomy    Intervertebral disc disorder with radiculopathy of lumbosacral region    Hyperlipidemia    MDD (major depressive disorder), recurrent severe, 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)    Continuous opioid dependence (HCC)      Past Medical and Surgical History:     Past Medical History:   Diagnosis Date    Anxiety     Asthma     allergy induced    Chronic pain disorder     back    Colon polyp     Fibromyalgia     GERD (gastroesophageal reflux disease)     Hearing aid worn     Hearing impaired     \"broken eardrum after eardrops used\"    Hyperlipidemia     controlled    Hypertension     controlled    Lumbar herniated disc     Shortness of breath     at times    Wears dentures     Wears glasses     Weight loss      Past Surgical History:   Procedure Laterality Date    BREAST BIOPSY Left     benign- at age 29    COLONOSCOPY      EAR SURGERY      IL LAPAROSCOPY COLECTOMY PARTIAL W/ANASTOMOSIS N/A 11/1/2021    Procedure: RESECTION COLON SIGMOID LAPAROSCOPIC WTIH COLORECTAL ANASTOMOSIS, DIVERTING LOOP ILEOSTOMY;  Surgeon: Mitch Stapleton MD;  Location: [facility];  Service: General    IL LAPAROSCOPY SURG ILEOSTOMY/JEJUNOSTOMY NON-TUBE N/A 4/4/2022    Procedure: ILEOSTOMY LOOP REVERSAL;  Surgeon: Mitch Stapleton MD;  Location: [facility];  Service: General    TUBAL LIGATION      ULNAR NERVE REPAIR Left       Family History:     Family History   Problem Relation Age of Onset    Heart failure Mother     Hyperlipidemia " Mother     Hypertension Mother     Coronary artery disease Brother     COPD Maternal Grandmother     No Known Problems Sister     No Known Problems Daughter     No Known Problems Sister     Aneurysm Maternal Aunt       Social History:     Social History     Socioeconomic History    Marital status:      Spouse name: None    Number of children: None    Years of education: None    Highest education level: None   Occupational History    None   Tobacco Use    Smoking status: Former     Current packs/day: 0.00     Average packs/day: 0.5 packs/day for 50.0 years (25.0 ttl pk-yrs)     Types: Cigarettes     Start date: 4/3/1972     Quit date: 4/3/2022     Years since quittin.9    Smokeless tobacco: Never    Tobacco comments:     wears nicotine patch on for 1/2 day   Vaping Use    Vaping status: Never Used   Substance and Sexual Activity    Alcohol use: Not Currently    Drug use: No    Sexual activity: None   Other Topics Concern    None   Social History Narrative    None     Social Determinants of Health     Financial Resource Strain: Low Risk  (3/3/2024)    Overall Financial Resource Strain (CARDIA)     Difficulty of Paying Living Expenses: Not very hard   Food Insecurity: No Food Insecurity (8/15/2023)    Received from [facility]    Hunger Vital Sign     Worried About Running Out of Food in the Last Year: Never true     Ran Out of Food in the Last Year: Never true   Transportation Needs: No Transportation Needs (3/3/2024)    PRAPARE - Transportation     Lack of Transportation (Medical): No     Lack of Transportation (Non-Medical): No   Physical Activity: Not on file   Stress: Not on file   Social Connections: Not on file   Intimate Partner Violence: Not At Risk (8/15/2023)    Received from [facility]    Humiliation, Afraid, Rape, and Kick questionnaire     Fear of Current or Ex-Partner: No     Emotionally Abused: No     Physically Abused: No     Sexually Abused: No   Housing  Stability: Low Risk  (8/15/2023)    Received from     Housing Stability Vital Sign     Unable to Pay for Housing in the Last Year: No     Number of Places Lived in the Last Year: 1     Unstable Housing in the Last Year: No      Medications and Allergies:     Current Outpatient Medications   Medication Sig Dispense Refill    albuterol (PROVENTIL HFA,VENTOLIN HFA) 90 mcg/act inhaler Inhale 2 puffs 3 (three) times a day as needed for wheezing or shortness of breath 18 g 5    atorvastatin (LIPITOR) 10 mg tablet TAKE 1 TABLET BY MOUTH EVERY DAY 90 tablet 1    Cholecalciferol (VITAMIN D3 PO) Take by mouth      diazepam (VALIUM) 5 mg tablet Take 1 PO in am 1-2 at HS 90 tablet 0    fluticasone (FLONASE) 50 mcg/act nasal spray Spray 2 sprays into each nostril every day 16 mL 1    HYDROcodone-acetaminophen (NORCO)  mg per tablet       lisinopril (ZESTRIL) 2.5 mg tablet TAKE 1 TABLET (2.5 MG TOTAL) BY MOUTH DAILY PT TAKES SHE HAS NOT BEEN TAKING 90 tablet 0    MAGNESIUM PO Take by mouth      mirtazapine (REMERON) 7.5 MG tablet TAKE 1 TABLET (7.5 MG TOTAL) BY MOUTH DAILY AT BEDTIME NO ALCOHOL 90 tablet 1    naloxone (NARCAN) 4 mg/0.1 mL nasal spray Administer 1 spray into a nostril. If no response after 2-3 minutes, give another dose in the other nostril using a new spray. 1 each 1    ondansetron (ZOFRAN) 4 mg tablet Take 1 tablet (4 mg total) by mouth every 8 (eight) hours as needed for nausea or vomiting 30 tablet 0    primidone (MYSOLINE) 50 mg tablet 3 tabs (150 mg at 8 AM ) and 4-5 tabs (200-250 mg at 6-6:30 P) 720 tablet 1    Probiotic Product (PROBIOTIC-10 PO) Take by mouth      zaleplon (SONATA) 5 MG capsule Take 1 capsule (5 mg total) by mouth daily at bedtime as needed for sleep 30 capsule 0    predniSONE 2.5 mg tablet Take 1 tablet (2.5 mg total) by mouth daily 30 tablet 0    tiZANidine (ZANAFLEX) 4 mg tablet Take 1 tablet (4 mg total) by mouth daily at bedtime 30 tablet 0     No  current facility-administered medications for this visit.     Allergies   Allergen Reactions    Levofloxacin Other (See Comments)     Weak legs, blurred vision    Carafate [Sucralfate] Rash    Other Palpitations     MRI dye    Tetracycline Rash      Immunizations:       There is no immunization history on file for this patient.   Health Maintenance:         Topic Date Due    Lung Cancer Screening  Never done    Breast Cancer Screening: Mammogram  09/08/2023    Hepatitis C Screening  04/01/2030 (Originally 1955)    Colorectal Cancer Screening  10/04/2032         Topic Date Due    Influenza Vaccine (1) Never done    COVID-19 Vaccine (1 - 2023-24 season) Never done      Medicare Screening Tests and Risk Assessments:     Ora is here for her Subsequent Wellness visit.     Health Risk Assessment:   Patient rates overall health as fair. Patient feels that their physical health rating is slightly worse. Patient is satisfied with their life. Eyesight was rated as slightly worse. Hearing was rated as slightly worse. Patient feels that their emotional and mental health rating is slightly worse. Patients states they are never, rarely angry. Patient states they are sometimes unusually tired/fatigued. Pain experienced in the last 7 days has been some. Patient's pain rating has been 5/10. Patient states that she has experienced weight loss or gain in last 6 months.     Depression Screening:   PHQ-9 Score: 5      Fall Risk Screening:   In the past year, patient has experienced: no history of falling in past year      Urinary Incontinence Screening:   Patient has not leaked urine accidently in the last six months.     Home Safety:  Patient does not have trouble with stairs inside or outside of their home. Patient has working smoke alarms and has working carbon monoxide detector. Home safety hazards include: none.     Nutrition:   Current diet is Regular.     Medications:   Patient is currently taking over-the-counter  supplements. OTC medications include: see medication list. Patient is able to manage medications.     Activities of Daily Living (ADLs)/Instrumental Activities of Daily Living (IADLs):   Walk and transfer into and out of bed and chair?: Yes  Dress and groom yourself?: Yes    Bathe or shower yourself?: Yes    Feed yourself? Yes  Do your laundry/housekeeping?: Yes  Manage your money, pay your bills and track your expenses?: Yes  Make your own meals?: Yes    Do your own shopping?: Yes    Previous Hospitalizations:   Any hospitalizations or ED visits within the last 12 months?: No      Advance Care Planning:   Living will: No    Durable POA for healthcare: No    Advanced directive: No    Advanced directive counseling given: Yes    End of Life Decisions reviewed with patient: Yes      Cognitive Screening:   Provider or family/friend/caregiver concerned regarding cognition?: No    PREVENTIVE SCREENINGS      Cardiovascular Screening:    General: History Lipid Disorder and Screening Current      Diabetes Screening:     General: Screening Current      Colorectal Cancer Screening:     General: Screening Current      Breast Cancer Screening:     General: Screening Current      Cervical Cancer Screening:    General: Screening Current      Osteoporosis Screening:    General: Screening Current      Abdominal Aortic Aneurysm (AAA) Screening:        General: Screening Not Indicated      Hepatitis C Screening:    General: Screening Current    Hep C Screening Accepted: No     Screening, Brief Intervention, and Referral to Treatment (SBIRT)    Screening  Typical number of drinks in a day: 0  Typical number of drinks in a week: 0  Interpretation: Low risk drinking behavior.    AUDIT-C Screenin) How often did you have a drink containing alcohol in the past year? never  2) How many drinks did you have on a typical day when you were drinking in the past year? 0  3) How often did you have 6 or more drinks on one occasion in the past  "year? never    AUDIT-C Score: 0  Interpretation: Score 0-2 (female): Negative screen for alcohol misuse    Single Item Drug Screening:  How often have you used an illegal drug (including marijuana) or a prescription medication for non-medical reasons in the past year? never    Single Item Drug Screen Score: 0  Interpretation: Negative screen for possible drug use disorder    Review of Current Opioid Use    Opioid Risk Tool (ORT) Interpretation: Complete Opioid Risk Tool (ORT)    No results found.     Physical Exam:     /70   Pulse 100   Temp (!) 97.4 °F (36.3 °C) (Temporal)   Resp 16   Ht 5' 5\" (1.651 m)   Wt 49.7 kg (109 lb 9.6 oz)   LMP  (LMP Unknown)   SpO2 99%   BMI 18.24 kg/m²     Physical Exam  Vitals and nursing note reviewed.   Constitutional:       General: She is not in acute distress.     Appearance: Normal appearance. She is well-developed.      Comments: Thin without cachexia, 68-year-old female appears stated age   HENT:      Head: Normocephalic and atraumatic.      Right Ear: Tympanic membrane normal.      Left Ear: Tympanic membrane normal.      Nose: Nose normal.      Mouth/Throat:      Mouth: Mucous membranes are moist.      Comments: Dentures  Eyes:      Conjunctiva/sclera: Conjunctivae normal.   Neck:      Vascular: No carotid bruit.   Cardiovascular:      Rate and Rhythm: Normal rate and regular rhythm.      Heart sounds: No murmur heard.  Pulmonary:      Effort: Pulmonary effort is normal. No respiratory distress.      Breath sounds: Normal breath sounds.   Abdominal:      Palpations: Abdomen is soft.      Tenderness: There is no abdominal tenderness.   Musculoskeletal:         General: No swelling.   Skin:     Findings: No rash.   Neurological:      Mental Status: She is alert and oriented to person, place, and time.      Comments: Tremor noted   Psychiatric:         Mood and Affect: Mood normal.          Suzan Ferrer, DO  "

## 2024-03-06 ENCOUNTER — EVALUATION (OUTPATIENT)
Dept: OCCUPATIONAL THERAPY | Facility: REHABILITATION | Age: 69
End: 2024-03-06
Payer: COMMERCIAL

## 2024-03-06 DIAGNOSIS — G25.0 ESSENTIAL TREMOR: Primary | ICD-10-CM

## 2024-03-06 PROCEDURE — 97166 OT EVAL MOD COMPLEX 45 MIN: CPT | Performed by: OCCUPATIONAL THERAPIST

## 2024-03-06 NOTE — PROGRESS NOTES
OCCUPATIONAL THERAPY INITIAL EVALUATION:    03/06/2024  Ora Rivera  1955  [id number]  Vesta Fox PA-C   Diagnosis ICD-10-CM Associated Orders   1. Essential tremor  G25.0 Ambulatory Referral to Occupational Therapy            Assessment  Assessment details: See skilled analysis for further details.         Skilled Analysis:  Pt is a 68 y.o. female referred to Occupational Therapy s/p Essential tremor [G25.0].   Pt participated in skilled OT evaluation and following formalized testing as well as clinical observation, Pt presents with the following areas of deficit:  essential tremors affecting normalized movements of BUE impacting functional performance with item retrieval, sustained grasp to objects (coffee mug), fluidity and legibility of handwriting, and basic ADL/IADL tasks.  Pt demo with generalized weakness throughout BUE and poor muscle endurance possibly due to increased pain levels reported on this date.  Pt also presents with head tremors, voice tremors, and mild postural and action tremors throughout evaluation.  Pt will benefit from skilled Occupational Therapy services 1x/week for 4-6 weeks with focus on Pt edu on compensatory tremor reductions strategies, Pt edu on adaptive equipment, thera ex, and thera act to increase overall BUE strength/muscle endurance and reduce frequency of tremors to improve overall functional performance with salient tasks.       Short Term Goals:  Pt will demo good carryover of clinic and home tremor reduction strategies for improved functional use of BUE improved hand to target precision, sustained grasp on items, utensil management, and item retrieval 4 weeks  Pt will increase R prehension patterns for improved dynamic tripod with pen/pencil to increase overall fluidity/legibility of handwriting for increased PSFS scale of 8/10 4 weeks       Pt with demo with decreased tremors in BUE with functional reach for normalized movement pattern of  BUE x 25 % to  "improve abilities maintaining grasp on items and for improved fluidity and legibility of handwriting 4 weeks   Pt will increase L UE strength to 4/5 and increased bilateral  strength x #3 each, through the use of strengthening exercises and home program for eventual return to meaningful occupations 4 weeks  --  Pt will demo G understanding of tremor management strategies such as proximal stabilization, resting hands on table, maintaining elbows close to body as demonstrated by incorporation in functional ADL/IADL tasks 4 weeks   -- Pt will demo competency with use of weighted utensils, weighted writing tools, use of wrist weights, built up handles, scoop dishes, and all AE as appropriate/needed for ADL/IADL fxn 4 weeks        Long Term Goals:  Pt will increase R prehension patterns for improved dynamic tripod with pen/pencil to increase overall fluidity/legibility of handwriting for increased PSFS scale of 9/10       Pt with demo with decreased tremors in BUE with functional reach for normalized movement pattern of  BUE x 50 % to improve abilities maintaining grasp on items and for improved fluidity and legibility of handwriting   Pt will increase L UE strength to 4+/5 and increased bilateral  strength x #5 each, through the use of strengthening exercises and home program for eventual return to meaningful occupations       Subjective Evaluation    Quality of life: fair          SUBJECTIVE: \"I am so frustrated with these tremors.\"    PATIENT GOAL: \"Reduce my tremors.\"    Patient-Specific Functional Scale   Task is scored 0 (unable to perform activity) to 10 (ability to perform activity independently)    Activity Date: 03/06/24 Date:   To hold coffee mug with less frequency of tremors 8/10    2.   Improve fluidity/legibility of handwriting 7/10        HISTORY OF PRESENT ILLNESS:     Pt is a 68 y.o. female who was referred to Occupational Therapy s/p  Essential tremor [G25.0]. As per chart review, Pt's tremors " "present since her mid 40s and have progressed to include bilateral hand, head and voice tremor. Primidone was started with some improvement of her tremors. Also with hx of syncopal episode on 21 (occurred when getting up to use the bathroom, had body pain and possible bite jose elias on the tongue following event). EEG was normal. MRI brain with mild, chronic microangiopathy. No family history of tremors. Pt has not been taking the Remeron consistently which could be contributing to her worsened tremors.  Pt is also no longer taking Neurontin which she had been on in the past, although she states she has not taken this in some time.  Pt does take Valium for anxiety which was recently reduced to 5mg from 10mg which she feels caused the tremors to be worse as well.      Pt with self-report of continuing to noticing ongoing hand tremors, voice tremors, and head tremors.  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     Pt lives in a bi-level home with .  Pt currently performing all ADLs/IADLs at Mod I status.  Pt is a retired  at the [employer]-- Pt retired in approximately .  Pt continues the  role without difficulties reported.      PMH:   Past Medical History:   Diagnosis Date    Anxiety     Asthma     allergy induced    Chronic pain disorder     back    Colon polyp     Fibromyalgia     GERD (gastroesophageal reflux disease)     Hearing aid worn     Hearing impaired     \"broken eardrum after eardrops used\"    Hyperlipidemia     controlled    Hypertension     controlled    Lumbar herniated disc     Shortness of breath     at times    Wears dentures     Wears glasses     Weight loss          Pain Levels:     Restin    With Activity:  7    Pt occasionally has cervical pain and headaches.     Objective     Functional " Assessment        Comments  See impairment section for further details.       Impairment Observations:        IE RUE IE LUE  Comments                 UPPER EXTREMITY FXN Impaired Impaired  Pt is R hand dominant                           /Pinch Strength           Dynamometer       - Gross Grasp 40 lbs 35 lbs  subnormal   Pinch Meter        - PINCER 7.5 lbs 5 lbs  subnormal    - TRIPOD 8.5 lbs 6.5 lbs  subnormal    - LATERAL 11.5 lbs  7 lbs  subnormal               AROM (seated)        Elevation WFL WFL     Shoulder FF WFL  WFL      Shoulder Ext WFL  WFL      Shoulder Abd WFL  WFL      Shoulder Add WFL  WFL      Horizontal Abd WFL  WFL      Horizontal Add WFL  WFL      Elbow Flex WFL  WFL      Elbow Ext WFL  WFL      Pronation WFL  WFL      Supination WFL  WFL      Wrist Flex WFL  WFL      Wrist Ext WFL  WFL      Digit Flex WFL  WFL      Digit Ex WFL  WFL      Composite Grasp WFL  WFL      Hook Grasp WFL  WFL      Opposition Bradykinetic pace  Bradykinetic pace      Dysdiadochokinesia Intact Intact                          MMT           Shoulder FF 3+/5 3+/5     Shoulder Ext 3+/5 3+/5     Shoulder Abd 3+/5 3+/5     Shoulder ADd 3+/5 3+/5     Elbow Flex 4/5 4/5     Elbow Ext 4/5 4/5     Wrist Flex 4/5 4/5     Wrist Ext 4/5 4/5     SENSATION       Proprioception Intact Intact     Hot/Cold Temp Intact Intact            COORDINATION       9 Hole Peg Test 33.75 seconds 39.29 seconds  abnormal   Fxnl Dexterity Test 32.89 seconds 47.09 seconds  abnormal   MODIFIED VINCENZO SCALE (TONE)       No increase in muscle tone (0) 0 0     Slight Increase in muscle tone with catch and release or min resist at end range (1)       Slight Increase in muscle tone with catch and release, followed by min resistance through remainder of range (1+)       Increased muscle tone through full range, able to be moved easily (2)       Considerable increase in tone, difficult to move (3)       Rigid in Flexion/Extension (4)                                      Upper Extremity Function (Fine motor, ADL):    Are you able to turn a key in a lock? Without any difficulty  Are you able to brush your teeth? Without any difficulty  Are you able to make a phone call using a touch tone or key-pad? With a little difficulty  Are you able to  coins from a table top? With a little difficulty  Are you are able to write with a pen or pencil? With some difficulty  Are you able to open and close a zipper? Without any difficulty  Are you able to wash and dry your body? With a little difficulty  Are you able to shampoo your hair? With a little difficulty  Are you able to open previously opened jars? Without any difficulty  Are you able to hold a plate full of food? With a little difficulty  Are you able to pull on trousers? Without any difficulty  Are you able to button your shirt? Without any difficulty  Are you able to trim your fingernails? With a little difficulty  Are you able to cut your toe nails? With a little difficulty  Are you able to bend down and  clothing from the floor? With a little difficulty  How much DIFFICULTY do you currently have using a spoon to eat a meal?A little difficulty  How much DIFFICULTY do you currently have putting on a pullover shirt? No difficulty  How much DIFFICULTY do you currently have taking off a pullover shirt? No difficulty  How much DIFFICULTY do you currently have removing wrappings from small objects? No difficulty  How much DIFFUCULTY do you currently have opening medications or vitamin containers (e.g. childproof containers, small bottles)? No difficulty        OTHER PLANNED THERAPY INTERVENTIONS:   Supine, seated, and in stance thera ex-- proximal/distal strengthening  FMC/prehension/in-hand manipulation  Timed Trials  Compensatory tremor reduction strategies  UBE  Seated functional reach: crossing midline  Pt edu on adaptive equipment    INTERVENTION COMMENTS:  Diagnosis: Essential tremor [G25.0]  Precautions:  Anxiety, Fibromyalgia, HTN, Depression  Insurance: Payor: [employer]  [place] [facility] [place] [facility] [place]P / Product Type: Medicare HMO /   1 of 10 visits, PN due 04/06/2024

## 2024-03-07 DIAGNOSIS — M79.7 FIBROMYALGIA: ICD-10-CM

## 2024-03-07 DIAGNOSIS — J06.9 UPPER RESPIRATORY TRACT INFECTION, UNSPECIFIED TYPE: ICD-10-CM

## 2024-03-07 RX ORDER — PREDNISONE 2.5 MG/1
2.5 TABLET ORAL DAILY
Qty: 30 TABLET | Refills: 0 | Status: SHIPPED | OUTPATIENT
Start: 2024-03-07

## 2024-03-07 RX ORDER — TIZANIDINE 4 MG/1
4 TABLET ORAL
Qty: 30 TABLET | Refills: 0 | Status: SHIPPED | OUTPATIENT
Start: 2024-03-07

## 2024-03-11 ENCOUNTER — TELEPHONE (OUTPATIENT)
Age: 69
End: 2024-03-11

## 2024-03-11 ENCOUNTER — APPOINTMENT (OUTPATIENT)
Dept: OCCUPATIONAL THERAPY | Facility: REHABILITATION | Age: 69
End: 2024-03-11
Payer: COMMERCIAL

## 2024-03-11 VITALS
TEMPERATURE: 97.4 F | WEIGHT: 109.6 LBS | HEIGHT: 65 IN | SYSTOLIC BLOOD PRESSURE: 110 MMHG | RESPIRATION RATE: 16 BRPM | BODY MASS INDEX: 18.26 KG/M2 | OXYGEN SATURATION: 99 % | DIASTOLIC BLOOD PRESSURE: 70 MMHG | HEART RATE: 100 BPM

## 2024-03-11 DIAGNOSIS — R05.2 SUBACUTE COUGH: Primary | ICD-10-CM

## 2024-03-11 RX ORDER — BENZONATATE 100 MG/1
100 CAPSULE ORAL 3 TIMES DAILY PRN
Qty: 20 CAPSULE | Refills: 0 | Status: SHIPPED | OUTPATIENT
Start: 2024-03-11 | End: 2024-03-20 | Stop reason: SDUPTHER

## 2024-03-11 NOTE — TELEPHONE ENCOUNTER
Patient has been coughing a lot of phlegm wants to know if you can call in something for the cough. Inhaler does help.

## 2024-03-13 DIAGNOSIS — J30.2 SEASONAL ALLERGIC RHINITIS, UNSPECIFIED TRIGGER: ICD-10-CM

## 2024-03-13 RX ORDER — FLUTICASONE PROPIONATE 50 MCG
SPRAY, SUSPENSION (ML) NASAL
Qty: 16 ML | Refills: 1 | Status: SHIPPED | OUTPATIENT
Start: 2024-03-13

## 2024-03-14 ENCOUNTER — APPOINTMENT (OUTPATIENT)
Dept: OCCUPATIONAL THERAPY | Facility: REHABILITATION | Age: 69
End: 2024-03-14
Payer: COMMERCIAL

## 2024-03-20 DIAGNOSIS — R05.2 SUBACUTE COUGH: ICD-10-CM

## 2024-03-20 DIAGNOSIS — R10.13 DYSPEPSIA: ICD-10-CM

## 2024-03-20 DIAGNOSIS — J42 CHRONIC BRONCHITIS, UNSPECIFIED CHRONIC BRONCHITIS TYPE (HCC): ICD-10-CM

## 2024-03-20 RX ORDER — ALBUTEROL SULFATE 90 UG/1
2 AEROSOL, METERED RESPIRATORY (INHALATION) 3 TIMES DAILY PRN
Qty: 18 G | Refills: 0 | OUTPATIENT
Start: 2024-03-20

## 2024-03-20 RX ORDER — BENZONATATE 100 MG/1
100 CAPSULE ORAL 3 TIMES DAILY PRN
Qty: 20 CAPSULE | Refills: 0 | Status: SHIPPED | OUTPATIENT
Start: 2024-03-20

## 2024-03-20 RX ORDER — ONDANSETRON 4 MG/1
4 TABLET, FILM COATED ORAL EVERY 8 HOURS PRN
Qty: 30 TABLET | Refills: 5 | Status: SHIPPED | OUTPATIENT
Start: 2024-03-20

## 2024-03-25 ENCOUNTER — NURSE TRIAGE (OUTPATIENT)
Age: 69
End: 2024-03-25

## 2024-03-25 NOTE — TELEPHONE ENCOUNTER
Pt called back. CTS sent message to Nurse to have a call back for the pt. Informed pt to stay near her phone to wait for the nurse to call back

## 2024-03-25 NOTE — TELEPHONE ENCOUNTER
Regarding: Body aches  ----- Message from Naty Guzman sent at 3/25/2024  9:22 AM EDT -----  Pt with horrible body aches. States they started last week and she is in a lot of pain. Cannot sleep, has a productive cough with yellow mucous. Thinks she has bronchitis and her body hurts from coughing and stomach pain. Slight fever of 101. Denying an appt with PCP due to not being able to move.

## 2024-03-26 DIAGNOSIS — G47.01 INSOMNIA DUE TO MEDICAL CONDITION: ICD-10-CM

## 2024-03-26 DIAGNOSIS — G25.0 ESSENTIAL TREMOR: ICD-10-CM

## 2024-03-26 RX ORDER — PRIMIDONE 50 MG/1
TABLET ORAL
Qty: 720 TABLET | Refills: 1 | Status: SHIPPED | OUTPATIENT
Start: 2024-03-26

## 2024-03-26 RX ORDER — ZALEPLON 5 MG/1
5 CAPSULE ORAL
Qty: 30 CAPSULE | Refills: 0 | Status: SHIPPED | OUTPATIENT
Start: 2024-03-26

## 2024-03-26 NOTE — TELEPHONE ENCOUNTER
Refill request is for primidone. Last OV 2/9/24, next OV 6/14/24. Routed to provider to review the refill request.

## 2024-03-28 NOTE — TELEPHONE ENCOUNTER
Received VM transcription from 3/25/24, 2:14 PM:    This is Ora Rivera. I need my primidone filled, 50 mg it is. Again, it's Ora Rivera [birth year] calling to get my prescription to [facility] for Primidone. Thank you.  ------------------    Already addressed. Script sent 3/26/24.

## 2024-04-02 ENCOUNTER — OFFICE VISIT (OUTPATIENT)
Dept: FAMILY MEDICINE CLINIC | Facility: CLINIC | Age: 69
End: 2024-04-02
Payer: COMMERCIAL

## 2024-04-02 VITALS
HEIGHT: 65 IN | DIASTOLIC BLOOD PRESSURE: 64 MMHG | WEIGHT: 105 LBS | TEMPERATURE: 96.5 F | SYSTOLIC BLOOD PRESSURE: 118 MMHG | BODY MASS INDEX: 17.49 KG/M2

## 2024-04-02 DIAGNOSIS — J42 CHRONIC BRONCHITIS, UNSPECIFIED CHRONIC BRONCHITIS TYPE (HCC): ICD-10-CM

## 2024-04-02 DIAGNOSIS — E78.5 HYPERLIPIDEMIA, UNSPECIFIED HYPERLIPIDEMIA TYPE: ICD-10-CM

## 2024-04-02 DIAGNOSIS — F17.200 TOBACCO USE DISORDER: ICD-10-CM

## 2024-04-02 DIAGNOSIS — R05.1 ACUTE COUGH: Primary | ICD-10-CM

## 2024-04-02 DIAGNOSIS — M54.50 ACUTE LEFT-SIDED LOW BACK PAIN, UNSPECIFIED WHETHER SCIATICA PRESENT: ICD-10-CM

## 2024-04-02 DIAGNOSIS — I10 ESSENTIAL HYPERTENSION: ICD-10-CM

## 2024-04-02 LAB
SARS-COV-2 AG UPPER RESP QL IA: NEGATIVE
SL AMB POCT RAPID FLU A: NEGATIVE
SL AMB POCT RAPID FLU B: NORMAL
VALID CONTROL: NORMAL

## 2024-04-02 PROCEDURE — 99214 OFFICE O/P EST MOD 30 MIN: CPT | Performed by: FAMILY MEDICINE

## 2024-04-02 PROCEDURE — 87811 SARS-COV-2 COVID19 W/OPTIC: CPT | Performed by: FAMILY MEDICINE

## 2024-04-02 PROCEDURE — 87804 INFLUENZA ASSAY W/OPTIC: CPT | Performed by: FAMILY MEDICINE

## 2024-04-02 PROCEDURE — G2211 COMPLEX E/M VISIT ADD ON: HCPCS | Performed by: FAMILY MEDICINE

## 2024-04-02 RX ORDER — PREDNISONE 10 MG/1
TABLET ORAL DAILY
Qty: 15 TABLET | Refills: 0 | Status: SHIPPED | OUTPATIENT
Start: 2024-04-02 | End: 2024-04-06

## 2024-04-02 NOTE — PROGRESS NOTES
Name: Ora Rivera      : 1955      MRN: [id number]  Encounter Provider: Kenzie Fraga MD  Encounter Date: 2024   Encounter department: [facility] PRIMARY CARE    Assessment & Plan     Rapid COVID and flu negative.  We will start patient on a prednisone taper.  Of note she has been on prednisone quite a few times and she understands the risks involved.  I did recommend the patient be seen by pulmonology, states that she prefers to follow-up with her PCP.  Blood pressure stable today 118/64, continue lisinopril as prescribed.  Continue statin for hyperlipidemia.  Recommend stretching and Tylenol for her back pain.  She should work on quitting smoking.  RTC as needed    1. Acute cough  -     POCT Rapid Covid Ag  -     POCT rapid flu A and B  -     predniSONE 10 mg tablet; Take 5 tablets (50 mg total) by mouth daily for 1 day, THEN 4 tablets (40 mg total) daily for 1 day, THEN 3 tablets (30 mg total) daily for 1 day, THEN 2 tablets (20 mg total) daily for 1 day, THEN 1 tablet (10 mg total) daily for 1 day.    2. Essential hypertension    3. Hyperlipidemia, unspecified hyperlipidemia type    4. Chronic bronchitis, unspecified chronic bronchitis type (HCC)    5. Acute left-sided low back pain, unspecified whether sciatica present    6. Tobacco use disorder           Subjective      She presents today to discuss cold symptoms.  Seems like she has these issues regularly.  As per chart review it states that she has a history of chronic bronchitis.  Has not seen a pulmonologist.  Continues to smoke cigarettes daily.  Compliant with her medications otherwise.    Cough  The current episode started 1 to 4 weeks ago. The problem has been gradually worsening. The problem occurs every few hours. The cough is Productive of purulent sputum. Associated symptoms include chills, ear pain, a fever, headaches, heartburn, myalgias, nasal congestion, postnasal drip, shortness of breath, sweats, weight loss  and wheezing. Pertinent negatives include no chest pain, ear congestion, eye redness, hemoptysis, rash, rhinorrhea or sore throat. The symptoms are aggravated by lying down. Risk factors for lung disease include smoking/tobacco exposure.     Review of Systems   Constitutional:  Positive for chills, fever and weight loss. Negative for activity change, appetite change and fatigue.   HENT:  Positive for ear pain, hearing loss and postnasal drip. Negative for congestion, rhinorrhea, sneezing and sore throat.    Eyes:  Negative for pain, discharge, redness and itching.   Respiratory:  Positive for cough, shortness of breath and wheezing. Negative for hemoptysis and chest tightness.    Cardiovascular:  Negative for chest pain and palpitations.   Gastrointestinal:  Positive for heartburn. Negative for abdominal distention, abdominal pain, constipation, diarrhea, nausea and vomiting.   Musculoskeletal:  Positive for myalgias. Negative for arthralgias, back pain and joint swelling.   Skin:  Negative for rash.   Neurological:  Positive for headaches. Negative for dizziness, weakness and numbness.   Hematological:  Negative for adenopathy.   Psychiatric/Behavioral:  Negative for confusion.        Current Outpatient Medications on File Prior to Visit   Medication Sig   • albuterol (PROVENTIL HFA,VENTOLIN HFA) 90 mcg/act inhaler Inhale 2 puffs 3 (three) times a day as needed for wheezing or shortness of breath   • atorvastatin (LIPITOR) 10 mg tablet TAKE 1 TABLET BY MOUTH EVERY DAY   • benzonatate (TESSALON PERLES) 100 mg capsule Take 1 capsule (100 mg total) by mouth 3 (three) times a day as needed for cough   • Cholecalciferol (VITAMIN D3 PO) Take by mouth   • diazepam (VALIUM) 5 mg tablet Take 1 PO in am 1-2 at HS   • fluticasone (FLONASE) 50 mcg/act nasal spray SPRAY 2 SPRAYS INTO EACH NOSTRIL EVERY DAY   • HYDROcodone-acetaminophen (NORCO)  mg per tablet    • lisinopril (ZESTRIL) 2.5 mg tablet TAKE 1 TABLET (2.5 MG  "TOTAL) BY MOUTH DAILY PT TAKES SHE HAS NOT BEEN TAKING   • MAGNESIUM PO Take by mouth   • mirtazapine (REMERON) 7.5 MG tablet TAKE 1 TABLET (7.5 MG TOTAL) BY MOUTH DAILY AT BEDTIME NO ALCOHOL   • naloxone (NARCAN) 4 mg/0.1 mL nasal spray Administer 1 spray into a nostril. If no response after 2-3 minutes, give another dose in the other nostril using a new spray.   • ondansetron (ZOFRAN) 4 mg tablet Take 1 tablet (4 mg total) by mouth every 8 (eight) hours as needed for nausea or vomiting   • predniSONE 2.5 mg tablet Take 1 tablet (2.5 mg total) by mouth daily   • primidone (MYSOLINE) 50 mg tablet 3 tabs (150 mg at 8 AM ) and 4-5 tabs (200-250 mg at 6-6:30 P)   • Probiotic Product (PROBIOTIC-10 PO) Take by mouth   • tiZANidine (ZANAFLEX) 4 mg tablet Take 1 tablet (4 mg total) by mouth daily at bedtime   • zaleplon (SONATA) 5 MG capsule Take 1 capsule (5 mg total) by mouth daily at bedtime as needed for sleep       Objective     /64 (BP Location: Left arm, Patient Position: Sitting, Cuff Size: Adult)   Temp (!) 96.5 °F (35.8 °C) (Temporal)   Ht 5' 5\" (1.651 m)   Wt 47.6 kg (105 lb)   LMP  (LMP Unknown)   BMI 17.47 kg/m²     Physical Exam  Vitals reviewed.   Constitutional:       General: She is not in acute distress.     Appearance: Normal appearance. She is well-developed. She is not toxic-appearing or diaphoretic.   HENT:      Head: Normocephalic and atraumatic.      Right Ear: Tympanic membrane, ear canal and external ear normal. There is no impacted cerumen.      Left Ear: Tympanic membrane, ear canal and external ear normal. There is no impacted cerumen.      Nose: Nose normal. No congestion.      Mouth/Throat:      Mouth: Mucous membranes are moist.      Pharynx: Oropharynx is clear. No oropharyngeal exudate or posterior oropharyngeal erythema.   Eyes:      General: No scleral icterus.        Right eye: No discharge.         Left eye: No discharge.      Conjunctiva/sclera: Conjunctivae normal. "   Cardiovascular:      Rate and Rhythm: Normal rate and regular rhythm.      Pulses: Normal pulses.      Heart sounds: Normal heart sounds. No murmur heard.  Pulmonary:      Effort: Pulmonary effort is normal. No respiratory distress.      Breath sounds: Normal breath sounds. No wheezing.   Abdominal:      General: There is no distension.      Palpations: Abdomen is soft. There is no mass.      Tenderness: There is no abdominal tenderness.      Hernia: No hernia is present.   Musculoskeletal:         General: No tenderness. Normal range of motion.      Cervical back: Normal range of motion.   Lymphadenopathy:      Cervical: No cervical adenopathy.   Skin:     General: Skin is warm and dry.      Capillary Refill: Capillary refill takes less than 2 seconds.      Coloration: Skin is not pale.      Findings: No erythema.   Neurological:      General: No focal deficit present.      Mental Status: She is alert.   Psychiatric:         Mood and Affect: Mood normal.         Behavior: Behavior normal.       Kenzie Fraga MD

## 2024-04-03 DIAGNOSIS — R10.13 DYSPEPSIA: ICD-10-CM

## 2024-04-03 DIAGNOSIS — F41.1 GENERALIZED ANXIETY DISORDER: ICD-10-CM

## 2024-04-03 RX ORDER — ONDANSETRON 4 MG/1
4 TABLET, FILM COATED ORAL EVERY 8 HOURS PRN
Qty: 30 TABLET | Refills: 0 | Status: CANCELLED | OUTPATIENT
Start: 2024-04-03

## 2024-04-04 ENCOUNTER — TELEPHONE (OUTPATIENT)
Age: 69
End: 2024-04-04

## 2024-04-04 RX ORDER — DIAZEPAM 5 MG/1
TABLET ORAL
Qty: 90 TABLET | Refills: 0 | Status: SHIPPED | OUTPATIENT
Start: 2024-04-04

## 2024-04-04 NOTE — TELEPHONE ENCOUNTER
Patient called to check on her refills for zofran and diazepam, notified her she has refills on zofran and diazepam was sent in

## 2024-04-09 DIAGNOSIS — M79.7 FIBROMYALGIA: ICD-10-CM

## 2024-04-09 DIAGNOSIS — J06.9 UPPER RESPIRATORY TRACT INFECTION, UNSPECIFIED TYPE: ICD-10-CM

## 2024-04-09 RX ORDER — PREDNISONE 2.5 MG/1
2.5 TABLET ORAL DAILY
Qty: 30 TABLET | Refills: 0 | Status: SHIPPED | OUTPATIENT
Start: 2024-04-09

## 2024-04-23 DIAGNOSIS — M79.7 FIBROMYALGIA: ICD-10-CM

## 2024-04-23 DIAGNOSIS — J06.9 UPPER RESPIRATORY TRACT INFECTION, UNSPECIFIED TYPE: ICD-10-CM

## 2024-04-23 RX ORDER — PREDNISONE 2.5 MG/1
2.5 TABLET ORAL DAILY
Qty: 30 TABLET | Refills: 0 | Status: SHIPPED | OUTPATIENT
Start: 2024-04-23

## 2024-04-23 NOTE — TELEPHONE ENCOUNTER
Pt would like a refill of prednisone, inhaler and antibiotic , she said she does not feel well and feels like a sinus is still continuing and she is starting to have diarrhea . [facility] on file [facility], please advise. Thank you.

## 2024-04-24 DIAGNOSIS — R05.1 ACUTE COUGH: ICD-10-CM

## 2024-04-24 NOTE — TELEPHONE ENCOUNTER
Requested medication(s) are due for refill today: No  Patient has already received a courtesy refill: Yes  Other reason request has been forwarded to provider:

## 2024-04-27 RX ORDER — PREDNISONE 10 MG/1
TABLET ORAL
Qty: 15 TABLET | Refills: 0 | Status: SHIPPED | OUTPATIENT
Start: 2024-04-27 | End: 2024-05-02

## 2024-05-01 ENCOUNTER — TELEPHONE (OUTPATIENT)
Age: 69
End: 2024-05-01

## 2024-05-01 NOTE — TELEPHONE ENCOUNTER
Pt called and feels worse with her cough and sounds a little wheezing since completing the Prednisone.  Prednisone did seem to help.  She is producing phlegm with the cough - a little yellow in color.  Pt seen 4/2 and started on Prednisone 4/24.    Attempted to schedule an appt.  No availability.      Please advise pt - [phone]

## 2024-05-02 ENCOUNTER — OFFICE VISIT (OUTPATIENT)
Dept: FAMILY MEDICINE CLINIC | Facility: CLINIC | Age: 69
End: 2024-05-02
Payer: COMMERCIAL

## 2024-05-02 VITALS
HEIGHT: 65 IN | TEMPERATURE: 97.5 F | WEIGHT: 109.2 LBS | SYSTOLIC BLOOD PRESSURE: 118 MMHG | OXYGEN SATURATION: 98 % | HEART RATE: 86 BPM | BODY MASS INDEX: 18.19 KG/M2 | DIASTOLIC BLOOD PRESSURE: 70 MMHG

## 2024-05-02 DIAGNOSIS — J45.31 MILD PERSISTENT ASTHMA WITH ACUTE EXACERBATION: ICD-10-CM

## 2024-05-02 DIAGNOSIS — J06.9 ACUTE URI: Primary | ICD-10-CM

## 2024-05-02 PROCEDURE — 99214 OFFICE O/P EST MOD 30 MIN: CPT | Performed by: NURSE PRACTITIONER

## 2024-05-02 PROCEDURE — 1159F MED LIST DOCD IN RCRD: CPT | Performed by: NURSE PRACTITIONER

## 2024-05-02 PROCEDURE — 3074F SYST BP LT 130 MM HG: CPT | Performed by: NURSE PRACTITIONER

## 2024-05-02 PROCEDURE — 3078F DIAST BP <80 MM HG: CPT | Performed by: NURSE PRACTITIONER

## 2024-05-02 PROCEDURE — 1160F RVW MEDS BY RX/DR IN RCRD: CPT | Performed by: NURSE PRACTITIONER

## 2024-05-02 RX ORDER — PREDNISONE 10 MG/1
TABLET ORAL
Qty: 21 TABLET | Refills: 0 | Status: SHIPPED | OUTPATIENT
Start: 2024-05-02 | End: 2024-05-09 | Stop reason: SDUPTHER

## 2024-05-02 RX ORDER — OXYCODONE HYDROCHLORIDE AND ACETAMINOPHEN 5; 325 MG/1; MG/1
1 TABLET ORAL EVERY 4 HOURS PRN
COMMUNITY

## 2024-05-02 RX ORDER — AZITHROMYCIN 250 MG/1
TABLET, FILM COATED ORAL DAILY
Qty: 6 TABLET | Refills: 0 | Status: SHIPPED | OUTPATIENT
Start: 2024-05-02 | End: 2024-05-07

## 2024-05-02 NOTE — PATIENT INSTRUCTIONS
Start azithromycin, this is the antibiotic, This is 2 pills on day one and then 1 pill for the following 4 days.     Start prednisone, this is the steroid. It will be 6 pills today and decrease by 1 pill each day for the following 6 days. So it will be 6-5-4-3-2-1. Take this with food as it may upset your stomach. It's best to take it earlier in the day otherwise it may keep you up at night. Do not take this with NSAIDs such as advil/ibuprofen/advil/aleve/motrin.     Stay well hydrated.     Start nasonex routinely.     Please call the office if you are experiencing any worsening of symptoms or no symptom improvement.

## 2024-05-02 NOTE — PROGRESS NOTES
Name: Ora Rivera      : 1955      MRN: [id number]  Encounter Provider: HIRAL Loaiza  Encounter Date: 2024   Encounter department: [facility] PRIMARY CARE    Assessment & Plan     1. Acute URI  -     azithromycin (Zithromax) 250 mg tablet; Take 2 tablets (500 mg total) by mouth daily for 1 day, THEN 1 tablet (250 mg total) daily for 4 days.  -     predniSONE 10 mg tablet; Day 1: 6 tabs  Day 2: 5 tabs Day 3: 4 tabs  Day 4: 3 tabs  Day 5: 2 tabs  Day 6: 1 tab    2. Mild persistent asthma with acute exacerbation     Start azithromycin, this is the antibiotic, This is 2 pills on day one and then 1 pill for the following 4 days.   Start prednisone, this is the steroid. It will be 6 pills today and decrease by 1 pill each day for the following 6 days. So it will be 6-5-4-3-2-1. Take this with food as it may upset your stomach. It's best to take it earlier in the day otherwise it may keep you up at night. Do not take this with NSAIDs such as advil/ibuprofen/advil/aleve/motrin.   Stay well hydrated.   Start nasonex routinely.   Advised not to use zofran.   Continue with albuterol PRN  Stop daily prednisone while on taper and resume when finished.   Please call the office if you are experiencing any worsening of symptoms or no symptom improvement.       Subjective      Patient presents with cough and wheezing.   She was initially seen  for symptoms.  COVID and flu were negative.  She was started on prednisone taper from 50 mg to 10 mg over 5 days.  Patient then called the office yesterday stating that she feels worse with her cough and sounds like she is wheezing again.  She completed the prednisone it helped initially but symptoms came back.  She is producing phlegm with the cough.  Phlegm is yellow in color.    Cough  Associated symptoms include ear pain, a fever (last week), rhinorrhea, a sore throat and wheezing. Pertinent negatives include no chest pain, chills,  headaches, rash or shortness of breath.     Review of Systems   Constitutional:  Positive for fatigue and fever (last week). Negative for chills.   HENT:  Positive for congestion, ear pain, rhinorrhea and sore throat.    Eyes:  Negative for discharge.   Respiratory:  Positive for cough and wheezing. Negative for shortness of breath.    Cardiovascular:  Negative for chest pain.   Gastrointestinal:  Negative for constipation and diarrhea.   Genitourinary:  Negative for difficulty urinating.   Musculoskeletal:  Negative for joint swelling.   Skin:  Negative for rash.   Neurological:  Negative for headaches.   Hematological:  Negative for adenopathy.   Psychiatric/Behavioral:  The patient is not nervous/anxious.        Current Outpatient Medications on File Prior to Visit   Medication Sig   • albuterol (PROVENTIL HFA,VENTOLIN HFA) 90 mcg/act inhaler Inhale 2 puffs 3 (three) times a day as needed for wheezing or shortness of breath   • atorvastatin (LIPITOR) 10 mg tablet TAKE 1 TABLET BY MOUTH EVERY DAY   • benzonatate (TESSALON PERLES) 100 mg capsule Take 1 capsule (100 mg total) by mouth 3 (three) times a day as needed for cough   • Cholecalciferol (VITAMIN D3 PO) Take by mouth   • diazepam (VALIUM) 5 mg tablet Take 1 PO in am 1-2 at HS   • fluticasone (FLONASE) 50 mcg/act nasal spray SPRAY 2 SPRAYS INTO EACH NOSTRIL EVERY DAY   • lisinopril (ZESTRIL) 2.5 mg tablet TAKE 1 TABLET (2.5 MG TOTAL) BY MOUTH DAILY PT TAKES SHE HAS NOT BEEN TAKING   • MAGNESIUM PO Take by mouth   • mirtazapine (REMERON) 7.5 MG tablet TAKE 1 TABLET (7.5 MG TOTAL) BY MOUTH DAILY AT BEDTIME NO ALCOHOL   • ondansetron (ZOFRAN) 4 mg tablet Take 1 tablet (4 mg total) by mouth every 8 (eight) hours as needed for nausea or vomiting   • oxyCODONE-acetaminophen (PERCOCET) 5-325 mg per tablet Take 1 tablet by mouth every 4 (four) hours as needed for moderate pain   • predniSONE 2.5 mg tablet Take 1 tablet (2.5 mg total) by mouth daily   • primidone  "(MYSOLINE) 50 mg tablet 3 tabs (150 mg at 8 AM ) and 4-5 tabs (200-250 mg at 6-6:30 P)   • Probiotic Product (PROBIOTIC-10 PO) Take by mouth   • zaleplon (SONATA) 5 MG capsule Take 1 capsule (5 mg total) by mouth daily at bedtime as needed for sleep   • HYDROcodone-acetaminophen (NORCO)  mg per tablet    • naloxone (NARCAN) 4 mg/0.1 mL nasal spray Administer 1 spray into a nostril. If no response after 2-3 minutes, give another dose in the other nostril using a new spray.   • [DISCONTINUED] predniSONE 10 mg tablet TAKE 5 TABLETS (50 MG TOTAL) BY MOUTH DAILY FOR 1 DAY, THEN 4 TABLETS (40 MG TOTAL) DAILY FOR 1 DAY, THEN 3 TABLETS (30 MG TOTAL) DAILY FOR 1 DAY, THEN 2 TABLETS (20 MG TOTAL) DAILY FOR 1 DAY, THEN 1 TABLET (10 MG TOTAL) DAILY FOR 1 DAY.   • [DISCONTINUED] tiZANidine (ZANAFLEX) 4 mg tablet Take 1 tablet (4 mg total) by mouth daily at bedtime       Objective     /70   Pulse 86   Temp 97.5 °F (36.4 °C) (Temporal)   Ht 5' 5\" (1.651 m)   Wt 49.5 kg (109 lb 3.2 oz)   LMP  (LMP Unknown)   SpO2 98%   BMI 18.17 kg/m²     Physical Exam  Vitals and nursing note reviewed.   Constitutional:       General: She is not in acute distress.     Appearance: Normal appearance. She is well-developed. She is not diaphoretic.   HENT:      Head: Normocephalic and atraumatic.      Right Ear: External ear normal.      Left Ear: External ear normal.   Eyes:      General: Lids are normal.         Right eye: No discharge.         Left eye: No discharge.      Conjunctiva/sclera: Conjunctivae normal.   Cardiovascular:      Rate and Rhythm: Normal rate and regular rhythm.      Heart sounds: No murmur heard.  Pulmonary:      Effort: Pulmonary effort is normal. No respiratory distress.      Breath sounds: Wheezing (faint) and rhonchi present.      Comments: Strong wet cough  Musculoskeletal:         General: No deformity.   Skin:     General: Skin is warm and dry.   Neurological:      General: No focal deficit present. "      Mental Status: She is alert and oriented to person, place, and time.   Psychiatric:         Speech: Speech normal.         Behavior: Behavior normal.         Thought Content: Thought content normal.         Judgment: Judgment normal.       HIRAL Loaiza

## 2024-05-07 DIAGNOSIS — F41.1 GENERALIZED ANXIETY DISORDER: ICD-10-CM

## 2024-05-09 DIAGNOSIS — F11.90 CHRONIC, CONTINUOUS USE OF OPIOIDS: ICD-10-CM

## 2024-05-09 DIAGNOSIS — J06.9 ACUTE URI: ICD-10-CM

## 2024-05-09 DIAGNOSIS — I10 ESSENTIAL HYPERTENSION: ICD-10-CM

## 2024-05-09 RX ORDER — NALOXONE HYDROCHLORIDE 4 MG/.1ML
SPRAY NASAL
Qty: 1 EACH | Refills: 1 | Status: SHIPPED | OUTPATIENT
Start: 2024-05-09 | End: 2025-05-09

## 2024-05-09 RX ORDER — PREDNISONE 10 MG/1
TABLET ORAL
Qty: 21 TABLET | Refills: 0 | Status: SHIPPED | OUTPATIENT
Start: 2024-05-09 | End: 2024-05-17 | Stop reason: SDUPTHER

## 2024-05-09 RX ORDER — LISINOPRIL 2.5 MG/1
2.5 TABLET ORAL DAILY
Qty: 90 TABLET | Refills: 1 | Status: SHIPPED | OUTPATIENT
Start: 2024-05-09

## 2024-05-09 RX ORDER — DIAZEPAM 5 MG/1
TABLET ORAL
Qty: 90 TABLET | Refills: 0 | Status: SHIPPED | OUTPATIENT
Start: 2024-05-09

## 2024-05-09 NOTE — TELEPHONE ENCOUNTER
Prednisone really releives her joint pain and swelling. This is why she is asking for a refill, incase Dr. Leonard needs to know why she is requesting it again.

## 2024-05-17 DIAGNOSIS — M79.7 FIBROMYALGIA: ICD-10-CM

## 2024-05-17 DIAGNOSIS — J06.9 ACUTE URI: ICD-10-CM

## 2024-05-17 RX ORDER — PREDNISONE 10 MG/1
TABLET ORAL
Qty: 21 TABLET | Refills: 0 | Status: SHIPPED | OUTPATIENT
Start: 2024-05-17

## 2024-05-20 RX ORDER — LIDOCAINE 50 MG/G
OINTMENT TOPICAL AS NEEDED
Qty: 50 G | Refills: 1 | Status: SHIPPED | OUTPATIENT
Start: 2024-05-20

## 2024-05-20 RX ORDER — PREDNISONE 10 MG/1
TABLET ORAL
Qty: 21 TABLET | Refills: 0 | OUTPATIENT
Start: 2024-05-20

## 2024-05-21 DIAGNOSIS — M79.7 FIBROMYALGIA: ICD-10-CM

## 2024-05-21 DIAGNOSIS — J06.9 UPPER RESPIRATORY TRACT INFECTION, UNSPECIFIED TYPE: ICD-10-CM

## 2024-05-21 RX ORDER — PREDNISONE 2.5 MG/1
2.5 TABLET ORAL DAILY
Qty: 30 TABLET | Refills: 0 | Status: SHIPPED | OUTPATIENT
Start: 2024-05-21

## 2024-05-22 ENCOUNTER — TELEPHONE (OUTPATIENT)
Age: 69
End: 2024-05-22

## 2024-05-22 NOTE — TELEPHONE ENCOUNTER
PA for Lidocaine Denied    Reason:(Screenshot if applicable)        Message sent to office clinical pool Yes    Denial letter scanned into Media Yes    Appeal started No ( Provider will need to decide if appeal is warranted and send clinical documentation to PA team for initiation.)    **Please follow up with your patient regarding denial and next steps**

## 2024-05-22 NOTE — TELEPHONE ENCOUNTER
PA for Lidocaine ointment    Submitted via    []CMM-KEY   [x][employer]-Case ID # : [id number]  []Faxed to plan   []Other website   []Phone call Case ID #     Office notes sent, clinical questions answered. Awaiting determination    Turnaround time for your insurance to make a decision on your Prior Authorization can take 7-21 business days.

## 2024-05-31 DIAGNOSIS — M79.7 FIBROMYALGIA: ICD-10-CM

## 2024-05-31 DIAGNOSIS — J42 CHRONIC BRONCHITIS, UNSPECIFIED CHRONIC BRONCHITIS TYPE (HCC): ICD-10-CM

## 2024-05-31 DIAGNOSIS — J06.9 UPPER RESPIRATORY TRACT INFECTION, UNSPECIFIED TYPE: ICD-10-CM

## 2024-05-31 RX ORDER — ALBUTEROL SULFATE 90 UG/1
2 INHALANT RESPIRATORY (INHALATION) 3 TIMES DAILY PRN
Qty: 18 G | Refills: 5 | Status: SHIPPED | OUTPATIENT
Start: 2024-05-31 | End: 2024-08-12 | Stop reason: SDUPTHER

## 2024-05-31 RX ORDER — PREDNISONE 2.5 MG/1
2.5 TABLET ORAL DAILY
Qty: 30 TABLET | Refills: 0 | OUTPATIENT
Start: 2024-05-31

## 2024-06-03 DIAGNOSIS — J30.2 SEASONAL ALLERGIC RHINITIS, UNSPECIFIED TRIGGER: ICD-10-CM

## 2024-06-04 RX ORDER — FLUTICASONE PROPIONATE 50 MCG
SPRAY, SUSPENSION (ML) NASAL
Qty: 16 ML | Refills: 0 | Status: SHIPPED | OUTPATIENT
Start: 2024-06-04

## 2024-06-05 ENCOUNTER — TELEPHONE (OUTPATIENT)
Age: 69
End: 2024-06-05

## 2024-06-05 DIAGNOSIS — G43.909 MIGRAINE WITHOUT STATUS MIGRAINOSUS, NOT INTRACTABLE, UNSPECIFIED MIGRAINE TYPE: Primary | ICD-10-CM

## 2024-06-05 RX ORDER — KETOROLAC TROMETHAMINE 10 MG/1
10 TABLET, FILM COATED ORAL EVERY 6 HOURS PRN
Qty: 20 TABLET | Refills: 0 | Status: SHIPPED | OUTPATIENT
Start: 2024-06-05

## 2024-06-05 NOTE — TELEPHONE ENCOUNTER
Pt had her epidural shot yesterday. It always gives her a headache but today its turning into a migraine. She is asking if Dr. Leonard can rx her the pill she gave her last time to [facility]. She doesn't remember the name of the pill. I looked at all the meds and didn't see any dx code that matched migraines, please advise.

## 2024-06-06 DIAGNOSIS — M79.7 FIBROMYALGIA: ICD-10-CM

## 2024-06-06 DIAGNOSIS — J06.9 UPPER RESPIRATORY TRACT INFECTION, UNSPECIFIED TYPE: ICD-10-CM

## 2024-06-07 ENCOUNTER — TELEPHONE (OUTPATIENT)
Age: 69
End: 2024-06-07

## 2024-06-07 RX ORDER — PREDNISONE 2.5 MG/1
2.5 TABLET ORAL DAILY
Qty: 30 TABLET | Refills: 0 | Status: SHIPPED | OUTPATIENT
Start: 2024-06-07

## 2024-06-07 NOTE — TELEPHONE ENCOUNTER
PA for ketorolac (TORADOL) 10 mg tablet     Submitted via    []CMM-KEY   [x][employer]-Case ID # [id number]   []Faxed to plan   []Other website   []Phone call Case ID #     Office notes sent, clinical questions answered. Awaiting determination    Turnaround time for your insurance to make a decision on your Prior Authorization can take 7-21 business days.

## 2024-06-10 NOTE — TELEPHONE ENCOUNTER
PA for KETOROLAC Approved     Date(s) approved   March 8, 2024 to June 7, 2025   Case #PA Case: [id number]   PA Case: [id number]   Patient advised by          [] BeDohart Message  [x] Phone call   []LMOM  []L/M to call office as no active Communication consent on file  []Unable to leave detailed message as VM not approved on Communication consent       Pharmacy advised by    []Fax  []Phone call    Approval letter scanned into Media Yes

## 2024-06-13 ENCOUNTER — APPOINTMENT (OUTPATIENT)
Dept: LAB | Facility: CLINIC | Age: 69
End: 2024-06-13
Payer: COMMERCIAL

## 2024-06-13 DIAGNOSIS — F41.1 GENERALIZED ANXIETY DISORDER: ICD-10-CM

## 2024-06-13 DIAGNOSIS — I10 ESSENTIAL HYPERTENSION: ICD-10-CM

## 2024-06-13 DIAGNOSIS — E78.5 HYPERLIPIDEMIA, UNSPECIFIED HYPERLIPIDEMIA TYPE: ICD-10-CM

## 2024-06-13 LAB
ALBUMIN SERPL BCP-MCNC: 4.3 G/DL (ref 3.5–5)
ALP SERPL-CCNC: 37 U/L (ref 34–104)
ALT SERPL W P-5'-P-CCNC: 25 U/L (ref 7–52)
ANION GAP SERPL CALCULATED.3IONS-SCNC: 11 MMOL/L (ref 4–13)
AST SERPL W P-5'-P-CCNC: 19 U/L (ref 13–39)
BILIRUB SERPL-MCNC: 0.41 MG/DL (ref 0.2–1)
BUN SERPL-MCNC: 14 MG/DL (ref 5–25)
CALCIUM SERPL-MCNC: 9.3 MG/DL (ref 8.4–10.2)
CHLORIDE SERPL-SCNC: 100 MMOL/L (ref 96–108)
CHOLEST SERPL-MCNC: 202 MG/DL
CO2 SERPL-SCNC: 26 MMOL/L (ref 21–32)
CREAT SERPL-MCNC: 0.91 MG/DL (ref 0.6–1.3)
GFR SERPL CREATININE-BSD FRML MDRD: 65 ML/MIN/1.73SQ M
GLUCOSE P FAST SERPL-MCNC: 79 MG/DL (ref 65–99)
HDLC SERPL-MCNC: 84 MG/DL
LDLC SERPL CALC-MCNC: 100 MG/DL (ref 0–100)
POTASSIUM SERPL-SCNC: 4.8 MMOL/L (ref 3.5–5.3)
PROT SERPL-MCNC: 7.1 G/DL (ref 6.4–8.4)
SODIUM SERPL-SCNC: 137 MMOL/L (ref 135–147)
T3FREE SERPL-MCNC: 3.26 PG/ML (ref 2.5–3.9)
T4 FREE SERPL-MCNC: 0.62 NG/DL (ref 0.61–1.12)
TRIGL SERPL-MCNC: 89 MG/DL
TSH SERPL DL<=0.05 MIU/L-ACNC: 1.91 UIU/ML (ref 0.45–4.5)

## 2024-06-13 PROCEDURE — 80061 LIPID PANEL: CPT

## 2024-06-13 PROCEDURE — 84443 ASSAY THYROID STIM HORMONE: CPT

## 2024-06-13 PROCEDURE — 84481 FREE ASSAY (FT-3): CPT

## 2024-06-13 PROCEDURE — 80053 COMPREHEN METABOLIC PANEL: CPT

## 2024-06-13 PROCEDURE — 84439 ASSAY OF FREE THYROXINE: CPT

## 2024-06-13 PROCEDURE — 36415 COLL VENOUS BLD VENIPUNCTURE: CPT

## 2024-06-14 ENCOUNTER — OFFICE VISIT (OUTPATIENT)
Dept: NEUROLOGY | Facility: CLINIC | Age: 69
End: 2024-06-14
Payer: COMMERCIAL

## 2024-06-14 VITALS
SYSTOLIC BLOOD PRESSURE: 108 MMHG | TEMPERATURE: 97.7 F | OXYGEN SATURATION: 98 % | HEART RATE: 78 BPM | WEIGHT: 108.6 LBS | BODY MASS INDEX: 18.07 KG/M2 | DIASTOLIC BLOOD PRESSURE: 74 MMHG

## 2024-06-14 DIAGNOSIS — G25.0 ESSENTIAL TREMOR: ICD-10-CM

## 2024-06-14 PROCEDURE — 99214 OFFICE O/P EST MOD 30 MIN: CPT | Performed by: PHYSICIAN ASSISTANT

## 2024-06-14 PROCEDURE — G2211 COMPLEX E/M VISIT ADD ON: HCPCS | Performed by: PHYSICIAN ASSISTANT

## 2024-06-14 RX ORDER — PRIMIDONE 50 MG/1
TABLET ORAL
Qty: 720 TABLET | Refills: 3 | Status: SHIPPED | OUTPATIENT
Start: 2024-06-14

## 2024-06-14 RX ORDER — DIAZEPAM 5 MG/1
TABLET ORAL
Qty: 90 TABLET | Refills: 0 | Status: SHIPPED | OUTPATIENT
Start: 2024-06-14

## 2024-06-14 NOTE — PATIENT INSTRUCTIONS
Patient with slowly progressive hand, head and vocal tremor since her mid 40s consistent with her diagnosis of essential tremor.  Currently she is taking primidone before bed with some benefit.  Unfortunately unable to tolerate daytime doses due to sedation.  She also remains on Remeron, this dose was recently reduced by her PCP, unclear the exact reason.  She finds the most benefit at this time with Valium for which her PCP prescribes as well.    We had a long discussion in regards to her diagnosis of essential tremor, the progression, and treatment options.  We continue to avoid beta-blockers such as propranolol given her history of asthma, blood pressure is also on the lower end at baseline.  She was previously on Neurontin for nerve pain, did not like the way that this medication made her feel.  She is not interested in a retrial of this medication.  Alternatively could consider a trial of topiramate or zonisamide.  We discussed potential side effects including weight loss, cognitive slowing and tingling in the hands or feet.  Unfortunately she does already have some issues with weight loss so we will avoid this medication at this time.    For now she will continue with her current dosing of primidone, Remeron and Valium.      We also did discuss the surgical options for essential tremor.  These would include the MRI guided ultrasound as well as deep brain stimulator.  There is also a task specific device that could be used called the Mag Trio device.  This could be worn when doing specific tasks that trigger tremor, studies did show it provided about 90 minutes of dampening tremor.  We also discussed a referral to occupational therapy to learn some ways to adapt to the tremor as well as trial of some adaptive devices.  She is not interested at this time however may consider looking into some adaptive devices online.  These could include weighted utensils, special cups and pens.

## 2024-06-14 NOTE — PROGRESS NOTES
Patient ID: Ora Rivera is a 68 y.o. female.    Assessment/Plan:    Essential tremor  Patient with slowly progressive hand, head and vocal tremor since her mid 40s consistent with her diagnosis of essential tremor.  Currently she is taking primidone before bed with some benefit.  Unfortunately unable to tolerate daytime doses due to sedation.  She also remains on Remeron, this dose was recently reduced by her PCP, unclear the exact reason.  She finds the most benefit at this time with Valium for which her PCP prescribes as well.    We had a long discussion in regards to her diagnosis of essential tremor, the progression, and treatment options.  We continue to avoid beta-blockers such as propranolol given her history of asthma, blood pressure is also on the lower end at baseline.  She was previously on Neurontin for nerve pain, did not like the way that this medication made her feel.  She is not interested in a retrial of this medication.  Alternatively could consider a trial of topiramate or zonisamide.  We discussed potential side effects including weight loss, cognitive slowing and tingling in the hands or feet.  Unfortunately she does already have some issues with weight loss so we will avoid this medication at this time.    For now she will continue with her current dosing of primidone, Remeron and Valium.      We also did discuss the surgical options for essential tremor.  These would include the MRI guided ultrasound as well as deep brain stimulator.  There is also a task specific device that could be used called the Mag Trio device.  This could be worn when doing specific tasks that trigger tremor, studies did show it provided about 90 minutes of dampening tremor.  We also discussed a referral to occupational therapy to learn some ways to adapt to the tremor as well as trial of some adaptive devices.  She is not interested at this time however may consider looking into some adaptive devices online.   These could include weighted utensils, special cups and pens.          Subjective:    Ora Rivera is a 68 y.o. female who presents in follow up for essential tremor. To review, tremors present since her mid 40s and have progressed to include bilateral hand, head and voice tremor. Primidone was started with some improvement of her tremors. Also with hx of syncopal episode on 1/26/21 (occurred when getting up to use the bathroom, had body pain and possible bite jose elias on the tongue following event). EEG was normal. MRI brain with mild, chronic microangiopathy. No family history of tremors.      At her last visit she was having some increased tremors, told to start taking Remeron consistently. Primidone increased to 2tabs qam, 2tabs qafternoon and 5tabs qpm.      INTERVAL HISTORY:  Tremors continue to be bothersome   Some days she will only have a mild head tremor, other days she will have head and hand tremors   Hand tremors come and go   When present she will struggle with doing things like using a computer or carrying a cup of coffee   She can eat for the most part  Appetite is not as good, she will not eat when the tremors are more bothersome   She continues to take Valium which she feels helps with the tremors   She feels sleepy with the Primidone and does not like to take this during the day, does notice a benefit with it at night      Current medications:   Primidone 6tabs qpm   Remeron 7.5mg qhs - had been on higher dosing, unclear why this was reduced   Valium taken for anxiety (not prescribed by neuro)    Prior medications:  Gabapentin - was on for nerve pain, made her feel weird   Beta blockers AVOIDED due to asthma            I personally reviewed and updated the ROS.         Objective:    Blood pressure 108/74, pulse 78, temperature 97.7 °F (36.5 °C), temperature source Temporal, weight 49.3 kg (108 lb 9.6 oz), SpO2 98%, not currently breastfeeding.    Physical Exam  Constitutional:       General: She is  awake.      Appearance: Normal appearance.   Eyes:      General: Lids are normal.      Extraocular Movements: Extraocular movements intact.      Pupils: Pupils are equal, round, and reactive to light.   Pulmonary:      Effort: Pulmonary effort is normal.   Neurological:      Mental Status: She is alert.   Psychiatric:         Speech: Speech normal.       Neurological Exam  Mental Status  Awake and alert. Speech is normal.  Patient hard of hearing   .    Cranial Nerves  CN III, IV, VI: Extraocular movements intact bilaterally. Normal lids and orbits bilaterally. Pupils equal round and reactive to light bilaterally.  CN V:  Right: Facial sensation is normal.  Left: Facial sensation is normal on the left.  CN VIII:  Right: Hearing is decreased.  Left: Hearing is decreased.  CN XI: Shoulder shrug strength is normal.    Motor    Generalized weakness .    Sensory  Light touch is normal in upper and lower extremities.     Coordination  Right: Finger-to-nose abnormality:Left: Finger-to-nose abnormality:  Mild postural tremor left hand, moderate right hand   Moderate action tremors with finger to nose testing bilaterally.   No resting tremors.   Mild constant head tremor with slight head turn to the right. At times the head tremor was more jerky in nature.   No rigidity   No decrement, some generalized slowness .    Gait  Casual gait is normal including stance, stride, and arm swing.  Slow however no shuffling or freezing .        ROS:    Review of Systems   Constitutional:  Negative for appetite change, fatigue and fever.   HENT:  Positive for trouble swallowing (sometimes). Negative for hearing loss, tinnitus and voice change.    Eyes:  Positive for visual disturbance (double at times). Negative for photophobia and pain.   Respiratory: Negative.  Negative for shortness of breath.    Cardiovascular: Negative.  Negative for palpitations.   Gastrointestinal:  Positive for nausea (shaking causes nausea). Negative for vomiting.    Endocrine: Negative.  Negative for cold intolerance.   Genitourinary: Negative.  Negative for dysuria, frequency and urgency.   Musculoskeletal:  Positive for gait problem (shaky when gets up in am). Negative for back pain, myalgias, neck pain and neck stiffness.   Skin: Negative.  Negative for rash.   Allergic/Immunologic: Negative.    Neurological:  Positive for dizziness (when shakes a lot) and tremors (good days/bad days). Negative for seizures, syncope, facial asymmetry, speech difficulty, weakness, light-headedness, numbness and headaches.   Hematological: Negative.  Does not bruise/bleed easily.   Psychiatric/Behavioral: Negative.  Negative for confusion, hallucinations and sleep disturbance.

## 2024-06-17 DIAGNOSIS — J06.9 ACUTE URI: ICD-10-CM

## 2024-06-17 DIAGNOSIS — R10.13 DYSPEPSIA: ICD-10-CM

## 2024-06-17 RX ORDER — ONDANSETRON 4 MG/1
4 TABLET, FILM COATED ORAL EVERY 8 HOURS PRN
Qty: 30 TABLET | Refills: 5 | OUTPATIENT
Start: 2024-06-17

## 2024-06-17 NOTE — TELEPHONE ENCOUNTER
TWO REFILLS    Medication: ondansetron (ZOFRAN) 4 mg tablet     Dose/Frequency:   Take 1 tablet (4 mg total) by mouth every 8 (eight) hours as needed for nausea or vomiting    Quantity: 30 tablet     Pharmacy: [facility]/pharmacy #[facility] [place] - [id number]     Office:   [x] PCP/Provider - Suzan Ferrer, DO   [] Speciality/Provider -     Does the patient have enough for 3 days?   [] Yes   [x] No - Send as HP to POD        Medication: predniSONE 10 mg tablet     Dose/Frequency: Day 1: 6 tabs Day 2: 5 tabs Day 3: 4 tabs Day 4: 3 tabs Day 5: 2 tabs Day 6: 1 tab     Quantity: 21 tablet     Pharmacy: [facility]/pharmacy #[id number]  [place] - [phone]    Office:   [x] PCP/Provider - Suzan Ferrer, DO   [] Speciality/Provider -     Does the patient have enough for 3 days?   [] Yes   [x] No - Send as HP to POD

## 2024-06-18 ENCOUNTER — TELEPHONE (OUTPATIENT)
Age: 69
End: 2024-06-18

## 2024-06-18 DIAGNOSIS — J06.9 ACUTE URI: ICD-10-CM

## 2024-06-18 RX ORDER — PREDNISONE 10 MG/1
TABLET ORAL
Qty: 21 TABLET | Refills: 0 | Status: SHIPPED | OUTPATIENT
Start: 2024-06-18 | End: 2024-06-18 | Stop reason: SDUPTHER

## 2024-06-18 RX ORDER — PREDNISONE 10 MG/1
TABLET ORAL
Qty: 21 TABLET | Refills: 0 | Status: SHIPPED | OUTPATIENT
Start: 2024-06-18

## 2024-06-18 NOTE — TELEPHONE ENCOUNTER
Patient called to request a refill for their PREDNISONE 5 MG    Pt states she is really having trouble breathing with this heat and states the prednisone   5 mg tablets really help her.  She would like a month supply .

## 2024-06-27 DIAGNOSIS — J06.9 UPPER RESPIRATORY TRACT INFECTION, UNSPECIFIED TYPE: ICD-10-CM

## 2024-06-27 DIAGNOSIS — M79.7 FIBROMYALGIA: ICD-10-CM

## 2024-06-28 RX ORDER — PREDNISONE 2.5 MG/1
2.5 TABLET ORAL DAILY
Qty: 30 TABLET | Refills: 0 | OUTPATIENT
Start: 2024-06-28

## 2024-06-28 NOTE — TELEPHONE ENCOUNTER
Pt is checking on prednisone.  States belly hurts and the prednisone does help.  Pt is stating 10mg prednisone helps more.    Pt also stated she has a lot of aches in back, hips, joints.    Please advise pt [phone]    See message below.    Did schedule an appt for July 12th

## 2024-07-01 DIAGNOSIS — J06.9 UPPER RESPIRATORY TRACT INFECTION, UNSPECIFIED TYPE: ICD-10-CM

## 2024-07-01 DIAGNOSIS — M79.7 FIBROMYALGIA: ICD-10-CM

## 2024-07-01 NOTE — TELEPHONE ENCOUNTER
"Pt is requesting if the Prednisone could be increased to 5 mg. She has been having a lot of \"ache/pain in her bones.\"     Medication: Prednisone     Dose/Frequency: 2.5 mg    Quantity: 30    Pharmacy: [facility] [place]     Office:   [x] PCP/Provider -   [] Speciality/Provider -     Does the patient have enough for 3 days?   [] Yes   [x] No - Send as HP to POD      "

## 2024-07-02 DIAGNOSIS — J30.2 SEASONAL ALLERGIC RHINITIS, UNSPECIFIED TRIGGER: ICD-10-CM

## 2024-07-02 RX ORDER — FLUTICASONE PROPIONATE 50 MCG
SPRAY, SUSPENSION (ML) NASAL
Qty: 16 ML | Refills: 2 | Status: SHIPPED | OUTPATIENT
Start: 2024-07-02

## 2024-07-02 RX ORDER — PREDNISONE 2.5 MG/1
2.5 TABLET ORAL DAILY
Qty: 30 TABLET | Refills: 0 | Status: SHIPPED | OUTPATIENT
Start: 2024-07-02

## 2024-07-11 ENCOUNTER — NURSE TRIAGE (OUTPATIENT)
Age: 69
End: 2024-07-11

## 2024-07-11 ENCOUNTER — TELEPHONE (OUTPATIENT)
Age: 69
End: 2024-07-11

## 2024-07-11 NOTE — TELEPHONE ENCOUNTER
Warm transferred to triage. Pt in bad belly pain and body aches all over. She has been taking more prednisone than recommended.

## 2024-07-11 NOTE — TELEPHONE ENCOUNTER
"Patient transferred from [place] to triage. Patient requesting a refill on Prednisone. States she picked up last prescription of predniSONE 2.5 mg tablet on 07/02/24, states that she takes sometimes 2-4 tablets daily as it helps her to eat. She is constantly experiencing stomach discomfort pain 8-9, body aches, back pain, feels she is having trouble eating (takes prednisone to help her eat). Does have an appt tomorrow with pcp scheduled. Refused appt today, and declined UC/ED only wants to see pcp. I advised I would forward information to other providers to see what they'd recommend. Instructed patient that they may want her to be evaluated at ED and not wait until tomorrow, she understood. Also contacted poison control center, advised this is a low dose, okay to follow up with office for further guidance.     Reason for Disposition   MORE THAN A DOUBLE DOSE of a prescription or over-the-counter (OTC) drug    Answer Assessment - Initial Assessment Questions  1. NAME of MEDICATION: \"What medicine are you calling about?\"      predniSONE 2.5 mg tablet  2. QUESTION: \"What is your question?\" (e.g., medication refill, side effect)      Side effect/reaction  3. PRESCRIBING HCP: \"Who prescribed it?\" Reason: if prescribed by specialist, call should be referred to that group.      Ordering User: Suzan Ferrer DO  4. SYMPTOMS: \"Do you have any symptoms?\"      Body aches, stomach discomfort, back pain/ unable to eat (states prednisone helps to eat)  5. SEVERITY: If symptoms are present, ask \"Are they mild, moderate or severe?\"      Severe (pain 8 or 9)  6. PREGNANCY:  \"Is there any chance that you are pregnant?\" \"When was your last menstrual period?\"      N/A    Protocols used: Medication Question Call-ADULT-[place]    " 07-Jul-2023 13:36

## 2024-07-12 ENCOUNTER — TELEPHONE (OUTPATIENT)
Age: 69
End: 2024-07-12

## 2024-07-12 ENCOUNTER — OFFICE VISIT (OUTPATIENT)
Dept: FAMILY MEDICINE CLINIC | Facility: CLINIC | Age: 69
End: 2024-07-12
Payer: COMMERCIAL

## 2024-07-12 VITALS
SYSTOLIC BLOOD PRESSURE: 122 MMHG | BODY MASS INDEX: 17.86 KG/M2 | WEIGHT: 107.2 LBS | OXYGEN SATURATION: 96 % | HEIGHT: 65 IN | HEART RATE: 83 BPM | TEMPERATURE: 96 F | DIASTOLIC BLOOD PRESSURE: 70 MMHG

## 2024-07-12 DIAGNOSIS — R10.13 DYSPEPSIA: ICD-10-CM

## 2024-07-12 DIAGNOSIS — R10.31 RLQ ABDOMINAL PAIN: ICD-10-CM

## 2024-07-12 DIAGNOSIS — M79.7 FIBROMYALGIA: ICD-10-CM

## 2024-07-12 DIAGNOSIS — J06.9 ACUTE URI: ICD-10-CM

## 2024-07-12 DIAGNOSIS — F33.1 MODERATE EPISODE OF RECURRENT MAJOR DEPRESSIVE DISORDER (HCC): ICD-10-CM

## 2024-07-12 DIAGNOSIS — K57.20 DIVERTICULITIS OF LARGE INTESTINE WITH PERFORATION AND ABSCESS WITHOUT BLEEDING: ICD-10-CM

## 2024-07-12 DIAGNOSIS — Z98.890 STATUS POST REVERSAL OF ILEOSTOMY: ICD-10-CM

## 2024-07-12 DIAGNOSIS — J06.9 UPPER RESPIRATORY TRACT INFECTION, UNSPECIFIED TYPE: ICD-10-CM

## 2024-07-12 DIAGNOSIS — K21.9 GASTROESOPHAGEAL REFLUX DISEASE, UNSPECIFIED WHETHER ESOPHAGITIS PRESENT: Primary | Chronic | ICD-10-CM

## 2024-07-12 DIAGNOSIS — Z90.49 STATUS POST LAPAROSCOPIC COLECTOMY: ICD-10-CM

## 2024-07-12 PROCEDURE — 99214 OFFICE O/P EST MOD 30 MIN: CPT | Performed by: FAMILY MEDICINE

## 2024-07-12 PROCEDURE — G2211 COMPLEX E/M VISIT ADD ON: HCPCS | Performed by: FAMILY MEDICINE

## 2024-07-12 RX ORDER — OMEPRAZOLE 40 MG/1
40 CAPSULE, DELAYED RELEASE ORAL
Qty: 30 CAPSULE | Refills: 5 | Status: SHIPPED | OUTPATIENT
Start: 2024-07-12 | End: 2025-01-08

## 2024-07-12 RX ORDER — ONDANSETRON 4 MG/1
4 TABLET, FILM COATED ORAL EVERY 8 HOURS PRN
Qty: 30 TABLET | Refills: 5 | Status: SHIPPED | OUTPATIENT
Start: 2024-07-12 | End: 2024-07-29 | Stop reason: SDUPTHER

## 2024-07-12 RX ORDER — PREDNISONE 2.5 MG/1
2.5 TABLET ORAL 2 TIMES DAILY
Qty: 60 TABLET | Refills: 0 | Status: SHIPPED | OUTPATIENT
Start: 2024-07-12 | End: 2024-07-29 | Stop reason: SDUPTHER

## 2024-07-12 RX ORDER — PREDNISONE 10 MG/1
TABLET ORAL
Qty: 21 TABLET | Refills: 0 | Status: SHIPPED | OUTPATIENT
Start: 2024-07-12 | End: 2024-07-27 | Stop reason: SDUPTHER

## 2024-07-12 RX ORDER — MIRTAZAPINE 15 MG/1
15 TABLET, FILM COATED ORAL
Qty: 30 TABLET | Refills: 5 | Status: SHIPPED | OUTPATIENT
Start: 2024-07-12

## 2024-07-12 NOTE — PROGRESS NOTES
Ambulatory Visit  Name: Ora Rivera      : 1955      MRN: [id number]  Encounter Provider: Suzan Ferrer DO  Encounter Date: 2024   Encounter department: [facility] PRIMARY CARE  Return if symptoms worsen or fail to improve, for AFTER CT.   Assessment & Plan   1. Gastroesophageal reflux disease, unspecified whether esophagitis present  -     ondansetron (ZOFRAN) 4 mg tablet; Take 1 tablet (4 mg total) by mouth every 8 (eight) hours as needed for nausea or vomiting  -     omeprazole (PriLOSEC) 40 MG capsule; Take 1 capsule (40 mg total) by mouth daily before breakfast  2. Dyspepsia  -     ondansetron (ZOFRAN) 4 mg tablet; Take 1 tablet (4 mg total) by mouth every 8 (eight) hours as needed for nausea or vomiting  -     omeprazole (PriLOSEC) 40 MG capsule; Take 1 capsule (40 mg total) by mouth daily before breakfast  3. Acute URI  -     predniSONE 10 mg tablet; Day 1: 6 tabs  Day 2: 5 tabs Day 3: 4 tabs  Day 4: 3 tabs  Day 5: 2 tabs  Day 6: 1 tab  4. Fibromyalgia  -     predniSONE 2.5 mg tablet; Take 1 tablet (2.5 mg total) by mouth 2 (two) times a day  -     mirtazapine (REMERON) 15 mg tablet; Take 1 tablet (15 mg total) by mouth daily at bedtime NO alcohol  5. Upper respiratory tract infection, unspecified type  -     predniSONE 2.5 mg tablet; Take 1 tablet (2.5 mg total) by mouth 2 (two) times a day  6. Moderate episode of recurrent major depressive disorder (HCC)  -     mirtazapine (REMERON) 15 mg tablet; Take 1 tablet (15 mg total) by mouth daily at bedtime NO alcohol  7. Diverticulitis of large intestine with perforation and abscess without bleeding  -     CT abdomen pelvis wo contrast; Future; Expected date: 2024  8. Status post laparoscopic colectomy  -     CT abdomen pelvis wo contrast; Future; Expected date: 2024  9. Status post reversal of ileostomy  -     CT abdomen pelvis wo contrast; Future; Expected date: 2024  10. RLQ abdominal pain  -     CT abdomen  pelvis wo contrast; Future; Expected date: 07/12/2024       Appointments for this Order    Date/Time Provider Department   7/24/24 6:00 PM  - 30 min AL CT 1 (Resource) Al Ct Scan   I have spent a total time of 25 minutes in caring for this patient on the day of the visit/encounter including Diagnostic results, Instructions for management, Patient and family education, Impressions, Counseling / Coordination of care, Documenting in the medical record, Reviewing / ordering tests, medicine, procedures  , and Obtaining or reviewing history  .   History of Present Illness     68-year-old female with chronic dyspepsia, nausea, abdominal discomfort waxing and waning.  Uses Zofran frequently.  History of laparoscopic colectomy secondary to diverticulitis.  Did have ileostomy and then reversal.      Chief Complaint   Patient presents with   • Abdominal Pain   • Joint Pain    Hurts all over-uses prednisone low-dose and occasional prednisone taper.    History of  Case: [id number] Anesthesia Start Date/Time: 04/04/22 1549   Procedure: ILEOSTOMY LOOP REVERSAL (Abdomen)   Anesthesia type: general   Diagnosis: Ileostomy in place (HCC) [Z93.2]   Pre-op diagnosis: Ileostomy in place (HCC) [Z93.2]   Location: AL OR ROOM 02 / [facility]   Surgeons: Mitch Stapleton MD     Review of Systems   Constitutional:  Positive for unexpected weight change (Lost 10 pounds since October).   HENT:  Positive for congestion and postnasal drip.    Respiratory:  Negative for cough.    Gastrointestinal:         As noted, intermittent.  Mostly right lower quadrant.  Surgical history as noted.   Genitourinary: Negative.    Musculoskeletal:         Chronic fibromyalgia pain has follow-up with pain management for back injections   Psychiatric/Behavioral:  Positive for sleep disturbance. The patient is nervous/anxious.      Past Medical History:   Diagnosis Date   • Anxiety    • Asthma     allergy induced   • Chronic pain disorder      "back   • Colon polyp    • Fibromyalgia    • GERD (gastroesophageal reflux disease)    • Hearing aid worn    • Hearing impaired     \"broken eardrum after eardrops used\"   • Hyperlipidemia     controlled   • Hypertension     controlled   • Lumbar herniated disc    • Shortness of breath     at times   • Wears dentures    • Wears glasses    • Weight loss      Past Surgical History:   Procedure Laterality Date   • BREAST BIOPSY Left     benign- at age 29   • COLONOSCOPY     • EAR SURGERY     • [facility] LAPAROSCOPY COLECTOMY PARTIAL W/ANASTOMOSIS N/A 2021    Procedure: RESECTION COLON SIGMOID LAPAROSCOPIC WTIH COLORECTAL ANASTOMOSIS, DIVERTING LOOP ILEOSTOMY;  Surgeon: Mitch Stapleton MD;  Location: [facility];  Service: General   • [facility] LAPAROSCOPY SURG ILEOSTOMY/JEJUNOSTOMY NON-TUBE N/A 2022    Procedure: ILEOSTOMY LOOP REVERSAL;  Surgeon: Mitch Stapleton MD;  Location: [facility];  Service: General   • TUBAL LIGATION     • ULNAR NERVE REPAIR Left      Family History   Problem Relation Age of Onset   • Heart failure Mother    • Hyperlipidemia Mother    • Hypertension Mother    • Coronary artery disease Brother    • COPD Maternal Grandmother    • No Known Problems Sister    • No Known Problems Daughter    • No Known Problems Sister    • Aneurysm Maternal Aunt      Social History     Tobacco Use   • Smoking status: Former     Current packs/day: 0.00     Average packs/day: 0.5 packs/day for 50.0 years (25.0 ttl pk-yrs)     Types: Cigarettes     Start date: 4/3/1972     Quit date: 4/3/2022     Years since quittin.3   • Smokeless tobacco: Never   • Tobacco comments:     wears nicotine patch on for 1/2 day   Vaping Use   • Vaping status: Never Used   Substance and Sexual Activity   • Alcohol use: Not Currently   • Drug use: No   • Sexual activity: Not on file     Current Outpatient Medications on File Prior to Visit   Medication Sig   • albuterol (PROVENTIL HFA,VENTOLIN HFA) 90 mcg/act inhaler Inhale 2 puffs 3 (three) " "times a day as needed for wheezing or shortness of breath   • atorvastatin (LIPITOR) 10 mg tablet TAKE 1 TABLET BY MOUTH EVERY DAY   • Cholecalciferol (VITAMIN D3 PO) Take by mouth   • fluticasone (FLONASE) 50 mcg/act nasal spray SPRAY 2 SPRAYS INTO EACH NOSTRIL EVERY DAY   • ketorolac (TORADOL) 10 mg tablet Take 1 tablet (10 mg total) by mouth every 6 (six) hours as needed for moderate pain   • lidocaine (XYLOCAINE) 5 % ointment APPLY TOPICALLY AS NEEDED FOR MILD PAIN.   • lisinopril (ZESTRIL) 2.5 mg tablet TAKE 1 TABLET (2.5 MG TOTAL) BY MOUTH DAILY PT TAKES SHE HAS NOT BEEN TAKING   • MAGNESIUM PO Take by mouth   • naloxone (NARCAN) 4 mg/0.1 mL nasal spray Administer 1 spray into a nostril. If no response after 2-3 minutes, give another dose in the other nostril using a new spray.   • oxyCODONE-acetaminophen (PERCOCET) 5-325 mg per tablet Take 1 tablet by mouth every 4 (four) hours as needed for moderate pain   • primidone (MYSOLINE) 50 mg tablet Take 6tabs qhs   • Probiotic Product (PROBIOTIC-10 PO) Take by mouth     Allergies   Allergen Reactions   • Levofloxacin Other (See Comments)     Weak legs, blurred vision   • Carafate [Sucralfate] Rash   • Other Palpitations     MRI dye   • Tetracycline Rash       There is no immunization history on file for this patient.  Objective   /70   Pulse 83   Temp (!) 96 °F (35.6 °C) (Temporal)   Ht 5' 5\" (1.651 m)   Wt 48.6 kg (107 lb 3.2 oz)   LMP  (LMP Unknown)   SpO2 96%   BMI 17.84 kg/m²     Physical Exam  Constitutional:       Appearance: She is not ill-appearing (Chronically).      Comments: 68-year-old female with BMI of 17% thin, without overt cachexia   HENT:      Right Ear: Tympanic membrane normal.      Left Ear: Tympanic membrane normal.      Nose: Rhinorrhea present.      Mouth/Throat:      Mouth: Mucous membranes are moist.   Eyes:      General: No scleral icterus.  Abdominal:      General: Abdomen is flat.      Palpations: Abdomen is soft.      " Tenderness: There is abdominal tenderness (Nonlocalized). There is no guarding or rebound.   Skin:     Findings: No rash.   Neurological:      Mental Status: She is alert and oriented to person, place, and time.   Psychiatric:         Mood and Affect: Mood is anxious. Affect is tearful.      Comments: Uncomfortable

## 2024-07-12 NOTE — TELEPHONE ENCOUNTER
Patient called regarding the CT scan ordered by Dr. Leonard. I was able to instruct the patient to call central scheduling to schedule the procedure. Patient stated that she had the telephone number for central scheduling.

## 2024-07-23 ENCOUNTER — TELEPHONE (OUTPATIENT)
Age: 69
End: 2024-07-23

## 2024-07-23 NOTE — TELEPHONE ENCOUNTER
Patient contacted the office this afternoon stating that she is scheduled for CT tomorrow. She saw on the instructions she was to drink Barium, she contacted the scheduling department who advised she did not and was to relay to pcp office. Looked over order, CT abdomen pelvis wo contrast. Advised without contrast she should not have to drink barium. Nothing else needed at this time.

## 2024-07-24 ENCOUNTER — HOSPITAL ENCOUNTER (OUTPATIENT)
Dept: CT IMAGING | Facility: HOSPITAL | Age: 69
Discharge: HOME | End: 2024-07-24
Payer: COMMERCIAL

## 2024-07-24 DIAGNOSIS — Z90.49 STATUS POST LAPAROSCOPIC COLECTOMY: ICD-10-CM

## 2024-07-24 DIAGNOSIS — R10.31 RLQ ABDOMINAL PAIN: ICD-10-CM

## 2024-07-24 DIAGNOSIS — Z98.890 STATUS POST REVERSAL OF ILEOSTOMY: ICD-10-CM

## 2024-07-24 DIAGNOSIS — K57.20 DIVERTICULITIS OF LARGE INTESTINE WITH PERFORATION AND ABSCESS WITHOUT BLEEDING: ICD-10-CM

## 2024-07-24 PROCEDURE — 74176 CT ABD & PELVIS W/O CONTRAST: CPT

## 2024-07-25 DIAGNOSIS — E78.5 HYPERLIPIDEMIA, UNSPECIFIED: ICD-10-CM

## 2024-07-25 RX ORDER — ATORVASTATIN CALCIUM 10 MG/1
TABLET, FILM COATED ORAL
Qty: 100 TABLET | Refills: 1 | Status: SHIPPED | OUTPATIENT
Start: 2024-07-25

## 2024-07-27 DIAGNOSIS — J06.9 ACUTE URI: ICD-10-CM

## 2024-07-27 NOTE — RESULT ENCOUNTER NOTE
CT abdomen and pelvis are unremarkable for any changes of concern.  Patient with a history of diverticulitis and perforation with abscess in several surgical procedures.  Right now she is having right lower quadrant discomfort.  That is why CT abdomen and pelvis was performed.  No findings of concern.  Will need to call patient and review results with her.  She should follow-up with us if her discomfort is persistent.

## 2024-07-29 ENCOUNTER — TELEPHONE (OUTPATIENT)
Age: 69
End: 2024-07-29

## 2024-07-29 DIAGNOSIS — K21.9 GASTROESOPHAGEAL REFLUX DISEASE, UNSPECIFIED WHETHER ESOPHAGITIS PRESENT: Chronic | ICD-10-CM

## 2024-07-29 DIAGNOSIS — R10.13 DYSPEPSIA: ICD-10-CM

## 2024-07-29 DIAGNOSIS — M79.7 FIBROMYALGIA: ICD-10-CM

## 2024-07-29 DIAGNOSIS — J06.9 UPPER RESPIRATORY TRACT INFECTION, UNSPECIFIED TYPE: ICD-10-CM

## 2024-07-29 RX ORDER — PREDNISONE 10 MG/1
TABLET ORAL
Qty: 21 TABLET | Refills: 0 | Status: SHIPPED | OUTPATIENT
Start: 2024-07-29

## 2024-07-30 ENCOUNTER — TELEPHONE (OUTPATIENT)
Dept: GASTROENTEROLOGY | Facility: CLINIC | Age: 69
End: 2024-07-30

## 2024-07-30 RX ORDER — ONDANSETRON 4 MG/1
4 TABLET, FILM COATED ORAL EVERY 8 HOURS PRN
Qty: 30 TABLET | Refills: 0 | Status: SHIPPED | OUTPATIENT
Start: 2024-07-30

## 2024-07-30 NOTE — TELEPHONE ENCOUNTER
----- Message from Kavya GOTTLIEB sent at 7/30/2024  9:27 AM EDT -----  Regarding: FW: Functional GI symptoms.    ----- Message -----  From: Tammy Stone  Sent: 7/30/2024   7:24 AM EDT  To: #  Subject: FW: Functional GI symptoms.                        ----- Message -----  From: Alfonso Yeung MD  Sent: 7/29/2024   6:46 PM EDT  To: #  Subject: FW: Functional GI symptoms.                      Can we schedule patient for follow-up, okay to come in 740 or during lunchtime to see her.  ----- Message -----  From: Suzan Ferrer DO  Sent: 7/29/2024   6:35 PM EDT  To: Lala Leon PA-C; Alfonso Yeung MD; #  Subject: Functional GI symptoms.                          Patient known to you but quite some time.  History of diverticulitis and requiring surgical intervention with ileostomy and then reanastomosis.  Recently having some postprandial symptoms and weight loss.  Recent CT abdomen and pelvis relatively unremarkable.  I told her to reach out to you for reestablishing care.  Thank you

## 2024-07-31 NOTE — TELEPHONE ENCOUNTER
Patients GI provider:  Dr. Yeung    Number to return call: [phone]    Reason for call: Pt calling to request that directions to office be mailed to address on file. Advised we do not typically mail directions out. Please double-check to see if directions can be mailed out to pt, if not please call her back to advise.    Scheduled procedure/appointment date if applicable: Apt 08/07/2024

## 2024-08-02 RX ORDER — PREDNISONE 2.5 MG/1
2.5 TABLET ORAL 2 TIMES DAILY
Qty: 60 TABLET | Refills: 0 | Status: SHIPPED | OUTPATIENT
Start: 2024-08-02

## 2024-08-06 RX ORDER — HYDROCODONE BITARTRATE AND ACETAMINOPHEN 10; 325 MG/1; MG/1
TABLET ORAL
COMMUNITY
Start: 2024-07-10

## 2024-08-07 ENCOUNTER — OFFICE VISIT (OUTPATIENT)
Dept: GASTROENTEROLOGY | Facility: CLINIC | Age: 69
End: 2024-08-07
Payer: COMMERCIAL

## 2024-08-07 VITALS
BODY MASS INDEX: 18.56 KG/M2 | WEIGHT: 111.4 LBS | SYSTOLIC BLOOD PRESSURE: 114 MMHG | TEMPERATURE: 98.9 F | HEIGHT: 65 IN | DIASTOLIC BLOOD PRESSURE: 74 MMHG

## 2024-08-07 DIAGNOSIS — K59.04 CHRONIC IDIOPATHIC CONSTIPATION: Primary | ICD-10-CM

## 2024-08-07 DIAGNOSIS — F11.20 CONTINUOUS OPIOID DEPENDENCE (HCC): ICD-10-CM

## 2024-08-07 DIAGNOSIS — K57.20 DIVERTICULITIS OF LARGE INTESTINE WITH PERFORATION AND ABSCESS WITHOUT BLEEDING: ICD-10-CM

## 2024-08-07 PROBLEM — M51.17 INTERVERTEBRAL DISC DISORDER WITH RADICULOPATHY OF LUMBOSACRAL REGION: Status: RESOLVED | Noted: 2022-10-10 | Resolved: 2024-08-07

## 2024-08-07 PROCEDURE — 99204 OFFICE O/P NEW MOD 45 MIN: CPT | Performed by: INTERNAL MEDICINE

## 2024-08-07 RX ORDER — ASPIRIN 81 MG/1
81 TABLET, CHEWABLE ORAL DAILY
COMMUNITY

## 2024-08-07 NOTE — PROGRESS NOTES
[facility] - Outpatient Consultation  Ora Rivera 68 y.o. female MRN: [id number]  Encounter: [id number]          ASSESSMENT AND PLAN:      1. Chronic idiopathic constipation  - linaCLOtide 145 MCG CAPS; Take 1 capsule (145 mcg total) by mouth daily  Dispense: 30 capsule; Refill: 8    2. Continuous opioid dependence (HCC)  3. Diverticulitis of large intestine with perforation and abscess without bleeding    She is a 68-year-old female presents here for evaluation for constipation in setting of opioid use.  She does have previous history of diverticulitis status post surgery.  In setting of previous constipation and abdominal surgery patient also likely has scar tissue resulting in constipation.  Colonoscopy evaluation 2020 by Dr. Stapleton demonstrated normal healthy end-to-end anastomosis of the sigmoid.  Patient was referred to us emergently by Dr. Ferrer to evaluate abdominal discomfort symptoms.  She is currently taking over-the-counter stool softener which are not helping her.    -We discussed optimizing fiber and fluid intake  -Reviewed previous imaging study and colonoscopy.  There is evidence of increased stool burden on PACs imaging study  -Starting bowel prep with MiraLAX today 6 to 7 cups until adequate amount of bowel movement.  Last bowel movement 3 to 4 days ago.  Once there is good amount of evacuation can start on Linzess daily      Answers submitted by the patient for this visit:  Abdominal Pain Questionnaire (Submitted on 8/1/2024)  Chief Complaint: Abdominal pain  Chronicity: chronic  Onset: more than 1 month ago  Onset quality: sudden  Frequency: 2 to 4 times per day  Progression since onset: waxing and waning  Pain location: epigastric region, periumbilical region, right flank  Pain - numeric: 8/10  Pain quality: aching, cramping, a sensation of fullness  Radiates to: LLQ, RLQ, epigastric region, suprapubic region, pelvis  anorexia: Yes  arthralgias:  "Yes  belching: Yes  constipation: No  diarrhea: Yes  dysuria: No  fever: No  flatus: No  frequency: Yes  headaches: Yes  hematochezia: No  hematuria: No  melena: Yes  myalgias: Yes  nausea: Yes  weight loss: Yes  vomiting: Yes  Aggravated by: nothing, certain positions, eating  Relieved by: belching, liquids, passing flatus, recumbency  Diagnostic workup: CT scan, lower endoscopy, surgery      REVIEW OF SYSTEMS:    CONSTITUTIONAL: Denies any fever, chills, rigors, and weight loss.  HEENT: No earache or tinnitus. Denies hearing loss or visual disturbances.  CARDIOVASCULAR: No chest pain or palpitations.   RESPIRATORY: Denies any cough, hemoptysis, shortness of breath or dyspnea on exertion.  GASTROINTESTINAL: As noted in the History of Present Illness.   GENITOURINARY: No problems with urination. Denies any hematuria or dysuria.  NEUROLOGIC: No dizziness or vertigo, denies headaches.   MUSCULOSKELETAL: Denies any muscle or joint pain.   SKIN: Denies skin rashes or itching.   ENDOCRINE: Denies excessive thirst. Denies intolerance to heat or cold.  PSYCHOSOCIAL: Denies depression or anxiety. Denies any recent memory loss.       Historical Information   Past Medical History:   Diagnosis Date    Anxiety     Asthma     allergy induced    Chronic pain disorder     back    Colon polyp     Fibromyalgia     GERD (gastroesophageal reflux disease)     Hearing aid worn     Hearing impaired     \"broken eardrum after eardrops used\"    Hyperlipidemia     controlled    Hypertension     controlled    Lumbar herniated disc     Shortness of breath     at times    Wears dentures     Wears glasses     Weight loss      Past Surgical History:   Procedure Laterality Date    BREAST BIOPSY Left     benign- at age 29    COLONOSCOPY      EAR SURGERY      [facility] LAPAROSCOPY COLECTOMY PARTIAL W/ANASTOMOSIS N/A 11/1/2021    Procedure: RESECTION COLON SIGMOID LAPAROSCOPIC WTIH COLORECTAL ANASTOMOSIS, DIVERTING LOOP ILEOSTOMY;  Surgeon: Mitch Stapleton, " MD;  Location: [facility];  Service: General    [facility] LAPAROSCOPY SURG ILEOSTOMY/JEJUNOSTOMY NON-TUBE N/A 2022    Procedure: ILEOSTOMY LOOP REVERSAL;  Surgeon: Mitch Stapleton MD;  [facility];  Service: General    TUBAL LIGATION      ULNAR NERVE REPAIR Left      Social History   Social History     Substance and Sexual Activity   Alcohol Use Not Currently     Social History     Substance and Sexual Activity   Drug Use No     Social History     Tobacco Use   Smoking Status Former    Current packs/day: 0.00    Average packs/day: 0.5 packs/day for 50.0 years (25.0 ttl pk-yrs)    Types: Cigarettes    Start date: 4/3/1972    Quit date: 4/3/2022    Years since quittin.3   Smokeless Tobacco Never   Tobacco Comments    wears nicotine patch on for 1/2 day     Family History   Problem Relation Age of Onset    Heart failure Mother     Hyperlipidemia Mother     Hypertension Mother     Coronary artery disease Brother     COPD Maternal Grandmother     No Known Problems Sister     No Known Problems Daughter     No Known Problems Sister     Aneurysm Maternal Aunt        Meds/Allergies       Current Outpatient Medications:     albuterol (PROVENTIL HFA,VENTOLIN HFA) 90 mcg/act inhaler    aspirin 81 mg chewable tablet    atorvastatin (LIPITOR) 10 mg tablet    Cholecalciferol (VITAMIN D3 PO)    diazepam (VALIUM) 5 mg tablet    fluticasone (FLONASE) 50 mcg/act nasal spray    HYDROcodone-acetaminophen (NORCO)  mg per tablet    ketorolac (TORADOL) 10 mg tablet    lidocaine (XYLOCAINE) 5 % ointment    linaCLOtide 145 MCG CAPS    lisinopril (ZESTRIL) 2.5 mg tablet    MAGNESIUM PO    mirtazapine (REMERON) 15 mg tablet    naloxone (NARCAN) 4 mg/0.1 mL nasal spray    omeprazole (PriLOSEC) 40 MG capsule    ondansetron (ZOFRAN) 4 mg tablet    oxyCODONE-acetaminophen (PERCOCET) 5-325 mg per tablet    predniSONE 10 mg tablet    predniSONE 2.5 mg tablet    primidone (MYSOLINE) 50 mg tablet    Probiotic Product (PROBIOTIC-10  "PO)    tiZANidine (ZANAFLEX) 4 mg tablet    Allergies   Allergen Reactions    Levofloxacin Other (See Comments)     Weak legs, blurred vision    Carafate [Sucralfate] Rash    Other Palpitations     MRI dye    Tetracycline Rash           Objective     Blood pressure 114/74, temperature 98.9 °F (37.2 °C), temperature source Tympanic, height 5' 5\" (1.651 m), weight 50.5 kg (111 lb 6.4 oz), not currently breastfeeding. Body mass index is 18.54 kg/m².        PHYSICAL EXAM:      General Appearance:   Alert, cooperative, no distress   HEENT:   Normocephalic, atraumatic, anicteric.     Neck:  Supple, symmetrical, trachea midline   Lungs:   Clear to auscultation bilaterally; no rales, rhonchi or wheezing; respirations unlabored    Heart::   Regular rate and rhythm; no murmur, rub, or gallop.   Abdomen:   Soft, non-tender, non-distended; normal bowel sounds; no masses, no organomegaly    Genitalia:   Deferred    Rectal:   Deferred    Extremities:  No cyanosis, clubbing or edema    Pulses:  2+ and symmetric    Skin:  No jaundice, rashes, or lesions    Lymph nodes:  No palpable cervical lymphadenopathy        Lab Results:   No visits with results within 1 Day(s) from this visit.   Latest known visit with results is:   Appointment on 06/13/2024   Component Date Value    Cholesterol 06/13/2024 202 (H)     Triglycerides 06/13/2024 89     HDL, Direct 06/13/2024 84     LDL Calculated 06/13/2024 100     Sodium 06/13/2024 137     Potassium 06/13/2024 4.8     Chloride 06/13/2024 100     CO2 06/13/2024 26     ANION GAP 06/13/2024 11     BUN 06/13/2024 14     Creatinine 06/13/2024 0.91     Glucose, Fasting 06/13/2024 79     Calcium 06/13/2024 9.3     AST 06/13/2024 19     ALT 06/13/2024 25     Alkaline Phosphatase 06/13/2024 37     Total Protein 06/13/2024 7.1     Albumin 06/13/2024 4.3     Total Bilirubin 06/13/2024 0.41     eGFR 06/13/2024 65     T3, Free 06/13/2024 3.26     Free T4 06/13/2024 0.62     TSH 3RD GENERATON 06/13/2024 " 1.914          Radiology Results:   CT abdomen pelvis wo contrast    Result Date: 7/26/2024  Narrative: CT ABDOMEN AND PELVIS WITHOUT IV CONTRAST INDICATION:   Diverticulitis of large intestine with perforation and abscess without bleeding. Acquired absence of other specified parts of digestive tract. Other specified postprocedural states. Right lower quadrant pain. COMPARISON: 12/15/2021 TECHNIQUE:  CT examination of the abdomen and pelvis was performed without intravenous contrast. Multiplanar 2D reformatted images were created from the source data. This examination, like all CT scans performed in the [facility], was performed utilizing techniques to minimize radiation dose exposure, including the use of iterative reconstruction and automated exposure control. Radiation dose length product (DLP) for this visit: Enteric contrast was not administered. FINDINGS: ABDOMEN LOWER CHEST: No clinically significant abnormality in the visualized lower chest. Mild emphysema LIVER/BILIARY TREE: Unremarkable. GALLBLADDER: No calcified gallstones. No pericholecystic inflammatory change. SPLEEN: Unremarkable. PANCREAS: Unremarkable. ADRENAL GLANDS: Unremarkable. KIDNEYS/URETERS: Unremarkable. No hydronephrosis. STOMACH AND BOWEL: Prior small bowel surgery. Prior partial sigmoidectomy. Patent anastomosis. No significant diverticular disease APPENDIX: No findings to suggest appendicitis. ABDOMINOPELVIC CAVITY: No ascites. No pneumoperitoneum. No lymphadenopathy. VESSELS: Atherosclerosis without abdominal aortic aneurysm. PELVIS REPRODUCTIVE ORGANS: Unremarkable for patient's age. URINARY BLADDER: Unremarkable. ABDOMINAL WALL/INGUINAL REGIONS: Diastases recti with small periumbilical incisional hernia.. BONES: No acute fracture or suspicious osseous lesion. Spinal degenerative changes.     Impression: 1. No acute intra-abdominal disease. Normal caliber bowel loops. No significant diverticular disease. Partial  sigmoidectomy with patent anastomosis. Electronically signed: 07/26/2024 04:47 PM Dutch Garcia MD

## 2024-08-12 DIAGNOSIS — J42 CHRONIC BRONCHITIS, UNSPECIFIED CHRONIC BRONCHITIS TYPE (HCC): ICD-10-CM

## 2024-08-13 DIAGNOSIS — F41.1 GENERALIZED ANXIETY DISORDER: ICD-10-CM

## 2024-08-13 RX ORDER — ALBUTEROL SULFATE 90 UG/1
2 AEROSOL, METERED RESPIRATORY (INHALATION) 3 TIMES DAILY PRN
Qty: 18 G | Refills: 5 | Status: SHIPPED | OUTPATIENT
Start: 2024-08-13 | End: 2024-08-19 | Stop reason: SDUPTHER

## 2024-08-14 ENCOUNTER — TELEPHONE (OUTPATIENT)
Age: 69
End: 2024-08-14

## 2024-08-14 ENCOUNTER — OFFICE VISIT (OUTPATIENT)
Dept: GASTROENTEROLOGY | Facility: CLINIC | Age: 69
End: 2024-08-14
Payer: COMMERCIAL

## 2024-08-14 VITALS
WEIGHT: 105 LBS | HEIGHT: 65 IN | BODY MASS INDEX: 17.49 KG/M2 | DIASTOLIC BLOOD PRESSURE: 70 MMHG | SYSTOLIC BLOOD PRESSURE: 118 MMHG | TEMPERATURE: 99.5 F

## 2024-08-14 DIAGNOSIS — R10.13 DYSPEPSIA: ICD-10-CM

## 2024-08-14 DIAGNOSIS — K21.9 GASTROESOPHAGEAL REFLUX DISEASE, UNSPECIFIED WHETHER ESOPHAGITIS PRESENT: Chronic | ICD-10-CM

## 2024-08-14 PROCEDURE — 99213 OFFICE O/P EST LOW 20 MIN: CPT | Performed by: INTERNAL MEDICINE

## 2024-08-14 RX ORDER — ONDANSETRON 4 MG/1
8 TABLET, FILM COATED ORAL EVERY 8 HOURS PRN
Qty: 30 TABLET | Refills: 0 | Status: SHIPPED | OUTPATIENT
Start: 2024-08-14 | End: 2024-08-14 | Stop reason: SDUPTHER

## 2024-08-14 RX ORDER — DIAZEPAM 5 MG
TABLET ORAL
Qty: 90 TABLET | Refills: 0 | Status: SHIPPED | OUTPATIENT
Start: 2024-08-14

## 2024-08-14 RX ORDER — ONDANSETRON 4 MG/1
8 TABLET, FILM COATED ORAL EVERY 8 HOURS PRN
Qty: 30 TABLET | Refills: 7 | Status: SHIPPED | OUTPATIENT
Start: 2024-08-14 | End: 2024-08-19 | Stop reason: SDUPTHER

## 2024-08-14 NOTE — PATIENT INSTRUCTIONS
Patient will need a follow up with Dr. Yeung in 2-3 months and when he is in [place] with the fellows.  He stated that he will see her at lunch time .  Scheduled for 12/04/2024 at 12:00 with Dr. Yeung

## 2024-08-14 NOTE — TELEPHONE ENCOUNTER
Open and unsigned provider notes.  Please advise pa team when Provider concludes OV so PA can be submitted for ondansetron. Thank you

## 2024-08-15 NOTE — PROGRESS NOTES
[facility]  Progress Note - Ora Rivera 69 y.o. female MRN: [id number]    Unit/Bed#:  Encounter: [id number]    Assessment/Plan:  1. Dyspepsia  - ondansetron (ZOFRAN) 4 mg tablet; Take 2 tablets (8 mg total) by mouth every 8 (eight) hours as needed for nausea or vomiting  Dispense: 30 tablet; Refill: 7    2. Gastroesophageal reflux disease, unspecified whether esophagitis present  - ondansetron (ZOFRAN) 4 mg tablet; Take 2 tablets (8 mg total) by mouth every 8 (eight) hours as needed for nausea or vomiting  Dispense: 30 tablet; Refill: 7    Symptoms are improved with Zofran as a result we discussed increasing Zofran from 4 mg to 8 mg.    Continue omeprazole 40 mg    Continue optimizing fiber and fluid    Follow-up in 1 month  Antireflux measures      Subjective:     Constipation is improved but also has nausea now.  Zofran of 8 mg works better than 4 mg.  Denies any other acute distress at this time other than mild abdominal pain which is gradually improving.  Answers submitted by the patient for this visit:  Abdominal Pain Questionnaire (Submitted on 8/12/2024)  Chief Complaint: Abdominal pain  Chronicity: chronic  Onset: more than 1 month ago  Onset quality: gradual  Frequency: daily  Progression since onset: waxing and waning  Pain location: epigastric region, right flank  Pain - numeric: 7/10  Pain quality: cramping, a sensation of fullness, sharp  Radiates to: RLQ, left shoulder, back  anorexia: Yes  arthralgias: Yes  belching: No  constipation: Yes  diarrhea: Yes  dysuria: No  fever: No  flatus: No  frequency: Yes  headaches: Yes  hematochezia: No  hematuria: No  melena: No  myalgias: Yes  nausea: Yes  weight loss: Yes  vomiting: Yes  Aggravated by: certain positions, eating  Relieved by: belching, bowel movements, palpation, passing flatus, recumbency  Diagnostic workup: CT scan    Objective:     Vitals: Blood pressure 118/70, temperature 99.5 °F (37.5 °C), temperature source Tympanic,  "height 5' 5\" (1.651 m), weight 47.6 kg (105 lb), not currently breastfeeding.,Body mass index is 17.47 kg/m².    [unfilled]    Physical Exam:    GEN: wn/wd, NAD  HEENT: MMM, no cervical or supraclavicular LAD, anciteric  CV: RRR, no m/r/g  CHEST: CTA b/l, no w/r/r  ABD: +BS, soft, NT/ND, no hepatosplenomegaly  EXT: no c/c/e  SKIN: no rashes  NEURO: aaox3      Invasive Devices       None                           Lab, Imaging and other studies:     No visits with results within 1 Day(s) from this visit.   Latest known visit with results is:   Appointment on 06/13/2024   Component Date Value    Cholesterol 06/13/2024 202 (H)     Triglycerides 06/13/2024 89     HDL, Direct 06/13/2024 84     LDL Calculated 06/13/2024 100     Sodium 06/13/2024 137     Potassium 06/13/2024 4.8     Chloride 06/13/2024 100     CO2 06/13/2024 26     ANION GAP 06/13/2024 11     BUN 06/13/2024 14     Creatinine 06/13/2024 0.91     Glucose, Fasting 06/13/2024 79     Calcium 06/13/2024 9.3     AST 06/13/2024 19     ALT 06/13/2024 25     Alkaline Phosphatase 06/13/2024 37     Total Protein 06/13/2024 7.1     Albumin 06/13/2024 4.3     Total Bilirubin 06/13/2024 0.41     eGFR 06/13/2024 65     T3, Free 06/13/2024 3.26     Free T4 06/13/2024 0.62     TSH 3RD GENERATON 06/13/2024 1.914              No current facility-administered medications for this visit.             "

## 2024-08-16 NOTE — TELEPHONE ENCOUNTER
PA for ondansetron SUBMITTED     via    []Counts include 234 beds at the [facility]-KEY:   [x]Kevin-Case ID #   [id number]   []Faxed to plan   []Other website   []Phone call Case ID #     Office notes sent, clinical questions answered. Awaiting determination    Turnaround time for your insurance to make a decision on your Prior Authorization can take 7-21 business days.

## 2024-08-17 NOTE — TELEPHONE ENCOUNTER
PA for ondansetron Approved     Date(s) approved May 17, 2024 to August 16, 2025     Case #[id number]     Patient advised by          [] MyChart Message  [] Phone call   []LMOM  []L/M to call office as no active Communication consent on file  []Unable to leave detailed message as VM not approved on Communication consent       Pharmacy advised by    [x]Fax  []Phone call    Approval letter scanned into Media Yes

## 2024-08-19 ENCOUNTER — OFFICE VISIT (OUTPATIENT)
Dept: FAMILY MEDICINE CLINIC | Facility: CLINIC | Age: 69
End: 2024-08-19
Payer: COMMERCIAL

## 2024-08-19 ENCOUNTER — TRANSCRIBE ORDERS (OUTPATIENT)
Dept: GASTROENTEROLOGY | Facility: CLINIC | Age: 69
End: 2024-08-19

## 2024-08-19 VITALS
WEIGHT: 109.4 LBS | BODY MASS INDEX: 18.23 KG/M2 | OXYGEN SATURATION: 97 % | TEMPERATURE: 97.5 F | HEART RATE: 75 BPM | HEIGHT: 65 IN

## 2024-08-19 DIAGNOSIS — R10.13 DYSPEPSIA: Primary | ICD-10-CM

## 2024-08-19 DIAGNOSIS — J30.2 SEASONAL ALLERGIC RHINITIS, UNSPECIFIED TRIGGER: ICD-10-CM

## 2024-08-19 DIAGNOSIS — K59.89 VISCERAL HYPERSENSITIVITY SYNDROME: ICD-10-CM

## 2024-08-19 DIAGNOSIS — K21.9 GASTROESOPHAGEAL REFLUX DISEASE, UNSPECIFIED WHETHER ESOPHAGITIS PRESENT: Chronic | ICD-10-CM

## 2024-08-19 DIAGNOSIS — J42 CHRONIC BRONCHITIS, UNSPECIFIED CHRONIC BRONCHITIS TYPE (HCC): ICD-10-CM

## 2024-08-19 PROCEDURE — 99214 OFFICE O/P EST MOD 30 MIN: CPT | Performed by: FAMILY MEDICINE

## 2024-08-19 PROCEDURE — G2211 COMPLEX E/M VISIT ADD ON: HCPCS | Performed by: FAMILY MEDICINE

## 2024-08-19 RX ORDER — LEVOCETIRIZINE DIHYDROCHLORIDE 5 MG/1
5 TABLET, FILM COATED ORAL EVERY EVENING
Qty: 30 TABLET | Refills: 0 | Status: SHIPPED | OUTPATIENT
Start: 2024-08-19 | End: 2024-09-18

## 2024-08-19 RX ORDER — ALBUTEROL SULFATE 90 UG/1
2 AEROSOL, METERED RESPIRATORY (INHALATION) 3 TIMES DAILY PRN
Qty: 18 G | Refills: 5 | Status: SHIPPED | OUTPATIENT
Start: 2024-08-19

## 2024-08-19 RX ORDER — ONDANSETRON 8 MG/1
8 TABLET, FILM COATED ORAL EVERY 8 HOURS PRN
Start: 2024-08-19 | End: 2024-08-25 | Stop reason: SDUPTHER

## 2024-08-19 NOTE — PROGRESS NOTES
Ambulatory Visit  Name: Ora Rivera      : 1955      MRN: [id number]  Encounter Provider: Suzan Ferrer DO  Encounter Date: 2024   Encounter department: [facility] PRIMARY CARE    Assessment & Plan   1. Dyspepsia  -     Ambulatory Referral to Gastroenterology; Future; Expected date: 2024  2. Gastroesophageal reflux disease, unspecified whether esophagitis present  -     Ambulatory Referral to Gastroenterology; Future; Expected date: 2024  3. Chronic bronchitis, unspecified chronic bronchitis type (HCC)  -     albuterol (PROVENTIL HFA,VENTOLIN HFA) 90 mcg/act inhaler; Inhale 2 puffs 3 (three) times a day as needed for wheezing or shortness of breath  4. Seasonal allergic rhinitis, unspecified trigger  -     levocetirizine (XYZAL) 5 MG tablet; Take 1 tablet (5 mg total) by mouth every evening  5. Visceral hypersensitivity syndrome  Assessment & Plan:  Likely component of her GI symptoms due to high risk with previous surgical procedures    I have spent a total time of 25 minutes in caring for this patient on the day of the visit/encounter including Diagnostic results, Prognosis, Instructions for management, Patient and family education, Impressions, Counseling / Coordination of care, Documenting in the medical record, Reviewing / ordering tests, medicine, procedures  , and Obtaining or reviewing history  .      History of Present Illness     69-year-old female here for follow-up on CT scan.  Did see GI and has further follow-up .  Appetite is somewhat better.  Put back on a few pounds.  House partner is difficult to live with and increases stress and GI symptoms      Chief Complaint   Patient presents with   • Follow-up     F/u catscan , pain in stomach    Did see GI, Dr. Yeung in follow-up on .  Suggestions were to continue the Zofran and omeprazole.  Also recommended continue optimizing fiber and fluids.    CT scan from July was unremarkable for any  "acute changes.  Patient with history of diverticulitis and perforation with abscess with several surgical procedures including ileostomy and takedown.  Review of Systems   Constitutional:  Negative for fatigue (About the same improved).   Gastrointestinal:         GI symptoms about the same.  Is using omeprazole and Zofran   Genitourinary: Negative.    Musculoskeletal:  Positive for arthralgias and myalgias.        Continues to see pain management   Psychiatric/Behavioral:          Stable   All other systems reviewed and are negative.    ABDOMINAL WALL/INGUINAL REGIONS: Diastases recti with small periumbilical incisional hernia..   IMPRESSION:  1. No acute intra-abdominal disease. Normal caliber bowel loops. No significant diverticular disease. Partial sigmoidectomy with patent anastomosis.       Objective   Pulse 75   Temp 97.5 °F (36.4 °C) (Temporal)   Ht 5' 5\" (1.651 m)   Wt 49.6 kg (109 lb 6.4 oz)   LMP  (LMP Unknown)   SpO2 97%   BMI 18.21 kg/m²     Physical Exam  Constitutional:       Appearance: Normal appearance.      Comments: 69-year-old slim female, does not appear cachectic   Cardiovascular:      Rate and Rhythm: Normal rate and regular rhythm.   Pulmonary:      Effort: Pulmonary effort is normal.      Breath sounds: Normal breath sounds.   Abdominal:      General: Abdomen is flat.      Palpations: Abdomen is soft.      Tenderness: There is no abdominal tenderness (Mild right lower quadrant).   Neurological:      Mental Status: She is alert.       Administrative Statements             "

## 2024-08-25 DIAGNOSIS — K21.9 GASTROESOPHAGEAL REFLUX DISEASE, UNSPECIFIED WHETHER ESOPHAGITIS PRESENT: Chronic | ICD-10-CM

## 2024-08-25 DIAGNOSIS — J06.9 ACUTE URI: ICD-10-CM

## 2024-08-25 DIAGNOSIS — R10.13 DYSPEPSIA: ICD-10-CM

## 2024-08-27 RX ORDER — ONDANSETRON 8 MG/1
8 TABLET, FILM COATED ORAL EVERY 8 HOURS PRN
Qty: 20 TABLET | Refills: 0 | Status: SHIPPED | OUTPATIENT
Start: 2024-08-27

## 2024-08-27 NOTE — TELEPHONE ENCOUNTER
Patient called and stated she would like a refill of this medication as soon as possible. Patient states she is having pain all over in her bones. Please advise.

## 2024-08-29 DIAGNOSIS — J06.9 UPPER RESPIRATORY TRACT INFECTION, UNSPECIFIED TYPE: ICD-10-CM

## 2024-08-29 DIAGNOSIS — M79.7 FIBROMYALGIA: ICD-10-CM

## 2024-08-29 RX ORDER — PREDNISONE 2.5 MG/1
2.5 TABLET ORAL 2 TIMES DAILY
Qty: 60 TABLET | Refills: 0 | Status: SHIPPED | OUTPATIENT
Start: 2024-08-29

## 2024-08-29 RX ORDER — PREDNISONE 10 MG/1
TABLET ORAL
Qty: 21 TABLET | Refills: 0 | Status: SHIPPED | OUTPATIENT
Start: 2024-08-29

## 2024-09-09 DIAGNOSIS — K21.9 GASTROESOPHAGEAL REFLUX DISEASE, UNSPECIFIED WHETHER ESOPHAGITIS PRESENT: Chronic | ICD-10-CM

## 2024-09-09 DIAGNOSIS — R10.13 DYSPEPSIA: ICD-10-CM

## 2024-09-09 PROBLEM — K59.89 VISCERAL HYPERSENSITIVITY SYNDROME: Status: ACTIVE | Noted: 2024-09-09

## 2024-09-09 RX ORDER — ONDANSETRON 8 MG/1
8 TABLET, FILM COATED ORAL EVERY 8 HOURS PRN
Qty: 20 TABLET | Refills: 0 | Status: SHIPPED | OUTPATIENT
Start: 2024-09-09

## 2024-09-13 DIAGNOSIS — F41.1 GENERALIZED ANXIETY DISORDER: ICD-10-CM

## 2024-09-14 RX ORDER — DIAZEPAM 5 MG
TABLET ORAL
Qty: 90 TABLET | Refills: 0 | Status: SHIPPED | OUTPATIENT
Start: 2024-09-14

## 2024-09-16 DIAGNOSIS — J06.9 ACUTE URI: ICD-10-CM

## 2024-09-18 RX ORDER — PREDNISONE 10 MG/1
TABLET ORAL
Qty: 21 TABLET | Refills: 0 | Status: SHIPPED | OUTPATIENT
Start: 2024-09-18

## 2024-09-24 ENCOUNTER — OFFICE VISIT (OUTPATIENT)
Dept: FAMILY MEDICINE CLINIC | Facility: CLINIC | Age: 69
End: 2024-09-24
Payer: COMMERCIAL

## 2024-09-24 VITALS
BODY MASS INDEX: 18.83 KG/M2 | HEART RATE: 61 BPM | TEMPERATURE: 97 F | DIASTOLIC BLOOD PRESSURE: 62 MMHG | HEIGHT: 65 IN | SYSTOLIC BLOOD PRESSURE: 112 MMHG | OXYGEN SATURATION: 98 % | WEIGHT: 113 LBS

## 2024-09-24 DIAGNOSIS — R10.13 DYSPEPSIA: ICD-10-CM

## 2024-09-24 DIAGNOSIS — K21.9 GASTROESOPHAGEAL REFLUX DISEASE, UNSPECIFIED WHETHER ESOPHAGITIS PRESENT: Chronic | ICD-10-CM

## 2024-09-24 DIAGNOSIS — R09.81 HEAD CONGESTION: ICD-10-CM

## 2024-09-24 DIAGNOSIS — Z72.0 TOBACCO USE: ICD-10-CM

## 2024-09-24 DIAGNOSIS — R05.9 COUGH, UNSPECIFIED TYPE: Primary | ICD-10-CM

## 2024-09-24 DIAGNOSIS — I10 ESSENTIAL HYPERTENSION: ICD-10-CM

## 2024-09-24 DIAGNOSIS — J45.31 MILD PERSISTENT ASTHMA WITH ACUTE EXACERBATION: ICD-10-CM

## 2024-09-24 DIAGNOSIS — H92.02 LEFT EAR PAIN: ICD-10-CM

## 2024-09-24 PROBLEM — E46 PROTEIN-CALORIE MALNUTRITION, UNSPECIFIED SEVERITY (HCC): Status: ACTIVE | Noted: 2021-10-13

## 2024-09-24 PROCEDURE — G2211 COMPLEX E/M VISIT ADD ON: HCPCS | Performed by: FAMILY MEDICINE

## 2024-09-24 PROCEDURE — 99214 OFFICE O/P EST MOD 30 MIN: CPT | Performed by: FAMILY MEDICINE

## 2024-09-24 RX ORDER — OMEPRAZOLE 40 MG/1
40 CAPSULE, DELAYED RELEASE ORAL
Qty: 30 CAPSULE | Refills: 0 | Status: SHIPPED | OUTPATIENT
Start: 2024-09-24 | End: 2025-03-23

## 2024-09-24 RX ORDER — AZITHROMYCIN 250 MG/1
TABLET, FILM COATED ORAL
Qty: 6 TABLET | Refills: 0 | Status: SHIPPED | OUTPATIENT
Start: 2024-09-24 | End: 2024-09-29

## 2024-09-24 RX ORDER — PREDNISONE 10 MG/1
TABLET ORAL
Qty: 21 TABLET | Refills: 0 | Status: SHIPPED | OUTPATIENT
Start: 2024-09-24

## 2024-09-24 NOTE — ASSESSMENT & PLAN NOTE
Orders:    predniSONE 10 mg tablet; Take 60 mg po day#1, 50 mg po day#2, 40 mg po day#3, 30 mg po day#4, 20 mg po day#5m and 10 mg po day#6

## 2024-09-24 NOTE — PROGRESS NOTES
Ambulatory Visit  Name: Ora Rivera      : 1955      MRN: [id number]  Encounter Provider: Bret Ibarra DO  Encounter Date: 2024   Encounter department: [facility] PRIMARY CARE  Chief Complaint   Patient presents with    Cold Like Symptoms     Left Earache, cough and congestion fever at times started a few weeks ago      Patient Instructions   Rest and fluids, start abx and prednisone and quit tobacco, may use Dimetapp DM cold and cough prn.     Assessment & Plan  Cough, unspecified type    Orders:    predniSONE 10 mg tablet; Take 60 mg po day#1, 50 mg po day#2, 40 mg po day#3, 30 mg po day#4, 20 mg po day#5m and 10 mg po day#6    azithromycin (Zithromax) 250 mg tablet; Take 2 tablets (500 mg total) by mouth daily for 1 day, THEN 1 tablet (250 mg total) daily for 4 days.    Left ear pain    Orders:    predniSONE 10 mg tablet; Take 60 mg po day#1, 50 mg po day#2, 40 mg po day#3, 30 mg po day#4, 20 mg po day#5m and 10 mg po day#6    azithromycin (Zithromax) 250 mg tablet; Take 2 tablets (500 mg total) by mouth daily for 1 day, THEN 1 tablet (250 mg total) daily for 4 days.    Head congestion    Orders:    predniSONE 10 mg tablet; Take 60 mg po day#1, 50 mg po day#2, 40 mg po day#3, 30 mg po day#4, 20 mg po day#5m and 10 mg po day#6    Essential hypertension         Mild persistent asthma with acute exacerbation    Orders:    predniSONE 10 mg tablet; Take 60 mg po day#1, 50 mg po day#2, 40 mg po day#3, 30 mg po day#4, 20 mg po day#5m and 10 mg po day#6    Tobacco use         Dyspepsia    Orders:    omeprazole (PriLOSEC) 40 MG capsule; Take 1 capsule (40 mg total) by mouth daily before breakfast    Gastroesophageal reflux disease, unspecified whether esophagitis present    Orders:    omeprazole (PriLOSEC) 40 MG capsule; Take 1 capsule (40 mg total) by mouth daily before breakfast    [unfilled]  History of Present Illness     Cold Like Symptoms (Left Earache, cough and congestion  fever at times started a few weeks ago )        History obtained from : patient  Review of Systems   Constitutional:  Positive for fever.   HENT:  Positive for congestion and ear pain (left).    Eyes: Negative.    Respiratory:  Positive for cough.    Cardiovascular: Negative.    Gastrointestinal: Negative.    Endocrine: Negative.    Genitourinary: Negative.    Musculoskeletal:  Positive for myalgias.   Skin: Negative.    Allergic/Immunologic: Negative.    Neurological: Negative.    Hematological: Negative.    Psychiatric/Behavioral: Negative.       Current Outpatient Medications on File Prior to Visit   Medication Sig Dispense Refill    albuterol (PROVENTIL HFA,VENTOLIN HFA) 90 mcg/act inhaler Inhale 2 puffs 3 (three) times a day as needed for wheezing or shortness of breath 18 g 5    aspirin 81 mg chewable tablet Chew 81 mg daily      atorvastatin (LIPITOR) 10 mg tablet TAKE 1 TABLET BY MOUTH EVERY  tablet 1    Cholecalciferol (VITAMIN D3 PO) Take by mouth      diazepam (VALIUM) 5 mg tablet Take 1 PO in am 1-2 at HS 90 tablet 0    fluticasone (FLONASE) 50 mcg/act nasal spray SPRAY 2 SPRAYS INTO EACH NOSTRIL EVERY DAY 16 mL 5    HYDROcodone-acetaminophen (NORCO)  mg per tablet       ketorolac (TORADOL) 10 mg tablet Take 1 tablet (10 mg total) by mouth every 6 (six) hours as needed for moderate pain 20 tablet 0    levocetirizine (XYZAL) 5 MG tablet TAKE 1 TABLET BY MOUTH EVERY DAY IN THE EVENING 30 tablet 5    lidocaine (XYLOCAINE) 5 % ointment APPLY TOPICALLY AS NEEDED FOR MILD PAIN. 50 g 1    lisinopril (ZESTRIL) 2.5 mg tablet TAKE 1 TABLET (2.5 MG TOTAL) BY MOUTH DAILY PT TAKES SHE HAS NOT BEEN TAKING 90 tablet 1    MAGNESIUM PO Take by mouth      mirtazapine (REMERON) 15 mg tablet Take 1 tablet (15 mg total) by mouth daily at bedtime NO alcohol 30 tablet 5    naloxone (NARCAN) 4 mg/0.1 mL nasal spray Administer 1 spray into a nostril. If no response after 2-3 minutes, give another dose in the other  "nostril using a new spray. 1 each 1    ondansetron (ZOFRAN) 8 mg tablet Take 1 tablet (8 mg total) by mouth every 8 (eight) hours as needed for nausea or vomiting 20 tablet 0    predniSONE 10 mg tablet Day 1: 6 tabs  Day 2: 5 tabs Day 3: 4 tabs  Day 4: 3 tabs  Day 5: 2 tabs  Day 6: 1 tab 21 tablet 0    predniSONE 2.5 mg tablet Take 1 tablet (2.5 mg total) by mouth 2 (two) times a day 60 tablet 0    primidone (MYSOLINE) 50 mg tablet Take 6tabs qhs 720 tablet 3    Probiotic Product (PROBIOTIC-10 PO) Take by mouth      tiZANidine (ZANAFLEX) 4 mg tablet       [DISCONTINUED] omeprazole (PriLOSEC) 40 MG capsule Take 1 capsule (40 mg total) by mouth daily before breakfast 30 capsule 5    linaCLOtide 145 MCG CAPS Take 1 capsule (145 mcg total) by mouth daily (Patient not taking: Reported on 9/24/2024) 30 capsule 8    oxyCODONE-acetaminophen (PERCOCET) 5-325 mg per tablet Take 1 tablet by mouth every 4 (four) hours as needed for moderate pain (Patient not taking: Reported on 9/24/2024)       No current facility-administered medications on file prior to visit.          Objective     /62 (BP Location: Left arm, Patient Position: Sitting, Cuff Size: Adult)   Pulse 61   Temp (!) 97 °F (36.1 °C) (Temporal)   Ht 5' 5\" (1.651 m)   Wt 51.3 kg (113 lb)   LMP  (LMP Unknown)   SpO2 98%   BMI 18.80 kg/m²     Physical Exam  Constitutional:       Appearance: She is well-developed.   HENT:      Head: Normocephalic and atraumatic.      Right Ear: Tympanic membrane, ear canal and external ear normal.      Left Ear: Tympanic membrane, ear canal and external ear normal.      Nose: Nose normal.   Eyes:      Conjunctiva/sclera: Conjunctivae normal.      Pupils: Pupils are equal, round, and reactive to light.   Cardiovascular:      Rate and Rhythm: Normal rate and regular rhythm.      Pulses: Normal pulses.      Heart sounds: Normal heart sounds.   Pulmonary:      Effort: Pulmonary effort is normal.      Breath sounds: Normal breath " sounds.      Comments: cough  Musculoskeletal:         General: Normal range of motion.      Cervical back: Normal range of motion and neck supple.   Skin:     General: Skin is warm and dry.      Capillary Refill: Capillary refill takes less than 2 seconds.   Neurological:      General: No focal deficit present.      Mental Status: She is alert and oriented to person, place, and time. Mental status is at baseline.      Deep Tendon Reflexes: Reflexes are normal and symmetric.   Psychiatric:         Mood and Affect: Mood normal.         Behavior: Behavior normal.         Thought Content: Thought content normal.         Judgment: Judgment normal.       Administrative Statements   I have spent a total time of 20 minutes in caring for this patient on the day of the visit/encounter including Diagnostic results, Prognosis, Risks and benefits of tx options, Instructions for management, Patient and family education, Importance of tx compliance, Risk factor reductions, Impressions, Counseling / Coordination of care, Documenting in the medical record, Reviewing / ordering tests, medicine, procedures  , and Obtaining or reviewing history  .

## 2024-09-24 NOTE — PATIENT INSTRUCTIONS
Rest and fluids, start abx and prednisone and quit tobacco, may use Dimetapp DM cold and cough prn.

## 2024-09-26 DIAGNOSIS — K21.9 GASTROESOPHAGEAL REFLUX DISEASE, UNSPECIFIED WHETHER ESOPHAGITIS PRESENT: Chronic | ICD-10-CM

## 2024-09-26 DIAGNOSIS — R10.13 DYSPEPSIA: ICD-10-CM

## 2024-09-26 RX ORDER — ONDANSETRON 8 MG/1
8 TABLET, FILM COATED ORAL EVERY 8 HOURS PRN
Qty: 20 TABLET | Refills: 0 | Status: SHIPPED | OUTPATIENT
Start: 2024-09-26

## 2024-10-01 DIAGNOSIS — M79.7 FIBROMYALGIA: ICD-10-CM

## 2024-10-01 DIAGNOSIS — J06.9 UPPER RESPIRATORY TRACT INFECTION, UNSPECIFIED TYPE: ICD-10-CM

## 2024-10-01 RX ORDER — PREDNISONE 2.5 MG/1
2.5 TABLET ORAL 2 TIMES DAILY
Qty: 60 TABLET | Refills: 0 | Status: SHIPPED | OUTPATIENT
Start: 2024-10-01

## 2024-10-02 ENCOUNTER — OFFICE VISIT (OUTPATIENT)
Dept: FAMILY MEDICINE CLINIC | Facility: CLINIC | Age: 69
End: 2024-10-02
Payer: COMMERCIAL

## 2024-10-02 VITALS
SYSTOLIC BLOOD PRESSURE: 120 MMHG | HEIGHT: 65 IN | RESPIRATION RATE: 16 BRPM | OXYGEN SATURATION: 98 % | DIASTOLIC BLOOD PRESSURE: 70 MMHG | HEART RATE: 76 BPM | WEIGHT: 114.8 LBS | TEMPERATURE: 97.8 F | BODY MASS INDEX: 19.13 KG/M2

## 2024-10-02 DIAGNOSIS — J01.41 ACUTE RECURRENT PANSINUSITIS: Primary | ICD-10-CM

## 2024-10-02 DIAGNOSIS — Z12.31 ENCOUNTER FOR SCREENING MAMMOGRAM FOR BREAST CANCER: ICD-10-CM

## 2024-10-02 DIAGNOSIS — F17.211 NICOTINE DEPENDENCE, CIGARETTES, IN REMISSION: ICD-10-CM

## 2024-10-02 PROBLEM — E46 PROTEIN-CALORIE MALNUTRITION, UNSPECIFIED SEVERITY (HCC): Status: RESOLVED | Noted: 2021-10-13 | Resolved: 2024-10-02

## 2024-10-02 PROCEDURE — G2211 COMPLEX E/M VISIT ADD ON: HCPCS | Performed by: FAMILY MEDICINE

## 2024-10-02 PROCEDURE — 99213 OFFICE O/P EST LOW 20 MIN: CPT | Performed by: FAMILY MEDICINE

## 2024-10-02 RX ORDER — PREDNISONE 10 MG/1
TABLET ORAL
Qty: 21 TABLET | Refills: 0 | Status: SHIPPED | OUTPATIENT
Start: 2024-10-02 | End: 2024-10-16 | Stop reason: SDUPTHER

## 2024-10-02 RX ORDER — ERYTHROMYCIN 5 MG/G
OINTMENT OPHTHALMIC
COMMUNITY
Start: 2024-09-05

## 2024-10-02 RX ORDER — AZITHROMYCIN 250 MG/1
TABLET, FILM COATED ORAL
Qty: 6 TABLET | Refills: 0 | Status: SHIPPED | OUTPATIENT
Start: 2024-10-02 | End: 2024-10-06

## 2024-10-02 NOTE — PROGRESS NOTES
"Ambulatory Visit  Name: Ora Rivera      : 1955      MRN: [id number]  Encounter Provider: Suzan Ferrer DO  Encounter Date: 10/2/2024   Encounter department: [facility] PRIMARY CARE    Assessment & Plan  Acute recurrent pansinusitis  Hydrate, use Mucinex OTC  Orders:    azithromycin (ZITHROMAX) 250 mg tablet; Take 2 tablets today then 1 tablet daily x 4 days    Encounter for screening mammogram for breast cancer         Nicotine dependence, cigarettes, in remission    Orders:    CT lung screening program; Future       History of Present Illness     Patient is a 69-year-old female current smoker.  Presents with continued symptoms of cough nasal congestion etc. as noted.    URI   This is a chronic problem. The current episode started 1 to 4 weeks ago. The problem has been waxing and waning. Associated symptoms include congestion, coughing and wheezing. Treatments tried: Seen on  was given prednisone taper as well as Z-David.     Patient was here with Dr. Ibarra on  and was given prednisone taper and the Z-David.  She is still having symptoms of cough nasal congestion shortness of breath and just not feeling well.  Feels she needs another course of antibiotic.    Review of Systems   Constitutional:  Positive for fatigue.   HENT:  Positive for congestion.    Respiratory:  Positive for cough and wheezing.            Objective     /70   Pulse 76   Temp 97.8 °F (36.6 °C) (Temporal)   Resp 16   Ht 5' 5\" (1.651 m)   Wt 52.1 kg (114 lb 12.8 oz)   LMP  (LMP Unknown)   SpO2 98%   BMI 19.10 kg/m²     Physical Exam  Vitals reviewed.   Constitutional:       General: She is not in acute distress.     Comments: 69-year-old female appears stated age   HENT:      Nose: Mucosal edema and rhinorrhea present.      Mouth/Throat:      Mouth: Mucous membranes are moist.      Pharynx: Postnasal drip present.   Cardiovascular:      Rate and Rhythm: Normal rate and regular rhythm. "   Pulmonary:      Effort: Pulmonary effort is normal.      Breath sounds: Normal breath sounds.      Comments: Slight increased expiratory phase  Skin:     Findings: No rash.   Neurological:      Mental Status: She is alert and oriented to person, place, and time.   Psychiatric:         Mood and Affect: Mood is anxious. Affect is not blunt.

## 2024-10-09 ENCOUNTER — TELEPHONE (OUTPATIENT)
Age: 69
End: 2024-10-09

## 2024-10-09 DIAGNOSIS — K21.9 GASTROESOPHAGEAL REFLUX DISEASE, UNSPECIFIED WHETHER ESOPHAGITIS PRESENT: Chronic | ICD-10-CM

## 2024-10-09 DIAGNOSIS — R10.13 DYSPEPSIA: ICD-10-CM

## 2024-10-09 NOTE — TELEPHONE ENCOUNTER
Patients ophthalmologist is recommending patient get repeated TSH lab work. Asking for this to be ordered.

## 2024-10-10 RX ORDER — ONDANSETRON 8 MG/1
8 TABLET, FILM COATED ORAL EVERY 8 HOURS PRN
Qty: 20 TABLET | Refills: 3 | Status: SHIPPED | OUTPATIENT
Start: 2024-10-10

## 2024-10-11 DIAGNOSIS — F41.1 GENERALIZED ANXIETY DISORDER: ICD-10-CM

## 2024-10-11 RX ORDER — DIAZEPAM 5 MG
TABLET ORAL
Qty: 90 TABLET | Refills: 0 | Status: SHIPPED | OUTPATIENT
Start: 2024-10-11

## 2024-10-11 NOTE — TELEPHONE ENCOUNTER
We did receive notes from PRATIMA PARSONS.  Patient's thyroid parameters are up-to-date.  I have sent another message to forward the lab results to Juaquin Chaudhry MD @ [facility]  Component      Latest Ref Rng 6/13/2024   FREE T3      2.50 - 3.90 pg/mL 3.26    FREE T4      0.61 - 1.12 ng/dL 0.62    TSH 3RD GENERATON      0.450 - 4.500 uIU/mL 1.914

## 2024-10-16 DIAGNOSIS — J01.41 ACUTE RECURRENT PANSINUSITIS: ICD-10-CM

## 2024-10-16 DIAGNOSIS — J42 CHRONIC BRONCHITIS, UNSPECIFIED CHRONIC BRONCHITIS TYPE (HCC): ICD-10-CM

## 2024-10-17 RX ORDER — ALBUTEROL SULFATE 90 UG/1
2 INHALANT RESPIRATORY (INHALATION) 3 TIMES DAILY PRN
Qty: 18 G | Refills: 2 | Status: SHIPPED | OUTPATIENT
Start: 2024-10-17

## 2024-10-17 RX ORDER — PREDNISONE 10 MG/1
TABLET ORAL
Qty: 21 TABLET | Refills: 0 | Status: SHIPPED | OUTPATIENT
Start: 2024-10-17 | End: 2024-10-22 | Stop reason: SDUPTHER

## 2024-10-22 DIAGNOSIS — J01.41 ACUTE RECURRENT PANSINUSITIS: ICD-10-CM

## 2024-10-22 NOTE — TELEPHONE ENCOUNTER
Requested medication(s) are due for refill today: Yes  Patient has already received a courtesy refill: No  Other reason request has been forwarded to provider:    PAST MEDICAL HISTORY:  Aorticopulmonary septal defect     Asthma as a child, resolved    Atrial fibrillation     Congenital heart disease Eissenmengers complex, aorticopulmonary septal defect    COVID-19 virus infection 4/2020 - symptoms of N/V, fever, chills, body aches    Eisenmengers syndrome     GERD (gastroesophageal reflux disease)     H/O lung transplant     Heart transplanted     HLD (hyperlipidemia)     Hypothyroid     Pulmonary emboli     Pulmonary hypertension     TIA (transient ischemic attack)

## 2024-10-23 RX ORDER — PREDNISONE 10 MG/1
TABLET ORAL
Qty: 21 TABLET | Refills: 0 | Status: SHIPPED | OUTPATIENT
Start: 2024-10-23

## 2024-10-30 DIAGNOSIS — M79.7 FIBROMYALGIA: ICD-10-CM

## 2024-10-30 DIAGNOSIS — J06.9 UPPER RESPIRATORY TRACT INFECTION, UNSPECIFIED TYPE: ICD-10-CM

## 2024-10-30 RX ORDER — PREDNISONE 2.5 MG/1
2.5 TABLET ORAL 2 TIMES DAILY
Qty: 60 TABLET | Refills: 0 | Status: SHIPPED | OUTPATIENT
Start: 2024-10-30

## 2024-11-01 DIAGNOSIS — R10.13 DYSPEPSIA: ICD-10-CM

## 2024-11-01 DIAGNOSIS — K21.9 GASTROESOPHAGEAL REFLUX DISEASE, UNSPECIFIED WHETHER ESOPHAGITIS PRESENT: Chronic | ICD-10-CM

## 2024-11-01 RX ORDER — OMEPRAZOLE 40 MG/1
40 CAPSULE, DELAYED RELEASE ORAL
Qty: 30 CAPSULE | Refills: 0 | Status: SHIPPED | OUTPATIENT
Start: 2024-11-01

## 2024-11-05 DIAGNOSIS — J01.41 ACUTE RECURRENT PANSINUSITIS: ICD-10-CM

## 2024-11-05 RX ORDER — PREDNISONE 10 MG/1
TABLET ORAL
Qty: 21 TABLET | Refills: 0 | Status: CANCELLED | OUTPATIENT
Start: 2024-11-05

## 2024-11-08 DIAGNOSIS — J45.31 MILD PERSISTENT ASTHMA WITH ACUTE EXACERBATION: ICD-10-CM

## 2024-11-08 DIAGNOSIS — J42 CHRONIC BRONCHITIS, UNSPECIFIED CHRONIC BRONCHITIS TYPE (HCC): ICD-10-CM

## 2024-11-08 DIAGNOSIS — H92.02 LEFT EAR PAIN: ICD-10-CM

## 2024-11-08 DIAGNOSIS — F41.1 GENERALIZED ANXIETY DISORDER: ICD-10-CM

## 2024-11-08 DIAGNOSIS — R09.81 HEAD CONGESTION: ICD-10-CM

## 2024-11-08 DIAGNOSIS — R05.9 COUGH, UNSPECIFIED TYPE: ICD-10-CM

## 2024-11-08 RX ORDER — ALBUTEROL SULFATE 90 UG/1
2 INHALANT RESPIRATORY (INHALATION) 3 TIMES DAILY PRN
Qty: 18 G | Refills: 1 | Status: SHIPPED | OUTPATIENT
Start: 2024-11-08 | End: 2024-11-12 | Stop reason: SDUPTHER

## 2024-11-08 RX ORDER — PREDNISONE 10 MG/1
TABLET ORAL
Qty: 21 TABLET | Refills: 0 | Status: SHIPPED | OUTPATIENT
Start: 2024-11-08 | End: 2024-11-14

## 2024-11-08 RX ORDER — DIAZEPAM 5 MG/1
TABLET ORAL
Qty: 90 TABLET | Refills: 0 | Status: SHIPPED | OUTPATIENT
Start: 2024-11-08

## 2024-11-12 ENCOUNTER — TELEMEDICINE (OUTPATIENT)
Dept: FAMILY MEDICINE CLINIC | Facility: CLINIC | Age: 69
End: 2024-11-12
Payer: COMMERCIAL

## 2024-11-12 DIAGNOSIS — J02.9 SORE THROAT: Primary | ICD-10-CM

## 2024-11-12 DIAGNOSIS — J42 CHRONIC BRONCHITIS, UNSPECIFIED CHRONIC BRONCHITIS TYPE (HCC): ICD-10-CM

## 2024-11-12 DIAGNOSIS — R10.13 DYSPEPSIA: ICD-10-CM

## 2024-11-12 DIAGNOSIS — K21.9 GASTROESOPHAGEAL REFLUX DISEASE, UNSPECIFIED WHETHER ESOPHAGITIS PRESENT: Chronic | ICD-10-CM

## 2024-11-12 PROCEDURE — G2211 COMPLEX E/M VISIT ADD ON: HCPCS | Performed by: FAMILY MEDICINE

## 2024-11-12 PROCEDURE — 99213 OFFICE O/P EST LOW 20 MIN: CPT | Performed by: FAMILY MEDICINE

## 2024-11-12 RX ORDER — AZITHROMYCIN 250 MG/1
TABLET, FILM COATED ORAL
Qty: 6 TABLET | Refills: 0 | Status: SHIPPED | OUTPATIENT
Start: 2024-11-12 | End: 2024-11-17

## 2024-11-12 NOTE — PROGRESS NOTES
Virtual Regular Visit  Name: Ora Rivera      : 1955      MRN: [id number]  Encounter Provider: Bret Ibarra DO  Encounter Date: 2024   Encounter department: [facility] PRIMARY CARE  Chief Complaint   Patient presents with    Virtual Regular Visit     Patient Instructions   Gargle TID and start abx for sore throat and call if worse. Change toothbrush as well.     Verification of patient location:    Patient is located at Home in the following state in which I hold an active license [place]    Assessment & Plan  Sore throat    Orders:    azithromycin (Zithromax) 250 mg tablet; Take 2 tablets (500 mg total) by mouth daily for 1 day, THEN 1 tablet (250 mg total) daily for 4 days.         Encounter provider Bret Ibarra DO    The patient was identified by name and date of birth. Ora Rivera was informed that this is a telemedicine visit and that the visit is being conducted through the Epic Embedded platform. She agrees to proceed..  My office door was closed. No one else was in the room.  She acknowledged consent and understanding of privacy and security of the video platform. The patient has agreed to participate and understands they can discontinue the visit at any time.    Patient is aware this is a billable service.     History of Present Illness     Here for feeling sore throat, left swollen and is white. No other complaints.         History obtained from : patient  Review of Systems   Constitutional: Negative.    HENT:  Positive for sore throat.    Eyes: Negative.    Respiratory: Negative.     Cardiovascular: Negative.    Gastrointestinal: Negative.    Endocrine: Negative.    Genitourinary: Negative.    Musculoskeletal: Negative.    Skin: Negative.    Allergic/Immunologic: Negative.    Neurological: Negative.    Hematological: Negative.    Psychiatric/Behavioral: Negative.       Current Outpatient Medications on File Prior to Visit   Medication Sig Dispense Refill     albuterol (PROVENTIL HFA,VENTOLIN HFA) 90 mcg/act inhaler Inhale 2 puffs 3 (three) times a day as needed for wheezing or shortness of breath 18 g 1    aspirin 81 mg chewable tablet Chew 81 mg daily      atorvastatin (LIPITOR) 10 mg tablet TAKE 1 TABLET BY MOUTH EVERY  tablet 1    Cholecalciferol (VITAMIN D3 PO) Take by mouth      diazepam (VALIUM) 5 mg tablet Take 1 PO in am 1-2 at HS 90 tablet 0    erythromycin (ILOTYCIN) ophthalmic ointment APPLY A SMALL AMOUNT INTO THE LEFT EYE AT BEDTIME      fluticasone (FLONASE) 50 mcg/act nasal spray SPRAY 2 SPRAYS INTO EACH NOSTRIL EVERY DAY 16 mL 5    HYDROcodone-acetaminophen (NORCO)  mg per tablet       ketorolac (TORADOL) 10 mg tablet Take 1 tablet (10 mg total) by mouth every 6 (six) hours as needed for moderate pain 20 tablet 0    levocetirizine (XYZAL) 5 MG tablet TAKE 1 TABLET BY MOUTH EVERY DAY IN THE EVENING 30 tablet 5    lidocaine (XYLOCAINE) 5 % ointment APPLY TOPICALLY AS NEEDED FOR MILD PAIN. 50 g 1    lisinopril (ZESTRIL) 2.5 mg tablet TAKE 1 TABLET (2.5 MG TOTAL) BY MOUTH DAILY PT TAKES SHE HAS NOT BEEN TAKING 90 tablet 1    MAGNESIUM PO Take by mouth      mirtazapine (REMERON) 15 mg tablet Take 1 tablet (15 mg total) by mouth daily at bedtime NO alcohol 30 tablet 5    naloxone (NARCAN) 4 mg/0.1 mL nasal spray Administer 1 spray into a nostril. If no response after 2-3 minutes, give another dose in the other nostril using a new spray. 1 each 1    omeprazole (PriLOSEC) 40 MG capsule TAKE 1 CAPSULE BY MOUTH EVERY DAY BEFORE BREAKFAST 30 capsule 0    ondansetron (ZOFRAN) 8 mg tablet Take 1 tablet (8 mg total) by mouth every 8 (eight) hours as needed for nausea or vomiting 20 tablet 3    oxyCODONE-acetaminophen (PERCOCET) 5-325 mg per tablet Take 1 tablet by mouth every 4 (four) hours as needed for moderate pain (Patient not taking: Reported on 9/24/2024)      predniSONE 10 mg tablet Day 1: 6 tabs  Day 2: 5 tabs Day 3: 4 tabs  Day 4: 3 tabs  Day 5:  2 tabs  Day 6: 1 tab 21 tablet 0    predniSONE 10 mg tablet Take 60 mg po day#1, 50 mg po day#2, 40 mg po day#3, 30 mg po day#4, 20 mg po day#5m and 10 mg po day#6 21 tablet 0    predniSONE 2.5 mg tablet Take 1 tablet (2.5 mg total) by mouth 2 (two) times a day 60 tablet 0    primidone (MYSOLINE) 50 mg tablet Take 6tabs qhs 720 tablet 3    Probiotic Product (PROBIOTIC-10 PO) Take by mouth      tiZANidine (ZANAFLEX) 4 mg tablet        No current facility-administered medications on file prior to visit.          Objective     LMP  (LMP Unknown)   Physical Exam  HENT:      Mouth/Throat:      Mouth: Mucous membranes are moist.      Pharynx: Posterior oropharyngeal erythema present.   Pulmonary:      Effort: Pulmonary effort is normal. No respiratory distress.   Neurological:      General: No focal deficit present.      Mental Status: She is alert and oriented to person, place, and time. Mental status is at baseline.   Psychiatric:         Mood and Affect: Mood normal.         Behavior: Behavior normal.         Judgment: Judgment normal.         Visit Time  Total Visit Duration: 20

## 2024-11-13 DIAGNOSIS — M79.7 FIBROMYALGIA: ICD-10-CM

## 2024-11-13 DIAGNOSIS — J06.9 UPPER RESPIRATORY TRACT INFECTION, UNSPECIFIED TYPE: ICD-10-CM

## 2024-11-13 RX ORDER — ONDANSETRON 8 MG/1
8 TABLET, FILM COATED ORAL EVERY 8 HOURS PRN
Qty: 20 TABLET | Refills: 0 | Status: SHIPPED | OUTPATIENT
Start: 2024-11-13 | End: 2024-11-21 | Stop reason: SDUPTHER

## 2024-11-13 RX ORDER — PREDNISONE 2.5 MG/1
2.5 TABLET ORAL 2 TIMES DAILY
Qty: 60 TABLET | Refills: 0 | Status: CANCELLED | OUTPATIENT
Start: 2024-11-13

## 2024-11-13 RX ORDER — ALBUTEROL SULFATE 90 UG/1
2 INHALANT RESPIRATORY (INHALATION) 3 TIMES DAILY PRN
Qty: 18 G | Refills: 0 | Status: SHIPPED | OUTPATIENT
Start: 2024-11-13

## 2024-11-15 ENCOUNTER — TELEPHONE (OUTPATIENT)
Age: 69
End: 2024-11-15

## 2024-11-15 DIAGNOSIS — Z72.0 TOBACCO USE: ICD-10-CM

## 2024-11-15 DIAGNOSIS — J45.31 MILD PERSISTENT ASTHMA WITH ACUTE EXACERBATION: Primary | ICD-10-CM

## 2024-11-15 RX ORDER — PREDNISONE 10 MG/1
TABLET ORAL
Qty: 30 TABLET | Refills: 0 | Status: SHIPPED | OUTPATIENT
Start: 2024-11-15

## 2024-11-15 NOTE — TELEPHONE ENCOUNTER
Pt is calling for a refill of the prednisone 10mg for 6 days. States that it helps her to breathe. She is currently using the 21mg patch to quit smoking.    Please advise and f/u with pt as necessary.

## 2024-11-16 DIAGNOSIS — G25.0 ESSENTIAL TREMOR: ICD-10-CM

## 2024-11-19 RX ORDER — PRIMIDONE 50 MG/1
TABLET ORAL
Qty: 720 TABLET | Refills: 3 | Status: SHIPPED | OUTPATIENT
Start: 2024-11-19

## 2024-11-21 DIAGNOSIS — K21.9 GASTROESOPHAGEAL REFLUX DISEASE, UNSPECIFIED WHETHER ESOPHAGITIS PRESENT: Chronic | ICD-10-CM

## 2024-11-21 DIAGNOSIS — R10.13 DYSPEPSIA: ICD-10-CM

## 2024-11-21 DIAGNOSIS — J45.31 MILD PERSISTENT ASTHMA WITH ACUTE EXACERBATION: ICD-10-CM

## 2024-11-21 DIAGNOSIS — J45.41 MODERATE PERSISTENT ASTHMA WITH ACUTE EXACERBATION: Primary | ICD-10-CM

## 2024-11-21 DIAGNOSIS — Z72.0 TOBACCO USE: ICD-10-CM

## 2024-11-21 RX ORDER — BUDESONIDE 0.25 MG/2ML
0.25 INHALANT ORAL 2 TIMES DAILY
Qty: 120 ML | Refills: 1 | Status: SHIPPED | OUTPATIENT
Start: 2024-11-21

## 2024-11-21 RX ORDER — ONDANSETRON 8 MG/1
8 TABLET, FILM COATED ORAL EVERY 8 HOURS PRN
Qty: 20 TABLET | Refills: 2 | Status: SHIPPED | OUTPATIENT
Start: 2024-11-21

## 2024-11-21 RX ORDER — IPRATROPIUM BROMIDE AND ALBUTEROL SULFATE 2.5; .5 MG/3ML; MG/3ML
3 SOLUTION RESPIRATORY (INHALATION) 2 TIMES DAILY
Qty: 180 ML | Refills: 1 | Status: SHIPPED | OUTPATIENT
Start: 2024-11-21

## 2024-11-21 RX ORDER — PREDNISONE 10 MG/1
TABLET ORAL
Qty: 30 TABLET | Refills: 0 | OUTPATIENT
Start: 2024-11-21

## 2024-11-21 NOTE — TELEPHONE ENCOUNTER
Patient is continuing to ask for prednisone tapers 1 course after another.  If she is that symptomatic with her pulmonary/breathing she needs a referral to pulmonologist as well as updated chest x-ray and PFT studies.  She probably needs alternate inhaler type therapy as well.  Does she have a nebulizer machine at home?

## 2024-11-22 DIAGNOSIS — M79.7 FIBROMYALGIA: ICD-10-CM

## 2024-11-22 DIAGNOSIS — J06.9 UPPER RESPIRATORY TRACT INFECTION, UNSPECIFIED TYPE: ICD-10-CM

## 2024-11-22 RX ORDER — PREDNISONE 2.5 MG/1
2.5 TABLET ORAL 2 TIMES DAILY
Qty: 60 TABLET | Refills: 0 | Status: SHIPPED | OUTPATIENT
Start: 2024-11-22

## 2024-11-22 NOTE — TELEPHONE ENCOUNTER
Patient called in and is requesting prednisone 2.5 mg to be sent in since she wont receive the nebulizer for as few days. Please advise

## 2024-11-27 LAB
DME PARACHUTE DELIVERY DATE ACTUAL: NORMAL
DME PARACHUTE DELIVERY DATE REQUESTED: NORMAL
DME PARACHUTE ITEM DESCRIPTION: NORMAL
DME PARACHUTE ORDER STATUS: NORMAL
DME PARACHUTE SUPPLIER NAME: NORMAL
DME PARACHUTE SUPPLIER PHONE: NORMAL

## 2024-12-01 DIAGNOSIS — J42 CHRONIC BRONCHITIS, UNSPECIFIED CHRONIC BRONCHITIS TYPE (HCC): ICD-10-CM

## 2024-12-02 DIAGNOSIS — R10.13 DYSPEPSIA: ICD-10-CM

## 2024-12-02 DIAGNOSIS — K21.9 GASTROESOPHAGEAL REFLUX DISEASE, UNSPECIFIED WHETHER ESOPHAGITIS PRESENT: Chronic | ICD-10-CM

## 2024-12-02 RX ORDER — ALBUTEROL SULFATE 90 UG/1
2 INHALANT RESPIRATORY (INHALATION) 3 TIMES DAILY PRN
Qty: 18 G | Refills: 0 | Status: SHIPPED | OUTPATIENT
Start: 2024-12-02 | End: 2024-12-12 | Stop reason: SDUPTHER

## 2024-12-03 DIAGNOSIS — J45.31 MILD PERSISTENT ASTHMA WITH ACUTE EXACERBATION: ICD-10-CM

## 2024-12-03 DIAGNOSIS — Z72.0 TOBACCO USE: ICD-10-CM

## 2024-12-03 RX ORDER — PREDNISONE 10 MG/1
TABLET ORAL
Qty: 30 TABLET | Refills: 0 | OUTPATIENT
Start: 2024-12-03

## 2024-12-03 RX ORDER — OMEPRAZOLE 40 MG/1
40 CAPSULE, DELAYED RELEASE ORAL
Qty: 30 CAPSULE | Refills: 5 | Status: SHIPPED | OUTPATIENT
Start: 2024-12-03

## 2024-12-03 NOTE — TELEPHONE ENCOUNTER
Patient is requesting a refill on the predniSONE 10 mg tablet To help clear her lungs. She is also using  her nebulizer.    Please send to [facility] pharmacy on file per patients request.    Thank you.

## 2024-12-05 DIAGNOSIS — J45.31 MILD PERSISTENT ASTHMA WITH ACUTE EXACERBATION: ICD-10-CM

## 2024-12-05 DIAGNOSIS — Z72.0 TOBACCO USE: ICD-10-CM

## 2024-12-05 RX ORDER — PREDNISONE 10 MG/1
TABLET ORAL
Qty: 30 TABLET | Refills: 0 | Status: SHIPPED | OUTPATIENT
Start: 2024-12-05

## 2024-12-05 NOTE — TELEPHONE ENCOUNTER
Pt called and stated she had a missed call from office with no vm left, tried to get in contact with clerical and clinical. Unable to transfer, please contact pt back. Thanks.

## 2024-12-05 NOTE — TELEPHONE ENCOUNTER
Patient called, request refill of   predniSONE 10 mg tablet [[id number]] , states she experiencing a lot of mucus/phlegm.  Upon chart review/appointments, confirmed patient latest V V 11/12/2024.Please advise Patient at [phone], if any further questions.     Medication:     predniSONE 10 mg tablet       Dose/Frequency: Take p.o. 6-5-4-3-2-1     Quantity: 30 tablet     Pharmacy:   [facility]/pharmacy #[id number]  [place] - [phone]       Office:   [] PCP/Provider -   [] Speciality/Provider -     Does the patient have enough for 3 days?   [] Yes   [x] No - Send as HP to POD

## 2024-12-06 DIAGNOSIS — F41.1 GENERALIZED ANXIETY DISORDER: ICD-10-CM

## 2024-12-06 DIAGNOSIS — G25.0 ESSENTIAL TREMOR: ICD-10-CM

## 2024-12-06 RX ORDER — PRIMIDONE 50 MG/1
TABLET ORAL
Qty: 720 TABLET | Refills: 0 | OUTPATIENT
Start: 2024-12-06

## 2024-12-06 RX ORDER — DIAZEPAM 5 MG/1
TABLET ORAL
Qty: 90 TABLET | Refills: 0 | Status: SHIPPED | OUTPATIENT
Start: 2024-12-06

## 2024-12-10 NOTE — TELEPHONE ENCOUNTER
Pt called [facility] pharmacy did not refill her medication for Primidone. They said it was too soon to fill. Pt has been taking more at night to help with the tremors because its worse at night.  Pt does not have anymore. She is asking if she can have a short supply till she can get a refill. Pt uses [facility] pharmacy in [place], [place].

## 2024-12-11 NOTE — TELEPHONE ENCOUNTER
Warm transfer received from [facility] [place].   Pt reported that she has been out of Primidone since last Wednesday 12/4/24. Reported that she is taking Primidone 50 mg 2-3 tablets during the day (not every day, stated whenever she needs it) and 7 tablets at HS.    Pt reported increase in shakes/tremors and overall body fatigue throughout the day during the past week. Pt denied any recent falls and stated that a friend is currently at home with her.     Per 6/14/24 Office Visit:  --Primidone increased to 2tabs qam, 2tabs qafternoon and 5tabs qpm.     Primidone Rx will need to be update with the correct Sig: stating the dose and frequency of medication and sent to the pharmacy in order for the medication to be filled.     Vesta: please review and advise.     Clinical: please call pt back with provider's recommendations.

## 2024-12-11 NOTE — TELEPHONE ENCOUNTER
I called patient to clarify what is needed; reached vm, left message tcb to discuss (consent on file).

## 2024-12-12 DIAGNOSIS — J42 CHRONIC BRONCHITIS, UNSPECIFIED CHRONIC BRONCHITIS TYPE (HCC): ICD-10-CM

## 2024-12-12 DIAGNOSIS — M79.7 FIBROMYALGIA: Primary | ICD-10-CM

## 2024-12-13 ENCOUNTER — OFFICE VISIT (OUTPATIENT)
Dept: NEUROLOGY | Facility: CLINIC | Age: 69
End: 2024-12-13
Payer: COMMERCIAL

## 2024-12-13 ENCOUNTER — TELEPHONE (OUTPATIENT)
Age: 69
End: 2024-12-13

## 2024-12-13 VITALS
SYSTOLIC BLOOD PRESSURE: 108 MMHG | TEMPERATURE: 96 F | HEIGHT: 65 IN | WEIGHT: 118 LBS | DIASTOLIC BLOOD PRESSURE: 62 MMHG | HEART RATE: 70 BPM | OXYGEN SATURATION: 98 % | RESPIRATION RATE: 16 BRPM | BODY MASS INDEX: 19.66 KG/M2

## 2024-12-13 DIAGNOSIS — G25.0 ESSENTIAL TREMOR: ICD-10-CM

## 2024-12-13 DIAGNOSIS — M79.7 FIBROMYALGIA: ICD-10-CM

## 2024-12-13 DIAGNOSIS — J06.9 UPPER RESPIRATORY TRACT INFECTION, UNSPECIFIED TYPE: ICD-10-CM

## 2024-12-13 PROCEDURE — G2211 COMPLEX E/M VISIT ADD ON: HCPCS | Performed by: PHYSICIAN ASSISTANT

## 2024-12-13 PROCEDURE — 99214 OFFICE O/P EST MOD 30 MIN: CPT | Performed by: PHYSICIAN ASSISTANT

## 2024-12-13 RX ORDER — HYDROCODONE BITARTRATE AND ACETAMINOPHEN 7.5; 325 MG/1; MG/1
TABLET ORAL
COMMUNITY
Start: 2024-11-27

## 2024-12-13 RX ORDER — ALBUTEROL SULFATE 90 UG/1
2 INHALANT RESPIRATORY (INHALATION) 3 TIMES DAILY PRN
Qty: 18 G | Refills: 2 | Status: SHIPPED | OUTPATIENT
Start: 2024-12-13

## 2024-12-13 RX ORDER — PRIMIDONE 50 MG/1
TABLET ORAL
Qty: 810 TABLET | Refills: 3 | Status: SHIPPED | OUTPATIENT
Start: 2024-12-13

## 2024-12-13 NOTE — PROGRESS NOTES
Name: Ora Rivera      : 1955      MRN: [id number]  Encounter Provider: Vesta Fox PA-C  Encounter Date: 2024   Encounter department: [facility] [place]  :  Assessment & Plan  Essential tremor  Patient with slowly progressive hand, head and vocal tremor since her mid 40s consistent with her diagnosis of essential tremor.  Unfortunately she ran out of primidone about a week ago and has not been on this medication at all.  She has noticed a significant worsening of tremors without the medication.  She has not been able to sleep well due to head tremor throughout the night and hand tremors continue to interfere with function such as eating and drinking.    At this time we will have her slowly restart primidone by taking 1 tab at night to starts and then increasing by 1 tab daily until she is back up to 7 tabs before bed.  She can also continue to take 1-2 tabs in the a.m. as needed as well.  I will have her follow-up in 4 weeks so that we can further assess any need for additional medication adjustments or changes.     Beta-blockers avoided given her history of asthma as well as baseline low blood pressure.  She was previously on Neurontin for nerve pain, did not like the way that this medication made her feel.  She is not interested in a retrial of this medication.  Discussed a trial of topiramate or zonisamide as these medications have not been tried.  Potential side effects including weight loss, cognitive slowing and tingling in the hands or feet.  She has recently increased her weight so we could consider a trial of topiramate if tremors continue despite getting back on primidone.    She also remains on Remeron and Valium prescribed by her PCP.  Unfortunately she states without the primidone the tremors were significantly bothersome even on these 2 medications.    Once again also discussed the surgical options for essential tremor including MRI guided ultrasound as  "well as deep brain stimulator.  There is also a task specific device called the Mag Trio device.  She may be interested in DBS in the future and we can discuss this further with her at her follow-up visit.    Orders:    primidone (MYSOLINE) 50 mg tablet; Take 2tabs in the AM and 7tabs qhs          History of Present Illness   Ora Rivera is a 68 y.o. female who presents in follow up for essential tremor. To review, tremors present since her mid 40s and have progressed to include bilateral hand, head and voice tremor. Primidone was started with some improvement of her tremors. Also with hx of syncopal episode on 1/26/21 (occurred when getting up to use the bathroom, had body pain and possible bite jose elias on the tongue following event). EEG was normal. MRI brain with mild, chronic microangiopathy. No family history of tremors.     At her last visit no changes were made to her medications.  Tremors remained.      INTERVAL HISTORY:  Tremors are getting worse   She has been out of the Primidone for the past week  Tremors are worse without the medication   She had been taking 7tabs at night and 2tabs in the AM as needed   She is taking Valium more right now with not as much benefit as with the Primidone   She had been doing okay with taking the Primidone 7 tabs at night   She has gained some weight   She struggles with eating and drinking   She is not sleeping as well given the head tremor through the night     Current medications:   Primidone 6tabs qpm   Remeron 15mg qhs   Valium taken for anxiety (not prescribed by neuro)     Prior medications:  Gabapentin - was on for nerve pain, made her feel weird   Beta blockers AVOIDED due to asthma         Review of Systems I have personally reviewed the MA's review of systems and made changes as necessary.         Objective   /62 (BP Location: Right arm, Patient Position: Sitting, Cuff Size: Adult)   Pulse 70   Temp (!) 96 °F (35.6 °C) (Temporal)   Resp 16   Ht 5' 5\" " (1.651 m)   Wt 53.5 kg (118 lb)   LMP  (LMP Unknown)   SpO2 98%   BMI 19.64 kg/m²     Physical Exam  Constitutional:       General: She is awake.      Appearance: Normal appearance.   Eyes:      General: Lids are normal.      Extraocular Movements: Extraocular movements intact.      Pupils: Pupils are equal, round, and reactive to light.   Pulmonary:      Effort: Pulmonary effort is normal.   Neurological:      Mental Status: She is alert.   Psychiatric:         Speech: Speech normal.       Neurological Exam  Mental Status  Awake and alert. Speech is normal.  Patient hard of hearing   Tearful.    Cranial Nerves  CN III, IV, VI: Extraocular movements intact bilaterally. Normal lids and orbits bilaterally. Pupils equal round and reactive to light bilaterally.  CN V:  Right: Facial sensation is normal.  Left: Facial sensation is normal on the left.  CN VIII:  Right: Hearing is decreased.  Left: Hearing is decreased.  CN XI: Shoulder shrug strength is normal.    Motor    Generalized weakness .    Sensory  Light touch is normal in upper and lower extremities.     Coordination  Right: Finger-to-nose abnormality:Left: Finger-to-nose abnormality:  Mild postural tremor left hand, moderate right hand   Moderate action tremors with finger to nose testing bilaterally.     Mild to moderate constant head tremor with slight head turn to the right. At times the head tremor was more jerky in nature.   No rigidity   No asymmetric decrement, generalized slowness .    Gait  Casual gait is normal including stance, stride, and arm swing.  Slow however no shuffling or freezing .

## 2024-12-13 NOTE — PROGRESS NOTES
"Name: Ora Rivera      : 1955      MRN: [id number]  Encounter Provider: Vesta Fox PA-C  Encounter Date: 2024   Encounter department: [facility] [place]  :  Assessment & Plan          History of Present Illness   HPI  Review of Systems   Constitutional:  Negative for appetite change, fatigue and fever.   HENT:  Positive for hearing loss. Negative for tinnitus, trouble swallowing and voice change.    Eyes: Negative.  Negative for photophobia, pain and visual disturbance.   Respiratory: Negative.  Negative for shortness of breath.    Cardiovascular: Negative.  Negative for palpitations.   Gastrointestinal: Negative.  Negative for nausea and vomiting.   Endocrine: Negative.  Negative for cold intolerance.   Genitourinary: Negative.  Negative for dysuria, frequency and urgency.   Musculoskeletal:  Negative for back pain, gait problem, myalgias, neck pain and neck stiffness.   Skin: Negative.  Negative for rash.   Allergic/Immunologic: Negative.    Neurological:  Positive for tremors. Negative for dizziness, seizures, syncope, facial asymmetry, speech difficulty, weakness, light-headedness, numbness and headaches.   Hematological: Negative.  Does not bruise/bleed easily.   Psychiatric/Behavioral: Negative.  Negative for confusion, hallucinations and sleep disturbance.    All other systems reviewed and are negative.   I have personally reviewed the MA's review of systems and made changes as necessary.         Objective   Ht 5' 5\" (1.651 m)   LMP  (LMP Unknown)   BMI 19.10 kg/m²     Physical Exam  Neurological Exam            "

## 2024-12-13 NOTE — TELEPHONE ENCOUNTER
Patient is asking if we can please print out the request for the CT of the lung screening program. Patient will be in town for another visit and wants to  the order, at the office. . Patient also was requesting a refill of the prednisone 2.5 mg medication. I was able to refill that in a separate refill encounter.

## 2024-12-13 NOTE — ASSESSMENT & PLAN NOTE
Patient with slowly progressive hand, head and vocal tremor since her mid 40s consistent with her diagnosis of essential tremor.  Unfortunately she ran out of primidone about a week ago and has not been on this medication at all.  She has noticed a significant worsening of tremors without the medication.  She has not been able to sleep well due to head tremor throughout the night and hand tremors continue to interfere with function such as eating and drinking.    At this time we will have her slowly restart primidone by taking 1 tab at night to starts and then increasing by 1 tab daily until she is back up to 7 tabs before bed.  She can also continue to take 1-2 tabs in the a.m. as needed as well.  I will have her follow-up in 4 weeks so that we can further assess any need for additional medication adjustments or changes.     Beta-blockers avoided given her history of asthma as well as baseline low blood pressure.  She was previously on Neurontin for nerve pain, did not like the way that this medication made her feel.  She is not interested in a retrial of this medication.  Discussed a trial of topiramate or zonisamide as these medications have not been tried.  Potential side effects including weight loss, cognitive slowing and tingling in the hands or feet.  She has recently increased her weight so we could consider a trial of topiramate if tremors continue despite getting back on primidone.    She also remains on Remeron and Valium prescribed by her PCP.  Unfortunately she states without the primidone the tremors were significantly bothersome even on these 2 medications.    Once again also discussed the surgical options for essential tremor including MRI guided ultrasound as well as deep brain stimulator.  There is also a task specific device called the Mag Trio device.  She may be interested in DBS in the future and we can discuss this further with her at her follow-up visit.    Orders:    primidone (MYSOLINE)  50 mg tablet; Take 2tabs in the AM and 7tabs qhs

## 2024-12-17 ENCOUNTER — TELEPHONE (OUTPATIENT)
Age: 69
End: 2024-12-17

## 2024-12-17 NOTE — TELEPHONE ENCOUNTER
Patient calling because she would like another course of steroids. States she is coughing up a lot of yellow phlegm. States the nebulizer treatments make her breathing worse. Denies fever. Offered an OV but she declined. She would like Dr. Leonard to prescribe more steroids for her. Please follow up with patient.

## 2024-12-18 ENCOUNTER — TELEPHONE (OUTPATIENT)
Age: 69
End: 2024-12-18

## 2024-12-18 DIAGNOSIS — J45.31 MILD PERSISTENT ASTHMA WITH ACUTE EXACERBATION: ICD-10-CM

## 2024-12-18 DIAGNOSIS — Z72.0 TOBACCO USE: ICD-10-CM

## 2024-12-18 RX ORDER — PREDNISONE 2.5 MG/1
2.5 TABLET ORAL 2 TIMES DAILY
Qty: 60 TABLET | Refills: 0 | Status: SHIPPED | OUTPATIENT
Start: 2024-12-18

## 2024-12-18 RX ORDER — ROFLUMILAST 250 UG/1
250 TABLET ORAL DAILY
Qty: 30 TABLET | Refills: 0 | Status: SHIPPED | OUTPATIENT
Start: 2024-12-18

## 2024-12-18 RX ORDER — PREDNISONE 10 MG/1
TABLET ORAL
Qty: 30 TABLET | Refills: 0 | Status: SHIPPED | OUTPATIENT
Start: 2024-12-18

## 2024-12-18 NOTE — TELEPHONE ENCOUNTER
PA for roflumilast 250MCG tablet SUBMITTED to [facility]    via    []CMM-KEY:   [x]Surescripts-Case ID # [id number]   []Availity-Auth ID # NDC #   []Faxed to plan   []Other website   []Phone call Case ID #     [x]PA sent as URGENT    All office notes, labs and other pertaining documents and studies sent. Clinical questions answered. Awaiting determination from insurance company.     Turnaround time for your insurance to make a decision on your Prior Authorization can take 7-21 business days.

## 2024-12-18 NOTE — TELEPHONE ENCOUNTER
PA for roflumilast 250MCG tablet DENIED    Reason:    Message sent to office clinical pool Yes    Denial letter scanned into Media Yes    Appeal started No (Provider will need to decide if appeal is warranted and send clinical documentation to Prior Authorization Team for initiation.)    **Please follow up with your patient regarding denial and next steps**

## 2024-12-20 ENCOUNTER — TELEPHONE (OUTPATIENT)
Age: 69
End: 2024-12-20

## 2024-12-20 NOTE — TELEPHONE ENCOUNTER
Patient called in regards to scheduling her CT lung but they are requesting patient get a chest x-ray done first. Pateint would also like to know what does PFT stand for as provider put that on her AVS. Patient also requesting prednisone be sent in due to medication helping her.

## 2024-12-26 ENCOUNTER — HOSPITAL ENCOUNTER (OUTPATIENT)
Dept: CT IMAGING | Facility: HOSPITAL | Age: 69
Discharge: HOME | End: 2024-12-26

## 2024-12-26 DIAGNOSIS — R10.13 DYSPEPSIA: ICD-10-CM

## 2024-12-26 DIAGNOSIS — K21.9 GASTROESOPHAGEAL REFLUX DISEASE, UNSPECIFIED WHETHER ESOPHAGITIS PRESENT: Chronic | ICD-10-CM

## 2024-12-26 DIAGNOSIS — F17.211 NICOTINE DEPENDENCE, CIGARETTES, IN REMISSION: ICD-10-CM

## 2024-12-26 RX ORDER — ONDANSETRON 8 MG/1
8 TABLET, FILM COATED ORAL EVERY 8 HOURS PRN
Qty: 20 TABLET | Refills: 1 | Status: SHIPPED | OUTPATIENT
Start: 2024-12-26

## 2024-12-27 DIAGNOSIS — M79.7 FIBROMYALGIA: ICD-10-CM

## 2024-12-28 RX ORDER — LIDOCAINE 50 MG/G
OINTMENT TOPICAL AS NEEDED
Qty: 50 G | Refills: 0 | Status: SHIPPED | OUTPATIENT
Start: 2024-12-28

## 2024-12-29 DIAGNOSIS — F41.1 GENERALIZED ANXIETY DISORDER: ICD-10-CM

## 2025-01-02 RX ORDER — DIAZEPAM 5 MG/1
TABLET ORAL
Qty: 90 TABLET | Refills: 0 | Status: SHIPPED | OUTPATIENT
Start: 2025-01-02

## 2025-01-02 NOTE — TELEPHONE ENCOUNTER
Patient called, has no medication remaining.  Is having tremors and the shakes because of not having the medication. Is kindly asking that the medication be sent to the pharmacy today by ANY provider if possible.    Kindly call patient once sent.    Medication:     diazepam (VALIUM) 5 mg tablet       Dose/Frequency: Take 1 PO in am 1-2 at HS     Quantity: 90 tablets    Pharmacy: [facility]/pharmacy #[id number] [place], [place] - [phone]     Office:   [x] PCP/Provider -  Suzan Ferrer, DO   [] Speciality/Provider -     Does the patient have enough for 3 days?   [] Yes   [x] No - Send as HP to POD

## 2025-01-02 NOTE — TELEPHONE ENCOUNTER
I will send in today, however it was last filled on December 6 for a quantity of 90 which technically she should not be without any pills because it is not a full 30 days.

## 2025-01-03 DIAGNOSIS — G43.909 MIGRAINE WITHOUT STATUS MIGRAINOSUS, NOT INTRACTABLE, UNSPECIFIED MIGRAINE TYPE: ICD-10-CM

## 2025-01-03 RX ORDER — KETOROLAC TROMETHAMINE 10 MG/1
10 TABLET, FILM COATED ORAL EVERY 6 HOURS PRN
Qty: 20 TABLET | Refills: 0 | Status: SHIPPED | OUTPATIENT
Start: 2025-01-03

## 2025-01-04 DIAGNOSIS — J42 CHRONIC BRONCHITIS, UNSPECIFIED CHRONIC BRONCHITIS TYPE (HCC): ICD-10-CM

## 2025-01-04 DIAGNOSIS — M79.7 FIBROMYALGIA: ICD-10-CM

## 2025-01-04 DIAGNOSIS — Z72.0 TOBACCO USE: ICD-10-CM

## 2025-01-04 DIAGNOSIS — J45.31 MILD PERSISTENT ASTHMA WITH ACUTE EXACERBATION: ICD-10-CM

## 2025-01-04 DIAGNOSIS — J06.9 UPPER RESPIRATORY TRACT INFECTION, UNSPECIFIED TYPE: ICD-10-CM

## 2025-01-05 ENCOUNTER — RESULTS FOLLOW-UP (OUTPATIENT)
Dept: FAMILY MEDICINE CLINIC | Facility: CLINIC | Age: 70
End: 2025-01-05

## 2025-01-05 DIAGNOSIS — R91.8 PULMONARY NODULES/LESIONS, MULTIPLE: Primary | ICD-10-CM

## 2025-01-05 RX ORDER — ALBUTEROL SULFATE 90 UG/1
2 INHALANT RESPIRATORY (INHALATION) 3 TIMES DAILY PRN
Qty: 18 G | Refills: 0 | Status: SHIPPED | OUTPATIENT
Start: 2025-01-05 | End: 2025-01-13 | Stop reason: SDUPTHER

## 2025-01-06 RX ORDER — PREDNISONE 10 MG/1
TABLET ORAL
Qty: 30 TABLET | Refills: 0 | Status: SHIPPED | OUTPATIENT
Start: 2025-01-06

## 2025-01-06 RX ORDER — PREDNISONE 2.5 MG/1
2.5 TABLET ORAL 2 TIMES DAILY
Qty: 60 TABLET | Refills: 0 | Status: SHIPPED | OUTPATIENT
Start: 2025-01-06

## 2025-01-11 DIAGNOSIS — M79.7 FIBROMYALGIA: ICD-10-CM

## 2025-01-13 DIAGNOSIS — F33.1 MODERATE EPISODE OF RECURRENT MAJOR DEPRESSIVE DISORDER (HCC): ICD-10-CM

## 2025-01-13 DIAGNOSIS — M79.7 FIBROMYALGIA: ICD-10-CM

## 2025-01-13 DIAGNOSIS — J42 CHRONIC BRONCHITIS, UNSPECIFIED CHRONIC BRONCHITIS TYPE (HCC): ICD-10-CM

## 2025-01-14 RX ORDER — MIRTAZAPINE 15 MG/1
15 TABLET, FILM COATED ORAL
Qty: 30 TABLET | Refills: 5 | Status: SHIPPED | OUTPATIENT
Start: 2025-01-14

## 2025-01-14 RX ORDER — ALBUTEROL SULFATE 90 UG/1
2 INHALANT RESPIRATORY (INHALATION) 3 TIMES DAILY PRN
Qty: 18 G | Refills: 5 | Status: SHIPPED | OUTPATIENT
Start: 2025-01-14

## 2025-01-15 DIAGNOSIS — K21.9 GASTROESOPHAGEAL REFLUX DISEASE, UNSPECIFIED WHETHER ESOPHAGITIS PRESENT: Chronic | ICD-10-CM

## 2025-01-15 DIAGNOSIS — R10.13 DYSPEPSIA: ICD-10-CM

## 2025-01-15 RX ORDER — ONDANSETRON 8 MG/1
8 TABLET, FILM COATED ORAL EVERY 8 HOURS PRN
Qty: 20 TABLET | Refills: 0 | Status: SHIPPED | OUTPATIENT
Start: 2025-01-15 | End: 2025-01-21 | Stop reason: SDUPTHER

## 2025-01-17 ENCOUNTER — TELEMEDICINE (OUTPATIENT)
Dept: NEUROLOGY | Facility: CLINIC | Age: 70
End: 2025-01-17
Payer: COMMERCIAL

## 2025-01-17 DIAGNOSIS — G43.909 MIGRAINE WITHOUT STATUS MIGRAINOSUS, NOT INTRACTABLE, UNSPECIFIED MIGRAINE TYPE: ICD-10-CM

## 2025-01-17 DIAGNOSIS — G25.0 ESSENTIAL TREMOR: Primary | ICD-10-CM

## 2025-01-17 PROCEDURE — 99213 OFFICE O/P EST LOW 20 MIN: CPT | Performed by: PHYSICIAN ASSISTANT

## 2025-01-17 RX ORDER — KETOROLAC TROMETHAMINE 10 MG/1
10 TABLET, FILM COATED ORAL EVERY 6 HOURS PRN
Qty: 20 TABLET | Refills: 0 | Status: SHIPPED | OUTPATIENT
Start: 2025-01-17

## 2025-01-17 RX ORDER — TOPIRAMATE 25 MG/1
TABLET, FILM COATED ORAL
Qty: 60 TABLET | Refills: 3 | Status: SHIPPED | OUTPATIENT
Start: 2025-01-17

## 2025-01-17 NOTE — PROGRESS NOTES
Virtual Regular Visit  Name: Ora Rivera      : 1955      MRN: [id number]  Encounter Provider: Vesta Fox PA-C  Encounter Date: 2025   Encounter department: [facility] [place]      Verification of patient location:  Patient is located at Home in the following state in which I hold an active license [place] :  Assessment & Plan  Essential tremor  Patient with slowly progressive hand, head and vocal tremor since her mid 40s consistent with her diagnosis of essential tremor.      Unfortunately she continues to have very bothersome head tremors despite being back on primidone.  She will notice dampening of tremor after taking a dose of the primidone however the effects do not last long.  At this point her main concern is her continued head tremor which can interfere with tasks such as eating and drinking.    She is very interested in starting a new medication at this time given she does not feel that primidone is providing enough benefit.  She is open to a trial of topiramate.  We did discuss potential side effects including weight loss, tingling in the hands or feet or cognitive slowing.  Will have her start by taking topiramate 25 mg 1 tab at night for the first week and then increase to 1 tab in the morning and at night.  I will have her follow-up in a few weeks to assess how she is doing with this medication and may consider increasing the dose as needed and tolerated.    Beta-blockers avoided given her history of asthma as well as baseline low blood pressure.  She was previously on Neurontin for nerve pain, did not like the way that this medication made her feel. She also remains on Remeron and Valium prescribed by her PCP.       At the last visit we had discussed surgical options for the tremors as well, at this time she states she is not interested in proceeding with either of those options.    Orders:    topiramate (Topamax) 25 mg tablet; Take 1tab qhs for a week then 1tab  [facility]        Encounter provider Vesta Fox PA-C    The patient was identified by name and date of birth. Ora Rivera was informed that this is a telemedicine visit and that the visit is being conducted through the Epic Embedded platform. She agrees to proceed..  My office door was closed. No one else was in the room.  She acknowledged consent and understanding of privacy and security of the video platform. The patient has agreed to participate and understands they can discontinue the visit at any time.    Patient is aware this is a billable service.     History of Present Illness     Ora Rivera is a 68 y.o. female who presents in follow up for essential tremor. To review, tremors present since her mid 40s and have progressed to include bilateral hand, head and voice tremor. Primidone was started with some improvement of her tremors. Also with hx of syncopal episode on 1/26/21 (occurred when getting up to use the bathroom, had body pain and possible bite jose elias on the tongue following event). EEG was normal. MRI brain with mild, chronic microangiopathy. No family history of tremors.      At her last visit tremors were significantly worse since running out of Primidone and it was slowly restarted.      INTERVAL HISTORY:  She feels that she is doing terrible   She states that she gets some benefit with the Primidone however she does not feel the benefit lasts  She is most bothered by the head tremor, she will notice some in the hands when she tries to drink coffee as well   She feels that at times her throat tightens up because of the shaking   She feels that Valium will help to lessen the tremors as well but does not completely get rid of them   She struggles to eat due to the tremors    Current medications:   Primidone 2-3 tabs in the AM, 7tabs qpm   Remeron 15mg qhs   Valium taken for anxiety (not prescribed by neuro)     Prior medications:  Gabapentin - was on for nerve pain, made her feel weird    Beta blockers AVOIDED due to asthma         Review of Systems    Objective   LMP  (LMP Unknown)     Physical Exam    Exam was limited.  Patient not feeling well and did not wish to get out of bed.  Moderate head tremor noted  Mild action tremor in the hands with finger-nose testing bilaterally  No resting tremor of the hands noted  Mild vocal tremor    Face symmetric  EOMI  Tongue midline    Visit Time  Total Visit Duration: 25 min

## 2025-01-17 NOTE — ASSESSMENT & PLAN NOTE
Patient with slowly progressive hand, head and vocal tremor since her mid 40s consistent with her diagnosis of essential tremor.      Unfortunately she continues to have very bothersome head tremors despite being back on primidone.  She will notice dampening of tremor after taking a dose of the primidone however the effects do not last long.  At this point her main concern is her continued head tremor which can interfere with tasks such as eating and drinking.    She is very interested in starting a new medication at this time given she does not feel that primidone is providing enough benefit.  She is open to a trial of topiramate.  We did discuss potential side effects including weight loss, tingling in the hands or feet or cognitive slowing.  Will have her start by taking topiramate 25 mg 1 tab at night for the first week and then increase to 1 tab in the morning and at night.  I will have her follow-up in a few weeks to assess how she is doing with this medication and may consider increasing the dose as needed and tolerated.    Beta-blockers avoided given her history of asthma as well as baseline low blood pressure.  She was previously on Neurontin for nerve pain, did not like the way that this medication made her feel. She also remains on Remeron and Valium prescribed by her PCP.       At the last visit we had discussed surgical options for the tremors as well, at this time she states she is not interested in proceeding with either of those options.    Orders:    topiramate (Topamax) 25 mg tablet; Take 1tab qhs for a week then 1tab qam and qhs

## 2025-01-20 DIAGNOSIS — Z72.0 TOBACCO USE: ICD-10-CM

## 2025-01-20 DIAGNOSIS — J45.31 MILD PERSISTENT ASTHMA WITH ACUTE EXACERBATION: ICD-10-CM

## 2025-01-21 DIAGNOSIS — R10.13 DYSPEPSIA: ICD-10-CM

## 2025-01-21 DIAGNOSIS — K21.9 GASTROESOPHAGEAL REFLUX DISEASE, UNSPECIFIED WHETHER ESOPHAGITIS PRESENT: Chronic | ICD-10-CM

## 2025-01-22 ENCOUNTER — TELEMEDICINE (OUTPATIENT)
Dept: OTHER | Facility: HOSPITAL | Age: 70
End: 2025-01-22
Payer: COMMERCIAL

## 2025-01-22 VITALS — TEMPERATURE: 99.9 F

## 2025-01-22 DIAGNOSIS — Z72.0 TOBACCO USE: ICD-10-CM

## 2025-01-22 DIAGNOSIS — M79.7 FIBROMYALGIA: ICD-10-CM

## 2025-01-22 DIAGNOSIS — J06.9 UPPER RESPIRATORY TRACT INFECTION, UNSPECIFIED TYPE: ICD-10-CM

## 2025-01-22 DIAGNOSIS — J45.31 MILD PERSISTENT ASTHMA WITH ACUTE EXACERBATION: ICD-10-CM

## 2025-01-22 PROCEDURE — 99213 OFFICE O/P EST LOW 20 MIN: CPT | Performed by: NURSE PRACTITIONER

## 2025-01-22 RX ORDER — PREDNISONE 10 MG/1
TABLET ORAL
Qty: 30 TABLET | Refills: 0 | Status: SHIPPED | OUTPATIENT
Start: 2025-01-22

## 2025-01-22 RX ORDER — AZITHROMYCIN 250 MG/1
TABLET, FILM COATED ORAL
Qty: 6 TABLET | Refills: 0 | Status: SHIPPED | OUTPATIENT
Start: 2025-01-22 | End: 2025-01-26

## 2025-01-22 RX ORDER — ONDANSETRON 8 MG/1
8 TABLET, FILM COATED ORAL EVERY 8 HOURS PRN
Qty: 20 TABLET | Refills: 1 | Status: SHIPPED | OUTPATIENT
Start: 2025-01-22

## 2025-01-22 NOTE — ASSESSMENT & PLAN NOTE
Orders:    azithromycin (ZITHROMAX) 250 mg tablet; Take 2 tablets today then 1 tablet daily x 4 days    predniSONE 10 mg tablet; Take p.o. 6-5-4-3-2-1

## 2025-01-22 NOTE — PROGRESS NOTES
Virtual Regular Visit  Name: Ora Rivera      : 1955      MRN: [id number]  Encounter Provider: HIRAL Hawkins  Encounter Date: 2025   Encounter department: VIRTUAL CARE       Verification of patient location:  Patient is located at Home in the following state in which I hold an active license [place] :  Assessment & Plan  Mild persistent asthma with acute exacerbation    Orders:    azithromycin (ZITHROMAX) 250 mg tablet; Take 2 tablets today then 1 tablet daily x 4 days    predniSONE 10 mg tablet; Take p.o. 6-5-4-3-2-1    Tobacco use             Encounter provider HIRAL Hawkins    The patient was identified by name and date of birth. Ora Rivera was informed that this is a telemedicine visit and that the visit is being conducted through the Epic Embedded platform. She agrees to proceed..  My office door was closed. No one else was in the room.  She acknowledged consent and understanding of privacy and security of the video platform. The patient has agreed to participate and understands they can discontinue the visit at any time.    Patient is aware this is a billable service.     History was obtained from: History obtained from: patient  History of Present Illness     This is a 69 year old female here today for video visit.  She states she she started with a sore throat 3 days ago.  She states tmax of 101 yesterday.  She took Asprin for fever.  She states she has cough and congestion which is normal for her.  She denies any sob or chest pain.  She states she is coughing up some yellow mucous.  She states she has history of emphysema.  She states she does not have a pulse ox at home.   She states she tested negative for covid.   She states she is trying to eat and drink.  She states yesterday she had chicken noodle soup.   She is not vaccinated for flu or covid. She has history of emphysema, fibromyalgia and asthma.  She is requesting prednisone.  She has been on zpack in past.        Review of Systems   Constitutional:  Positive for activity change, chills, fatigue and fever.   HENT:  Positive for congestion, rhinorrhea, sinus pressure and sinus pain.    Respiratory:  Positive for choking. Negative for wheezing.    Cardiovascular:  Negative for chest pain.   Gastrointestinal: Negative.    Neurological: Negative.    Psychiatric/Behavioral: Negative.         Objective   Temp 99.9 °F (37.7 °C)   LMP  (LMP Unknown)     Physical Exam  Constitutional:       General: She is not in acute distress.     Appearance: Normal appearance. She is not ill-appearing or toxic-appearing.   HENT:      Head: Normocephalic and atraumatic.      Nose: Congestion present.      Mouth/Throat:      Pharynx: No posterior oropharyngeal erythema.   Pulmonary:      Effort: Pulmonary effort is normal. No respiratory distress.      Comments: No audible cough or wheezing  Speaking in full sentences.   Neurological:      Mental Status: She is alert and oriented to person, place, and time.   Psychiatric:         Mood and Affect: Mood normal.         Behavior: Behavior normal.         Thought Content: Thought content normal.         Judgment: Judgment normal.         Visit Time  Total Visit Duration: 12 minutes not including the time spent for establishing the audio/video connection.

## 2025-01-22 NOTE — PATIENT INSTRUCTIONS
This could be flu.  Will treat with zpack and prednisone.  Rest and drink extra fluids.  Start antibiotic.  Take probiotic.  Nasal saline flushes can be helpful  Throat drops can help with throat.  Follow up with PCP if no improvement.  Go to ER with any worsening symtpoms, chest pain or sob.

## 2025-01-25 RX ORDER — PREDNISONE 10 MG/1
TABLET ORAL
Qty: 30 TABLET | Refills: 0 | OUTPATIENT
Start: 2025-01-25

## 2025-01-25 RX ORDER — PREDNISONE 2.5 MG/1
2.5 TABLET ORAL 2 TIMES DAILY
Qty: 60 TABLET | Refills: 0 | OUTPATIENT
Start: 2025-01-25

## 2025-01-28 DIAGNOSIS — G43.909 MIGRAINE WITHOUT STATUS MIGRAINOSUS, NOT INTRACTABLE, UNSPECIFIED MIGRAINE TYPE: ICD-10-CM

## 2025-01-29 DIAGNOSIS — J42 CHRONIC BRONCHITIS, UNSPECIFIED CHRONIC BRONCHITIS TYPE (HCC): ICD-10-CM

## 2025-01-29 RX ORDER — KETOROLAC TROMETHAMINE 10 MG/1
10 TABLET, FILM COATED ORAL EVERY 6 HOURS PRN
Qty: 20 TABLET | Refills: 0 | OUTPATIENT
Start: 2025-01-29

## 2025-01-30 ENCOUNTER — TELEPHONE (OUTPATIENT)
Age: 70
End: 2025-01-30

## 2025-01-30 DIAGNOSIS — J06.9 UPPER RESPIRATORY TRACT INFECTION, UNSPECIFIED TYPE: ICD-10-CM

## 2025-01-30 DIAGNOSIS — J45.41 MODERATE PERSISTENT ASTHMA WITH ACUTE EXACERBATION: ICD-10-CM

## 2025-01-30 DIAGNOSIS — J42 CHRONIC BRONCHITIS, UNSPECIFIED CHRONIC BRONCHITIS TYPE (HCC): Primary | ICD-10-CM

## 2025-01-30 DIAGNOSIS — J43.1 PANLOBULAR EMPHYSEMA (HCC): ICD-10-CM

## 2025-01-30 DIAGNOSIS — M79.7 FIBROMYALGIA: ICD-10-CM

## 2025-01-30 DIAGNOSIS — R91.8 PULMONARY NODULES/LESIONS, MULTIPLE: ICD-10-CM

## 2025-01-30 RX ORDER — ALBUTEROL SULFATE 90 UG/1
2 INHALANT RESPIRATORY (INHALATION) 3 TIMES DAILY PRN
Qty: 18 G | Refills: 0 | Status: SHIPPED | OUTPATIENT
Start: 2025-01-30

## 2025-01-30 NOTE — TELEPHONE ENCOUNTER
Patient would like albuterol nebulizer solution to help her quit smoking faster. She also needs a refill of her albuterol inhaler.

## 2025-01-31 DIAGNOSIS — J45.31 MILD PERSISTENT ASTHMA WITH ACUTE EXACERBATION: ICD-10-CM

## 2025-01-31 RX ORDER — PREDNISONE 10 MG/1
TABLET ORAL
Qty: 30 TABLET | Refills: 0 | OUTPATIENT
Start: 2025-01-31

## 2025-02-01 RX ORDER — PREDNISONE 2.5 MG/1
2.5 TABLET ORAL 2 TIMES DAILY
Qty: 60 TABLET | Refills: 0 | Status: SHIPPED | OUTPATIENT
Start: 2025-02-01

## 2025-02-04 DIAGNOSIS — E78.5 HYPERLIPIDEMIA, UNSPECIFIED: ICD-10-CM

## 2025-02-05 DIAGNOSIS — J42 CHRONIC BRONCHITIS, UNSPECIFIED CHRONIC BRONCHITIS TYPE (HCC): ICD-10-CM

## 2025-02-05 DIAGNOSIS — K21.9 GASTROESOPHAGEAL REFLUX DISEASE, UNSPECIFIED WHETHER ESOPHAGITIS PRESENT: Chronic | ICD-10-CM

## 2025-02-05 DIAGNOSIS — R10.13 DYSPEPSIA: ICD-10-CM

## 2025-02-05 DIAGNOSIS — E78.5 HYPERLIPIDEMIA, UNSPECIFIED: ICD-10-CM

## 2025-02-05 RX ORDER — ONDANSETRON 8 MG/1
8 TABLET, FILM COATED ORAL EVERY 8 HOURS PRN
Qty: 20 TABLET | Refills: 0 | Status: SHIPPED | OUTPATIENT
Start: 2025-02-05

## 2025-02-05 RX ORDER — ALBUTEROL SULFATE 90 UG/1
2 INHALANT RESPIRATORY (INHALATION) 3 TIMES DAILY PRN
Qty: 18 G | Refills: 0 | OUTPATIENT
Start: 2025-02-05

## 2025-02-05 RX ORDER — ATORVASTATIN CALCIUM 10 MG/1
10 TABLET, FILM COATED ORAL DAILY
Qty: 100 TABLET | Refills: 1 | Status: SHIPPED | OUTPATIENT
Start: 2025-02-05

## 2025-02-05 RX ORDER — ATORVASTATIN CALCIUM 10 MG/1
10 TABLET, FILM COATED ORAL DAILY
Qty: 100 TABLET | Refills: 0 | OUTPATIENT
Start: 2025-02-05

## 2025-02-07 DIAGNOSIS — M79.7 FIBROMYALGIA: ICD-10-CM

## 2025-02-11 ENCOUNTER — HOSPITAL ENCOUNTER (EMERGENCY)
Facility: HOSPITAL | Age: 70
Discharge: HOME | End: 2025-02-11
Attending: EMERGENCY MEDICINE
Payer: COMMERCIAL

## 2025-02-11 ENCOUNTER — APPOINTMENT (EMERGENCY)
Dept: CT IMAGING | Facility: HOSPITAL | Age: 70
End: 2025-02-11
Payer: COMMERCIAL

## 2025-02-11 VITALS
RESPIRATION RATE: 16 BRPM | SYSTOLIC BLOOD PRESSURE: 140 MMHG | OXYGEN SATURATION: 100 % | TEMPERATURE: 98.2 F | DIASTOLIC BLOOD PRESSURE: 80 MMHG | HEART RATE: 74 BPM

## 2025-02-11 DIAGNOSIS — K59.01 SLOW TRANSIT CONSTIPATION: ICD-10-CM

## 2025-02-11 DIAGNOSIS — R10.9 CHRONIC ABDOMINAL PAIN: Primary | ICD-10-CM

## 2025-02-11 DIAGNOSIS — M79.7 FIBROMYALGIA: ICD-10-CM

## 2025-02-11 DIAGNOSIS — G89.29 CHRONIC ABDOMINAL PAIN: Primary | ICD-10-CM

## 2025-02-11 LAB
ALBUMIN SERPL BCG-MCNC: 4.5 G/DL (ref 3.5–5)
ALP SERPL-CCNC: 37 U/L (ref 34–104)
ALT SERPL W P-5'-P-CCNC: 18 U/L (ref 7–52)
ANION GAP SERPL CALCULATED.3IONS-SCNC: 10 MMOL/L (ref 4–13)
AST SERPL W P-5'-P-CCNC: 17 U/L (ref 13–39)
ATRIAL RATE: 78 BPM
BACTERIA UR QL AUTO: ABNORMAL /HPF
BASOPHILS # BLD AUTO: 0.07 THOUSANDS/ΜL (ref 0–0.1)
BASOPHILS NFR BLD AUTO: 1 % (ref 0–1)
BILIRUB SERPL-MCNC: 0.29 MG/DL (ref 0.2–1)
BILIRUB UR QL STRIP: NEGATIVE
BUN SERPL-MCNC: 22 MG/DL (ref 5–25)
CALCIUM SERPL-MCNC: 9.4 MG/DL (ref 8.4–10.2)
CHLORIDE SERPL-SCNC: 100 MMOL/L (ref 96–108)
CLARITY UR: CLEAR
CO2 SERPL-SCNC: 27 MMOL/L (ref 21–32)
COLOR UR: YELLOW
CREAT SERPL-MCNC: 0.9 MG/DL (ref 0.6–1.3)
EOSINOPHIL # BLD AUTO: 0.23 THOUSAND/ΜL (ref 0–0.61)
EOSINOPHIL NFR BLD AUTO: 3 % (ref 0–6)
ERYTHROCYTE [DISTWIDTH] IN BLOOD BY AUTOMATED COUNT: 12.8 % (ref 11.6–15.1)
GFR SERPL CREATININE-BSD FRML MDRD: 65 ML/MIN/1.73SQ M
GLUCOSE SERPL-MCNC: 85 MG/DL (ref 65–140)
GLUCOSE UR STRIP-MCNC: NEGATIVE MG/DL
HCT VFR BLD AUTO: 45.8 % (ref 34.8–46.1)
HGB BLD-MCNC: 15.6 G/DL (ref 11.5–15.4)
HGB UR QL STRIP.AUTO: ABNORMAL
HYALINE CASTS #/AREA URNS LPF: ABNORMAL /LPF
IMM GRANULOCYTES # BLD AUTO: 0.02 THOUSAND/UL (ref 0–0.2)
IMM GRANULOCYTES NFR BLD AUTO: 0 % (ref 0–2)
KETONES UR STRIP-MCNC: NEGATIVE MG/DL
LEUKOCYTE ESTERASE UR QL STRIP: NEGATIVE
LIPASE SERPL-CCNC: 6 U/L (ref 11–82)
LYMPHOCYTES # BLD AUTO: 3.09 THOUSANDS/ΜL (ref 0.6–4.47)
LYMPHOCYTES NFR BLD AUTO: 40 % (ref 14–44)
MCH RBC QN AUTO: 36.5 PG (ref 26.8–34.3)
MCHC RBC AUTO-ENTMCNC: 34.1 G/DL (ref 31.4–37.4)
MCV RBC AUTO: 107 FL (ref 82–98)
MONOCYTES # BLD AUTO: 0.81 THOUSAND/ΜL (ref 0.17–1.22)
MONOCYTES NFR BLD AUTO: 11 % (ref 4–12)
NEUTROPHILS # BLD AUTO: 3.49 THOUSANDS/ΜL (ref 1.85–7.62)
NEUTS SEG NFR BLD AUTO: 45 % (ref 43–75)
NITRITE UR QL STRIP: NEGATIVE
NON-SQ EPI CELLS URNS QL MICRO: ABNORMAL /HPF
NRBC BLD AUTO-RTO: 0 /100 WBCS
P AXIS: 70 DEGREES
PH UR STRIP.AUTO: 6 [PH] (ref 4.5–8)
PLATELET # BLD AUTO: 296 THOUSANDS/UL (ref 149–390)
PMV BLD AUTO: 9.3 FL (ref 8.9–12.7)
POTASSIUM SERPL-SCNC: 3.9 MMOL/L (ref 3.5–5.3)
PR INTERVAL: 148 MS
PROT SERPL-MCNC: 7.1 G/DL (ref 6.4–8.4)
PROT UR STRIP-MCNC: NEGATIVE MG/DL
QRS AXIS: 51 DEGREES
QRSD INTERVAL: 86 MS
QT INTERVAL: 372 MS
QTC INTERVAL: 424 MS
RBC # BLD AUTO: 4.27 MILLION/UL (ref 3.81–5.12)
RBC #/AREA URNS AUTO: ABNORMAL /HPF
SODIUM SERPL-SCNC: 137 MMOL/L (ref 135–147)
SP GR UR STRIP.AUTO: 1.01 (ref 1–1.03)
T WAVE AXIS: 76 DEGREES
UROBILINOGEN UR QL STRIP.AUTO: 0.2 E.U./DL
VENTRICULAR RATE: 78 BPM
WBC # BLD AUTO: 7.71 THOUSAND/UL (ref 4.31–10.16)
WBC #/AREA URNS AUTO: ABNORMAL /HPF

## 2025-02-11 PROCEDURE — 96365 THER/PROPH/DIAG IV INF INIT: CPT

## 2025-02-11 PROCEDURE — 83690 ASSAY OF LIPASE: CPT | Performed by: EMERGENCY MEDICINE

## 2025-02-11 PROCEDURE — 99284 EMERGENCY DEPT VISIT MOD MDM: CPT

## 2025-02-11 PROCEDURE — 93005 ELECTROCARDIOGRAM TRACING: CPT

## 2025-02-11 PROCEDURE — 81001 URINALYSIS AUTO W/SCOPE: CPT

## 2025-02-11 PROCEDURE — 74177 CT ABD & PELVIS W/CONTRAST: CPT

## 2025-02-11 PROCEDURE — 80053 COMPREHEN METABOLIC PANEL: CPT | Performed by: EMERGENCY MEDICINE

## 2025-02-11 PROCEDURE — 99284 EMERGENCY DEPT VISIT MOD MDM: CPT | Performed by: EMERGENCY MEDICINE

## 2025-02-11 PROCEDURE — 36415 COLL VENOUS BLD VENIPUNCTURE: CPT | Performed by: EMERGENCY MEDICINE

## 2025-02-11 PROCEDURE — 93010 ELECTROCARDIOGRAM REPORT: CPT | Performed by: INTERNAL MEDICINE

## 2025-02-11 PROCEDURE — 96375 TX/PRO/DX INJ NEW DRUG ADDON: CPT

## 2025-02-11 PROCEDURE — 85025 COMPLETE CBC W/AUTO DIFF WBC: CPT | Performed by: EMERGENCY MEDICINE

## 2025-02-11 RX ORDER — FAMOTIDINE 10 MG/ML
20 INJECTION, SOLUTION INTRAVENOUS ONCE
Status: COMPLETED | OUTPATIENT
Start: 2025-02-11 | End: 2025-02-11

## 2025-02-11 RX ORDER — ASPIRIN 325 MG
325 TABLET ORAL DAILY
COMMUNITY

## 2025-02-11 RX ORDER — ONDANSETRON 2 MG/ML
4 INJECTION INTRAMUSCULAR; INTRAVENOUS ONCE
Status: COMPLETED | OUTPATIENT
Start: 2025-02-11 | End: 2025-02-11

## 2025-02-11 RX ORDER — SODIUM CHLORIDE, SODIUM GLUCONATE, SODIUM ACETATE, POTASSIUM CHLORIDE, MAGNESIUM CHLORIDE, SODIUM PHOSPHATE, DIBASIC, AND POTASSIUM PHOSPHATE .53; .5; .37; .037; .03; .012; .00082 G/100ML; G/100ML; G/100ML; G/100ML; G/100ML; G/100ML; G/100ML
1000 INJECTION, SOLUTION INTRAVENOUS ONCE
Status: COMPLETED | OUTPATIENT
Start: 2025-02-11 | End: 2025-02-11

## 2025-02-11 RX ADMIN — SODIUM CHLORIDE, SODIUM GLUCONATE, SODIUM ACETATE, POTASSIUM CHLORIDE, MAGNESIUM CHLORIDE, SODIUM PHOSPHATE, DIBASIC, AND POTASSIUM PHOSPHATE 1000 ML: .53; .5; .37; .037; .03; .012; .00082 INJECTION, SOLUTION INTRAVENOUS at 14:19

## 2025-02-11 RX ADMIN — FAMOTIDINE 20 MG: 10 INJECTION, SOLUTION INTRAVENOUS at 14:18

## 2025-02-11 RX ADMIN — ONDANSETRON 4 MG: 2 INJECTION INTRAMUSCULAR; INTRAVENOUS at 14:16

## 2025-02-11 RX ADMIN — IOHEXOL 100 ML: 350 INJECTION, SOLUTION INTRAVENOUS at 15:10

## 2025-02-11 NOTE — DISCHARGE INSTRUCTIONS
Constipation   WHAT YOU NEED TO KNOW:   Constipation is when you have hard, dry bowel movements, or you go longer than usual between bowel movements.   DISCHARGE INSTRUCTIONS:   Return to the emergency department if:   You have blood in your bowel movements.    You have a fever and abdominal pain with the constipation.  Contact your healthcare provider if:   Your constipation gets worse.     You start to vomit.    You have questions or concerns about your condition or care.  Medicines:   Fiber supplement  Will help make your bowel movement softer.  Manage your constipation:   Drink liquids as directed.  You may need to drink extra liquids to help soften and move your bowels. Ask how much liquid to drink each day and which liquids are best for you.     Take a stool softener every day in addition to a fiber supplement.   You can use Miralax or magnesium citrate for up to 7 days at a time when you are constipated.

## 2025-02-11 NOTE — ED PROVIDER NOTES
"  ED Disposition       None          Assessment & Plan       Medical Decision Making  Amount and/or Complexity of Data Reviewed  Labs: ordered.  Radiology: ordered.    Risk  Prescription drug management.        ED Course as of 02/11/25 1602   Tue Feb 11, 2025   1519 Reviewed results with patient at bedside and updated on the plan.  CT results pending.  Labwork is reassuring.  She is going for UA at this time   1540 UA, no UTI, no ketones   1550 CT abdomen pelvis with contrast  Imaging reviewed and interpreted myself and concur with radiologist:   \"1.  No identifiable acute abnormality to account for the patient's clinical presentation.   2.  Large amount of stool distending the rectum, without definite surrounding inflammation.     3.  Please refer to the report body for description of other incidental chronic and/or benign findings.\"       Medications - No data to display    ED Risk Strat Scores                                              History of Present Illness       Chief Complaint   Patient presents with   • Abdominal Pain     Abdominal pain for weeks. Was having palpitations, took an aspirin and then sx stopped.        Past Medical History:   Diagnosis Date   • Anxiety    • Asthma     allergy induced   • Chronic pain disorder     back   • Colon polyp    • Difficulty walking Past year    Occasionally weakness   • Fibromyalgia    • Fibromyalgia, primary     Last 15yr apx   • GERD (gastroesophageal reflux disease)    • Headache, tension-type     Off and on since my  death   • Hearing aid worn    • Hearing impaired     \"broken eardrum after eardrops used\"   • HL (hearing loss)     Over 10 years ago   • Hyperlipidemia     controlled   • Hypertension     controlled   • Lumbar herniated disc    • Migraine 10/1/2018    Apx. For past two months   • Movement disorder 15 yrs. ago    Tremors head &hands   • Shortness of breath     at times   • Vision loss Several months ago    Blurred   • Wears dentures    • " "Wears glasses    • Weight loss       Past Surgical History:   Procedure Laterality Date   • BREAST BIOPSY Left     benign- at age 29   • COLONOSCOPY     • EAR SURGERY     • [place] LAPAROSCOPY COLECTOMY PARTIAL W/ANASTOMOSIS N/A 2021    Procedure: RESECTION COLON SIGMOID LAPAROSCOPIC WTIH COLORECTAL ANASTOMOSIS, DIVERTING LOOP ILEOSTOMY;  Surgeon: Mitch Stapleton MD;  Location: [facility];  Service: General   • AZ LAPAROSCOPY SURG ILEOSTOMY/JEJUNOSTOMY NON-TUBE N/A 2022    Procedure: ILEOSTOMY LOOP REVERSAL;  Surgeon: Mitch Stapleton MD;  Location: [facility];  Service: General   • TUBAL LIGATION     • ULNAR NERVE REPAIR Left       Family History   Problem Relation Age of Onset   • Heart failure Mother    • Hyperlipidemia Mother    • Hypertension Mother    • Coronary artery disease Brother    • COPD Maternal Grandmother    • No Known Problems Sister    • No Known Problems Daughter    • No Known Problems Sister    • Aneurysm Maternal Aunt       Social History     Tobacco Use   • Smoking status: Every Day     Current packs/day: 0.00     Average packs/day: 0.5 packs/day for 50.0 years (25.0 ttl pk-yrs)     Types: Cigarettes     Start date: 4/3/1972     Last attempt to quit: 4/3/2022     Years since quittin.8   • Smokeless tobacco: Never   • Tobacco comments:     wears nicotine patch on for 1/2 day   Vaping Use   • Vaping status: Never Used   Substance Use Topics   • Alcohol use: Not Currently   • Drug use: No      E-Cigarette/Vaping   • E-Cigarette Use Never User       E-Cigarette/Vaping Substances   • Nicotine No    • THC No    • CBD No    • Flavoring No    • Other No    • Unknown No       I have reviewed and agree with the history as documented.     68 yo F smoker with chronic bronchitis, c/o abdominal pain, which she affirms is chronic, her  only affirms it \"a long time\", possibly weeks to years, with no change today, no N/V/D, no fever.  She says the pain has been treated for a long time with " "tizanidine, \"that will make the pain go away.\"  She said she does not have any at home, but her  said she does, \"she'll tell you anything to get more.\"  She says bowel movements are typically difficult, no bloody stools. By records, she has past h/o diverticulitis, S/P sigmoid resection, with ileostomy 11/2021, that was later reversed 4/2022.        History provided by:  Patient and spouse      Review of Systems        Objective       ED Triage Vitals [02/11/25 1336]   Temperature Pulse Blood Pressure Respirations SpO2 Patient Position - Orthostatic VS   98.2 °F (36.8 °C) 82 137/84 18 96 % --      Temp src Heart Rate Source BP Location FiO2 (%) Pain Score    -- -- -- -- --      Vitals      Date and Time Temp Pulse SpO2 Resp BP Pain Score FACES Pain Rating User   02/11/25 1336 98.2 °F (36.8 °C) 82 96 % 18 137/84 -- -- LUIS ALFREDO            Physical Exam  Vitals and nursing note reviewed.   Constitutional:       General: She is not in acute distress.     Appearance: She is well-developed and underweight. She is not toxic-appearing or diaphoretic.      Comments: No acute distress, appears older than recorded age, behavior is slowed, but she is alert, by PDMP her chronic medications included diazepam and vicodin.     HENT:      Head: Normocephalic and atraumatic.      Right Ear: External ear normal.      Left Ear: External ear normal.      Nose: Nose normal.      Mouth/Throat:      Mouth: Mucous membranes are moist.   Eyes:      Conjunctiva/sclera: Conjunctivae normal.      Pupils: Pupils are equal, round, and reactive to light.   Cardiovascular:      Rate and Rhythm: Normal rate and regular rhythm.   Pulmonary:      Effort: Pulmonary effort is normal.   Abdominal:      Palpations: Abdomen is soft.      Tenderness: There is no abdominal tenderness.   Musculoskeletal:         General: Normal range of motion.      Cervical back: Normal range of motion and neck supple.   Skin:     General: Skin is warm and dry.      " Capillary Refill: Capillary refill takes less than 2 seconds.      Findings: No rash.   Neurological:      Mental Status: She is alert and oriented to person, place, and time. Mental status is at baseline.      Gait: Gait normal.   Psychiatric:         Speech: Speech is delayed.         Behavior: Behavior is slowed.         Results Reviewed       None            No orders to display       Procedures    ED Medication and Procedure Management   Prior to Admission Medications   Prescriptions Last Dose Informant Patient Reported? Taking?   Cholecalciferol (VITAMIN D3 PO) 2/10/2025 Self Yes Yes   Sig: Take by mouth   HYDROcodone-acetaminophen (NORCO)  mg per tablet 2/10/2025 Self Yes Yes   HYDROcodone-acetaminophen (NORCO) 7.5-325 mg per tablet Not Taking  Yes No   Patient not taking: Reported on 2/11/2025   MAGNESIUM PO 2/10/2025 Self Yes Yes   Sig: Take by mouth   Probiotic Product (PROBIOTIC-10 PO) 2/10/2025 Self Yes Yes   Sig: Take by mouth   albuterol (PROVENTIL HFA,VENTOLIN HFA) 90 mcg/act inhaler Unknown  No No   Sig: Inhale 2 puffs 3 (three) times a day as needed for wheezing or shortness of breath   aspirin 325 mg tablet  Self Yes Yes   Sig: Take 325 mg by mouth daily   aspirin 81 mg chewable tablet Not Taking Self Yes No   Sig: Chew 81 mg daily   Patient not taking: Reported on 2/11/2025   atorvastatin (LIPITOR) 10 mg tablet 2/10/2025  No Yes   Sig: TAKE 1 TABLET BY MOUTH EVERY DAY   budesonide (Pulmicort) 0.25 mg/2 mL nebulizer solution Unknown Self No No   Sig: Take 2 mL (0.25 mg total) by nebulization 2 (two) times a day Rinse mouth after use.  (Mix with albuterol ipratropium)   diazepam (VALIUM) 5 mg tablet 2/10/2025  No Yes   Sig: Take 1 PO in am 1-2 at HS   fluticasone (FLONASE) 50 mcg/act nasal spray Past Month Self No Yes   Sig: SPRAY 2 SPRAYS INTO EACH NOSTRIL EVERY DAY   ipratropium-albuterol (DUO-NEB) 0.5-2.5 mg/3 mL nebulizer solution Not Taking Self No No   Sig: Take 3 mL by nebulization 2  (two) times a day Mix with budesonide vial, swish and spit   Patient not taking: Reported on 2/11/2025   ketorolac (TORADOL) 10 mg tablet Not Taking  No No   Sig: Take 1 tablet (10 mg total) by mouth every 6 (six) hours as needed for moderate pain   Patient not taking: Reported on 2/11/2025   lidocaine (XYLOCAINE) 5 % ointment   No No   Sig: Apply topically as needed for mild pain   lisinopril (ZESTRIL) 2.5 mg tablet 2/10/2025 Self No Yes   Sig: TAKE 1 TABLET (2.5 MG TOTAL) BY MOUTH DAILY PT TAKES SHE HAS NOT BEEN TAKING   mirtazapine (REMERON) 15 mg tablet Unknown  No No   Sig: Take 1 tablet (15 mg total) by mouth daily at bedtime NO alcohol   naloxone (NARCAN) 4 mg/0.1 mL nasal spray Unknown Self No No   Sig: Administer 1 spray into a nostril. If no response after 2-3 minutes, give another dose in the other nostril using a new spray.   omeprazole (PriLOSEC) 40 MG capsule Past Month Self No Yes   Sig: TAKE 1 CAPSULE BY MOUTH EVERY DAY BEFORE BREAKFAST   ondansetron (ZOFRAN) 8 mg tablet 2/11/2025  No Yes   Sig: Take 1 tablet (8 mg total) by mouth every 8 (eight) hours as needed for nausea or vomiting   predniSONE 10 mg tablet Not Taking  No No   Sig: Take p.o. 6-5-4-3-2-1   Patient not taking: Reported on 2/11/2025   predniSONE 2.5 mg tablet Not Taking  No No   Sig: TAKE 1 TABLET BY MOUTH TWICE A DAY   Patient not taking: Reported on 2/11/2025   primidone (MYSOLINE) 50 mg tablet Unknown  No No   Sig: Take 2tabs in the AM and 7tabs qhs   roflumilast (DALIRESP) 250 MCG tablet Not Taking  No No   Sig: Take 1 tablet (250 mcg total) by mouth daily   Patient not taking: Reported on 2/11/2025   tiZANidine (ZANAFLEX) 4 mg tablet Unknown  No No   Sig: TAKE 1 TABLET BY MOUTH 2 TIMES A DAY.   topiramate (Topamax) 25 mg tablet Not Taking  No No   Sig: Take 1tab qhs for a week then 1tab qam and qhs   Patient not taking: Reported on 2/11/2025      Facility-Administered Medications: None     Patient's Medications   Discharge  Prescriptions    No medications on file     No discharge procedures on file.  ED SEPSIS DOCUMENTATION            Chago Arteaga MD  02/12/25 0700

## 2025-02-14 DIAGNOSIS — M79.7 FIBROMYALGIA: ICD-10-CM

## 2025-02-14 DIAGNOSIS — J06.9 UPPER RESPIRATORY TRACT INFECTION, UNSPECIFIED TYPE: ICD-10-CM

## 2025-02-14 DIAGNOSIS — J42 CHRONIC BRONCHITIS, UNSPECIFIED CHRONIC BRONCHITIS TYPE (HCC): ICD-10-CM

## 2025-02-15 DIAGNOSIS — J06.9 UPPER RESPIRATORY TRACT INFECTION, UNSPECIFIED TYPE: ICD-10-CM

## 2025-02-15 DIAGNOSIS — M79.7 FIBROMYALGIA: ICD-10-CM

## 2025-02-16 RX ORDER — PREDNISONE 2.5 MG/1
2.5 TABLET ORAL 2 TIMES DAILY
Qty: 60 TABLET | Refills: 0 | Status: SHIPPED | OUTPATIENT
Start: 2025-02-16

## 2025-02-16 RX ORDER — ALBUTEROL SULFATE 90 UG/1
2 INHALANT RESPIRATORY (INHALATION) 3 TIMES DAILY PRN
Qty: 18 G | Refills: 0 | Status: SHIPPED | OUTPATIENT
Start: 2025-02-16

## 2025-02-17 DIAGNOSIS — G25.0 ESSENTIAL TREMOR: ICD-10-CM

## 2025-02-17 RX ORDER — PREDNISONE 2.5 MG/1
2.5 TABLET ORAL 2 TIMES DAILY
Qty: 60 TABLET | Refills: 0 | OUTPATIENT
Start: 2025-02-17

## 2025-02-17 RX ORDER — PRIMIDONE 50 MG/1
TABLET ORAL
Qty: 810 TABLET | Refills: 1 | Status: SHIPPED | OUTPATIENT
Start: 2025-02-17

## 2025-02-17 NOTE — TELEPHONE ENCOUNTER
Reason for call:   [x] Refill   [] Prior Auth  [x] Other: Patient requesting additional refills, Please review.    Office:   [] PCP/Provider -   [x] Specialty/Provider - [facility] Fox PA-C     Medication: primidone (MYSOLINE) 50 mg tablet     Dose/Frequency: Take 2tabs in the AM and 7tabs qhs     Quantity:  810 tablet     Pharmacy: [facility]/pharm[facility] #[id number] - [place] - [id number] [phone]    Does the patient have enough for 3 days?   [] Yes   [x] No - Send as HP to POD

## 2025-02-19 ENCOUNTER — NURSE TRIAGE (OUTPATIENT)
Age: 70
End: 2025-02-19

## 2025-02-19 ENCOUNTER — APPOINTMENT (EMERGENCY)
Dept: CT IMAGING | Facility: HOSPITAL | Age: 70
End: 2025-02-19
Payer: COMMERCIAL

## 2025-02-19 ENCOUNTER — HOSPITAL ENCOUNTER (EMERGENCY)
Facility: HOSPITAL | Age: 70
Discharge: HOME | End: 2025-02-19
Attending: EMERGENCY MEDICINE | Admitting: EMERGENCY MEDICINE
Payer: COMMERCIAL

## 2025-02-19 VITALS
SYSTOLIC BLOOD PRESSURE: 175 MMHG | OXYGEN SATURATION: 99 % | RESPIRATION RATE: 18 BRPM | DIASTOLIC BLOOD PRESSURE: 97 MMHG | TEMPERATURE: 98.3 F | HEART RATE: 97 BPM

## 2025-02-19 DIAGNOSIS — R19.7 DIARRHEA: Primary | ICD-10-CM

## 2025-02-19 DIAGNOSIS — E87.1 HYPONATREMIA: ICD-10-CM

## 2025-02-19 DIAGNOSIS — R11.2 NAUSEA AND VOMITING: ICD-10-CM

## 2025-02-19 LAB
ALBUMIN SERPL BCG-MCNC: 4.6 G/DL (ref 3.5–5)
ALP SERPL-CCNC: 46 U/L (ref 34–104)
ALT SERPL W P-5'-P-CCNC: 22 U/L (ref 7–52)
ANION GAP SERPL CALCULATED.3IONS-SCNC: 11 MMOL/L (ref 4–13)
AST SERPL W P-5'-P-CCNC: 27 U/L (ref 13–39)
BASOPHILS # BLD AUTO: 0.06 THOUSANDS/ΜL (ref 0–0.1)
BASOPHILS NFR BLD AUTO: 1 % (ref 0–1)
BILIRUB DIRECT SERPL-MCNC: 0.09 MG/DL (ref 0–0.2)
BILIRUB SERPL-MCNC: 0.52 MG/DL (ref 0.2–1)
BUN SERPL-MCNC: 10 MG/DL (ref 5–25)
CALCIUM SERPL-MCNC: 9.2 MG/DL (ref 8.4–10.2)
CHLORIDE SERPL-SCNC: 95 MMOL/L (ref 96–108)
CO2 SERPL-SCNC: 23 MMOL/L (ref 21–32)
CREAT SERPL-MCNC: 0.73 MG/DL (ref 0.6–1.3)
EOSINOPHIL # BLD AUTO: 0.02 THOUSAND/ΜL (ref 0–0.61)
EOSINOPHIL NFR BLD AUTO: 0 % (ref 0–6)
ERYTHROCYTE [DISTWIDTH] IN BLOOD BY AUTOMATED COUNT: 12.5 % (ref 11.6–15.1)
GFR SERPL CREATININE-BSD FRML MDRD: 84 ML/MIN/1.73SQ M
GLUCOSE SERPL-MCNC: 88 MG/DL (ref 65–140)
HCT VFR BLD AUTO: 40.9 % (ref 34.8–46.1)
HGB BLD-MCNC: 14.4 G/DL (ref 11.5–15.4)
IMM GRANULOCYTES # BLD AUTO: 0.13 THOUSAND/UL (ref 0–0.2)
IMM GRANULOCYTES NFR BLD AUTO: 1 % (ref 0–2)
LIPASE SERPL-CCNC: 18 U/L (ref 11–82)
LYMPHOCYTES # BLD AUTO: 1.57 THOUSANDS/ΜL (ref 0.6–4.47)
LYMPHOCYTES NFR BLD AUTO: 16 % (ref 14–44)
MCH RBC QN AUTO: 37.3 PG (ref 26.8–34.3)
MCHC RBC AUTO-ENTMCNC: 35.2 G/DL (ref 31.4–37.4)
MCV RBC AUTO: 106 FL (ref 82–98)
MONOCYTES # BLD AUTO: 0.93 THOUSAND/ΜL (ref 0.17–1.22)
MONOCYTES NFR BLD AUTO: 10 % (ref 4–12)
NEUTROPHILS # BLD AUTO: 6.9 THOUSANDS/ΜL (ref 1.85–7.62)
NEUTS SEG NFR BLD AUTO: 72 % (ref 43–75)
NRBC BLD AUTO-RTO: 0 /100 WBCS
PLATELET # BLD AUTO: 303 THOUSANDS/UL (ref 149–390)
PMV BLD AUTO: 9.2 FL (ref 8.9–12.7)
POTASSIUM SERPL-SCNC: 3.7 MMOL/L (ref 3.5–5.3)
PROT SERPL-MCNC: 7.6 G/DL (ref 6.4–8.4)
RBC # BLD AUTO: 3.86 MILLION/UL (ref 3.81–5.12)
SODIUM SERPL-SCNC: 129 MMOL/L (ref 135–147)
WBC # BLD AUTO: 9.61 THOUSAND/UL (ref 4.31–10.16)

## 2025-02-19 PROCEDURE — 96366 THER/PROPH/DIAG IV INF ADDON: CPT

## 2025-02-19 PROCEDURE — 80048 BASIC METABOLIC PNL TOTAL CA: CPT

## 2025-02-19 PROCEDURE — 36415 COLL VENOUS BLD VENIPUNCTURE: CPT

## 2025-02-19 PROCEDURE — 74177 CT ABD & PELVIS W/CONTRAST: CPT

## 2025-02-19 PROCEDURE — 83690 ASSAY OF LIPASE: CPT

## 2025-02-19 PROCEDURE — 96365 THER/PROPH/DIAG IV INF INIT: CPT

## 2025-02-19 PROCEDURE — 85025 COMPLETE CBC W/AUTO DIFF WBC: CPT

## 2025-02-19 PROCEDURE — 96375 TX/PRO/DX INJ NEW DRUG ADDON: CPT

## 2025-02-19 PROCEDURE — 99284 EMERGENCY DEPT VISIT MOD MDM: CPT

## 2025-02-19 PROCEDURE — 80076 HEPATIC FUNCTION PANEL: CPT

## 2025-02-19 PROCEDURE — 99285 EMERGENCY DEPT VISIT HI MDM: CPT

## 2025-02-19 RX ORDER — ONDANSETRON 2 MG/ML
4 INJECTION INTRAMUSCULAR; INTRAVENOUS ONCE
Status: COMPLETED | OUTPATIENT
Start: 2025-02-19 | End: 2025-02-19

## 2025-02-19 RX ORDER — PANTOPRAZOLE SODIUM 40 MG/1
40 TABLET, DELAYED RELEASE ORAL
Status: DISCONTINUED | OUTPATIENT
Start: 2025-02-20 | End: 2025-02-19

## 2025-02-19 RX ORDER — PANTOPRAZOLE SODIUM 40 MG/1
40 TABLET, DELAYED RELEASE ORAL ONCE
Status: COMPLETED | OUTPATIENT
Start: 2025-02-19 | End: 2025-02-19

## 2025-02-19 RX ORDER — ONDANSETRON 4 MG/1
4 TABLET, ORALLY DISINTEGRATING ORAL EVERY 6 HOURS PRN
Qty: 20 TABLET | Refills: 0 | Status: SHIPPED | OUTPATIENT
Start: 2025-02-19

## 2025-02-19 RX ADMIN — IOHEXOL 85 ML: 350 INJECTION, SOLUTION INTRAVENOUS at 14:02

## 2025-02-19 RX ADMIN — PANTOPRAZOLE SODIUM 40 MG: 40 TABLET, DELAYED RELEASE ORAL at 16:27

## 2025-02-19 RX ADMIN — ONDANSETRON 4 MG: 2 INJECTION INTRAMUSCULAR; INTRAVENOUS at 13:10

## 2025-02-19 RX ADMIN — SODIUM CHLORIDE, SODIUM LACTATE, POTASSIUM CHLORIDE, AND CALCIUM CHLORIDE 1000 ML: .6; .31; .03; .02 INJECTION, SOLUTION INTRAVENOUS at 13:11

## 2025-02-19 NOTE — DISCHARGE INSTRUCTIONS
Please follow up with GI     If you are still unable to tolerate anything by mouth in 1-2 days, please return to ER

## 2025-02-19 NOTE — TELEPHONE ENCOUNTER
"Patient reports vomiting and diarrhea s/p recent hospital visit. She was advised to go to the ED if her symptoms worsen. She would like to do a virtual with PCP if possible and would like a call back to advise. (No appointments available.)     Reason for Disposition   SEVERE vomiting (e.g., 6 or more times/day, vomits everything) BUT hydrated    Answer Assessment - Initial Assessment Questions  1. VOMITING SEVERITY: \"How many times have you vomited in the past 24 hours?\"       15-20 times   2. ONSET: \"When did the vomiting begin?\"       2/18/25  3. FLUIDS: \"What fluids or food have you vomited up today?\" \"Have you been able to keep any fluids down?\"      Vomited this morning, able to keep water down   4. ABDOMEN PAIN: \"Are your having any abdomen pain?\" If Yes : \"How bad is it and what does it feel like?\" (e.g., crampy, dull, intermittent, constant)       Denies   5. DIARRHEA: \"Is there any diarrhea?\" If Yes, ask: \"How many times today?\"       Yes, 10 or more  6. CONTACTS: \"Is there anyone else in the family with the same symptoms?\"       Denies   7. CAUSE: \"What do you think is causing your vomiting?\"      Recent hospitalization with enema   8. HYDRATION STATUS: \"Any signs of dehydration?\" (e.g., dry mouth [not only dry lips], too weak to stand) \"When did you last urinate?\"      Denies   9. OTHER SYMPTOMS: \"Do you have any other symptoms?\" (e.g., fever, headache, vertigo, vomiting blood or coffee grounds, recent head injury)      Weakness, dizziness at times   10. PREGNANCY: \"Is there any chance you are pregnant?\" \"When was your last menstrual period?\"        N/A    Protocols used: Vomiting-Adult-[place]    "

## 2025-02-19 NOTE — ED PROVIDER NOTES
Time reflects when diagnosis was documented in both MDM as applicable and the Disposition within this note       Time User Action Codes Description Comment    2/19/2025  1:36 PM Hodan Hicks Add [R19.7] Diarrhea     2/19/2025  1:36 PM Hodan Hicks Add [R11.2] Nausea and vomiting     2/19/2025  1:36 PM Hodan Hicks Add [E87.1] Hyponatremia           ED Disposition       ED Disposition   Discharge    Condition   Stable    Date/Time   Wed Feb 19, 2025  3:54 PM    Comment   Ora Rivera discharge to home/self care.                   Assessment & Plan       Medical Decision Making  DDx including but not limited to: appendicitis, gastroenteritis, gastroparesis, hepatitis, pancreatitis, ileus, bowel obstruction, volvulus, cholecystitis, biliary colic, diverticulitis, constipation    Discussed findings with the patient.  No acute intra-abdominal pathology to account for patient's symptoms.  Discussed that the diarrhea could be residual from the enema.  Discussed the vomiting may be a viral syndrome.  I did discuss her low sodium and that if she continues to vomit and have diarrhea for 2 to 3 days she should return to the ER.  Medication sent to the pharmacy. Needs to follow up with GI     I have discussed the plan to discharge pt from ED. The patient was discharged in stable condition.  Patient ambulated off the department.  Extensive return to emergency department precautions were discussed.  Follow up with appropriate providers including primary care physician was discussed.  Patient and/or their  primary decision maker expressed understanding.  Patient remained stable during entire emergency department stay.      Amount and/or Complexity of Data Reviewed  Labs: ordered. Decision-making details documented in ED Course.  Radiology: ordered. Decision-making details documented in ED Course.    Risk  Prescription drug management.        ED Course as of 02/19/25 1628   Wed Feb 19, 2025   1335 Sodium(!): 129   1523 CT  "abdomen pelvis w contrast  No acute findings in the abdomen or pelvis.   1531 Will PO challenge    1554 Pt has been able to tolerate PO challenge        Medications   pantoprazole (PROTONIX) EC tablet 40 mg (has no administration in time range)   lactated ringers bolus 1,000 mL (0 mL Intravenous Stopped 2/19/25 1458)   ondansetron (ZOFRAN) injection 4 mg (4 mg Intravenous Given 2/19/25 1310)   iohexol (OMNIPAQUE) 350 MG/ML injection (MULTI-DOSE) 100 mL (85 mL Intravenous Given 2/19/25 1402)       ED Risk Strat Scores                            SBIRT 22yo+      Flowsheet Row Most Recent Value   Initial Alcohol Screen: US AUDIT-C     1. How often do you have a drink containing alcohol? 0 Filed at: 02/19/2025 1239   2. How many drinks containing alcohol do you have on a typical day you are drinking?  0 Filed at: 02/19/2025 1239   3a. Male UNDER 65: How often do you have five or more drinks on one occasion? 0 Filed at: 02/19/2025 1239   3b. FEMALE Any Age, or MALE 65+: How often do you have 4 or more drinks on one occassion? 0 Filed at: 02/19/2025 1239   Audit-C Score 0 Filed at: 02/19/2025 1239   JOSE MANUEL: How many times in the past year have you...    Used an illegal drug or used a prescription medication for non-medical reasons? Never Filed at: 02/19/2025 1239                            History of Present Illness       Chief Complaint   Patient presents with    Abdominal Pain     Pt reports abd pain, nausea, vomiting and diarrhea.        Past Medical History:   Diagnosis Date    Anxiety     Asthma     allergy induced    Chronic pain disorder     back    Colon polyp     Difficulty walking Past year    Occasionally weakness    Fibromyalgia     Fibromyalgia, primary     Last 15yr apx    GERD (gastroesophageal reflux disease)     Headache, tension-type     Off and on since my  death    Hearing aid worn     Hearing impaired     \"broken eardrum after eardrops used\"    HL (hearing loss)     Over 10 years ago    " Hyperlipidemia     controlled    Hypertension     controlled    Lumbar herniated disc     Migraine 10/1/2018    Apx. For past two months    Movement disorder 15 yrs. ago    Tremors head &hands    Shortness of breath     at times    Vision loss Several months ago    Blurred    Wears dentures     Wears glasses     Weight loss       Past Surgical History:   Procedure Laterality Date    BREAST BIOPSY Left     benign- at age 29    COLONOSCOPY      EAR SURGERY      SD LAPAROSCOPY COLECTOMY PARTIAL W/ANASTOMOSIS N/A 2021    Procedure: RESECTION COLON SIGMOID LAPAROSCOPIC WTIH COLORECTAL ANASTOMOSIS, DIVERTING LOOP ILEOSTOMY;  Surgeon: Mitch Stapleton MD;  [facility];  Service: General    SD LAPAROSCOPY SURG ILEOSTOMY/JEJUNOSTOMY NON-TUBE N/A 2022    Procedure: ILEOSTOMY LOOP REVERSAL;  Surgeon: Mitch Stapleton MD;  Location[facility];  Service: General    TUBAL LIGATION      ULNAR NERVE REPAIR Left       Family History   Problem Relation Age of Onset    Heart failure Mother     Hyperlipidemia Mother     Hypertension Mother     Coronary artery disease Brother     COPD Maternal Grandmother     No Known Problems Sister     No Known Problems Daughter     No Known Problems Sister     Aneurysm Maternal Aunt       Social History     Tobacco Use    Smoking status: Every Day     Current packs/day: 0.00     Average packs/day: 0.5 packs/day for 50.0 years (25.0 ttl pk-yrs)     Types: Cigarettes     Start date: 4/3/1972     Last attempt to quit: 4/3/2022     Years since quittin.8    Smokeless tobacco: Never    Tobacco comments:     wears nicotine patch on for 1/2 day   Vaping Use    Vaping status: Never Used   Substance Use Topics    Alcohol use: Not Currently    Drug use: No      E-Cigarette/Vaping    E-Cigarette Use Never User       E-Cigarette/Vaping Substances    Nicotine No     THC No     CBD No     Flavoring No     Other No     Unknown No       I have reviewed and agree with the history as documented.      69 YOF with PMH diverticulitis s/p sigmoid resection, with ileostomy 11/2021, that was later reversed 4/2022; HTN, HLD presents today with complaint of diarrhea, nausea and vomiting. Reports that she was seen here on 2/11 for abd pain and constipation. Reports they gave her an enema here. Reports since then she has been having diarrhea. Nausea and vomiting started about 2 days ago. Has been taking tums without any relief.         Review of Systems   Constitutional:  Negative for chills and fever.   Gastrointestinal:  Positive for abdominal pain, diarrhea, nausea and vomiting. Negative for blood in stool.           Objective       ED Triage Vitals   Temperature Pulse Blood Pressure Respirations SpO2 Patient Position - Orthostatic VS   02/19/25 1231 02/19/25 1231 02/19/25 1231 02/19/25 1231 02/19/25 1231 02/19/25 1624   98.3 °F (36.8 °C) (!) 106 (!) 175/100 20 97 % Sitting      Temp Source Heart Rate Source BP Location FiO2 (%) Pain Score    02/19/25 1231 02/19/25 1231 02/19/25 1231 -- 02/19/25 1314    Oral Monitor Right arm  6      Vitals      Date and Time Temp Pulse SpO2 Resp BP Pain Score FACES Pain Rating User   02/19/25 1624 -- 97 99 % 18 175/97 4 -- SL   02/19/25 1459 -- 72 98 % 18 183/84 -- -- EP   02/19/25 1314 -- 75 98 % 20 190/86 6 -- EP   02/19/25 1231 98.3 °F (36.8 °C) 106 97 % 20 175/100 -- -- HMR            Physical Exam  Vitals and nursing note reviewed.   Constitutional:       General: She is not in acute distress.     Appearance: She is well-developed.   HENT:      Head: Normocephalic and atraumatic.   Eyes:      Conjunctiva/sclera: Conjunctivae normal.   Cardiovascular:      Rate and Rhythm: Normal rate and regular rhythm.      Heart sounds: No murmur heard.  Pulmonary:      Effort: Pulmonary effort is normal. No respiratory distress.      Breath sounds: Normal breath sounds.   Abdominal:      Palpations: Abdomen is soft.      Tenderness: There is abdominal tenderness.   Musculoskeletal:          General: No swelling.      Cervical back: Neck supple.   Skin:     General: Skin is warm and dry.      Capillary Refill: Capillary refill takes less than 2 seconds.   Neurological:      Mental Status: She is alert.   Psychiatric:         Mood and Affect: Mood normal.         Results Reviewed       Procedure Component Value Units Date/Time    Basic metabolic panel [[id number]]  (Abnormal) Collected: 02/19/25 1309    Lab Status: Final result Specimen: Blood from Arm, Left Updated: 02/19/25 1333     Sodium 129 mmol/L      Potassium 3.7 mmol/L      Chloride 95 mmol/L      CO2 23 mmol/L      ANION GAP 11 mmol/L      BUN 10 mg/dL      Creatinine 0.73 mg/dL      Glucose 88 mg/dL      Calcium 9.2 mg/dL      eGFR 84 ml/min/1.73sq m     Narrative:      National Kidney Disease Foundation guidelines for Chronic Kidney Disease (CKD):     Stage 1 with normal or high GFR (GFR > 90 mL/min/1.73 square meters)    Stage 2 Mild CKD (GFR = 60-89 mL/min/1.73 square meters)    Stage 3A Moderate CKD (GFR = 45-59 mL/min/1.73 square meters)    Stage 3B Moderate CKD (GFR = 30-44 mL/min/1.73 square meters)    Stage 4 Severe CKD (GFR = 15-29 mL/min/1.73 square meters)    Stage 5 End Stage CKD (GFR <15 mL/min/1.73 square meters)  Note: GFR calculation is accurate only with a steady state creatinine    Hepatic function panel [[id number]]  (Normal) Collected: 02/19/25 1309    Lab Status: Final result Specimen: Blood from Arm, Left Updated: 02/19/25 1333     Total Bilirubin 0.52 mg/dL      Bilirubin, Direct 0.09 mg/dL      Alkaline Phosphatase 46 U/L      AST 27 U/L      ALT 22 U/L      Total Protein 7.6 g/dL      Albumin 4.6 g/dL     Lipase [[id number]]  (Normal) Collected: 02/19/25 1309    Lab Status: Final result Specimen: Blood from Arm, Left Updated: 02/19/25 1333     Lipase 18 u/L     CBC and differential [[id number]]  (Abnormal) Collected: 02/19/25 1309    Lab Status: Final result Specimen: Blood from Arm, Left Updated: 02/19/25 1320     WBC  9.61 Thousand/uL      RBC 3.86 Million/uL      Hemoglobin 14.4 g/dL      Hematocrit 40.9 %       fL      MCH 37.3 pg      MCHC 35.2 g/dL      RDW 12.5 %      MPV 9.2 fL      Platelets 303 Thousands/uL      nRBC 0 /100 WBCs      Segmented % 72 %      Immature Grans % 1 %      Lymphocytes % 16 %      Monocytes % 10 %      Eosinophils Relative 0 %      Basophils Relative 1 %      Absolute Neutrophils 6.90 Thousands/µL      Absolute Immature Grans 0.13 Thousand/uL      Absolute Lymphocytes 1.57 Thousands/µL      Absolute Monocytes 0.93 Thousand/µL      Eosinophils Absolute 0.02 Thousand/µL      Basophils Absolute 0.06 Thousands/µL             CT abdomen pelvis w contrast   Final Interpretation by Adebayo Galvez MD (02/19 9178)      No acute findings in the abdomen or pelvis.         Workstation performed: [id number]             Procedures    ED Medication and Procedure Management   Prior to Admission Medications   Prescriptions Last Dose Informant Patient Reported? Taking?   Cholecalciferol (VITAMIN D3 PO)  Self Yes No   Sig: Take by mouth   HYDROcodone-acetaminophen (NORCO)  mg per tablet  Self Yes No   HYDROcodone-acetaminophen (NORCO) 7.5-325 mg per tablet   Yes No   Patient not taking: Reported on 2/11/2025   MAGNESIUM PO  Self Yes No   Sig: Take by mouth   Probiotic Product (PROBIOTIC-10 PO)  Self Yes No   Sig: Take by mouth   albuterol (PROVENTIL HFA,VENTOLIN HFA) 90 mcg/act inhaler   No No   Sig: Inhale 2 puffs 3 (three) times a day as needed for wheezing or shortness of breath   aspirin 325 mg tablet  Self Yes No   Sig: Take 325 mg by mouth daily   aspirin 81 mg chewable tablet  Self Yes No   Sig: Chew 81 mg daily   Patient not taking: Reported on 2/11/2025   atorvastatin (LIPITOR) 10 mg tablet   No No   Sig: TAKE 1 TABLET BY MOUTH EVERY DAY   budesonide (Pulmicort) 0.25 mg/2 mL nebulizer solution  Self No No   Sig: Take 2 mL (0.25 mg total) by nebulization 2 (two) times a day Rinse mouth after  use.  (Mix with albuterol ipratropium)   diazepam (VALIUM) 5 mg tablet   No No   Sig: Take 1 PO in am 1-2 at HS   fluticasone (FLONASE) 50 mcg/act nasal spray  Self No No   Sig: SPRAY 2 SPRAYS INTO EACH NOSTRIL EVERY DAY   ipratropium-albuterol (DUO-NEB) 0.5-2.5 mg/3 mL nebulizer solution  Self No No   Sig: Take 3 mL by nebulization 2 (two) times a day Mix with budesonide vial, swish and spit   Patient not taking: Reported on 2/11/2025   ketorolac (TORADOL) 10 mg tablet   No No   Sig: Take 1 tablet (10 mg total) by mouth every 6 (six) hours as needed for moderate pain   Patient not taking: Reported on 2/11/2025   lidocaine (XYLOCAINE) 5 % ointment   No No   Sig: Apply topically as needed for mild pain   lisinopril (ZESTRIL) 2.5 mg tablet  Self No No   Sig: TAKE 1 TABLET (2.5 MG TOTAL) BY MOUTH DAILY PT TAKES SHE HAS NOT BEEN TAKING   mirtazapine (REMERON) 15 mg tablet   No No   Sig: Take 1 tablet (15 mg total) by mouth daily at bedtime NO alcohol   naloxone (NARCAN) 4 mg/0.1 mL nasal spray  Self No No   Sig: Administer 1 spray into a nostril. If no response after 2-3 minutes, give another dose in the other nostril using a new spray.   omeprazole (PriLOSEC) 40 MG capsule  Self No No   Sig: TAKE 1 CAPSULE BY MOUTH EVERY DAY BEFORE BREAKFAST   ondansetron (ZOFRAN) 8 mg tablet   No No   Sig: Take 1 tablet (8 mg total) by mouth every 8 (eight) hours as needed for nausea or vomiting   predniSONE 10 mg tablet   No No   Sig: Take p.o. 6-5-4-3-2-1   Patient not taking: Reported on 2/11/2025   predniSONE 2.5 mg tablet   No No   Sig: Take 1 tablet (2.5 mg total) by mouth 2 (two) times a day   primidone (MYSOLINE) 50 mg tablet   No No   Sig: Take 2tabs in the AM and 7tabs qhs   roflumilast (DALIRESP) 250 MCG tablet   No No   Sig: Take 1 tablet (250 mcg total) by mouth daily   Patient not taking: Reported on 2/11/2025   tiZANidine (ZANAFLEX) 4 mg tablet   No No   Sig: TAKE 1 TABLET BY MOUTH 2 TIMES A DAY.   topiramate (Topamax)  25 mg tablet   No No   Sig: Take 1tab qhs for a week then 1tab qam and qhs   Patient not taking: Reported on 2/11/2025      Facility-Administered Medications: None     Patient's Medications   Discharge Prescriptions    OMEPRAZOLE (PRILOSEC) 20 MG DELAYED RELEASE CAPSULE    Take 1 capsule (20 mg total) by mouth daily for 14 days       Start Date: 2/19/2025 End Date: 3/5/2025       Order Dose: 20 mg       Quantity: 14 capsule    Refills: 0    ONDANSETRON (ZOFRAN-ODT) 4 MG DISINTEGRATING TABLET    Take 1 tablet (4 mg total) by mouth every 6 (six) hours as needed for nausea or vomiting       Start Date: 2/19/2025 End Date: --       Order Dose: 4 mg       Quantity: 20 tablet    Refills: 0     No discharge procedures on file.  ED SEPSIS DOCUMENTATION   Time reflects when diagnosis was documented in both MDM as applicable and the Disposition within this note       Time User Action Codes Description Comment    2/19/2025  1:36 PM Hodan Hicks Add [R19.7] Diarrhea     2/19/2025  1:36 PM Hodan Hicks Add [R11.2] Nausea and vomiting     2/19/2025  1:36 PM Hodan Hicks Add [E87.1] Hyponatremia                  Hodan Hicks PA-C  02/19/25 8547

## 2025-02-20 ENCOUNTER — OFFICE VISIT (OUTPATIENT)
Dept: PULMONOLOGY | Facility: CLINIC | Age: 70
End: 2025-02-20
Payer: COMMERCIAL

## 2025-02-20 ENCOUNTER — TELEPHONE (OUTPATIENT)
Age: 70
End: 2025-02-20

## 2025-02-20 VITALS
HEIGHT: 65 IN | TEMPERATURE: 98.8 F | WEIGHT: 106 LBS | BODY MASS INDEX: 17.66 KG/M2 | DIASTOLIC BLOOD PRESSURE: 78 MMHG | SYSTOLIC BLOOD PRESSURE: 122 MMHG | OXYGEN SATURATION: 98 % | HEART RATE: 107 BPM

## 2025-02-20 DIAGNOSIS — F41.1 GENERALIZED ANXIETY DISORDER: ICD-10-CM

## 2025-02-20 DIAGNOSIS — R91.8 PULMONARY NODULES/LESIONS, MULTIPLE: ICD-10-CM

## 2025-02-20 DIAGNOSIS — Z72.0 TOBACCO USE: ICD-10-CM

## 2025-02-20 DIAGNOSIS — J43.9 PULMONARY EMPHYSEMA, UNSPECIFIED EMPHYSEMA TYPE (HCC): Primary | ICD-10-CM

## 2025-02-20 PROCEDURE — G2211 COMPLEX E/M VISIT ADD ON: HCPCS | Performed by: INTERNAL MEDICINE

## 2025-02-20 PROCEDURE — 99204 OFFICE O/P NEW MOD 45 MIN: CPT | Performed by: INTERNAL MEDICINE

## 2025-02-20 RX ORDER — NICOTINE 21 MG/24HR
1 PATCH, TRANSDERMAL 24 HOURS TRANSDERMAL EVERY 24 HOURS
Qty: 28 PATCH | Refills: 0 | Status: SHIPPED | OUTPATIENT
Start: 2025-02-20

## 2025-02-20 RX ORDER — DIAZEPAM 5 MG/1
TABLET ORAL
Qty: 90 TABLET | Refills: 0 | OUTPATIENT
Start: 2025-02-20

## 2025-02-20 RX ORDER — UMECLIDINIUM BROMIDE AND VILANTEROL TRIFENATATE 62.5; 25 UG/1; UG/1
1 POWDER RESPIRATORY (INHALATION) DAILY
Qty: 60 BLISTER | Refills: 3 | Status: SHIPPED | OUTPATIENT
Start: 2025-02-20 | End: 2025-06-20

## 2025-02-20 NOTE — TELEPHONE ENCOUNTER
I called and left the patient a message to give the office a call back with an update on her symptoms.

## 2025-02-20 NOTE — TELEPHONE ENCOUNTER
Patient returning call from the office. States she is still weak from vomiting and diarrhea but is getting stronger. Is drinking fluids with electrolytes and is able to eat bits of terri crackers and ate one egg today.

## 2025-02-20 NOTE — ASSESSMENT & PLAN NOTE
CT on 12/26/24: Few scattered small pulmonary nodules, linear scar, and benign intrapulmonary lymph node, the largest of which in the juxtapleural right lower lobe measures 5 mm     - will need to follow up with low dose CT yearly.   - encouraged smoking cessation   Orders:    Ambulatory Referral to Pulmonology

## 2025-02-20 NOTE — TELEPHONE ENCOUNTER
I reviewed the patient's chart.  She was seen in ED yesterday.  We could do a courtesy follow-up phone call to make sure that her symptoms are improved.

## 2025-02-20 NOTE — PROGRESS NOTES
Consultation - Pulmonary Medicine   Name: Ora Rivera      : 1955      MRN: [id number]  Encounter Provider: Buffy Myers MD  Encounter Date: 2025   Encounter department: [facility] [place]    Requesting Provider:   Suzan Ferrer Do  [address]  [address]  [place] [address]     Reason for Consult: pulmonary nodules, hx of asthma :  Assessment & Plan  Pulmonary nodules/lesions, multiple  CT on 24: Few scattered small pulmonary nodules, linear scar, and benign intrapulmonary lymph node, the largest of which in the juxtapleural right lower lobe measures 5 mm     - will need to follow up with low dose CT yearly.   - encouraged smoking cessation   Orders:    Ambulatory Referral to Pulmonology    Pulmonary emphysema, unspecified emphysema type (HCC)    Long time heavy smoker.- last cigarette 3 days ago with intention to quit   Dx of asthma per chart review in the past few years  No PFT on file.   Current medication include: albuterol PRN,  she takes on average twice a day and low dose prednisone 2.5 mg BID for the past 6 months, stopped 1 month ago.   Given smoking hx and emphysema findings presentation more suggestive of COPD.   O2 sat > 95 % on RA  Will start Anoro ellipta   - follow up in 4 months  - PFT  - nicotine patches for smoking cessation        Orders:    Ambulatory Referral to Pulmonology    umeclidinium-vilanterol (Anoro Ellipta) 62.5-25 mcg/actuation inhaler; Inhale 1 puff daily    Complete PFT with post bronchodilator; Future    Tobacco use  Since 13 yo smoking , recently increased to 2 packs a day    Orders:    nicotine (NICODERM CQ) 21 mg/24 hr TD 24 hr patch; Place 1 patch on the skin over 24 hours every 24 hours      Return in about 4 months (around 2025).  History of Present Illness   Ora Rivera is a 69 y.o. female who was referred to pulmonology due to recent findings in the CT scan showing Few scattered small pulmonary  "nodules and emphysema changes.   Patient is reporting having  been dx with asthma in 2019 using albuterol and low dose prednisone to manage her SOB symptoms with some relief. She was not able to use and ICS inhaler due to difficulty rinsing her mouth due to dentures so was given prednisone 2.5 mg BID. Last dose was 1 month ago. She has been using albuterol 2 times a day on average.   Of note she ws in the ED yesterday for symptoms of gastroenteritis for the past week and is reporting fatigue due to that.   Social hx : smoker, last cig 3 days ago, - planning to quit.   2 pack a day.since 15 yo.    Fire place at home  Lives with ex .         Review of Systems   Constitutional:  Positive for appetite change and fever.   HENT:  Positive for ear pain, postnasal drip, sore throat and trouble swallowing.    Respiratory:  Positive for cough, shortness of breath and wheezing.    Musculoskeletal:  Positive for myalgias.   Neurological:  Positive for headaches.     Aside from what is mentioned in the HPI, ROS is otherwise negative    Medical History Reviewed by provider this encounter:     .    Historical Information   Past Medical History:   Diagnosis Date    Anxiety     Asthma     allergy induced    Chronic pain disorder     back    Colon polyp     Difficulty walking Past year    Occasionally weakness    Fibromyalgia     Fibromyalgia, primary     Last 15yr apx    GERD (gastroesophageal reflux disease)     Headache, tension-type     Off and on since my  death    Hearing aid worn     Hearing impaired     \"broken eardrum after eardrops used\"    HL (hearing loss)     Over 10 years ago    Hyperlipidemia     controlled    Hypertension     controlled    Lumbar herniated disc     Migraine 10/1/2018    Apx. For past two months    Movement disorder 15 yrs. ago    Tremors head &hands    Shortness of breath     at times    Vision loss Several months ago    Blurred    Wears dentures     Wears glasses     Weight loss  " "    Past Surgical History:   Procedure Laterality Date    BREAST BIOPSY Left     benign- at age 29    COLONOSCOPY      EAR SURGERY      IA LAPAROSCOPY COLECTOMY PARTIAL W/ANASTOMOSIS N/A 2021    Procedure: RESECTION COLON SIGMOID LAPAROSCOPIC WTIH COLORECTAL ANASTOMOSIS, DIVERTING LOOP ILEOSTOMY;  Surgeon: Mitch Stapleton MD;  [facility];  Service: General    IA LAPAROSCOPY SURG ILEOSTOMY/JEJUNOSTOMY NON-TUBE N/A 2022    Procedure: ILEOSTOMY LOOP REVERSAL;  Surgeon: Mitch Stapleton MD;  [facility];  Service: General    TUBAL LIGATION      ULNAR NERVE REPAIR Left      Social History   Social History     Tobacco Use   Smoking Status Former    Average packs/day: 0.5 packs/day for 50.0 years (25.0 ttl pk-yrs)    Types: Cigarettes    Start date: 4/3/1972    Quit date: 4/3/2022    Years since quittin.8   Smokeless Tobacco Never   Tobacco Comments    wears nicotine patch on for 1/2 day         Family History:   Family History   Problem Relation Age of Onset    Heart failure Mother     Hyperlipidemia Mother     Hypertension Mother     Coronary artery disease Brother     COPD Maternal Grandmother     No Known Problems Sister     No Known Problems Daughter     No Known Problems Sister     Aneurysm Maternal Aunt        Objective   /78 (BP Location: Left arm, Patient Position: Sitting, Cuff Size: Standard)   Pulse (!) 107   Temp 98.8 °F (37.1 °C) (Tympanic)   Ht 5' 5\" (1.651 m)   Wt 48.1 kg (106 lb)   LMP  (LMP Unknown)   SpO2 98%   BMI 17.64 kg/m²      Physical Exam  Vitals and nursing note reviewed.   Constitutional:       General: She is not in acute distress.     Appearance: She is well-developed. She is ill-appearing. She is not toxic-appearing.      Comments: Frail    HENT:      Head: Normocephalic and atraumatic.      Nose: Nose normal. No congestion.      Mouth/Throat:      Mouth: Mucous membranes are moist.      Pharynx: No posterior oropharyngeal erythema.   Eyes:      " General: No scleral icterus.     Conjunctiva/sclera: Conjunctivae normal.   Cardiovascular:      Rate and Rhythm: Normal rate and regular rhythm.      Heart sounds: No murmur heard.  Pulmonary:      Effort: Pulmonary effort is normal. No respiratory distress.      Breath sounds: Normal breath sounds. No wheezing or rales.   Musculoskeletal:         General: No swelling.      Cervical back: Neck supple.   Skin:     General: Skin is warm and dry.      Capillary Refill: Capillary refill takes less than 2 seconds.   Neurological:      Mental Status: She is alert.         Diagnostic Data:  Labs: I personally reviewed the most recent laboratory data pertinent to today's visit.  CBC- eosinophils normal count     Radiology results:  Few scattered small pulmonary nodules, linear scar, and benign intrapulmonary lymph node, the largest of which in the juxtapleural right lower lobe measures 5 mm (series 5, image 64; smaller nodules are also annotated on series 5).     Emphysema. Central airways are normal.      Jessica Mata MD

## 2025-02-24 ENCOUNTER — TELEPHONE (OUTPATIENT)
Dept: NEUROLOGY | Facility: CLINIC | Age: 70
End: 2025-02-24

## 2025-02-24 NOTE — TELEPHONE ENCOUNTER
Received via [employer] from [facility] Medication Safety and Quality Opportunities Notification regarding patient.  Notification scanned into .

## 2025-03-03 DIAGNOSIS — G43.909 MIGRAINE WITHOUT STATUS MIGRAINOSUS, NOT INTRACTABLE, UNSPECIFIED MIGRAINE TYPE: ICD-10-CM

## 2025-03-03 DIAGNOSIS — G25.0 ESSENTIAL TREMOR: ICD-10-CM

## 2025-03-04 DIAGNOSIS — F41.1 GENERALIZED ANXIETY DISORDER: ICD-10-CM

## 2025-03-04 RX ORDER — KETOROLAC TROMETHAMINE 10 MG/1
10 TABLET, FILM COATED ORAL EVERY 6 HOURS PRN
Qty: 20 TABLET | Refills: 5 | Status: SHIPPED | OUTPATIENT
Start: 2025-03-04

## 2025-03-04 RX ORDER — DIAZEPAM 5 MG/1
TABLET ORAL
Qty: 90 TABLET | Refills: 0 | Status: SHIPPED | OUTPATIENT
Start: 2025-03-04

## 2025-03-04 RX ORDER — PRIMIDONE 50 MG/1
TABLET ORAL
Qty: 810 TABLET | Refills: 0 | OUTPATIENT
Start: 2025-03-04

## 2025-03-04 NOTE — TELEPHONE ENCOUNTER
Patient left a comment on Neurology request    Patient comment: I also need the diazapam    diazepam (VALIUM) 5 mg tablet -Take 1 PO in am 1-2 at HS      [facility]-Suzan Ferrer DO       [facility]/pharmacy #[id number] - [place] - [phone]

## 2025-03-04 NOTE — TELEPHONE ENCOUNTER
primidone (MYSOLINE) 50 mg tablet, was sent to the [facility] on 2/17/25 with a qty of 810 tablets with 1 refills

## 2025-03-05 ENCOUNTER — OFFICE VISIT (OUTPATIENT)
Dept: FAMILY MEDICINE CLINIC | Facility: CLINIC | Age: 70
End: 2025-03-05
Payer: COMMERCIAL

## 2025-03-05 VITALS
BODY MASS INDEX: 18.06 KG/M2 | TEMPERATURE: 97.7 F | HEIGHT: 65 IN | WEIGHT: 108.4 LBS | DIASTOLIC BLOOD PRESSURE: 72 MMHG | SYSTOLIC BLOOD PRESSURE: 138 MMHG | HEART RATE: 96 BPM | OXYGEN SATURATION: 98 %

## 2025-03-05 DIAGNOSIS — R91.8 PULMONARY NODULES/LESIONS, MULTIPLE: ICD-10-CM

## 2025-03-05 DIAGNOSIS — M79.7 FIBROMYALGIA: ICD-10-CM

## 2025-03-05 DIAGNOSIS — J43.1 PANLOBULAR EMPHYSEMA (HCC): ICD-10-CM

## 2025-03-05 DIAGNOSIS — I10 ESSENTIAL HYPERTENSION: ICD-10-CM

## 2025-03-05 DIAGNOSIS — Z00.00 MEDICARE ANNUAL WELLNESS VISIT, SUBSEQUENT: Primary | ICD-10-CM

## 2025-03-05 PROBLEM — F11.20 CONTINUOUS OPIOID DEPENDENCE (HCC): Status: RESOLVED | Noted: 2023-10-23 | Resolved: 2025-03-05

## 2025-03-05 PROCEDURE — G0439 PPPS, SUBSEQ VISIT: HCPCS | Performed by: FAMILY MEDICINE

## 2025-03-05 PROCEDURE — G2211 COMPLEX E/M VISIT ADD ON: HCPCS | Performed by: FAMILY MEDICINE

## 2025-03-05 PROCEDURE — 99213 OFFICE O/P EST LOW 20 MIN: CPT | Performed by: FAMILY MEDICINE

## 2025-03-05 RX ORDER — ALBUTEROL SULFATE 90 UG/1
2 INHALANT RESPIRATORY (INHALATION) 3 TIMES DAILY PRN
Qty: 18 G | Refills: 0 | Status: SHIPPED | OUTPATIENT
Start: 2025-03-05 | End: 2025-04-03

## 2025-03-05 NOTE — PROGRESS NOTES
Name: Ora Rivera      : 1955      MRN: [id number]  Encounter Provider: Suzan Ferrer DO  Encounter Date: 3/5/2025   Encounter department: [facility] PRIMARY CARE    Assessment & Plan  Medicare annual wellness visit, subsequent         Fibromyalgia  Patient does follow with pain management, but does utilize Zanaflex prescribed by PCP and Opioid bypain management         Essential hypertension  Currently controlled with lisinopril.  GFR up-to-date       Panlobular emphysema (HCC)  Patient is followed by pulmonary.  Stressed compliance with Anoro and continued smoking cessation       Pulmonary nodules/lesions, multiple  Patient's last CT lung screening 2014.  There is an order in the chart to have it repeated yearly.  Patient will be seeing pulmonology as well in follow-up.         Depression Screening and Follow-up Plan: Patient was screened for depression during today's encounter. They screened negative with a PHQ-9 score of 0.        Preventive health issues were discussed with patient, and age appropriate screening tests were ordered as noted in patient's After Visit Summary. Personalized health advice and appropriate referrals for health education or preventive services given if needed, as noted in patient's After Visit Summary.    History of Present Illness   Chief Complaint   Patient presents with   • Annual Exam     Medicare AWV.  Anxiety getting worse.     Patient here for wellness.  Also had some recent labs in February.    Labs were done as part of his ED visit.  Did see pulmonary in February-on Anoro as well as rescue inhaler.  Follows with neurology primarily for essential tremor-on primidone.    Still does check in with pain management ([facility])    Patient Care Team:  Suzan Ferrer DO as PCP - General (Family Medicine)    Review of Systems   Constitutional:  Negative for fatigue (Feeling more energy since smoking cessation).   Respiratory:  Negative for  cough (Much less coughing almost completely stopped since smoking cessation).         NOT SMOKING SINCE 2/17   Gastrointestinal:         Other than flare of nausea vomiting and diarrhea episode in February she has recovered and doing fairly well.  Appetite is better since smoking cessation   Neurological:  Negative for light-headedness (Occasional with positional).   Hematological:  Does not bruise/bleed easily.   Psychiatric/Behavioral:  Negative for dysphoric mood (Improved). The patient is not nervous/anxious (Improved).         Compliant with [facility]       Annual Wellness Visit Questionnaire   Ora is here for her Subsequent Wellness visit.     Health Risk Assessment:   Patient rates overall health as fair. Patient feels that their physical health rating is slightly worse. Patient is satisfied with their life. Eyesight was rated as slightly worse. Hearing was rated as same. Patient feels that their emotional and mental health rating is same. Patients states they are never, rarely angry. Patient states they are sometimes unusually tired/fatigued. Pain experienced in the last 7 days has been some. Patient's pain rating has been 5/10. Patient states that she has experienced weight loss or gain in last 6 months.     Depression Screening:   PHQ-9 Score: 0      Fall Risk Screening:   In the past year, patient has experienced: no history of falling in past year      Urinary Incontinence Screening:   Patient has not leaked urine accidently in the last six months.     Home Safety:  Patient does not have trouble with stairs inside or outside of their home. Patient has working smoke alarms and has no working carbon monoxide detector. Home safety hazards include: none.     Nutrition:   Current diet is Regular.     Medications:   Patient is currently taking over-the-counter supplements. OTC medications include: see medication list. Patient is able to manage medications.     Activities of Daily Living (ADLs)/Instrumental  Activities of Daily Living (IADLs):   Walk and transfer into and out of bed and chair?: Yes  Dress and groom yourself?: Yes    Bathe or shower yourself?: Yes    Feed yourself? Yes  Do your laundry/housekeeping?: Yes  Manage your money, pay your bills and track your expenses?: Yes  Make your own meals?: Yes    Do your own shopping?: Yes    Previous Hospitalizations:   Any hospitalizations or ED visits within the last 12 months?: Yes    How many hospitalizations have you had in the last year?: 1-2    Hospitalization Comments: ER for diarrhea and vomiting    Advance Care Planning:   Living will: No    Durable POA for healthcare: No    Advanced directive: No    Advanced directive counseling given: Yes    End of Life Decisions reviewed with patient: Yes      Comments: Paperwork reviewed    Cognitive Screening:   Provider or family/friend/caregiver concerned regarding cognition?: No    PREVENTIVE SCREENINGS      Cardiovascular Screening:    General: History Lipid Disorder    Due for: Lipid Panel      Diabetes Screening:     General: Screening Current and Screening Not Indicated      Colorectal Cancer Screening:     General: Screening Current      Breast Cancer Screening:     General: Risks and Benefits Discussed      Cervical Cancer Screening:    General: Screening Not Indicated      Osteoporosis Screening:    General: Risks and Benefits Discussed      Abdominal Aortic Aneurysm (AAA) Screening:        General: Screening Not Indicated      Lung Cancer Screening:     General: Screening Current      Hepatitis C Screening:    General: Screening Current    Hep C Screening Accepted: No     Screening, Brief Intervention, and Referral to Treatment (SBIRT)     Screening  Typical number of drinks in a day: 0  Typical number of drinks in a week: 0  Interpretation: Low risk drinking behavior.    AUDIT-C Screenin) How often did you have a drink containing alcohol in the past year? never  2) How many drinks did you have on a  "typical day when you were drinking in the past year? 0  3) How often did you have 6 or more drinks on one occasion in the past year? never    AUDIT-C Score: 0  Interpretation: Score 0-2 (female): Negative screen for alcohol misuse    Single Item Drug Screening:  How often have you used an illegal drug (including marijuana) or a prescription medication for non-medical reasons in the past year? never    Single Item Drug Screen Score: 0  Interpretation: Negative screen for possible drug use disorder    Review of Current Opioid Use    Opioid Risk Tool (ORT) Interpretation: Complete Opioid Risk Tool (ORT)    Social Drivers of Health     Financial Resource Strain: Low Risk  (3/3/2024)    Overall Financial Resource Strain (CARDIA)    • Difficulty of Paying Living Expenses: Not very hard   Food Insecurity: No Food Insecurity (3/5/2025)    Hunger Vital Sign    • Worried About Running Out of Food in the Last Year: Never true    • Ran Out of Food in the Last Year: Never true   Transportation Needs: No Transportation Needs (3/5/2025)    PRAPARE - Transportation    • Lack of Transportation (Medical): No    • Lack of Transportation (Non-Medical): No   Housing Stability: Low Risk  (3/5/2025)    Housing Stability Vital Sign    • Unable to Pay for Housing in the Last Year: No    • Number of Times Moved in the Last Year: 0    • Homeless in the Last Year: No   Utilities: Not At Risk (3/5/2025)    [facility] Utilities    • Threatened with loss of utilities: No     No results found.  Objective    /72   Pulse 96   Temp 97.7 °F (36.5 °C) (Temporal)   Ht 5' 5\" (1.651 m)   Wt 49.2 kg (108 lb 6.4 oz)   LMP  (LMP Unknown)   SpO2 98%   BMI 18.04 kg/m²   Component      Latest Ref Rng 2/19/2025   WBC      4.31 - 10.16 Thousand/uL 9.61    RBC      3.81 - 5.12 Million/uL 3.86    Hemoglobin      11.5 - 15.4 g/dL 14.4    Hematocrit      34.8 - 46.1 % 40.9    MCV      82 - 98 fL 106 (H)    MCH      26.8 - 34.3 pg 37.3 (H)    MCHC      31.4 - " 37.4 g/dL 35.2    RDW      11.6 - 15.1 % 12.5    MPV      8.9 - 12.7 fL 9.2    Platelet Count      149 - 390 Thousands/uL 303       Component      Latest Ref Rng 2/19/2025 (2/11/25)   Sodium      135 - 147 mmol/L 129 (L) (137) acute drop in sodium likely due to acute symptomatic nausea vomiting and diarrhea   Potassium      3.5 - 5.3 mmol/L 3.7    Chloride      96 - 108 mmol/L 95 (L)    Carbon Dioxide      21 - 32 mmol/L 23    ANION GAP      4 - 13 mmol/L 11    BUN      5 - 25 mg/dL 10    Creatinine      0.60 - 1.30 mg/dL 0.73    GLUCOSE      65 - 140 mg/dL 88    Calcium      8.4 - 10.2 mg/dL 9.2    GFR, Calculated      ml/min/1.73sq m 84    Total Bilirubin      0.20 - 1.00 mg/dL 0.52    BILIRUBIN DIRECT      0.00 - 0.20 mg/dL 0.09    ALK PHOS      34 - 104 U/L 46    AST      13 - 39 U/L 27    ALT      7 - 52 U/L 22    Total Protein      6.4 - 8.4 g/dL 7.6    Albumin      3.5 - 5.0 g/dL 4.6    LIPASE      11 - 82 u/L 18       Generally thyroid issues have not been a problem.  Most recent lipid panel in June 2024 acceptable.    Physical Exam  Vitals reviewed.   Constitutional:       General: She is not in acute distress.     Appearance: She is not ill-appearing or toxic-appearing.      Comments: 69-year-old female appears slightly older than stated age, thin without cachexia   HENT:      Right Ear: Tympanic membrane normal. There is no impacted cerumen.      Left Ear: Tympanic membrane normal. There is no impacted cerumen.      Ears:      Comments: Bilateral hearing aids     Mouth/Throat:      Dentition: Has dentures.   Eyes:      Pupils: Pupils are equal, round, and reactive to light.   Cardiovascular:      Rate and Rhythm: Normal rate and regular rhythm.      Pulses: Normal pulses.   Pulmonary:      Effort: Pulmonary effort is normal.      Breath sounds: Normal breath sounds.   Abdominal:      General: Abdomen is flat. Bowel sounds are normal.      Palpations: Abdomen is soft.   Musculoskeletal:      Right lower  leg: No edema.      Left lower leg: No edema.      Comments: Slightly forward bent gait   Skin:     Findings: No rash.   Neurological:      Mental Status: She is alert and oriented to person, place, and time.      Motor: Tremor present.   Psychiatric:         Mood and Affect: Mood normal.         Behavior: Behavior normal.         Thought Content: Thought content normal.         Judgment: Judgment normal.

## 2025-03-05 NOTE — ASSESSMENT & PLAN NOTE
Patient does follow with pain management, but does utilize Zanaflex prescribed by PCP and Opioid bypain management

## 2025-03-05 NOTE — PATIENT INSTRUCTIONS
Medicare Preventive Visit Patient Instructions  Thank you for completing your Welcome to Medicare Visit or Medicare Annual Wellness Visit today. Your next wellness visit will be due in one year (3/6/2026).  The screening/preventive services that you may require over the next 5-10 years are detailed below. Some tests may not apply to you based off risk factors and/or age. Screening tests ordered at today's visit but not completed yet may show as past due. Also, please note that scanned in results may not display below.  Preventive Screenings:  Service Recommendations Previous Testing/Comments   Colorectal Cancer Screening  * Colonoscopy    * Fecal Occult Blood Test (FOBT)/Fecal Immunochemical Test (FIT)  * Fecal DNA/Cologuard Test  * Flexible Sigmoidoscopy Age: 45-75 years old   Colonoscopy: every 10 years (may be performed more frequently if at higher risk)  OR  FOBT/FIT: every 1 year  OR  Cologuard: every 3 years  OR  Sigmoidoscopy: every 5 years  Screening may be recommended earlier than age 45 if at higher risk for colorectal cancer. Also, an individualized decision between you and your healthcare provider will decide whether screening between the ages of 76-85 would be appropriate. Colonoscopy: 10/07/2022  FOBT/FIT: Not on file  Cologuard: Not on file  Sigmoidoscopy: Not on file    Screening Current     Breast Cancer Screening Age: 40+ years old  Frequency: every 1-2 years  Not required if history of left and right mastectomy Mammogram: 09/08/2022        Cervical Cancer Screening Between the ages of 21-29, pap smear recommended once every 3 years.   Between the ages of 30-65, can perform pap smear with HPV co-testing every 5 years.   Recommendations may differ for women with a history of total hysterectomy, cervical cancer, or abnormal pap smears in past. Pap Smear: 07/18/2022    Screening Not Indicated   Hepatitis C Screening Once for adults born between 1945 and 1965  More frequently in patients at high risk  for Hepatitis C Hep C Antibody: Not on file    Screening Current   Diabetes Screening 1-2 times per year if you're at risk for diabetes or have pre-diabetes Fasting glucose: 79 mg/dL (6/13/2024)  A1C: 4.9 % (3/16/2022)  Screening Current   Cholesterol Screening Once every 5 years if you don't have a lipid disorder. May order more often based on risk factors. Lipid panel: 06/13/2024    Screening Not Indicated  History Lipid Disorder     Other Preventive Screenings Covered by Medicare:  Abdominal Aortic Aneurysm (AAA) Screening: covered once if your at risk. You're considered to be at risk if you have a family history of AAA.  Lung Cancer Screening: covers low dose CT scan once per year if you meet all of the following conditions: (1) Age 55-77; (2) No signs or symptoms of lung cancer; (3) Current smoker or have quit smoking within the last 15 years; (4) You have a tobacco smoking history of at least 20 pack years (packs per day multiplied by number of years you smoked); (5) You get a written order from a healthcare provider.  Glaucoma Screening: covered annually if you're considered high risk: (1) You have diabetes OR (2) Family history of glaucoma OR (3)  aged 50 and older OR (4)  American aged 65 and older  Osteoporosis Screening: covered every 2 years if you meet one of the following conditions: (1) You're estrogen deficient and at risk for osteoporosis based off medical history and other findings; (2) Have a vertebral abnormality; (3) On glucocorticoid therapy for more than 3 months; (4) Have primary hyperparathyroidism; (5) On osteoporosis medications and need to assess response to drug therapy.   Last bone density test (DXA Scan): 09/12/2022.  HIV Screening: covered annually if you're between the age of 15-65. Also covered annually if you are younger than 15 and older than 65 with risk factors for HIV infection. For pregnant patients, it is covered up to 3 times per  pregnancy.    Immunizations:  Immunization Recommendations   Influenza Vaccine Annual influenza vaccination during flu season is recommended for all persons aged >= 6 months who do not have contraindications   Pneumococcal Vaccine   * Pneumococcal conjugate vaccine = PCV13 (Prevnar 13), PCV15 (Vaxneuvance), PCV20 (Prevnar 20)  * Pneumococcal polysaccharide vaccine = PPSV23 (Pneumovax) Adults 19-65 yo with certain risk factors or if 65+ yo  If never received any pneumonia vaccine: recommend Prevnar 20 (PCV20)  Give PCV20 if previously received 1 dose of PCV13 or PPSV23   Hepatitis B Vaccine 3 dose series if at intermediate or high risk (ex: diabetes, end stage renal disease, liver disease)   Respiratory syncytial virus (RSV) Vaccine - COVERED BY MEDICARE PART D  * RSVPreF3 (Arexvy) CDC recommends that adults 60 years of age and older may receive a single dose of RSV vaccine using shared clinical decision-making (SCDM)   Tetanus (Td) Vaccine - COST NOT COVERED BY MEDICARE PART B Following completion of primary series, a booster dose should be given every 10 years to maintain immunity against tetanus. Td may also be given as tetanus wound prophylaxis.   Tdap Vaccine - COST NOT COVERED BY MEDICARE PART B Recommended at least once for all adults. For pregnant patients, recommended with each pregnancy.   Shingles Vaccine (Shingrix) - COST NOT COVERED BY MEDICARE PART B  2 shot series recommended in those 19 years and older who have or will have weakened immune systems or those 50 years and older     Health Maintenance Due:      Topic Date Due   • Breast Cancer Screening: Mammogram  09/08/2023   • Hepatitis C Screening  04/01/2030 (Originally 1955)   • Lung Cancer Screening  12/26/2025   • Colorectal Cancer Screening  10/04/2032     Immunizations Due:      Topic Date Due   • Hepatitis A Vaccine (1 of 2 - Risk 2-dose series) Never done   • Influenza Vaccine (1) Never done   • COVID-19 Vaccine (1 - 2024-25 season) Never  done     Advance Directives   What are advance directives?  Advance directives are legal documents that state your wishes and plans for medical care. These plans are made ahead of time in case you lose your ability to make decisions for yourself. Advance directives can apply to any medical decision, such as the treatments you want, and if you want to donate organs.   What are the types of advance directives?  There are many types of advance directives, and each state has rules about how to use them. You may choose a combination of any of the following:  Living will:  This is a written record of the treatment you want. You can also choose which treatments you do not want, which to limit, and which to stop at a certain time. This includes surgery, medicine, IV fluid, and tube feedings.   Durable power of  for healthcare (DPAHC):  This is a written record that states who you want to make healthcare choices for you when you are unable to make them for yourself. This person, called a proxy, is usually a family member or a friend. You may choose more than 1 proxy.  Do not resuscitate (DNR) order:  A DNR order is used in case your heart stops beating or you stop breathing. It is a request not to have certain forms of treatment, such as CPR. A DNR order may be included in other types of advance directives.  Medical directive:  This covers the care that you want if you are in a coma, near death, or unable to make decisions for yourself. You can list the treatments you want for each condition. Treatment may include pain medicine, surgery, blood transfusions, dialysis, IV or tube feedings, and a ventilator (breathing machine).  Values history:  This document has questions about your views, beliefs, and how you feel and think about life. This information can help others choose the care that you would choose.  Why are advance directives important?  An advance directive helps you control your care. Although spoken wishes  may be used, it is better to have your wishes written down. Spoken wishes can be misunderstood, or not followed. Treatments may be given even if you do not want them. An advance directive may make it easier for your family to make difficult choices about your care.   Urinary Incontinence   Urinary incontinence (UI)  is when you lose control of your bladder. UI develops because your bladder cannot store or empty urine properly. The 3 most common types of UI are stress incontinence, urge incontinence, or both.  Medicines:   May be given to help strengthen your bladder control. Report any side effects of medication to your healthcare provider.  Do pelvic muscle exercises often:  Your pelvic muscles help you stop urinating. Squeeze these muscles tight for 5 seconds, then relax for 5 seconds. Gradually work up to squeezing for 10 seconds. Do 3 sets of 15 repetitions a day, or as directed. This will help strengthen your pelvic muscles and improve bladder control.  Train your bladder:  Go to the bathroom at set times, such as every 2 hours, even if you do not feel the urge to go. You can also try to hold your urine when you feel the urge to go. For example, hold your urine for 5 minutes when you feel the urge to go. As that becomes easier, hold your urine for 10 minutes.   Self-care:   Keep a UI record.  Write down how often you leak urine and how much you leak. Make a note of what you were doing when you leaked urine.  Drink liquids as directed. You may need to limit the amount of liquid you drink to help control your urine leakage. Do not drink any liquid right before you go to bed. Limit or do not have drinks that contain caffeine or alcohol.   Prevent constipation.  Eat a variety of high-fiber foods. Good examples are high-fiber cereals, beans, vegetables, and whole-grain breads. Walking is the best way to trigger your intestines to have a bowel movement.  Exercise regularly and maintain a healthy weight.  Weight loss  and exercise will decrease pressure on your bladder and help you control your leakage.   Use a catheter as directed  to help empty your bladder. A catheter is a tiny, plastic tube that is put into your bladder to drain your urine.   Go to behavior therapy as directed.  Behavior therapy may be used to help you learn to control your urge to urinate.    Underweight  Underweight is defined as having a body mass index (BMI) of less than 18.5 kg/m2   Anorexia  is a loss of appetite, decreased food intake, or both. Your appetite naturally decreases as you get older. You also get full faster than you used to. This occurs because your body needs less energy. Other body changes can also lead to a decreased appetite. Even though some appetite loss is normal, you still need to get enough calories and nutrients to keep you healthy. You can start to lose too much weight if you do not eat as much food as your body needs. Unwanted weight loss can cause health problems, or worsen health problems you already have. You can also become dehydrated if you do not drink enough liquid.  How to eat healthy and get enough nutrients:   Choose healthy foods.  Eat a variety of fruits, vegetables, whole grains, low-fat dairy foods, lean meats, and other protein foods. Limit foods high in fat, sugar, and salt. Limit or avoid alcohol as directed. Work with a dietitian to help you plan your meals if you need to follow a special diet. A dietitian can also teach you how to modify foods if you have trouble chewing or swallowing.   Snack on healthy foods between meals  if you only eat a small amount during meals. Snacks provide extra healthy nutrients and calories between meals. Examples include fruit, cheese, and whole grain crackers.   Drink liquids as directed  to avoid dehydration. Drink liquids between meals if they cause you to get full too quickly during meals. Ask how much liquid to drink each day and which liquids are best for you.   Use herbs,  spices, and flavor enhancers to add flavor to foods.  Avoid using herbs and spice blends that also contain sodium. Ask your healthcare provider or dietitian about flavor enhancers. Flavor enhancers with ham, natural cortez, and roast beef flavors can also be sprinkled on food to add flavor.   Share meals with others as often as you can.  Eating with others may help you to eat better during meal time. Ask family members, neighbors, or friends to join you for lunch. There are also senior centers where you can meet people, and share meals with them.   Ask family and friends for help  with shopping or preparing foods. Ask for a ride to the grocery store, if needed.      Narcotic (Opioid) Safety    Use narcotics safely:  Take prescribed narcotics exactly as directed  Do not give narcotics to others or take narcotics that belong to someone else  Do not mix narcotics without medicines or alcohol  Do not drive or operate heavy machinery after you take the narcotic  Monitor for side effects and notify your healthcare provider if you experienced side effects such as nausea, sleepiness, itching, or trouble thinking clearly.    Manage constipation:    Constipation is the most common side effect of narcotic medicine. Constipation is when you have hard, dry bowel movements, or you go longer than usual between bowel movements. Tell your healthcare provider about all changes in your bowel movements while you are taking narcotics. He or she may recommend laxative medicine to help you have a bowel movement. He or she may also change the kind of narcotic you are taking, or change when you take it. The following are more ways you can prevent or relieve constipation:    Drink liquids as directed.  You may need to drink extra liquids to help soften and move your bowels. Ask how much liquid to drink each day and which liquids are best for you.  Eat high-fiber foods.  This may help decrease constipation by adding bulk to your bowel  movements. High-fiber foods include fruits, vegetables, whole-grain breads and cereals, and beans. Your healthcare provider or dietitian can help you create a high-fiber meal plan. Your provider may also recommend a fiber supplement if you cannot get enough fiber from food.  Exercise regularly.  Regular physical activity can help stimulate your intestines. Walking is a good exercise to prevent or relieve constipation. Ask which exercises are best for you.  Schedule a time each day to have a bowel movement.  This may help train your body to have regular bowel movements. Bend forward while you are on the toilet to help move the bowel movement out. Sit on the toilet for at least 10 minutes, even if you do not have a bowel movement.    Store narcotics safely:   Store narcotics where others cannot easily get them.  Keep them in a locked cabinet or secure area. Do not  keep them in a purse or other bag you carry with you. A person may be looking for something else and find the narcotics.  Make sure narcotics are stored out of the reach of children.  A child can easily overdose on narcotics. Narcotics may look like candy to a small child.    The best way to dispose of narcotics:      The laws vary by country and area. In the [place], the best way is to return the narcotics through a take-back program. This program is offered by the [place] Drug Enforcement Agency (AZUL). The following are options for using the program:  Take the narcotics to a AZUL collection site.  The site is often a law enforcement center. Call your local law enforcement center for scheduled take-back days in your area. You will be given information on where to go if the collection site is in a different location.  Take the narcotics to an approved pharmacy or hospital.  A pharmacy or hospital may be set up as a collection site. You will need to ask if it is a AZUL collection site if you were not directed there. A pharmacy or doctor's office may not be  able to take back narcotics unless it is a AZUL site.  Use a mail-back system.  This means you are given containers to put the narcotics into. You will then mail them in the containers.  Use a take-back drop box.  This is a place to leave the narcotics at any time. People and animals will not be able to get into the box. Your local law enforcement agency can tell you where to find a drop box in your area.    Other ways to manage pain:   Ask your healthcare provider about non-narcotic medicines to control pain.  Nonprescription medicines include NSAIDs (such as ibuprofen) and acetaminophen. Prescription medicines include muscle relaxers, antidepressants, and steroids.  Pain may be managed without any medicines.  Some ways to relieve pain include massage, aromatherapy, or meditation. Physical or occupational therapy may also help.    For more information:   Drug Enforcement Administration  [address] [place] [address]  Phone: 5- 621 - 706-1869  Web Address: https://www.deadiversion.[facility].gov/drug_disposal/    [place] Food and Drug Administration  [address] [place] [address]  Phone: 2- 520 - 355-8168  Web Address: http://www.fda.gov     © Copyright [employer] 2018 Information is for End User's use only and may not be sold, redistributed or otherwise used for commercial purposes. All illustrations and images included in CareNotes® are the copyrighted property of [employer].A[employer]M., Inc. or [employer]

## 2025-03-18 DIAGNOSIS — R19.7 DIARRHEA: ICD-10-CM

## 2025-03-18 DIAGNOSIS — R11.2 NAUSEA AND VOMITING: ICD-10-CM

## 2025-03-19 RX ORDER — ONDANSETRON 4 MG/1
4 TABLET, ORALLY DISINTEGRATING ORAL EVERY 6 HOURS PRN
Qty: 20 TABLET | Refills: 0 | Status: SHIPPED | OUTPATIENT
Start: 2025-03-19

## 2025-03-29 DIAGNOSIS — J30.2 SEASONAL ALLERGIC RHINITIS, UNSPECIFIED TRIGGER: ICD-10-CM

## 2025-03-29 RX ORDER — FLUTICASONE PROPIONATE 50 MCG
SPRAY, SUSPENSION (ML) NASAL
Qty: 16 ML | Refills: 5 | Status: SHIPPED | OUTPATIENT
Start: 2025-03-29

## 2025-04-03 DIAGNOSIS — F41.1 GENERALIZED ANXIETY DISORDER: ICD-10-CM

## 2025-04-03 DIAGNOSIS — R19.7 DIARRHEA: ICD-10-CM

## 2025-04-03 DIAGNOSIS — M79.7 FIBROMYALGIA: ICD-10-CM

## 2025-04-03 DIAGNOSIS — J42 CHRONIC BRONCHITIS, UNSPECIFIED CHRONIC BRONCHITIS TYPE (HCC): ICD-10-CM

## 2025-04-03 DIAGNOSIS — R11.2 NAUSEA AND VOMITING: ICD-10-CM

## 2025-04-03 RX ORDER — ALBUTEROL SULFATE 90 UG/1
2 INHALANT RESPIRATORY (INHALATION) 3 TIMES DAILY PRN
Qty: 18 G | Refills: 0 | Status: SHIPPED | OUTPATIENT
Start: 2025-04-03

## 2025-04-04 RX ORDER — ONDANSETRON 4 MG/1
4 TABLET, ORALLY DISINTEGRATING ORAL EVERY 6 HOURS PRN
Qty: 20 TABLET | Refills: 0 | Status: SHIPPED | OUTPATIENT
Start: 2025-04-04

## 2025-04-04 NOTE — TELEPHONE ENCOUNTER
Requested medication(s) are due for refill today: Yes  **If antibiotic or given during sick visit, contact patient to discuss current symptoms.   **Confirm prescribing provider    LOV:  03/05/2025  **If longer then 1 year, contact patient to schedule annual PRIOR to refilling. Once scheduled, adjust refill for 30 days, no refills.  **Update CareEverywhere to confirm not being seen elsewhere    NOV:  none     Is patient due for annual visit:NO   **If future appointment, adjust to annual/follow up.  ** No appointment call to schedule annual/follow up.    Route to PCP, unless PCP no longer here, then physician they are seeing next.

## 2025-04-05 RX ORDER — DIAZEPAM 5 MG/1
TABLET ORAL
Qty: 90 TABLET | Refills: 0 | Status: SHIPPED | OUTPATIENT
Start: 2025-04-05

## 2025-04-06 PROBLEM — F33.2 MDD (MAJOR DEPRESSIVE DISORDER), RECURRENT SEVERE, WITHOUT PSYCHOSIS (HCC): Status: RESOLVED | Noted: 2023-08-16 | Resolved: 2025-04-06

## 2025-04-06 PROBLEM — F33.0 MILD EPISODE OF RECURRENT MAJOR DEPRESSIVE DISORDER (HCC): Status: ACTIVE | Noted: 2017-05-01

## 2025-04-06 PROBLEM — E78.5 HYPERLIPIDEMIA: Status: RESOLVED | Noted: 2022-10-24 | Resolved: 2025-04-06

## 2025-04-06 NOTE — ASSESSMENT & PLAN NOTE
Patient is followed by pulmonary.  Stressed compliance with Anoro and continued smoking cessation

## 2025-04-06 NOTE — ASSESSMENT & PLAN NOTE
Patient's last CT lung screening December 2014.  There is an order in the chart to have it repeated yearly.  Patient will be seeing pulmonology as well in follow-up.

## 2025-04-07 ENCOUNTER — TELEPHONE (OUTPATIENT)
Age: 70
End: 2025-04-07

## 2025-04-07 NOTE — TELEPHONE ENCOUNTER
Patient called and stated she has been running a fever ranging from 100-102, nasal congestion, wheezing and patient states she feels overall weak. Patient stated her symptoms started a 1 week ago. Patient is asking for an antibiotic at this time to be called in to her pharmacy. Patient stated she is unable to be seen for a visit due to her not having a ride and is currently . Please advise if a medication can be called in at this time.

## 2025-04-07 NOTE — TELEPHONE ENCOUNTER
I called and left the patient a message to give the office a eladia back to schedule a virtual or in person appt as she does need to be evaluated for her symptoms.

## 2025-04-09 NOTE — TELEPHONE ENCOUNTER
Patient called back and is willing to schedule a virtual but there are no appts available. Please advise

## 2025-04-09 NOTE — TELEPHONE ENCOUNTER
LM to schedule virtual, if nothing is available soon, I recommend pt to see urgent care to be evaluated sooner rather than later.

## 2025-04-09 NOTE — TELEPHONE ENCOUNTER
Patient returning call   Scheduled for virtual visit 04/10 at 3:10 pm   Advised to go to UC if symptoms worsen

## 2025-04-10 ENCOUNTER — TELEMEDICINE (OUTPATIENT)
Dept: FAMILY MEDICINE CLINIC | Facility: CLINIC | Age: 70
End: 2025-04-10
Payer: COMMERCIAL

## 2025-04-10 DIAGNOSIS — J01.90 ACUTE NON-RECURRENT SINUSITIS, UNSPECIFIED LOCATION: ICD-10-CM

## 2025-04-10 DIAGNOSIS — J06.9 ACUTE URI: Primary | ICD-10-CM

## 2025-04-10 PROCEDURE — G2211 COMPLEX E/M VISIT ADD ON: HCPCS | Performed by: NURSE PRACTITIONER

## 2025-04-10 PROCEDURE — 99213 OFFICE O/P EST LOW 20 MIN: CPT | Performed by: NURSE PRACTITIONER

## 2025-04-10 RX ORDER — PREDNISONE 10 MG/1
TABLET ORAL
Qty: 21 TABLET | Refills: 0 | Status: SHIPPED | OUTPATIENT
Start: 2025-04-10 | End: 2025-04-17 | Stop reason: SDUPTHER

## 2025-04-10 NOTE — PATIENT INSTRUCTIONS
Start prednisone, this is the steroid. It will be 6 pills today and decrease by 1 pill each day for the following 6 days. So it will be 6-5-4-3-2-1. Take this with food as it may upset your stomach. It's best to take it earlier in the day otherwise it may keep you up at night. Do not take this with NSAIDs such as advil/ibuprofen/advil/aleve/motrin.     Start antibiotic, this is doxycycline 100 mg twice daily for 7 days. Take antibiotic with food. If you take any vitamin supplements, try to separate dose by 4-6 hours as this may impair absorption of the antibiotic. Remain in upright position (do not lay down) 1 hour after taking antibiotic.     Please call the office if you are experiencing any worsening of symptoms or no symptom improvement.     ER for any worsening symptoms.

## 2025-04-10 NOTE — PROGRESS NOTES
Virtual Regular VisitName: Ora Rivera      : 1955      MRN: [id number]  Encounter Provider: HIRAL Loaiza  Encounter Date: 4/10/2025   Encounter department: [facility] PRIMARY CARE  :  Assessment & Plan  Acute URI  Start prednisone, this is the steroid. It will be 6 pills today and decrease by 1 pill each day for the following 6 days. So it will be 6-5-4-3-2-1. Take this with food as it may upset your stomach. It's best to take it earlier in the day otherwise it may keep you up at night. Do not take this with NSAIDs such as advil/ibuprofen/advil/aleve/motrin.     Start antibiotic, this is doxycycline 100 mg twice daily for 7 days. Take antibiotic with food. If you take any vitamin supplements, try to separate dose by 4-6 hours as this may impair absorption of the antibiotic. Remain in upright position (do not lay down) 1 hour after taking antibiotic.     Please call the office if you are experiencing any worsening of symptoms or no symptom improvement.     ER for any worsening symptoms  as she would need to be evaluated for any further management.    Orders:  •  predniSONE 10 mg tablet; Day 1: 6 tabs  Day 2: 5 tabs Day 3: 4 tabs  Day 4: 3 tabs  Day 5: 2 tabs  Day 6: 1 tab  •  amoxicillin-clavulanate (AUGMENTIN) 875-125 mg per tablet; Take 1 tablet by mouth every 12 (twelve) hours for 7 days    Acute non-recurrent sinusitis, unspecified location  Start prednisone, this is the steroid. It will be 6 pills today and decrease by 1 pill each day for the following 6 days. So it will be 6-5-4-3-2-1. Take this with food as it may upset your stomach. It's best to take it earlier in the day otherwise it may keep you up at night. Do not take this with NSAIDs such as advil/ibuprofen/advil/aleve/motrin.     Start antibiotic, this is doxycycline 100 mg twice daily for 7 days. Take antibiotic with food. If you take any vitamin supplements, try to separate dose by 4-6 hours as this may impair  absorption of the antibiotic. Remain in upright position (do not lay down) 1 hour after taking antibiotic.     Please call the office if you are experiencing any worsening of symptoms or no symptom improvement.     ER for any worsening symptoms as she would need to be evaluated for any further management.   Orders:  •  predniSONE 10 mg tablet; Day 1: 6 tabs  Day 2: 5 tabs Day 3: 4 tabs  Day 4: 3 tabs  Day 5: 2 tabs  Day 6: 1 tab  •  amoxicillin-clavulanate (AUGMENTIN) 875-125 mg per tablet; Take 1 tablet by mouth every 12 (twelve) hours for 7 days        History of Present Illness     Patient presents for virtual visit today to discuss cold symptoms.  Patient had called the office on April 7 stating that she has been having a fever and nasal congestion and wheezing.  Symptoms started 1 week ago prior to that call.  She was asking for antibiotic.  Virtual was scheduled to review symptoms.  This morning she started coughing up phlegm. Still feels she's wheezing more so at night. Fever today. Tmax prior was 102. Today is 100. States she is having a lot of sinus pain/ pressure now.   Ast antibiotics in January- had azithromycin       Review of Systems   Constitutional:  Positive for fatigue and fever. Negative for chills.   HENT:  Positive for congestion, sinus pressure and sinus pain.    Eyes:  Negative for discharge.   Respiratory:  Positive for cough. Negative for shortness of breath.    Cardiovascular:  Negative for chest pain.   Gastrointestinal:  Negative for constipation and diarrhea.   Genitourinary:  Negative for difficulty urinating.   Musculoskeletal:  Negative for joint swelling.   Skin:  Negative for rash.   Neurological:  Negative for headaches.   Hematological:  Negative for adenopathy.   Psychiatric/Behavioral:  The patient is not nervous/anxious.        Objective   LMP  (LMP Unknown)     Physical Exam    Administrative Statements   Encounter provider HIRAL Loaiza    The Patient is located  at Home and in the following state in which I hold an active license [place].    The patient was identified by name and date of birth. Ora ELIZABETH Rivera was informed that this is a telemedicine visit and that the visit is being conducted through the Epic Embedded platform. She agrees to proceed..  My office door was closed. No one else was in the room.  She acknowledged consent and understanding of privacy and security of the video platform. The patient has agreed to participate and understands they can discontinue the visit at any time.    I have spent a total time of 15 minutes in caring for this patient on the day of the visit/encounter including Instructions for management, Importance of tx compliance, Counseling / Coordination of care, Documenting in the medical record, Reviewing/placing orders in the medical record (including tests, medications, and/or procedures), and Obtaining or reviewing history  , not including the time spent for establishing the audio/video connection.    It was my intent to perform this visit via video technology but the patient was not able to do a video connection so the visit was completed via audio telephone only.

## 2025-04-17 DIAGNOSIS — J01.90 ACUTE NON-RECURRENT SINUSITIS, UNSPECIFIED LOCATION: ICD-10-CM

## 2025-04-17 DIAGNOSIS — J06.9 ACUTE URI: ICD-10-CM

## 2025-04-17 RX ORDER — PREDNISONE 10 MG/1
TABLET ORAL
Qty: 21 TABLET | Refills: 0 | Status: SHIPPED | OUTPATIENT
Start: 2025-04-17

## 2025-04-17 NOTE — TELEPHONE ENCOUNTER
Spoke to patient.  She complains that she is recovering from a sinus infection but still struggling with increased shortness of breath, wheeze and productive cough.  Refilled quick prednisone taper at her request.  Encouraged her to call back if she is not feeling better after that.  She has a follow-up 4/28.

## 2025-04-17 NOTE — TELEPHONE ENCOUNTER
Patient asking to have Prednisone refilled. Patient states she is having trouble breathing without it.  Patient stated even 5 mgs will help

## 2025-04-18 DIAGNOSIS — G43.909 MIGRAINE WITHOUT STATUS MIGRAINOSUS, NOT INTRACTABLE, UNSPECIFIED MIGRAINE TYPE: ICD-10-CM

## 2025-04-18 DIAGNOSIS — R19.7 DIARRHEA: ICD-10-CM

## 2025-04-18 DIAGNOSIS — R11.2 NAUSEA AND VOMITING: ICD-10-CM

## 2025-04-18 RX ORDER — PREDNISONE 10 MG/1
TABLET ORAL
Qty: 21 TABLET | Refills: 0 | Status: SHIPPED | OUTPATIENT
Start: 2025-04-18

## 2025-04-21 RX ORDER — KETOROLAC TROMETHAMINE 10 MG/1
10 TABLET, FILM COATED ORAL EVERY 6 HOURS PRN
Qty: 20 TABLET | Refills: 0 | Status: SHIPPED | OUTPATIENT
Start: 2025-04-21

## 2025-04-21 RX ORDER — ONDANSETRON 8 MG/1
8 TABLET, ORALLY DISINTEGRATING ORAL EVERY 8 HOURS PRN
Qty: 20 TABLET | Refills: 0 | Status: SHIPPED | OUTPATIENT
Start: 2025-04-21

## 2025-04-24 DIAGNOSIS — I10 ESSENTIAL HYPERTENSION: ICD-10-CM

## 2025-04-24 RX ORDER — LISINOPRIL 2.5 MG/1
2.5 TABLET ORAL DAILY
Qty: 90 TABLET | Refills: 1 | Status: SHIPPED | OUTPATIENT
Start: 2025-04-24

## 2025-04-28 ENCOUNTER — OFFICE VISIT (OUTPATIENT)
Dept: PULMONOLOGY | Facility: CLINIC | Age: 70
End: 2025-04-28
Payer: COMMERCIAL

## 2025-04-28 VITALS
HEART RATE: 86 BPM | HEIGHT: 65 IN | BODY MASS INDEX: 19.22 KG/M2 | OXYGEN SATURATION: 98 % | WEIGHT: 115.4 LBS | SYSTOLIC BLOOD PRESSURE: 122 MMHG | DIASTOLIC BLOOD PRESSURE: 80 MMHG | TEMPERATURE: 98.6 F

## 2025-04-28 DIAGNOSIS — R91.8 PULMONARY NODULES/LESIONS, MULTIPLE: ICD-10-CM

## 2025-04-28 DIAGNOSIS — F40.9 FEAR FOR PERSONAL SAFETY: ICD-10-CM

## 2025-04-28 DIAGNOSIS — J43.1 PANLOBULAR EMPHYSEMA (HCC): ICD-10-CM

## 2025-04-28 DIAGNOSIS — J30.2 SEASONAL ALLERGIC RHINITIS, UNSPECIFIED TRIGGER: ICD-10-CM

## 2025-04-28 DIAGNOSIS — F17.200 NICOTINE DEPENDENCE WITH CURRENT USE: ICD-10-CM

## 2025-04-28 DIAGNOSIS — E44.0 MODERATE PROTEIN-CALORIE MALNUTRITION (HCC): ICD-10-CM

## 2025-04-28 DIAGNOSIS — K21.9 GASTROESOPHAGEAL REFLUX DISEASE, UNSPECIFIED WHETHER ESOPHAGITIS PRESENT: Chronic | ICD-10-CM

## 2025-04-28 DIAGNOSIS — J43.9 PULMONARY EMPHYSEMA, UNSPECIFIED EMPHYSEMA TYPE (HCC): Primary | ICD-10-CM

## 2025-04-28 PROCEDURE — 99214 OFFICE O/P EST MOD 30 MIN: CPT | Performed by: INTERNAL MEDICINE

## 2025-04-28 PROCEDURE — G2211 COMPLEX E/M VISIT ADD ON: HCPCS | Performed by: INTERNAL MEDICINE

## 2025-04-28 RX ORDER — FLUTICASONE FUROATE, UMECLIDINIUM BROMIDE AND VILANTEROL TRIFENATATE 200; 62.5; 25 UG/1; UG/1; UG/1
1 POWDER RESPIRATORY (INHALATION) DAILY
Qty: 60 BLISTER | Refills: 0 | Status: SHIPPED | COMMUNITY
Start: 2025-04-28 | End: 2025-05-28

## 2025-04-28 RX ORDER — PREDNISONE 5 MG/1
5 TABLET ORAL DAILY
Qty: 15 TABLET | Refills: 0 | Status: SHIPPED | OUTPATIENT
Start: 2025-04-28

## 2025-04-28 NOTE — ASSESSMENT & PLAN NOTE
Last visit I suspected some safety issue and when I asked the patient she confirmed that she feels sometimes unsafe at home with her ex- who she stays with.  When I spoke to her today she stated that she feels better, her ex- is calming down as she states, her daughter is aware and she is in the process of moving with her daughter when her grandchild moved out.  She does not feel threatened currently when I asked her directly.  I encouraged her to seek help anytime she feels threatened by calling 911 or she can call our office or her primary care office to help.  She verbalized understanding.  I also encouraged her to continue to discuss and notify her daughter.

## 2025-04-28 NOTE — PROGRESS NOTES
Progress note - Pulmonary Medicine   Ora Rivera 69 y.o. female MRN: [id number]       Impression & Plan:     Panlobular emphysema (HCC)  Given the fact that patient is still symptomatic I decided to try Trelegy to add ICS, she wanted to continue Anoro until she finishes the box she has at home, she still has 20 days left.  I gave her a sample of Trelegy for 1 month, she will try after that normal and will let me know if she feels better to switch her prescription to Trelegy 200.  Otherwise I told her that she does not need prednisone especially when she starts Trelegy, I will prescribe 5 mg daily to take for the next 2 weeks and then she will stop.  I encouraged her to quit smoking again.  Also she will get the PFTs that I ordered last visit.    Pulmonary nodules/lesions, multiple  Will monitor on repeat CT scan, will be for December 2025 and I will order next visit.    Seasonal allergic rhinitis  Continue Flonase and over-the-counter antihistamine    GERD (gastroesophageal reflux disease)  Continue omeprazole, currently controlled    Moderate protein-calorie malnutrition (HCC)  Malnutrition Findings:       Weight loss, muscular wasting specially bilateral temporal.  Suspect secondary to depression and poor intake.      BMI Findings:     Body mass index is 19.2 kg/m².     She reports improvement in her appetite, her weight increased by about 7 pounds.  I encouraged her to increase her intake of calorie and protein.      Nicotine dependence with current use  Encouraged to quit smoking again, she has nicotine patch and she will start use.    Fear for personal safety  Last visit I suspected some safety issue and when I asked the patient she confirmed that she feels sometimes unsafe at home with her ex- who she stays with.  When I spoke to her today she stated that she feels better, her ex- is calming down as she states, her daughter is aware and she is in the process of moving with her daughter when  her grandchild moved out.  She does not feel threatened currently when I asked her directly.  I encouraged her to seek help anytime she feels threatened by calling 911 or she can call our office or her primary care office to help.  She verbalized understanding.  I also encouraged her to continue to discuss and notify her daughter.      Return in about 6 months (around 10/28/2025).    Diagnoses and all orders for this visit:    Pulmonary emphysema, unspecified emphysema type (HCC)  -     predniSONE 5 mg tablet; Take 1 tablet (5 mg total) by mouth daily  -     fluticasone-umeclidinium-vilanterol (Trelegy Ellipta) 200-62.5-25 mcg/actuation AEPB inhaler; Inhale 1 puff daily Rinse mouth after use.    Panlobular emphysema (HCC)    Pulmonary nodules/lesions, multiple    Seasonal allergic rhinitis, unspecified trigger    Gastroesophageal reflux disease, unspecified whether esophagitis present    Moderate protein-calorie malnutrition (HCC)    Nicotine dependence with current use    Fear for personal safety      ______________________________________________________________________    HPI:    Ora Rivera presents today for follow-up of emphysema and possible asthma-COPD although no PFTs done yet.  Patient did well on Anoro, improved significantly as she states but currently she feels worse with more shortness of breath and intermittent wheezing and chest tightness sometimes with mild cough, she attributes that to starting to smoke again after she quit for 2 months.  She has nicotine patch and she is planning to quit again.  She denies fever or chills or night sweats, denies chest pain, denies weight loss, actually she gained a few pounds since last visit and at that time she had some diarrhea that led to weight loss.  She has been taking prednisone 10 mg daily until 2 days ago when she ran out.  She has some allergic rhinitis with postnasal drip and nasal congestion.  She still lives with her ex-, she feels safe at  home although she felt threatened by him in the past, she has been avoiding seeing him in general but she feels that he is much better than before.  She discusses with her daughter about her issues at home as well.    Current Medications:    Current Outpatient Medications:     albuterol (PROVENTIL HFA,VENTOLIN HFA) 90 mcg/act inhaler, INHALE 2 PUFFS 3 (THREE) TIMES A DAY AS NEEDED FOR WHEEZING OR SHORTNESS OF BREATH, Disp: 18 g, Rfl: 0    aspirin 325 mg tablet, Take 325 mg by mouth daily, Disp: , Rfl:     atorvastatin (LIPITOR) 10 mg tablet, TAKE 1 TABLET BY MOUTH EVERY DAY, Disp: 100 tablet, Rfl: 1    Cholecalciferol (VITAMIN D3 PO), Take by mouth, Disp: , Rfl:     diazepam (VALIUM) 5 mg tablet, Take 1 PO in am 1-2 at HS, Disp: 90 tablet, Rfl: 0    fluticasone (FLONASE) 50 mcg/act nasal spray, SPRAY 2 SPRAYS INTO EACH NOSTRIL EVERY DAY, Disp: 16 mL, Rfl: 5    HYDROcodone-acetaminophen (NORCO)  mg per tablet, , Disp: , Rfl:     ketorolac (TORADOL) 10 mg tablet, Take 1 tablet (10 mg total) by mouth every 6 (six) hours as needed for moderate pain, Disp: 20 tablet, Rfl: 0    lisinopril (ZESTRIL) 2.5 mg tablet, TAKE 1 TABLET (2.5 MG TOTAL) BY MOUTH DAILY PT TAKES SHE HAS NOT BEEN TAKING, Disp: 90 tablet, Rfl: 1    MAGNESIUM PO, Take by mouth, Disp: , Rfl:     mirtazapine (REMERON) 15 mg tablet, Take 1 tablet (15 mg total) by mouth daily at bedtime NO alcohol, Disp: 30 tablet, Rfl: 5    naloxone (NARCAN) 4 mg/0.1 mL nasal spray, Administer 1 spray into a nostril. If no response after 2-3 minutes, give another dose in the other nostril using a new spray., Disp: 1 each, Rfl: 1    omeprazole (PriLOSEC) 40 MG capsule, TAKE 1 CAPSULE BY MOUTH EVERY DAY BEFORE BREAKFAST, Disp: 30 capsule, Rfl: 5    ondansetron (ZOFRAN-ODT) 8 mg disintegrating tablet, Take 1 tablet (8 mg total) by mouth every 8 (eight) hours as needed for nausea or vomiting, Disp: 20 tablet, Rfl: 0    predniSONE 10 mg tablet, Day 1: 6 tabs  Day 2: 5 tabs  Day 3: 4 tabs  Day 4: 3 tabs  Day 5: 2 tabs  Day 6: 1 tab, Disp: 21 tablet, Rfl: 0    predniSONE 10 mg tablet, Day 1: 6 tabs  Day 2: 5 tabs Day 3: 4 tabs  Day 4: 3 tabs  Day 5: 2 tabs  Day 6: 1 tab, Disp: 21 tablet, Rfl: 0    primidone (MYSOLINE) 50 mg tablet, Take 2tabs in the AM and 7tabs qhs, Disp: 810 tablet, Rfl: 1    Probiotic Product (PROBIOTIC-10 PO), Take by mouth, Disp: , Rfl:     tiZANidine (ZANAFLEX) 4 mg tablet, TAKE 1 TABLET BY MOUTH 2 TIMES A DAY., Disp: 60 tablet, Rfl: 0    tiZANidine (ZANAFLEX) 4 mg tablet, Take 1 tablet (4 mg total) by mouth 2 (two) times a day, Disp: 60 tablet, Rfl: 0    umeclidinium-vilanterol (Anoro Ellipta) 62.5-25 mcg/actuation inhaler, Inhale 1 puff daily, Disp: 60 blister, Rfl: 3    Review of Systems:  Review of Systems   Constitutional:  Positive for appetite change. Negative for fever.   HENT:  Positive for ear pain, postnasal drip, rhinorrhea, sore throat and trouble swallowing. Negative for sneezing.    Eyes: Negative.    Respiratory:  Positive for shortness of breath and wheezing.    Cardiovascular: Negative.  Negative for chest pain.   Gastrointestinal: Negative.    Endocrine: Negative.    Genitourinary: Negative.    Musculoskeletal:  Positive for myalgias.   Skin: Negative.    Allergic/Immunologic: Negative.    Neurological:  Positive for headaches.   Hematological: Negative.    Psychiatric/Behavioral: Negative.       Aside from what is mentioned in the HPI, the review of systems is otherwise negative    Past medical history, surgical history, and family history were reviewed and updated as appropriate    Social history updates:  Social History     Tobacco Use   Smoking Status Former    Current packs/day: 0.00    Average packs/day: 0.5 packs/day for 50.0 years (25.0 ttl pk-yrs)    Types: Cigarettes    Start date: 4/3/1972    Quit date: 4/3/2022    Years since quitting: 3.0   Smokeless Tobacco Never   Tobacco Comments    wears nicotine patch on for 1/2 day  "      PhysicalExamination:  Vitals:   /80 (BP Location: Left arm, Patient Position: Sitting, Cuff Size: Standard)   Pulse 86   Temp 98.6 °F (37 °C) (Tympanic Core)   Ht 5' 5\" (1.651 m)   Wt 52.3 kg (115 lb 6.4 oz)   LMP  (LMP Unknown)   SpO2 98%   BMI 19.20 kg/m²     Gen:  Comfortable on room air.  No conversational dyspnea  HEENT:  Conjugate gaze.  sclerae anicteric.  Oropharynx moist  Neck: Trachea is midline. No JVD. No adenopathy  Chest: Equal mildly diminished breath sounds bilaterally, clear to auscultation with no wheezing or crackles, good air movement  Cardiac: S1-S2 regular. no murmur  Abdomen:  benign  Extremities: No edema  Neuro:  Normal speech and mentation    Diagnostic Data:  Labs:  I personally reviewed the most recent laboratory data pertinent to today's visit    Lab Results   Component Value Date    WBC 9.61 02/19/2025    HGB 14.4 02/19/2025    HCT 40.9 02/19/2025     (H) 02/19/2025     02/19/2025     Lab Results   Component Value Date    SODIUM 129 (L) 02/19/2025    K 3.7 02/19/2025    CO2 23 02/19/2025    CL 95 (L) 02/19/2025    BUN 10 02/19/2025    CREATININE 0.73 02/19/2025    CALCIUM 9.2 02/19/2025           Imaging:  I personally reviewed the images on the PAC system pertinent to today's visit  Chest CT scan reviewed in PACS: Emphysematous changes, scattered small nodules bilaterally the largest is 5 mm.  No mediastinal lymphadenopathy.      Buffy Myers MD  Answers submitted by the patient for this visit:  Pulmonology Questionnaire (Submitted on 4/25/2025)  Chief Complaint: Primary symptoms  Do you have difficulty breathing?: Yes  Do you experience frequent throat clearing?: Yes  Do you have a hoarse voice?: Yes  Chronicity: chronic  When did you first notice your symptoms?: more than 1 month ago  How often do your symptoms occur?: daily  Since you first noticed this problem, how has it changed?: gradually improving  Do you have shortness of breath that " occurs with effort or exertion?: Yes  Do you have ear congestion?: No  Do you have heartburn?: No  Do you have fatigue?: Yes  Do you have nasal congestion?: Yes  Do you have shortness of breath when lying flat?: No  Do you have shortness of breath when you wake up?: No  Do you have sweats?: Yes  Have you experienced weight loss?: No  Which of the following makes your symptoms worse?: change in weather, climbing stairs, eating, emotional stress, pollen, strenuous activity  Which of the following makes your symptoms better?: change in position, oral steroids, OTC inhaler, rest  Risk factors for lung disease: smoking/tobacco exposure

## 2025-04-28 NOTE — ASSESSMENT & PLAN NOTE
Malnutrition Findings:       Weight loss, muscular wasting specially bilateral temporal.  Suspect secondary to depression and poor intake.      BMI Findings:     Body mass index is 19.2 kg/m².     She reports improvement in her appetite, her weight increased by about 7 pounds.  I encouraged her to increase her intake of calorie and protein.

## 2025-04-28 NOTE — ASSESSMENT & PLAN NOTE
Given the fact that patient is still symptomatic I decided to try Trelegy to add ICS, she wanted to continue Anoro until she finishes the box she has at home, she still has 20 days left.  I gave her a sample of Trelegy for 1 month, she will try after that normal and will let me know if she feels better to switch her prescription to Trelegy 200.  Otherwise I told her that she does not need prednisone especially when she starts Trelegy, I will prescribe 5 mg daily to take for the next 2 weeks and then she will stop.  I encouraged her to quit smoking again.  Also she will get the PFTs that I ordered last visit.

## 2025-05-05 DIAGNOSIS — R19.7 DIARRHEA: ICD-10-CM

## 2025-05-05 DIAGNOSIS — R11.2 NAUSEA AND VOMITING: ICD-10-CM

## 2025-05-05 DIAGNOSIS — F41.1 GENERALIZED ANXIETY DISORDER: ICD-10-CM

## 2025-05-07 RX ORDER — DIAZEPAM 5 MG/1
TABLET ORAL
Qty: 90 TABLET | Refills: 0 | Status: SHIPPED | OUTPATIENT
Start: 2025-05-07

## 2025-05-07 RX ORDER — ONDANSETRON 8 MG/1
8 TABLET, ORALLY DISINTEGRATING ORAL EVERY 8 HOURS PRN
Qty: 20 TABLET | Refills: 0 | Status: SHIPPED | OUTPATIENT
Start: 2025-05-07

## 2025-05-12 ENCOUNTER — TELEPHONE (OUTPATIENT)
Dept: FAMILY MEDICINE CLINIC | Facility: CLINIC | Age: 70
End: 2025-05-12

## 2025-05-12 NOTE — TELEPHONE ENCOUNTER
PA for ketorolac 10mg tablet CANCELLED due to     []Approval on file-dates approved   [x]Medication already on Formulary  []Brand Name Preferred  []Patient no longer covered by insurance  []Therapy Changed/Medication Discontinued

## 2025-05-13 DIAGNOSIS — J42 CHRONIC BRONCHITIS, UNSPECIFIED CHRONIC BRONCHITIS TYPE (HCC): ICD-10-CM

## 2025-05-13 RX ORDER — ALBUTEROL SULFATE 90 UG/1
2 INHALANT RESPIRATORY (INHALATION) 3 TIMES DAILY PRN
Qty: 18 G | Refills: 5 | Status: SHIPPED | OUTPATIENT
Start: 2025-05-13

## 2025-05-15 ENCOUNTER — HOSPITAL ENCOUNTER (OUTPATIENT)
Dept: PULMONOLOGY | Facility: HOSPITAL | Age: 70
End: 2025-05-15
Attending: INTERNAL MEDICINE
Payer: COMMERCIAL

## 2025-05-15 DIAGNOSIS — J43.9 PULMONARY EMPHYSEMA, UNSPECIFIED EMPHYSEMA TYPE (HCC): ICD-10-CM

## 2025-05-15 PROCEDURE — 94726 PLETHYSMOGRAPHY LUNG VOLUMES: CPT | Performed by: INTERNAL MEDICINE

## 2025-05-15 PROCEDURE — 94726 PLETHYSMOGRAPHY LUNG VOLUMES: CPT

## 2025-05-15 PROCEDURE — 94729 DIFFUSING CAPACITY: CPT

## 2025-05-15 PROCEDURE — 94760 N-INVAS EAR/PLS OXIMETRY 1: CPT

## 2025-05-15 PROCEDURE — 94060 EVALUATION OF WHEEZING: CPT

## 2025-05-15 PROCEDURE — 94060 EVALUATION OF WHEEZING: CPT | Performed by: INTERNAL MEDICINE

## 2025-05-15 PROCEDURE — 94729 DIFFUSING CAPACITY: CPT | Performed by: INTERNAL MEDICINE

## 2025-05-15 RX ORDER — ALBUTEROL SULFATE 0.83 MG/ML
2.5 SOLUTION RESPIRATORY (INHALATION) ONCE
Status: COMPLETED | OUTPATIENT
Start: 2025-05-15 | End: 2025-05-15

## 2025-05-15 RX ADMIN — ALBUTEROL SULFATE 2.5 MG: 2.5 SOLUTION RESPIRATORY (INHALATION) at 15:41

## 2025-05-16 DIAGNOSIS — K21.9 GASTROESOPHAGEAL REFLUX DISEASE, UNSPECIFIED WHETHER ESOPHAGITIS PRESENT: Chronic | ICD-10-CM

## 2025-05-16 DIAGNOSIS — R10.13 DYSPEPSIA: ICD-10-CM

## 2025-05-16 RX ORDER — OMEPRAZOLE 40 MG/1
40 CAPSULE, DELAYED RELEASE ORAL
Qty: 30 CAPSULE | Refills: 5 | Status: SHIPPED | OUTPATIENT
Start: 2025-05-16

## 2025-05-19 ENCOUNTER — TELEPHONE (OUTPATIENT)
Age: 70
End: 2025-05-19

## 2025-05-19 DIAGNOSIS — J43.9 PULMONARY EMPHYSEMA, UNSPECIFIED EMPHYSEMA TYPE (HCC): ICD-10-CM

## 2025-05-19 RX ORDER — UMECLIDINIUM BROMIDE AND VILANTEROL TRIFENATATE 62.5; 25 UG/1; UG/1
1 POWDER RESPIRATORY (INHALATION) DAILY
Qty: 60 BLISTER | Refills: 3 | Status: SHIPPED | OUTPATIENT
Start: 2025-05-19 | End: 2025-09-16

## 2025-05-19 NOTE — TELEPHONE ENCOUNTER
Patient called in stating that she has been using the Trelegy inhaler but she is starting to get blisters in her mouth. She states that she has dentures so even though she rises her mouth out the medication still gets between her gums and her dentures. Patient is requesting to go back on the Anoro inhaler and is asking for a script to be sent to the [facility] pharmacy on file. She is also requesting a script for prednisone because she has been having a hard time breathing. Please advise.    20

## 2025-05-28 DIAGNOSIS — M79.7 FIBROMYALGIA: ICD-10-CM

## 2025-05-28 NOTE — TELEPHONE ENCOUNTER
Requested medication(s) are due for refill today: Yes  **If antibiotic or given during sick visit, contact patient to discuss current symptoms.   **Confirm prescribing provider    LOV:  3/5/25  **If longer then 1 year, contact patient to schedule annual PRIOR to refilling. Once scheduled, adjust refill for 30 days, no refills.  **Update CareEverywhere to confirm not being seen elsewhere    NOV:  None made    Is patient due for annual visit: No  **If future appointment, adjust to annual/follow up.  ** No appointment call to schedule annual/follow up.    Route to PCP, unless PCP no longer here, then physician they are seeing next.

## 2025-05-31 DIAGNOSIS — G43.909 MIGRAINE WITHOUT STATUS MIGRAINOSUS, NOT INTRACTABLE, UNSPECIFIED MIGRAINE TYPE: ICD-10-CM

## 2025-06-01 RX ORDER — KETOROLAC TROMETHAMINE 10 MG/1
10 TABLET, FILM COATED ORAL EVERY 6 HOURS PRN
Qty: 20 TABLET | Refills: 0 | Status: SHIPPED | OUTPATIENT
Start: 2025-06-01

## 2025-06-02 ENCOUNTER — TELEPHONE (OUTPATIENT)
Age: 70
End: 2025-06-02

## 2025-06-02 NOTE — TELEPHONE ENCOUNTER
Patient contacted the office this morning in regards to cold symptoms x1 week. Body aches, cough, congestion, fever around 101, feels she has bronchitis at this time. Asking if abx/prednisone could be called into [facility] pharmacy for her as this has helped in the past. Was using inhaler and Hayley medication. Did not want to come into the office. Advised office may recommend UC to be further evaluated, in order to listen to lungs/possible imaging may be needed. Please contact patient back with an update, thank you.     used

## 2025-06-02 NOTE — TELEPHONE ENCOUNTER
Lm to let pt know that she would need to be seen before anything can be sent to the pharmacy. Please call our office to schedule or recommend urgent care to be evaluated today.

## 2025-06-04 ENCOUNTER — TELEMEDICINE (OUTPATIENT)
Dept: FAMILY MEDICINE CLINIC | Facility: CLINIC | Age: 70
End: 2025-06-04
Payer: COMMERCIAL

## 2025-06-04 DIAGNOSIS — H92.02 LEFT EAR PAIN: Primary | ICD-10-CM

## 2025-06-04 DIAGNOSIS — R19.7 DIARRHEA, UNSPECIFIED TYPE: ICD-10-CM

## 2025-06-04 DIAGNOSIS — R52 BODY ACHES: ICD-10-CM

## 2025-06-04 PROCEDURE — G2211 COMPLEX E/M VISIT ADD ON: HCPCS | Performed by: FAMILY MEDICINE

## 2025-06-04 PROCEDURE — 99213 OFFICE O/P EST LOW 20 MIN: CPT | Performed by: FAMILY MEDICINE

## 2025-06-04 RX ORDER — AZITHROMYCIN 250 MG/1
TABLET, FILM COATED ORAL
Qty: 6 TABLET | Refills: 0 | Status: SHIPPED | OUTPATIENT
Start: 2025-06-04 | End: 2025-06-09

## 2025-06-04 NOTE — PROGRESS NOTES
Virtual Regular Visit  Name: Ora Rivera      : 1955      MRN: [id number]  Encounter Provider: Bret Ibarra DO  Encounter Date: 2025   Encounter department: [facility] PRIMARY CARE  :No chief complaint on file.    Patient Instructions   Start abx for left ear pain and start pepto Bismol prn diarrhea and stay well hydrated and may use gatorade prn and take Tylenol prn body aches. Recheck in in the office in the next 2 days. Call if worse or ER if not better.     Assessment & Plan  Diarrhea, unspecified type  Start Pepto Bismol and stay well hydrated and if not better rec ER       Left ear pain  Start abx   Orders:    azithromycin (Zithromax) 250 mg tablet; Take 2 tablets (500 mg total) by mouth daily for 1 day, THEN 1 tablet (250 mg total) daily for 4 days.    Body aches  Take Tylenol prn pain            History of Present Illness     Has diarrhea, for a couple days ago. Patient drinks water. No fever. Denies cough. Patient is at home. Tried Immodium for diarrhea. No other complaints.       Review of Systems   Constitutional: Negative.    HENT:  Positive for ear pain (left).    Eyes: Negative.    Respiratory: Negative.     Cardiovascular: Negative.    Gastrointestinal:  Positive for diarrhea.   Endocrine: Negative.    Genitourinary: Negative.    Musculoskeletal:  Positive for myalgias.   Skin: Negative.    Allergic/Immunologic: Negative.    Neurological: Negative.    Hematological: Negative.    Psychiatric/Behavioral: Negative.         Objective   LMP  (LMP Unknown)     Physical Exam  Constitutional:       Appearance: Normal appearance. She is well-developed.     Neurological:      General: No focal deficit present.      Mental Status: She is alert and oriented to person, place, and time. Mental status is at baseline.      Deep Tendon Reflexes: Reflexes are normal and symmetric.     Psychiatric:         Mood and Affect: Mood normal.         Behavior: Behavior normal.         Thought  Content: Thought content normal.         Judgment: Judgment normal.         Administrative Statements   Encounter provider Bret Ibarra, DO    The Patient is located at Home and in the following state in which I hold an active license [place].    The patient was identified by name and date of birth. Ora Rivera was informed that this is a telemedicine visit and that the visit is being conducted through the Epic Embedded platform. She agrees to proceed..  My office door was closed. No one else was in the room.  She acknowledged consent and understanding of privacy and security of the video platform. The patient has agreed to participate and understands they can discontinue the visit at any time.    I have spent a total time of 30 minutes in caring for this patient on the day of the visit/encounter including Diagnostic results, Prognosis, Risks and benefits of tx options, Instructions for management, Patient and family education, Importance of tx compliance, Risk factor reductions, Impressions, Counseling / Coordination of care, Documenting in the medical record, Reviewing/placing orders in the medical record (including tests, medications, and/or procedures), and Obtaining or reviewing history  , not including the time spent for establishing the audio/video connection.

## 2025-06-04 NOTE — PATIENT INSTRUCTIONS
Start abx for left ear pain and start pepto Bismol prn diarrhea and stay well hydrated and may use gatorade prn and take Tylenol prn body aches. Recheck in in the office in the next 2 days. Call if worse or ER if not better.

## 2025-06-05 DIAGNOSIS — F41.1 GENERALIZED ANXIETY DISORDER: ICD-10-CM

## 2025-06-05 DIAGNOSIS — J42 CHRONIC BRONCHITIS, UNSPECIFIED CHRONIC BRONCHITIS TYPE (HCC): ICD-10-CM

## 2025-06-05 DIAGNOSIS — R11.2 NAUSEA AND VOMITING: ICD-10-CM

## 2025-06-05 DIAGNOSIS — R19.7 DIARRHEA: ICD-10-CM

## 2025-06-05 RX ORDER — ONDANSETRON 8 MG/1
8 TABLET, ORALLY DISINTEGRATING ORAL EVERY 8 HOURS PRN
Qty: 20 TABLET | Refills: 2 | Status: SHIPPED | OUTPATIENT
Start: 2025-06-05

## 2025-06-05 RX ORDER — ALBUTEROL SULFATE 90 UG/1
2 INHALANT RESPIRATORY (INHALATION) 3 TIMES DAILY PRN
Qty: 18 G | Refills: 2 | Status: SHIPPED | OUTPATIENT
Start: 2025-06-05

## 2025-06-05 NOTE — TELEPHONE ENCOUNTER
Requested medication(s) are due for refill today: Yes  **If antibiotic or given during sick visit, contact patient to discuss current symptoms.   **Confirm prescribing provider    LOV:  03/05/2025  **If longer then 1 year, contact patient to schedule annual PRIOR to refilling. Once scheduled, adjust refill for 30 days, no refills.  **Update CareEverywhere to confirm not being seen elsewhere    NOV:  none     Is patient due for annual visit: No  **If future appointment, adjust to annual/follow up.  ** No appointment call to schedule annual/follow up.    Route to PCP, unless PCP no longer here, then physician they are seeing next.

## 2025-06-05 NOTE — TELEPHONE ENCOUNTER
Call patient is she willing to try reducing the milligram of the diazepam?  Not the schedule, she can still do 3 times a day as needed but just the milligram?

## 2025-06-06 RX ORDER — DIAZEPAM 2 MG/1
TABLET ORAL
Qty: 90 TABLET | Refills: 0 | Status: SHIPPED | OUTPATIENT
Start: 2025-06-06

## 2025-06-06 NOTE — TELEPHONE ENCOUNTER
Patient called in patient schedule next available appointment 6/10 patient had no further questions

## 2025-06-07 ENCOUNTER — NURSE TRIAGE (OUTPATIENT)
Dept: OTHER | Facility: OTHER | Age: 70
End: 2025-06-07

## 2025-06-07 DIAGNOSIS — R06.2 WHEEZING: Primary | ICD-10-CM

## 2025-06-07 RX ORDER — PREDNISONE 10 MG/1
TABLET ORAL
Qty: 21 TABLET | Refills: 0 | Status: SHIPPED | OUTPATIENT
Start: 2025-06-07 | End: 2025-06-10

## 2025-06-07 NOTE — TELEPHONE ENCOUNTER
"Regarding: severe congestion/ cough/runny nose/ ear pain  ----- Message from Lucian HUSSEIN sent at 6/7/2025  1:25 PM EDT -----  Patient stated, \" I am experiencing severe congestion, cough, runny nose and ear pain. I would like to know if I can get Rx for Prednisone.\"   Pharmacy: [id number] [address]  [address], [facility] [address]  Phone: [phone]    "

## 2025-06-07 NOTE — TELEPHONE ENCOUNTER
Regarding: severe congestion/ cough/runny nose/ ear pain  ----- Message from Diane GOVEA sent at 6/7/2025  4:21 PM EDT -----  Patient advise that she will be getting a call back by one of the RN . Patient verbalized understanding

## 2025-06-07 NOTE — TELEPHONE ENCOUNTER
"REASON FOR CONVERSATION: Breathing Difficulty    SYMPTOMS: worsening cough, congestion, ear pain    OTHER HEALTH INFORMATION:  was seen on 6/4 and given azithromycin for ear pain. Pt wanting steroid to be called in.    PROTOCOL DISPOSITION: See [facility] Within 4 Hours (Or PCP Triage)    CARE ADVICE PROVIDED: on call provider- call in tapered dose of prednisone, advise staying hydrated.    prescribe Prednisone 10 mg, take 60 mg po day 1, 50 mg po day 2, 40 mg po day 3, 30 mg po day 4, 20 mg po day 5, and 10 mg po day 6. Disp #21 no refills     PRACTICE FOLLOW-UP: none            Answer Assessment - Initial Assessment Questions  1. RESPIRATORY STATUS: \"Describe your breathing?\" (e.g., wheezing, shortness of breath, unable to speak, severe coughing)       Wheezing, raspy cough    2. ONSET: \"When did this breathing problem begin?\"       Has been ongoing for about a week. Feeling worse the last couple days    4. SEVERITY: \"How bad is your breathing?\" (e.g., mild, moderate, severe)       Has gotten worse    5. RECURRENT SYMPTOM: \"Have you had difficulty breathing before?\" If Yes, ask: \"When was the last time?\" and \"What happened that time?\"       History of bronchitis    6. CARDIAC HISTORY: \"Do you have any history of heart disease?\" (e.g., heart attack, angina, bypass surgery, angioplasty)       Denies    7. LUNG HISTORY: \"Do you have any history of lung disease?\"  (e.g., pulmonary embolus, asthma, emphysema)      Denies    8. CAUSE: \"What do you think is causing the breathing problem?\"       Unsure    9. OTHER SYMPTOMS: \"Do you have any other symptoms?\" (e.g., chest pain, cough, dizziness, fever, runny nose)      Denies, but says she is unsure    10. O2 SATURATION MONITOR:  \"Do you use an oxygen saturation monitor (pulse oximeter) at home?\" If Yes, ask: \"What is your reading (oxygen level) today?\" \"What is your usual oxygen saturation reading?\" (e.g., 95%)        Denies    Protocols used: Breathing Difficulty-Adult-AH    "

## 2025-06-07 NOTE — TELEPHONE ENCOUNTER
Reason for Disposition  • [1] MILD difficulty breathing (e.g., minimal/no SOB at rest, SOB with walking, pulse <100) AND [2] NEW-onset or WORSE than normal    Protocols used: Breathing Difficulty-Adult-AH

## 2025-06-10 ENCOUNTER — OFFICE VISIT (OUTPATIENT)
Dept: FAMILY MEDICINE CLINIC | Facility: CLINIC | Age: 70
End: 2025-06-10
Payer: COMMERCIAL

## 2025-06-10 VITALS
WEIGHT: 109 LBS | BODY MASS INDEX: 18.16 KG/M2 | HEIGHT: 65 IN | SYSTOLIC BLOOD PRESSURE: 120 MMHG | HEART RATE: 84 BPM | DIASTOLIC BLOOD PRESSURE: 60 MMHG | TEMPERATURE: 97.1 F | RESPIRATION RATE: 16 BRPM | OXYGEN SATURATION: 96 %

## 2025-06-10 DIAGNOSIS — R05.9 COUGH, UNSPECIFIED TYPE: Primary | ICD-10-CM

## 2025-06-10 DIAGNOSIS — J43.1 PANLOBULAR EMPHYSEMA (HCC): ICD-10-CM

## 2025-06-10 DIAGNOSIS — I10 ESSENTIAL HYPERTENSION: ICD-10-CM

## 2025-06-10 PROCEDURE — 99214 OFFICE O/P EST MOD 30 MIN: CPT | Performed by: FAMILY MEDICINE

## 2025-06-10 PROCEDURE — G2211 COMPLEX E/M VISIT ADD ON: HCPCS | Performed by: FAMILY MEDICINE

## 2025-06-10 RX ORDER — CEFDINIR 300 MG/1
300 CAPSULE ORAL EVERY 12 HOURS SCHEDULED
Qty: 14 CAPSULE | Refills: 0 | Status: SHIPPED | OUTPATIENT
Start: 2025-06-10 | End: 2025-06-17

## 2025-06-10 RX ORDER — PREDNISONE 10 MG/1
TABLET ORAL
Qty: 21 TABLET | Refills: 0 | Status: SHIPPED | OUTPATIENT
Start: 2025-06-10 | End: 2025-06-14 | Stop reason: SDUPTHER

## 2025-06-10 NOTE — PROGRESS NOTES
Name: Ora Rivera      : 1955      MRN: [id number]  Encounter Provider: Bret Ibarra DO  Encounter Date: 6/10/2025   Encounter department: [facility]  Chief Complaint   Patient presents with   • Cough     Cough wheezing      Patient Instructions   Get cxray as directed and start cefdinir, and stay well hydrated. Call if worse or ER. Await chest xray r/o infiltrate. Patient never picked up prednisone from over the last weekend that was prescribed. I will reorder prednisone.         Assessment & Plan  Cough, unspecified type  Start abx and also take prednisone as directed and get chest xray. Use inhaler prn, rec ER if not better.   Orders:  •  XR chest pa and lateral; Future  •  cefdinir (OMNICEF) 300 mg capsule; Take 1 capsule (300 mg total) by mouth every 12 (twelve) hours for 7 days  •  predniSONE 10 mg tablet; Take 60 mg po day#1, 50 mg po day#2, 40 mg po day#3, 30 mg po day#4, 20 mg po day#5m and 10 mg po day#6    Panlobular emphysema (HCC)  Take inhaler prn and prednisone       Essential hypertension  stable            History of Present Illness     Cough (Cough wheezing )    Cough      Review of Systems   Constitutional: Negative.    HENT: Negative.     Eyes: Negative.    Respiratory:  Positive for cough.    Cardiovascular: Negative.    Gastrointestinal: Negative.    Endocrine: Negative.    Genitourinary: Negative.    Musculoskeletal: Negative.    Skin: Negative.    Allergic/Immunologic: Negative.    Neurological: Negative.    Hematological: Negative.    Psychiatric/Behavioral: Negative.       Past Medical History[1]  Past Surgical History[2]  Family History[3]  Social History[4]  Medications[5]  Allergies   Allergen Reactions   • Levofloxacin Other (See Comments)     Weak legs, blurred vision   • Carafate [Sucralfate] Rash   • Other Palpitations     MRI dye   • Tetracycline Rash       There is no immunization history on file for this patient.  Objective   /60   " Pulse 84   Temp (!) 97.1 °F (36.2 °C) (Temporal)   Resp 16   Ht 5' 5\" (1.651 m)   Wt 49.4 kg (109 lb)   LMP  (LMP Unknown)   SpO2 96%   BMI 18.14 kg/m²     Physical Exam  Constitutional:       Appearance: Normal appearance. She is well-developed.   HENT:      Head: Normocephalic and atraumatic.      Right Ear: External ear normal.      Left Ear: External ear normal.      Nose: Nose normal.      Mouth/Throat:      Mouth: Mucous membranes are moist.     Eyes:      Conjunctiva/sclera: Conjunctivae normal.      Pupils: Pupils are equal, round, and reactive to light.       Cardiovascular:      Rate and Rhythm: Normal rate and regular rhythm.      Pulses: Normal pulses.      Heart sounds: Normal heart sounds.   Pulmonary:      Effort: Pulmonary effort is normal.      Breath sounds: Normal breath sounds.     Musculoskeletal:         General: Normal range of motion.      Cervical back: Normal range of motion and neck supple.     Skin:     General: Skin is warm and dry.      Capillary Refill: Capillary refill takes less than 2 seconds.     Neurological:      General: No focal deficit present.      Mental Status: She is alert and oriented to person, place, and time. Mental status is at baseline.      Deep Tendon Reflexes: Reflexes are normal and symmetric.     Psychiatric:         Mood and Affect: Mood normal.         Behavior: Behavior normal.         Thought Content: Thought content normal.         Judgment: Judgment normal.       Administrative Statements   I have spent a total time of 30 minutes in caring for this patient on the day of the visit/encounter including Diagnostic results, Prognosis, Risks and benefits of tx options, Instructions for management, Patient and family education, Importance of tx compliance, Risk factor reductions, Impressions, Counseling / Coordination of care, Documenting in the medical record, Reviewing/placing orders in the medical record (including tests, medications, and/or " "procedures), and Obtaining or reviewing history  .         [1]  Past Medical History:  Diagnosis Date   • Anxiety    • Asthma     allergy induced   • Chronic pain disorder     back   • Colon polyp    • Difficulty walking Past year    Occasionally weakness   • Fibromyalgia    • Fibromyalgia, primary     Last 15yr apx   • GERD (gastroesophageal reflux disease)    • Headache, tension-type     Off and on since my  death   • Hearing aid worn    • Hearing impaired     \"broken eardrum after eardrops used\"   • HL (hearing loss)     Over 10 years ago   • Hyperlipidemia     controlled   • Hypertension     controlled   • Lumbar herniated disc    • Migraine 10/1/2018    Apx. For past two months   • Movement disorder 15 yrs. ago    Tremors head &hands   • Shortness of breath     at times   • Vision loss Several months ago    Blurred   • Wears dentures    • Wears glasses    • Weight loss    [2]  Past Surgical History:  Procedure Laterality Date   • BREAST BIOPSY Left     benign- at age 29   • COLONOSCOPY     • EAR SURGERY     • ME LAPAROSCOPY COLECTOMY PARTIAL W/ANASTOMOSIS N/A 11/1/2021    Procedure: RESECTION COLON SIGMOID LAPAROSCOPIC WTIH COLORECTAL ANASTOMOSIS, DIVERTING LOOP ILEOSTOMY;  Surgeon: Mitch Stapleton MD;  Location: [facility];  Service: General   • ME LAPAROSCOPY SURG ILEOSTOMY/JEJUNOSTOMY NON-TUBE N/A 4/4/2022    Procedure: ILEOSTOMY LOOP REVERSAL;  Surgeon: Mitch Stapleton MD;  Location: [facility];  Service: General   • TUBAL LIGATION     • ULNAR NERVE REPAIR Left    [3]  Family History  Problem Relation Name Age of Onset   • Heart failure Mother     • Hyperlipidemia Mother     • Hypertension Mother     • Coronary artery disease Brother     • COPD Maternal Grandmother     • No Known Problems Sister     • No Known Problems Daughter     • No Known Problems Sister     • Aneurysm Maternal Aunt     [4]  Social History  Tobacco Use   • Smoking status: Former     Current packs/day: 0.00     Average packs/day: " 0.5 packs/day for 50.0 years (25.0 ttl pk-yrs)     Types: Cigarettes     Start date: 4/3/1972     Quit date: 4/3/2022     Years since quitting: 3.1   • Smokeless tobacco: Never   • Tobacco comments:     wears nicotine patch on for 1/2 day   Vaping Use   • Vaping status: Never Used   Substance and Sexual Activity   • Alcohol use: Not Currently   • Drug use: No   • Sexual activity: Not Currently     Partners: Male   [5]  Current Outpatient Medications on File Prior to Visit   Medication Sig   • albuterol (PROVENTIL HFA,VENTOLIN HFA) 90 mcg/act inhaler Inhale 2 puffs 3 (three) times a day as needed for wheezing or shortness of breath   • aspirin 325 mg tablet Take 325 mg by mouth in the morning.   • atorvastatin (LIPITOR) 10 mg tablet TAKE 1 TABLET BY MOUTH EVERY DAY   • Cholecalciferol (VITAMIN D3 PO) Take by mouth   • diazepam (VALIUM) 2 mg tablet Take 1 PO in am 1-2 at HS   • fluticasone (FLONASE) 50 mcg/act nasal spray SPRAY 2 SPRAYS INTO EACH NOSTRIL EVERY DAY   • HYDROcodone-acetaminophen (NORCO)  mg per tablet    • ketorolac (TORADOL) 10 mg tablet Take 1 tablet (10 mg total) by mouth every 6 (six) hours as needed for moderate pain   • lisinopril (ZESTRIL) 2.5 mg tablet TAKE 1 TABLET (2.5 MG TOTAL) BY MOUTH DAILY PT TAKES SHE HAS NOT BEEN TAKING   • MAGNESIUM PO Take by mouth   • mirtazapine (REMERON) 15 mg tablet Take 1 tablet (15 mg total) by mouth daily at bedtime NO alcohol   • omeprazole (PriLOSEC) 40 MG capsule TAKE 1 CAPSULE BY MOUTH EVERY DAY BEFORE BREAKFAST   • ondansetron (ZOFRAN-ODT) 8 mg disintegrating tablet Take 1 tablet (8 mg total) by mouth every 8 (eight) hours as needed for nausea or vomiting   • primidone (MYSOLINE) 50 mg tablet Take 2tabs in the AM and 7tabs qhs   • Probiotic Product (PROBIOTIC-10 PO) Take by mouth   • tiZANidine (ZANAFLEX) 4 mg tablet TAKE 1 TABLET BY MOUTH 2 TIMES A DAY.   • tiZANidine (ZANAFLEX) 4 mg tablet TAKE 1 TABLET BY MOUTH 2 TIMES A DAY.   •  umeclidinium-vilanterol (Anoro Ellipta) 62.5-25 mcg/actuation inhaler Inhale 1 puff daily   • [DISCONTINUED] predniSONE 10 mg tablet Take 6 tablets (60 mg total) by mouth daily for 1 day, THEN 5 tablets (50 mg total) daily for 1 day, THEN 4 tablets (40 mg total) daily for 1 day, THEN 3 tablets (30 mg total) daily for 1 day, THEN 2 tablets (20 mg total) daily for 1 day, THEN 1 tablet (10 mg total) daily for 1 day.   • [] azithromycin (Zithromax) 250 mg tablet Take 2 tablets (500 mg total) by mouth daily for 1 day, THEN 1 tablet (250 mg total) daily for 4 days.   • naloxone (NARCAN) 4 mg/0.1 mL nasal spray Administer 1 spray into a nostril. If no response after 2-3 minutes, give another dose in the other nostril using a new spray.

## 2025-06-10 NOTE — PATIENT INSTRUCTIONS
Get cxray as directed and start cefdinir, and stay well hydrated. Call if worse or ER. Await chest xray r/o infiltrate. Patient never picked up prednisone from over the last weekend that was prescribed. I will reorder prednisone.

## 2025-06-13 ENCOUNTER — APPOINTMENT (OUTPATIENT)
Dept: RADIOLOGY | Facility: MEDICAL CENTER | Age: 70
End: 2025-06-13
Payer: COMMERCIAL

## 2025-06-13 ENCOUNTER — TELEPHONE (OUTPATIENT)
Age: 70
End: 2025-06-13

## 2025-06-13 DIAGNOSIS — R05.9 COUGH, UNSPECIFIED TYPE: ICD-10-CM

## 2025-06-13 PROCEDURE — 71046 X-RAY EXAM CHEST 2 VIEWS: CPT

## 2025-06-13 NOTE — TELEPHONE ENCOUNTER
Patient calling in to check status of call she made around 4 pm   I advised that her provider has been made aware and we are awaiting a response. The office will be in contact with her.

## 2025-06-13 NOTE — TELEPHONE ENCOUNTER
Patient called and is asking pcp if he would recommend that she continue on the Prednisone for another week. Patient stated the new antibiotic has worked however she is still experiencing the cough/wheezing. Patient would like to do another week of the prednisone or if pcp has any other recommendations at this time. Please advise and return patient call with advise/recommendations from pcp.

## 2025-06-14 ENCOUNTER — RESULTS FOLLOW-UP (OUTPATIENT)
Dept: FAMILY MEDICINE CLINIC | Facility: CLINIC | Age: 70
End: 2025-06-14

## 2025-06-14 DIAGNOSIS — F41.1 GENERALIZED ANXIETY DISORDER: ICD-10-CM

## 2025-06-14 DIAGNOSIS — R05.9 COUGH, UNSPECIFIED TYPE: ICD-10-CM

## 2025-06-14 NOTE — TELEPHONE ENCOUNTER
Dr. Ibarra did give you a course of prednisone that should last at least till Monday.  At that time the decision can be made to repeat the prednisone.

## 2025-06-16 ENCOUNTER — TELEPHONE (OUTPATIENT)
Dept: OTHER | Facility: OTHER | Age: 70
End: 2025-06-16

## 2025-06-16 RX ORDER — PREDNISONE 5 MG/1
TABLET ORAL
Qty: 21 TABLET | Refills: 0 | Status: SHIPPED | OUTPATIENT
Start: 2025-06-16

## 2025-06-16 NOTE — TELEPHONE ENCOUNTER
Patient returned call from the office. Asked her how she is feeling, patient states she is feeling much better than last week although she was very SOB with conversation with a constant cough. States she is is coughing up yellow phlegm. She finished her steroid but needs another refill of ellipta. She has been taking mucinex daily.

## 2025-06-16 NOTE — TELEPHONE ENCOUNTER
Spoke with patient. Informed her prednisone is with Dr. Ibarra and Valium is with Dr. Leonard. Patient is still shortness of breath with activity, sounds ill on phone.

## 2025-06-16 NOTE — TELEPHONE ENCOUNTER
Requested medication(s) are due for refill today: Yes  **If antibiotic or given during sick visit, contact patient to discuss current symptoms.   **Confirm prescribing provider    LOV:  6/10/25  **If longer then 1 year, contact patient to schedule annual PRIOR to refilling. Once scheduled, adjust refill for 30 days, no refills.  **Update CareEverywhere to confirm not being seen elsewhere    NOV:  unknown date    Is patient due for annual visit: No  **If future appointment, adjust to annual/follow up.  ** No appointment call to schedule annual/follow up.    Route to PCP, unless PCP no longer here, then physician they are seeing next.

## 2025-06-16 NOTE — TELEPHONE ENCOUNTER
Patient is calling regarding cancelling an appointment.    Date/Time: 6/16/25 @ 10:00 am    Patient was rescheduled: YES [] NO [x]    Patient requesting call back to reschedule: YES [x] NO []

## 2025-06-16 NOTE — TELEPHONE ENCOUNTER
Requested medication(s) are due for refill today: If no, explain: Patient is requesting 5mg  **If antibiotic or given during sick visit, contact patient to discuss current symptoms.   **Confirm prescribing provider    LOV:  6/10/25  **If longer then 1 year, contact patient to schedule annual PRIOR to refilling. Once scheduled, adjust refill for 30 days, no refills.  **Update CareEverywhere to confirm not being seen elsewhere    NOV:  unknown date    Is patient due for annual visit: No  **If future appointment, adjust to annual/follow up.  ** No appointment call to schedule annual/follow up.    Route to PCP, unless PCP no longer here, then physician they are seeing next.

## 2025-06-17 DIAGNOSIS — G43.909 MIGRAINE WITHOUT STATUS MIGRAINOSUS, NOT INTRACTABLE, UNSPECIFIED MIGRAINE TYPE: ICD-10-CM

## 2025-06-17 DIAGNOSIS — J06.9 UPPER RESPIRATORY TRACT INFECTION, UNSPECIFIED TYPE: ICD-10-CM

## 2025-06-17 DIAGNOSIS — M79.7 FIBROMYALGIA: ICD-10-CM

## 2025-06-17 NOTE — TELEPHONE ENCOUNTER
Requested medication(s) are due for refill today: No  **If antibiotic or given during sick visit, contact patient to discuss current symptoms.   **Confirm prescribing provider    LOV:  6/10/25  **If longer then 1 year, contact patient to schedule annual PRIOR to refilling. Once scheduled, adjust refill for 30 days, no refills.  **Update CareEverywhere to confirm not being seen elsewhere    NOV:  unknown date    Is patient due for annual visit: No  **If future appointment, adjust to annual/follow up.  ** No appointment call to schedule annual/follow up.    Route to PCP, unless PCP no longer here, then physician they are seeing next.

## 2025-06-18 ENCOUNTER — TELEPHONE (OUTPATIENT)
Dept: FAMILY MEDICINE CLINIC | Facility: CLINIC | Age: 70
End: 2025-06-18

## 2025-06-18 RX ORDER — KETOROLAC TROMETHAMINE 10 MG/1
10 TABLET, FILM COATED ORAL EVERY 6 HOURS PRN
Qty: 20 TABLET | Refills: 0 | Status: SHIPPED | OUTPATIENT
Start: 2025-06-18

## 2025-06-18 RX ORDER — PREDNISONE 2.5 MG/1
2.5 TABLET ORAL 2 TIMES DAILY
Qty: 60 TABLET | Refills: 0 | Status: SHIPPED | OUTPATIENT
Start: 2025-06-18

## 2025-06-18 RX ORDER — DIAZEPAM 2 MG/1
TABLET ORAL
Qty: 90 TABLET | Refills: 0 | Status: SHIPPED | OUTPATIENT
Start: 2025-06-18 | End: 2025-06-27 | Stop reason: SDUPTHER

## 2025-06-18 NOTE — TELEPHONE ENCOUNTER
Patient returned call, passed along message . Patient stated as of now she would prefer to stay at [facility] with Dr Ibarra .

## 2025-06-18 NOTE — TELEPHONE ENCOUNTER
Please reach out to the patient ASAP.  I am retiring June 30 which she probably is aware of.  She will need to pick a new provider.  I would recommend [facility]  (that was the practice that she was at before she changed over to see me.) One of the reason she switched to me is that her daughter comes to this practice.  However, [facility] is right in her backyard.  I would recommend Lashaun Allen DO.  Please give patient the phone number of that office.  She would need to make an appointment ASAP to establish and go over her medical diagnoses and her medications so that doctor would be able to prescribe anything that she needs in the near future especially her refills (like the diazepam)

## 2025-06-19 DIAGNOSIS — R19.7 DIARRHEA: ICD-10-CM

## 2025-06-19 DIAGNOSIS — R11.2 NAUSEA AND VOMITING: ICD-10-CM

## 2025-06-19 RX ORDER — ONDANSETRON 8 MG/1
8 TABLET, ORALLY DISINTEGRATING ORAL EVERY 8 HOURS PRN
Qty: 20 TABLET | Refills: 0 | OUTPATIENT
Start: 2025-06-19

## 2025-06-27 ENCOUNTER — TELEPHONE (OUTPATIENT)
Age: 70
End: 2025-06-27

## 2025-06-27 DIAGNOSIS — F41.1 GENERALIZED ANXIETY DISORDER: ICD-10-CM

## 2025-06-27 RX ORDER — DIAZEPAM 5 MG/1
TABLET ORAL
Qty: 30 TABLET | Refills: 0 | Status: SHIPPED | OUTPATIENT
Start: 2025-06-27

## 2025-06-27 NOTE — TELEPHONE ENCOUNTER
Patient called today and said her Diazepam 2 mgs are not working and she would like to go back to the 5 mgs. Patient asked if Dr. Ibarra can please sent that to the pharmacy this morning because she is having a lot of anxiety. Please advise [phone] thank you.

## 2025-06-27 NOTE — TELEPHONE ENCOUNTER
Spoke with patient and made her aware the dr pierre isnt in today but will get message on Monday. Patient ok with this.

## 2025-06-30 NOTE — TELEPHONE ENCOUNTER
I called and spoke with the patient and made sooner appt for med follow up, needs UDS and controlled substance updated

## 2025-06-30 NOTE — TELEPHONE ENCOUNTER
Patient calls to discuss the diazepam.  Patient will follow up on 9/8/2025. Patient reports that she is taking the 5 mg diazepam in the Am and to control her tremors. Patient has been treated for generalized anxiety disorder x multple years. Patient was initially on Diazepam 10 mg po 3x day. Patient was weaned to 5 mg po 3x day, with the last prescription being 5/07/25. On 6/6/25 patient was weaned again to diazepam 2 mg po 1 in the AM and then 1-2 in the PM. Patient initially was instructed to try the 2 mg dose 3x day. Patient then called on 6/14/25 to report that the dose was not enough of a dose. Patient called back on 6/27/25 to again state that the 2 mg was not strong enough. Patient was prescribed 5 mg dose but only 1 per day. Patient is still asking if she can have an additional dose of the diazepam 5 mg ? Patient also reports that she has not tried any other medications for depression or anxiety. Please review and then please discuss with the patient what would be the best approach. I did schedule the patient for a routine 6 month follow up on 9/8/25. Patient can be reached at [phone].

## 2025-07-01 NOTE — TELEPHONE ENCOUNTER
Called and spoke with patient to advise that patient can increase to twice a day if needed. Advised patient that when she needs  more medication to reach out to the office for refill.

## 2025-07-06 DIAGNOSIS — F41.1 GENERALIZED ANXIETY DISORDER: ICD-10-CM

## 2025-07-07 ENCOUNTER — TELEPHONE (OUTPATIENT)
Age: 70
End: 2025-07-07

## 2025-07-07 RX ORDER — DIAZEPAM 5 MG/1
TABLET ORAL
Qty: 30 TABLET | Refills: 0 | OUTPATIENT
Start: 2025-07-07

## 2025-07-07 NOTE — TELEPHONE ENCOUNTER
Patient wants to know why her valium was declined for refill. Advised that she was prescribed valium 1 pill daily , she picked up last script on 6/27. Patient admits that she took it twice a day since she picked up the script. Advised that even if she took 2 pills per day to date. she should still have 8 pills left.  Patient states she only took 2 pills a day and only has 1 pill left. She would like a refill and would like the valium increased to 3 tab per day. Please f/u with patient.

## 2025-07-07 NOTE — TELEPHONE ENCOUNTER
Medication: diazepam (VALIUM) 5 mg tablet     Dose/Frequency:     Take 1 PO qd prn anxiety       Quantity: 30 tablet     Pharmacy: /pharmacy #[id number] -     Office:   [x] PCP/Provider -   [] Speciality/Provider -     Does the patient have enough for 3 days?   Medication: predniSONE 2.5 mg tablet     Dose/Frequency: TAKE 1 TABLET BY MOUTH TWICE A DAY     Quantity: 60 tablet     Pharmacy:  [facility]/pharmacy #[id number]     Office:   [] PCP/Provider -   [] Speciality/Provider -     Does the patient have enough for 3 days?   Medication: ondansetron (ZOFRAN-ODT) 8 mg disintegrating tablet     Dose/Frequency: Take 1 tablet (8 mg total) by mouth every 8 (eight) hours as needed for nausea or vomiting     Quantity:  20 tablet     Pharmacy:  [facility]/pharmacy     Office:   [x] PCP/Provider -   [] Speciality/Provider -     Does the patient have enough for 3 days?   [x] Yes   [] No - Send as HP to POD   Yes   [] No - Send as HP to POD   Yes   [] No - Send as HP to POD

## 2025-07-08 ENCOUNTER — TELEPHONE (OUTPATIENT)
Age: 70
End: 2025-07-08

## 2025-07-08 NOTE — TELEPHONE ENCOUNTER
Patient called, would like to be scheduled sooner:    Reason for needing a sooner appointment:   Symptoms: discuss medication management  Duration of Symptoms:n/a  Current Appointment date:Tuesday 7/22 @ 4:45pm  Dr Ibarra    PCP ONLY or Okay with seeing another provider - Primary Care Provider only    Is the patient Currently on the wait list? Yes or No - YES    Please review with provider if able to accommodate a sooner appointment.   Call back# [phone]

## 2025-07-10 ENCOUNTER — OFFICE VISIT (OUTPATIENT)
Dept: FAMILY MEDICINE CLINIC | Facility: CLINIC | Age: 70
End: 2025-07-10
Payer: COMMERCIAL

## 2025-07-10 VITALS
SYSTOLIC BLOOD PRESSURE: 130 MMHG | HEART RATE: 77 BPM | HEIGHT: 65 IN | BODY MASS INDEX: 18.29 KG/M2 | DIASTOLIC BLOOD PRESSURE: 80 MMHG | OXYGEN SATURATION: 98 % | WEIGHT: 109.8 LBS | TEMPERATURE: 97.2 F

## 2025-07-10 DIAGNOSIS — J06.9 UPPER RESPIRATORY TRACT INFECTION, UNSPECIFIED TYPE: ICD-10-CM

## 2025-07-10 DIAGNOSIS — R11.0 NAUSEA: ICD-10-CM

## 2025-07-10 DIAGNOSIS — J43.9 PULMONARY EMPHYSEMA, UNSPECIFIED EMPHYSEMA TYPE (HCC): ICD-10-CM

## 2025-07-10 DIAGNOSIS — F41.1 GENERALIZED ANXIETY DISORDER: ICD-10-CM

## 2025-07-10 DIAGNOSIS — J42 CHRONIC BRONCHITIS, UNSPECIFIED CHRONIC BRONCHITIS TYPE (HCC): ICD-10-CM

## 2025-07-10 DIAGNOSIS — R10.13 DYSPEPSIA: ICD-10-CM

## 2025-07-10 DIAGNOSIS — M79.7 FIBROMYALGIA: ICD-10-CM

## 2025-07-10 DIAGNOSIS — I10 ESSENTIAL HYPERTENSION: Primary | ICD-10-CM

## 2025-07-10 PROCEDURE — 99214 OFFICE O/P EST MOD 30 MIN: CPT | Performed by: FAMILY MEDICINE

## 2025-07-10 PROCEDURE — G2211 COMPLEX E/M VISIT ADD ON: HCPCS | Performed by: FAMILY MEDICINE

## 2025-07-10 RX ORDER — DIAZEPAM 5 MG/1
TABLET ORAL
Qty: 30 TABLET | Refills: 0 | Status: SHIPPED | OUTPATIENT
Start: 2025-07-10 | End: 2025-07-16 | Stop reason: SDUPTHER

## 2025-07-10 RX ORDER — ONDANSETRON 4 MG/1
4 TABLET, FILM COATED ORAL DAILY PRN
Qty: 20 TABLET | Refills: 0 | Status: SHIPPED | OUTPATIENT
Start: 2025-07-10

## 2025-07-10 RX ORDER — UMECLIDINIUM BROMIDE AND VILANTEROL TRIFENATATE 62.5; 25 UG/1; UG/1
1 POWDER RESPIRATORY (INHALATION) DAILY
Qty: 60 BLISTER | Refills: 3 | Status: SHIPPED | OUTPATIENT
Start: 2025-07-10 | End: 2025-11-07

## 2025-07-10 RX ORDER — PREDNISONE 2.5 MG/1
2.5 TABLET ORAL 2 TIMES DAILY
Qty: 30 TABLET | Refills: 0 | Status: SHIPPED | OUTPATIENT
Start: 2025-07-10 | End: 2025-07-16 | Stop reason: SDUPTHER

## 2025-07-10 RX ORDER — ALBUTEROL SULFATE 90 UG/1
2 INHALANT RESPIRATORY (INHALATION) 3 TIMES DAILY PRN
Qty: 18 G | Refills: 2 | Status: SHIPPED | OUTPATIENT
Start: 2025-07-10

## 2025-07-10 NOTE — ASSESSMENT & PLAN NOTE
Stable   Orders:  •  predniSONE 2.5 mg tablet; Take 1 tablet (2.5 mg total) by mouth 2 (two) times a day Prn sob and wheeze

## 2025-07-10 NOTE — PATIENT INSTRUCTIONS
Refilled meds for emphysema, chronic bronchitis, anxiety, and fibromyalgia. Take meds as directed and call if any problems. Get Dexa scan as directed.

## 2025-07-10 NOTE — PROGRESS NOTES
Name: Ora Rivera      : 1955      MRN: [id number]  Encounter Provider: Bret Ibarra DO  Encounter Date: 7/10/2025   Encounter department: [facility]  Chief Complaint   Patient presents with   • Medication Management     Patient would like the Doctor to refill these medications Zofram, Palomares inhaler, Albuteral inhaler, Diazapam, Prednisone,     Patient Instructions   Refilled meds for emphysema, chronic bronchitis, anxiety, and fibromyalgia. Take meds as directed and call if any problems. Get Dexa scan as directed.     Assessment & Plan  Pulmonary emphysema, unspecified emphysema type (HCC)  stable  Orders:  •  umeclidinium-vilanterol (Anoro Ellipta) 62.5-25 mcg/actuation inhaler; Inhale 1 puff daily    Chronic bronchitis, unspecified chronic bronchitis type (HCC)  Stable   Orders:  •  albuterol (PROVENTIL HFA,VENTOLIN HFA) 90 mcg/act inhaler; Inhale 2 puffs 3 (three) times a day as needed for wheezing or shortness of breath    Generalized anxiety disorder  Stable   Orders:  •  diazepam (VALIUM) 5 mg tablet; Take 1 PO qd prn anxiety    Fibromyalgia  Stable   Orders:  •  predniSONE 2.5 mg tablet; Take 1 tablet (2.5 mg total) by mouth 2 (two) times a day Prn sob and wheeze    Upper respiratory tract infection, unspecified type  stable  Orders:  •  predniSONE 2.5 mg tablet; Take 1 tablet (2.5 mg total) by mouth 2 (two) times a day Prn sob and wheeze    Essential hypertension  Stable on lisinopril       Dyspepsia  Stable on med       Nausea    Orders:  •  ondansetron (ZOFRAN) 4 mg tablet; Take 1 tablet (4 mg total) by mouth daily as needed for nausea         History of Present Illness     Medication Management (Patient would like the Doctor to refill these medications Zofra, Palomares inhaler, Albuteral inhaler, Diazapam, Prednisone,)      Review of Systems   Constitutional: Negative.    HENT: Negative.     Eyes: Negative.    Respiratory: Negative.     Cardiovascular: Negative.   "  Gastrointestinal: Negative.    Endocrine: Negative.    Genitourinary: Negative.    Musculoskeletal: Negative.    Skin: Negative.    Allergic/Immunologic: Negative.    Neurological: Negative.    Hematological: Negative.    Psychiatric/Behavioral:          Anxiety     Past Medical History[1]  Past Surgical History[2]  Family History[3]  Social History[4]  Medications[5]  Allergies   Allergen Reactions   • Levofloxacin Other (See Comments)     Weak legs, blurred vision   • Carafate [Sucralfate] Rash   • Other Palpitations     MRI dye   • Tetracycline Rash       There is no immunization history on file for this patient.  Objective   /80   Pulse 77   Temp (!) 97.2 °F (36.2 °C) (Temporal)   Ht 5' 5\" (1.651 m)   Wt 49.8 kg (109 lb 12.8 oz)   LMP  (LMP Unknown)   SpO2 98%   BMI 18.27 kg/m²     Physical Exam  Constitutional:       Appearance: She is well-developed.   HENT:      Head: Normocephalic and atraumatic.      Right Ear: External ear normal.      Left Ear: External ear normal.      Nose: Nose normal.     Eyes:      Conjunctiva/sclera: Conjunctivae normal.      Pupils: Pupils are equal, round, and reactive to light.       Cardiovascular:      Rate and Rhythm: Normal rate and regular rhythm.      Heart sounds: Normal heart sounds.   Pulmonary:      Effort: Pulmonary effort is normal.      Breath sounds: Normal breath sounds.     Musculoskeletal:         General: Normal range of motion.      Cervical back: Normal range of motion and neck supple.     Skin:     General: Skin is warm and dry.     Neurological:      Mental Status: She is alert and oriented to person, place, and time.      Deep Tendon Reflexes: Reflexes are normal and symmetric.     Psychiatric:         Mood and Affect: Mood normal.         Behavior: Behavior normal.         Thought Content: Thought content normal.         Judgment: Judgment normal.      Comments: anxiety       Administrative Statements   I have spent a total time of 32 " "minutes in caring for this patient on the day of the visit/encounter including Diagnostic results, Prognosis, Risks and benefits of tx options, Instructions for management, Patient and family education, Importance of tx compliance, Risk factor reductions, Impressions, Counseling / Coordination of care, Documenting in the medical record, Reviewing/placing orders in the medical record (including tests, medications, and/or procedures), and Obtaining or reviewing history  .         [1]  Past Medical History:  Diagnosis Date   • Anxiety    • Asthma     allergy induced   • Chronic pain disorder     back   • Colon polyp    • Difficulty walking Past year    Occasionally weakness   • Fibromyalgia    • Fibromyalgia, primary     Last 15yr apx   • GERD (gastroesophageal reflux disease)    • Headache, tension-type     Off and on since my  death   • Hearing aid worn    • Hearing impaired     \"broken eardrum after eardrops used\"   • HL (hearing loss)     Over 10 years ago   • Hyperlipidemia     controlled   • Hypertension     controlled   • Lumbar herniated disc    • Migraine 10/1/2018    Apx. For past two months   • Movement disorder 15 yrs. ago    Tremors head &hands   • Shortness of breath     at times   • Vision loss Several months ago    Blurred   • Wears dentures    • Wears glasses    • Weight loss    [2]  Past Surgical History:  Procedure Laterality Date   • BREAST BIOPSY Left     benign- at age 29   • COLONOSCOPY     • EAR SURGERY     • MA LAPAROSCOPY COLECTOMY PARTIAL W/ANASTOMOSIS N/A 11/1/2021    Procedure: RESECTION COLON SIGMOID LAPAROSCOPIC WTIH COLORECTAL ANASTOMOSIS, DIVERTING LOOP ILEOSTOMY;  Surgeon: Mitch Stapleton MD;  Location: [facility];  Service: General   • MA LAPAROSCOPY SURG ILEOSTOMY/JEJUNOSTOMY NON-TUBE N/A 4/4/2022    Procedure: ILEOSTOMY LOOP REVERSAL;  Surgeon: Mitch Stapleton MD;  Location: [facility];  Service: General   • TUBAL LIGATION     • ULNAR NERVE REPAIR Left    [3]  Family " History  Problem Relation Name Age of Onset   • Heart failure Mother     • Hyperlipidemia Mother     • Hypertension Mother     • Coronary artery disease Brother     • COPD Maternal Grandmother     • No Known Problems Sister     • No Known Problems Daughter     • No Known Problems Sister     • Aneurysm Maternal Aunt     [4]  Social History  Tobacco Use   • Smoking status: Former     Current packs/day: 0.00     Average packs/day: 0.5 packs/day for 50.0 years (25.0 ttl pk-yrs)     Types: Cigarettes     Start date: 4/3/1972     Quit date: 4/3/2022     Years since quitting: 3.2   • Smokeless tobacco: Never   • Tobacco comments:     wears nicotine patch on for 1/2 day   Vaping Use   • Vaping status: Never Used   Substance and Sexual Activity   • Alcohol use: Not Currently   • Drug use: No   • Sexual activity: Not Currently     Partners: Male   [5]  Current Outpatient Medications on File Prior to Visit   Medication Sig   • aspirin 325 mg tablet Take 325 mg by mouth in the morning.   • atorvastatin (LIPITOR) 10 mg tablet TAKE 1 TABLET BY MOUTH EVERY DAY   • Cholecalciferol (VITAMIN D3 PO) Take by mouth   • fluticasone (FLONASE) 50 mcg/act nasal spray SPRAY 2 SPRAYS INTO EACH NOSTRIL EVERY DAY   • HYDROcodone-acetaminophen (NORCO)  mg per tablet    • ketorolac (TORADOL) 10 mg tablet Take 1 tablet (10 mg total) by mouth every 6 (six) hours as needed for moderate pain   • lisinopril (ZESTRIL) 2.5 mg tablet TAKE 1 TABLET (2.5 MG TOTAL) BY MOUTH DAILY PT TAKES SHE HAS NOT BEEN TAKING   • MAGNESIUM PO Take by mouth   • mirtazapine (REMERON) 15 mg tablet Take 1 tablet (15 mg total) by mouth daily at bedtime NO alcohol   • naloxone (NARCAN) 4 mg/0.1 mL nasal spray Administer 1 spray into a nostril. If no response after 2-3 minutes, give another dose in the other nostril using a new spray.   • omeprazole (PriLOSEC) 40 MG capsule TAKE 1 CAPSULE BY MOUTH EVERY DAY BEFORE BREAKFAST   • primidone (MYSOLINE) 50 mg tablet Take 2tabs  in the AM and 7tabs qhs   • Probiotic Product (PROBIOTIC-10 PO) Take by mouth   • tiZANidine (ZANAFLEX) 4 mg tablet TAKE 1 TABLET BY MOUTH 2 TIMES A DAY.   • tiZANidine (ZANAFLEX) 4 mg tablet TAKE 1 TABLET BY MOUTH TWICE A DAY   • [DISCONTINUED] albuterol (PROVENTIL HFA,VENTOLIN HFA) 90 mcg/act inhaler Inhale 2 puffs 3 (three) times a day as needed for wheezing or shortness of breath   • [DISCONTINUED] diazepam (VALIUM) 5 mg tablet Take 1 PO qd prn anxiety   • [DISCONTINUED] ondansetron (ZOFRAN-ODT) 8 mg disintegrating tablet Take 1 tablet (8 mg total) by mouth every 8 (eight) hours as needed for nausea or vomiting   • [DISCONTINUED] predniSONE 2.5 mg tablet TAKE 1 TABLET BY MOUTH TWICE A DAY   • [DISCONTINUED] umeclidinium-vilanterol (Anoro Ellipta) 62.5-25 mcg/actuation inhaler Inhale 1 puff daily   • [DISCONTINUED] predniSONE 5 mg tablet Take PO 6-5-4-3-2-1 (Patient not taking: Reported on 7/10/2025)

## 2025-07-15 DIAGNOSIS — J06.9 UPPER RESPIRATORY TRACT INFECTION, UNSPECIFIED TYPE: ICD-10-CM

## 2025-07-15 DIAGNOSIS — J42 CHRONIC BRONCHITIS, UNSPECIFIED CHRONIC BRONCHITIS TYPE (HCC): ICD-10-CM

## 2025-07-15 DIAGNOSIS — M79.7 FIBROMYALGIA: ICD-10-CM

## 2025-07-16 DIAGNOSIS — M79.7 FIBROMYALGIA: ICD-10-CM

## 2025-07-16 DIAGNOSIS — F41.1 GENERALIZED ANXIETY DISORDER: ICD-10-CM

## 2025-07-16 DIAGNOSIS — J06.9 UPPER RESPIRATORY TRACT INFECTION, UNSPECIFIED TYPE: ICD-10-CM

## 2025-07-16 RX ORDER — PREDNISONE 2.5 MG/1
2.5 TABLET ORAL 2 TIMES DAILY
Qty: 30 TABLET | Refills: 0 | Status: SHIPPED | OUTPATIENT
Start: 2025-07-16 | End: 2025-07-23 | Stop reason: SDUPTHER

## 2025-07-16 RX ORDER — PREDNISONE 2.5 MG/1
2.5 TABLET ORAL 2 TIMES DAILY
Qty: 60 TABLET | OUTPATIENT
Start: 2025-07-16

## 2025-07-16 RX ORDER — DIAZEPAM 5 MG/1
TABLET ORAL
Qty: 30 TABLET | Refills: 0 | Status: SHIPPED | OUTPATIENT
Start: 2025-07-16 | End: 2025-07-23 | Stop reason: SDUPTHER

## 2025-07-16 NOTE — TELEPHONE ENCOUNTER
Requested medication(s) are due for refill today: No. Patient just got a refill for this medication.  **If antibiotic or given during sick visit, contact patient to discuss current symptoms.   **Confirm prescribing provider    LOV:  7/10/2025  **If longer then 1 year, contact patient to schedule annual PRIOR to refilling. Once scheduled, adjust refill for 30 days, no refills.  **Update CareEverywhere to confirm not being seen elsewhere    NOV:  10/10/2025    Is patient due for annual visit: No  **If future appointment, adjust to annual/follow up.  ** No appointment call to schedule annual/follow up.    Route to PCP, unless PCP no longer here, then physician they are seeing next.

## 2025-07-16 NOTE — TELEPHONE ENCOUNTER
Requested medication(s) are due for refill today: No. This medication was just refilled recently.  **If antibiotic or given during sick visit, contact patient to discuss current symptoms.   **Confirm prescribing provider    LOV:  7/10/2025  **If longer then 1 year, contact patient to schedule annual PRIOR to refilling. Once scheduled, adjust refill for 30 days, no refills.  **Update CareEverywhere to confirm not being seen elsewhere    NOV:  10/10/2025    Is patient due for annual visit: No  **If future appointment, adjust to annual/follow up.  ** No appointment call to schedule annual/follow up.    Route to PCP, unless PCP no longer here, then physician they are seeing next.

## 2025-07-16 NOTE — TELEPHONE ENCOUNTER
Requested medication(s) are due for refill today: Yes  **If antibiotic or given during sick visit, contact patient to discuss current symptoms.   **Confirm prescribing provider    LOV:  7/10/25  **If longer then 1 year, contact patient to schedule annual PRIOR to refilling. Once scheduled, adjust refill for 30 days, no refills.  **Update CareEverywhere to confirm not being seen elsewhere    NOV:  10/10/25    Is patient due for annual visit: No  **If future appointment, adjust to annual/follow up.  ** No appointment call to schedule annual/follow up.    Route to PCP, unless PCP no longer here, then physician they are seeing next.

## 2025-07-17 RX ORDER — ALBUTEROL SULFATE 90 UG/1
INHALANT RESPIRATORY (INHALATION)
Refills: 2 | OUTPATIENT
Start: 2025-07-17

## 2025-07-21 DIAGNOSIS — M79.7 FIBROMYALGIA: ICD-10-CM

## 2025-07-21 DIAGNOSIS — J06.9 UPPER RESPIRATORY TRACT INFECTION, UNSPECIFIED TYPE: ICD-10-CM

## 2025-07-21 DIAGNOSIS — F41.1 GENERALIZED ANXIETY DISORDER: ICD-10-CM

## 2025-07-22 RX ORDER — PREDNISONE 2.5 MG/1
2.5 TABLET ORAL 2 TIMES DAILY
Qty: 30 TABLET | Refills: 0 | OUTPATIENT
Start: 2025-07-22

## 2025-07-22 RX ORDER — DIAZEPAM 5 MG/1
TABLET ORAL
Qty: 30 TABLET | Refills: 0 | OUTPATIENT
Start: 2025-07-22

## 2025-07-22 NOTE — TELEPHONE ENCOUNTER
Requested medication(s) are due for refill today: No. Medication was just refilled less than a week ago.  **If antibiotic or given during sick visit, contact patient to discuss current symptoms.   **Confirm prescribing provider    LOV:  7/10/2025  **If longer then 1 year, contact patient to schedule annual PRIOR to refilling. Once scheduled, adjust refill for 30 days, no refills.  **Update CareEverywhere to confirm not being seen elsewhere    NOV:  10/10/2025    Is patient due for annual visit: No  **If future appointment, adjust to annual/follow up.  ** No appointment call to schedule annual/follow up.    Route to PCP, unless PCP no longer here, then physician they are seeing next.

## 2025-07-23 DIAGNOSIS — M79.7 FIBROMYALGIA: ICD-10-CM

## 2025-07-23 DIAGNOSIS — F41.1 GENERALIZED ANXIETY DISORDER: ICD-10-CM

## 2025-07-23 DIAGNOSIS — J06.9 UPPER RESPIRATORY TRACT INFECTION, UNSPECIFIED TYPE: ICD-10-CM

## 2025-07-23 DIAGNOSIS — R11.0 NAUSEA: ICD-10-CM

## 2025-07-23 DIAGNOSIS — J42 CHRONIC BRONCHITIS, UNSPECIFIED CHRONIC BRONCHITIS TYPE (HCC): ICD-10-CM

## 2025-07-23 RX ORDER — PREDNISONE 2.5 MG/1
2.5 TABLET ORAL 2 TIMES DAILY
Qty: 30 TABLET | Refills: 0 | Status: SHIPPED | OUTPATIENT
Start: 2025-07-23

## 2025-07-23 RX ORDER — DIAZEPAM 5 MG/1
TABLET ORAL
Qty: 30 TABLET | Refills: 0 | Status: SHIPPED | OUTPATIENT
Start: 2025-07-23

## 2025-07-23 NOTE — TELEPHONE ENCOUNTER
Requested medication(s) are due for refill today: If no, explain: sent on 7/16 but patient wasn't able to , please resend  **If antibiotic or given during sick visit, contact patient to discuss current symptoms.   **Confirm prescribing provider    LOV:  7/10/25  **If longer then 1 year, contact patient to schedule annual PRIOR to refilling. Once scheduled, adjust refill for 30 days, no refills.  **Update CareEverywhere to confirm not being seen elsewhere    NOV:  10/10/25    Is patient due for annual visit: No  **If future appointment, adjust to annual/follow up.  ** No appointment call to schedule annual/follow up.    Route to PCP, unless PCP no longer here, then physician they are seeing next.

## 2025-07-24 RX ORDER — ALBUTEROL SULFATE 90 UG/1
INHALANT RESPIRATORY (INHALATION)
Refills: 2 | OUTPATIENT
Start: 2025-07-24

## 2025-07-24 RX ORDER — ONDANSETRON 4 MG/1
4 TABLET, FILM COATED ORAL DAILY PRN
Qty: 20 TABLET | Refills: 0 | Status: SHIPPED | OUTPATIENT
Start: 2025-07-24

## 2025-07-29 DIAGNOSIS — M79.7 FIBROMYALGIA: ICD-10-CM

## 2025-07-30 DIAGNOSIS — M79.7 FIBROMYALGIA: ICD-10-CM

## 2025-08-14 ENCOUNTER — TELEPHONE (OUTPATIENT)
Age: 70
End: 2025-08-14

## 2025-08-15 ENCOUNTER — TELEPHONE (OUTPATIENT)
Dept: NEUROLOGY | Facility: CLINIC | Age: 70
End: 2025-08-15

## 2025-08-15 ENCOUNTER — TELEMEDICINE (OUTPATIENT)
Dept: NEUROLOGY | Facility: CLINIC | Age: 70
End: 2025-08-15
Payer: COMMERCIAL

## 2025-08-20 ENCOUNTER — TELEPHONE (OUTPATIENT)
Age: 70
End: 2025-08-20

## 2025-08-21 DIAGNOSIS — R11.0 NAUSEA: ICD-10-CM

## 2025-08-22 DIAGNOSIS — M79.7 FIBROMYALGIA: ICD-10-CM

## 2025-08-22 DIAGNOSIS — F41.1 GENERALIZED ANXIETY DISORDER: ICD-10-CM

## 2025-08-22 DIAGNOSIS — J06.9 UPPER RESPIRATORY TRACT INFECTION, UNSPECIFIED TYPE: ICD-10-CM

## 2025-08-22 RX ORDER — PREDNISONE 2.5 MG/1
2.5 TABLET ORAL 2 TIMES DAILY
Qty: 30 TABLET | Refills: 0 | Status: SHIPPED | OUTPATIENT
Start: 2025-08-22

## 2025-08-22 RX ORDER — DIAZEPAM 5 MG/1
TABLET ORAL
Qty: 30 TABLET | Refills: 0 | Status: SHIPPED | OUTPATIENT
Start: 2025-08-22

## 2025-08-22 RX ORDER — ONDANSETRON 8 MG/1
8 TABLET, FILM COATED ORAL DAILY PRN
Qty: 20 TABLET | Refills: 0 | Status: SHIPPED | OUTPATIENT
Start: 2025-08-22

## (undated) DEVICE — GLOVE PI ULTRA TOUCH SZ.7.5

## (undated) DEVICE — TUBING SUCTION 5MM X 12 FT

## (undated) DEVICE — CHLORAPREP HI-LITE 26ML ORANGE

## (undated) DEVICE — BULB SYRINGE,IRRIGATION WITH PROTECTIVE CAP: Brand: DOVER

## (undated) DEVICE — ENDOPATH ECHELON ENDOSCOPIC LINEAR CUTTER RELOADS, BLUE, 60MM: Brand: ECHELON ENDOPATH

## (undated) DEVICE — GLOVE SRG BIOGEL 6.5

## (undated) DEVICE — SUT VICRYL 3-0 SH 27 IN J416H

## (undated) DEVICE — GLOVE SRG BIOGEL ECLIPSE 7.5

## (undated) DEVICE — 3000CC GUARDIAN II: Brand: GUARDIAN

## (undated) DEVICE — DRAPE LAPAROTOMY W/POUCHES

## (undated) DEVICE — GLOVE INDICATOR PI UNDERGLOVE SZ 6.5 BLUE

## (undated) DEVICE — TRAY FOLEY 16FR URIMETER SURESTEP

## (undated) DEVICE — SCD SEQUENTIAL COMPRESSION COMFORT SLEEVE MEDIUM KNEE LENGTH: Brand: KENDALL SCD

## (undated) DEVICE — POOLE SUCTION HANDLE: Brand: CARDINAL HEALTH

## (undated) DEVICE — PROXIMATE RELOADABLE LINEAR CUTTER WITH SAFETY LOCK-OUT.  55MM LINEAR CUTTER.: Brand: PROXIMATE

## (undated) DEVICE — INTENDED FOR TISSUE SEPARATION, AND OTHER PROCEDURES THAT REQUIRE A SHARP SURGICAL BLADE TO PUNCTURE OR CUT.: Brand: BARD-PARKER SAFETY BLADES SIZE 15, STERILE

## (undated) DEVICE — ALLENTOWN LAP CHOLE APP PACK: Brand: CARDINAL HEALTH

## (undated) DEVICE — ACCESS PLATFORM FOR MINIMALLY INVASIVE SURGERY.: Brand: GELPORT® LAPAROSCOPIC  SYSTEM

## (undated) DEVICE — PROXIMATE SKIN STAPLERS (35 WIDE) CONTAINS 35 STAINLESS STEEL STAPLES (FIXED HEAD): Brand: PROXIMATE

## (undated) DEVICE — SUT PDS II 3-0 SH 27 IN Z316H

## (undated) DEVICE — SUT VICRYL 0 CT-1 36 IN J946H

## (undated) DEVICE — HARMONIC ACE 5MM DIAMETER SHEARS 36CM SHAFT LENGTH + ADAPTIVE TISSUE TECHNOLOGY FOR USE WITH GENERATOR G11: Brand: HARMONIC ACE

## (undated) DEVICE — GLOVE INDICATOR PI UNDERGLOVE SZ 7.5 BLUE

## (undated) DEVICE — ADHESIVE SKIN HIGH VISCOSITY EXOFIN 1ML

## (undated) DEVICE — ECHELON FLEX POWERED PLUS ARTICULATING ENDOSCOPIC LINEAR CUTTER , 60MM: Brand: ECHELON FLEX

## (undated) DEVICE — DRESSING MEPILEX AG BORDER 4 X 4 IN

## (undated) DEVICE — INTENDED FOR TISSUE SEPARATION, AND OTHER PROCEDURES THAT REQUIRE A SHARP SURGICAL BLADE TO PUNCTURE OR CUT.: Brand: BARD-PARKER SAFETY BLADES SIZE 11, STERILE

## (undated) DEVICE — SUT PDS II 1 CT-1 27 IN Z347H

## (undated) DEVICE — 3M™ TEGADERM™ TRANSPARENT FILM DRESSING FRAME STYLE, 1626W, 4 IN X 4-3/4 IN (10 CM X 12 CM), 50/CT 4CT/CASE: Brand: 3M™ TEGADERM™

## (undated) DEVICE — SUT PDS II 1 CTX 36 IN Z371T

## (undated) DEVICE — DRAPE EQUIPMENT RF WAND

## (undated) DEVICE — BLUE HEAT SCOPE WARMER

## (undated) DEVICE — TROCAR: Brand: KII FIOS FIRST ENTRY

## (undated) DEVICE — INVIEW CLEAR LEGGINGS: Brand: CONVERTORS

## (undated) DEVICE — PROXIMATE LINEAR CUTTER RELOAD (STNADARD) , BLUE, 55MM: Brand: PROXIMATE

## (undated) DEVICE — DRESSING MEPILEX AG BORDER POST-OP 4 X 6 IN

## (undated) DEVICE — ECHELON CIRCULAR POWERED STAPLER: Brand: ECHELON CIRCULAR

## (undated) DEVICE — PMI DISPOSABLE PUNCTURE CLOSURE DEVICE / SUTURE GRASPER: Brand: PMI

## (undated) DEVICE — 5 MM CURVED DISSECTORS WITH MONOPOLAR CAUTERY: Brand: ENDOPATH

## (undated) DEVICE — GLOVE INDICATOR PI UNDERGLOVE SZ 8 BLUE

## (undated) DEVICE — POV-IOD SOLUTION 4OZ BT

## (undated) DEVICE — GLOVE SRG BIOGEL ECLIPSE 8

## (undated) DEVICE — SUT VICRYL 0 REEL 54 IN J287G

## (undated) DEVICE — SUT VICRYL 3-0 CT-1 36 IN J944H

## (undated) DEVICE — LOOP OSTOMY BRIDGE - STERILE: Brand: HOLLISTER

## (undated) DEVICE — TELFA NON-ADHERENT ABSORBENT DRESSING: Brand: TELFA

## (undated) DEVICE — TROCAR: Brand: KII SLEEVE

## (undated) DEVICE — SUT SILK 3-0 SH CR/8 18 IN C013D

## (undated) DEVICE — SPONGE LAP 18 X 18 IN STRL RFD

## (undated) DEVICE — MEDI-VAC YANKAUER SUCTION HANDLE W/BULBOUS AND CONTROL VENT: Brand: CARDINAL HEALTH

## (undated) DEVICE — MAYO STAND COVER: Brand: CONVERTORS

## (undated) DEVICE — IRRIG ENDO FLO TUBING

## (undated) DEVICE — SUT MONOCRYL 4-0 PS-2 27 IN Y426H

## (undated) DEVICE — TUBE SET SMOKE EVAC PNEUMOCLEAR HIGH FLOW

## (undated) DEVICE — SUT VICRYL 4-0 SH 27 IN J415H